# Patient Record
Sex: MALE | Race: BLACK OR AFRICAN AMERICAN | NOT HISPANIC OR LATINO | Employment: OTHER | URBAN - METROPOLITAN AREA
[De-identification: names, ages, dates, MRNs, and addresses within clinical notes are randomized per-mention and may not be internally consistent; named-entity substitution may affect disease eponyms.]

---

## 2017-12-12 ENCOUNTER — HOSPITAL ENCOUNTER (EMERGENCY)
Facility: CLINIC | Age: 67
Discharge: HOME | End: 2017-12-12
Attending: EMERGENCY MEDICINE | Admitting: EMERGENCY MEDICINE
Payer: OTHER MISCELLANEOUS

## 2017-12-12 ENCOUNTER — APPOINTMENT (OUTPATIENT)
Dept: GENERAL RADIOLOGY | Facility: CLINIC | Age: 67
End: 2017-12-12
Attending: EMERGENCY MEDICINE
Payer: OTHER MISCELLANEOUS

## 2017-12-12 VITALS
DIASTOLIC BLOOD PRESSURE: 100 MMHG | SYSTOLIC BLOOD PRESSURE: 141 MMHG | BODY MASS INDEX: 29.4 KG/M2 | TEMPERATURE: 98 F | RESPIRATION RATE: 16 BRPM | WEIGHT: 210 LBS | OXYGEN SATURATION: 98 % | HEIGHT: 71 IN

## 2017-12-12 DIAGNOSIS — T14.8XXA HEMATOMA: ICD-10-CM

## 2017-12-12 DIAGNOSIS — R07.89 CHEST WALL PAIN: ICD-10-CM

## 2017-12-12 PROCEDURE — 25000128 H RX IP 250 OP 636: Performed by: EMERGENCY MEDICINE

## 2017-12-12 PROCEDURE — 90715 TDAP VACCINE 7 YRS/> IM: CPT | Performed by: EMERGENCY MEDICINE

## 2017-12-12 PROCEDURE — 71101 X-RAY EXAM UNILAT RIBS/CHEST: CPT | Mod: RT

## 2017-12-12 PROCEDURE — 25000132 ZZH RX MED GY IP 250 OP 250 PS 637: Performed by: EMERGENCY MEDICINE

## 2017-12-12 PROCEDURE — 99283 EMERGENCY DEPT VISIT LOW MDM: CPT

## 2017-12-12 PROCEDURE — 90471 IMMUNIZATION ADMIN: CPT

## 2017-12-12 RX ORDER — ACETAMINOPHEN 325 MG/1
650 TABLET ORAL ONCE
Status: COMPLETED | OUTPATIENT
Start: 2017-12-12 | End: 2017-12-12

## 2017-12-12 RX ADMIN — CLOSTRIDIUM TETANI TOXOID ANTIGEN (FORMALDEHYDE INACTIVATED), CORYNEBACTERIUM DIPHTHERIAE TOXOID ANTIGEN (FORMALDEHYDE INACTIVATED), BORDETELLA PERTUSSIS TOXOID ANTIGEN (GLUTARALDEHYDE INACTIVATED), BORDETELLA PERTUSSIS FILAMENTOUS HEMAGGLUTININ ANTIGEN (FORMALDEHYDE INACTIVATED), BORDETELLA PERTUSSIS PERTACTIN ANTIGEN, AND BORDETELLA PERTUSSIS FIMBRIAE 2/3 ANTIGEN 0.5 ML: 5; 2; 2.5; 5; 3; 5 INJECTION, SUSPENSION INTRAMUSCULAR at 03:17

## 2017-12-12 RX ADMIN — ACETAMINOPHEN 650 MG: 325 TABLET, FILM COATED ORAL at 02:59

## 2017-12-12 ASSESSMENT — ENCOUNTER SYMPTOMS
SHORTNESS OF BREATH: 0
MYALGIAS: 1

## 2017-12-12 NOTE — ED NOTES
Bed: ED25  Expected date:   Expected time:   Means of arrival:   Comments:  531- 67 male chest pain

## 2017-12-12 NOTE — ED AVS SNAPSHOT
Emergency Department    [address] [place] [address]    Phone:  [phone]    Fax:  [phone]                                       Raj Warren   MRN: [id number]    Department:   Emergency Department   Date of Visit:  12/12/2017           After Visit Summary Signature Page     I have received my discharge instructions, and my questions have been answered. I have discussed any challenges I see with this plan with the nurse or doctor.    ..........................................................................................................................................  Patient/Patient Representative Signature      ..........................................................................................................................................  Patient Representative Print Name and Relationship to Patient    ..................................................               ................................................  Date                                            Time    ..........................................................................................................................................  Reviewed by Signature/Title    ...................................................              ..............................................  Date                                                            Time
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
## 2017-12-12 NOTE — LETTER
December 12, 2017      To Whom It May Concern:      Raj Warren was seen in our Emergency Department today, 12/12/17.  I expect his condition to improve over the next 2 days.  He may return to work/school when improved.    Sincerely,        Serg Miller, DO

## 2017-12-12 NOTE — ED NOTES
Received report from Samira TURPIN RN.   Assumed care at this time.  Belkis Andres RN,.......................................... 12/12/2017   3:31 AM

## 2018-02-19 ENCOUNTER — OFFICE VISIT (OUTPATIENT)
Dept: URGENT CARE | Facility: URGENT CARE | Age: 68
End: 2018-02-19
Payer: COMMERCIAL

## 2018-02-19 VITALS
SYSTOLIC BLOOD PRESSURE: 120 MMHG | HEART RATE: 106 BPM | RESPIRATION RATE: 22 BRPM | BODY MASS INDEX: 30.13 KG/M2 | TEMPERATURE: 98.6 F | WEIGHT: 216 LBS | DIASTOLIC BLOOD PRESSURE: 80 MMHG | OXYGEN SATURATION: 98 %

## 2018-02-19 DIAGNOSIS — R05.8 PRODUCTIVE COUGH: ICD-10-CM

## 2018-02-19 DIAGNOSIS — R50.9 FEVER IN ADULT: Primary | ICD-10-CM

## 2018-02-19 DIAGNOSIS — R09.89 CHEST CONGESTION: ICD-10-CM

## 2018-02-19 DIAGNOSIS — J10.1 INFLUENZA A: ICD-10-CM

## 2018-02-19 DIAGNOSIS — J98.01 ACUTE BRONCHOSPASM: ICD-10-CM

## 2018-02-19 LAB
FLUAV+FLUBV AG SPEC QL: NEGATIVE
FLUAV+FLUBV AG SPEC QL: POSITIVE
SPECIMEN SOURCE: ABNORMAL

## 2018-02-19 PROCEDURE — 87804 INFLUENZA ASSAY W/OPTIC: CPT | Mod: 59 | Performed by: FAMILY MEDICINE

## 2018-02-19 PROCEDURE — 99214 OFFICE O/P EST MOD 30 MIN: CPT | Performed by: PHYSICIAN ASSISTANT

## 2018-02-19 RX ORDER — ALBUTEROL SULFATE 90 UG/1
2 AEROSOL, METERED RESPIRATORY (INHALATION) EVERY 6 HOURS
Qty: 1 INHALER | Refills: 0 | Status: SHIPPED | OUTPATIENT
Start: 2018-02-19 | End: 2020-04-02

## 2018-02-19 RX ORDER — AZITHROMYCIN 250 MG/1
TABLET, FILM COATED ORAL
Qty: 6 TABLET | Refills: 0 | Status: SHIPPED | OUTPATIENT
Start: 2018-02-19 | End: 2020-04-02

## 2018-02-19 RX ORDER — CODEINE PHOSPHATE AND GUAIFENESIN 10; 100 MG/5ML; MG/5ML
5-10 SOLUTION ORAL
Qty: 120 ML | Refills: 0 | Status: SHIPPED | OUTPATIENT
Start: 2018-02-19 | End: 2020-04-02

## 2018-02-19 NOTE — LETTER
[facility]  [address] [address]  [phone]      February 19, 2018    RE:  Raj Warren                                                                                                                                                       [address]  [facility] [address]            To whom it may concern:    Raj Warren was seen in the [facility] today for a medical issue. Please excuse patient from work on 2/19/18 and 2/20/18. Pt may return to work on 2/21/18.        Sincerely,        [place], KOKO/ Tristin Browne, PAC    [place] [facility]

## 2018-02-19 NOTE — MR AVS SNAPSHOT
"              After Visit Summary   2018    Raj Warren    MRN: [id number]           Patient Information     Date Of Birth          1950        Visit Information        Provider Department      2018 2:25 PM Tristin Browne PA-C [facility]        Today's Diagnoses     Fever in adult    -  1    Influenza A        Productive cough        Chest congestion        Acute bronchospasm           Follow-ups after your visit        Who to contact     If you have questions or need follow up information about today's clinic visit or your schedule please contact [facility] directly at [phone].  Normal or non-critical lab and imaging results will be communicated to you by [employer], letter or phone within 4 business days after the clinic has received the results. If you do not hear from us within 7 days, please contact the clinic through [employer] or phone. If you have a critical or abnormal lab result, we will notify you by phone as soon as possible.  Submit refill requests through [employer] or call your pharmacy and they will forward the refill request to us. Please allow 3 business days for your refill to be completed.          Additional Information About Your Visit        MyChart Information     [employer] lets you send messages to your doctor, view your test results, renew your prescriptions, schedule appointments and more. To sign up, go to [email or web address] . Click on \"Log in\" on the left side of the screen, which will take you to the Welcome page. Then click on \"Sign up Now\" on the right side of the page.     You will be asked to enter the access code listed below, as well as some personal information. Please follow the directions to create your username and password.     Your access code is: [email or web address]  Expires: 3/12/2018  4:13 AM     Your access code will  in 90 days. If you need help or a new code, please call your [place] clinic or " Pt has rested quietly during the 12-7a shift. oob to br with assistance. Dawson drains x2 draining brownish fluid without diff. Pt remains alert and oriented x4. Denies pain this shift. ivf infusing via iv site without diff. Iv site benign. Edema noted in bilateral hands. sr up x3, bed lowered and locked, and call light within reach. [phone].        Care EveryWhere ID     This is your Care EveryWhere ID. This could be used by other organizations to access your [facility] medical records  [id number]        Your Vitals Were     Pulse Temperature Respirations Pulse Oximetry BMI (Body Mass Index)       106 98.6  F (37  C) 22 98% 30.13 kg/m2        Blood Pressure from Last 3 Encounters:   02/19/18 120/80   12/12/17 (!) 141/100   01/31/12 129/103    Weight from Last 3 Encounters:   02/19/18 216 lb (98 kg)   12/12/17 210 lb (95.3 kg)   01/30/12 205 lb (93 kg)              We Performed the Following     Influenza A/B antigen          Today's Medication Changes          These changes are accurate as of 2/19/18  4:37 PM.  If you have any questions, ask your nurse or doctor.               Start taking these medicines.        Dose/Directions    albuterol 108 (90 BASE) MCG/ACT Inhaler   Commonly known as:  PROVENTIL HFA   Used for:  Acute bronchospasm   Started by:  Tristin Browne PA-C        Dose:  2 puff   Inhale 2 puffs into the lungs every 6 hours   Quantity:  1 Inhaler   Refills:  0       azithromycin 250 MG tablet   Commonly known as:  ZITHROMAX   Used for:  Productive cough   Started by:  Tristin Browne PA-C        2 tabs po qd day 1, then 1 tab po qd days 2-5   Quantity:  6 tablet   Refills:  0       guaiFENesin-codeine 100-10 MG/5ML Soln solution   Commonly known as:  ROBITUSSIN AC   Used for:  Chest congestion   Started by:  Tristin Browne PA-C        Dose:  5-10 mL   Take 5-10 mLs by mouth nightly as needed for cough   Quantity:  120 mL   Refills:  0            Where to get your medicines      These medications were sent to [facility] [address] [address]     Phone:  [phone]     albuterol 108 (90 BASE) MCG/ACT Inhaler    azithromycin 250 MG tablet         Some of these will need a paper prescription and others can be bought over the counter.  Ask your nurse if  you have questions.     Bring a paper prescription for each of these medications     guaiFENesin-codeine 100-10 MG/5ML Soln solution                Primary Care Provider Fax #    Physician No Ref-Primary [phone]       No address on file        Equal Access to Services     [facility] : Gricelda mckeon ku haley Soestebanali, waaxda luqadaha, qaybta kaalmada adefredyyacharlotte, amy mccormickstarr amaro. So [facility] [phone].    ATENCIÓN: Si habla español, tiene a cheema disposición servicios gratuitos de asistencia lingüística. Llame al [phone].    We comply with applicable federal civil rights laws and [place] laws. We do not discriminate on the basis of race, color, national origin, age, disability, sex, sexual orientation, or gender identity.            Thank you!     Thank you for choosing [facility]  for your care. Our goal is always to provide you with excellent care. Hearing back from our patients is one way we can continue to improve our services. Please take a few minutes to complete the written survey that you may receive in the mail after your visit with us. Thank you!             Your Updated Medication List - Protect others around you: Learn how to safely use, store and throw away your medicines at www.disposemymeds.org.          This list is accurate as of 2/19/18  4:37 PM.  Always use your most recent med list.                   Brand Name Dispense Instructions for use Diagnosis    albuterol 108 (90 BASE) MCG/ACT Inhaler    PROVENTIL HFA    1 Inhaler    Inhale 2 puffs into the lungs every 6 hours    Acute bronchospasm       azithromycin 250 MG tablet    ZITHROMAX    6 tablet    2 tabs po qd day 1, then 1 tab po qd days 2-5    Productive cough       CLARITIN PO      Take  by mouth.        FLONASE 50 MCG/ACT spray   Generic drug:  fluticasone      INHALE 2 SPRAYS IN EACH NOSTRIL ONCE DAILY at bedtime        guaiFENesin-codeine 100-10 MG/5ML Soln solution    ROBITUSSIN  AC    120 mL    Take 5-10 mLs by mouth nightly as needed for cough    Chest congestion       meclizine 25 MG tablet    ANTIVERT    30 Tab    ONE TABLET FOUR TIMES DAILY AS NEEDED    Labyrinthitis       TYLENOL COLD MAX 10-5-325 MG Tabs   Generic drug:  DM-Phenylephrine-Acetaminophen      Take by mouth as needed        ZYRTEC-D 5-120 MG per 12 hr tablet   Generic drug:  cetirizine-psuedoePHEDrine      1 TABLET TWICE DAILY AS NEEDED

## 2018-02-19 NOTE — PROGRESS NOTES
SUBJECTIVE:   Raj Warren is a 67 year old male presenting with a chief complaint of chest congestion, productive cough and body aches with fver.  Onset of symptoms was 5- days  ago.  Course of illness is worsening.    Severity moderate  Current and Associated symptoms: chest congestion, productive cough, bronchospasms  Treatment measures tried include OTC medications.  Predisposing factors include influenza A.    Past Medical History:   Diagnosis Date     Allergic state         Allergies   Allergen Reactions     Seasonal Allergies          Social History   Substance Use Topics     Smoking status: Former Smoker     Types: Cigarettes     Quit date: 1/1/1989     Smokeless tobacco: Never Used      Comment: quit in 1989  had smoked about 1/2 pack a day then cut back     Alcohol use No       ROS:  CONSTITUTIONAL:POSITIVE  for fever and chills  INTEGUMENTARY/SKIN: NEGATIVE for worrisome rashes, moles or lesions  ENT/MOUTH: positive for runny nose, throat pain  RESP:POSITIVE for coughing and chest congestion  CV: NEGATIVE for chest pain, palpitations or peripheral edema  GI: NEGATIVE for nausea, abdominal pain, heartburn, or change in bowel habits  MUSCULOSKELETAL: positive for body aches  NEURO: NEGATIVE for weakness, dizziness or paresthesias    OBJECTIVE  :/80  Pulse 106  Temp 98.6  F (37  C)  Resp 22  Wt 216 lb (98 kg)  SpO2 98%  BMI 30.13 kg/m2  GENERAL APPEARANCE: healthy, alert and no distress  EYES: EOMI,  PERRL, conjunctiva clear  HENT: ear canals and TM's normal.  Nose and mouth without ulcers, erythema or lesions  NECK: supple, nontender, no lymphadenopathy  RESP: Positive for wheezing and bronchospasms  CV: regular rates and rhythm, normal S1 S2, no murmur noted  NEURO: Normal strength and tone, sensory exam grossly normal,  normal speech and mentation  SKIN: no suspicious lesions or rashes    Results for orders placed or performed in visit on 02/19/18   Influenza A/B antigen   Result Value Ref  Range    Influenza A/B Agn Specimen Nasopharyngeal     Influenza A Positive (A) NEG^Negative    Influenza B Negative NEG^Negative       ASSESSMENT/PLAN:    ICD-10-CM    1. Fever in adult R50.9 Influenza A/B antigen   2. Influenza A J10.1    3. Productive cough R05 azithromycin (ZITHROMAX) 250 MG tablet   4. Chest congestion R09.89 guaiFENesin-codeine (ROBITUSSIN AC) 100-10 MG/5ML SOLN solution   5. Acute bronchospasm J98.01 albuterol (PROVENTIL HFA) 108 (90 BASE) MCG/ACT Inhaler     Patient given information about influenza.  Patient understands they are contagious until their fever has resolved without the use of motrin or tylenol.  At that time they can return to school/work.  Patient is to monitor for any worsening symptoms and return to the clinic if this occurs.  The most common complication of influenza is Pneumonia or other respiratory problems especially in those with underlying lung problems including asthma and COPD.  Patient will follow up if this occurs.    Patient given inhaler and zpak for bronchospasms and infection  Follow up with PCP as needed  See orders in Epic

## 2019-08-07 ENCOUNTER — HOSPITAL PATHOLOGY (OUTPATIENT)
Dept: OTHER | Facility: CLINIC | Age: 69
End: 2019-08-07

## 2019-08-09 LAB — COPATH REPORT: NORMAL

## 2020-04-02 ENCOUNTER — OFFICE VISIT (OUTPATIENT)
Dept: URGENT CARE | Facility: URGENT CARE | Age: 70
End: 2020-04-02
Payer: COMMERCIAL

## 2020-04-02 VITALS — HEART RATE: 83 BPM | SYSTOLIC BLOOD PRESSURE: 157 MMHG | DIASTOLIC BLOOD PRESSURE: 88 MMHG | TEMPERATURE: 98 F

## 2020-04-02 DIAGNOSIS — L29.9 ITCHING: ICD-10-CM

## 2020-04-02 DIAGNOSIS — J30.89 SEASONAL ALLERGIC RHINITIS DUE TO OTHER ALLERGIC TRIGGER: ICD-10-CM

## 2020-04-02 DIAGNOSIS — L50.9 HIVES: Primary | ICD-10-CM

## 2020-04-02 PROCEDURE — 99214 OFFICE O/P EST MOD 30 MIN: CPT | Performed by: PHYSICIAN ASSISTANT

## 2020-04-02 RX ORDER — METHYLPREDNISOLONE 4 MG
TABLET, DOSE PACK ORAL
Qty: 21 TABLET | Refills: 0 | Status: ON HOLD | OUTPATIENT
Start: 2020-04-02 | End: 2021-04-12

## 2020-04-02 RX ORDER — CETIRIZINE HYDROCHLORIDE 10 MG/1
10 TABLET ORAL DAILY
Qty: 30 TABLET | Refills: 0 | Status: ON HOLD | OUTPATIENT
Start: 2020-04-02 | End: 2021-04-12

## 2020-04-02 NOTE — PROGRESS NOTES
SUBJECTIVE:   Raj Warren is a 69 year old male presenting with a chief complaint of itching and hives.  Onset of symptoms was 4 day(s) ago.  Course of illness is worsening.    Severity moderate  Current and Associated symptoms: runny nose, stuffy nose and itching  Treatment measures tried include none.  Predisposing factors include allergies.    Past Medical History:   Diagnosis Date     Allergic state         Allergies   Allergen Reactions     Pollen Extract      Seasonal Allergies      Family History   Problem Relation Age of Onset     Circulatory Mother         blood clots     Alcohol/Drug Father         alcohol drank heavily     Alcohol/Drug Brother         alcohol         Social History     Tobacco Use     Smoking status: Former Smoker     Types: Cigarettes     Last attempt to quit: 1989     Years since quittin.2     Smokeless tobacco: Never Used     Tobacco comment: quit in   had smoked about 1/2 pack a day then cut back   Substance Use Topics     Alcohol use: No       ROS:  CONSTITUTIONAL:NEGATIVE for fever, chills, change in weight  INTEGUMENTARY/SKIN: POSITIVE for itching and rash  EYES: NEGATIVE for vision changes or irritation  ENT/MOUTH: POSITIVE for runny nose  RESP:NEGATIVE for significant cough or SOB  CV: NEGATIVE for chest pain, palpitations or peripheral edema  GI: NEGATIVE for nausea, abdominal pain, heartburn, or change in bowel habits  : negative for dysuria, hematuria, decreased urinary stream, erectile dysfunction  MUSCULOSKELETAL: NEGATIVE for significant arthralgias or myalgia  NEURO: NEGATIVE for weakness, dizziness or paresthesias    OBJECTIVE  :BP (!) 157/88   Pulse 83   Temp 98  F (36.7  C) (Oral)   GENERAL APPEARANCE: healthy, alert and no distress  EYES: EOMI,  PERRL, conjunctiva clear  HENT: TM's normal bilaterally and rhinorrhea clear  NECK: supple, nontender, no lymphadenopathy  RESP: lungs clear to auscultation - no rales, rhonchi or wheezes  CV: regular rates  and rhythm, normal S1 S2, no murmur noted  ABDOMEN:  soft, nontender, no HSM or masses and bowel sounds normal  NEURO: Normal strength and tone, sensory exam grossly normal,  normal speech and mentation  SKIN: hives and itching    ASSESSMENT/PLAN      ICD-10-CM    1. Hives  L50.9 cetirizine (ZYRTEC) 10 MG tablet     methylPREDNISolone (MEDROL DOSEPAK) 4 MG tablet therapy pack   2. Itching  L29.9 cetirizine (ZYRTEC) 10 MG tablet     methylPREDNISolone (MEDROL DOSEPAK) 4 MG tablet therapy pack   3. Seasonal allergic rhinitis due to other allergic trigger  J30.89        Orders Placed This Encounter     cetirizine (ZYRTEC) 10 MG tablet     methylPREDNISolone (MEDROL DOSEPAK) 4 MG tablet therapy pack       Use zyrtec for rash and itching, allergies  Medrol for rash  Follow up with PCP as needed

## 2021-04-05 ENCOUNTER — APPOINTMENT (OUTPATIENT)
Dept: GENERAL RADIOLOGY | Facility: CLINIC | Age: 71
End: 2021-04-05
Attending: EMERGENCY MEDICINE
Payer: COMMERCIAL

## 2021-04-05 ENCOUNTER — HOSPITAL ENCOUNTER (EMERGENCY)
Facility: CLINIC | Age: 71
Discharge: HOME | End: 2021-04-05
Attending: EMERGENCY MEDICINE | Admitting: EMERGENCY MEDICINE
Payer: COMMERCIAL

## 2021-04-05 VITALS
HEART RATE: 102 BPM | DIASTOLIC BLOOD PRESSURE: 108 MMHG | SYSTOLIC BLOOD PRESSURE: 161 MMHG | OXYGEN SATURATION: 97 % | RESPIRATION RATE: 18 BRPM

## 2021-04-05 DIAGNOSIS — R06.1 STRIDOR: ICD-10-CM

## 2021-04-05 LAB
ANION GAP SERPL CALCULATED.3IONS-SCNC: 4 MMOL/L (ref 3–14)
BASOPHILS # BLD AUTO: 0.1 10E9/L (ref 0–0.2)
BASOPHILS NFR BLD AUTO: 0.6 %
BUN SERPL-MCNC: 14 MG/DL (ref 7–30)
CALCIUM SERPL-MCNC: 8.6 MG/DL (ref 8.5–10.1)
CHLORIDE SERPL-SCNC: 109 MMOL/L (ref 94–109)
CO2 SERPL-SCNC: 27 MMOL/L (ref 20–32)
CREAT SERPL-MCNC: 0.97 MG/DL (ref 0.66–1.25)
DIFFERENTIAL METHOD BLD: NORMAL
EOSINOPHIL # BLD AUTO: 0.4 10E9/L (ref 0–0.7)
EOSINOPHIL NFR BLD AUTO: 4.4 %
ERYTHROCYTE [DISTWIDTH] IN BLOOD BY AUTOMATED COUNT: 12.9 % (ref 10–15)
GFR SERPL CREATININE-BSD FRML MDRD: 79 ML/MIN/{1.73_M2}
GLUCOSE SERPL-MCNC: 114 MG/DL (ref 70–99)
HCT VFR BLD AUTO: 45.7 % (ref 40–53)
HGB BLD-MCNC: 15.9 G/DL (ref 13.3–17.7)
IMM GRANULOCYTES # BLD: 0 10E9/L (ref 0–0.4)
IMM GRANULOCYTES NFR BLD: 0.3 %
INTERPRETATION ECG - MUSE: NORMAL
LYMPHOCYTES # BLD AUTO: 3.7 10E9/L (ref 0.8–5.3)
LYMPHOCYTES NFR BLD AUTO: 46.9 %
MCH RBC QN AUTO: 30.1 PG (ref 26.5–33)
MCHC RBC AUTO-ENTMCNC: 34.8 G/DL (ref 31.5–36.5)
MCV RBC AUTO: 86 FL (ref 78–100)
MONOCYTES # BLD AUTO: 0.8 10E9/L (ref 0–1.3)
MONOCYTES NFR BLD AUTO: 9.9 %
NEUTROPHILS # BLD AUTO: 3 10E9/L (ref 1.6–8.3)
NEUTROPHILS NFR BLD AUTO: 37.9 %
NRBC # BLD AUTO: 0 10*3/UL
NRBC BLD AUTO-RTO: 0 /100
PLATELET # BLD AUTO: 285 10E9/L (ref 150–450)
POTASSIUM SERPL-SCNC: 4.1 MMOL/L (ref 3.4–5.3)
RBC # BLD AUTO: 5.29 10E12/L (ref 4.4–5.9)
SODIUM SERPL-SCNC: 140 MMOL/L (ref 133–144)
WBC # BLD AUTO: 7.9 10E9/L (ref 4–11)

## 2021-04-05 PROCEDURE — 96374 THER/PROPH/DIAG INJ IV PUSH: CPT

## 2021-04-05 PROCEDURE — 250N000013 HC RX MED GY IP 250 OP 250 PS 637: Performed by: EMERGENCY MEDICINE

## 2021-04-05 PROCEDURE — 93005 ELECTROCARDIOGRAM TRACING: CPT

## 2021-04-05 PROCEDURE — 70360 X-RAY EXAM OF NECK: CPT

## 2021-04-05 PROCEDURE — 250N000009 HC RX 250

## 2021-04-05 PROCEDURE — 250N000011 HC RX IP 250 OP 636: Performed by: EMERGENCY MEDICINE

## 2021-04-05 PROCEDURE — 94640 AIRWAY INHALATION TREATMENT: CPT

## 2021-04-05 PROCEDURE — 99285 EMERGENCY DEPT VISIT HI MDM: CPT | Mod: 25

## 2021-04-05 PROCEDURE — 71045 X-RAY EXAM CHEST 1 VIEW: CPT

## 2021-04-05 PROCEDURE — 85025 COMPLETE CBC W/AUTO DIFF WBC: CPT | Performed by: EMERGENCY MEDICINE

## 2021-04-05 PROCEDURE — C9803 HOPD COVID-19 SPEC COLLECT: HCPCS

## 2021-04-05 PROCEDURE — 250N000009 HC RX 250: Performed by: EMERGENCY MEDICINE

## 2021-04-05 PROCEDURE — 80048 BASIC METABOLIC PNL TOTAL CA: CPT | Performed by: EMERGENCY MEDICINE

## 2021-04-05 RX ORDER — PREDNISONE 20 MG/1
40 TABLET ORAL DAILY
Qty: 10 TABLET | Refills: 0 | Status: SHIPPED | OUTPATIENT
Start: 2021-04-05 | End: 2021-04-10

## 2021-04-05 RX ORDER — IPRATROPIUM BROMIDE AND ALBUTEROL SULFATE 2.5; .5 MG/3ML; MG/3ML
3 SOLUTION RESPIRATORY (INHALATION) ONCE
Status: COMPLETED | OUTPATIENT
Start: 2021-04-05 | End: 2021-04-05

## 2021-04-05 RX ORDER — IPRATROPIUM BROMIDE AND ALBUTEROL SULFATE 2.5; .5 MG/3ML; MG/3ML
3 SOLUTION RESPIRATORY (INHALATION) ONCE
Status: DISCONTINUED | OUTPATIENT
Start: 2021-04-05 | End: 2021-04-05 | Stop reason: HOSPADM

## 2021-04-05 RX ORDER — METHYLPREDNISOLONE SODIUM SUCCINATE 125 MG/2ML
125 INJECTION, POWDER, LYOPHILIZED, FOR SOLUTION INTRAMUSCULAR; INTRAVENOUS ONCE
Status: COMPLETED | OUTPATIENT
Start: 2021-04-05 | End: 2021-04-05

## 2021-04-05 RX ORDER — IPRATROPIUM BROMIDE AND ALBUTEROL SULFATE 2.5; .5 MG/3ML; MG/3ML
SOLUTION RESPIRATORY (INHALATION)
Status: COMPLETED
Start: 2021-04-05 | End: 2021-04-05

## 2021-04-05 RX ADMIN — IPRATROPIUM BROMIDE AND ALBUTEROL SULFATE 3 ML: 2.5; .5 SOLUTION RESPIRATORY (INHALATION) at 06:35

## 2021-04-05 RX ADMIN — IPRATROPIUM BROMIDE AND ALBUTEROL SULFATE 3 ML: .5; 3 SOLUTION RESPIRATORY (INHALATION) at 06:35

## 2021-04-05 RX ADMIN — RACEPINEPHRINE HYDROCHLORIDE 0.5 ML: 11.25 SOLUTION RESPIRATORY (INHALATION) at 08:44

## 2021-04-05 RX ADMIN — IPRATROPIUM BROMIDE AND ALBUTEROL SULFATE 3 ML: .5; 3 SOLUTION RESPIRATORY (INHALATION) at 07:02

## 2021-04-05 RX ADMIN — METHYLPREDNISOLONE SODIUM SUCCINATE 125 MG: 125 INJECTION, POWDER, FOR SOLUTION INTRAMUSCULAR; INTRAVENOUS at 06:54

## 2021-04-05 ASSESSMENT — ENCOUNTER SYMPTOMS
NAUSEA: 0
SHORTNESS OF BREATH: 1
WHEEZING: 1
COUGH: 0
DIAPHORESIS: 0
CHEST TIGHTNESS: 1
FEVER: 0
CHILLS: 0

## 2021-04-05 NOTE — ED PROVIDER NOTES
History   Chief Complaint:  Shortness of Breath    HPI   Raj Warren is a 70 year old male, who presents to the ED for evaluation of shortness of breath. The patient reports he has been dealing with some shortness of breath for the past week. He says he lives on the third floor of his building and when he walks up the three flights of stairs he feels as if he has extreme chest tightness and that he is having a heart attack. He says he is wheezing, has a sore throat, bilateral ear soreness and that he has to turn the air conditioner on and sit for about 7-8 minutes before his symptoms subside. He cannot tell if he feels that his sore throat is swelling or closing, rather it just hurts. He has not had any fever or nausea. The patient denies any chest pain rather it just feels tight. He denies any diaphoresis, chills, cough, or any other acute symptoms. He had 2 negative covid tests recently, last one 3/25/21.     Review of Systems   Constitutional: Negative for chills, diaphoresis and fever.   Respiratory: Positive for chest tightness, shortness of breath and wheezing. Negative for cough.    Cardiovascular: Negative for chest pain.   Gastrointestinal: Negative for nausea.   All other systems reviewed and are negative.    Allergies:  No known drug allergies    Medications:    The patient is not currently taking any prescribed medications.    Past Medical History:    Aphthous ulcer    Past Surgical History:    The patient denies past surgical history.     Family History:    Circulatory abnormalities  Alcohol abuse    Social History:  Presents to the ED alone  Lives independently    Physical Exam     Patient Vitals for the past 24 hrs:   BP Pulse Resp SpO2   04/05/21 0628 (!) 161/108 102 18 97 %       Physical Exam  General/Appearance: appears stated age, well-groomed, appears comfortable, stridulous, hoarse voice which pt states is his baseline  Eyes: EOMI, no scleral injection, no icterus  ENT: MMM  Neck: supple,  nl ROM, no stiffness  Cardiovascular: tachy but regular, nl S1S2, no m/r/g, 2+ pulses in all 4 extremities, cap refill <2sec  Respiratory: stridor vs wheezing, mild increased WOB but able to communicate in full sentences  GI: abd soft, non-distended, nttp,  no HSM, no rebound, no guarding, nl BS  MSK: SIMPSON, good tone, no bony abnormality  Skin: warm and well-perfused, no rash, no edema, no ecchymosis, nl turgor  Neuro: GCS 15, alert and oriented, no gross focal neuro deficits  Psych: interacts appropriately  Heme: no petechia, no purpura, no active bleeding        Emergency Department Course   ECG (06:49:53):  Rate 100 bpm. AR interval 154. QRS duration 68. QT/QTc 346/446. P-R-T axes 77 42 76. Normal sinu rhythm. Normal ECG. Interpreted at 0652 by Samantha Soliman MD.    Imaging:    XR Chest, portable, 1 view:  Unremarkable single view of the chest.     Neck soft tissue XR:  The radiographic appearance of the base of tongue and   epiglottis is within normal limits. The prevertebral/retropharyngeal   soft tissues appear within normal limits. Multiple dental restorations   are partially seen. No definite radiopaque foreign body identified.   Multilevel degenerative disc disease is noted in the cervical spine.   If there is ongoing clinical concern for significant upper airway   stenosis or soft tissue abnormality, consider direct inspection or CT   of the neck, as clinically warranted.     Imaging independently reviewed and agree with radiologist interpretation.      Laboratory:  CBC: WBC 7.9, HGB 15.9,     BMP:  (H), o/w WNL (Creatinine 0.97)    Emergency Department Course:    Reviewed:  I reviewed the patient's nursing notes, vitals, past available medical records.     Assessments:  0630: I obtained history and examined the patient as noted above.     0711: I rechecked the patient. The patient states has had great improvement in his symptoms since he arrived.     0824: I rechecked the  "patient. Explained findings to patient. The patient is feeling much improved.    0905: I rechecked the patient after receiving additional medication and he stated \" my breathing is as good as it is going to get\". He does feel improved and ready for discharge.     Interventions:  0635: Duoneb 3 mL nebulization   0654: Solu-Medrol 125 mg IV  0702: Duoneb 3 mL nebulization   0844: Racepinephrine 0.5 mL nebulization     Disposition:  The patient was discharged to home.    Impression & Plan    Medical Decision Making:  This patient is a very pleasant 70-year-old male who presents with shortness of breath for several days.  On initial presentation it was noted that he had a fairly hoarse voice but he states this is his baseline.  It was difficult to tell if it was wheezing versus stridor.  We attempted to DuoNeb without any change in the audible noises or his symptoms.  At that point we tried a racemic epi which markedly proved how he felt and resolved the audible breathing noises.  This is consistent with stridor.  He is already gotten Solu-Medrol and I think would benefit from a steroid burst.  He clinically was able to complete full sentences, ambulate through the hallway, maintain normal oxygen saturations.  We did get a neck x-ray which did not show any evidence of enlarged epiglottis.  Because of this I suspect this is some parainfluenza or such virus and that this can be managed symptomatically at home.  He wishes to be discharged.  I said this is fine and we can remove the IV and not watch him for a couple hours after the racemic epi dose as long as, if he notices any worsening or recurrence of the audible stridor, he needs to return to the ER immediately.  He says he is willing to do this and thus will be discharged.    Covid-19  Raj Warren was evaluated during a global COVID-19 pandemic, which necessitated consideration that the patient might be at risk for infection with the SARS-CoV-2 virus that causes " COVID-19.   Applicable protocols for evaluation were followed during the patient's care.   COVID-19 was considered as part of the patient's evaluation.    Diagnosis:    ICD-10-CM    1. Stridor  R06.1        Discharge Medications:  New Prescriptions    PREDNISONE (DELTASONE) 20 MG TABLET    Take 2 tablets (40 mg) by mouth daily for 5 days     Scribe Disclosure:  Damien LOCKETT, am serving as a scribe at 6:30 AM on 4/5/2021 to document services personally performed by Samantha Soliman MD based on my observations and the provider's statements to me.      Samantha Soliman MD  04/05/21 4300

## 2021-04-12 ENCOUNTER — APPOINTMENT (OUTPATIENT)
Dept: CT IMAGING | Facility: CLINIC | Age: 71
DRG: 013 | End: 2021-04-12
Attending: EMERGENCY MEDICINE
Payer: COMMERCIAL

## 2021-04-12 ENCOUNTER — APPOINTMENT (OUTPATIENT)
Dept: GENERAL RADIOLOGY | Facility: CLINIC | Age: 71
DRG: 013 | End: 2021-04-12
Attending: EMERGENCY MEDICINE
Payer: COMMERCIAL

## 2021-04-12 ENCOUNTER — ANESTHESIA (OUTPATIENT)
Dept: SURGERY | Facility: CLINIC | Age: 71
DRG: 013 | End: 2021-04-12
Payer: COMMERCIAL

## 2021-04-12 ENCOUNTER — ANESTHESIA EVENT (OUTPATIENT)
Dept: SURGERY | Facility: CLINIC | Age: 71
DRG: 013 | End: 2021-04-12
Payer: COMMERCIAL

## 2021-04-12 ENCOUNTER — HOSPITAL ENCOUNTER (INPATIENT)
Facility: CLINIC | Age: 71
LOS: 5 days | Discharge: HOME | DRG: 013 | End: 2021-04-17
Attending: EMERGENCY MEDICINE | Admitting: INTERNAL MEDICINE
Payer: COMMERCIAL

## 2021-04-12 DIAGNOSIS — R52 PAIN: ICD-10-CM

## 2021-04-12 DIAGNOSIS — R06.1 STRIDOR: Primary | ICD-10-CM

## 2021-04-12 DIAGNOSIS — J38.3 VOCAL CORD MASS: ICD-10-CM

## 2021-04-12 LAB
ANION GAP SERPL CALCULATED.3IONS-SCNC: 5 MMOL/L (ref 3–14)
BASE DEFICIT BLDV-SCNC: 0.5 MMOL/L
BASOPHILS # BLD AUTO: 0.4 10E9/L (ref 0–0.2)
BASOPHILS NFR BLD AUTO: 2 %
BUN SERPL-MCNC: 23 MG/DL (ref 7–30)
CALCIUM SERPL-MCNC: 8.7 MG/DL (ref 8.5–10.1)
CHLORIDE SERPL-SCNC: 106 MMOL/L (ref 94–109)
CO2 SERPL-SCNC: 26 MMOL/L (ref 20–32)
CREAT SERPL-MCNC: 1.02 MG/DL (ref 0.66–1.25)
DACRYOCYTES BLD QL SMEAR: SLIGHT
DIFFERENTIAL METHOD BLD: ABNORMAL
EOSINOPHIL # BLD AUTO: 0.2 10E9/L (ref 0–0.7)
EOSINOPHIL NFR BLD AUTO: 1 %
ERYTHROCYTE [DISTWIDTH] IN BLOOD BY AUTOMATED COUNT: 13.2 % (ref 10–15)
FLUAV RNA RESP QL NAA+PROBE: NEGATIVE
FLUBV RNA RESP QL NAA+PROBE: NEGATIVE
GFR SERPL CREATININE-BSD FRML MDRD: 75 ML/MIN/{1.73_M2}
GLUCOSE BLDC GLUCOMTR-MCNC: 144 MG/DL (ref 70–99)
GLUCOSE BLDC GLUCOMTR-MCNC: 163 MG/DL (ref 70–99)
GLUCOSE BLDC GLUCOMTR-MCNC: 164 MG/DL (ref 70–99)
GLUCOSE BLDC GLUCOMTR-MCNC: 167 MG/DL (ref 70–99)
GLUCOSE BLDC GLUCOMTR-MCNC: 186 MG/DL (ref 70–99)
GLUCOSE BLDC GLUCOMTR-MCNC: 207 MG/DL (ref 70–99)
GLUCOSE SERPL-MCNC: 119 MG/DL (ref 70–99)
HBA1C MFR BLD: 5.6 % (ref 0–5.6)
HCO3 BLDV-SCNC: 27 MMOL/L (ref 21–28)
HCT VFR BLD AUTO: 48.3 % (ref 40–53)
HGB BLD-MCNC: 16.9 G/DL (ref 13.3–17.7)
INTERPRETATION ECG - MUSE: NORMAL
LABORATORY COMMENT REPORT: NORMAL
LYMPHOCYTES # BLD AUTO: 10.6 10E9/L (ref 0.8–5.3)
LYMPHOCYTES NFR BLD AUTO: 54 %
MAGNESIUM SERPL-MCNC: 2.4 MG/DL (ref 1.6–2.3)
MCH RBC QN AUTO: 30.1 PG (ref 26.5–33)
MCHC RBC AUTO-ENTMCNC: 35 G/DL (ref 31.5–36.5)
MCV RBC AUTO: 86 FL (ref 78–100)
MONOCYTES # BLD AUTO: 1.4 10E9/L (ref 0–1.3)
MONOCYTES NFR BLD AUTO: 7 %
NEUTROPHILS # BLD AUTO: 7.1 10E9/L (ref 1.6–8.3)
NEUTROPHILS NFR BLD AUTO: 36 %
OXYHGB MFR BLDV: 86 %
PCO2 BLDV: 51 MM HG (ref 40–50)
PH BLDV: 7.33 PH (ref 7.32–7.43)
PLATELET # BLD AUTO: 369 10E9/L (ref 150–450)
PLATELET # BLD EST: ABNORMAL 10*3/UL
PO2 BLDV: 54 MM HG (ref 25–47)
POTASSIUM SERPL-SCNC: 4.1 MMOL/L (ref 3.4–5.3)
RBC # BLD AUTO: 5.61 10E12/L (ref 4.4–5.9)
RBC MORPH BLD: ABNORMAL
RSV RNA SPEC QL NAA+PROBE: NORMAL
SARS-COV-2 RNA RESP QL NAA+PROBE: NEGATIVE
SODIUM SERPL-SCNC: 137 MMOL/L (ref 133–144)
SPECIMEN SOURCE: NORMAL
T4 FREE SERPL-MCNC: 1.17 NG/DL (ref 0.76–1.46)
TSH SERPL DL<=0.005 MIU/L-ACNC: 4.8 MU/L (ref 0.4–4)
VARIANT LYMPHS BLD QL SMEAR: PRESENT
WBC # BLD AUTO: 19.6 10E9/L (ref 4–11)

## 2021-04-12 PROCEDURE — 250N000013 HC RX MED GY IP 250 OP 250 PS 637

## 2021-04-12 PROCEDURE — 71275 CT ANGIOGRAPHY CHEST: CPT

## 2021-04-12 PROCEDURE — 99223 1ST HOSP IP/OBS HIGH 75: CPT | Mod: AI | Performed by: INTERNAL MEDICINE

## 2021-04-12 PROCEDURE — 70491 CT SOFT TISSUE NECK W/DYE: CPT

## 2021-04-12 PROCEDURE — 88305 TISSUE EXAM BY PATHOLOGIST: CPT | Mod: 26 | Performed by: PATHOLOGY

## 2021-04-12 PROCEDURE — 250N000011 HC RX IP 250 OP 636: Performed by: EMERGENCY MEDICINE

## 2021-04-12 PROCEDURE — 250N000025 HC SEVOFLURANE, PER MIN: Performed by: OTOLARYNGOLOGY

## 2021-04-12 PROCEDURE — 258N000003 HC RX IP 258 OP 636: Performed by: NURSE ANESTHETIST, CERTIFIED REGISTERED

## 2021-04-12 PROCEDURE — 93005 ELECTROCARDIOGRAM TRACING: CPT

## 2021-04-12 PROCEDURE — 272N000001 HC OR GENERAL SUPPLY STERILE: Performed by: OTOLARYNGOLOGY

## 2021-04-12 PROCEDURE — 999N000141 HC STATISTIC PRE-PROCEDURE NURSING ASSESSMENT: Performed by: OTOLARYNGOLOGY

## 2021-04-12 PROCEDURE — 999N000157 HC STATISTIC RCP TIME EA 10 MIN

## 2021-04-12 PROCEDURE — 258N000003 HC RX IP 258 OP 636: Performed by: INTERNAL MEDICINE

## 2021-04-12 PROCEDURE — 250N000013 HC RX MED GY IP 250 OP 250 PS 637: Performed by: EMERGENCY MEDICINE

## 2021-04-12 PROCEDURE — 85025 COMPLETE CBC W/AUTO DIFF WBC: CPT | Performed by: EMERGENCY MEDICINE

## 2021-04-12 PROCEDURE — 250N000011 HC RX IP 250 OP 636: Performed by: ANESTHESIOLOGY

## 2021-04-12 PROCEDURE — 0CBS8ZX EXCISION OF LARYNX, VIA NATURAL OR ARTIFICIAL OPENING ENDOSCOPIC, DIAGNOSTIC: ICD-10-PCS | Performed by: OTOLARYNGOLOGY

## 2021-04-12 PROCEDURE — 96374 THER/PROPH/DIAG INJ IV PUSH: CPT | Mod: 59

## 2021-04-12 PROCEDURE — 99292 CRITICAL CARE ADDL 30 MIN: CPT

## 2021-04-12 PROCEDURE — 200N000001 HC R&B ICU

## 2021-04-12 PROCEDURE — 82805 BLOOD GASES W/O2 SATURATION: CPT | Performed by: EMERGENCY MEDICINE

## 2021-04-12 PROCEDURE — 0B110F4 BYPASS TRACHEA TO CUTANEOUS WITH TRACHEOSTOMY DEVICE, OPEN APPROACH: ICD-10-PCS | Performed by: OTOLARYNGOLOGY

## 2021-04-12 PROCEDURE — 99291 CRITICAL CARE FIRST HOUR: CPT | Mod: 25

## 2021-04-12 PROCEDURE — 71045 X-RAY EXAM CHEST 1 VIEW: CPT

## 2021-04-12 PROCEDURE — 83735 ASSAY OF MAGNESIUM: CPT | Performed by: EMERGENCY MEDICINE

## 2021-04-12 PROCEDURE — 250N000011 HC RX IP 250 OP 636: Performed by: OTOLARYNGOLOGY

## 2021-04-12 PROCEDURE — C9803 HOPD COVID-19 SPEC COLLECT: HCPCS

## 2021-04-12 PROCEDURE — 250N000011 HC RX IP 250 OP 636: Performed by: HOSPITALIST

## 2021-04-12 PROCEDURE — 80048 BASIC METABOLIC PNL TOTAL CA: CPT | Performed by: EMERGENCY MEDICINE

## 2021-04-12 PROCEDURE — 84443 ASSAY THYROID STIM HORMONE: CPT | Performed by: EMERGENCY MEDICINE

## 2021-04-12 PROCEDURE — 999N001017 HC STATISTIC GLUCOSE BY METER IP

## 2021-04-12 PROCEDURE — 360N000076 HC SURGERY LEVEL 3, PER MIN: Performed by: OTOLARYNGOLOGY

## 2021-04-12 PROCEDURE — 250N000009 HC RX 250: Performed by: EMERGENCY MEDICINE

## 2021-04-12 PROCEDURE — 250N000009 HC RX 250: Performed by: NURSE ANESTHETIST, CERTIFIED REGISTERED

## 2021-04-12 PROCEDURE — 258N000003 HC RX IP 258 OP 636: Performed by: ANESTHESIOLOGY

## 2021-04-12 PROCEDURE — 250N000011 HC RX IP 250 OP 636: Performed by: INTERNAL MEDICINE

## 2021-04-12 PROCEDURE — 88305 TISSUE EXAM BY PATHOLOGIST: CPT | Mod: TC | Performed by: OTOLARYNGOLOGY

## 2021-04-12 PROCEDURE — 83036 HEMOGLOBIN GLYCOSYLATED A1C: CPT | Performed by: EMERGENCY MEDICINE

## 2021-04-12 PROCEDURE — 87636 SARSCOV2 & INF A&B AMP PRB: CPT | Performed by: EMERGENCY MEDICINE

## 2021-04-12 PROCEDURE — 84439 ASSAY OF FREE THYROXINE: CPT | Performed by: EMERGENCY MEDICINE

## 2021-04-12 PROCEDURE — 370N000017 HC ANESTHESIA TECHNICAL FEE, PER MIN: Performed by: OTOLARYNGOLOGY

## 2021-04-12 PROCEDURE — 710N000010 HC RECOVERY PHASE 1, LEVEL 2, PER MIN: Performed by: OTOLARYNGOLOGY

## 2021-04-12 PROCEDURE — 250N000011 HC RX IP 250 OP 636: Performed by: NURSE ANESTHETIST, CERTIFIED REGISTERED

## 2021-04-12 PROCEDURE — 250N000012 HC RX MED GY IP 250 OP 636 PS 637: Performed by: INTERNAL MEDICINE

## 2021-04-12 PROCEDURE — 99232 SBSQ HOSP IP/OBS MODERATE 35: CPT | Performed by: ANESTHESIOLOGY

## 2021-04-12 PROCEDURE — 250N000009 HC RX 250: Performed by: OTOLARYNGOLOGY

## 2021-04-12 RX ORDER — IPRATROPIUM BROMIDE AND ALBUTEROL SULFATE 2.5; .5 MG/3ML; MG/3ML
3 SOLUTION RESPIRATORY (INHALATION) ONCE
Status: COMPLETED | OUTPATIENT
Start: 2021-04-12 | End: 2021-04-12

## 2021-04-12 RX ORDER — ONDANSETRON 4 MG/1
4 TABLET, ORALLY DISINTEGRATING ORAL EVERY 6 HOURS PRN
Status: DISCONTINUED | OUTPATIENT
Start: 2021-04-12 | End: 2021-04-17 | Stop reason: HOSPADM

## 2021-04-12 RX ORDER — NALOXONE HYDROCHLORIDE 0.4 MG/ML
0.2 INJECTION, SOLUTION INTRAMUSCULAR; INTRAVENOUS; SUBCUTANEOUS
Status: DISCONTINUED | OUTPATIENT
Start: 2021-04-12 | End: 2021-04-17 | Stop reason: HOSPADM

## 2021-04-12 RX ORDER — ACETAMINOPHEN 325 MG/1
650 TABLET ORAL EVERY 4 HOURS PRN
Status: DISCONTINUED | OUTPATIENT
Start: 2021-04-12 | End: 2021-04-17 | Stop reason: HOSPADM

## 2021-04-12 RX ORDER — NALOXONE HYDROCHLORIDE 0.4 MG/ML
0.4 INJECTION, SOLUTION INTRAMUSCULAR; INTRAVENOUS; SUBCUTANEOUS
Status: DISCONTINUED | OUTPATIENT
Start: 2021-04-12 | End: 2021-04-17 | Stop reason: HOSPADM

## 2021-04-12 RX ORDER — DEXTROSE MONOHYDRATE 25 G/50ML
25-50 INJECTION, SOLUTION INTRAVENOUS
Status: DISCONTINUED | OUTPATIENT
Start: 2021-04-12 | End: 2021-04-17 | Stop reason: HOSPADM

## 2021-04-12 RX ORDER — HYDRALAZINE HYDROCHLORIDE 20 MG/ML
10 INJECTION INTRAMUSCULAR; INTRAVENOUS EVERY 4 HOURS PRN
Status: DISCONTINUED | OUTPATIENT
Start: 2021-04-12 | End: 2021-04-17 | Stop reason: HOSPADM

## 2021-04-12 RX ORDER — AMOXICILLIN 250 MG
1 CAPSULE ORAL 2 TIMES DAILY PRN
Status: DISCONTINUED | OUTPATIENT
Start: 2021-04-12 | End: 2021-04-17 | Stop reason: HOSPADM

## 2021-04-12 RX ORDER — ONDANSETRON 2 MG/ML
4 INJECTION INTRAMUSCULAR; INTRAVENOUS EVERY 30 MIN PRN
Status: DISCONTINUED | OUTPATIENT
Start: 2021-04-12 | End: 2021-04-12 | Stop reason: HOSPADM

## 2021-04-12 RX ORDER — SODIUM CHLORIDE, SODIUM LACTATE, POTASSIUM CHLORIDE, CALCIUM CHLORIDE 600; 310; 30; 20 MG/100ML; MG/100ML; MG/100ML; MG/100ML
INJECTION, SOLUTION INTRAVENOUS CONTINUOUS
Status: DISCONTINUED | OUTPATIENT
Start: 2021-04-12 | End: 2021-04-15 | Stop reason: ALTCHOICE

## 2021-04-12 RX ORDER — DEXTROSE MONOHYDRATE 25 G/50ML
25-50 INJECTION, SOLUTION INTRAVENOUS
Status: DISCONTINUED | OUTPATIENT
Start: 2021-04-12 | End: 2021-04-12

## 2021-04-12 RX ORDER — PROCHLORPERAZINE 25 MG
12.5 SUPPOSITORY, RECTAL RECTAL EVERY 12 HOURS PRN
Status: DISCONTINUED | OUTPATIENT
Start: 2021-04-12 | End: 2021-04-17 | Stop reason: HOSPADM

## 2021-04-12 RX ORDER — SODIUM CHLORIDE, SODIUM LACTATE, POTASSIUM CHLORIDE, CALCIUM CHLORIDE 600; 310; 30; 20 MG/100ML; MG/100ML; MG/100ML; MG/100ML
INJECTION, SOLUTION INTRAVENOUS CONTINUOUS
Status: DISCONTINUED | OUTPATIENT
Start: 2021-04-12 | End: 2021-04-12 | Stop reason: HOSPADM

## 2021-04-12 RX ORDER — ONDANSETRON 2 MG/ML
INJECTION INTRAMUSCULAR; INTRAVENOUS PRN
Status: DISCONTINUED | OUTPATIENT
Start: 2021-04-12 | End: 2021-04-12

## 2021-04-12 RX ORDER — IOPAMIDOL 755 MG/ML
115 INJECTION, SOLUTION INTRAVASCULAR ONCE
Status: COMPLETED | OUTPATIENT
Start: 2021-04-12 | End: 2021-04-12

## 2021-04-12 RX ORDER — HYDROMORPHONE HYDROCHLORIDE 1 MG/ML
.3-.5 INJECTION, SOLUTION INTRAMUSCULAR; INTRAVENOUS; SUBCUTANEOUS EVERY 5 MIN PRN
Status: DISCONTINUED | OUTPATIENT
Start: 2021-04-12 | End: 2021-04-12 | Stop reason: HOSPADM

## 2021-04-12 RX ORDER — PROPOFOL 10 MG/ML
INJECTION, EMULSION INTRAVENOUS PRN
Status: DISCONTINUED | OUTPATIENT
Start: 2021-04-12 | End: 2021-04-12

## 2021-04-12 RX ORDER — LABETALOL HYDROCHLORIDE 5 MG/ML
10 INJECTION, SOLUTION INTRAVENOUS
Status: DISCONTINUED | OUTPATIENT
Start: 2021-04-12 | End: 2021-04-17 | Stop reason: HOSPADM

## 2021-04-12 RX ORDER — ACETAMINOPHEN 650 MG/1
650 SUPPOSITORY RECTAL EVERY 4 HOURS PRN
Status: DISCONTINUED | OUTPATIENT
Start: 2021-04-12 | End: 2021-04-17 | Stop reason: HOSPADM

## 2021-04-12 RX ORDER — BISACODYL 10 MG
10 SUPPOSITORY, RECTAL RECTAL DAILY PRN
Status: DISCONTINUED | OUTPATIENT
Start: 2021-04-12 | End: 2021-04-17 | Stop reason: HOSPADM

## 2021-04-12 RX ORDER — EPINEPHRINE 1 MG/ML
0.5 INJECTION, SOLUTION, CONCENTRATE INTRAVENOUS ONCE
Status: DISCONTINUED | OUTPATIENT
Start: 2021-04-12 | End: 2021-04-12

## 2021-04-12 RX ORDER — DEXAMETHASONE SODIUM PHOSPHATE 4 MG/ML
INJECTION, SOLUTION INTRA-ARTICULAR; INTRALESIONAL; INTRAMUSCULAR; INTRAVENOUS; SOFT TISSUE PRN
Status: DISCONTINUED | OUTPATIENT
Start: 2021-04-12 | End: 2021-04-12

## 2021-04-12 RX ORDER — LIDOCAINE HYDROCHLORIDE 40 MG/ML
4 INJECTION, SOLUTION RETROBULBAR ONCE
Status: COMPLETED | OUTPATIENT
Start: 2021-04-12 | End: 2021-04-12

## 2021-04-12 RX ORDER — NICOTINE POLACRILEX 4 MG
15-30 LOZENGE BUCCAL
Status: DISCONTINUED | OUTPATIENT
Start: 2021-04-12 | End: 2021-04-17 | Stop reason: HOSPADM

## 2021-04-12 RX ORDER — ONDANSETRON 2 MG/ML
4 INJECTION INTRAMUSCULAR; INTRAVENOUS EVERY 6 HOURS PRN
Status: DISCONTINUED | OUTPATIENT
Start: 2021-04-12 | End: 2021-04-17 | Stop reason: HOSPADM

## 2021-04-12 RX ORDER — PROCHLORPERAZINE MALEATE 5 MG
5 TABLET ORAL EVERY 6 HOURS PRN
Status: DISCONTINUED | OUTPATIENT
Start: 2021-04-12 | End: 2021-04-17 | Stop reason: HOSPADM

## 2021-04-12 RX ORDER — NEOSTIGMINE METHYLSULFATE 1 MG/ML
VIAL (ML) INJECTION PRN
Status: DISCONTINUED | OUTPATIENT
Start: 2021-04-12 | End: 2021-04-12

## 2021-04-12 RX ORDER — EPINEPHRINE 1 MG/ML
INJECTION, SOLUTION INTRAMUSCULAR; SUBCUTANEOUS PRN
Status: DISCONTINUED | OUTPATIENT
Start: 2021-04-12 | End: 2021-04-12 | Stop reason: HOSPADM

## 2021-04-12 RX ORDER — POLYETHYLENE GLYCOL 3350 17 G/17G
17 POWDER, FOR SOLUTION ORAL DAILY PRN
Status: DISCONTINUED | OUTPATIENT
Start: 2021-04-12 | End: 2021-04-17 | Stop reason: HOSPADM

## 2021-04-12 RX ORDER — FENTANYL CITRATE 50 UG/ML
INJECTION, SOLUTION INTRAMUSCULAR; INTRAVENOUS PRN
Status: DISCONTINUED | OUTPATIENT
Start: 2021-04-12 | End: 2021-04-12

## 2021-04-12 RX ORDER — FENTANYL CITRATE 50 UG/ML
25-100 INJECTION, SOLUTION INTRAMUSCULAR; INTRAVENOUS
Status: DISCONTINUED | OUTPATIENT
Start: 2021-04-12 | End: 2021-04-12 | Stop reason: HOSPADM

## 2021-04-12 RX ORDER — NICOTINE POLACRILEX 4 MG
15-30 LOZENGE BUCCAL
Status: DISCONTINUED | OUTPATIENT
Start: 2021-04-12 | End: 2021-04-12

## 2021-04-12 RX ORDER — AMOXICILLIN 250 MG
2 CAPSULE ORAL 2 TIMES DAILY PRN
Status: DISCONTINUED | OUTPATIENT
Start: 2021-04-12 | End: 2021-04-17 | Stop reason: HOSPADM

## 2021-04-12 RX ORDER — LIDOCAINE HYDROCHLORIDE 40 MG/ML
SOLUTION TOPICAL PRN
Status: DISCONTINUED | OUTPATIENT
Start: 2021-04-12 | End: 2021-04-12 | Stop reason: HOSPADM

## 2021-04-12 RX ORDER — LABETALOL HYDROCHLORIDE 5 MG/ML
10 INJECTION, SOLUTION INTRAVENOUS
Status: DISCONTINUED | OUTPATIENT
Start: 2021-04-12 | End: 2021-04-12 | Stop reason: HOSPADM

## 2021-04-12 RX ORDER — HYDROMORPHONE HYDROCHLORIDE 1 MG/ML
.3-.5 INJECTION, SOLUTION INTRAMUSCULAR; INTRAVENOUS; SUBCUTANEOUS
Status: DISCONTINUED | OUTPATIENT
Start: 2021-04-12 | End: 2021-04-17 | Stop reason: HOSPADM

## 2021-04-12 RX ORDER — ONDANSETRON 4 MG/1
4 TABLET, ORALLY DISINTEGRATING ORAL EVERY 30 MIN PRN
Status: DISCONTINUED | OUTPATIENT
Start: 2021-04-12 | End: 2021-04-12 | Stop reason: HOSPADM

## 2021-04-12 RX ORDER — METHYLPREDNISOLONE SODIUM SUCCINATE 125 MG/2ML
125 INJECTION, POWDER, LYOPHILIZED, FOR SOLUTION INTRAMUSCULAR; INTRAVENOUS ONCE
Status: COMPLETED | OUTPATIENT
Start: 2021-04-12 | End: 2021-04-12

## 2021-04-12 RX ORDER — MEPERIDINE HYDROCHLORIDE 25 MG/ML
12.5 INJECTION INTRAMUSCULAR; INTRAVENOUS; SUBCUTANEOUS EVERY 5 MIN PRN
Status: DISCONTINUED | OUTPATIENT
Start: 2021-04-12 | End: 2021-04-12 | Stop reason: HOSPADM

## 2021-04-12 RX ORDER — DEXAMETHASONE SODIUM PHOSPHATE 4 MG/ML
4 INJECTION, SOLUTION INTRA-ARTICULAR; INTRALESIONAL; INTRAMUSCULAR; INTRAVENOUS; SOFT TISSUE EVERY 6 HOURS
Status: DISCONTINUED | OUTPATIENT
Start: 2021-04-12 | End: 2021-04-13 | Stop reason: ALTCHOICE

## 2021-04-12 RX ORDER — GLYCOPYRROLATE 0.2 MG/ML
INJECTION, SOLUTION INTRAMUSCULAR; INTRAVENOUS PRN
Status: DISCONTINUED | OUTPATIENT
Start: 2021-04-12 | End: 2021-04-12

## 2021-04-12 RX ORDER — KETAMINE HYDROCHLORIDE 10 MG/ML
INJECTION, SOLUTION INTRAMUSCULAR; INTRAVENOUS
Status: DISCONTINUED
Start: 2021-04-12 | End: 2021-04-12 | Stop reason: WASHOUT

## 2021-04-12 RX ORDER — HYDRALAZINE HYDROCHLORIDE 20 MG/ML
2.5-5 INJECTION INTRAMUSCULAR; INTRAVENOUS EVERY 10 MIN PRN
Status: DISCONTINUED | OUTPATIENT
Start: 2021-04-12 | End: 2021-04-12 | Stop reason: HOSPADM

## 2021-04-12 RX ORDER — FENTANYL CITRATE 50 UG/ML
25-50 INJECTION, SOLUTION INTRAMUSCULAR; INTRAVENOUS
Status: DISCONTINUED | OUTPATIENT
Start: 2021-04-12 | End: 2021-04-12 | Stop reason: HOSPADM

## 2021-04-12 RX ADMIN — DEXMEDETOMIDINE HYDROCHLORIDE 8 MCG: 100 INJECTION, SOLUTION INTRAVENOUS at 13:56

## 2021-04-12 RX ADMIN — HYDROMORPHONE HYDROCHLORIDE 0.3 MG: 1 INJECTION, SOLUTION INTRAMUSCULAR; INTRAVENOUS; SUBCUTANEOUS at 16:54

## 2021-04-12 RX ADMIN — PROPOFOL 50 MG: 10 INJECTION, EMULSION INTRAVENOUS at 13:35

## 2021-04-12 RX ADMIN — SODIUM CHLORIDE, POTASSIUM CHLORIDE, SODIUM LACTATE AND CALCIUM CHLORIDE: 600; 310; 30; 20 INJECTION, SOLUTION INTRAVENOUS at 12:35

## 2021-04-12 RX ADMIN — HYDRALAZINE HYDROCHLORIDE 10 MG: 20 INJECTION INTRAMUSCULAR; INTRAVENOUS at 17:54

## 2021-04-12 RX ADMIN — ONDANSETRON 4 MG: 2 INJECTION INTRAMUSCULAR; INTRAVENOUS at 13:55

## 2021-04-12 RX ADMIN — DEXMEDETOMIDINE HYDROCHLORIDE 8 MCG: 100 INJECTION, SOLUTION INTRAVENOUS at 13:12

## 2021-04-12 RX ADMIN — NEOSTIGMINE METHYLSULFATE 5 MG: 1 INJECTION, SOLUTION INTRAVENOUS at 14:03

## 2021-04-12 RX ADMIN — LABETALOL HYDROCHLORIDE 10 MG: 5 INJECTION, SOLUTION INTRAVENOUS at 18:39

## 2021-04-12 RX ADMIN — SODIUM CHLORIDE 98 ML: 9 INJECTION, SOLUTION INTRAVENOUS at 03:09

## 2021-04-12 RX ADMIN — LIDOCAINE HYDROCHLORIDE 4 ML: 40 INJECTION, SOLUTION RETROBULBAR; TOPICAL at 04:20

## 2021-04-12 RX ADMIN — ROCURONIUM BROMIDE 25 MG: 10 INJECTION INTRAVENOUS at 13:24

## 2021-04-12 RX ADMIN — IPRATROPIUM BROMIDE AND ALBUTEROL SULFATE 3 ML: .5; 3 SOLUTION RESPIRATORY (INHALATION) at 02:35

## 2021-04-12 RX ADMIN — DEXAMETHASONE SODIUM PHOSPHATE 4 MG: 4 INJECTION, SOLUTION INTRAMUSCULAR; INTRAVENOUS at 15:47

## 2021-04-12 RX ADMIN — INSULIN ASPART 1 UNITS: 100 INJECTION, SOLUTION INTRAVENOUS; SUBCUTANEOUS at 15:45

## 2021-04-12 RX ADMIN — MIDAZOLAM 1 MG: 1 INJECTION INTRAMUSCULAR; INTRAVENOUS at 13:24

## 2021-04-12 RX ADMIN — INSULIN ASPART 1 UNITS: 100 INJECTION, SOLUTION INTRAVENOUS; SUBCUTANEOUS at 08:16

## 2021-04-12 RX ADMIN — HYDROMORPHONE HYDROCHLORIDE 0.5 MG: 1 INJECTION, SOLUTION INTRAMUSCULAR; INTRAVENOUS; SUBCUTANEOUS at 13:51

## 2021-04-12 RX ADMIN — DEXMEDETOMIDINE HYDROCHLORIDE 8 MCG: 100 INJECTION, SOLUTION INTRAVENOUS at 12:55

## 2021-04-12 RX ADMIN — FENTANYL CITRATE 50 MCG: 50 INJECTION, SOLUTION INTRAMUSCULAR; INTRAVENOUS at 13:35

## 2021-04-12 RX ADMIN — RACEPINEPHRINE HYDROCHLORIDE 0.5 ML: 11.25 SOLUTION RESPIRATORY (INHALATION) at 03:51

## 2021-04-12 RX ADMIN — PROPOFOL 200 MG: 10 INJECTION, EMULSION INTRAVENOUS at 13:24

## 2021-04-12 RX ADMIN — PROPOFOL 50 MG: 10 INJECTION, EMULSION INTRAVENOUS at 13:45

## 2021-04-12 RX ADMIN — SODIUM CHLORIDE, POTASSIUM CHLORIDE, SODIUM LACTATE AND CALCIUM CHLORIDE: 600; 310; 30; 20 INJECTION, SOLUTION INTRAVENOUS at 07:56

## 2021-04-12 RX ADMIN — INSULIN ASPART 1 UNITS: 100 INJECTION, SOLUTION INTRAVENOUS; SUBCUTANEOUS at 20:25

## 2021-04-12 RX ADMIN — RACEPINEPHRINE HYDROCHLORIDE 0.5 ML: 11.25 SOLUTION RESPIRATORY (INHALATION) at 02:32

## 2021-04-12 RX ADMIN — ROCURONIUM BROMIDE 20 MG: 10 INJECTION INTRAVENOUS at 13:35

## 2021-04-12 RX ADMIN — HYDROMORPHONE HYDROCHLORIDE 0.5 MG: 1 INJECTION, SOLUTION INTRAMUSCULAR; INTRAVENOUS; SUBCUTANEOUS at 19:21

## 2021-04-12 RX ADMIN — DEXAMETHASONE SODIUM PHOSPHATE 4 MG: 4 INJECTION, SOLUTION INTRA-ARTICULAR; INTRALESIONAL; INTRAMUSCULAR; INTRAVENOUS; SOFT TISSUE at 13:35

## 2021-04-12 RX ADMIN — IOPAMIDOL 115 ML: 755 INJECTION, SOLUTION INTRAVENOUS at 03:09

## 2021-04-12 RX ADMIN — DEXAMETHASONE SODIUM PHOSPHATE 4 MG: 4 INJECTION, SOLUTION INTRAMUSCULAR; INTRAVENOUS at 09:36

## 2021-04-12 RX ADMIN — GLYCOPYRROLATE 0.2 MG: 0.2 INJECTION, SOLUTION INTRAMUSCULAR; INTRAVENOUS at 13:35

## 2021-04-12 RX ADMIN — GLYCOPYRROLATE 0.8 MG: 0.2 INJECTION, SOLUTION INTRAMUSCULAR; INTRAVENOUS at 14:03

## 2021-04-12 RX ADMIN — DEXAMETHASONE SODIUM PHOSPHATE 4 MG: 4 INJECTION, SOLUTION INTRAMUSCULAR; INTRAVENOUS at 22:01

## 2021-04-12 RX ADMIN — METHYLPREDNISOLONE SODIUM SUCCINATE 125 MG: 125 INJECTION, POWDER, FOR SOLUTION INTRAMUSCULAR; INTRAVENOUS at 02:32

## 2021-04-12 RX ADMIN — SODIUM CHLORIDE, POTASSIUM CHLORIDE, SODIUM LACTATE AND CALCIUM CHLORIDE: 600; 310; 30; 20 INJECTION, SOLUTION INTRAVENOUS at 11:27

## 2021-04-12 RX ADMIN — SODIUM CHLORIDE, POTASSIUM CHLORIDE, SODIUM LACTATE AND CALCIUM CHLORIDE: 600; 310; 30; 20 INJECTION, SOLUTION INTRAVENOUS at 13:41

## 2021-04-12 RX ADMIN — DEXMEDETOMIDINE HYDROCHLORIDE 12 MCG: 100 INJECTION, SOLUTION INTRAVENOUS at 12:39

## 2021-04-12 RX ADMIN — FENTANYL CITRATE 50 MCG: 0.05 INJECTION, SOLUTION INTRAMUSCULAR; INTRAVENOUS at 14:53

## 2021-04-12 RX ADMIN — DEXMEDETOMIDINE HYDROCHLORIDE 8 MCG: 100 INJECTION, SOLUTION INTRAVENOUS at 12:45

## 2021-04-12 RX ADMIN — RACEPINEPHRINE HYDROCHLORIDE 0.5 ML: 11.25 SOLUTION RESPIRATORY (INHALATION) at 04:04

## 2021-04-12 RX ADMIN — MIDAZOLAM 1 MG: 1 INJECTION INTRAMUSCULAR; INTRAVENOUS at 12:46

## 2021-04-12 RX ADMIN — FENTANYL CITRATE 50 MCG: 50 INJECTION, SOLUTION INTRAMUSCULAR; INTRAVENOUS at 13:24

## 2021-04-12 ASSESSMENT — ACTIVITIES OF DAILY LIVING (ADL)
ADLS_ACUITY_SCORE: 16
ADLS_ACUITY_SCORE: 17
ADLS_ACUITY_SCORE: 14

## 2021-04-12 ASSESSMENT — ENCOUNTER SYMPTOMS
STRIDOR: 1
SORE THROAT: 1
NAUSEA: 0
VOMITING: 0
SHORTNESS OF BREATH: 1
COUGH: 1
ABDOMINAL PAIN: 0
FEVER: 0

## 2021-04-12 NOTE — H&P
Admitted:     04/12/2021      CHIEF COMPLAINT:  Stridor and respiratory distress.      HISTORY OF PRESENT ILLNESS:  The patient is a 70-year-old male seen in the ER on 4-5-21 for stridor and was prescribed prednisone.  He developed increased shortness of breath and a cough while going to bed, so he went to the emergency room.  He was noted to have stridor.  He subsequently had a CT scan which showed an enhancing soft tissue mass of the right larynx 3.9 x 1.6 x 3.9 cm.  He has improved somewhat with the use of racemic epinephrine and Solu-Medrol in the emergency room.  He still has some stridor, but is not feeling significantly short of breath.  He is not reporting pain or discomfort with swallowing.  He has a hoarse voice and does have some mild stridor at rest, his O2 saturations have been 100% on 2 liters nasal cannula.  The significant past medical history is that of a biopsy-proven laryngeal carcinoma in 2019.  It is unclear what further therapy  he had done.      The remainder of the past medical history, social history, family history and review of systems is unremarkable.  He has a past history of smoking and quit in 2005.  He does continue to use alcohol.      PHYSICAL EXAMINATION:  On examination today revealed the ears are clear.  The nose was clear anteriorly.  The oral cavity was unremarkable.      We discussed the potential for flexible fiberoptic examination and he consented.  The left nostril was anesthetized with topical lidocaine and then the flexible scope was used to visualize the hypopharynx and larynx.  There was a large mass involving the right supraglottic structures.  There appears to be right vocal cord paralysis.  A left vocal cord moves some but there may be some mild compromise.  There is relatively small airway because of the mass, which extends to near the midline.  The CT scan was reviewed, which revealed a large enhancing mass consistent with laryngeal cancer.      IMPRESSION:   Laryngeal cancer with airway compromise.      PLAN:  At this point, he is stable with his airway.  This has been noted for the past several weeks.  He has improved while in the emergency room with the steroids and racemic epinephrine.  He will likely need to have a tracheotomy and direct laryngoscopy for complete diagnosis.  We will make these arrangements for him later today.  In the meantime, he will be in the ICU for close airway observation.  He does have airway compromise, but this has been a slow growing process so with the stabilization that has been obtained in the emergency room should be able to be accomplished later today in a controlled fashion in the operating room.         JIM KUMAR MD             D: 2021   T: 2021   MT: ABBIE      Name:     LUPE HALL   MRN:      [id number]        Account:      [id number]   :      1950        Admitted:     2021                   Document: [id number]

## 2021-04-12 NOTE — OR NURSING
Quite stridorous but able to maintain SaO2 at 100% and answer questions in a hoarse whisper. Monitoring closely.

## 2021-04-12 NOTE — PROGRESS NOTES
SHIFT SUMMARY   NEURO: AOx4. Writes.   RESP: new trach #8 shiley today. On trach dome, 30%, 40L. Minimal bloody secretions. Trach cares per ENT.  CARDIAC: Sinus tach. BP elevated, PRN hydralazine & labetalol given   GI/: NPO. SLP ordered. No BM. Uses urinal. UOP adequate.   PLAN: Pt educated on new trach. Can write questions, answered by RN. Anxious about not being able to eat/drink/talk. Oral cares provided frequently. & reassurance provided.   1800 Bloody drainage noted from trach. ENT aware. Per Dr. Johnson leave dressing in place. And reinforce over dressing as needed.       Tabitha Kraft RN

## 2021-04-12 NOTE — PROGRESS NOTES
Admitted from ED approx 0640. UA to arun lying flat per ED RN. Able to scoot across from cart to bed with staff assist but did note increase in stridor with movement. Sats remained 100% on 2L NC. -154's, RR20's-30's 's/100's.  Dr. Sinclair at bedside discussing possible plan for hospital stay. Pt A&O, noted sl increase in stridor with talking but sats remained 100%. , will get order for sliding scale insulin. Pt resting at this time and report given to oncoming RN.

## 2021-04-12 NOTE — ANESTHESIA POSTPROCEDURE EVALUATION
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    Anesthesia Type:  General    Note:  Disposition: Inpatient   Postop Pain Control: Uneventful            Sign Out: Well controlled pain   PONV: No   Neuro/Psych: Uneventful            Sign Out: Acceptable/Baseline neuro status   Airway/Respiratory: Uneventful            Sign Out: AIRWAY IN SITU/Resp. Support   CV/Hemodynamics: Uneventful            Sign Out: Acceptable CV status   Other NRE: NONE   DID A NON-ROUTINE EVENT OCCUR? No         Last vitals:  Vitals:    04/12/21 1420 04/12/21 1430 04/12/21 1440   BP: (!) 158/87 (!) 153/90    Pulse:  102 100   Resp:  12 12   Temp: 36.5  C (97.7  F)     SpO2: 100% 100% 100%       Last vitals prior to Anesthesia Care Transfer:  CRNA VITALS  4/12/2021 1349 - 4/12/2021 1443      4/12/2021             EKG:  Sinus rhythm          Electronically Signed By: Carlos De León MD  April 12, 2021  2:43 PM

## 2021-04-12 NOTE — ED PROVIDER NOTES
History   Chief Complaint:  Shortness of Breath     HPI   Raj Warren is a 70 year old male who was seen at the ER on 4/05 for stridor and prescribed prednisone who presents with shortness of breath. Per the patient, today he developed shortness of breath, cough, and sore throat while getting ready for bed. He drove himself her but his symptoms worsened as he walked into the ER. He states he finished his prescribed prednisone yesterday. He denies fever and a personal history of asthma or COPD.    4/05/21 XR Chest, portable, 1 view:  Unremarkable single view of the chest.      4/05/21 Neck soft tissue XR:  The radiographic appearance of the base of tongue and   epiglottis is within normal limits. The prevertebral/retropharyngeal   soft tissues appear within normal limits. Multiple dental restorations   are partially seen. No definite radiopaque foreign body identified.   Multilevel degenerative disc disease is noted in the cervical spine.   If there is ongoing clinical concern for significant upper airway   stenosis or soft tissue abnormality, consider direct inspection or CT   of the neck, as clinically warranted.     Review of Systems   Constitutional: Negative for fever.   HENT: Positive for sore throat.    Respiratory: Positive for cough and shortness of breath.    Cardiovascular: Negative for chest pain.   Gastrointestinal: Negative for abdominal pain, nausea and vomiting.   All other systems reviewed and are negative.        Allergies:  No known drug allergies     Medications:    The patient is not currently taking any prescribed medications.     Past Medical History:    Aphthous ulcer     Past Surgical History:    The patient denies past surgical history.      Family History:    Circulatory abnormalities  Alcohol abuse     Social History:  Presents to the ED alone  Lives independently    Physical Exam     Patient Vitals for the past 24 hrs:   BP Temp Temp src Pulse Resp SpO2 Weight   04/12/21 0450 (!)  177/95 -- -- 131 21 98 % --   04/12/21 0440 (!) 145/92 -- -- 128 22 100 % --   04/12/21 0430 (!) 136/109 -- -- 126 8 100 % --   04/12/21 0425 (!) 170/97 -- -- 130 18 100 % --   04/12/21 0420 (!) 162/101 -- -- 131 27 100 % --   04/12/21 0415 -- -- -- 130 17 100 % --   04/12/21 0410 (!) 174/109 -- -- 120 12 100 % --   04/12/21 0405 (!) 151/99 -- -- 122 18 100 % --   04/12/21 0400 (!) 172/101 -- -- 125 19 100 % --   04/12/21 0355 -- -- -- -- -- 100 % --   04/12/21 0351 -- -- -- 112 18 100 % --   04/12/21 0350 (!) 195/113 -- -- 112 (!) 42 100 % --   04/12/21 0347 -- -- -- 138 (!) 44 99 % --   04/12/21 0345 (!) 195/113 -- -- 146 (!) 31 -- --   04/12/21 0340 -- -- -- 104 15 100 % --   04/12/21 0335 -- -- -- 105 14 100 % --   04/12/21 0330 (!) 152/99 -- -- 105 14 100 % --   04/12/21 0325 -- -- -- 104 15 100 % --   04/12/21 0323 -- -- -- 105 17 99 % --   04/12/21 0322 (!) 151/97 -- -- 108 26 -- --   04/12/21 0320 -- -- -- 107 -- -- --   04/12/21 0315 -- -- -- 105 19 100 % --   04/12/21 0310 (!) 158/97 -- -- 109 15 100 % --   04/12/21 0305 (!) 150/97 -- -- 117 -- 99 % --   04/12/21 0252 (!) 161/97 -- -- 118 24 96 % --   04/12/21 0245 -- -- -- 118 15 100 % --   04/12/21 0240 -- -- -- 118 17 100 % --   04/12/21 0236 -- 96.7  F (35.9  C) Temporal -- -- -- --   04/12/21 0235 (!) 179/126 -- -- 115 18 100 % --   04/12/21 0230 (!) 150/132 -- -- 116 (!) 38 94 % 95.3 kg (210 lb)       Physical Exam  General: Appears anxious  Head: No signs of trauma.   Mouth/Throat: Oropharynx is clear and moist.   Eyes: Conjunctivae are normal.   Neck: Normal range of motion. No nuchal rigidity. No cervical adenopathy  CV: Tachycardic and regular rhythm.    Resp: Significant stridor and respiratory distress.  Hoarse voice  GI: Soft. There is no tenderness.  No rebound or guarding.  Normal bowel sounds.    MSK: Normal range of motion. no edema. No Calf tenderness.  Neuro: The patient is alert and oriented.  PERRLA, EOMI, strength in upper/lower  extremities normal and symmetrical. Sensation normal.   Skin: Skin is warm and dry. No rash noted.       Emergency Department Course   ECG  ECG taken at 0228, ECG read at 0230  Sinus tachycardia with premature atrial complexes with aberrant conduction  Nonspecific ST and T wave abnormality  Abnormal ECG   Rate 123 bpm. DE interval 144 ms. QRS duration 64 ms. QT/QTc 306/438 ms. P-R-T axes 68 -2 78.     Imaging:    Soft tissue neck CT w contrast  1.  Enhancing soft tissue mass centered within the right larynx measuring 3.9 x 1.6 x 3.9 cm (AP x TV x CC), centered within the right vocal apparatus with involvement of the right aryepiglottic fold, right false vocal fold, right infraglottic trachea,   posterior hypopharynx and left aryepiglottic fold. There is effacement of the laryngeal airway. ENT consultation and correlation for airway compromise is recommended.  2.  No definite suspicious lymphadenopathy.  Reading per radiology.    CT Chest Pulmonary Embolism w Contrast  1.  There appears to be narrowing of the airway at the level of the vocal folds with possible thickening of the right vocal fold not fully imaged on this study of the chest. Reference to the CT of the neck study also performed today is recommended for   additional information.  2.  No pulmonary embolus.  3.  Cholelithiasis.  Reading per radiology.    XR Chest Port 1 View  Negative chest.  Reading per radiology.    The above imaging workup was performed.     Laboratory:    CBC: WBC 19.6(H), HGB 16.9,   BMP: Glucose 119(H) o/w WNL (Creatinine 1.02)  Blood gas venous and oxyhgb: pH 7.33 PCO2 51(H) PO2 54(H) Bicarbonate 27 Oxyhemoglobin 86 Base Deficit 0.5    Symptomatic Influenza A/B & SARS-CoV2 (COVID19) Virus PCR Multiplex: Negative     Emergency Department Course:    Reviewed:  I reviewed nursing notes, vitals and past medical history    Assessments:  1424 The patient arrived at the ER from triage. I obtained history and performed a physical  exam as documented above.  0403 I rechecked the patient and explained findings.   0618 I rechecked the patient and explained findings.    Consults:   0325 I spoke with Dr. Giordano of the anesthesiologist service from [facility] regarding patient's presentation, findings, and plan of care.  0342 I spoke with Dr. Porras of the radiology service from [facility] regarding patient's CT scan.  0405 I spoke with Dr. Antonio Willoughby of the ENT service from [facility] regarding patient's presentation, findings, and plan of care. He states he will be in the ER in 25-30 minutes.  0420 I spoke with Dr. Rincon of the ICU service from [facility] regarding patient's presentation, findings, and plan of care.  0428 I spoke with Dr. Antonio Willoughby of the ENT service from [facility] regarding patient's presentation, findings, and plan of care.  0511 ENT arrived. The patient will now be admitted here at [facility].  0521 I spoke with Dr. Sinclair of the hospitalist service from [facility] regarding patient's presentation, findings, and plan of care.    Interventions:  0232: Racepinephrine neb solution, 0.5 mL, Nebulization  0232: Solu-Medrol, 125 mg, IV  0235: Duoneb, 3 mL, Nebulization  0404: Racepinephrine neb solution, 0.5 mL, Nebulization  0420: Lidocaine 4% injection, 4 mL, Nebulization    Disposition:  The patient was admitted to the hospital under the care of Dr. Sinclair      Impression & Plan     Medical Decision Making:  Raj Warren is a 70-year-old gentleman who presents due to difficulty breathing.  He presented with significant stridor and was taken immediately to the stabilization bay.  He had a similar presentation on Monday and apparently responded to steroids and racemic epinephrine.  He was given these once again and did have improvement in his breathing.  We were able to get a CT scan of the neck and chest.  The CT scan showed a large vocal cord mass causing significant airway constriction.  Shortly  after obtaining the CT the patient had worsening stridor again.  He was given additional racemic epi nebulizers and once again had improvement.  Given the mass and the return of stridor I did ask anesthesia and ENT to see the patient emergently.  Dr. Willoughby with ENT was able to scope the patient and we could visualize the vocal cords.  The mass was present and the air space was quite small.  We were all in agreement that attrmpting to intubate the patient from above the vocal cords would be very dangerous.  Dr. Willoughby felt that the patient was currently stable and we were able to obtain an ICU bed here with the plan that the patient will go to the operating room for a controlled trach later today.  Pt remained stable with appropriate breathing throughout the remainder of his time in the ER.    Critical Care Time for this patient, exclusive of procedures, is 120 minutes.     Covid-19  Raj Warren was evaluated during a global COVID-19 pandemic, which necessitated consideration that the patient might be at risk for infection with the SARS-CoV-2 virus that causes COVID-19.   Applicable protocols for evaluation were followed during the patient's care.   COVID-19 was considered as part of the patient's evaluation. The plan for testing is:  a test was obtained during this visit.    Diagnosis:    ICD-10-CM    1. 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 Stridor  R06.1      Scribe Disclosure:  Julio LOCKETT, am serving as a scribe at 2:27 AM on 4/12/2021 to document services personally performed by Jarrod Perdomo MD based on my observations and the provider's statements to me.          Jarrod Perdomo MD  04/12/21 0617

## 2021-04-12 NOTE — ED TRIAGE NOTES
Patient walked, stumbled into triage.  Severe labored breathing, audible stridor.  Respiratory distress.  Wheelchair to patient & patient partly collapsed into wheelchair.  RN rushed patient back to Stabe 1 & EDT notified Charge RN of patient arrival.  Stabe team awaiting patient in Stabe room 1.

## 2021-04-12 NOTE — UTILIZATION REVIEW
Admission Status; Secondary Review Determination    Under the authority of the Utilization Management Committee, the utilization review process indicated a secondary review on the above patient. The review outcome is based on review of the medical records, discussions with staff, and applying clinical experience noted on the date of the review.    (x) Inpatient Status Appropriate - This patient's medical care is consistent with medical management for inpatient care and reasonable inpatient medical practice.    RATIONALE FOR DETERMINATION:70-year-old male first presented to the emergency room on April 5 for stridor and was prescribed empiric prednisone.  He represents 12 April with worsening shortness of breath and stridor and found to have a 3.9 x 1.6 x 3.9 cm laryngeal mass suspicious for laryngeal cancer causing significant respiratory distress and airway compromise.  Patient requiring intensive care unit management with urgent surgery for direct laryngoscopy and probable tracheotomy appropriate for inpatient management.    At the time of admission with the information available to the attending physician more than 2 nights Hospital complex care was anticipated, based on patient risk of adverse outcome if treated as outpatient and complex care required. Inpatient admission is appropriate based on the Medicare guidelines.    This document was produced using voice recognition software    The information on this document is developed by the utilization review team in order for the business office to ensure compliance. This only denotes the appropriateness of proper admission status and does not reflect the quality of care rendered.    The definitions of Inpatient Status and Observation Status used in making the determination above are those provided in the CMS Coverage Manual, Chapter 1 and Chapter 6, section 70.4.    Sincerely,    Jay Jay Carmen MD  Utilization Review  Physician Advisor  [facility].

## 2021-04-12 NOTE — PROGRESS NOTES
70-year-old male admitted to the ICU for neck mass with airway compromise.  Patient has a history of laryngeal mass and presented to the emergency room with shortness of breath.  Patient was admitted to the ICU on the hospitalist service with stable vital signs.  I examined the patient this morning and found him stable on 2 L nasal cannula with no use of accessory muscles and no shortness of breath.  Patient did report his breathing was better than when he was admitted to the hospital.  On my exam patient had audible stridor and breath sounds were coarse bilaterally abdomen was soft.  Review of systems was unremarkable social history the patient denied EtOH and tobacco use.  The patient go for tracheostomy this a.m. for airway obstruction.  The patient is primarily in the hospital service I will follow the patient in the periphery and be available if any critical care needs arise.    Roger Muñoz MD  Critical Care Medicine

## 2021-04-12 NOTE — H&P
[facility]    History and Physical - Hospitalist Service       Date of Admission:  4/12/2021    Assessment & Plan   Raj Warren is a 70 year old male admitted on 4/12/2021. He presents to the emergency department for evaluation of worsening shortness of breath and stridor.  Recently seen and evaluated and thought to have a COPD exacerbation.  Some improvement with steroids at that time, now on representation found to have laryngeal mass on CT chest imaging.    Biopsy-proven laryngeal carcinoma with airway compromise: First diagnosed through outpatient ENT clinic in 2019; patient tells me that he had a laryngeal polyp at that time.  Patient reports that he was told there was a question that this could represent cancer, though did not follow-up further  -ENT consulted, aware of patient from the emergency department and was evaluated in the ER with plan for tracheostomy later today  -Admit to ICU for monitoring of airway  -As needed racemic epi nebulizer treatments are available.  Patient did have improvement with these, though required redosing at approximately 1-1/2 hours in the emergency department; has received 3 total.  Please page provider to update on status if worsening stridor requires use of racemic epi neb.  -N.p.o.  -Decadron 4 mg IV every 6 hours; received Solu-Medrol 125 in the emergency department    Hyperglycemia: Blood glucose of 207 following steroid administration in the ER  -Medium dose sliding scale insulin available every 4 hours as needed while n.p.o.  -Hemoglobin A1c pending       Diet:  N.p.o. per anesthesia guidelines  DVT Prophylaxis: Pneumatic compression devices  Mera Catheter: not present  Code Status:  Full code.  Confirmed with patient on admission         Disposition Plan   Expected discharge: Suspect 4/14/2021, recommended to prior living arrangement once Airway stabilized with post tracheostomy teaching.  Possible more long-term hospitalization pending  surgical intervention required via ENT or subsequent interventions for laryngeal cancer..  Entered: Tristin Sinclair MD 04/12/2021, 6:39 AM     The patient's care was discussed with the Patient and Dr. Perdomo in the emergency department, Dr. Elliott in ICU.  Discussed also with bedside nurse in ICU    Tristin Sinclair MD  [facility]  Contact information available via [facility] Paging/Directory      ______________________________________________________________________    Chief Complaint   Shortness of breath    History is obtained from patient, chart review, discussion with ER provider    History of Present Illness   Raj Warren is a 70 year old male who presents to the emergency department for evaluation of worsening shortness of breath.  Patient with a distant history of alcohol and tobacco use disorder, quit greater than 30 years ago.    In 2019, patient was evaluated by ENT service for what he believed was a vocal fold polyp.  He had a clinic biopsy which apparently demonstrated laryngeal cancer.  He tells me that there was a concern for cancer at that time and he was requested for follow-up.  He is not exactly clear as to what was recommended at that time, though describes that his ENT provider wanted to do something with x-rays (PET scan?).  He tells me that his biopsy demonstrated that his laryngeal mass could possibly be cancer.  ENT in the emergency department notes that patient had biopsy proven laryngeal carcinoma suggesting that this was not the case.    Patient apparently did well, though over the past several weeks, he has had increasing shortness of breath.  Was actually evaluated in the emergency department 4/5/2021 with some wheezing, sore throat.  Reported improvement with sitting in front of his air conditioner, also worsening with hot shower.  He was actually concerned that he had COVID-19, though had 2 - tests for this given his persistent symptoms prior to ER  presentation on the fifth.  Received nebulizer treatments and steroids in the emergency department given suspicion for COPD, discharged with a course of prednisone.    Patient reports that he did have improvement initially, finished his course of prednisone on Saturday.  Since then, he has again been worsening with hoarse voice and wheezing, shortness of breath with exertion as well as at rest.  Represented to the emergency department and a CT scan of the chest was performed.  This demonstrated significant airway obstruction with laryngeal mass.  A dedicated CT scan of the neck further identified notable laryngeal mass.  Patient received racemic epinephrine nebulizer for his stridor, and had improvement.  He had recurrence of his stridor approximately 45 minutes later, again improved with racemic epinephrine.  On initial arrival to the emergency department he also received IV Solu-Medrol 125 mg.    With findings of significant airway compromise, ENT was contacted in the emergency department.  Plan is for tracheostomy, though following racemic epinephrine, patient stridor has much improved.  He is not hypoxic.  Briefly observed patient approximately 45 minutes after racemic epinephrine and he is not stridulous.  On arrival to the floor in the ICU, more significant stridor.  Speaking in 3-4 word sentences.  Describes feeling as though he was having a heart attack as he walked from his car to the emergency department secondary to shortness of breath.    Discussed at length current findings including concern for laryngeal carcinoma, need for tracheostomy via ENT for further evaluation and treatment of his airway compromise.  Discussed that I anticipate his tracheostomy will be in place at least temporarily for the next days, possibly more prolonged given risk of airway edema associated with procedural intervention.  Patient made aware that if he has a tracheostomy postoperatively, he will initially be unable to speak  until tracheostomy training takes place.    Patient tells me that his only other surgical intervention was his laryngeal polyp biopsy in 2019.  He reports no complications with anesthesia.    Review of Systems    The 10 point Review of Systems is negative other than noted in the HPI or here.  No fevers or chills  Some phlegm production over the past days  Chest tightness associated with exertion and shortness of breath.    Past Medical History    I have reviewed this patient's medical history and updated it with pertinent information if needed.   Past Medical History:   Diagnosis Date     Allergic state/allergic rhinitis        Past Surgical History   I have reviewed this patient's surgical history and updated it with pertinent information if needed.  Vocal fold mass biopsy 2019 under general anesthesia    Social History   I have reviewed this patient's social history and updated it with pertinent information if needed.  Social History     Tobacco Use     Smoking status: Former Smoker     Types: Cigarettes     Quit date: 1989     Years since quittin.2     Smokeless tobacco: Never Used     Tobacco comment: quit in   had smoked about 1/2 pack a day then cut back   Substance Use Topics     Alcohol use: No     Drug use: No       Family History   I have reviewed this patient's family history and updated it with pertinent information if needed.  Family History   Problem Relation Age of Onset     Circulatory Mother         blood clots     Alcohol/Drug Father         alcohol drank heavily     Alcohol/Drug Brother         alcohol       Prior to Admission Medications   Prior to Admission Medications   Prescriptions Last Dose Informant Patient Reported? Taking?   cetirizine (ZYRTEC) 10 MG tablet   No No   Sig: Take 1 tablet (10 mg) by mouth daily   methylPREDNISolone (MEDROL DOSEPAK) 4 MG tablet therapy pack   No No   Sig: Follow package instructions      Facility-Administered Medications: None     Allergies    Allergies   Allergen Reactions     Pollen Extract      Seasonal Allergies        Physical Exam   Vital Signs: Temp: 96.7  F (35.9  C) Temp src: Temporal BP: (!) 148/103 Pulse: 123   Resp: 21 SpO2: 96 % O2 Device: Nasal cannula Oxygen Delivery: 2 LPM  Weight: 210 lbs 0 oz    General Appearance: Robust appearing 70-year-old -American male sitting comfortably on hospital bed  Eyes: No scleral icterus or injection  HEENT: Normocephalic, atraumatic.  Overt stridor present with quite raspy voice.  Respiratory: Transmitted stridor throughout air fields, good air movement throughout lung fields.  Tachypnea to the 20s range.  Speaking only in 3-4 word phrases  Cardiovascular: Tachycardia to the 130s.  No appreciable murmur  GI: Abdomen protuberant, soft, nontender to palpation.  No palpable mass.  Lymph/Hematologic: No lower extremity edema  Genitourinary: Not examined  Skin: No rashes, no jaundice  Musculoskeletal: Muscular tone intact in all extremities without subcutaneous fat wasting  Neurologic: Alert, conversant, appropriate in conversation.  Mental status grossly intact.  Psychiatric: Very pleasant, normal affect    Data   Data reviewed today: I reviewed all medications, new labs and imaging results over the last 24 hours. I personally reviewed the Neck CT image(s) showing Significantly compromised laryngeal airway at level of vocal folds.    Recent Labs   Lab 04/12/21  0227 04/05/21  0653   WBC 19.6* 7.9   HGB 16.9 15.9   MCV 86 86    285    140   POTASSIUM 4.1 4.1   CHLORIDE 106 109   CO2 26 27   BUN 23 14   CR 1.02 0.97   ANIONGAP 5 4   TELMA 8.7 8.6   * 114*

## 2021-04-12 NOTE — PROGRESS NOTES
Pt came up from the PACU on a 10L trach dome. It was noted that the trach is secured with twill. Bloody drainage on dressing around trach site. Pt was placed on high flow trach dome 30 LPM FiO2 40% with SpO2 in the upper 90's.   Will cont to monitor.  4/12/2021  Elke Galloway, RT

## 2021-04-12 NOTE — PROGRESS NOTES
ent consult dictated. Pt has a large right sided large mass which extends to the midline with apparent right vocal cord paralysis. Some mild stridor noted when speaking, none at rest, maintaining O2 sats well with 2 liters by nasal cannula. Will need trach and DL. Will try to arrange today if possible

## 2021-04-12 NOTE — ED NOTES
Pt reports he is beginning to feel a little bit better. Pt appears less anxious and work of breathing has improved, however, pt still having stridor. Pt to be transported to CT with RN and EDT on monitor

## 2021-04-12 NOTE — PROCEDURES
OpNote    PreOp Dx:  Laryngeal cancer  Respiratory insufficiency    PostOp Dx:  same    Surgical Procedure:  Tracheotomy, awake  DL with bx    Surgeon:  MOISE Sauceda    Anesthesia:  Local with mac, then Gen ET.     Findings:  Normal trach anatomy  Endolaryngeal mass based on the R, difficult to distinguish landmarks      EBL:  10 ml    No complications.    R laryngeal tissue sent to pathology

## 2021-04-12 NOTE — PROGRESS NOTES
[facility] Intensive Care Unit  History and Physical  April 12, 2021    Raj Warren   MRN# [id number]   Age: 70 year old YOB: 1950     Date of Admission: 4/12/2021    Primary care provider: No Ref-Primary, Physician          Assessment and plan :   Raj Warren is a 70 year old male admitted on 4/12/2021 from the ED for a neck mass with airway compromise. He was seen in the ED 4/5/2021 for stridor and shortness of breath and prescribed steroids with initial improvement which worsened after course was completed.    I have personally reviewed the daily labs, imaging studies, cultures and discussed the case with referring physician and consulting physicians.     My assessment and plan by system for this patient is as follows:    Neurology/Psychiatry:   1. Pain  2. Analgesia  3. Anxiety  Plan  - PRN APAP, Dilaudid     Cardiovascular:   1. Inpatient hypertension- systolic in the 170's-180's, likely related to racemic epinephrine treatment  2. Tachycardia- ranging near 130's  Plan  - Hydralazine and labetalol PRN's for SBP>180 or DBP>110    Pulmonary/Ventilator Management:   1. Biopsy-confirmed laryngeal cancer with airway compromise- Imaging demonstrates severe obstruction due to neoplasm although patient has been satting appropriately.  Plan  - Dexamethasone 4mg Q6H.  - Plan for procedure this afternoon.    GI/Nutrition :   1. Need for procedure this afternoon  Plan  - NPO  - Bowel regimen ppx available  - PRN Compazine, Zofran    Renal/Fluids/Electrolytes:   1. Electrolyte monitoring- BMP normal on admission  Plan  - Monitor and replace per ICU protocol.  - Maintenance fluids LR 100ml/hr    Infectious Disease:   1. Patient afebrile without signs of infection- isolated leukocytosis most likely due to malignant state  Plan  - No changes for now.     Endocrine:   1. Stress and steroid-induced hyperglycemia  Plan  - ICU insulin protocol, goal sugar <180  - Check A1c  - Checking  TSH    Hematology/Oncology:   1. Leukocytosis- consistent with malignant state.  Plan  - Will continue to monitor, no changes for now.    MSK/Rheum:  1. No concerns.    IV/Access:   1. Venous access - PIV x3    ICU Prophylaxis:   1. DVT: mechanical ppx  2. Bowel regimen ordered  3. Disposition - OR for trach today    Medications     lactated ringers 100 mL/hr at 04/12/21 0756       dexamethasone  4 mg Intravenous Q6H     insulin aspart  1-6 Units Subcutaneous Q4H       Data        Goals for the next 24 hours :    1. Airway monitoring  2.   OR for trach  3.   Reassess care needs following procedure.               Murphy Mclaughlin, MS4    I reviewed this note with medical student. Please see mu separate progress note for my separate visit      History of Present Illness          Chief Complaint/ Reason for ICU Admission and HPI     Admitted for : Neck mass with airway compromise  History obtained from Patient and chart.  History of Present Illness: Patient has had some wheezing and trouble breathing for the past two weeks. He initially thought it was COVID-19 but tested negative for this. He first went to the emergency department on 4/5/2021, after which he was prescribed a course of steroids. He said this initially helped, however once he completed the medications he started having trouble breathing again. He came back to the emergency department on 4/12/2021.              Physical Exam:   Vitals were reviewed  Physical Exam   Temp: 96.7  F (35.9  C) Temp src: Temporal Temp  Min: 96.7  F (35.9  C)  Max: 96.7  F (35.9  C) BP: (!) 148/103 Pulse: 129   Resp: 18 SpO2: 100 % O2 Device: Nasal cannula Oxygen Delivery: 2 LPM    Vitals:    04/12/21 0230   Weight: 95.3 kg (210 lb)       Intake/Output Summary (Last 24 hours) at 4/12/2021 0814  Last data filed at 4/12/2021 0700  Gross per 24 hour   Intake --   Output 400 ml   Net -400 ml       GEN: no acute distress.   HEENT: head atraumatic, sclera anicteric.  PULM: prominent  inspiratory and expiratory stridor, patient able to speak, hoarse voice, no respiratory distress.  CV/COR: RRR, normal S1 and S2, no additional heart sounds appreciated.  ABD: soft, non-tender, non-distended, audible bowel sounds.  MSK/EXT:  No LE edema. Extremities warm and well perfused.  NEURO: CN II-XII grossly intact.  SKIN: no obvious rash, skin warm and dry.          Ancillary Data:   All laboratory and imaging data reviewed.   ABG/vent data:   Resp: 18    No lab results found in last 7 days.     ROUTINE IP LABS  Recent Labs   Lab 04/12/21  0227   WBC 19.6*   HGB 16.9   MCV 86         POTASSIUM 4.1   CHLORIDE 106   CO2 26   BUN 23   CR 1.02   ANIONGAP 5   TELMA 8.7   *     Recent Results (from the past 24 hour(s))   XR Chest Port 1 View    Narrative    EXAM: XR CHEST PORT 1 VW  LOCATION: [facility]  DATE/TIME: 4/12/2021 2:30 AM    INDICATION: Shortness of breath.  COMPARISON: 4/5/2021.      Impression    IMPRESSION: Negative chest.   CT Chest Pulmonary Embolism w Contrast    Narrative    EXAM: CT CHEST PULMONARY EMBOLISM W CONTRAST  LOCATION: [facility]  DATE/TIME: 4/12/2021 2:53 AM    INDICATION: Difficulty breathing. Stridor.  COMPARISON: Chest radiograph 4/5/2021 and 4/12/2021.  TECHNIQUE: CT chest pulmonary angiogram during arterial phase injection of IV contrast. Multiplanar reformats and MIP reconstructions were performed. Dose reduction techniques were used.   CONTRAST: 115mL Isovue 370    FINDINGS:  ANGIOGRAM CHEST: No aortic dissection or aneurysm. No pulmonary embolus detected.    LUNGS AND PLEURA: Mild dependent and basilar atelectasis bilaterally. No pleural fluid or pneumothorax.    MEDIASTINUM/AXILLAE: There appears to be airway narrowing at the level of the vocal folds with possible thickening of the right vocal fold not fully imaged on this study of the chest (images 1-8 of axial series 4). Trachea and visualized central bronchi   appear patent. No  lymphadenopathy.    CORONARY ARTERY CALCIFICATION: Mild.    UPPER ABDOMEN: Cholelithiasis.    MUSCULOSKELETAL: Normal.      Impression    IMPRESSION:  1.  There appears to be narrowing of the airway at the level of the vocal folds with possible thickening of the right vocal fold not fully imaged on this study of the chest. Reference to the CT of the neck study also performed today is recommended for   additional information.  2.  No pulmonary embolus.  3.  Cholelithiasis.   Soft tissue neck CT w contrast    Narrative    EXAM: CT SOFT TISSUE NECK W CONTRAST  LOCATION: [facility]  DATE/TIME: 4/12/2021 2:54 AM    INDICATION: Dyspnea. Stridor.  COMPARISON: None.  CONTRAST: 115 mL Isovue 370.  TECHNIQUE: Routine CT soft tissue neck with IV contrast. Multiplanar reformats. Dose reduction techniques were used.    FINDINGS:     MUCOSAL SPACES/SOFT TISSUES: The adenoid soft tissues as well as oropharyngeal soft tissues demonstrate normal enhancement without mass lesion. There is an enlarged and enhancing mass centered within the right true vocal fold with involvement of the   right aryepiglottic fold, right false vocal folds, posterior hypopharynx (asymmetric to the right), right proximal wall of the infraglottic trachea. This mass measures approximately 3.9 x 1.6 cm in greatest axial dimensions and approximately 3.9 cm in   cranial caudal dimensions. This mass appears to involve the right thyroid cartilage. Additionally, there is abnormal thickening and enhancement involving the left aryepiglottic fold. The right prelaryngeal fat planes have been infiltrated by enhancing   soft tissue. The mass effaces the laryngeal airway. Normal parapharyngeal space and subcutaneous soft tissues. Normal oral cavity,  spaces, and floor of mouth structures.    LYMPH NODES: No pathologic lymph nodes by size or morphology criteria.     SALIVARY GLANDS: Normal parotid and submandibular glands.    THYROID: Normal.      VESSELS: Vascular structures of the neck are patent.    VISUALIZED INTRACRANIAL/ORBITS/SINUSES: No abnormality of the visualized intracranial compartment or orbits. Minimal paranasal sinus mucosal thickening. The mastoid air cells are clear.    OTHER: Multilevel degenerative changes of cervical spine. The included lung apices are clear.      Impression    IMPRESSION:   1.  Enhancing soft tissue mass involving the right larynx measuring 3.9 x 1.6 x 3.9 cm (AP x TV x CC), centered within the right true vocal fold with involvement of the right aryepiglottic fold, right false vocal fold, right infraglottic trachea,   posterior hypopharynx and left aryepiglottic fold. There is effacement of the laryngeal airway. ENT consultation and correlation for airway compromise is recommended.  2.  No pathologic-appearing lymphadenopathy.      Dr. Roger Porras discussed results with Dr. Perdomo on 4/12/2021 3:42 AM.           I have reviewed this note and agree.

## 2021-04-12 NOTE — ED TRIAGE NOTES
Pt presents through triage with complaints of SOB. Pt has audible stridor, tachypnic and in obvious distress. Initial room air saturation 94%. Pt was seen in ED on Monday for similar symptoms and reports the stridor worsens when he exerts himself. Pt states this has been ongoing since Monday but did worsen when he arrived to the ED.

## 2021-04-12 NOTE — ANESTHESIA CARE TRANSFER NOTE
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    Anesthesia Type:   General     Note:    Oropharynx: trach in place.  Level of Consciousness: awake  Oxygen Supplementation: face mask (over trach.)  Level of Supplemental Oxygen (L/min / FiO2): 6  Airway patency satisfactory and stable: trach.  Dentition: dentition unchanged  Vital Signs Stable: post-procedure vital signs reviewed and stable  Report to RN Given: handoff report given  Patient transferred to: PACU    Handoff Report: Identifed the Patient, Identified the Reponsible Provider, Reviewed the pertinent medical history, Discussed the surgical course, Reviewed Intra-OP anesthesia mangement and issues during anesthesia, Set expectations for post-procedure period and Allowed opportunity for questions and acknowledgement of understanding      Vitals: (Last set prior to Anesthesia Care Transfer)  CRNA VITALS  4/12/2021 1349 - 4/12/2021 1428      4/12/2021             EKG:  Sinus rhythm        Electronically Signed By: TARUN Kearney CRNA  April 12, 2021  2:28 PM

## 2021-04-12 NOTE — PHARMACY-ADMISSION MEDICATION HISTORY
Pharmacy Medication History  Admission medication history interview status for the 4/12/2021  admission is complete. See EPIC admission navigator for prior to admission medications     Medication history sources: Patient, Surescripts and Care Everywhere    Significant changes made to the medication list:  none    In the past week, patient estimated taking medication this percent of the time: N/A    Additional medication history information:   none    Medication reconciliation completed by provider prior to medication history? No    Time spent in this activity: 20    Prior to Admission medications    None       The information provided in this note is only as accurate as the sources available at the time of update(s)

## 2021-04-12 NOTE — PLAN OF CARE
Acknowledged new SLP order to evaluate s/p trach.  Patient awaiting trach placement due to airway obstruction from laryngeal CA.  Will follow 24-48 hours s/p tracheostomy per ENT clearance for any PO trials.  Patient unlikely to be appropriate for speaking valve use due to tumor, but will follow closely for swallowing plan.

## 2021-04-13 LAB
ANION GAP SERPL CALCULATED.3IONS-SCNC: 4 MMOL/L (ref 3–14)
BUN SERPL-MCNC: 17 MG/DL (ref 7–30)
CALCIUM SERPL-MCNC: 9 MG/DL (ref 8.5–10.1)
CHLORIDE SERPL-SCNC: 104 MMOL/L (ref 94–109)
CO2 SERPL-SCNC: 29 MMOL/L (ref 20–32)
COPATH REPORT: NORMAL
CREAT SERPL-MCNC: 0.7 MG/DL (ref 0.66–1.25)
ERYTHROCYTE [DISTWIDTH] IN BLOOD BY AUTOMATED COUNT: 13.5 % (ref 10–15)
GFR SERPL CREATININE-BSD FRML MDRD: >90 ML/MIN/{1.73_M2}
GLUCOSE BLDC GLUCOMTR-MCNC: 117 MG/DL (ref 70–99)
GLUCOSE BLDC GLUCOMTR-MCNC: 117 MG/DL (ref 70–99)
GLUCOSE BLDC GLUCOMTR-MCNC: 137 MG/DL (ref 70–99)
GLUCOSE BLDC GLUCOMTR-MCNC: 139 MG/DL (ref 70–99)
GLUCOSE BLDC GLUCOMTR-MCNC: 140 MG/DL (ref 70–99)
GLUCOSE BLDC GLUCOMTR-MCNC: 150 MG/DL (ref 70–99)
GLUCOSE SERPL-MCNC: 125 MG/DL (ref 70–99)
HCT VFR BLD AUTO: 46.2 % (ref 40–53)
HGB BLD-MCNC: 15.6 G/DL (ref 13.3–17.7)
MCH RBC QN AUTO: 29.5 PG (ref 26.5–33)
MCHC RBC AUTO-ENTMCNC: 33.8 G/DL (ref 31.5–36.5)
MCV RBC AUTO: 87 FL (ref 78–100)
PLATELET # BLD AUTO: 348 10E9/L (ref 150–450)
POTASSIUM SERPL-SCNC: 4.2 MMOL/L (ref 3.4–5.3)
RBC # BLD AUTO: 5.29 10E12/L (ref 4.4–5.9)
SODIUM SERPL-SCNC: 137 MMOL/L (ref 133–144)
WBC # BLD AUTO: 18.2 10E9/L (ref 4–11)

## 2021-04-13 PROCEDURE — 120N000001 HC R&B MED SURG/OB

## 2021-04-13 PROCEDURE — 99231 SBSQ HOSP IP/OBS SF/LOW 25: CPT | Performed by: ANESTHESIOLOGY

## 2021-04-13 PROCEDURE — 99222 1ST HOSP IP/OBS MODERATE 55: CPT | Performed by: INTERNAL MEDICINE

## 2021-04-13 PROCEDURE — 99233 SBSQ HOSP IP/OBS HIGH 50: CPT | Performed by: HOSPITALIST

## 2021-04-13 PROCEDURE — 250N000011 HC RX IP 250 OP 636: Performed by: INTERNAL MEDICINE

## 2021-04-13 PROCEDURE — 85027 COMPLETE CBC AUTOMATED: CPT | Performed by: HOSPITALIST

## 2021-04-13 PROCEDURE — 999N001017 HC STATISTIC GLUCOSE BY METER IP

## 2021-04-13 PROCEDURE — 250N000011 HC RX IP 250 OP 636: Performed by: HOSPITALIST

## 2021-04-13 PROCEDURE — 36415 COLL VENOUS BLD VENIPUNCTURE: CPT | Performed by: HOSPITALIST

## 2021-04-13 PROCEDURE — 250N000009 HC RX 250: Performed by: HOSPITALIST

## 2021-04-13 PROCEDURE — 80048 BASIC METABOLIC PNL TOTAL CA: CPT | Performed by: HOSPITALIST

## 2021-04-13 PROCEDURE — 258N000003 HC RX IP 258 OP 636: Performed by: INTERNAL MEDICINE

## 2021-04-13 RX ORDER — ACETAMINOPHEN 160 MG
TABLET,DISINTEGRATING ORAL EVERY 8 HOURS PRN
Status: DISCONTINUED | OUTPATIENT
Start: 2021-04-13 | End: 2021-04-17 | Stop reason: HOSPADM

## 2021-04-13 RX ADMIN — SODIUM CHLORIDE, POTASSIUM CHLORIDE, SODIUM LACTATE AND CALCIUM CHLORIDE: 600; 310; 30; 20 INJECTION, SOLUTION INTRAVENOUS at 13:41

## 2021-04-13 RX ADMIN — HYDROMORPHONE HYDROCHLORIDE 0.5 MG: 1 INJECTION, SOLUTION INTRAMUSCULAR; INTRAVENOUS; SUBCUTANEOUS at 20:08

## 2021-04-13 RX ADMIN — INSULIN ASPART 1 UNITS: 100 INJECTION, SOLUTION INTRAVENOUS; SUBCUTANEOUS at 04:14

## 2021-04-13 RX ADMIN — HYDROGEN PEROXIDE: 30 LIQUID TOPICAL at 16:15

## 2021-04-13 RX ADMIN — DEXAMETHASONE SODIUM PHOSPHATE 4 MG: 4 INJECTION, SOLUTION INTRAMUSCULAR; INTRAVENOUS at 09:24

## 2021-04-13 RX ADMIN — SODIUM CHLORIDE, POTASSIUM CHLORIDE, SODIUM LACTATE AND CALCIUM CHLORIDE: 600; 310; 30; 20 INJECTION, SOLUTION INTRAVENOUS at 03:06

## 2021-04-13 RX ADMIN — DEXAMETHASONE SODIUM PHOSPHATE 4 MG: 4 INJECTION, SOLUTION INTRAMUSCULAR; INTRAVENOUS at 04:15

## 2021-04-13 ASSESSMENT — ACTIVITIES OF DAILY LIVING (ADL)
ADLS_ACUITY_SCORE: 15

## 2021-04-13 NOTE — PROGRESS NOTES
[facility]    Otolaryngology Progress Note     Assessment & Plan   Raj Warren is a 70 year old male who was admitted on 4/12/2021. He is POD#1 s/p awake tracheotomy and DL/BX.  Biopsy results are pending.  He is off the ventillator and on trach dome for humidification.  No bleeding.  Will keep the trach cuff up for 24 hours.  Dressing changed.        Roger Tavarez MD    Interval History   Per above    Physical Exam   Temp: 98.7  F (37.1  C) Temp src: Axillary BP: (!) 149/87 Pulse: 92   Resp: 14 SpO2: 98 % O2 Device: Trach dome Oxygen Delivery: 8 LPM  Vitals:    04/12/21 0230 04/13/21 0600   Weight: 95.3 kg (210 lb) 98.1 kg (216 lb 4.3 oz)     Vital Signs with Ranges  Temp:  [97.4  F (36.3  C)-98.7  F (37.1  C)] 98.7  F (37.1  C)  Pulse:  [] 92  Resp:  [6-30] 14  BP: (127-199)/() 149/87  FiO2 (%):  [28 %-100 %] 28 %  SpO2:  [98 %-100 %] 98 %  I/O last 3 completed shifts:  In: 3190 [I.V.:3190]  Out: 3080 [Urine:3075; Blood:5]     Trach site stable without bleeding, dressing in place.  No abnormal discharge or extraneous sounds from the trach.      Medications     lactated ringers 100 mL/hr at 04/13/21 0830       dexamethasone  4 mg Intravenous Q6H     insulin aspart  1-6 Units Subcutaneous Q4H       Data   Results for orders placed or performed during the hospital encounter of 04/12/21 (from the past 24 hour(s))   Glucose by meter   Result Value Ref Range    Glucose 167 (H) 70 - 99 mg/dL   Glucose by meter   Result Value Ref Range    Glucose 144 (H) 70 - 99 mg/dL   Glucose by meter   Result Value Ref Range    Glucose 163 (H) 70 - 99 mg/dL   Glucose by meter   Result Value Ref Range    Glucose 139 (H) 70 - 99 mg/dL   Glucose by meter   Result Value Ref Range    Glucose 150 (H) 70 - 99 mg/dL   Glucose by meter   Result Value Ref Range    Glucose 140 (H) 70 - 99 mg/dL

## 2021-04-13 NOTE — PROVIDER NOTIFICATION
MD NOTIFICATION    Person Notified: MD    Notified Person's Name:    Notification Date/Time: 4/13/2021 0049    Notification Interaction: Paged physician     Purpose of Notification: Did dressing change on trach and now oozing blood. 1/3 of 1 4x4 gauze soaked.    Orders Received: No new orders. Ok to transfer to [facility], [place].

## 2021-04-13 NOTE — PLAN OF CARE
SLP - Epic notes were reviewed, and RT/RN consulted.  Plan to schedule SLP swallow evaluation for 4/14 as trach placement has not been greater than 24 hours for cuff deflation during swallow eval.  Recommend continued NPO with frequent oral cares.

## 2021-04-13 NOTE — PROGRESS NOTES
Pt is on HFTD 40% and 30LPM. The SPO2 is in the upper 90's.We will continue to monitor.  Georgiana Santizo, RT

## 2021-04-13 NOTE — PLAN OF CARE
Pt A+Ox4. New trach placed today. Writes to communicate when needed. BP elevated, controlled using PRN medication. Tele Sinus tach. O2 stable on trach dome. Bloody secretions present. Per MD continue to reinforce dressing. Using urinal, adequate output. Will continue to monitor.

## 2021-04-13 NOTE — CONSULTS
Consult Date:  04/13/2021      This consult has been requested by Dr. Alexander for laryngeal cancer.      Mr. Warren is a 70-year-old gentleman who has been having shortness of breath.  He was initially evaluated in the emergency room on 04/05/2021 for shortness of breath.  He was treated with steroid.  He again presented to the emergency room on 04/12/2021 because of shortness of breath.  He was found to have stridor.  Multiple investigations were done.   -CBC reveals leukocytosis.  Normal hemoglobin and platelets.   -Normal electrolytes.   -Mildly elevated TSH, but normal free T4.   -COVID-19 negative.   -Influenza negative.   -CT chest angiogram did not reveal any pulmonary embolism.   -CT neck revealed an enhancing soft tissue mass involving the right larynx centered within right true vocal cord with involvement of right aryepiglottic fold, right false vocal cord, right infraglottic trachea, posterior hypopharynx and left aryepiglottic fold.      The patient was seen by ENT.  Direct laryngoscopy on 04/12/2021 revealed an endolaryngeal mass based on the right.  Because of stridor, patient had a tracheostomy done on 04/12/2021.      I met with the patient.  Pathology is still pending.  The patient denies any history of cancer.      Before this shortness of breath, he had been in good health.  No chest pain.  No upper respiratory infection.  No weight loss.  He has not felt any lump.  All other review of systems negative.      ALLERGIES:  REVIEWED.      MEDICATIONS:  Reviewed.      PAST MEDICAL HISTORY:     1.  Allergic rhinitis.     2.  ENT evaluation in 2019 for hoarseness of voice. Details not available.     SOCIAL HISTORY:    -He smoked half a pack per day for almost 30 years. Quit in 1989.    -Occasional alcohol use.      PHYSICAL EXAMINATION:   GENERAL:  The patient is alert and oriented.  Not in acute distress.  He has tracheostomy.    The rest of the systems not examined.      ASSESSMENT:  A 70-year-old  gentleman with right-sided laryngeal mass, status post biopsy and tracheostomy.      RECOMMENDATIONS:   1.  The patient has a right-sided laryngeal mass causing stridor.  He had biopsy and tracheostomy done.  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   2.  Discussed with him regarding further management.  We will need to get a PET scan as an outpatient.  On the CT scan, there is no involvement of lymph node.   3.  I explained to the patient that main treatment will be concurrent chemoradiation.  We will get a Radiation Oncology consult. Definitive treatment plan will be made after PET scan and Radiation Oncology consult.      Thanks for the consult.      TOTAL TIME SPENT:  45 minutes.         SANTIAGO BERRY MD             D: 2021   T: 2021   MT: CELESTINO      Name:     LUPE HALL   MRN:      [id number]        Account:       [id number]   :      1950           Consult Date:  2021      Document: [id number]

## 2021-04-13 NOTE — PROGRESS NOTES
[facility]    Hospitalist Progress Note      Assessment & Plan   Raj Warren is a 70 year old male was admitted on 4/12/2021 presents with progressive dyspnea and stridor.    CT with laryngeal mass with airway compromise presented with dyspnea and stridor  S/p trach per ENT 4/12/2021  Hx laryngeal mass with airway compromise 2019: First diagnosed through outpatient ENT clinic in 2019; on admission he reported he had a laryngeal polyp at that time.  Patient reports dyspnea for the last few weeks, presented to ED 4/5 with stridor and prescribed prednisone.  Since then progressive dyspnea.  In ED CT with soft tissue mass in right [place] 3.9 x 1.6 x 3.9 cm within the right vocal apparatus, narrowing of airway.  Given racepinephrine per nebulizer x 2, Solu-Medrol 1.25 mg, DuoNeb.  -Admited to ICU for monitoring of airway started on dexamethasone every 6 hours.  -ENT consult, performed tracheotomy 4/12/2021 with humidified trach placement to keep inflated 24 hours.  -4/13/2021 status post day 1 trach placement, on exam stridor resolved, will DC dexamethasone, discussed with Dr. Johnson, ENT who describes complicated biopsy and states if this returns negative for CA he would be doubtful and would repeat biopsy.  -Continue orders for racemic epi nebulizer PRN  -RT to continue to follow, instruct patient on self trach care.  -ST to evaluate after 24 hours with potential trach deflation to resume p.o.'s.  -Although biopsy results are pending we will have Oncology consult with patient as in the past he was lost to follow-up.  Oncology can discuss with patient expectation regarding plans pending biopsy.    Hyperglycemia: Blood glucose of 207 following steroid administration in the ER  -Medium dose sliding scale insulin available every 4 hours as needed while n.p.o.  -Hemoglobin A1c normal     Suboptimal blood pressure control  Patient denies history of hypertension.  Since admission -160 range  diastolic .  Antihypertensives were limited as n.p.o. now with trach, inflated for the next 24 hours.  Has as needed IV labetalol and hydralazine.  After can resume p.o. start antihypertensives if needed.    Covid 19 NEG    DVT Prophylaxis: Pneumatic Compression Devices. Early ambulation  Code Status: Full Code  Expected discharge:1-2 days pending clinical improvement, ENT clearance and  plan established.  Although biopsy is pending we will have Oncology see patient as in the past he was lost to follow-up.  Naga Alexander MD  Text Page (7am - 6pm, M-F)    Interval History   Reports breathing improved, no further wheeze or stridor.  Nursing reports no complication, trach remains inflated, plan for at least 24 hours.  Glucoses WNL.      -Data reviewed today: I reviewed all new labs and imaging results over the last 24 hours.    Physical Exam   Temp: 98.5  F (36.9  C) Temp src: Axillary BP: (!) 159/98 Pulse: 90   Resp: 11 SpO2: 99 % O2 Device: Trach dome Oxygen Delivery: 8 LPM  Vitals:    04/12/21 0230 04/13/21 0600   Weight: 95.3 kg (210 lb) 98.1 kg (216 lb 4.3 oz)     Vital Signs with Ranges  Temp:  [97.4  F (36.3  C)-98.7  F (37.1  C)] 98.5  F (36.9  C)  Pulse:  [] 90  Resp:  [6-30] 11  BP: (131-199)/() 159/98  FiO2 (%):  [28 %-40 %] 28 %  SpO2:  [98 %-100 %] 99 %  I/O last 3 completed shifts:  In: 2428.83 [I.V.:2428.83]  Out: 3275 [Urine:3275]    General/Constitutional:   NAD, alert, calm, cooperative    HEENT/Head Exam:  Atraumatic; s/p tracheotomy with trach dome.  Eyes:  PERRL, no conjunctivits  Mouth/Oral Pharynx:  Buccal mucosa WNL  Chest/Respiratory: Trach dome.  Air exchange bilateral lung fields; no rales or wheeze. Respiration nonlabored.  Cardiovascular: No murmur appreciated.  LE edema none  Gastrointestinal/Abdomen:  soft, nontender, no rebound, guarding or other peritoneal signs.  Musculoskeletal:  extremities warm, dry, noncyanotic; no acitve synovitis.  Neurologic:  gross CN  and motor testing  nonfocal.  Sensation grossly tested intact.  Psychiatric:  Mental status:  Alert, oriented, affect calm  Skin:  No rash noted on gross exam    Medications     lactated ringers 100 mL/hr at 04/13/21 1341       insulin aspart  1-6 Units Subcutaneous Q4H       Data   Recent Labs   Lab 04/13/21  1421 04/12/21  0227   WBC 18.2* 19.6*   HGB 15.6 16.9   MCV 87 86    369    137   POTASSIUM 4.2 4.1   CHLORIDE 104 106   CO2 29 26   BUN 17 23   CR 0.70 1.02   ANIONGAP 4 5   TELMA 9.0 8.7   * 119*

## 2021-04-13 NOTE — PROGRESS NOTES
Patient is currently on 8L 28% trach dome. MD wants his cuff up for 24 hours. Breath sounds are course and crackles. SpO2 is in the upper 90s. We will continue to monitor and wean O2 as needed.   Morena Dahl, RT

## 2021-04-13 NOTE — OP NOTE
Procedure Date: 04/12/2021      PREOPERATIVE DIAGNOSES:  Laryngeal mass with respiratory insufficiency.      POSTOPERATIVE DIAGNOSES:  Laryngeal mass with respiratory insufficiency.      SURGICAL PROCEDURES:   1.  Awake tracheostomy.   2.  Direct laryngoscopy with biopsies.      SURGEONS:  Choco Johnson MD; Tam Sauceda MD      ANESTHESIA:  Local with monitored anesthesia care, converted to general anesthesia with tracheostomy tube in place.      FINDINGS:   1.  Normal neck and tracheal anatomy.   2.  Endolaryngeal mass obscuring laryngeal landmarks.  Mass appeared to be based on the right endolarynx.  Biopsy sent.      INDICATIONS:  Mr. Warren is a 70-year-old male with a history of a right-sided vocal cord carcinoma, who did not complete treatment for this.  He has had progressive dyspnea in the past several weeks and came to the emergency room in stridor and mild respiratory distress.  CT imaging identified a right-sided laryngeal mass and in order to secure an airway, the above procedure was recommended.  The risks, alternatives, and benefits were reviewed with the patient directly, he had his questions answered, and he wished to proceed.  The risks discussed included the risks of infection, anesthesia, hemorrhage, loss of airway, death.  We discussed the possibility this may be a permanent trach or may be temporary.  We also briefly discussed potential treatment options for his laryngeal cancer, including laryngectomy and/or chemo/radiation.      DESCRIPTION OF PROCEDURE:  After meeting with the patient in the preoperative area and answering all his questions, he was taken to the operating room and placed on the operating table in supine position.  He was placed in a slightly recumbent position with his neck extended.  The universal timeout protocol was then observed and all were in agreement.  The neck was cleansed and 1% lidocaine with epinephrine 1:100,000 was carefully injected into the planned  incision line.  The neck was then prepped and draped in the usual sterile fashion.  A horizontal incision was made approximately 2 fingerbreadths above the sternal notch with a #15 blade.  This was carried through the skin, through the subcutaneous tissue, down to the level of the strap muscles.  These were bluntly  in the midline and retracted laterally, exposing the thyroid isthmus.  The thyroid gland isthmus was carefully elevated off of the trachea and was divided with electrocautery.  The trach was then identified and soft tissue removed.  At the level of the second tracheal ring, an anterior window was made with removal of cartilage.  A #8 cuffed Shiley tube was then inserted and a good airway was noted.  The Anesthesia team then proceeded to place him under full general anesthesia.  The trach was secured with four 2-0 nylon sutures and cotton twill tape.  The table was then turned 90 degrees for the direct laryngoscopy.      The shoulder roll was removed and the head placed in more of a sniffing position.  An upper dental guard was placed and a Rafat laryngoscope was selected.  The upper aerodigestive tract was examined; however, endolaryngeal landmarks were difficult to identify due to the bulbous soft tissue.  Eventually, I was able to use a Dedo laryngoscope to identify the endolarynx and biopsies were taken of the right-sided endolaryngeal mass.  I could not identify vocal cords, anterior commissure, but I was able to identify the arytenoids.  I did not appreciate a tumor extending into the piriform sinuses.  The laryngoscope was then removed and the dental guard was removed, and he was allowed to recover from anesthesia.      He tolerated the procedure well without any complications and no significant blood loss.         LEVI SEXTON MD             D: 2021   T: 2021   MT: SARAY      Name:     LUPE HALL   MRN:      [id number]        Account:        [id number]   :       1950           Procedure Date: 04/12/2021      Document: [id number]

## 2021-04-13 NOTE — PLAN OF CARE
Pt to floor from ICU at 1715. Pt. Alert and oriented x4, communicates well with mouthing and written communication. Vital signs stable on 8L humidified trach dome. Up SBA. Pt NPO. Lung sounds clear. Bowel sounds active, + flatus. BM 4/10, adequate urine output. Trach site with minimal sanguinous drainage. Denies pain. Denies nausea.

## 2021-04-13 NOTE — PLAN OF CARE
Shift Summary    Neuro: A&O. ALOK   Cardiac: BP elevated, under parameters. Tachy at times. Tele SR/ST  Pulmonary: Trach dome 40% 30L. Frequent cough.   GI/: No bm/Flatus. adaquate UO per urinal  Skin: Trach with dried drainage. No drainage on reinforced gauze.

## 2021-04-13 NOTE — PROGRESS NOTES
Patient is postop day 1 from tracheostomy from potential obstruction secondary to laryngeal mass.  He is awake alert doing well on high-humidity trach collar when I examined him this morning.  He offers no complaints and reports his breathing is better.  Temp: 98.7  F (37.1  C) Temp src: Axillary BP: (!) 160/99 Pulse: 92   Resp: 17 SpO2: 100 % O2 Device: Trach dome Oxygen Delivery: 8 LPM   Awake alert sitting in chair  Trach in position  Follows commands  CV regular rate and rhythm  Chest coarse anteriorly  Assessment and plan postop day 1 from tracheostomy from laryngeal mass.  He is stable from the ICU perspective and can be transferred out of the ICU.  He can remain on the hospitalist service.  I am available if any critical care needs arise and be more than happy to reconsult.    Roger Muñoz MD  Critical Care Medicine

## 2021-04-13 NOTE — PLAN OF CARE
Shift: [id number]    Neuro: PERRL. Intact, SIMPSON. Up to chair- SBA. Able to make needs known.   Cardiac: SR/ST. BP hypertensive but wnl. Pulses palpable.   Resp: Weaned down to trach dome 8L 28% for humidification. Changed trach dressing- some oozing- see provider notification note. Lungs clear. Coarse at times. Minimal secretions from trach- bloody.   GI: NPO. Plan for SLP to see in AM. No BM. BG wnl.   : Using urinal at bedside.   Skin: Intact.   Family: Talked with sisterYeni. Updated and questions answered.     Transferred to [facility] . Plan to discharge within the next couple days.

## 2021-04-14 LAB
ERYTHROCYTE [DISTWIDTH] IN BLOOD BY AUTOMATED COUNT: 13.4 % (ref 10–15)
GLUCOSE BLDC GLUCOMTR-MCNC: 100 MG/DL (ref 70–99)
GLUCOSE BLDC GLUCOMTR-MCNC: 104 MG/DL (ref 70–99)
GLUCOSE BLDC GLUCOMTR-MCNC: 119 MG/DL (ref 70–99)
GLUCOSE BLDC GLUCOMTR-MCNC: 127 MG/DL (ref 70–99)
GLUCOSE BLDC GLUCOMTR-MCNC: 87 MG/DL (ref 70–99)
GLUCOSE BLDC GLUCOMTR-MCNC: 96 MG/DL (ref 70–99)
HCT VFR BLD AUTO: 45.1 % (ref 40–53)
HGB BLD-MCNC: 15.3 G/DL (ref 13.3–17.7)
MCH RBC QN AUTO: 29.7 PG (ref 26.5–33)
MCHC RBC AUTO-ENTMCNC: 33.9 G/DL (ref 31.5–36.5)
MCV RBC AUTO: 87 FL (ref 78–100)
PLATELET # BLD AUTO: 295 10E9/L (ref 150–450)
RBC # BLD AUTO: 5.16 10E12/L (ref 4.4–5.9)
WBC # BLD AUTO: 17.7 10E9/L (ref 4–11)

## 2021-04-14 PROCEDURE — 36415 COLL VENOUS BLD VENIPUNCTURE: CPT | Performed by: HOSPITALIST

## 2021-04-14 PROCEDURE — 999N001017 HC STATISTIC GLUCOSE BY METER IP

## 2021-04-14 PROCEDURE — 85027 COMPLETE CBC AUTOMATED: CPT | Performed by: HOSPITALIST

## 2021-04-14 PROCEDURE — 99231 SBSQ HOSP IP/OBS SF/LOW 25: CPT | Performed by: INTERNAL MEDICINE

## 2021-04-14 PROCEDURE — 99233 SBSQ HOSP IP/OBS HIGH 50: CPT | Performed by: HOSPITALIST

## 2021-04-14 PROCEDURE — 120N000001 HC R&B MED SURG/OB

## 2021-04-14 PROCEDURE — 258N000003 HC RX IP 258 OP 636: Performed by: HOSPITALIST

## 2021-04-14 RX ADMIN — SODIUM CHLORIDE, POTASSIUM CHLORIDE, SODIUM LACTATE AND CALCIUM CHLORIDE: 600; 310; 30; 20 INJECTION, SOLUTION INTRAVENOUS at 00:29

## 2021-04-14 RX ADMIN — SODIUM CHLORIDE, POTASSIUM CHLORIDE, SODIUM LACTATE AND CALCIUM CHLORIDE: 600; 310; 30; 20 INJECTION, SOLUTION INTRAVENOUS at 09:32

## 2021-04-14 RX ADMIN — SODIUM CHLORIDE, POTASSIUM CHLORIDE, SODIUM LACTATE AND CALCIUM CHLORIDE: 600; 310; 30; 20 INJECTION, SOLUTION INTRAVENOUS at 20:18

## 2021-04-14 ASSESSMENT — ACTIVITIES OF DAILY LIVING (ADL)
ADLS_ACUITY_SCORE: 15

## 2021-04-14 NOTE — PLAN OF CARE
VSS except slightly hypertensive at times, not meeting critieria for PRN meds.  A/Ox 4. Tracheostomy site cleansed and dressing changed. Has some minor discomfort but denies need for intervention. Up independently. NPO d/t new trach, per ENT will deflate tomorrow and trial liquids. IVF @ 100. Blood sugars monitored. Voiding well. PRN suctioning at request of the patient, trach dome on 5L but 98% room air. Plan for radiation marking prior to discharge. Care management consult placed for possible discharge home tomorrow.

## 2021-04-14 NOTE — PLAN OF CARE
The patient is a very pleasant man who communicates mainly through gestures or a writing pad. Patient is very independent with cares, specifically trach cares and voiding. Inner cannula was found to be full of clots about 0130 and Respiratory Therapy replaced it after deep suctioning. Patient experienced significant relief, though had been saturating high 90s to 100%. Patient continued to require intermittent deep suctioning, performed by this writer, productive of pink-tinged sputum.    VS WNL other than slight hypertension. A/Ox4. Dressing continues unchanged with very small amount of dried blood. Dressing changed once during shift. Pain controlled with one dose of Dilaudid. Up independently. Diet NPO. BS normoactive. Positive flatus but no bowel movement since 4/10/21. Voiding very well with bedside urinal. LS CTAB.

## 2021-04-14 NOTE — PROGRESS NOTES
[facility]    Hospitalist Progress Note      Assessment & Plan   Raj Warren is a 70 year old male was admitted on 4/12/2021 presents with progressive dyspnea and stridor.    CT with laryngeal mass with airway compromise presented with dyspnea and stridor  S/p trach per ENT 4/12/2021  Hx laryngeal mass with airway compromise 2019: First diagnosed through outpatient ENT clinic in 2019; on admission he reported he had a laryngeal polyp at that time.  Patient reports dyspnea for the last few weeks, presented to ED 4/5 with stridor and prescribed prednisone.  Since then progressive dyspnea.  In ED CT with soft tissue mass in right Larynex 3.9 x 1.6 x 3.9 cm within the right vocal apparatus, narrowing of airway.  Given racepinephrine per nebulizer x 2, Solu-Medrol 1.25 mg, DuoNeb.  -Admited to ICU for monitoring of airway started on dexamethasone every 6 hours.  -ENT consult, performed tracheotomy 4/12/2021 with humidified trach placement to keep inflated 24 hours.  -4/13/2021 status post day 1 trach placement, on exam stridor resolved, will DC dexamethasone, discussed with Dr. Johnson, ENT who describes complicated biopsy and states if this returns negative for CA he would be doubtful and would repeat biopsy.  -Continue orders for racemic epi nebulizer PRN  -RT to continue to follow, instruct patient on self trach care.  -ST to evaluate after 24 hours with potential trach deflation to resume p.o.'s.  4/14/2021 due to oozing around trach ENT is postponing deflation today, reassess tomorrow including ST evaluation for possible start on p.o.'s.  -Oncology, Dr. Bean met with patient yesterday and reviewed plans pending biopsy for presumptive right-sided laryngeal cancer including need for PET scan as outpatient with main treatment being concurrent chemo radiation.  Ration oncology consult placed.  -Patient's airway remains patent, occasional increased secretion requiring suctioning, patient will  be instructed on self trach care.      Hyperglycemia: Blood glucose of 207 following steroid administration in the ER  -Medium dose sliding scale insulin available every 4 hours while on dexamethasone.  Since DC dexamethasone glucoses WNL.-Hemoglobin A1c normal     Leukocytosis; WBC 19.6 on admission,> 18.2> 17.7  Likely related to high-dose dexamethasone prior to trach placement for stridor and airway obstruction.  No S/S active infection.  Afebrile.  Recheck WBC in the a.m., persistent elevation check procalcitonin.    Suboptimal blood pressure control  Patient denies history of hypertension.  Since admission -160 range diastolic .  Antihypertensives were limited as n.p.o. now with trach, inflated.  Has as needed IV labetalol and hydralazine.  After can resume p.o. start antihypertensives if needed.    Covid 19 NEG    DVT Prophylaxis: Pneumatic Compression Devices. Early ambulation  Code Status: Full Code  Expected discharge:1-2 days pending clinical improvement, ENT clearance and  plan established, including trach cuff deflation, ST clearance regarding p.o.'s, instructions on self trach care.  Naga Alexander MD  Text Page (7am - 6pm, M-F)      I spent >35 minutes on the unit/floor in management of care today reviewing labs, medications, interdisciplinary notes; and completing documentation of encounter and orders with over 50% of my time was spent Counseling the Patient regarding trach care, delay on start of p.o.'s, hypertension, oncology plans pending biopsy results and Coordinating Care and plan with Nursing and Specialists, ENT regarding trach management and surveillance.     Interval History   Breathing improving, no wheeze or stridor.  Occasional increased secretion requiring suctioning.  Patient has not yet been instructed on trach self-care.  Reports oozing around trach site therefore ENT has postponed trach deflation for at least the next 24 hours.  Hemoglobin stable.  Reassess at that  time.    -Data reviewed today: I reviewed all new labs and imaging results over the last 24 hours.    Physical Exam   Temp: 97.9  F (36.6  C) Temp src: Oral BP: (!) 155/92 Pulse: 96   Resp: 18 SpO2: 100 % O2 Device: None (Room air) Oxygen Delivery: 8 LPM  Vitals:    04/12/21 0230 04/13/21 0600   Weight: 95.3 kg (210 lb) 98.1 kg (216 lb 4.3 oz)     Vital Signs with Ranges  Temp:  [97.5  F (36.4  C)-98.1  F (36.7  C)] 97.9  F (36.6  C)  Pulse:  [79-97] 96  Resp:  [15-20] 18  BP: (136-165)/() 155/92  FiO2 (%):  [28 %] 28 %  SpO2:  [98 %-100 %] 100 %  I/O last 3 completed shifts:  In: 1386.67 [I.V.:1386.67]  Out: 2510 [Urine:2510]    General/Constitutional:    NAD, alert, calm, cooperative    HEENT/Head Exam:  Atraumatic; s/p tracheotomy with trach dome.  Eyes:  PERRL, no conjunctivits  Mouth/Oral Pharynx:  Buccal mucosa WNL  Chest/Respiratory: Trach dome.  No surrounding swelling, erythema, active bleeding.  Minimal secretions trach, clears with suctioning.  Air exchange bilateral lung fields; no rales or wheeze. Respiration nonlabored.  Cardiovascular: No murmur appreciated.  LE edema none  Gastrointestinal/Abdomen:  soft, nontender, no rebound, guarding or other peritoneal signs.  Musculoskeletal:  extremities warm, dry, noncyanotic; no acitve synovitis.  Neurologic:  gross CN and motor testing  nonfocal.  Sensation grossly tested intact.  Psychiatric:  Mental status:  Alert, oriented, affect calm.  Skin:  No rash noted on gross exam    Medications     lactated ringers 100 mL/hr at 04/14/21 0932       insulin aspart  1-6 Units Subcutaneous Q4H       Data   Recent Labs   Lab 04/14/21  0845 04/13/21  1421 04/12/21  0227   WBC 17.7* 18.2* 19.6*   HGB 15.3 15.6 16.9   MCV 87 87 86    348 369   NA  --  137 137   POTASSIUM  --  4.2 4.1   CHLORIDE  --  104 106   CO2  --  29 26   BUN  --  17 23   CR  --  0.70 1.02   ANIONGAP  --  4 5   TELMA  --  9.0 8.7   GLC  --  125* 119*

## 2021-04-14 NOTE — PLAN OF CARE
SLP - RN and ENT consulted this am.  ENT reports pt with continued oozing blood and plan for no cuff deflation today.  Plan to return 4/15 for SLP swallow evaluation if pt able to tolerate cuff deflation per ENT.

## 2021-04-14 NOTE — PROGRESS NOTES
ENT Progress Note:    POD #2 tracheotomy, DL with  Bx.  Pt doing well, indicates minimal pain, able to sleep. Anxious to eat and go home.     Exam:  BP (!) 150/100 (BP Location: Right arm)   Pulse 96   Temp 97.5  F (36.4  C) (Oral)   Resp 20   Wt 98.1 kg (216 lb 4.3 oz)   SpO2 98%   BMI 30.16 kg/m       Trach secure, minor blood staining on dressing, inner cannula removed and had some moist blood.    Path: moderately differentiated SCCA    A/P  --Advanced stage laryngeal squamous cell cancer, will likely need concurrent Chemo and XRT with laryngectomy for salvage. This was briefly discussed with the pt. Further w/u with PET scan as an outpt.   --Because there was still some minor oozing I left the trach cuff up. If there is no oozing tomorrow will deflate cuff and start  liquid diet.   --Nursing staff to teach pt inner cannula care.  --When pt can demonstrate inner cannula care and is able to drink liquids would be OK to discharge to home. ENT f/u next week for trach change. Will switch to a cuffless trach tube which will allow some phonation and should allow him to advance his diet.   --please get #8 trach tube that is cuffless (NO cuff) at bedside and send home with pt. He will need this for trach change in clinic. If he stays in the hospital over the weekend will change trach as inpt.     Choco Johnson MD  ENT [facility]  [phone]

## 2021-04-14 NOTE — CONSULTS
69 y/o male with Dx. 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  CT neck imaging demonstrates laryngeal tumor with cartilage erosion - likely stage T3-T4 with no evidence of H&N lymphadenopathy.  Based on obstructing tumor consideration of XRT treatment & chemorx. was discussed with pt. Pt being considered for PET/CT study for staging once discharged from hospital.  I discussed with Dr. MOISE Johnson ENT & Dr. Bean Oncology.  I recommend RT treatment planning CT simulation while pt inpt to prepare for initiating XRT rx. once outpt staging accomplished. I met with pt & discussed & reviewed potential XRT rx. For likely 7 week course of combined treatment.  Also discussed potential side effects of XRT rx.  Pt is agreeable to carrying out simulation tomorrow.  Will tentatively schedule tomorrow.  MAXIMILIANO Mclaughlin MD.

## 2021-04-14 NOTE — PROGRESS NOTES
Service Date: 2021      SUBJECTIVE:  Mr. Warren is a 70-year-old gentleman with newly diagnosed laryngeal squamous cell carcinoma.  The patient admitted with stridor.  Direct laryngoscopy revealed laryngeal mass.  CT scan revealed soft tissue mass involving the right larynx.  There is no neck lymphadenopathy.  Biopsy has come back positive for invasive keratinizing moderately differentiated squamous cell carcinoma.      I met with the patient.  He has a trach.  Overall condition is stable.  He is not in pain.  Stridor has improved.  No chest pain.  No nausea or vomiting.  No fever.      PHYSICAL EXAMINATION:   GENERAL:  He is alert, oriented x 3.  Not in any acute distress.   VITAL SIGNS:  Reviewed.      The rest of the systems not examined.      ASSESSMENT:  A 70-year-old gentleman with newly diagnosed laryngeal squamous cell carcinoma.      PLAN:   1.  Biopsy report was reviewed with the patient.  I explained to him that he has laryngeal squamous cell carcinoma.   2.  Based on the CT neck and chest, there is no evidence of lymphadenopathy or lung metastasis.  He needs a PET scan, which will be arranged after discharge.   3.  My plan will be to treat him with concurrent chemoradiation.  Hopefully, it goes into remission.  If not, he will need salvage laryngectomy after that.  Radiation Oncology consult awaited.  After discharge, we will also schedule him to go to [facility].  The patient is agreeable for it.   4.  Oncology will continue to follow.         SANTIAGO BERRY MD             D: 2021   T: 2021   MT: CELESTINO      Name:     LUPE WARREN   MRN:      [id number]        Account:      [id number]   :      1950           Service Date: 2021      Document: [id number]

## 2021-04-15 ENCOUNTER — APPOINTMENT (OUTPATIENT)
Dept: SPEECH THERAPY | Facility: CLINIC | Age: 71
DRG: 013 | End: 2021-04-15
Attending: HOSPITALIST
Payer: COMMERCIAL

## 2021-04-15 DIAGNOSIS — C14.0 PRIMARY SQUAMOUS CELL CARCINOMA OF THROAT (H): ICD-10-CM

## 2021-04-15 DIAGNOSIS — J38.3 VOCAL CORD MASS: Primary | ICD-10-CM

## 2021-04-15 DIAGNOSIS — C32.8 MALIGNANT NEOPLASM OF OVERLAPPING SITES OF LARYNX (H): ICD-10-CM

## 2021-04-15 DIAGNOSIS — C32.9 LARYNGEAL SQUAMOUS CELL CARCINOMA (H): ICD-10-CM

## 2021-04-15 LAB
ERYTHROCYTE [DISTWIDTH] IN BLOOD BY AUTOMATED COUNT: 13.2 % (ref 10–15)
GLUCOSE BLDC GLUCOMTR-MCNC: 100 MG/DL (ref 70–99)
GLUCOSE BLDC GLUCOMTR-MCNC: 104 MG/DL (ref 70–99)
GLUCOSE BLDC GLUCOMTR-MCNC: 96 MG/DL (ref 70–99)
HCT VFR BLD AUTO: 47.5 % (ref 40–53)
HGB BLD-MCNC: 16 G/DL (ref 13.3–17.7)
MCH RBC QN AUTO: 29.3 PG (ref 26.5–33)
MCHC RBC AUTO-ENTMCNC: 33.7 G/DL (ref 31.5–36.5)
MCV RBC AUTO: 87 FL (ref 78–100)
PLATELET # BLD AUTO: 231 10E9/L (ref 150–450)
PROCALCITONIN SERPL-MCNC: <0.05 NG/ML
RBC # BLD AUTO: 5.47 10E12/L (ref 4.4–5.9)
WBC # BLD AUTO: 11.7 10E9/L (ref 4–11)

## 2021-04-15 PROCEDURE — 84145 PROCALCITONIN (PCT): CPT | Performed by: HOSPITALIST

## 2021-04-15 PROCEDURE — 999N001017 HC STATISTIC GLUCOSE BY METER IP

## 2021-04-15 PROCEDURE — 92610 EVALUATE SWALLOWING FUNCTION: CPT | Mod: GN

## 2021-04-15 PROCEDURE — 36415 COLL VENOUS BLD VENIPUNCTURE: CPT | Performed by: HOSPITALIST

## 2021-04-15 PROCEDURE — 258N000003 HC RX IP 258 OP 636: Performed by: HOSPITALIST

## 2021-04-15 PROCEDURE — 85027 COMPLETE CBC AUTOMATED: CPT | Performed by: HOSPITALIST

## 2021-04-15 PROCEDURE — 120N000001 HC R&B MED SURG/OB

## 2021-04-15 PROCEDURE — 99231 SBSQ HOSP IP/OBS SF/LOW 25: CPT | Performed by: INTERNAL MEDICINE

## 2021-04-15 PROCEDURE — 99233 SBSQ HOSP IP/OBS HIGH 50: CPT | Performed by: HOSPITALIST

## 2021-04-15 PROCEDURE — 999N000157 HC STATISTIC RCP TIME EA 10 MIN

## 2021-04-15 RX ORDER — SODIUM CHLORIDE 9 MG/ML
INJECTION, SOLUTION INTRAVENOUS CONTINUOUS
Status: DISCONTINUED | OUTPATIENT
Start: 2021-04-15 | End: 2021-04-17 | Stop reason: ALTCHOICE

## 2021-04-15 RX ADMIN — DEXTROSE AND SODIUM CHLORIDE: 5; 900 INJECTION, SOLUTION INTRAVENOUS at 12:08

## 2021-04-15 RX ADMIN — SODIUM CHLORIDE: 9 INJECTION, SOLUTION INTRAVENOUS at 15:04

## 2021-04-15 RX ADMIN — SODIUM CHLORIDE, POTASSIUM CHLORIDE, SODIUM LACTATE AND CALCIUM CHLORIDE: 600; 310; 30; 20 INJECTION, SOLUTION INTRAVENOUS at 06:32

## 2021-04-15 ASSESSMENT — ACTIVITIES OF DAILY LIVING (ADL)
ADLS_ACUITY_SCORE: 15
ADLS_ACUITY_SCORE: 16
ADLS_ACUITY_SCORE: 15
ADLS_ACUITY_SCORE: 15
ADLS_ACUITY_SCORE: 17
ADLS_ACUITY_SCORE: 16

## 2021-04-15 NOTE — CONSULTS
Consult Date:  04/15/2021      RADIATION THERAPY CONSULTATION      INPATIENT LOCATION:  Room 332, Bed 2.      REQUESTING PHYSICIAN:  Dr. Rosa Bean; Dr. Choco Johnson, ENT and Dr. Alexander, Hospitalist.      HISTORY OF PRESENT ILLNESS:  Mr. Raj Warren is a 70-year-old gentleman admitted to [facility] with increased shortness of breath and respiratory stridor on 04/12/2021.  Subsequent evaluation included a CT scan of the neck, identifying soft tissue mass causing airway obstruction of the right hemilarynx with right vocal cord tumor mass, extending into the posterior hypopharynx and crossing midline with evidence of cartilage erosion, consistent with stage T3 to T4 laryngeal carcinoma tumor.  Subsequent direct laryngoscopy and biopsy procedure on 04/12/2021 was carried out by Dr. Johnson in ENT.  A tracheostomy was placed at that time.  Pathology report from this procedure identifies invasive keratinizing moderately differentiated squamous cell carcinoma tumor involvement.  The patient has stabilized after having tracheostomy placement.  He initially was managed with dexamethasone medication.  This has subsequently been tapered.  CT scan of the chest on 04/12/2021 identified narrowing of the airway with no evidence of pulmonary metastatic or mediastinal metastatic disease involvement.      The patient is seen for discussion and consideration of the role of external beam radiation treatment for his identified squamous cell carcinoma of the larynx.      PAST MEDICAL HISTORY:     1.  Prior ENT evaluation in 2019 for voice hoarseness with biopsy of right vocal cord mass on 08/07/2019, positive for invasive squamous cell carcinoma, moderately differentiated.  The patient apparently did not follow-up for treatment.   2.  History of allergic rhinitis.      HABITS:  Tobacco, half-pack per day, discontinued in 1989.  Alcohol -- occasional.      MEDICATIONS:  The patient has been tapered off the  dexamethasone.  He has been on insulin as needed during hospitalization and Dilaudid pain medication.      ALLERGIES:  SEASONAL AND NO KNOWN DRUG ALLERGIES.      PHYSICAL EXAMINATION:   GENERAL:  Reveals alert male in no acute distress.  Tracheostomy in place with serosanguineous crusting.   VITAL SIGNS:  Blood pressure 155/92, pulse 96 and regular, temperature 97.9 degrees Fahrenheit.  Weight 216 pounds.   HEENT:  EOMs are intact.  Oral cavity exam reveals no mucosal lesions or ulcerations.  Moderate dentition with missing tooth in one area.  He has not had dental care in the last several years.   NECK:  No cervical or supraclavicular lymphadenopathy.  Tracheostomy in place with serosanguineous crusting.   LUNGS:  On auscultation reveal no significant rales or rhonchi.   CARDIAC:  Regular rhythm without significant murmur.   ABDOMEN:  Soft without organomegaly.   EXTREMITIES:  Show no significant edema.  No calf tenderness.      IMPRESSION:  Mr. Raj Warren is a 70-year-old gentleman with a diagnosis of moderately differentiated squamous cell carcinoma of the larynx with evidence of cartilage erosion with likely stage T3 T4a disease involvement, stage III versus stage DALY.      Based on the extent of the patient's disease involvement, he has been considered for treatment for his locally advanced squamous cell carcinoma tumor with external beam radiation to be combined with potential chemotherapy treatment.  Based on this, I have recommended simulation and treatment planning for the region.  He is additionally being scheduled for outpatient staging evaluation including PET CT study.  I have explained to the patient the potential side effects, complications and goals of external beam radiation directed to the larynx neck region.  I have outlined consideration of treatment to a cumulative dose of 7000 cGy in 35 treatment fractions to the reduced larynx treatment region.  I have explained the risks for related  mucositis, xerostomia and decreased taste sensation.  I have recommended dental prophylaxis prior to initiating radiation treatment; however, this will be dependent on the patient's discharge and evaluation by his dentist.      I have recommended that we initiate radiation treatment promptly once the final staging is accomplished.  Consideration of concomitant systemic chemotherapy as per Dr. Bean in Oncology is also being considered.      We will be carrying out simulation and CT planning today.  We will plan to consider initiating treatment after final treatment recommendations are finalized and final staging as noted above is accomplished.      Thank you for asking me to evaluate Mr. Raj Hall.         JONATHAN GALVAN MD             D: 04/15/2021   T: 04/15/2021   MT:       Name:     RAJ HALL   MRN:      [id number]        Account:       [id number]   :      1950           Consult Date:  04/15/2021      Document: [id number]       cc: Rosa Johnson MD

## 2021-04-15 NOTE — PROGRESS NOTES
Visit Date:   04/15/2021      SUBJECTIVE:  Mr. Hall is a 70-year-old gentleman with newly diagnosed laryngeal squamous cell carcinoma.  This is T3 versus T4 lesion, N0.  This is at least stage III disease.        For his laryngeal squamous cell carcinoma, plan is to get an outpatient PET scan and after that start him on concurrent chemoradiation.  He has been evaluated by Dr. Mclaughlin from Radiation Oncology.      The patient's overall condition is stable.  His shortness of breath/stridor is better.  He has a trach.  ENT is following.      The patient is currently on clear liquid.  Speech therapists are seeing.  He will be getting a video swallow.  Diet will be increased as needed.      The patient is not in pain.  No chest pain.  No shortness of breath.  No fever.  No chills.      All other review of systems negative.      PHYSICAL EXAMINATION:   GENERAL:  The patient is alert, oriented x 3.  Not in any distress.   VITAL SIGNS:  Reviewed.      The rest of the systems not examined.      LABORATORY DATA:  Reviewed.      ASSESSMENT:  A 70-year-old gentleman with newly diagnosed laryngeal squamous cell carcinoma.      PLAN:   1.  Treatment was again discussed with the patient.  I told him that we will plan on starting him on concurrent chemoradiation in the next 1-2 weeks.   2.  We will schedule an outpatient PET scan.  PET scan not available inpatient.   3.  The patient will also see an ENT at [facility].  The patient may need a salvage laryngectomy.   4.  The patient will be getting radiation.  He will develop radiation esophagitis.  In the future, we may have to consider PEG tube placement.  If his video swallow study reveals any aspiration, will get the PEG tube sooner.   5.  I will see him in clinic next week for followup.  Case discussed with Dr. Alexander.         SANTIAGO BERRY MD             D: 04/15/2021   T: 04/15/2021   MT: CELESTINO      Name:     LUPE HALL   MRN:      [id number]        Account:       [id number]   :      1950           Visit Date:   04/15/2021      Document: [id number]

## 2021-04-15 NOTE — PLAN OF CARE
Alert and oriented x4. Vital signs stable on room air except tachycardic.Trach in place, cuff deflated by RT per MD order, trach sutured in place. Inner cannula changed by RT. Tolerating suctioning for thick serosanguinous secretions. Stand by assist. Patient tolerating clear liquid diet. Lung sounds clear, equal bilaterally, non-labored respiratory pattern. Bowel sounds active, passing flatus, no bowel movement during shift, adequate urine output. Denies pain. Denies nausea.

## 2021-04-15 NOTE — PROGRESS NOTES
Pt seen and examined.  Trach in place with crusting.  Breathing stable.  Outlined proposed XRT treatment to pt & potential side effects & complications & goals of XRT treatment.  Anticipate approximate 35 XRT rx fractions to dose of 7000 cGy.  Pt consents. Have carried out CT simulation for XRT treatment planning.    Will await final staging W/U as outpt. before initiating XRT & possible concomitant chemorx.  Pt likely to have PET/CT for staging as outpt.  I discussed with Dr. Bean of oncology.  Will follow pt & schedule return appt in radiation oncology as outpt. Consult dictated.  MAXIMILIANO Mclaughlin MD.

## 2021-04-15 NOTE — PLAN OF CARE
Alert and oriented x4. Vital signs stable on 5L trach dome with humidification, tachycardic. Assist of SBA. Tolerating NPO diet. Lung sounds diminished. Passing flatus, no BM this shift. Adequate urine output. Trach intact, scant serosanguineous drainage, suctioned x1. Denies pain and nausea.

## 2021-04-15 NOTE — PROGRESS NOTES
ENT Dr  Came in removed sutures. Per MD cuff deflated.     Trach cares done. Inner cannula changed. Inner cannula was full of bloody clots. There was still bleeding and oozing secretions around trach site.   RT will continue to follow and monitor.    Yoselin Lanier, RT

## 2021-04-15 NOTE — PLAN OF CARE
A&OX4, VSS, except tachycardia.  Denies pain.  Trach requiring suctioning of thick bloody/secretions at 1800.  Tolerated well.  Tolerating clear liquid diet.  Voiding well.

## 2021-04-15 NOTE — PROGRESS NOTES
[facility]    Hospitalist Progress Note      Assessment & Plan   Raj Warren is a 70 year old male was admitted on 4/12/2021 presents with progressive dyspnea and stridor.    CT with laryngeal mass with airway compromise presented with dyspnea and stridor  S/p trach per ENT 4/12/2021  Hx laryngeal mass with airway compromise 2019: First diagnosed through outpatient ENT clinic in 2019; on admission he reported he had a laryngeal polyp at that time.  Patient reports dyspnea for the last few weeks, presented to ED 4/5 with stridor and prescribed prednisone.  Since then progressive dyspnea.  In ED CT with soft tissue mass in right Larynex 3.9 x 1.6 x 3.9 cm within the right vocal apparatus, narrowing of airway.  Given racepinephrine per nebulizer x 2, Solu-Medrol 1.25 mg, DuoNeb.  -Admited to ICU for monitoring of airway started on dexamethasone every 6 hours.  -ENT consult, performed tracheotomy 4/12/2021 with humidified trach placement to keep inflated 24 hours.  -4/13/2021 status post day 1 trach placement, on exam stridor resolved, will DC dexamethasone, discussed with Dr. Johnson, ENT who describes complicated biopsy and states if this returns negative for CA he would be doubtful and would repeat biopsy.  -Continue orders for racemic epi nebulizer PRN  -RT to continue to follow, instruct patient on self trach care.  -ST to evaluate after 24 hours with potential trach deflation to resume p.o.'s.  4/14/2021 due to oozing around trach ENT is postponing deflation, remained NPO  -Oncology, Dr. Bean met with patien ;  bx:  SCCA. PET scan as outpatient with main treatment being concurrent chemo radiation. Dr Mclaughlin Radiation Oncology saw patient and had simulation and CT planning today.  Dr. Bean indicates treatment through [facility].   -per RT ENT deflated cuff today, notes continued oozing.  ST evaluated patient and felt patient was appropriate for regular diet however last ENT  note yesterday indicates clear liquids which will initiate.  Continue IVF at decreased rate 50 cc NS/hour.  Plans for video swallow study tomorrow, further recommendations regarding diet at that time with ENT input.  -Per ENT note yesterday plans for replacement of trach with cuffless trach it appears early next week.  -Patient needs education on self trach care prior to discharge.    Hyperglycemia: Initial hypoglycemia while on dexamethasone now normoglycemic.  Started on p.o.'s today.    Leukocytosis; WBC 19.6 on admission,> 18.2> 17.7>11.7  Likely related to high-dose dexamethasone prior to trach placement for stridor and airway obstruction.  No S/S active infection.  Afebrile.  Recheck WBC in the a.m., if persistent elevation check procalcitonin.    Suboptimal blood pressure control  Patient denies history of hypertension.  Since admission -160 range diastolic .  Antihypertensives were limited as n.p.o. now with trach, inflated.  Has as needed IV labetalol and hydralazine.  After can resume p.o. start antihypertensives if needed.  -Serial declining BP today 140s/80s, monitor.  Initial hypertension may have been related to steroid effects.      Covid 19 NEG    DVT Prophylaxis: Pneumatic Compression Devices. Early ambulation  Code Status: Full Code  Expected discharge:1-2 days pending clinical improvement including advancement of diet without IV support, ENT clearance and  plan established including education on self trach care. Naga Alexander MD  Text Page (7am - 6pm, M-F)    I spent >35 minutes on the unit/floor in management of care today reviewing labs, medications, interdisciplinary notes; and completing documentation of encounter and orders with over 50% of my time was spent Counseling the Patient regarding trach status, pending ST evaluation for p.o.'s, plans for cancer treatment with XRT, PET scanning and chemotherapy and Coordinating Care and plan with Nursing regarding education on self  trach care and Specialists, ST regarding p.o. intake and plans for video swallow study tomorrow      Interval History   Reports breathing improved, has a lot of questions.  Decreased secretions.  Had trach cuff deflated this morning, awaiting ST evaluation for p.o.'s.  Plan XRT simulation today.    -Data reviewed today: I reviewed all new labs and imaging results over the last 24 hours.    Physical Exam   Temp: 98.9  F (37.2  C) Temp src: Oral BP: (!) 147/87 Pulse: 121   Resp: 18 SpO2: 95 % O2 Device: None (Room air)(trach HME device) Oxygen Delivery: 5 LPM  Vitals:    04/12/21 0230 04/13/21 0600 04/15/21 0545   Weight: 95.3 kg (210 lb) 98.1 kg (216 lb 4.3 oz) 95.3 kg (210 lb 1.6 oz)     Vital Signs with Ranges  Temp:  [97.9  F (36.6  C)-98.9  F (37.2  C)] 98.9  F (37.2  C)  Pulse:  [107-121] 121  Resp:  [18] 18  BP: (141-161)/(86-98) 147/87  FiO2 (%):  [28 %] 28 %  SpO2:  [95 %-98 %] 95 %  I/O last 3 completed shifts:  In: -   Out: 1575 [Urine:1575]    General/Constitutional:    NAD, alert, calm, cooperative  HEENT/Head Exam:  Atraumatic; s/p tracheotomy with trach dome. Eyes:  PERRL, no conjunctivits  Mouth/Oral Pharynx:  Buccal mucosa WNL  Chest/Respiratory: Trach dome.  No surrounding swelling, erythema.  Minimal scabbing around trach site.  Minimal trach secretions. clears with suctioning.  Air exchange bilateral lung fields; no rales or wheeze. Respiration nonlabored.  Cardiovascular: No murmur appreciated.  LE edema none  Gastrointestinal/Abdomen:  soft, nontender, no rebound, guarding or other peritoneal signs.  Musculoskeletal:  extremities warm, dry, noncyanotic; no acitve synovitis.  Neurologic:  gross CN and motor testing  nonfocal.  Sensation grossly tested intact.  Psychiatric:  Mental status:  Alert, oriented, affect calm.  Skin:  No rash noted on gross exam    Medications     sodium chloride 50 mL/hr at 04/15/21 1504         Data   Recent Labs   Lab 04/15/21  1205 04/14/21  0845 04/13/21  1426  04/12/21  0227   WBC 11.7* 17.7* 18.2* 19.6*   HGB 16.0 15.3 15.6 16.9   MCV 87 87 87 86    295 348 369   NA  --   --  137 137   POTASSIUM  --   --  4.2 4.1   CHLORIDE  --   --  104 106   CO2  --   --  29 26   BUN  --   --  17 23   CR  --   --  0.70 1.02   ANIONGAP  --   --  4 5   TELMA  --   --  9.0 8.7   GLC  --   --  125* 119*

## 2021-04-15 NOTE — CONSULTS
Care Management Initial Consult    General Information  Assessment completed with: Patient, VM-chart review,    Type of CM/SW Visit: Initial Assessment    Primary Care Provider verified and updated as needed: Yes(needs one at the [facility] location)   Readmission within the last 30 days:           Advance Care Planning:            Communication Assessment  Patient's communication style: spoken language (English or Bilingual)    Hearing Difficulty or Deaf: no   Wear Glasses or Blind: no    Cognitive  Cognitive/Neuro/Behavioral: .WDL except  Level of Consciousness: alert  Arousal Level: opens eyes spontaneously  Orientation: oriented x 4  Mood/Behavior: calm, cooperative  Best Language: 3 - Mute  Speech: trached    Living Environment:   People in home: alone     Current living Arrangements: apartment(2 flights of stairrs to navigate)      Able to return to prior arrangements: yes       Family/Social Support:  Care provided by: self  Provides care for: no one     Children, Sibling(s)          Description of Support System:           Current Resources:   Patient receiving home care services: No     Community Resources:    Equipment currently used at home:    Supplies currently used at home:      Employment/Financial:  Employment Status:          Financial Concerns:             Lifestyle & Psychosocial Needs:        Socioeconomic History     Marital status: Single     Spouse name: Not on file     Number of children: Not on file     Years of education: Not on file     Highest education level: Not on file     Tobacco Use     Smoking status: Former Smoker     Types: Cigarettes     Quit date: 1989     Years since quittin.3     Smokeless tobacco: Never Used     Tobacco comment: quit in   had smoked about 1/2 pack a day then cut back   Substance and Sexual Activity     Alcohol use: No     Drug use: No     Sexual activity: Yes     Partners: Female       Functional Status:  Prior to admission patient needed  assistance:              Mental Health Status:          Chemical Dependency Status:                Values/Beliefs:  Spiritual, Cultural Beliefs, Congregational Practices, Values that affect care:                 Additional Information:  Awaiting evaluation by . Will follow to finalize plans and discuss with MD. Felipa Hartman RN   [facility]   Phone [phone]

## 2021-04-15 NOTE — PROGRESS NOTES
04/15/21 1456   General Information   Onset of Illness/Injury or Date of Surgery 04/12/21   Referring Physician Naga Alexander MD   Patient/Family Therapy Goal Statement (SLP) Wants to eat/drink and talk    Pertinent History of Current Problem 69 y/o male with Dx. 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    General Observations Pt is alert and agreeable. He is eager for swallowing evaluation. ENT note not in yet today therefore somewhat limited info. Did discuss pt with hospitalist. No order for PMSV/communication evaluation and suspect this may not be desired given pt's tumor and unable to get in touch with ENT for clarification.    Past History of Dysphagia None reported by pt or in chart review    Type of Evaluation   Type of Evaluation Swallow Evaluation   Oral Motor   Oral Musculature generally intact   Structural Abnormalities none present   Mucosal Quality adequate   Dentition (Oral Motor)   Dentition (Oral Motor) natural dentition   Facial Symmetry (Oral Motor)   Facial Symmetry (Oral Motor) WNL   Lip Function (Oral Motor)   Lip Range of Motion (Oral Motor) WNL   Tongue Function (Oral Motor)   Tongue ROM (Oral Motor) WNL   Cough/Swallow/Gag Reflex (Oral Motor)   Soft Palate/Velum (Oral Motor) WNL   Vocal Quality/Secretion Management (Oral Motor)   Vocal Quality (Oral Motor) aphonia  (d/t tracheostomy)   Secretion Management (Oral Motor) WNL  (pt reports some tracheal suctioning required t/o the day )   General Swallowing Observations   Current Diet/Method of Nutritional Intake (General Swallowing Observations, NIS) NPO   Respiratory Support (General Swallowing Observations) none  (room air with humidifier in place )   Swallowing Evaluation Clinical swallow evaluation   Clinical Swallow Evaluation   Feeding Assistance no assistance needed   Clinical Swallow Evaluation Textures Trialed Thin Liquids;Puree Textures;Solid Foods   Clinical Swallow  Eval: Thin Liquid Texture Trial   Mode of Presentation, Thin Liquids cup;straw;self-fed   Volume of Liquid or Food Presented 6 oz    Oral Phase of Swallow WFL   Pharyngeal Phase of Swallow intact   Diagnostic Statement No overt s/sx of aspiration, no changes in breath sounds or vocal quality. Slight increase in congested sounds but no clear evidence of blue from trach    Clinical Swallow Evaluation: Puree Solid Texture Trial   Mode of Presentation, Puree spoon;self-fed   Volume of Puree Presented 4 oz    Oral Phase, Puree WFL   Pharyngeal Phase, Puree intact   Diagnostic Statement Adequate oral manipulation and clearance, no overt s/sx of aspiration    Clinical Swallow Evaluation: Solid Food Texture Trial   Mode of Presentation, Solid self-fed   Volume of Solid Food Presented 2 crackers    Oral Phase, Solid WFL   Pharyngeal Phase, Solid intact   Diagnostic Statement Adequate oral manipulation and clearance, no overt s/sx of aspiration, pt denies any difficulty    Esophageal Phase of Swallow   Patient reports or presents with symptoms of esophageal dysphagia No   Swallowing Recommendations   Diet Consistency Recommendations regular diet;thin liquids  (MD would like to start with clear liquids today )   Supervision Level for Intake patient independent   Mode of Delivery Recommendations bolus size, small;slow rate of intake   Swallowing Maneuver Recommendations alternate food and liquid intake;extra swallow   Monitoring/Assistance Required (Eating/Swallowing) stop eating activities when fatigue is present;monitor for cough or change in vocal quality with intake   Recommended Feeding/Eating Techniques (Swallow Eval) maintain upright sitting position for eating;maintain upright posture during/after eating for 30 minutes;minimize distractions during oral intake   Medication Administration Recommendations, Swallowing (SLP) As per pt preference    Instrumental Assessment Recommendations VFSS (videofluroscopic swallowing  study)   General Therapy Interventions   Planned Therapy Interventions Dysphagia Treatment   Dysphagia treatment Instruction of safe swallow strategies   SLP Therapy Assessment/Plan   Criteria for Skilled Therapeutic Interventions Met (SLP Eval) yes;treatment indicated   SLP Diagnosis Minimal oropharyngeal dysphagia    Rehab Potential (SLP Eval) good, to achieve stated therapy goals   Therapy Frequency (SLP Eval) 5 times/wk   Predicted Duration of Therapy Intervention (SLP Eval) 1 week    Comment, Therapy Assessment/Plan (SLP) Pt seen for clinical swallow evaluation per providers order. Pt is alert and eager to eat/drink. He has many appropriate questions re: trach tube, trach cares, communication, swallow, etc. Pt was assessed with various textures/consistencies using blue dye to assess for any material coming from trach tube as an indication of aspiration. Pt consumed thin liquids by cup sip and straw sip without overt s/sx of aspiration, no changes in breath sounds or vocal quality. Slightly congested sounded breathing, however pt has also been needing some tracheal suctioning t/o the day prior to Po intake. Pt also consumed 4 oz of puree and 2 crackers with adequate oral manipulation and clearance and without overt s/sx of aspiration.  Pt denied any difficulty. Given new trach and likely inability to wear PMSV will complete a video swallow to further assess for aspiration. Pt was discussed with hospitalist and it was recommended by her to initiate a clear liquid diet with further recommendations to follow after video swallow. Pt also very eager to try to speak, therefore, finger occlusion was taught to the pt. He was able to implement this very quickly with improved success in communication and timing to coordinate breathing and speech.  Initiate SLP services for communciation, education, and swallow.     Recommend clear liquid diet and video swallow tomorrow 4/16.    Therapy Plan Review/Discharge Plan (SLP)    Therapy Plan Review (SLP) evaluation/treatment results reviewed;care plan/treatment goals reviewed;risks/benefits reviewed;participants included;participants voiced agreement with care plan;patient   SLP Discharge Planning    SLP Discharge Recommendation (DC Rec) home;home with outpatient speech therapy   SLP Rationale for DC Rec Pt is below baseline in regards to swallow function and communication    SLP Brief overview of current status  Initiate SLP services for communciation, education, and swallow. Recommend clear liquid diet and video swallow tomorrow 4/16.     Total Evaluation Time   Total Evaluation Time (Minutes) 20

## 2021-04-15 NOTE — PROGRESS NOTES
Patient came to radiation therapy for a CT planning scan of his larynx.  Patient will be contacted at a later date to set up treatment appointments.   DANYA WAN

## 2021-04-15 NOTE — PROGRESS NOTES
Patient was switched from cool aerosol trach dome to trach HME device with no oxygen bled in. SpO2 in the mid to upper 90's. Pt was suctioned down trach for a small amount of red streaked secretions. Inner cannula was changed. Size 8 Shiley cuffless fenestrated trach placed at bedside per MD request.  Will cont to monitor.  4/15/2021  Elke Galloway, RT

## 2021-04-16 ENCOUNTER — APPOINTMENT (OUTPATIENT)
Dept: SPEECH THERAPY | Facility: CLINIC | Age: 71
DRG: 013 | End: 2021-04-16
Payer: COMMERCIAL

## 2021-04-16 ENCOUNTER — APPOINTMENT (OUTPATIENT)
Dept: GENERAL RADIOLOGY | Facility: CLINIC | Age: 71
DRG: 013 | End: 2021-04-16
Attending: HOSPITALIST
Payer: COMMERCIAL

## 2021-04-16 ENCOUNTER — TELEPHONE (OUTPATIENT)
Dept: OTOLARYNGOLOGY | Facility: CLINIC | Age: 71
End: 2021-04-16
Payer: COMMERCIAL

## 2021-04-16 LAB
ANION GAP SERPL CALCULATED.3IONS-SCNC: 6 MMOL/L (ref 3–14)
BUN SERPL-MCNC: 16 MG/DL (ref 7–30)
CALCIUM SERPL-MCNC: 8.1 MG/DL (ref 8.5–10.1)
CHLORIDE SERPL-SCNC: 107 MMOL/L (ref 94–109)
CO2 SERPL-SCNC: 24 MMOL/L (ref 20–32)
CREAT SERPL-MCNC: 0.97 MG/DL (ref 0.66–1.25)
ERYTHROCYTE [DISTWIDTH] IN BLOOD BY AUTOMATED COUNT: 13.1 % (ref 10–15)
GFR SERPL CREATININE-BSD FRML MDRD: 80 ML/MIN/{1.73_M2}
GLUCOSE SERPL-MCNC: 115 MG/DL (ref 70–99)
HCT VFR BLD AUTO: 43.2 % (ref 40–53)
HGB BLD-MCNC: 14.7 G/DL (ref 13.3–17.7)
MCH RBC QN AUTO: 29.7 PG (ref 26.5–33)
MCHC RBC AUTO-ENTMCNC: 34 G/DL (ref 31.5–36.5)
MCV RBC AUTO: 87 FL (ref 78–100)
PLATELET # BLD AUTO: 194 10E9/L (ref 150–450)
POTASSIUM SERPL-SCNC: 3.8 MMOL/L (ref 3.4–5.3)
RBC # BLD AUTO: 4.95 10E12/L (ref 4.4–5.9)
SODIUM SERPL-SCNC: 137 MMOL/L (ref 133–144)
WBC # BLD AUTO: 11.7 10E9/L (ref 4–11)

## 2021-04-16 PROCEDURE — 258N000003 HC RX IP 258 OP 636: Performed by: HOSPITALIST

## 2021-04-16 PROCEDURE — 99233 SBSQ HOSP IP/OBS HIGH 50: CPT | Performed by: HOSPITALIST

## 2021-04-16 PROCEDURE — 92611 MOTION FLUOROSCOPY/SWALLOW: CPT | Mod: GN | Performed by: SPEECH-LANGUAGE PATHOLOGIST

## 2021-04-16 PROCEDURE — 36415 COLL VENOUS BLD VENIPUNCTURE: CPT | Performed by: HOSPITALIST

## 2021-04-16 PROCEDURE — 74230 X-RAY XM SWLNG FUNCJ C+: CPT

## 2021-04-16 PROCEDURE — 85027 COMPLETE CBC AUTOMATED: CPT | Performed by: HOSPITALIST

## 2021-04-16 PROCEDURE — 120N000001 HC R&B MED SURG/OB

## 2021-04-16 PROCEDURE — 80048 BASIC METABOLIC PNL TOTAL CA: CPT | Performed by: HOSPITALIST

## 2021-04-16 PROCEDURE — 92526 ORAL FUNCTION THERAPY: CPT | Mod: GN | Performed by: SPEECH-LANGUAGE PATHOLOGIST

## 2021-04-16 RX ADMIN — SODIUM CHLORIDE: 9 INJECTION, SOLUTION INTRAVENOUS at 10:12

## 2021-04-16 ASSESSMENT — ACTIVITIES OF DAILY LIVING (ADL)
ADLS_ACUITY_SCORE: 17

## 2021-04-16 NOTE — PROGRESS NOTES
[facility]    Hospitalist Progress Note      Assessment & Plan   Raj Warren is a 70 year old male was admitted on 4/12/2021 presents with progressive dyspnea and stridor.    CT with laryngeal mass with airway compromise presented with dyspnea and stridor  S/p trach per ENT 4/12/2021  Hx laryngeal mass with airway compromise 2019: First diagnosed through outpatient ENT clinic in 2019; on admission he reported he had a laryngeal polyp at that time.  Patient reports dyspnea for the last few weeks, presented to ED 4/5 with stridor and prescribed prednisone.  Since then progressive dyspnea.  In ED CT with soft tissue mass in right Larynex 3.9 x 1.6 x 3.9 cm within the right vocal apparatus, narrowing of airway.  Given racepinephrine per nebulizer x 2, Solu-Medrol 1.25 mg, DuoNeb.  -Admited to ICU for monitoring of airway started on dexamethasone every 6 hours.  -ENT consult, performed tracheotomy 4/12/2021 with humidified trach placement to keep inflated 24 hours.  -4/13/2021 status post day 1 trach placement, on exam stridor resolved, will DC dexamethasone, discussed with Dr. Johnson, ENT who describes complicated biopsy and states if this returns negative for CA he would be doubtful and would repeat biopsy.  -Continue orders for racemic epi nebulizer PRN  -Oncology, Dr. Bean met with patient ;bx: SCCA. PET scan as outpatient;  chemo radiation. Dr Mclaughlin Radiation Oncology saw patient and had simulation and CT planning  Dr. Bean to manage Oncology course and chemotherapy.  He has arranged ENT consultation of [facility] given potential need for salvage laryngectomy   ENT deflated cuff yesterday, with plans for change to cuffless #8 trach tomorrow, and ENT follow-up next week  -Video swallow study today without aspiration/dysphagia, start regular diet.  ST recommends ST evaluation every other week after start of XRT    -Patient needs self trach care education per Nursing or RT prior to  discharge  -Patient taking adequate p.o.'s, DC IV..    Hyperglycemia: Initial hypoglycemia while on dexamethasone now normoglycemic.  Started on p.o.'s today.    Leukocytosis; WBC 19.6 on admission,> 18.2> 17.7>11.7  Likely related to high-dose dexamethasone prior to trach placement for stridor and airway obstruction.  No S/S active infection.  Afebrile.  Recheck WBC in the a.m., if persistent elevation check procalcitonin.    Suboptimal blood pressure control  Patient denies history of hypertension.  Since admission -160 range diastolic .  Antihypertensives were limited as n.p.o. now with trach, inflated.  Has as needed IV labetalol and hydralazine.  After can resume p.o. start antihypertensives if needed.  -Now normotensive without antihypertensives, initial hypertension may have been related to steroid effects.      Covid 19 NEG    DVT Prophylaxis: Pneumatic Compression Devices. Early ambulation  Code Status: Full Code  Expected discharge:1-days pending clinical improvement, tolerating regular diet, ENT plans trach placement tomorrow cuffless, needs self trach care education per Nursing or RT prior to discharge.    Naga Alexander MD  Text Page (7am - 6pm, M-F)    I spent >35 minutes on the unit/floor in management of care today reviewing labs, medications, interdisciplinary notes; and completing documentation of encounter and orders with over 50% of my time was spent Counseling the Patient regarding discharge planning reviewing oncology, XRT, ENT and ST follow-up and Coordinating Care and plan with  regarding need for self trach care education prior to discharge    Interval History   No issues with breathing, manual occlusion of trach in attempts to vocalization.  No excess secretions.  Tolerated softer food choices on regular diet for breakfast..    -Data reviewed today: I reviewed all new labs and imaging results over the last 24 hours.    Physical Exam   Temp: 98.4  F (36.9  C)  Temp src: Axillary BP: 135/71 Pulse: 114   Resp: 18 SpO2: 98 % O2 Device: None (Room air)    Vitals:    04/12/21 0230 04/13/21 0600 04/15/21 0545   Weight: 95.3 kg (210 lb) 98.1 kg (216 lb 4.3 oz) 95.3 kg (210 lb 1.6 oz)     Vital Signs with Ranges  Temp:  [97.3  F (36.3  C)-99.2  F (37.3  C)] 98.4  F (36.9  C)  Pulse:  [111-118] 114  Resp:  [18] 18  BP: (121-135)/(71-91) 135/71  SpO2:  [93 %-98 %] 98 %  I/O last 3 completed shifts:  In: 40 [P.O.:40]  Out: 2025 [Urine:2025]    General/Constitutional:    NAD, alert, calm, cooperative   HEENT/Head Exam:  Atraumatic; s/p tracheotomy   Eyes:  PERRL, no conjunctivits  Mouth/Oral Pharynx:  Buccal mucosa WNL  Chest/Respiratory: Trach.  No surrounding swelling, erythema.  Minimal scabbing around trach site.  Minimal trach secretions.   Air exchange bilateral lung fields; no rales or wheeze. Respiration nonlabored.  Cardiovascular: No murmur appreciated.  LE edema none  Gastrointestinal/Abdomen:  soft, nontender, no rebound, guarding or other peritoneal signs.  Musculoskeletal:  extremities warm, dry, noncyanotic; no acitve synovitis.  Neurologic:  gross CN and motor testing  nonfocal.  Sensation grossly tested intact.  Psychiatric:  Mental status:  Alert, oriented, affect calm .  Skin:  No rash noted on gross exam    Medications     sodium chloride 50 mL/hr at 04/16/21 1012         Data   Recent Labs   Lab 04/16/21  0712 04/15/21  1205 04/14/21  0845 04/13/21  1421 04/12/21  0227   WBC 11.7* 11.7* 17.7* 18.2* 19.6*   HGB 14.7 16.0 15.3 15.6 16.9   MCV 87 87 87 87 86    231 295 348 369     --   --  137 137   POTASSIUM 3.8  --   --  4.2 4.1   CHLORIDE 107  --   --  104 106   CO2 24  --   --  29 26   BUN 16  --   --  17 23   CR 0.97  --   --  0.70 1.02   ANIONGAP 6  --   --  4 5   TELMA 8.1*  --   --  9.0 8.7   *  --   --  125* 119*

## 2021-04-16 NOTE — PROGRESS NOTES
ENT Progress note    Hx:  Pt denies neck pain, dyspnea, and cough. He is anxious to eat, is tolerating liquids.     Exam:  /82 (BP Location: Right arm)   Pulse 114   Temp 98.8  F (37.1  C) (Axillary)   Resp 18   Wt 95.3 kg (210 lb 1.6 oz)   SpO2 93%   BMI 29.30 kg/m      Sitting up in chair, breathing easily  Trach secure, stoma healthy, no active bleeding, no blood in inner cannula.    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 May use a PMR with a cuffless tube to improve voice.  He is scheduled for VSS today, but with his trach tube tethering his larynx he may due poorly. If he is able would advance to soft, then regular diet.     Choco Johnson MD  [facility]  [phone]

## 2021-04-16 NOTE — PROGRESS NOTES
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  Trach is stable. He continues to have some thick, blood-tinged secretions.  Tolerated having the cuff down.  SLP consult today to advance diet. Discussed need for Cuffless Shiley #8 to bedside prior to discharge. Oncology and Radiation oncology consults today with planned PET imaging after discharge.   Please call with questions or concerns.        Roger Tavarez MD    Interval History   Stable with limited ongoing secretions.  Learning trach care.  Home care/supplies/education underway.     Physical Exam   Temp: 98.4  F (36.9  C) Temp src: Oral BP: (!) 145/88 Pulse: 121   Resp: 18 SpO2: 97 % O2 Device: None (Room air) Oxygen Delivery: 5 LPM  Vitals:    04/12/21 0230 04/13/21 0600 04/15/21 0545   Weight: 95.3 kg (210 lb) 98.1 kg (216 lb 4.3 oz) 95.3 kg (210 lb 1.6 oz)     Vital Signs with Ranges  Temp:  [98  F (36.7  C)-98.9  F (37.2  C)] 98.4  F (36.9  C)  Pulse:  [107-121] 121  Resp:  [18] 18  BP: (141-154)/(86-93) 145/88  FiO2 (%):  [28 %] 28 %  SpO2:  [95 %-98 %] 97 %  I/O last 3 completed shifts:  In: -   Out: 1575 [Urine:1575]    Constitutional: awake, alert, cooperative, no apparent distress, and appears stated age  Tracheotomy site c/d/i without erythema - dressing changed    Medications     sodium chloride 50 mL/hr at 04/15/21 1504         Data   Results for orders placed or performed during the hospital encounter of 04/12/21 (from the past 24 hour(s))   Glucose by meter   Result Value Ref Range    Glucose 96 70 - 99 mg/dL   Glucose by meter   Result Value Ref Range    Glucose 104 (H) 70 - 99 mg/dL   Glucose by meter   Result Value Ref Range    Glucose 100 (H) 70 - 99 mg/dL   CBC with platelets   Result Value Ref Range    WBC 11.7 (H) 4.0 - 11.0 10e9/L    RBC Count 5.47 4.4 - 5.9 10e12/L    Hemoglobin 16.0 13.3  - 17.7 g/dL    Hematocrit 47.5 40.0 - 53.0 %    MCV 87 78 - 100 fl    MCH 29.3 26.5 - 33.0 pg    MCHC 33.7 31.5 - 36.5 g/dL    RDW 13.2 10.0 - 15.0 %    Platelet Count 231 150 - 450 10e9/L   Procalcitonin   Result Value Ref Range    Procalcitonin <0.05 ng/ml

## 2021-04-16 NOTE — PLAN OF CARE
A&Ox4, VSS on RA. SBA. Tolerating regular diet with thin liquids. LS clear. No BM for shift, voiding adequately. Did not need suctioning for trach except at shift start. Video swallow done, diet advanced to regular with thin liquids, tolerating. Denies pain/nausea. Has some numbness and tingling to L calf. +pulses BLE. Care coordinator working on discharge planning. Will continue to monitor.

## 2021-04-16 NOTE — PROGRESS NOTES
Care Management Follow Up    Length of Stay (days): 4    Expected Discharge Date: 04/18/21     Concerns to be Addressed: discharge planning     Patient plan of care discussed at interdisciplinary rounds: Yes    Anticipated Discharge Disposition: Home, Outpatient Rehab (PT, OT, SLP, Cardiac or Pulmonary)  Disposition Comments: Introduced role of case management. Patient confirms he needs a PCP and would like [facility] [place], male provider. Updated hospitalist that outpatient ST recommended. Anticipated Discharge Services:  Outpatient ST  Anticipated Discharge DME: Other (see comment)(tracheostomy supplies)  -  Call out to [employer] to help with #8 shiley cuffless inner cannulas. It could take a week for shipment so hospital would need to supply enough til then. Split dressing, q tips should be able to be obtained over the counter per [employer].     Patient/family educated on Medicare website which has current facility and service quality ratings: no  Education Provided on the Discharge Plan:    Patient/Family in Agreement with the Plan: other (see comments)(pending determination about HHC and trach cares)    Referrals Placed by CM/SW:    Private pay costs discussed: Not applicable    Additional Information:  Discussed discharge plan with MD, ST, RT and nursing. Will have RT and nursing staff provide education for trach cares at home. I discussed trach supplies with Jeffrey at [facility] they have 20 on hand pending orders and documentation. Upon discharge fax documents to [phone] attention Jeffrey. Patient should receive shipment next week, so hospital needs to send some supplies with him until shipment comes.     Per MD not anticipated to have HHC or need for suction at this time.     Felipa Hartman, RN   [facility]   Phone [phone]

## 2021-04-16 NOTE — PLAN OF CARE
Alert and oriented x4. Vital signs stable on RA. SBA in room. Tolerating clear liquid diet. Lung sounds clear. No BM this shift. Adequate urine output. Scant drainage to trach, suctioned x2, tolerated well. Denies pain and nausea. Reported numbness and tingling to L calf, +2 pulses BLE, no change to sensation to BLE.

## 2021-04-16 NOTE — TELEPHONE ENCOUNTER
[facility] Call Center    Phone Message    May a detailed message be left on voicemail: no     Reason for Call: Appointment Intake    Referring Provider Name: Rosa Bean MD [facility]  Diagnosis and/or Symptoms: Vocal cord mass    Action Taken: Message routed to:  [facility] [[id number]]    Travel Screening: Not Applicable

## 2021-04-16 NOTE — PROGRESS NOTES
VIDEO SWALLOW STUDY       04/16/21 0952   General Information   Onset of Illness/Injury or Date of Surgery 04/12/21   Referring Physician Naga Alexander MD   Patient/Family Therapy Goal Statement (SLP) To return to regular PO intake.   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  He will likely undergo chemoXRT followed by prospective salvage laryngectomy, pending outcome of chemoXRT course.    Past History of Dysphagia None reported or noted in EMR prior to this admission.    Type of Evaluation   Type of Evaluation Swallow Evaluation   Oral Motor   Oral Musculature generally intact   Structural Abnormalities none present   Mucosal Quality adequate   Dentition (Oral Motor)   Dentition (Oral Motor) natural dentition   Facial Symmetry (Oral Motor)   Facial Symmetry (Oral Motor) WNL   Lip Function (Oral Motor)   Lip Range of Motion (Oral Motor) WNL   Tongue Function (Oral Motor)   Tongue ROM (Oral Motor) WNL   Cough/Swallow/Gag Reflex (Oral Motor)   Soft Palate/Velum (Oral Motor) WNL   Vocal Quality/Secretion Management (Oral Motor)   Vocal Quality (Oral Motor) aphonia  (d/t tracheostomy)   Secretion Management (Oral Motor) WNL  (pt reports some tracheal suctioning required t/o the day )   Comment, Vocal Quality/Secretion Management (Oral Motor) Dysphonia with finger occlusion   General Swallowing Observations   Current Diet/Method of Nutritional Intake (General Swallowing Observations, NIS) clear liquid diet   Respiratory Support (General Swallowing Observations) none  (HME piece)   Swallowing Evaluation Videofluoroscopic swallow study (VFSS)   Clinical Swallow Evaluation   Feeding Assistance no assistance needed   VFSS Evaluation   Radiologist Dr. Huffman   Views Taken left lateral   VFSS Textures Trialed Thin Liquids;Purees;Solid   VFSS Eval: Thin Liquid Texture Trial   Mode of Presentation,  Thin Liquid cup;self-fed   Preparatory Phase WFL   Oral Phase, Thin Liquid WFL   Pharyngeal Phase, Thin Liquid WFL   Rosenbek's Penetration Aspiration Scale: Thin Liquid Trial Results 1 - no aspiration, contrast does not enter airway   Diagnostic Statement No penetration or aspiration with single sips or consecutive swallowing   VFSS Evaluation: Puree Solid Texture Trial   Mode of Presentation, Puree spoon;self-fed   Preparatory Phase WFL   Pharyngeal Phase, Puree WFL   Rosenbek's Penetration Aspiration Scale: Puree Food Trial Results 1 - no aspiration, contrast does not enter airway   Diagnostic Statement WFL   VFSS Evaluation: Solid Food Texture Trial   Mode of Presentation, Solid spoon;self-fed   Preparatory Phase WFL   Oral Phase, Solid WFL   Pharyngeal Phase, Solid WFL   Rosenbek's Penetration Aspiration Scale: Solid Food Trial Results 1 - no aspiration, contrast does not enter airway   Diagnostic Statement WFL   Esophageal Phase of Swallow   Patient reports or presents with symptoms of esophageal dysphagia No   Esophageal sweep performed during today s vidofluoroscopic exam  No   Esophageal comments Cervical esophagus viewed, no restriction   Swallowing Recommendations   Diet Consistency Recommendations regular diet;thin liquids  (MD would like to start with clear liquids today )   Supervision Level for Intake patient independent   Mode of Delivery Recommendations bolus size, small;slow rate of intake   Swallowing Maneuver Recommendations extra swallow;effortful (hard) swallow   Monitoring/Assistance Required (Eating/Swallowing) stop eating activities when fatigue is present;monitor for cough or change in vocal quality with intake   Recommended Feeding/Eating Techniques (Swallow Eval) maintain upright sitting position for eating;maintain upright posture during/after eating for 30 minutes   General Therapy Interventions   Planned Therapy Interventions Dysphagia Treatment   Dysphagia treatment Instruction of  safe swallow strategies   SLP Therapy Assessment/Plan   Criteria for Skilled Therapeutic Interventions Met (SLP Eval) yes;treatment indicated   Rehab Potential (SLP Eval) fair, will monitor progress closely   Therapy Frequency (SLP Eval) 5 times/wk   Predicted Duration of Therapy Intervention (SLP Eval) 1 week   Comment, Therapy Assessment/Plan (SLP) Will likely require ongoing SLP services during chemoXRT for prevention of further dysphagia and communication rehabilitation.   Therapy Plan Review/Discharge Plan (SLP)   Therapy Plan Review (SLP) evaluation/treatment results reviewed;care plan/treatment goals reviewed;risks/benefits reviewed;participants included;participants voiced agreement with care plan;patient   SLP Discharge Planning    SLP Discharge Recommendation (DC Rec) home with outpatient speech therapy   SLP Rationale for DC Rec Recommend SLP services as outpatient every other week for swallowing and communication during chemoXRT.   SLP Brief overview of current status  video swallow completed - recommend regular diet with thin liquids when upright, alert.  Chew well and attend to eating.  Recommend regular interval OP SLP follow up to prevent further dysphagia and rehab communication function during the course of the patient's chemoXRT.    Total Evaluation Time   Total Evaluation Time (Minutes) 45

## 2021-04-17 ENCOUNTER — DOCUMENTATION ONLY (OUTPATIENT)
Dept: RESPIRATORY THERAPY | Facility: CLINIC | Age: 71
End: 2021-04-17

## 2021-04-17 ENCOUNTER — APPOINTMENT (OUTPATIENT)
Dept: SPEECH THERAPY | Facility: CLINIC | Age: 71
DRG: 013 | End: 2021-04-17
Payer: COMMERCIAL

## 2021-04-17 VITALS
HEART RATE: 110 BPM | OXYGEN SATURATION: 97 % | RESPIRATION RATE: 18 BRPM | WEIGHT: 215.39 LBS | SYSTOLIC BLOOD PRESSURE: 126 MMHG | TEMPERATURE: 98 F | DIASTOLIC BLOOD PRESSURE: 78 MMHG | BODY MASS INDEX: 30.04 KG/M2

## 2021-04-17 PROCEDURE — 99239 HOSP IP/OBS DSCHRG MGMT >30: CPT | Performed by: HOSPITALIST

## 2021-04-17 PROCEDURE — 258N000003 HC RX IP 258 OP 636: Performed by: HOSPITALIST

## 2021-04-17 PROCEDURE — 92526 ORAL FUNCTION THERAPY: CPT | Mod: GN | Performed by: SPEECH-LANGUAGE PATHOLOGIST

## 2021-04-17 RX ORDER — ACETAMINOPHEN 325 MG/1
650 TABLET ORAL 3 TIMES DAILY PRN
Status: ON HOLD | COMMUNITY
Start: 2021-04-17 | End: 2021-05-06

## 2021-04-17 RX ADMIN — SODIUM CHLORIDE: 9 INJECTION, SOLUTION INTRAVENOUS at 05:56

## 2021-04-17 ASSESSMENT — ACTIVITIES OF DAILY LIVING (ADL)
ADLS_ACUITY_SCORE: 17

## 2021-04-17 NOTE — CONSULTS
Care Management Discharge Note    Discharge Date: 04/17/21       Discharge Disposition: Home.    Discharge Services:  Outpatient Speech Therapy Referral    Discharge DME:  Tracheostomy Supplies ordered through [facility] (to be delivered next week), Suction to be ordered on Monday 4/19/2021 Through [facility].  (Ins Benefits will be checked on Monday 4/19 and possible need for Ins Authorization). Suction will hopefully be delivered to patient's home Monday or early next week.)  Discharge Transportation:  family    Private pay costs discussed: Not applicable    PAS Confirmation Code:  N/A  Patient/family educated on Medicare website which has current facility and service quality ratings: no    Education Provided on the Discharge Plan:  yes  Persons Notified of Discharge Plans: yes  Patient/Family in Agreement with the Plan:  yes    Handoff Referral Completed: Yes, Care Coordination Outpatient Referral    Additional Information:  TELEPHONE ORDER:  Spoke with CASH Clark   Patient can discharge home today without suction in anticipation the suction will be delivered to his home next week,   preferably on Monday 4/19/2021. Due to the weekend, [facility] unable to check patient's Insurance.   Insurance can not be checked until Monday 4/19 for Insurance Authorization.          Care Coordinator on Monday 4/19 will contact [facility] to order Home Suction for patient. Spoke with Leigh at [facility] who informed writer that suction order could not be placed until Monday when office open to check patient's Insurance coverage and possible need for authorization. See Telephone Order above from CASH Clark.     Spoke with patient regarding the plan for Suction.      Yeni Waldron RN  Care Coordination

## 2021-04-17 NOTE — PLAN OF CARE
A&Ox4, VSS on RA. SBA. Tolerating regular diet with thin liquids. LS clear. No BM for shift, voiding adequately. Did not need suctioning today, pt able to cough up secretions well. Denies pain/nausea. Trach care teaching done, pt able to demonstrate changing dressing, cuff and inner canula. Pt instructed on follow up appointments. Notified pt that per care coordinator, 1) pt should call dentist for retainer prior to radiation treatments, 1) keep PCP appoint, ok to come late after covid shot, they will not turn away pt. Pt verbalized understanding. Pt provided supplies for home care until more supplies are mailed in. Pt discharged home with son who picked up pt.

## 2021-04-17 NOTE — PROGRESS NOTES
Spoke with Mary this morning regarding a suction device and supplies for this pt. Went over what docs we need to qualify, and that we would have to follow up on Monday because of the pt's insurance (we may need a PA). She said her co-worker spoke with Jeffrey recently about trach supplies, but the dr didn't think he would need suction. Today, the ENT dr would feel more comfortable with the pt having suction. I told Mary I would get things started on my end, and Monday we would contact insurance and get  Back to her.

## 2021-04-17 NOTE — DISCHARGE SUMMARY
[facility]    Discharge Summary  Hospitalist    Date of Admission:  4/12/2021  Date of Discharge:  4/17/2021  Discharging Provider: Naga Alexander MD  Date of Service (when I saw the patient): 04/17/21      History of Present Illness   Raj Warren is a 70 year old male was admitted on 4/12/2021 presents with progressive dyspnea and stridor.    Hospital Course   Raj Warren was admitted on 4/12/2021.  The following problems were addressed during his hospitalization:    CT with laryngeal mass with airway compromise: Bx:SCCA of Larynx   S/p trach per ENT 4/12/2021  Hx laryngeal mass with airway compromise 2019: First diagnosed through outpatient ENT clinic in 2019; on admission he reported he had a laryngeal polyp at that time.  Patient reports dyspnea for the last few weeks, presented to ED 4/5/21 with stridor and prescribed prednisone.  Since then progressive dyspnea.  In ED CT with soft tissue mass in right Larynx 3.9 x 1.6 x 3.9 cm within the right vocal apparatus, narrowing of airway.  Given racepinephrine per nebulizer x 2, Solu-Medrol 1.25 mg, DuoNeb.  -Started on dexamethasone every 6 hours.  -ENT consult, performed tracheotomy 4/12/2021   -Oncology, Dr. Bean. Bx: SCCA. PET scan as outpatient;  chemo radiation. Dr Mclaughlin Radiation Oncology with simulation and CT planning.  Dr. Bean to manage Oncology course and chemotherapy; to arrange ENT consultation of [facility] given potential need for salvage laryngectomy   ENT deflated cuff;Video swallow study today without aspiration/dysphagia, start regular diet 4/16/2021.   -  ST follow-up/evaluation every other week after start of XRT  -4/17/2021 ENT, Dr. Johnson changed back to cuffless, stoma without issue, ENT follow-up at 1 week.  -Patient taking in p.o.'s without complication, DC IV.  -Discharge today with Oncology and XRT appointments as above, ENT, Dr. Johnson at 1 week, see below.  ST appointments once XRT started, see below,  patient established with PCP at [facility], follow-up next week, see below    -Zohaib with Patient and Nursing discharge today after education on self trach care.     Hyperglycemia: Resolved.   Initial hypoglycemia while on dexamethasone now normoglycemic.         Leukocytosis; WBC 19.6 on admission,> 18.2> 17.7>11.7  Likely related to high-dose dexamethasone prior to trach placement for stridor and airway obstruction.  No S/S active infection.  Afebrile.   -PCP follow-up next week    Suboptimal blood pressure control, resolved  Patient denies history of hypertension.  Since admission -160 range diastolic .  -Now normotensive without antihypertensives, initial hypertension may have been related to steroid effects.      Covid 19 NEG      Code Status   Full Code       Primary Care Physician   Physician No Ref-Primary    Physical Exam   Temp: 98  F (36.7  C) Temp src: Oral BP: 126/78 Pulse: 110   Resp: 18 SpO2: 97 % O2 Device: None (Room air)    Vitals:    04/13/21 0600 04/15/21 0545 04/17/21 0656   Weight: 98.1 kg (216 lb 4.3 oz) 95.3 kg (210 lb 1.6 oz) 97.7 kg (215 lb 6.2 oz)     Vital Signs with Ranges  Temp:  [97.8  F (36.6  C)-99  F (37.2  C)] 98  F (36.7  C)  Pulse:  [] 110  Resp:  [18] 18  BP: (126-138)/(45-88) 126/78  SpO2:  [96 %-100 %] 97 %  I/O last 3 completed shifts:  In: 50 [P.O.:50]  Out: 925 [Urine:925]        Discharge Disposition   Discharged to home  Condition at discharge: Stable    Consultations This Hospital Stay   ENT IP CONSULT  SPEECH LANGUAGE PATH ADULT IP CONSULT  HEMATOLOGY & ONCOLOGY IP CONSULT  RADIATION ONCOLOGY IP CONSULT  CARE MANAGEMENT / SOCIAL WORK IP CONSULT    Time Spent on this Encounter   INaga MD, personally saw the patient today and spent greater than 30 minutes discharging this patient discussing discharge planning with Patient and Nursing, including coordination of extensive follow-up appointments, see below, and discussion  with Nursing that patient needs self trach care education prior to discharge..    Discharge Orders      SPEECH THERAPY REFERRAL      Speech Therapy Referral      Reason for your hospital stay    Presented with shortness of breath and airway obstruction.  Diagnosed with Larynx Cancer     Follow-up and recommended labs and tests     Follow up with primary care provider, Nuvia Brown MD, Thursday Apr 22, 2021 1:00 PM for hospital follow- up.   [facility]  [address] [facility] [id number] [phone]      Apr19 PE NPET ONCOLOGY (EYES TO THIGHS)  Monday Apr 19, 2021 9:30 AM  [facility]  [address] [facility] [id number] [phone]    Apr21 Return Visit with Rosa Bean MD  Wednesday Apr 21, 2021 3:20 PM   [facility]  [address]   [facility]      ENT, Dr Johnson for follow-up appointment within 1 wk as arranged  Choco Johnson MD  ENT [facility] [place]  [phone]     Appointment at [facility] ENT to be arranged by Dr Bean's Office, please confirm on follow-up appointment with Dr Herrera     Speech Therapy will call to schedule appointment every other week once Radiation Therapy begins.   [facility] [id number] [phone]     Activity    Your activity upon discharge: activity as tolerated     Full Code     Diet    Follow this diet upon discharge: Orders Placed This Encounter      Combination Diet Regular Diet Adult; Thin Liquids (water, ice chips, juice, milk, gelatin, ice cream, etc)     Discharge Medications   Current Discharge Medication List      START taking these medications    Details   acetaminophen (TYLENOL) 325 MG tablet Take 2 tablets (650 mg) by mouth 3 times daily as needed for mild pain    Associated Diagnoses: Pain           Allergies   Allergies   Allergen Reactions     Pollen Extract      Seasonal Allergies      Data   Most Recent 3 CBC's:  Recent Labs    Lab Test 04/16/21  0712 04/15/21  1205 04/14/21  0845   WBC 11.7* 11.7* 17.7*   HGB 14.7 16.0 15.3   MCV 87 87 87    231 295      Most Recent 3 BMP's:  Recent Labs   Lab Test 04/16/21  0712 04/13/21  1421 04/12/21 0227    137 137   POTASSIUM 3.8 4.2 4.1   CHLORIDE 107 104 106   CO2 24 29 26   BUN 16 17 23   CR 0.97 0.70 1.02   ANIONGAP 6 4 5   TELMA 8.1* 9.0 8.7   * 125* 119*       Recent Labs   Lab Test 04/12/21 0227   TSH 4.80*   T4 1.17   A1C 5.6

## 2021-04-17 NOTE — PLAN OF CARE
A&Ox4, unable to talk - but able to make needs known by pointing and mouthing words. VSS on RA. Denies pain. Trach patent, dried drainage to trach. Up SBA. Tolerating Regular diet. Possible discharge home today after ENT changes to cuffless trach.

## 2021-04-17 NOTE — PLAN OF CARE
Speech Language Therapy Discharge Summary    Reason for therapy discharge:    All goals and outcomes met, no further needs identified.    Progress towards therapy goal(s). See goals on Care Plan in [facility] electronic health record for goal details.  Goals met    Therapy recommendation(s):    Continued therapy is recommended.  Rationale/Recommendations:  Reviewed Video Swallow Study, current swallowing function, close monitoring of swallowing function and s/sx of aspiration, frequent use of exercises and staying hydrated/keeping mouth moist. Reviewed previously provided handouts and pt expressed understanding. Continue OP SLP check in once every 2 weeks. Notified Care Coordinator of pt concerns for dental referral - encouraged close follow up with Care Coordinator at Radiation Oncology. .

## 2021-04-17 NOTE — PROGRESS NOTES
ENT Progress note    Hx:  POD #5 trach, DL  No complaints  He passed swallow study and is tolerating a regular diet.     Exam:  /88 (BP Location: Right arm)   Pulse 95   Temp 99  F (37.2  C) (Oral)   Resp 18   Wt 97.7 kg (215 lb 6.2 oz)   SpO2 100%   BMI 30.04 kg/m      Trach changed: #8 cuffless, fenestrated easily placed. Stoma without active bleeding or infection.       WBC   Date Value Ref Range Status   04/16/2021 11.7 (H) 4.0 - 11.0 10e9/L Final       Assessment/Plan:  His trach was successfully changed and his is tolerating a regular diet. OK to discharge to home with ENT f/u in 1 week. Further outpt w/u and treatment planning as per Heme/onc and Radiation oncology.

## 2021-04-19 ENCOUNTER — PATIENT OUTREACH (OUTPATIENT)
Dept: CARE COORDINATION | Facility: CLINIC | Age: 71
End: 2021-04-19

## 2021-04-19 ENCOUNTER — TELEPHONE (OUTPATIENT)
Dept: ONCOLOGY | Facility: CLINIC | Age: 71
End: 2021-04-19

## 2021-04-19 NOTE — PROGRESS NOTES
Please Note: Patient has already been discharged to home on Saturday 4/17/21.     1. Orders were faxed as requested to [facility] this morning for Tracheostomy Supplies. Faxed Attention Jeffrey at [phone]. According to Epic note from Felipa, Care Coordinator on 4/16/21, patient should get a shipment of the Tracheostomy Supplies delivered to his home this week. (Tracheostomy Supplies had already been discussed between Felipa and Jeffrey last week).    2. Spoke with Customer Service at [facility]: Still waitng for Insurance Authorization for the Tracheostomy Suction.

## 2021-04-19 NOTE — TELEPHONE ENCOUNTER
Raj called to inquire about his schedule.    He was scheduled with ENT Dr. Sethi on 4/21 and he is wondering if this is necessary.  He states he already has an appointment with ENT MD Dr. Johnson next week, whom he saw while inpatient.    He missed his PET scan this morning.  He states he was unaware of the appointment.  Message sent to scheduling to reschedule.    Reviewed upcoming appointment with Dr. Bean on 4/21.    Routing to RNCC Kayy to call patient back about ENT appointment and also to follow-up on PET scan scheduling.    Cali New, ARLETN, RN, OCN  Oncology Care Coordinator  [facility]

## 2021-04-19 NOTE — TELEPHONE ENCOUNTER
FUTURE VISIT INFORMATION      FUTURE VISIT INFORMATION:    Date: 4/21/2021    Time: 12PM    Location: [facility]  REFERRAL INFORMATION:    Referring provider:      Referring providers clinic:      Reason for visit/diagnosis  vocal cord mass // ref by nikos Christian by Sheree     RECORDS REQUESTED FROM:       Clinic name Comments Records Status Imaging Status   [facility]  4/12/2021 - 4/17/2021 Hospital Encounter   4/12/2021 LARYNGOSCOPY; CREATION, TRACHEOSTOMY   EPIC    Imaging 4/16/2021 XR Video Swallow  4/12/2012 CT Neck and CT chest Epic PACS   Pathology  4/12/2021 right laryns (Specimen #: [id number] )  8/7/2019 Vocal cord mass biopsy, right (Specimen #: [id number] ) Epic

## 2021-04-19 NOTE — PROGRESS NOTES
Clinic Care Coordination Contact  [facility]/Voicemail       Clinical Data: Care Coordinator Outreach  Outreach attempted x 1.  Left message on patient's voicemail with call back information and requested return call.  Plan: Care Coordinator will try to reach patient again in 1-2 business days.

## 2021-04-19 NOTE — TELEPHONE ENCOUNTER
Writer noted patient has been R/S with Dr. Bean for next week 4/26 and 4/29.    Writer called and and spoke with patient he states that he would like to see Dr. Johnson as scheduled.Writer asked when he was following up and was informed he would call me back with that information.    Serena Castillo RN

## 2021-04-20 ENCOUNTER — TELEPHONE (OUTPATIENT)
Dept: OTOLARYNGOLOGY | Facility: CLINIC | Age: 71
End: 2021-04-20

## 2021-04-20 ENCOUNTER — DOCUMENTATION ONLY (OUTPATIENT)
Dept: HOME HEALTH SERVICES | Facility: CLINIC | Age: 71
End: 2021-04-20

## 2021-04-20 PROBLEM — C32.9 MALIGNANT NEOPLASM OF LARYNX (H): Status: ACTIVE | Noted: 2021-04-20

## 2021-04-20 NOTE — PROGRESS NOTES
Clinic Care Coordination Contact  Community Health Worker Initial Outreach            Patient accepts CC: No, patient did not need anything at this time but may need CCC in the furture . Patient will be sent Care Coordination introduction letter for future reference.

## 2021-04-21 ENCOUNTER — PRE VISIT (OUTPATIENT)
Dept: OTOLARYNGOLOGY | Facility: CLINIC | Age: 71
End: 2021-04-21

## 2021-04-22 ENCOUNTER — OFFICE VISIT (OUTPATIENT)
Dept: FAMILY MEDICINE | Facility: CLINIC | Age: 71
End: 2021-04-22
Payer: COMMERCIAL

## 2021-04-22 VITALS
HEART RATE: 120 BPM | WEIGHT: 207 LBS | OXYGEN SATURATION: 94 % | SYSTOLIC BLOOD PRESSURE: 136 MMHG | TEMPERATURE: 97.7 F | BODY MASS INDEX: 28.98 KG/M2 | DIASTOLIC BLOOD PRESSURE: 86 MMHG | HEIGHT: 71 IN

## 2021-04-22 DIAGNOSIS — H10.45 CHRONIC ALLERGIC CONJUNCTIVITIS: ICD-10-CM

## 2021-04-22 DIAGNOSIS — Z93.0 TRACHEOSTOMY IN PLACE (H): ICD-10-CM

## 2021-04-22 DIAGNOSIS — C32.9 MALIGNANT NEOPLASM OF LARYNX (H): Primary | ICD-10-CM

## 2021-04-22 DIAGNOSIS — R06.1 STRIDOR: ICD-10-CM

## 2021-04-22 DIAGNOSIS — Z48.89 ENCOUNTER FOR POST SURGICAL WOUND CHECK: ICD-10-CM

## 2021-04-22 DIAGNOSIS — R06.09 OTHER FORM OF DYSPNEA: ICD-10-CM

## 2021-04-22 PROCEDURE — 99204 OFFICE O/P NEW MOD 45 MIN: CPT | Performed by: INTERNAL MEDICINE

## 2021-04-22 ASSESSMENT — MIFFLIN-ST. JEOR: SCORE: 1731.08

## 2021-04-22 NOTE — LETTER
April 22, 2021      Raj Warren  [address]  [facility] [facility] [address]        To Whom It May Concern:    Raj Warren was seen in our clinic. Please excuse patient's son Raj Hahn from work for at least 1 week to be able to travel to [place] to help take care of his father Mr. Warren during his cancer treatment for Laryngeal cancer.         Sincerely,              Nuvia Brown MD

## 2021-04-22 NOTE — PROGRESS NOTES
Subjective   Raj is a 70 year old who presents for the following health issues     HPI       Hospital Follow-up Visit:    Hospital/Nursing Home/IP Rehab Facility: [facility]  Date of Admission: 04/12/2021  Date of Discharge: 04/17/2021  Reason(s) for Admission:       CT with laryngeal mass with airway compromise: Bx:SCCA of Larynx   S/p trach per ENT 4/12/2021  Hx laryngeal mass with airway compromise 2019        Was your hospitalization related to COVID-19? No   Problems taking medications regularly:  None  Medication changes since discharge: None  Problems adhering to non-medication therapy:  None    Summary of hospitalization:  [facility] discharge summary reviewed  Diagnostic Tests/Treatments reviewed.  Follow up needed: ENT   Other Healthcare Providers Involved in Patient s Care:         Specialist appointment - oncology  Update since discharge: stable.    Post Discharge Medication Reconciliation: discharge medications reconciled, continue medications without change.  Plan of care communicated with patient          Chief Complaint:     Post hospital discharge follow up of recent hospitalization for stridor due to malignant neoplasm of the larynx s/p tracheostomy    HPI:   Patient Raj Warren is a very pleasant 70 year old male with history of stridor due to larynx mass who presents to Internal Medicine clinic today for post hospital discharge follow up of recent hospitalization for stridor due to malignant neoplasm of the larynx s/p tracheostomy. No further dyspnea symptoms at this time. Patient is scheduled for both oncology, ENT clinic follow up appointments going forward. No signs of acute tracheostomy obstruction or surgical wound site infection or bleeding at this time. Pain is well controlled on tylenol medication. No chest pain, headaches, fever or chills at this time.    Current Medications:     Current Outpatient Medications   Medication Sig Dispense Refill      acetaminophen (TYLENOL) 325 MG tablet Take 2 tablets (650 mg) by mouth 3 times daily as needed for mild pain           Allergies:      Allergies   Allergen Reactions     Pollen Extract      Seasonal Allergies             Past Medical History:     Past Medical History:   Diagnosis Date     Allergic state          Past Surgical History:     Past Surgical History:   Procedure Laterality Date     LARYNGOSCOPY N/A 2021    Procedure: LARYNGOSCOPY;  Surgeon: Choco Johnson MD;  Location:  OR     TRACHEOSTOMY  2021    Procedure: CREATION, TRACHEOSTOMY;  Surgeon: Choco Johnson MD;  Location:  OR         Family Medical History:     Family History   Problem Relation Age of Onset     Circulatory Mother         blood clots     Alcohol/Drug Father         alcohol drank heavily     Alcohol/Drug Brother         alcohol         Social History:     Social History     Socioeconomic History     Marital status: Single     Spouse name: Not on file     Number of children: Not on file     Years of education: Not on file     Highest education level: Not on file   Occupational History     Not on file   Social Needs     Financial resource strain: Not on file     Food insecurity     Worry: Not on file     Inability: Not on file     Transportation needs     Medical: Not on file     Non-medical: Not on file   Tobacco Use     Smoking status: Former Smoker     Types: Cigarettes     Quit date: 1989     Years since quittin.3     Smokeless tobacco: Never Used     Tobacco comment: quit in   had smoked about 1/2 pack a day then cut back   Substance and Sexual Activity     Alcohol use: No     Drug use: No     Sexual activity: Yes     Partners: Female   Lifestyle     Physical activity     Days per week: Not on file     Minutes per session: Not on file     Stress: Not on file   Relationships     Social connections     Talks on phone: Not on file     Gets together: Not on file     Attends Yarsani service: Not on file  "    Active member of club or organization: Not on file     Attends meetings of clubs or organizations: Not on file     Relationship status: Not on file     Intimate partner violence     Fear of current or ex partner: Not on file     Emotionally abused: Not on file     Physically abused: Not on file     Forced sexual activity: Not on file   Other Topics Concern     Parent/sibling w/ CABG, MI or angioplasty before 65F 55M? Not Asked   Social History Narrative     Not on file           Review of System:     Constitutional: Negative for fever or chills  Skin: Negative for rashes  Eyes: positive for allergic conjunctivitis  Ears/Nose/Throat: positive for recent diagnosis of larynx mass with stridor  Respiratory: positive for dyspnea  Cardiovascular: Negative for chest pain  Gastrointestinal: Negative for nausea, vomiting  Genitourinary: Negative for dysuria, hematuria  Musculoskeletal: Negative for myalgias  Neurologic: Negative for headaches  Psychiatric: Negative for depression, anxiety  Hematologic/Lymphatic/Immunologic: Negative  Endocrine: Negative  Behavioral: Negative for tobacco use       Physical Exam:   /86 (BP Location: Right arm, Patient Position: Sitting, Cuff Size: Adult Regular)   Pulse 120   Temp 97.7  F (36.5  C) (Temporal)   Ht 1.803 m (5' 11\")   Wt 93.9 kg (207 lb)   SpO2 94%   BMI 28.87 kg/m      GENERAL: alert and no distress  EYES: eyes grossly normal to inspection, and conjunctivae and sclerae normal  HENT: Normocephalic atraumatic. Tracheostomy present without signs of acute tracheostomy obstruction or surgical wound site infection or bleeding at this time.  NECK: supple  RESP: lungs clear to auscultation   CV: regular rate and rhythm, normal S1 S2  LYMPH: no peripheral edema   ABDOMEN: nondistended  MS: no gross musculoskeletal defects noted  SKIN: no suspicious lesions or rashes  NEURO: Alert & Oriented x 3.   PSYCH: mentation appears normal, affect normal        Diagnostic Test " Results:     Diagnostic Test Results:  Labs reviewed in [facility]    EXAM: CT SOFT TISSUE NECK W CONTRAST  LOCATION: [facility]  DATE/TIME: 4/12/2021 2:54 AM     INDICATION: Dyspnea. Stridor.  COMPARISON: None.  CONTRAST: 115 mL Isovue 370.  TECHNIQUE: Routine CT soft tissue neck with IV contrast. Multiplanar reformats. Dose reduction techniques were used.     FINDINGS:      MUCOSAL SPACES/SOFT TISSUES: The adenoid soft tissues as well as oropharyngeal soft tissues demonstrate normal enhancement without mass lesion. There is an enlarged and enhancing mass centered within the right true vocal fold with involvement of the   right aryepiglottic fold, right false vocal folds, posterior hypopharynx (asymmetric to the right), right proximal wall of the infraglottic trachea. This mass measures approximately 3.9 x 1.6 cm in greatest axial dimensions and approximately 3.9 cm in   cranial caudal dimensions. This mass appears to involve the right thyroid cartilage. Additionally, there is abnormal thickening and enhancement involving the left aryepiglottic fold. The right prelaryngeal fat planes have been infiltrated by enhancing   soft tissue. The mass effaces the laryngeal airway. Normal parapharyngeal space and subcutaneous soft tissues. Normal oral cavity,  spaces, and floor of mouth structures.     LYMPH NODES: No pathologic lymph nodes by size or morphology criteria.      SALIVARY GLANDS: Normal parotid and submandibular glands.     THYROID: Normal.      VESSELS: Vascular structures of the neck are patent.     VISUALIZED INTRACRANIAL/ORBITS/SINUSES: No abnormality of the visualized intracranial compartment or orbits. Minimal paranasal sinus mucosal thickening. The mastoid air cells are clear.     OTHER: Multilevel degenerative changes of cervical spine. The included lung apices are clear.                                                                      IMPRESSION:   1.  Enhancing soft tissue mass  involving the right larynx measuring 3.9 x 1.6 x 3.9 cm (AP x TV x CC), centered within the right true vocal fold with involvement of the right aryepiglottic fold, right false vocal fold, right infraglottic trachea,   posterior hypopharynx and left aryepiglottic fold. There is effacement of the laryngeal airway. ENT consultation and correlation for airway compromise is recommended.  2.  No pathologic-appearing lymphadenopathy.        Dr. Roger Porras discussed results with Dr. Perdomo on 4/12/2021 3:42 AM.    ASSESSMENT/PLAN:     (Z48.89) Encounter for post surgical wound check  (R06.09) Other form of dyspnea  (Z93.0) Tracheostomy in place (H)  (R06.1) Stridor  (C32.9) Malignant neoplasm of larynx (H)  (primary encounter diagnosis)  Comment: post hospital discharge follow up of recent hospitalization for stridor due to malignant neoplasm of the larynx s/p tracheostomy. No further dyspnea symptoms at this time. Patient is scheduled for both oncology, ENT clinic follow up appointments going forward. No signs of acute tracheostomy obstruction or surgical wound site infection or bleeding at this time.  Plan: continue oncology, ENT clinics follow up going forward. Continue tylenol pain medicaiton for pain relief going forward.       (H10.45) Chronic allergic conjunctivitis  Comment: stable  Plan: continue current therapy        Follow Up Plan:     Patient is instructed to return to Internal Medicine clinic for follow-up visit in 1 month.        Nuvia Brown MD  Internal Medicine  [facility]

## 2021-04-26 ENCOUNTER — HOSPITAL ENCOUNTER (OUTPATIENT)
Dept: PET IMAGING | Facility: CLINIC | Age: 71
Discharge: HOME | DRG: 163 | End: 2021-04-26
Attending: INTERNAL MEDICINE | Admitting: INTERNAL MEDICINE
Payer: COMMERCIAL

## 2021-04-26 DIAGNOSIS — C32.8 MALIGNANT NEOPLASM OF OVERLAPPING SITES OF LARYNX (H): ICD-10-CM

## 2021-04-26 PROBLEM — Z93.0 TRACHEOSTOMY IN PLACE (H): Status: ACTIVE | Noted: 2021-04-26

## 2021-04-26 PROCEDURE — 78815 PET IMAGE W/CT SKULL-THIGH: CPT | Mod: 26 | Performed by: RADIOLOGY

## 2021-04-26 PROCEDURE — A9552 F18 FDG: HCPCS | Performed by: INTERNAL MEDICINE

## 2021-04-26 PROCEDURE — 78815 PET IMAGE W/CT SKULL-THIGH: CPT

## 2021-04-26 PROCEDURE — 343N000001 HC RX 343: Performed by: INTERNAL MEDICINE

## 2021-04-26 RX ADMIN — FLUDEOXYGLUCOSE F-18 11.83 MCI.: 500 INJECTION, SOLUTION INTRAVENOUS at 15:12

## 2021-04-27 ENCOUNTER — HOSPITAL ENCOUNTER (EMERGENCY)
Facility: CLINIC | Age: 71
Discharge: ANOTHER HOSPITAL | End: 2021-04-27
Attending: EMERGENCY MEDICINE | Admitting: EMERGENCY MEDICINE
Payer: COMMERCIAL

## 2021-04-27 ENCOUNTER — APPOINTMENT (OUTPATIENT)
Dept: CARDIOLOGY | Facility: CLINIC | Age: 71
End: 2021-04-27
Attending: EMERGENCY MEDICINE
Payer: COMMERCIAL

## 2021-04-27 ENCOUNTER — APPOINTMENT (OUTPATIENT)
Dept: ULTRASOUND IMAGING | Facility: CLINIC | Age: 71
DRG: 163 | End: 2021-04-27
Attending: HOSPITALIST
Payer: COMMERCIAL

## 2021-04-27 ENCOUNTER — APPOINTMENT (OUTPATIENT)
Dept: CT IMAGING | Facility: CLINIC | Age: 71
End: 2021-04-27
Attending: EMERGENCY MEDICINE
Payer: COMMERCIAL

## 2021-04-27 ENCOUNTER — HOSPITAL ENCOUNTER (INPATIENT)
Facility: CLINIC | Age: 71
LOS: 11 days | Discharge: NURSING, REHAB OR LONG-TERM CARE | DRG: 163 | End: 2021-05-08
Attending: HOSPITALIST | Admitting: HOSPITALIST
Payer: COMMERCIAL

## 2021-04-27 ENCOUNTER — APPOINTMENT (OUTPATIENT)
Dept: GENERAL RADIOLOGY | Facility: CLINIC | Age: 71
End: 2021-04-27
Attending: EMERGENCY MEDICINE
Payer: COMMERCIAL

## 2021-04-27 VITALS
SYSTOLIC BLOOD PRESSURE: 122 MMHG | OXYGEN SATURATION: 96 % | DIASTOLIC BLOOD PRESSURE: 82 MMHG | TEMPERATURE: 97.5 F | HEART RATE: 124 BPM | RESPIRATION RATE: 28 BRPM

## 2021-04-27 DIAGNOSIS — I26.09 ACUTE PULMONARY EMBOLISM WITH ACUTE COR PULMONALE, UNSPECIFIED PULMONARY EMBOLISM TYPE (H): ICD-10-CM

## 2021-04-27 DIAGNOSIS — I26.99 BILATERAL PULMONARY EMBOLISM (H): Primary | ICD-10-CM

## 2021-04-27 LAB
ANION GAP SERPL CALCULATED.3IONS-SCNC: 7 MMOL/L (ref 3–14)
BASE DEFICIT BLDV-SCNC: 0.2 MMOL/L
BASOPHILS # BLD AUTO: 0 10E9/L (ref 0–0.2)
BASOPHILS NFR BLD AUTO: 0.3 %
BUN SERPL-MCNC: 12 MG/DL (ref 7–30)
CALCIUM SERPL-MCNC: 9.6 MG/DL (ref 8.5–10.1)
CHLORIDE SERPL-SCNC: 107 MMOL/L (ref 94–109)
CO2 SERPL-SCNC: 25 MMOL/L (ref 20–32)
CREAT SERPL-MCNC: 1.06 MG/DL (ref 0.66–1.25)
D DIMER PPP FEU-MCNC: >20 UG/ML FEU (ref 0–0.5)
DIFFERENTIAL METHOD BLD: NORMAL
EOSINOPHIL # BLD AUTO: 0 10E9/L (ref 0–0.7)
EOSINOPHIL NFR BLD AUTO: 0.3 %
ERYTHROCYTE [DISTWIDTH] IN BLOOD BY AUTOMATED COUNT: 13.2 % (ref 10–15)
ERYTHROCYTE [DISTWIDTH] IN BLOOD BY AUTOMATED COUNT: 13.4 % (ref 10–15)
FLUAV RNA RESP QL NAA+PROBE: NEGATIVE
FLUBV RNA RESP QL NAA+PROBE: NEGATIVE
GFR SERPL CREATININE-BSD FRML MDRD: 71 ML/MIN/{1.73_M2}
GLUCOSE BLDC GLUCOMTR-MCNC: 107 MG/DL (ref 70–99)
GLUCOSE BLDC GLUCOMTR-MCNC: 122 MG/DL (ref 70–99)
GLUCOSE SERPL-MCNC: 137 MG/DL (ref 70–99)
HCO3 BLDV-SCNC: 25 MMOL/L (ref 21–28)
HCT VFR BLD AUTO: 42.8 % (ref 40–53)
HCT VFR BLD AUTO: 48.6 % (ref 40–53)
HGB BLD-MCNC: 14.7 G/DL (ref 13.3–17.7)
HGB BLD-MCNC: 16.2 G/DL (ref 13.3–17.7)
IMM GRANULOCYTES # BLD: 0 10E9/L (ref 0–0.4)
IMM GRANULOCYTES NFR BLD: 0.5 %
LABORATORY COMMENT REPORT: NORMAL
LACTATE BLD-SCNC: 2.1 MMOL/L (ref 0.7–2)
LACTATE BLD-SCNC: 2.3 MMOL/L (ref 0.7–2)
LYMPHOCYTES # BLD AUTO: 1.6 10E9/L (ref 0.8–5.3)
LYMPHOCYTES NFR BLD AUTO: 18.8 %
MCH RBC QN AUTO: 29.2 PG (ref 26.5–33)
MCH RBC QN AUTO: 29.7 PG (ref 26.5–33)
MCHC RBC AUTO-ENTMCNC: 33.3 G/DL (ref 31.5–36.5)
MCHC RBC AUTO-ENTMCNC: 34.3 G/DL (ref 31.5–36.5)
MCV RBC AUTO: 85 FL (ref 78–100)
MCV RBC AUTO: 89 FL (ref 78–100)
MONOCYTES # BLD AUTO: 0.8 10E9/L (ref 0–1.3)
MONOCYTES NFR BLD AUTO: 8.8 %
NEUTROPHILS # BLD AUTO: 6.2 10E9/L (ref 1.6–8.3)
NEUTROPHILS NFR BLD AUTO: 71.3 %
NRBC # BLD AUTO: 0 10*3/UL
NRBC BLD AUTO-RTO: 0 /100
NT-PROBNP SERPL-MCNC: 5441 PG/ML (ref 0–900)
O2/TOTAL GAS SETTING VFR VENT: ABNORMAL %
OXYHGB MFR BLDV: 19 %
PCO2 BLDV: 40 MM HG (ref 40–50)
PH BLDV: 7.4 PH (ref 7.32–7.43)
PLATELET # BLD AUTO: 281 10E9/L (ref 150–450)
PLATELET # BLD AUTO: 308 10E9/L (ref 150–450)
PO2 BLDV: 15 MM HG (ref 25–47)
POTASSIUM SERPL-SCNC: 3.8 MMOL/L (ref 3.4–5.3)
RADIOLOGIST FLAGS: ABNORMAL
RBC # BLD AUTO: 5.03 10E12/L (ref 4.4–5.9)
RBC # BLD AUTO: 5.46 10E12/L (ref 4.4–5.9)
RSV RNA SPEC QL NAA+PROBE: NORMAL
SARS-COV-2 RNA RESP QL NAA+PROBE: NEGATIVE
SODIUM SERPL-SCNC: 139 MMOL/L (ref 133–144)
SPECIMEN SOURCE: NORMAL
TROPONIN I SERPL-MCNC: 0.08 UG/L (ref 0–0.04)
UFH PPP CHRO-ACNC: >1.1 IU/ML
WBC # BLD AUTO: 8.7 10E9/L (ref 4–11)
WBC # BLD AUTO: 9 10E9/L (ref 4–11)

## 2021-04-27 PROCEDURE — 96365 THER/PROPH/DIAG IV INF INIT: CPT | Mod: 59

## 2021-04-27 PROCEDURE — 250N000011 HC RX IP 250 OP 636: Performed by: EMERGENCY MEDICINE

## 2021-04-27 PROCEDURE — 96366 THER/PROPH/DIAG IV INF ADDON: CPT

## 2021-04-27 PROCEDURE — 99291 CRITICAL CARE FIRST HOUR: CPT | Mod: 25

## 2021-04-27 PROCEDURE — 200N000001 HC R&B ICU

## 2021-04-27 PROCEDURE — 250N000009 HC RX 250: Performed by: EMERGENCY MEDICINE

## 2021-04-27 PROCEDURE — 71045 X-RAY EXAM CHEST 1 VIEW: CPT

## 2021-04-27 PROCEDURE — 93005 ELECTROCARDIOGRAM TRACING: CPT

## 2021-04-27 PROCEDURE — 82805 BLOOD GASES W/O2 SATURATION: CPT | Performed by: EMERGENCY MEDICINE

## 2021-04-27 PROCEDURE — 258N000003 HC RX IP 258 OP 636: Performed by: EMERGENCY MEDICINE

## 2021-04-27 PROCEDURE — 96361 HYDRATE IV INFUSION ADD-ON: CPT

## 2021-04-27 PROCEDURE — 96376 TX/PRO/DX INJ SAME DRUG ADON: CPT

## 2021-04-27 PROCEDURE — 999N001017 HC STATISTIC GLUCOSE BY METER IP

## 2021-04-27 PROCEDURE — 85027 COMPLETE CBC AUTOMATED: CPT | Performed by: HOSPITALIST

## 2021-04-27 PROCEDURE — 85025 COMPLETE CBC W/AUTO DIFF WBC: CPT | Performed by: EMERGENCY MEDICINE

## 2021-04-27 PROCEDURE — 96367 TX/PROPH/DG ADDL SEQ IV INF: CPT

## 2021-04-27 PROCEDURE — 80048 BASIC METABOLIC PNL TOTAL CA: CPT | Performed by: EMERGENCY MEDICINE

## 2021-04-27 PROCEDURE — 999N000208 ECHOCARDIOGRAM COMPLETE

## 2021-04-27 PROCEDURE — 85379 FIBRIN DEGRADATION QUANT: CPT | Performed by: EMERGENCY MEDICINE

## 2021-04-27 PROCEDURE — 83880 ASSAY OF NATRIURETIC PEPTIDE: CPT | Performed by: EMERGENCY MEDICINE

## 2021-04-27 PROCEDURE — 93306 TTE W/DOPPLER COMPLETE: CPT | Mod: 26 | Performed by: INTERNAL MEDICINE

## 2021-04-27 PROCEDURE — 87636 SARSCOV2 & INF A&B AMP PRB: CPT | Performed by: EMERGENCY MEDICINE

## 2021-04-27 PROCEDURE — 93970 EXTREMITY STUDY: CPT

## 2021-04-27 PROCEDURE — 36415 COLL VENOUS BLD VENIPUNCTURE: CPT | Performed by: HOSPITALIST

## 2021-04-27 PROCEDURE — C9803 HOPD COVID-19 SPEC COLLECT: HCPCS

## 2021-04-27 PROCEDURE — 87040 BLOOD CULTURE FOR BACTERIA: CPT | Mod: 59 | Performed by: EMERGENCY MEDICINE

## 2021-04-27 PROCEDURE — 99223 1ST HOSP IP/OBS HIGH 75: CPT | Mod: AI | Performed by: HOSPITALIST

## 2021-04-27 PROCEDURE — 71275 CT ANGIOGRAPHY CHEST: CPT

## 2021-04-27 PROCEDURE — 84484 ASSAY OF TROPONIN QUANT: CPT | Performed by: EMERGENCY MEDICINE

## 2021-04-27 PROCEDURE — 84145 PROCALCITONIN (PCT): CPT | Performed by: EMERGENCY MEDICINE

## 2021-04-27 PROCEDURE — 83605 ASSAY OF LACTIC ACID: CPT | Mod: 91 | Performed by: EMERGENCY MEDICINE

## 2021-04-27 PROCEDURE — 255N000002 HC RX 255 OP 636: Performed by: EMERGENCY MEDICINE

## 2021-04-27 PROCEDURE — 85520 HEPARIN ASSAY: CPT | Performed by: HOSPITALIST

## 2021-04-27 RX ORDER — POLYETHYLENE GLYCOL 3350 17 G/17G
17 POWDER, FOR SOLUTION ORAL DAILY PRN
Status: DISCONTINUED | OUTPATIENT
Start: 2021-04-27 | End: 2021-05-02

## 2021-04-27 RX ORDER — AMOXICILLIN 250 MG
1 CAPSULE ORAL 2 TIMES DAILY PRN
Status: DISCONTINUED | OUTPATIENT
Start: 2021-04-27 | End: 2021-05-02

## 2021-04-27 RX ORDER — PROCHLORPERAZINE 25 MG
12.5 SUPPOSITORY, RECTAL RECTAL EVERY 12 HOURS PRN
Status: DISCONTINUED | OUTPATIENT
Start: 2021-04-27 | End: 2021-05-01

## 2021-04-27 RX ORDER — PROCHLORPERAZINE MALEATE 5 MG
5 TABLET ORAL EVERY 6 HOURS PRN
Status: DISCONTINUED | OUTPATIENT
Start: 2021-04-27 | End: 2021-05-01

## 2021-04-27 RX ORDER — ONDANSETRON 4 MG/1
4 TABLET, ORALLY DISINTEGRATING ORAL EVERY 6 HOURS PRN
Status: DISCONTINUED | OUTPATIENT
Start: 2021-04-27 | End: 2021-04-28

## 2021-04-27 RX ORDER — ONDANSETRON 2 MG/ML
4 INJECTION INTRAMUSCULAR; INTRAVENOUS EVERY 6 HOURS PRN
Status: DISCONTINUED | OUTPATIENT
Start: 2021-04-27 | End: 2021-04-28

## 2021-04-27 RX ORDER — DEXTROSE MONOHYDRATE 25 G/50ML
25-50 INJECTION, SOLUTION INTRAVENOUS
Status: DISCONTINUED | OUTPATIENT
Start: 2021-04-27 | End: 2021-04-28

## 2021-04-27 RX ORDER — NICOTINE POLACRILEX 4 MG
15-30 LOZENGE BUCCAL
Status: DISCONTINUED | OUTPATIENT
Start: 2021-04-27 | End: 2021-04-28

## 2021-04-27 RX ORDER — SODIUM CHLORIDE 9 MG/ML
INJECTION, SOLUTION INTRAVENOUS CONTINUOUS
Status: DISCONTINUED | OUTPATIENT
Start: 2021-04-27 | End: 2021-04-27

## 2021-04-27 RX ORDER — IOPAMIDOL 755 MG/ML
500 INJECTION, SOLUTION INTRAVASCULAR ONCE
Status: COMPLETED | OUTPATIENT
Start: 2021-04-27 | End: 2021-04-27

## 2021-04-27 RX ORDER — ACETAMINOPHEN 325 MG/1
650 TABLET ORAL EVERY 4 HOURS PRN
Status: DISCONTINUED | OUTPATIENT
Start: 2021-04-27 | End: 2021-04-28

## 2021-04-27 RX ORDER — HEPARIN SODIUM 10000 [USP'U]/100ML
0-5000 INJECTION, SOLUTION INTRAVENOUS CONTINUOUS
Status: DISCONTINUED | OUTPATIENT
Start: 2021-04-27 | End: 2021-04-27 | Stop reason: HOSPADM

## 2021-04-27 RX ORDER — SODIUM CHLORIDE 9 MG/ML
INJECTION, SOLUTION INTRAVENOUS CONTINUOUS
Status: DISCONTINUED | OUTPATIENT
Start: 2021-04-27 | End: 2021-04-27 | Stop reason: HOSPADM

## 2021-04-27 RX ORDER — AMOXICILLIN 250 MG
2 CAPSULE ORAL 2 TIMES DAILY PRN
Status: DISCONTINUED | OUTPATIENT
Start: 2021-04-27 | End: 2021-05-02

## 2021-04-27 RX ORDER — HEPARIN SODIUM 10000 [USP'U]/100ML
0-5000 INJECTION, SOLUTION INTRAVENOUS CONTINUOUS
Status: DISCONTINUED | OUTPATIENT
Start: 2021-04-27 | End: 2021-04-28

## 2021-04-27 RX ADMIN — IOPAMIDOL 69 ML: 755 INJECTION, SOLUTION INTRAVENOUS at 14:15

## 2021-04-27 RX ADMIN — TAZOBACTAM SODIUM AND PIPERACILLIN SODIUM 4.5 G: 500; 4 INJECTION, SOLUTION INTRAVENOUS at 14:01

## 2021-04-27 RX ADMIN — HUMAN ALBUMIN MICROSPHERES AND PERFLUTREN 3 ML: 10; .22 INJECTION, SOLUTION INTRAVENOUS at 16:15

## 2021-04-27 RX ADMIN — SODIUM CHLORIDE 87 ML: 9 INJECTION, SOLUTION INTRAVENOUS at 14:16

## 2021-04-27 RX ADMIN — HEPARIN SODIUM 1700 UNITS/HR: 10000 INJECTION, SOLUTION INTRAVENOUS at 14:58

## 2021-04-27 RX ADMIN — SODIUM CHLORIDE 1000 ML: 9 INJECTION, SOLUTION INTRAVENOUS at 13:18

## 2021-04-27 ASSESSMENT — ENCOUNTER SYMPTOMS
SHORTNESS OF BREATH: 1
FEVER: 0
DIARRHEA: 0
VOMITING: 0
COUGH: 1

## 2021-04-27 ASSESSMENT — ACTIVITIES OF DAILY LIVING (ADL)
WEAR_GLASSES_OR_BLIND: NO
CONCENTRATING,_REMEMBERING_OR_MAKING_DECISIONS_DIFFICULTY: NO
TOILETING_ISSUES: NO
DIFFICULTY_EATING/SWALLOWING: NO
ADLS_ACUITY_SCORE: 16
HEARING_DIFFICULTY_OR_DEAF: NO
PATIENT_/_FAMILY_COMMUNICATION_STYLE: SPOKEN LANGUAGE (ENGLISH OR BILINGUAL)
WHICH_OF_THE_ABOVE_FUNCTIONAL_RISKS_HAD_A_RECENT_ONSET_OR_CHANGE?: TRANSFERRING;AMBULATION
DOING_ERRANDS_INDEPENDENTLY_DIFFICULTY: YES
DIFFICULTY_COMMUNICATING: NO
WALKING_OR_CLIMBING_STAIRS: STAIR CLIMBING DIFFICULTY, REQUIRES EQUIPMENT;TRANSFERRING DIFFICULTY, REQUIRES EQUIPMENT
FALL_HISTORY_WITHIN_LAST_SIX_MONTHS: NO
DRESSING/BATHING_DIFFICULTY: NO
WALKING_OR_CLIMBING_STAIRS_DIFFICULTY: YES

## 2021-04-27 ASSESSMENT — MIFFLIN-ST. JEOR: SCORE: 1700.13

## 2021-04-27 NOTE — TELEPHONE ENCOUNTER
Writer did not receive a call back and attempted to call patient and LVM requesting a return call regarding F/U care.     Does patient have follow up with Dr. Johnson?    Currently is scheduled with Dr. Bean on 4/29 at 9:40 in person.    Serena Castillo RN

## 2021-04-27 NOTE — ED NOTES
DATE:  4/27/2021   TIME OF RECEIPT FROM LAB:  1406  LAB TEST:  DDimer   LAB VALUE:  >20   RESULTS GIVEN WITH READ-BACK TO (PROVIDER): Lindenbaum   TIME LAB VALUE REPORTED TO PROVIDER:   1402

## 2021-04-27 NOTE — H&P
[facility]    History and Physical  Hospitalist       Date of Admission:  4/27/2021    Assessment & Plan   Raj Warren is a 70 year old male who presents with shortness of breath, found to have large bilateral pulmonary emboli with RV thrombus and right heart strain in setting of laryngeal cancer    Large bilateral pulmonary emboli with RV Strain  RV thrombus-likely clot in transit  SOB over the weekend and chest pain with coughing. D-dimer >20. BNP 5441. Trop 0.083 secondary to demand with PE.  Initially given zosyn at ED prior to findings of PE. No plans for further infectious treatment.   CT Chest PE: large bilateral PE, central thrombus burden leads to the bilateral main pulmonary arteries. Right heart strain  With bowing of septum. RV thrombus. Small patchy ground glass nodular opacities in ABNER. Soft tissue thickening of vocal folds.   Echocardiogram 4/27: EF 60-65%, RV severely dilated, moderately decreased RVSF. Echogenic mobile mass in RV 3.5x1cm, close to basal RV free wall --likely clot in transit. RVSP 48mmhg, pulmonary hypertension  - admit icu  - discussed with IR--no plans for thrombectomy unless decompensation  - CV surgery consult for RV thrombus  - cardiology consult  - IV tPA if decompensation and then repeat CTA to monitor RV thrombus  - NPO except meds for now  - high intensity heparin.  - lower extremity US bilateral lower extremities    CT with laryngeal mass with airway compromise: Bx:SCCA of Larynx   S/p trach per ENT 4/12/2021  Hx laryngeal mass with airway compromise 2019  Oncology, Dr. Bean. PET 4/26 with right laryngeal mass, level 4 LN, new nodule in right middle lobe  - oncology consulted  - trach cares per nursing    COVID-19 screening-negative    DVT Prophylaxis: heparin gtt  Code Status: Full Code  Expected discharge: >7 days, pending course, recovery from blood clot    Pam Bansal DO    Primary Care Physician   Nuvia Brown    Chief Complaint    Shortness of breath    History is obtained from the patient, prior record review    History of Present Illness   Raj Warren is a 70 year old male who presents with shortness of breath present over the past few days. Noted to have coughing as well with some chest discomfort. When his breathing started to get worse and he wasn't able to walk very far without feeling very short of breath. Came to ED and found to have clots.  No chest pain at rest. No fevers, chills, nausea, vomiting, abdominal pain, headache, numbness tingling. Has intermittent cough while talking.    Past Medical History    I have reviewed this patient's medical history and updated it with pertinent information if needed.   Past Medical History:   Diagnosis Date     Allergic state    laryngeal cancer  tracheostomy    Past Surgical History   I have reviewed this patient's surgical history and updated it with pertinent information if needed.  Past Surgical History:   Procedure Laterality Date     LARYNGOSCOPY N/A 4/12/2021    Procedure: LARYNGOSCOPY;  Surgeon: Choco Johnson MD;  Location:  OR     TRACHEOSTOMY  4/12/2021    Procedure: CREATION, TRACHEOSTOMY;  Surgeon: Choco Johnson MD;  Location:  OR       Prior to Admission Medications   Prior to Admission Medications   Prescriptions Last Dose Informant Patient Reported? Taking?   acetaminophen (TYLENOL) 325 MG tablet   No Yes   Sig: Take 2 tablets (650 mg) by mouth 3 times daily as needed for mild pain      Facility-Administered Medications: None     Allergies   Allergies   Allergen Reactions     Pollen Extract        Social History   I have reviewed this patient's social history and updated it with pertinent information if needed. Raj Warren  reports that he quit smoking about 32 years ago. His smoking use included cigarettes. He has never used smokeless tobacco. He reports that he does not drink alcohol or use drugs.    Family History   I have reviewed this patient's family  history and updated it with pertinent information if needed.   Family History   Problem Relation Age of Onset     Circulatory Mother         blood clots     Alcohol/Drug Father         alcohol drank heavily     Alcohol/Drug Brother         alcohol       Review of Systems   The 10 point Review of Systems is negative other than noted in the HPI    Physical Exam   Temp: 99.8  F (37.7  C) Temp src: Oral BP: (!) 121/93 Pulse: 118   Resp: 26 SpO2: 96 % O2 Device: None (Room air)    Vital Signs with Ranges  Temp:  [97.5  F (36.4  C)-99.8  F (37.7  C)] 99.8  F (37.7  C)  Pulse:  [117-129] 118  Resp:  [19-36] 26  BP: (113-157)/() 121/93  SpO2:  [94 %-99 %] 96 %  200 lbs 2.84 oz    Constitutional: Awake, alert, cooperative, no apparent distress.  Eyes: Conjunctiva and pupils examined and normal.  HEENT: Moist mucous membranes, normal dentition.  Respiratory: rhonchi bilaterally due to some congested transmitted sounds via trach. Respiratory rate in low 20s. Coughing with frequent talking. Tracheostomy present  Cardiovascular: tachycardic, regular rhythm normal S1 and S2, and no murmur noted.  GI: Soft, non-distended, non-tender, normal bowel sounds.  Lymph/Hematologic: No anterior cervical or supraclavicular adenopathy.  Skin: No rashes, no cyanosis, no edema.  Musculoskeletal: No joint swelling, erythema or tenderness.  Neurologic: Cranial nerves 2-12 intact, normal strength and sensation.  Psychiatric: Alert, oriented to person, place and time, no obvious anxiety or depression.    Data   Data reviewed today:  I personally reviewed CXR: tracheostomy present, no infiltrate. CT Chest PE: large bilateral PE, central thrombus burden leads to the bilateral main pulmonary arteries. Right heart strain  With bowing of septum. RV thrombus. Small patchy ground glass nodular opacities in ABNER. Soft tissue thickening of vocal folds.   Recent Labs   Lab 04/27/21  1906 04/27/21  1254   WBC 9.0 8.7   HGB 14.7 16.2   MCV 85 89   PLT  281 308   NA  --  139   POTASSIUM  --  3.8   CHLORIDE  --  107   CO2  --  25   BUN  --  12   CR  --  1.06   ANIONGAP  --  7   TELMA  --  9.6   GLC  --  137*   TROPI  --  0.083*       Recent Results (from the past 24 hour(s))   XR Chest Port 1 View    Narrative    CHEST PORTABLE ONE VIEW   4/27/2021 1:13 PM     HISTORY: Short of breath.    COMPARISON: 4/12/2021.      Impression    IMPRESSION: Tracheostomy appears appropriately positioned. Normal  cardiac silhouette. No acute airspace disease.    JESSICA REESE MD   CT Chest Pulmonary Embolism w Contrast   Result Value    Radiologist flags Large bilateral acute pulmonary embolism with (AA)    Narrative    CT CHEST PULMONARY EMBOLISM WITH CONTRAST 4/27/2021 2:31 PM    CLINICAL HISTORY: PE suspected, high probability. Shortness of breath.  Cough. Status post tracheostomy for laryngeal cancer, elevated  troponin/BNP.    TECHNIQUE: CT angiogram chest during arterial phase injection IV  contrast. 2D and 3D MIP reconstructions were performed by the CT  technologist. Dose reduction techniques were used.     CONTRAST: 69mL Isovue-370    COMPARISON: CT chest 4/12/2021.    FINDINGS:  ANGIOGRAM CHEST: Large bilateral pulmonary embolism newly identified  leading to all lobes of both lungs. Central embolism burden can be  seen extending to the midportion of the right main pulmonary artery  series 5 image 72, and distal aspect of the left main pulmonary artery  image 68. There is associated dilatation of the right cardiac chambers  and leftward bulging of the intraventricular septum. There are also  curvilinear low density filling defects within the right ventricular  lumen series 5 image 114 and on adjacent images. Thoracic aorta is  negative for dissection.    LUNGS AND PLEURA: No effusions. Motion artifact limits detailed  assessment of the lung parenchyma. Very mild patchy ground-glass  opacities at the left upper lobe, for instance, a nodular region  measuring 0.7 cm series 7 image  102. No dense lobar consolidation  identified.    MEDIASTINUM/AXILLAE: No enlarged lymph nodes are seen by size  criteria. There is a tracheostomy in place. Some small mucus  aspiration posterior to the tracheostomy. Soft tissue fullness at the  vocal folds again noted, right greater than left.    UPPER ABDOMEN: Cholelithiasis. No convincing acute abnormality, but  assessment is limited by motion.    MUSCULOSKELETAL: No acute abnormality.      Impression    IMPRESSION:  1.  Large bilateral pulmonary embolism leading to all lobes of both  lungs. Central thrombus burden leads to the bilateral main pulmonary  arteries. There is evidence of right cardiac strain. The chambers of  the right heart are dilated and there is leftward bowing of the  intraventricular septum. Further, there is curvilinear apparent  filling defect within the right ventricular lumen suggesting thrombus  within the right ventricle.  2.  Small patchy ground-glass nodular opacities at the left upper  lobe. Recommend follow-up CT chest in three months for surveillance.  3.  Soft tissue thickening at the vocal folds again noted.  4.  Appropriate positioning of a newly identified tracheostomy.    [Critical Result: Large bilateral acute pulmonary embolism with  evidence of right cardiac strain. There is also potential thrombus  burden within the lumen of the right ventricle.]    Finding was identified on 2021 2:33 PM.     Dr. Pritchard was contacted by me on 2021 2:43 PM and verbalized  understanding of the critical result.     JESSICA REESE MD   Echocardiogram Complete    Narrative    [id number]  [id number]  [id number]  [id number]^FIDEL^[facility]  Echocardiography Laboratory  [address] [place] [address]     Name: LUPE HALL  MRN: [id number]  : 1950  Study Date: 2021 03:38 PM  Age: 70 yrs  Gender: Male  Patient Location: [facility]  Reason For Study: Pulmonary Emboli  Ordering Physician:  JUANITA PRITCHARD  Performed By: May Billy     BSA: 2.1 m2  Height: 71 in  Weight: 207 lb  HR: 120  BP: 122/76 mmHg  ______________________________________________________________________________  Procedure  Complete Portable Echo Adult. Optison (NDC #[id number]) given intravenously.  ______________________________________________________________________________  Interpretation Summary     Left ventricular systolic function is normal.The visual ejection fraction is  estimated at 60-65%.Flattened septum is consistent with RV pressure/volume  overload.  The right ventricle is severely dilated.Moderately decreased right ventricular  systolic function  The right atrium is severely dilated.  Echogenic mobile mass in right ventricle- measuring 3.5 cm x 1 cm, it is seen  close to the basal RV free wall- differential includes clot in transit given  the clinical scenario of large bilateral acute pulmonary embolism,  differential also includes a mass.  Pulmonary hypertension- RVSP 48 mm hg +RA.     No prior study for comparison.  Findings discussed with Dr Pritchard.  ______________________________________________________________________________  Left Ventricle  The left ventricle is normal in size. There is mild concentric left  ventricular hypertrophy. Diastolic Doppler findings (E/E' ratio and/or other  parameters) suggest left ventricular filling pressures are indeterminate. Left  ventricular systolic function is normal. The visual ejection fraction is  estimated at 60-65%. Flattened septum is consistent with RV pressure/volume  overload.     Right Ventricle  The right ventricle is severely dilated. echogenic mobile mass in right  ventricle- measuring 3.5 cm x 1 cm, it is seen close to the basal RV free  wall- differential includes clot in transit given the clinical scenario of  large bilateral acute pulmonary embolism. Moderately decreased right  ventricular systolic function.     Atria  Normal left atrial size. The  right atrium is severely dilated.     Mitral Valve  There is mild (1+) mitral regurgitation.     Tricuspid Valve  There is mild (1+) tricuspid regurgitation. The right ventricular systolic  pressure is approximated at 47.6 mmHg plus the right atrial pressure.  Pulmonary hypertension.     Aortic Valve  The aortic valve is trileaflet. mild aortic valve sclerosis. No aortic  regurgitation is present. No aortic stenosis is present.     Pulmonic Valve  The pulmonic valve is not well visualized. There is no pulmonic valvular  stenosis.     Vessels  The aortic root is normal size. Normal size ascending aorta. borderline  dilated IVC.     Pericardium  There is no pericardial effusion.     ______________________________________________________________________________  MMode/2D Measurements & Calculations  IVSd: 1.5 cm  LVIDd: 2.3 cm  LVIDs: 1.5 cm  LVPWd: 1.4 cm  FS: 36.2 %     LV mass(C)d: 103.9 grams  LV mass(C)dI: 48.5 grams/m2  Ao root diam: 3.1 cm  LA dimension: 2.8 cm  asc Aorta Diam: 3.0 cm  LA/Ao: 0.90  LVOT diam: 2.0 cm  LVOT area: 3.1 cm2  LA Volume (BP): 16.4 ml  LA Volume Index (BP): 7.7 ml/m2  RWT: 1.2     Doppler Measurements & Calculations  MV E max sheila: 66.0 cm/sec     MV dec time: 0.05 sec  LV V1 max P.0 mmHg  LV V1 max: 70.5 cm/sec  LV V1 VTI: 9.9 cm  SV(LVOT): 31.2 ml  SI(LVOT): 14.6 ml/m2  PA acc time: 0.12 sec  TR max sheila: 345.0 cm/sec  TR max P.6 mmHg  E/E' av.0  Lateral E/e': 7.2  Medial E/e': 8.8     ______________________________________________________________________________  Report approved by: Mini Brooks 2021 04:44 PM

## 2021-04-27 NOTE — PHARMACY-ADMISSION MEDICATION HISTORY
Admission medication history interview status for this patient is complete. See [facility] admission navigator for allergy information, prior to admission medications and immunization status.     Medication history interview done, indicate source(s): Patient  Medication history resources (including written lists, pill bottles, clinic record):None  Pharmacy: -    Changes made to PTA medication list:  Added: -  Deleted: -  Changed: -    Actions taken by pharmacist (provider contacted, etc):None     Additional medication history information:None    Medication reconciliation/reorder completed by provider prior to medication history?  no   (Y/N)     For patients on insulin therapy:   Do you use sliding scale insulin based on blood sugars?   What is your pre-meal insulin coverage?    Do you typically eat three meals a day?   How many times do you check your blood glucose per day?   How many episodes of hypoglycemia do you typically have per month?   Do you have a Continuous Glucose Monitor (CGM)?      Prior to Admission medications    Medication Sig Last Dose Taking? Auth Provider   acetaminophen (TYLENOL) 325 MG tablet Take 2 tablets (650 mg) by mouth 3 times daily as needed for mild pain  Yes Naga Alexander MD

## 2021-04-27 NOTE — ED PROVIDER NOTES
History     Chief Complaint:  Shortness of Breath    HPI   Raj Warren is a 70 year old male with a history of larynx cancer and tracheostomy placement who presents with shortness of breath. The patient states that he has had some shortness of breath and cough over the last 3-4 days. He reports chest pain when coughing. The patient denies any fever, vomiting or diarrhea. He denies any leg swelling. He denies a history of blood clots or CHF. He received his second Covid-19 vaccine on 4/22.     PET Scan 4/26/2021:  1. 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 Mass involves the right aryepiglottic fold and suspect  involves the right-side of the epiglottis, as well as extends  inferiorly to just above the level of the tracheostomy.     2. Hypermetabolic left level 4 lymph node likely representing  metastasis.     3. New hypermetabolic nodule in the right middle lobe, new since  4/12/2021. This is unlikely to be metastasis given short time  interval, favor infection or inflammation, possibly from aspiration.     4. Borderline avid small subcarinal lymph node favor physiologic or  reactive to the lung process.     5. Cholelithiasis without evidence of acute cholecystitis.    Review of Systems   Constitutional: Negative for fever.   Respiratory: Positive for cough and shortness of breath.    Cardiovascular: Positive for chest pain. Negative for leg swelling.   Gastrointestinal: Negative for diarrhea and vomiting.   All other systems reviewed and are negative.    Allergies:  The patient has no known allergies.    Medications:    The patient is not currently taking any prescribed medications.    Past Medical History:    Tracheostomy   Larynx cancer   Aphthous ulcer     Past Surgical History:    Skin graft   Laryngoscopy   Tracheotomy      Family History:    Blood clots- mother   alcohol abuse- father, brother     Social History:  Lives alone   Presents with son     Physical  Exam     Patient Vitals for the past 24 hrs:   BP Temp Temp src Pulse Resp SpO2   04/27/21 1445 -- -- -- 117 29 99 %   04/27/21 1440 -- -- -- 120 24 97 %   04/27/21 1430 122/76 -- -- -- -- --   04/27/21 1410 116/87 -- -- 123 25 --   04/27/21 1400 -- -- -- 122 24 95 %   04/27/21 1350 -- -- -- 121 19 96 %   04/27/21 1345 -- -- -- 122 30 98 %   04/27/21 1330 -- -- -- 121 -- 97 %   04/27/21 1315 -- -- -- 126 27 97 %   04/27/21 1300 113/82 -- -- 123 28 97 %   04/27/21 1245 -- -- -- 124 24 98 %   04/27/21 1230 -- 97.5  F (36.4  C) Oral -- (!) 36 --   04/27/21 1227 (!) 146/96 -- Oral 126 -- 96 %       Physical Exam  VS: Reviewed per above  HENT: Mucous membranes moist  EYES: sclera anicteric  CV: Rate as noted, regular rhythm.   RESP: Tracheostomy present, mild tachypnea, intermittent coughing. Breath sounds are normal bilaterally.  GI: no tenderness/rebound/guarding, not distended.  NEURO: Alert, moving all extremities  MSK: No deformity of the extremities  SKIN: Warm and dry    Emergency Department Course   ECG:  ECG taken at 1234, ECG read at 1240  Sinus tachycardia    No change as compared to prior, dated 4/12/2021.  Rate 127 bpm. TX interval 130 ms. QRS duration 68 ms. QT/QTc 316/459 ms. P-R-T axes 78 16 6.     Imaging:    CT Chest pulmonary embolism with contrast: (AA)  1.  Large bilateral pulmonary embolism leading to all lobes of both   lungs. Central thrombus burden leads to the bilateral main pulmonary   arteries. There is evidence of right cardiac strain. The chambers of   the right heart are dilated and there is leftward bowing of the   intraventricular septum. Further, there is curvilinear apparent   filling defect within the right ventricular lumen suggesting thrombus   within the right ventricle.   2.  Small patchy ground-glass nodular opacities at the left upper   lobe. Recommend follow-up CT chest in three months for surveillance.   3.  Soft tissue thickening at the vocal folds again noted.   4.   Appropriate positioning of a newly identified tracheostomy.   Reading per radiology    Chest XR:  Tracheostomy appears appropriately positioned. Normal   cardiac silhouette. No acute airspace disease.  Reading per radiology    Laboratory:    CBC: WBC 8.7, HGB 16.2,      BMP: glucose 137 (H) o/w WNL (Creatinine 1.06)     Troponin (Collected 1254): 0.083    BNP: 5,441 (H)     D dimer: >20.0 (HH)     Procalcitonin: pending       Symptomatic Influenza A/B & SARS-CoV2 (COVID19) Virus PCR Multiplex: pending       Lactic acid (result time 1307) 2.3 (H)    Lactic acid (result time 1501) 2.1 (H)     Blood culture: pending X2     blood gas venous 1254: pH: 7.40, PCO2: 40, PO2: 15(L), Bicarbonate: 25, Oxyhgb: 19, FIO2 Room Air, base excess 0.2      Procedures:    Emergency Department Course:    Reviewed:  I reviewed nursing notes, vitals, past history and care everywhere    Assessments:  1247 I obtained history and examined the patient as noted above.     1443 I rechecked the patient and explained findings.     Consults:   1433 I spoke with Dr. Segovia of Radiology regarding patient's presentation, findings, and plan of care.     1532 I spoke with Dr. Bansal of the Hospitalist service from [facility] regarding patient's presentation, findings, and plan of care.     I spoke with Dr. Martinez of IR regarding patient's presentation, findings, and plan of care. She was in touch with [facility] IR provider with recommendation for stat echo and further IR evaluation at [facility].    Interventions:  1318 NS, 1 L, IV    1401 Zoysn 4.5 g IV     1458 Heparin loading dose 7,500 units IV     1458 Heparin 1,700 units/hr IV      Disposition:  The patient was transferred to [facility] via EMS. Dr. Bansal accepted the patient for transfer.    Impression & Plan      Medical Decision Making:  Patient presents to the ER for evaluation of a few days of worsening shortness of breath and cough.  On arrival he is  tachycardic.  He has intermittent coughing and is status post tracheostomy for laryngeal cancer.  EKG is sinus rhythm without ischemic change.  A single troponin is slightly elevated as well as BNP elevation.  Chest x-ray is clear.  D-dimer was over 20.  CT PE study shows large bilateral pulmonary emboli as well as evidence of RV thrombus and right heart strain.  Patient was given heparin.  Initially he was given IV antibiotics with cough and lactic acidosis, although in retrospect there is no evidence of pneumonia, and I suspect that this lactic acidosis is due to critical illness/PE.  He was given IV fluids.  On recheck, lactic acid is 2.1 from 2.3.  I spoke with interventional radiology here at [facility].  They were in touch with interventional radiologist at [facility].  Recommendation was for stat echocardiogram and transfer to [facility] for consideration of further IR intervention.  Signout was given to admitting hospitalist at [facility].  At signout to my colleague, patient remained stable while awaiting transfer.    Critical Care time:  35 minutes    Covid-Cristhian Warren was evaluated during a global COVID-19 pandemic, which necessitated consideration that the patient might be at risk for infection with the SARS-CoV-2 virus that causes COVID-19.   Applicable protocols for evaluation were followed during the patient's care.   COVID-19 was considered as part of the patient's evaluation. The plan for testing is:  a test was obtained during this visit.    Diagnosis:    ICD-10-CM    1. Acute pulmonary embolism with acute cor pulmonale, unspecified pulmonary embolism type (H)  I26.09 Blood culture     Blood culture     Lactic acid whole blood     Scribe Disclosure:  I, Kanwal Munoz, am serving as a scribe at 12:37 PM on 4/27/2021 to document services personally performed by Axel Pritchard MD based on my observations and the provider's statements to me.      Axel Pritchard,  MD  04/27/21 [id number]

## 2021-04-27 NOTE — PLAN OF CARE
Neuros intact. ST with bp in goal. PP+ skin warm dry intact. Shiley 8.0 with room air sat fine. Dry non productive cough. BS active NPO last BM 2 days ago. All care explained throughout shift period as well as plan of care. Close monitoring and consults tomorrow. Heparin gtt continued.

## 2021-04-27 NOTE — PROGRESS NOTES
Called to see trach patient. Patient was resting comfortable on room air sats=95%. Suctioned trach for scant/none secretions. cleaned and changed inner cannula. Trach site looks good. Patient may benefit with home humidity for trach.

## 2021-04-28 ENCOUNTER — ANESTHESIA (OUTPATIENT)
Dept: SURGERY | Facility: CLINIC | Age: 71
DRG: 163 | End: 2021-04-28
Payer: COMMERCIAL

## 2021-04-28 ENCOUNTER — APPOINTMENT (OUTPATIENT)
Dept: GENERAL RADIOLOGY | Facility: CLINIC | Age: 71
DRG: 163 | End: 2021-04-28
Attending: SURGERY
Payer: COMMERCIAL

## 2021-04-28 ENCOUNTER — APPOINTMENT (OUTPATIENT)
Dept: GENERAL RADIOLOGY | Facility: CLINIC | Age: 71
DRG: 163 | End: 2021-04-28
Attending: HOSPITALIST
Payer: COMMERCIAL

## 2021-04-28 ENCOUNTER — ANESTHESIA EVENT (OUTPATIENT)
Dept: SURGERY | Facility: CLINIC | Age: 71
DRG: 163 | End: 2021-04-28
Payer: COMMERCIAL

## 2021-04-28 PROBLEM — I26.99 PULMONARY EMBOLI (H): Status: ACTIVE | Noted: 2021-04-28

## 2021-04-28 LAB
ABO + RH BLD: NORMAL
ABO + RH BLD: NORMAL
ALBUMIN SERPL-MCNC: 2.6 G/DL (ref 3.4–5)
ALBUMIN SERPL-MCNC: 2.9 G/DL (ref 3.4–5)
ALBUMIN SERPL-MCNC: 2.9 G/DL (ref 3.4–5)
ALBUMIN SERPL-MCNC: 3.5 G/DL (ref 3.4–5)
ALP SERPL-CCNC: 38 U/L (ref 40–150)
ALP SERPL-CCNC: 41 U/L (ref 40–150)
ALP SERPL-CCNC: 44 U/L (ref 40–150)
ALP SERPL-CCNC: 62 U/L (ref 40–150)
ALT SERPL W P-5'-P-CCNC: 27 U/L (ref 0–70)
ALT SERPL W P-5'-P-CCNC: 28 U/L (ref 0–70)
ALT SERPL W P-5'-P-CCNC: 29 U/L (ref 0–70)
ANION GAP SERPL CALCULATED.3IONS-SCNC: 11 MMOL/L (ref 3–14)
ANION GAP SERPL CALCULATED.3IONS-SCNC: 22 MMOL/L (ref 3–14)
ANION GAP SERPL CALCULATED.3IONS-SCNC: 7 MMOL/L (ref 3–14)
ANION GAP SERPL CALCULATED.3IONS-SCNC: 8 MMOL/L (ref 3–14)
APTT PPP: 46 SEC (ref 22–37)
APTT PPP: 51 SEC (ref 22–37)
AST SERPL W P-5'-P-CCNC: 23 U/L (ref 0–45)
AST SERPL W P-5'-P-CCNC: 28 U/L (ref 0–45)
AST SERPL W P-5'-P-CCNC: 30 U/L (ref 0–45)
AST SERPL W P-5'-P-CCNC: 31 U/L (ref 0–45)
BASE DEFICIT BLDA-SCNC: 11.1 MMOL/L
BASE DEFICIT BLDA-SCNC: 14.5 MMOL/L
BASE DEFICIT BLDA-SCNC: 8.6 MMOL/L
BASE DEFICIT BLDV-SCNC: 14.1 MMOL/L
BASOPHILS # BLD AUTO: 0 10E9/L (ref 0–0.2)
BASOPHILS NFR BLD AUTO: 0.5 %
BILIRUB DIRECT SERPL-MCNC: 0.2 MG/DL (ref 0–0.2)
BILIRUB SERPL-MCNC: 0.4 MG/DL (ref 0.2–1.3)
BILIRUB SERPL-MCNC: 0.6 MG/DL (ref 0.2–1.3)
BILIRUB SERPL-MCNC: 0.9 MG/DL (ref 0.2–1.3)
BILIRUB SERPL-MCNC: 1.4 MG/DL (ref 0.2–1.3)
BLD GP AB SCN SERPL QL: NORMAL
BLD PROD TYP BPU: NORMAL
BLD UNIT ID BPU: 0
BLOOD BANK CMNT PATIENT-IMP: NORMAL
BLOOD PRODUCT CODE: NORMAL
BPU ID: NORMAL
BUN SERPL-MCNC: 11 MG/DL (ref 7–30)
BUN SERPL-MCNC: 11 MG/DL (ref 7–30)
BUN SERPL-MCNC: 12 MG/DL (ref 7–30)
BUN SERPL-MCNC: 14 MG/DL (ref 7–30)
CA-I BLD-MCNC: 4.6 MG/DL (ref 4.4–5.2)
CALCIUM SERPL-MCNC: 7.7 MG/DL (ref 8.5–10.1)
CALCIUM SERPL-MCNC: 8 MG/DL (ref 8.5–10.1)
CALCIUM SERPL-MCNC: 8.2 MG/DL (ref 8.5–10.1)
CALCIUM SERPL-MCNC: 9.3 MG/DL (ref 8.5–10.1)
CHLORIDE SERPL-SCNC: 110 MMOL/L (ref 94–109)
CHLORIDE SERPL-SCNC: 114 MMOL/L (ref 94–109)
CHLORIDE SERPL-SCNC: 115 MMOL/L (ref 94–109)
CHLORIDE SERPL-SCNC: 116 MMOL/L (ref 94–109)
CO2 SERPL-SCNC: 13 MMOL/L (ref 20–32)
CO2 SERPL-SCNC: 20 MMOL/L (ref 20–32)
CO2 SERPL-SCNC: 22 MMOL/L (ref 20–32)
CO2 SERPL-SCNC: 25 MMOL/L (ref 20–32)
CREAT SERPL-MCNC: 0.92 MG/DL (ref 0.66–1.25)
CREAT SERPL-MCNC: 0.95 MG/DL (ref 0.66–1.25)
CREAT SERPL-MCNC: 0.96 MG/DL (ref 0.66–1.25)
CREAT SERPL-MCNC: 1.23 MG/DL (ref 0.66–1.25)
DIFFERENTIAL METHOD BLD: NORMAL
EOSINOPHIL # BLD AUTO: 0.1 10E9/L (ref 0–0.7)
EOSINOPHIL NFR BLD AUTO: 0.9 %
ERYTHROCYTE [DISTWIDTH] IN BLOOD BY AUTOMATED COUNT: 13.6 % (ref 10–15)
ERYTHROCYTE [DISTWIDTH] IN BLOOD BY AUTOMATED COUNT: 13.8 % (ref 10–15)
ERYTHROCYTE [DISTWIDTH] IN BLOOD BY AUTOMATED COUNT: 14 % (ref 10–15)
ERYTHROCYTE [DISTWIDTH] IN BLOOD BY AUTOMATED COUNT: 14.2 % (ref 10–15)
FIBRINOGEN PPP-MCNC: 319 MG/DL (ref 200–420)
FIBRINOGEN PPP-MCNC: 484 MG/DL (ref 200–420)
GFR SERPL CREATININE-BSD FRML MDRD: 60 ML/MIN/{1.73_M2}
GFR SERPL CREATININE-BSD FRML MDRD: 80 ML/MIN/{1.73_M2}
GFR SERPL CREATININE-BSD FRML MDRD: 82 ML/MIN/{1.73_M2}
GFR SERPL CREATININE-BSD FRML MDRD: 84 ML/MIN/{1.73_M2}
GLUCOSE BLDC GLUCOMTR-MCNC: 103 MG/DL (ref 70–99)
GLUCOSE BLDC GLUCOMTR-MCNC: 106 MG/DL (ref 70–99)
GLUCOSE BLDC GLUCOMTR-MCNC: 126 MG/DL (ref 70–99)
GLUCOSE BLDC GLUCOMTR-MCNC: 218 MG/DL (ref 70–99)
GLUCOSE BLDC GLUCOMTR-MCNC: 246 MG/DL (ref 70–99)
GLUCOSE BLDC GLUCOMTR-MCNC: 295 MG/DL (ref 70–99)
GLUCOSE SERPL-MCNC: 116 MG/DL (ref 70–99)
GLUCOSE SERPL-MCNC: 194 MG/DL (ref 70–99)
GLUCOSE SERPL-MCNC: 208 MG/DL (ref 70–99)
GLUCOSE SERPL-MCNC: 291 MG/DL (ref 70–99)
HCO3 BLD-SCNC: 11 MMOL/L (ref 21–28)
HCO3 BLD-SCNC: 14 MMOL/L (ref 21–28)
HCO3 BLD-SCNC: 18 MMOL/L (ref 21–28)
HCO3 BLDV-SCNC: 13 MMOL/L (ref 21–28)
HCT VFR BLD AUTO: 25.8 % (ref 40–53)
HCT VFR BLD AUTO: 26.2 % (ref 40–53)
HCT VFR BLD AUTO: 28.7 % (ref 40–53)
HCT VFR BLD AUTO: 40.9 % (ref 40–53)
HGB BLD-MCNC: 14 G/DL (ref 13.3–17.7)
HGB BLD-MCNC: 8.5 G/DL (ref 13.3–17.7)
HGB BLD-MCNC: 8.9 G/DL (ref 13.3–17.7)
HGB BLD-MCNC: 9.5 G/DL (ref 13.3–17.7)
IMM GRANULOCYTES # BLD: 0 10E9/L (ref 0–0.4)
IMM GRANULOCYTES NFR BLD: 0.5 %
INR PPP: 1.26 (ref 0.86–1.14)
INR PPP: 1.5 (ref 0.86–1.14)
INR PPP: 1.82 (ref 0.86–1.14)
INTERPRETATION ECG - MUSE: NORMAL
LACTATE BLD-SCNC: 11.8 MMOL/L (ref 0.7–2)
LACTATE BLD-SCNC: 4 MMOL/L (ref 0.7–2)
LDH SERPL L TO P-CCNC: 376 U/L (ref 85–227)
LYMPHOCYTES # BLD AUTO: 2.2 10E9/L (ref 0.8–5.3)
LYMPHOCYTES NFR BLD AUTO: 25.2 %
MAGNESIUM SERPL-MCNC: 2.1 MG/DL (ref 1.6–2.3)
MCH RBC QN AUTO: 29.5 PG (ref 26.5–33)
MCH RBC QN AUTO: 29.7 PG (ref 26.5–33)
MCH RBC QN AUTO: 29.9 PG (ref 26.5–33)
MCH RBC QN AUTO: 30.6 PG (ref 26.5–33)
MCHC RBC AUTO-ENTMCNC: 32.9 G/DL (ref 31.5–36.5)
MCHC RBC AUTO-ENTMCNC: 33.1 G/DL (ref 31.5–36.5)
MCHC RBC AUTO-ENTMCNC: 34 G/DL (ref 31.5–36.5)
MCHC RBC AUTO-ENTMCNC: 34.2 G/DL (ref 31.5–36.5)
MCV RBC AUTO: 86 FL (ref 78–100)
MCV RBC AUTO: 90 FL (ref 78–100)
MCV RBC AUTO: 90 FL (ref 78–100)
MCV RBC AUTO: 91 FL (ref 78–100)
MONOCYTES # BLD AUTO: 0.9 10E9/L (ref 0–1.3)
MONOCYTES NFR BLD AUTO: 10.2 %
NEUTROPHILS # BLD AUTO: 5.4 10E9/L (ref 1.6–8.3)
NEUTROPHILS NFR BLD AUTO: 62.7 %
NRBC # BLD AUTO: 0 10*3/UL
NRBC BLD AUTO-RTO: 0 /100
NUM BPU REQUESTED: 2
NUM BPU REQUESTED: 2
NUM BPU REQUESTED: 3
NUM BPU REQUESTED: 6
O2/TOTAL GAS SETTING VFR VENT: ABNORMAL %
OXYHGB MFR BLD: 90 % (ref 92–100)
OXYHGB MFR BLD: 98 % (ref 92–100)
OXYHGB MFR BLD: 99 % (ref 92–100)
OXYHGB MFR BLDV: 62 %
PCO2 BLD: 23 MM HG (ref 35–45)
PCO2 BLD: 26 MM HG (ref 35–45)
PCO2 BLD: 43 MM HG (ref 35–45)
PCO2 BLDV: 33 MM HG (ref 40–50)
PH BLD: 7.24 PH (ref 7.35–7.45)
PH BLD: 7.27 PH (ref 7.35–7.45)
PH BLD: 7.33 PH (ref 7.35–7.45)
PH BLDV: 7.2 PH (ref 7.32–7.43)
PHOSPHATE SERPL-MCNC: 3.9 MG/DL (ref 2.5–4.5)
PHOSPHATE SERPL-MCNC: 4.9 MG/DL (ref 2.5–4.5)
PLATELET # BLD AUTO: 208 10E9/L (ref 150–450)
PLATELET # BLD AUTO: 278 10E9/L (ref 150–450)
PLATELET # BLD AUTO: 283 10E9/L (ref 150–450)
PLATELET # BLD AUTO: 292 10E9/L (ref 150–450)
PO2 BLD: 106 MM HG (ref 80–105)
PO2 BLD: 163 MM HG (ref 80–105)
PO2 BLD: 76 MM HG (ref 80–105)
PO2 BLDV: 38 MM HG (ref 25–47)
POTASSIUM SERPL-SCNC: 3.7 MMOL/L (ref 3.4–5.3)
POTASSIUM SERPL-SCNC: 3.8 MMOL/L (ref 3.4–5.3)
POTASSIUM SERPL-SCNC: 3.8 MMOL/L (ref 3.4–5.3)
POTASSIUM SERPL-SCNC: 4.1 MMOL/L (ref 3.4–5.3)
PROCALCITONIN SERPL-MCNC: <0.05 NG/ML
PROT SERPL-MCNC: 4.9 G/DL (ref 6.8–8.8)
PROT SERPL-MCNC: 5.3 G/DL (ref 6.8–8.8)
PROT SERPL-MCNC: 5.6 G/DL (ref 6.8–8.8)
PROT SERPL-MCNC: 6.5 G/DL (ref 6.8–8.8)
RBC # BLD AUTO: 2.84 10E12/L (ref 4.4–5.9)
RBC # BLD AUTO: 2.91 10E12/L (ref 4.4–5.9)
RBC # BLD AUTO: 3.2 10E12/L (ref 4.4–5.9)
RBC # BLD AUTO: 4.75 10E12/L (ref 4.4–5.9)
SODIUM SERPL-SCNC: 143 MMOL/L (ref 133–144)
SODIUM SERPL-SCNC: 145 MMOL/L (ref 133–144)
SODIUM SERPL-SCNC: 146 MMOL/L (ref 133–144)
SODIUM SERPL-SCNC: 149 MMOL/L (ref 133–144)
SPECIMEN EXP DATE BLD: NORMAL
TRANSFUSION STATUS PATIENT QL: NORMAL
UFH PPP CHRO-ACNC: 0.63 IU/ML
UFH PPP CHRO-ACNC: 0.89 IU/ML
WBC # BLD AUTO: 13.3 10E9/L (ref 4–11)
WBC # BLD AUTO: 14 10E9/L (ref 4–11)
WBC # BLD AUTO: 14.4 10E9/L (ref 4–11)
WBC # BLD AUTO: 8.5 10E9/L (ref 4–11)

## 2021-04-28 PROCEDURE — 03HY32Z INSERTION OF MONITORING DEVICE INTO UPPER ARTERY, PERCUTANEOUS APPROACH: ICD-10-PCS | Performed by: INTERNAL MEDICINE

## 2021-04-28 PROCEDURE — 250N000013 HC RX MED GY IP 250 OP 250 PS 637: Performed by: SURGERY

## 2021-04-28 PROCEDURE — 85730 THROMBOPLASTIN TIME PARTIAL: CPT | Performed by: SURGERY

## 2021-04-28 PROCEDURE — 82803 BLOOD GASES ANY COMBINATION: CPT | Performed by: SURGERY

## 2021-04-28 PROCEDURE — 82947 ASSAY GLUCOSE BLOOD QUANT: CPT | Performed by: SURGERY

## 2021-04-28 PROCEDURE — 999N000063 XR CHEST PORT 1 VIEW

## 2021-04-28 PROCEDURE — 86850 RBC ANTIBODY SCREEN: CPT | Performed by: SURGERY

## 2021-04-28 PROCEDURE — 85027 COMPLETE CBC AUTOMATED: CPT | Performed by: HOSPITALIST

## 2021-04-28 PROCEDURE — 36415 COLL VENOUS BLD VENIPUNCTURE: CPT | Performed by: INTERNAL MEDICINE

## 2021-04-28 PROCEDURE — 36620 INSERTION CATHETER ARTERY: CPT | Performed by: INTERNAL MEDICINE

## 2021-04-28 PROCEDURE — 370N000017 HC ANESTHESIA TECHNICAL FEE, PER MIN: Performed by: SURGERY

## 2021-04-28 PROCEDURE — P9041 ALBUMIN (HUMAN),5%, 50ML: HCPCS | Performed by: NURSE ANESTHETIST, CERTIFIED REGISTERED

## 2021-04-28 PROCEDURE — 5A1221Z PERFORMANCE OF CARDIAC OUTPUT, CONTINUOUS: ICD-10-PCS | Performed by: SURGERY

## 2021-04-28 PROCEDURE — 88305 TISSUE EXAM BY PATHOLOGIST: CPT | Mod: 26 | Performed by: PATHOLOGY

## 2021-04-28 PROCEDURE — 82248 BILIRUBIN DIRECT: CPT | Performed by: INTERNAL MEDICINE

## 2021-04-28 PROCEDURE — 250N000009 HC RX 250: Performed by: NURSE ANESTHETIST, CERTIFIED REGISTERED

## 2021-04-28 PROCEDURE — P9041 ALBUMIN (HUMAN),5%, 50ML: HCPCS | Performed by: SURGERY

## 2021-04-28 PROCEDURE — 82805 BLOOD GASES W/O2 SATURATION: CPT | Performed by: SURGERY

## 2021-04-28 PROCEDURE — 83605 ASSAY OF LACTIC ACID: CPT | Performed by: SURGERY

## 2021-04-28 PROCEDURE — 85384 FIBRINOGEN ACTIVITY: CPT | Performed by: HOSPITALIST

## 2021-04-28 PROCEDURE — 82247 BILIRUBIN TOTAL: CPT | Performed by: INTERNAL MEDICINE

## 2021-04-28 PROCEDURE — 250N000009 HC RX 250: Performed by: ANESTHESIOLOGY

## 2021-04-28 PROCEDURE — 999N000157 HC STATISTIC RCP TIME EA 10 MIN

## 2021-04-28 PROCEDURE — 80053 COMPREHEN METABOLIC PANEL: CPT | Performed by: SURGERY

## 2021-04-28 PROCEDURE — 83615 LACTATE (LD) (LDH) ENZYME: CPT | Performed by: INTERNAL MEDICINE

## 2021-04-28 PROCEDURE — 250N000009 HC RX 250: Performed by: SURGERY

## 2021-04-28 PROCEDURE — 80053 COMPREHEN METABOLIC PANEL: CPT | Performed by: HOSPITALIST

## 2021-04-28 PROCEDURE — 250N000011 HC RX IP 250 OP 636: Performed by: NURSE ANESTHETIST, CERTIFIED REGISTERED

## 2021-04-28 PROCEDURE — 02CQ0ZZ EXTIRPATION OF MATTER FROM RIGHT PULMONARY ARTERY, OPEN APPROACH: ICD-10-PCS | Performed by: SURGERY

## 2021-04-28 PROCEDURE — 83735 ASSAY OF MAGNESIUM: CPT | Performed by: SURGERY

## 2021-04-28 PROCEDURE — 999N001063 HC STATISTIC THAWING COMPONENT: Performed by: HOSPITALIST

## 2021-04-28 PROCEDURE — 82330 ASSAY OF CALCIUM: CPT | Performed by: SURGERY

## 2021-04-28 PROCEDURE — P9041 ALBUMIN (HUMAN),5%, 50ML: HCPCS

## 2021-04-28 PROCEDURE — 200N000001 HC R&B ICU

## 2021-04-28 PROCEDURE — 4A133R1 MONITORING OF ARTERIAL SATURATION, PERIPHERAL, PERCUTANEOUS APPROACH: ICD-10-PCS | Performed by: INTERNAL MEDICINE

## 2021-04-28 PROCEDURE — 360N000079 HC SURGERY LEVEL 6, PER MIN: Performed by: SURGERY

## 2021-04-28 PROCEDURE — 258N000003 HC RX IP 258 OP 636: Performed by: INTERNAL MEDICINE

## 2021-04-28 PROCEDURE — 250N000011 HC RX IP 250 OP 636: Performed by: SURGERY

## 2021-04-28 PROCEDURE — 5A1955Z RESPIRATORY VENTILATION, GREATER THAN 96 CONSECUTIVE HOURS: ICD-10-PCS | Performed by: INTERNAL MEDICINE

## 2021-04-28 PROCEDURE — 84100 ASSAY OF PHOSPHORUS: CPT | Performed by: SURGERY

## 2021-04-28 PROCEDURE — 999N001017 HC STATISTIC GLUCOSE BY METER IP

## 2021-04-28 PROCEDURE — 250N000012 HC RX MED GY IP 250 OP 636 PS 637: Performed by: SURGERY

## 2021-04-28 PROCEDURE — 85520 HEPARIN ASSAY: CPT | Performed by: HOSPITALIST

## 2021-04-28 PROCEDURE — 94002 VENT MGMT INPAT INIT DAY: CPT

## 2021-04-28 PROCEDURE — 36415 COLL VENOUS BLD VENIPUNCTURE: CPT | Performed by: HOSPITALIST

## 2021-04-28 PROCEDURE — 250N000011 HC RX IP 250 OP 636: Performed by: HOSPITALIST

## 2021-04-28 PROCEDURE — 80069 RENAL FUNCTION PANEL: CPT | Performed by: INTERNAL MEDICINE

## 2021-04-28 PROCEDURE — 84450 TRANSFERASE (AST) (SGOT): CPT | Performed by: INTERNAL MEDICINE

## 2021-04-28 PROCEDURE — 250N000011 HC RX IP 250 OP 636: Performed by: INTERNAL MEDICINE

## 2021-04-28 PROCEDURE — 250N000009 HC RX 250

## 2021-04-28 PROCEDURE — 93005 ELECTROCARDIOGRAM TRACING: CPT

## 2021-04-28 PROCEDURE — 272N000001 HC OR GENERAL SUPPLY STERILE: Performed by: SURGERY

## 2021-04-28 PROCEDURE — 84155 ASSAY OF PROTEIN SERUM: CPT | Performed by: INTERNAL MEDICINE

## 2021-04-28 PROCEDURE — 258N000003 HC RX IP 258 OP 636: Performed by: SURGERY

## 2021-04-28 PROCEDURE — 85027 COMPLETE CBC AUTOMATED: CPT | Performed by: SURGERY

## 2021-04-28 PROCEDURE — 84075 ASSAY ALKALINE PHOSPHATASE: CPT | Performed by: INTERNAL MEDICINE

## 2021-04-28 PROCEDURE — 02CR0ZZ EXTIRPATION OF MATTER FROM LEFT PULMONARY ARTERY, OPEN APPROACH: ICD-10-PCS | Performed by: SURGERY

## 2021-04-28 PROCEDURE — 82805 BLOOD GASES W/O2 SATURATION: CPT | Performed by: INTERNAL MEDICINE

## 2021-04-28 PROCEDURE — P9037 PLATE PHERES LEUKOREDU IRRAD: HCPCS | Performed by: HOSPITALIST

## 2021-04-28 PROCEDURE — 85610 PROTHROMBIN TIME: CPT | Performed by: SURGERY

## 2021-04-28 PROCEDURE — 250N000025 HC SEVOFLURANE, PER MIN: Performed by: SURGERY

## 2021-04-28 PROCEDURE — 258N000003 HC RX IP 258 OP 636: Performed by: NURSE ANESTHETIST, CERTIFIED REGISTERED

## 2021-04-28 PROCEDURE — C1763 CONN TISS, NON-HUMAN: HCPCS | Performed by: SURGERY

## 2021-04-28 PROCEDURE — 250N000011 HC RX IP 250 OP 636

## 2021-04-28 PROCEDURE — 85610 PROTHROMBIN TIME: CPT | Performed by: HOSPITALIST

## 2021-04-28 PROCEDURE — 99291 CRITICAL CARE FIRST HOUR: CPT | Mod: 25 | Performed by: INTERNAL MEDICINE

## 2021-04-28 PROCEDURE — 250N000009 HC RX 250: Performed by: INTERNAL MEDICINE

## 2021-04-28 PROCEDURE — P9012 CRYOPRECIPITATE EACH UNIT: HCPCS | Performed by: HOSPITALIST

## 2021-04-28 PROCEDURE — 86901 BLOOD TYPING SEROLOGIC RH(D): CPT | Performed by: SURGERY

## 2021-04-28 PROCEDURE — 99222 1ST HOSP IP/OBS MODERATE 55: CPT | Performed by: NURSE PRACTITIONER

## 2021-04-28 PROCEDURE — 99291 CRITICAL CARE FIRST HOUR: CPT | Performed by: INTERNAL MEDICINE

## 2021-04-28 PROCEDURE — 85025 COMPLETE CBC W/AUTO DIFF WBC: CPT | Performed by: INTERNAL MEDICINE

## 2021-04-28 PROCEDURE — 85730 THROMBOPLASTIN TIME PARTIAL: CPT | Performed by: HOSPITALIST

## 2021-04-28 PROCEDURE — 84132 ASSAY OF SERUM POTASSIUM: CPT | Performed by: SURGERY

## 2021-04-28 PROCEDURE — 4A133B1 MONITORING OF ARTERIAL PRESSURE, PERIPHERAL, PERCUTANEOUS APPROACH: ICD-10-PCS | Performed by: INTERNAL MEDICINE

## 2021-04-28 PROCEDURE — 86923 COMPATIBILITY TEST ELECTRIC: CPT | Performed by: SURGERY

## 2021-04-28 PROCEDURE — P9016 RBC LEUKOCYTES REDUCED: HCPCS | Performed by: SURGERY

## 2021-04-28 PROCEDURE — 88305 TISSUE EXAM BY PATHOLOGIST: CPT | Mod: TC | Performed by: SURGERY

## 2021-04-28 PROCEDURE — 84295 ASSAY OF SERUM SODIUM: CPT | Performed by: SURGERY

## 2021-04-28 PROCEDURE — 250N000011 HC RX IP 250 OP 636: Performed by: ANESTHESIOLOGY

## 2021-04-28 PROCEDURE — 99222 1ST HOSP IP/OBS MODERATE 55: CPT | Performed by: INTERNAL MEDICINE

## 2021-04-28 PROCEDURE — P9059 PLASMA, FRZ BETWEEN 8-24HOUR: HCPCS | Performed by: HOSPITALIST

## 2021-04-28 PROCEDURE — 94640 AIRWAY INHALATION TREATMENT: CPT

## 2021-04-28 PROCEDURE — 86900 BLOOD TYPING SEROLOGIC ABO: CPT | Performed by: SURGERY

## 2021-04-28 DEVICE — GRAFT PERI-GUARD PERICARDIUM 6X8CM PC0608SNBIO: Type: IMPLANTABLE DEVICE | Site: HEART | Status: FUNCTIONAL

## 2021-04-28 RX ORDER — LIDOCAINE 40 MG/G
CREAM TOPICAL
Status: CANCELLED | OUTPATIENT
Start: 2021-04-28

## 2021-04-28 RX ORDER — DEXTROSE MONOHYDRATE 25 G/50ML
25-50 INJECTION, SOLUTION INTRAVENOUS
Status: DISCONTINUED | OUTPATIENT
Start: 2021-04-28 | End: 2021-05-08 | Stop reason: HOSPADM

## 2021-04-28 RX ORDER — MILRINONE LACTATE 0.2 MG/ML
INJECTION, SOLUTION INTRAVENOUS CONTINUOUS PRN
Status: DISCONTINUED | OUTPATIENT
Start: 2021-04-28 | End: 2021-04-28

## 2021-04-28 RX ORDER — EPINEPHRINE IN 0.9 % SOD CHLOR 5 MG/250ML
.01-.1 PLASTIC BAG, INJECTION (ML) INTRAVENOUS CONTINUOUS PRN
Status: DISCONTINUED | OUTPATIENT
Start: 2021-04-28 | End: 2021-04-28

## 2021-04-28 RX ORDER — PROPOFOL 10 MG/ML
5-75 INJECTION, EMULSION INTRAVENOUS CONTINUOUS
Status: DISCONTINUED | OUTPATIENT
Start: 2021-04-28 | End: 2021-05-01

## 2021-04-28 RX ORDER — CALCIUM CHLORIDE 100 MG/ML
INJECTION INTRAVENOUS; INTRAVENTRICULAR PRN
Status: DISCONTINUED | OUTPATIENT
Start: 2021-04-28 | End: 2021-04-28

## 2021-04-28 RX ORDER — SODIUM CHLORIDE 9 MG/ML
INJECTION, SOLUTION INTRAVENOUS CONTINUOUS
Status: DISCONTINUED | OUTPATIENT
Start: 2021-04-28 | End: 2021-04-28

## 2021-04-28 RX ORDER — SODIUM CHLORIDE, SODIUM LACTATE, POTASSIUM CHLORIDE, CALCIUM CHLORIDE 600; 310; 30; 20 MG/100ML; MG/100ML; MG/100ML; MG/100ML
INJECTION, SOLUTION INTRAVENOUS CONTINUOUS PRN
Status: DISCONTINUED | OUTPATIENT
Start: 2021-04-28 | End: 2021-04-28

## 2021-04-28 RX ORDER — FENTANYL CITRATE 50 UG/ML
INJECTION, SOLUTION INTRAMUSCULAR; INTRAVENOUS PRN
Status: DISCONTINUED | OUTPATIENT
Start: 2021-04-28 | End: 2021-04-28

## 2021-04-28 RX ORDER — ALBUMIN, HUMAN INJ 5% 5 %
SOLUTION INTRAVENOUS CONTINUOUS PRN
Status: DISCONTINUED | OUTPATIENT
Start: 2021-04-28 | End: 2021-04-28

## 2021-04-28 RX ORDER — HYDRALAZINE HYDROCHLORIDE 20 MG/ML
10 INJECTION INTRAMUSCULAR; INTRAVENOUS EVERY 30 MIN PRN
Status: DISCONTINUED | OUTPATIENT
Start: 2021-04-28 | End: 2021-05-08 | Stop reason: HOSPADM

## 2021-04-28 RX ORDER — OXYCODONE HYDROCHLORIDE 5 MG/1
5 TABLET ORAL EVERY 4 HOURS PRN
Status: DISCONTINUED | OUTPATIENT
Start: 2021-04-28 | End: 2021-05-02

## 2021-04-28 RX ORDER — PHENYLEPHRINE HCL IN 0.9% NACL 50MG/250ML
.1-4 PLASTIC BAG, INJECTION (ML) INTRAVENOUS CONTINUOUS PRN
Status: DISCONTINUED | OUTPATIENT
Start: 2021-04-28 | End: 2021-05-01

## 2021-04-28 RX ORDER — ALBUTEROL SULFATE 0.83 MG/ML
2.5 SOLUTION RESPIRATORY (INHALATION) EVERY 4 HOURS PRN
Status: DISCONTINUED | OUTPATIENT
Start: 2021-04-28 | End: 2021-05-08 | Stop reason: HOSPADM

## 2021-04-28 RX ORDER — ACETAMINOPHEN 325 MG/1
650 TABLET ORAL EVERY 4 HOURS PRN
Status: DISCONTINUED | OUTPATIENT
Start: 2021-05-01 | End: 2021-05-08 | Stop reason: HOSPADM

## 2021-04-28 RX ORDER — CHLORHEXIDINE GLUCONATE ORAL RINSE 1.2 MG/ML
10 SOLUTION DENTAL ONCE
Status: CANCELLED | OUTPATIENT
Start: 2021-04-28 | End: 2021-04-28

## 2021-04-28 RX ORDER — HYDROMORPHONE HYDROCHLORIDE 1 MG/ML
0.4 INJECTION, SOLUTION INTRAMUSCULAR; INTRAVENOUS; SUBCUTANEOUS
Status: DISCONTINUED | OUTPATIENT
Start: 2021-04-28 | End: 2021-05-08 | Stop reason: HOSPADM

## 2021-04-28 RX ORDER — PROTAMINE SULFATE 10 MG/ML
INJECTION, SOLUTION INTRAVENOUS PRN
Status: DISCONTINUED | OUTPATIENT
Start: 2021-04-28 | End: 2021-04-28

## 2021-04-28 RX ORDER — LIDOCAINE HYDROCHLORIDE 20 MG/ML
INJECTION, SOLUTION INFILTRATION; PERINEURAL PRN
Status: DISCONTINUED | OUTPATIENT
Start: 2021-04-28 | End: 2021-04-28

## 2021-04-28 RX ORDER — VECURONIUM BROMIDE 1 MG/ML
INJECTION, POWDER, LYOPHILIZED, FOR SOLUTION INTRAVENOUS PRN
Status: DISCONTINUED | OUTPATIENT
Start: 2021-04-28 | End: 2021-04-28

## 2021-04-28 RX ORDER — MILRINONE LACTATE 0.2 MG/ML
0.25-0.75 INJECTION, SOLUTION INTRAVENOUS CONTINUOUS
Status: DISCONTINUED | OUTPATIENT
Start: 2021-04-28 | End: 2021-05-01

## 2021-04-28 RX ORDER — ASPIRIN 81 MG/1
162 TABLET, CHEWABLE ORAL DAILY
Status: DISCONTINUED | OUTPATIENT
Start: 2021-04-29 | End: 2021-05-04

## 2021-04-28 RX ORDER — EPINEPHRINE IN 0.9 % SOD CHLOR 5 MG/250ML
.03-.3 PLASTIC BAG, INJECTION (ML) INTRAVENOUS CONTINUOUS
Status: DISCONTINUED | OUTPATIENT
Start: 2021-04-28 | End: 2021-05-01

## 2021-04-28 RX ORDER — MILRINONE LACTATE 0.2 MG/ML
.125-.5 INJECTION, SOLUTION INTRAVENOUS CONTINUOUS PRN
Status: DISCONTINUED | OUTPATIENT
Start: 2021-04-28 | End: 2021-04-28

## 2021-04-28 RX ORDER — ONDANSETRON 4 MG/1
4 TABLET, ORALLY DISINTEGRATING ORAL EVERY 6 HOURS PRN
Status: DISCONTINUED | OUTPATIENT
Start: 2021-04-28 | End: 2021-05-08 | Stop reason: HOSPADM

## 2021-04-28 RX ORDER — LIDOCAINE 40 MG/G
CREAM TOPICAL
Status: DISCONTINUED | OUTPATIENT
Start: 2021-04-28 | End: 2021-05-08 | Stop reason: HOSPADM

## 2021-04-28 RX ORDER — DEXTROSE MONOHYDRATE, SODIUM CHLORIDE, AND POTASSIUM CHLORIDE 50; 1.49; 4.5 G/1000ML; G/1000ML; G/1000ML
INJECTION, SOLUTION INTRAVENOUS CONTINUOUS
Status: DISCONTINUED | OUTPATIENT
Start: 2021-04-28 | End: 2021-05-07 | Stop reason: CLARIF

## 2021-04-28 RX ORDER — SODIUM CHLORIDE 9 MG/ML
INJECTION, SOLUTION INTRAVENOUS CONTINUOUS
Status: DISCONTINUED | OUTPATIENT
Start: 2021-04-27 | End: 2021-05-04

## 2021-04-28 RX ORDER — NALOXONE HYDROCHLORIDE 0.4 MG/ML
0.2 INJECTION, SOLUTION INTRAMUSCULAR; INTRAVENOUS; SUBCUTANEOUS
Status: DISCONTINUED | OUTPATIENT
Start: 2021-04-28 | End: 2021-05-08 | Stop reason: HOSPADM

## 2021-04-28 RX ORDER — CHLORHEXIDINE GLUCONATE ORAL RINSE 1.2 MG/ML
15 SOLUTION DENTAL EVERY 12 HOURS
Status: DISCONTINUED | OUTPATIENT
Start: 2021-04-28 | End: 2021-05-08

## 2021-04-28 RX ORDER — AMOXICILLIN 250 MG
1 CAPSULE ORAL 2 TIMES DAILY
Status: DISCONTINUED | OUTPATIENT
Start: 2021-04-28 | End: 2021-05-02

## 2021-04-28 RX ORDER — NICOTINE POLACRILEX 4 MG
15-30 LOZENGE BUCCAL
Status: DISCONTINUED | OUTPATIENT
Start: 2021-04-28 | End: 2021-05-08 | Stop reason: HOSPADM

## 2021-04-28 RX ORDER — CEFAZOLIN SODIUM 1 G/3ML
1 INJECTION, POWDER, FOR SOLUTION INTRAMUSCULAR; INTRAVENOUS SEE ADMIN INSTRUCTIONS
Status: DISCONTINUED | OUTPATIENT
Start: 2021-04-28 | End: 2021-04-28 | Stop reason: HOSPADM

## 2021-04-28 RX ORDER — HYDROMORPHONE HCL IN WATER/PF 6 MG/30 ML
0.2 PATIENT CONTROLLED ANALGESIA SYRINGE INTRAVENOUS
Status: DISCONTINUED | OUTPATIENT
Start: 2021-04-28 | End: 2021-05-08 | Stop reason: HOSPADM

## 2021-04-28 RX ORDER — OXYCODONE HYDROCHLORIDE 5 MG/1
10 TABLET ORAL EVERY 4 HOURS PRN
Status: DISCONTINUED | OUTPATIENT
Start: 2021-04-28 | End: 2021-05-02

## 2021-04-28 RX ORDER — NICOTINE POLACRILEX 4 MG
15-30 LOZENGE BUCCAL
Status: DISCONTINUED | OUTPATIENT
Start: 2021-04-28 | End: 2021-04-28

## 2021-04-28 RX ORDER — LIDOCAINE HYDROCHLORIDE 10 MG/ML
INJECTION, SOLUTION INFILTRATION; PERINEURAL
Status: DISCONTINUED | OUTPATIENT
Start: 2021-04-28 | End: 2021-04-28

## 2021-04-28 RX ORDER — CEFAZOLIN SODIUM 2 G/100ML
INJECTION, SOLUTION INTRAVENOUS PRN
Status: DISCONTINUED | OUTPATIENT
Start: 2021-04-28 | End: 2021-04-28

## 2021-04-28 RX ORDER — NOREPINEPHRINE BITARTRATE 0.06 MG/ML
.01-.4 INJECTION, SOLUTION INTRAVENOUS CONTINUOUS PRN
Status: DISCONTINUED | OUTPATIENT
Start: 2021-04-28 | End: 2021-05-01

## 2021-04-28 RX ORDER — SODIUM CHLORIDE 9 MG/ML
INJECTION, SOLUTION INTRAVENOUS CONTINUOUS PRN
Status: DISCONTINUED | OUTPATIENT
Start: 2021-04-28 | End: 2021-04-28

## 2021-04-28 RX ORDER — NALOXONE HYDROCHLORIDE 0.4 MG/ML
0.4 INJECTION, SOLUTION INTRAMUSCULAR; INTRAVENOUS; SUBCUTANEOUS
Status: DISCONTINUED | OUTPATIENT
Start: 2021-04-28 | End: 2021-05-08 | Stop reason: HOSPADM

## 2021-04-28 RX ORDER — CEFAZOLIN SODIUM 2 G/100ML
2 INJECTION, SOLUTION INTRAVENOUS EVERY 8 HOURS
Status: COMPLETED | OUTPATIENT
Start: 2021-04-29 | End: 2021-04-29

## 2021-04-28 RX ORDER — PANTOPRAZOLE SODIUM 40 MG/1
40 TABLET, DELAYED RELEASE ORAL DAILY
Status: DISCONTINUED | OUTPATIENT
Start: 2021-04-28 | End: 2021-04-30

## 2021-04-28 RX ORDER — MUPIROCIN 20 MG/G
0.5 OINTMENT TOPICAL 2 TIMES DAILY
Status: DISCONTINUED | OUTPATIENT
Start: 2021-04-28 | End: 2021-05-01 | Stop reason: CLARIF

## 2021-04-28 RX ORDER — FENTANYL CITRATE 50 UG/ML
25-50 INJECTION, SOLUTION INTRAMUSCULAR; INTRAVENOUS
Status: DISCONTINUED | OUTPATIENT
Start: 2021-04-28 | End: 2021-05-05

## 2021-04-28 RX ORDER — HYDROMORPHONE HYDROCHLORIDE 1 MG/ML
.3-.5 INJECTION, SOLUTION INTRAMUSCULAR; INTRAVENOUS; SUBCUTANEOUS EVERY 4 HOURS PRN
Status: DISCONTINUED | OUTPATIENT
Start: 2021-04-28 | End: 2021-04-28

## 2021-04-28 RX ORDER — PROPOFOL 10 MG/ML
10-20 INJECTION, EMULSION INTRAVENOUS EVERY 30 MIN PRN
Status: DISCONTINUED | OUTPATIENT
Start: 2021-04-28 | End: 2021-05-01

## 2021-04-28 RX ORDER — DEXMEDETOMIDINE HYDROCHLORIDE 4 UG/ML
.2-.7 INJECTION, SOLUTION INTRAVENOUS CONTINUOUS PRN
Status: DISCONTINUED | OUTPATIENT
Start: 2021-04-28 | End: 2021-05-01

## 2021-04-28 RX ORDER — GABAPENTIN 100 MG/1
100 CAPSULE ORAL AT BEDTIME
Status: DISCONTINUED | OUTPATIENT
Start: 2021-04-28 | End: 2021-05-02

## 2021-04-28 RX ORDER — ALBUMIN, HUMAN INJ 5% 5 %
500-1000 SOLUTION INTRAVENOUS
Status: COMPLETED | OUTPATIENT
Start: 2021-04-28 | End: 2021-04-28

## 2021-04-28 RX ORDER — METHOCARBAMOL 500 MG/1
500 TABLET, FILM COATED ORAL EVERY 6 HOURS PRN
Status: DISCONTINUED | OUTPATIENT
Start: 2021-04-28 | End: 2021-05-02

## 2021-04-28 RX ORDER — POTASSIUM CHLORIDE 29.8 MG/ML
20 INJECTION INTRAVENOUS ONCE
Status: COMPLETED | OUTPATIENT
Start: 2021-04-28 | End: 2021-04-28

## 2021-04-28 RX ORDER — DEXTROSE MONOHYDRATE 100 MG/ML
INJECTION, SOLUTION INTRAVENOUS CONTINUOUS PRN
Status: DISCONTINUED | OUTPATIENT
Start: 2021-04-28 | End: 2021-05-07 | Stop reason: CLARIF

## 2021-04-28 RX ORDER — POLYETHYLENE GLYCOL 3350 17 G/17G
17 POWDER, FOR SOLUTION ORAL DAILY
Status: DISCONTINUED | OUTPATIENT
Start: 2021-04-29 | End: 2021-05-01 | Stop reason: DRUGHIGH

## 2021-04-28 RX ORDER — FAMOTIDINE 20 MG/1
20 TABLET, FILM COATED ORAL
Status: CANCELLED | OUTPATIENT
Start: 2021-04-28

## 2021-04-28 RX ORDER — CEFAZOLIN SODIUM 2 G/100ML
2 INJECTION, SOLUTION INTRAVENOUS
Status: CANCELLED | OUTPATIENT
Start: 2021-04-28

## 2021-04-28 RX ORDER — LORAZEPAM 2 MG/ML
.5-1 INJECTION INTRAMUSCULAR EVERY 4 HOURS PRN
Status: DISCONTINUED | OUTPATIENT
Start: 2021-04-28 | End: 2021-05-08 | Stop reason: HOSPADM

## 2021-04-28 RX ORDER — HEPARIN SODIUM 1000 [USP'U]/ML
INJECTION, SOLUTION INTRAVENOUS; SUBCUTANEOUS PRN
Status: DISCONTINUED | OUTPATIENT
Start: 2021-04-28 | End: 2021-04-28

## 2021-04-28 RX ORDER — CEFAZOLIN SODIUM 1 G/3ML
INJECTION, POWDER, FOR SOLUTION INTRAMUSCULAR; INTRAVENOUS PRN
Status: DISCONTINUED | OUTPATIENT
Start: 2021-04-28 | End: 2021-04-28

## 2021-04-28 RX ORDER — ALBUMIN, HUMAN INJ 5% 5 %
SOLUTION INTRAVENOUS
Status: COMPLETED
Start: 2021-04-28 | End: 2021-04-28

## 2021-04-28 RX ORDER — DEXTROSE MONOHYDRATE 25 G/50ML
25-50 INJECTION, SOLUTION INTRAVENOUS
Status: DISCONTINUED | OUTPATIENT
Start: 2021-04-28 | End: 2021-04-28

## 2021-04-28 RX ORDER — ONDANSETRON 2 MG/ML
4 INJECTION INTRAMUSCULAR; INTRAVENOUS EVERY 6 HOURS PRN
Status: DISCONTINUED | OUTPATIENT
Start: 2021-04-28 | End: 2021-05-08 | Stop reason: HOSPADM

## 2021-04-28 RX ORDER — PROPOFOL 10 MG/ML
INJECTION, EMULSION INTRAVENOUS PRN
Status: DISCONTINUED | OUTPATIENT
Start: 2021-04-28 | End: 2021-04-28

## 2021-04-28 RX ORDER — ACETAMINOPHEN 325 MG/1
975 TABLET ORAL EVERY 8 HOURS
Status: DISPENSED | OUTPATIENT
Start: 2021-04-28 | End: 2021-05-01

## 2021-04-28 RX ORDER — PROPOFOL 10 MG/ML
INJECTION, EMULSION INTRAVENOUS CONTINUOUS PRN
Status: DISCONTINUED | OUTPATIENT
Start: 2021-04-28 | End: 2021-04-28

## 2021-04-28 RX ORDER — BISACODYL 10 MG
10 SUPPOSITORY, RECTAL RECTAL DAILY PRN
Status: DISCONTINUED | OUTPATIENT
Start: 2021-04-28 | End: 2021-05-08 | Stop reason: HOSPADM

## 2021-04-28 RX ORDER — DOCUSATE SODIUM 100 MG/1
100 CAPSULE, LIQUID FILLED ORAL 2 TIMES DAILY
Status: DISCONTINUED | OUTPATIENT
Start: 2021-04-28 | End: 2021-05-02

## 2021-04-28 RX ADMIN — ALBUTEROL SULFATE 2.5 MG: 2.5 SOLUTION RESPIRATORY (INHALATION) at 01:25

## 2021-04-28 RX ADMIN — ALBUMIN HUMAN 250 ML: 0.05 INJECTION, SOLUTION INTRAVENOUS at 19:47

## 2021-04-28 RX ADMIN — SODIUM CHLORIDE: 9 INJECTION, SOLUTION INTRAVENOUS at 08:59

## 2021-04-28 RX ADMIN — CALCIUM CHLORIDE 200 MG: 100 INJECTION, SOLUTION INTRAVENOUS at 17:12

## 2021-04-28 RX ADMIN — FENTANYL CITRATE 50 MCG: 50 INJECTION, SOLUTION INTRAMUSCULAR; INTRAVENOUS at 17:56

## 2021-04-28 RX ADMIN — HYDROMORPHONE HYDROCHLORIDE 0.5 MG: 1 INJECTION, SOLUTION INTRAMUSCULAR; INTRAVENOUS; SUBCUTANEOUS at 02:34

## 2021-04-28 RX ADMIN — MIDAZOLAM HYDROCHLORIDE 2 MG: 1 INJECTION, SOLUTION INTRAMUSCULAR; INTRAVENOUS at 16:24

## 2021-04-28 RX ADMIN — EPINEPHRINE 10 MCG: 1 INJECTION INTRAMUSCULAR; INTRAVENOUS; SUBCUTANEOUS at 14:49

## 2021-04-28 RX ADMIN — SODIUM CHLORIDE 2.5 UNITS/HR: 9 INJECTION, SOLUTION INTRAVENOUS at 20:42

## 2021-04-28 RX ADMIN — NOREPINEPHRINE BITARTRATE 32 MCG: 1 INJECTION, SOLUTION, CONCENTRATE INTRAVENOUS at 14:54

## 2021-04-28 RX ADMIN — EPINEPHRINE 20 MCG: 1 INJECTION INTRAMUSCULAR; INTRAVENOUS; SUBCUTANEOUS at 15:07

## 2021-04-28 RX ADMIN — EPINEPHRINE 10 MCG: 1 INJECTION INTRAMUSCULAR; INTRAVENOUS; SUBCUTANEOUS at 14:55

## 2021-04-28 RX ADMIN — FENTANYL CITRATE 100 MCG: 50 INJECTION, SOLUTION INTRAMUSCULAR; INTRAVENOUS at 18:07

## 2021-04-28 RX ADMIN — EPINEPHRINE 10 MCG: 1 INJECTION INTRAMUSCULAR; INTRAVENOUS; SUBCUTANEOUS at 15:03

## 2021-04-28 RX ADMIN — NOREPINEPHRINE BITARTRATE 32 MCG: 1 INJECTION, SOLUTION, CONCENTRATE INTRAVENOUS at 14:55

## 2021-04-28 RX ADMIN — SODIUM CHLORIDE: 9 INJECTION, SOLUTION INTRAVENOUS at 17:41

## 2021-04-28 RX ADMIN — PROPOFOL 40 MCG/KG/MIN: 10 INJECTION, EMULSION INTRAVENOUS at 20:30

## 2021-04-28 RX ADMIN — LIDOCAINE HYDROCHLORIDE 100 MG: 20 INJECTION, SOLUTION INFILTRATION; PERINEURAL at 14:43

## 2021-04-28 RX ADMIN — CALCIUM CHLORIDE 200 MG: 100 INJECTION, SOLUTION INTRAVENOUS at 17:01

## 2021-04-28 RX ADMIN — PROPOFOL 50 MG: 10 INJECTION, EMULSION INTRAVENOUS at 14:46

## 2021-04-28 RX ADMIN — EPOPROSTENOL 20 NG/KG/MIN: 1.5 INJECTION, POWDER, LYOPHILIZED, FOR SOLUTION INTRAVENOUS at 20:52

## 2021-04-28 RX ADMIN — FENTANYL CITRATE 100 MCG: 50 INJECTION, SOLUTION INTRAMUSCULAR; INTRAVENOUS at 18:20

## 2021-04-28 RX ADMIN — MIDAZOLAM HYDROCHLORIDE 2 MG: 1 INJECTION, SOLUTION INTRAMUSCULAR; INTRAVENOUS at 17:19

## 2021-04-28 RX ADMIN — NOREPINEPHRINE BITARTRATE 64 MCG: 1 INJECTION, SOLUTION, CONCENTRATE INTRAVENOUS at 14:57

## 2021-04-28 RX ADMIN — SODIUM CHLORIDE, SODIUM LACTATE, POTASSIUM CHLORIDE, CALCIUM CHLORIDE: 600; 310; 30; 20 INJECTION, SOLUTION INTRAVENOUS at 14:54

## 2021-04-28 RX ADMIN — EPINEPHRINE 10 MCG: 1 INJECTION INTRAMUSCULAR; INTRAVENOUS; SUBCUTANEOUS at 14:52

## 2021-04-28 RX ADMIN — EPINEPHRINE 10 MCG: 1 INJECTION INTRAMUSCULAR; INTRAVENOUS; SUBCUTANEOUS at 14:59

## 2021-04-28 RX ADMIN — NOREPINEPHRINE BITARTRATE 32 MCG: 1 INJECTION, SOLUTION, CONCENTRATE INTRAVENOUS at 15:07

## 2021-04-28 RX ADMIN — NOREPINEPHRINE BITARTRATE 64 MCG: 1 INJECTION, SOLUTION, CONCENTRATE INTRAVENOUS at 14:51

## 2021-04-28 RX ADMIN — VECURONIUM BROMIDE 10 MG: 1 INJECTION, POWDER, LYOPHILIZED, FOR SOLUTION INTRAVENOUS at 15:09

## 2021-04-28 RX ADMIN — DEXMEDETOMIDINE HYDROCHLORIDE 8 MCG: 100 INJECTION, SOLUTION INTRAVENOUS at 14:43

## 2021-04-28 RX ADMIN — MILRINONE LACTATE IN DEXTROSE 0.38 MCG/KG/MIN: 200 INJECTION, SOLUTION INTRAVENOUS at 16:21

## 2021-04-28 RX ADMIN — PROPOFOL 50 MCG/KG/MIN: 10 INJECTION, EMULSION INTRAVENOUS at 17:43

## 2021-04-28 RX ADMIN — HEPARIN SODIUM 1400 UNITS/HR: 10000 INJECTION, SOLUTION INTRAVENOUS at 02:37

## 2021-04-28 RX ADMIN — NOREPINEPHRINE BITARTRATE 32 MCG: 1 INJECTION, SOLUTION, CONCENTRATE INTRAVENOUS at 15:04

## 2021-04-28 RX ADMIN — EPINEPHRINE 10 MCG: 1 INJECTION INTRAMUSCULAR; INTRAVENOUS; SUBCUTANEOUS at 14:56

## 2021-04-28 RX ADMIN — EPINEPHRINE 0.03 MCG/KG/MIN: 1 INJECTION INTRAMUSCULAR; INTRAVENOUS; SUBCUTANEOUS at 15:11

## 2021-04-28 RX ADMIN — SODIUM CHLORIDE: 9 INJECTION, SOLUTION INTRAVENOUS at 14:23

## 2021-04-28 RX ADMIN — EPINEPHRINE 10 MCG: 1 INJECTION INTRAMUSCULAR; INTRAVENOUS; SUBCUTANEOUS at 15:05

## 2021-04-28 RX ADMIN — SODIUM CHLORIDE, POTASSIUM CHLORIDE, SODIUM LACTATE AND CALCIUM CHLORIDE: 600; 310; 30; 20 INJECTION, SOLUTION INTRAVENOUS at 15:00

## 2021-04-28 RX ADMIN — CEFAZOLIN SODIUM 2 G: 2 INJECTION, SOLUTION INTRAVENOUS at 15:13

## 2021-04-28 RX ADMIN — PROPOFOL 30 MG: 10 INJECTION, EMULSION INTRAVENOUS at 14:47

## 2021-04-28 RX ADMIN — NOREPINEPHRINE BITARTRATE 32 MCG: 1 INJECTION, SOLUTION, CONCENTRATE INTRAVENOUS at 15:05

## 2021-04-28 RX ADMIN — POTASSIUM CHLORIDE, DEXTROSE MONOHYDRATE AND SODIUM CHLORIDE: 150; 5; 450 INJECTION, SOLUTION INTRAVENOUS at 20:28

## 2021-04-28 RX ADMIN — PROTAMINE SULFATE 300 MG: 10 INJECTION, SOLUTION INTRAVENOUS at 16:37

## 2021-04-28 RX ADMIN — NOREPINEPHRINE BITARTRATE 64 MCG: 1 INJECTION, SOLUTION, CONCENTRATE INTRAVENOUS at 15:01

## 2021-04-28 RX ADMIN — MIDAZOLAM HYDROCHLORIDE 2 MG: 1 INJECTION, SOLUTION INTRAMUSCULAR; INTRAVENOUS at 14:43

## 2021-04-28 RX ADMIN — EPINEPHRINE 10 MCG: 1 INJECTION INTRAMUSCULAR; INTRAVENOUS; SUBCUTANEOUS at 15:08

## 2021-04-28 RX ADMIN — CEFAZOLIN 1 G: 1 INJECTION, POWDER, FOR SOLUTION INTRAMUSCULAR; INTRAVENOUS at 17:13

## 2021-04-28 RX ADMIN — DEXMEDETOMIDINE HYDROCHLORIDE 12 MCG: 100 INJECTION, SOLUTION INTRAVENOUS at 14:46

## 2021-04-28 RX ADMIN — NOREPINEPHRINE BITARTRATE 16 MCG: 1 INJECTION, SOLUTION, CONCENTRATE INTRAVENOUS at 16:54

## 2021-04-28 RX ADMIN — ALBUMIN HUMAN: 0.05 INJECTION, SOLUTION INTRAVENOUS at 15:03

## 2021-04-28 RX ADMIN — VASOPRESSIN 2 UNITS/HR: 20 INJECTION INTRAVENOUS at 16:36

## 2021-04-28 RX ADMIN — EPINEPHRINE 10 MCG: 1 INJECTION INTRAMUSCULAR; INTRAVENOUS; SUBCUTANEOUS at 15:06

## 2021-04-28 RX ADMIN — HYDROXOCOBALAMIN 5 G: 5 INJECTION, POWDER, LYOPHILIZED, FOR SOLUTION INTRAVENOUS at 23:00

## 2021-04-28 RX ADMIN — Medication 1 UNITS: at 16:33

## 2021-04-28 RX ADMIN — Medication 1 UNITS: at 16:51

## 2021-04-28 RX ADMIN — EPINEPHRINE 10 MCG: 1 INJECTION INTRAMUSCULAR; INTRAVENOUS; SUBCUTANEOUS at 15:02

## 2021-04-28 RX ADMIN — VECURONIUM BROMIDE 3 MG: 1 INJECTION, POWDER, LYOPHILIZED, FOR SOLUTION INTRAVENOUS at 17:35

## 2021-04-28 RX ADMIN — ALBUMIN HUMAN 250 ML: 0.05 INJECTION, SOLUTION INTRAVENOUS at 20:30

## 2021-04-28 RX ADMIN — HYDROMORPHONE HYDROCHLORIDE 0.4 MG: 1 INJECTION, SOLUTION INTRAMUSCULAR; INTRAVENOUS; SUBCUTANEOUS at 19:20

## 2021-04-28 RX ADMIN — FENTANYL CITRATE 50 MCG: 50 INJECTION, SOLUTION INTRAMUSCULAR; INTRAVENOUS at 14:47

## 2021-04-28 RX ADMIN — POTASSIUM CHLORIDE 20 MEQ: 29.8 INJECTION, SOLUTION INTRAVENOUS at 21:04

## 2021-04-28 RX ADMIN — NOREPINEPHRINE BITARTRATE 64 MCG: 1 INJECTION, SOLUTION, CONCENTRATE INTRAVENOUS at 15:20

## 2021-04-28 RX ADMIN — EPINEPHRINE 10 MCG: 1 INJECTION INTRAMUSCULAR; INTRAVENOUS; SUBCUTANEOUS at 14:50

## 2021-04-28 RX ADMIN — NOREPINEPHRINE BITARTRATE 16 MCG: 1 INJECTION, SOLUTION, CONCENTRATE INTRAVENOUS at 15:24

## 2021-04-28 RX ADMIN — LORAZEPAM 1 MG: 2 INJECTION INTRAMUSCULAR; INTRAVENOUS at 01:45

## 2021-04-28 RX ADMIN — VECURONIUM BROMIDE 3 MG: 1 INJECTION, POWDER, LYOPHILIZED, FOR SOLUTION INTRAVENOUS at 16:43

## 2021-04-28 RX ADMIN — PHENYLEPHRINE HYDROCHLORIDE 100 MCG: 10 INJECTION INTRAVENOUS at 14:44

## 2021-04-28 RX ADMIN — NOREPINEPHRINE BITARTRATE 0.03 MCG/KG/MIN: 1 INJECTION, SOLUTION, CONCENTRATE INTRAVENOUS at 15:06

## 2021-04-28 RX ADMIN — SODIUM BICARBONATE 50 MEQ: 84 INJECTION INTRAVENOUS at 19:54

## 2021-04-28 RX ADMIN — Medication 2 UNITS: at 16:29

## 2021-04-28 RX ADMIN — NOREPINEPHRINE BITARTRATE 64 MCG: 1 INJECTION, SOLUTION, CONCENTRATE INTRAVENOUS at 14:59

## 2021-04-28 RX ADMIN — CHLORHEXIDINE GLUCONATE 15 ML: 1.2 SOLUTION ORAL at 19:45

## 2021-04-28 RX ADMIN — EPINEPHRINE 10 MCG: 1 INJECTION INTRAMUSCULAR; INTRAVENOUS; SUBCUTANEOUS at 15:01

## 2021-04-28 RX ADMIN — LIDOCAINE HYDROCHLORIDE 1 ML: 10 INJECTION, SOLUTION INFILTRATION; PERINEURAL at 22:33

## 2021-04-28 RX ADMIN — CALCIUM CHLORIDE 200 MG: 100 INJECTION, SOLUTION INTRAVENOUS at 17:10

## 2021-04-28 RX ADMIN — EPINEPHRINE 10 MCG: 1 INJECTION INTRAMUSCULAR; INTRAVENOUS; SUBCUTANEOUS at 14:51

## 2021-04-28 RX ADMIN — HEPARIN SODIUM 36000 UNITS: 1000 INJECTION INTRAVENOUS; SUBCUTANEOUS at 15:19

## 2021-04-28 RX ADMIN — PHENYLEPHRINE HYDROCHLORIDE 200 MCG: 10 INJECTION INTRAVENOUS at 14:48

## 2021-04-28 RX ADMIN — EPOPROSTENOL 20 NG/KG/MIN: 1.5 INJECTION, POWDER, LYOPHILIZED, FOR SOLUTION INTRAVENOUS at 19:35

## 2021-04-28 RX ADMIN — Medication 2 UNITS: at 16:30

## 2021-04-28 RX ADMIN — FENTANYL CITRATE 50 MCG: 50 INJECTION, SOLUTION INTRAMUSCULAR; INTRAVENOUS at 21:51

## 2021-04-28 RX ADMIN — NOREPINEPHRINE BITARTRATE 32 MCG: 1 INJECTION, SOLUTION, CONCENTRATE INTRAVENOUS at 15:06

## 2021-04-28 RX ADMIN — Medication 100 MEQ: at 23:30

## 2021-04-28 RX ADMIN — ALBUMIN HUMAN 250 ML: 0.05 INJECTION, SOLUTION INTRAVENOUS at 22:11

## 2021-04-28 RX ADMIN — AMINOCAPROIC ACID 1 G/HR: 250 INJECTION, SOLUTION INTRAVENOUS at 16:21

## 2021-04-28 RX ADMIN — NOREPINEPHRINE BITARTRATE 32 MCG: 1 INJECTION, SOLUTION, CONCENTRATE INTRAVENOUS at 15:08

## 2021-04-28 RX ADMIN — HYDROMORPHONE HYDROCHLORIDE 0.4 MG: 1 INJECTION, SOLUTION INTRAMUSCULAR; INTRAVENOUS; SUBCUTANEOUS at 23:13

## 2021-04-28 RX ADMIN — FENTANYL CITRATE 100 MCG: 50 INJECTION, SOLUTION INTRAMUSCULAR; INTRAVENOUS at 14:52

## 2021-04-28 RX ADMIN — FENTANYL CITRATE 100 MCG: 50 INJECTION, SOLUTION INTRAMUSCULAR; INTRAVENOUS at 15:15

## 2021-04-28 RX ADMIN — EPOPROSTENOL 20 NG/KG/MIN: 1.5 INJECTION, POWDER, LYOPHILIZED, FOR SOLUTION INTRAVENOUS at 16:22

## 2021-04-28 RX ADMIN — AMINOCAPROIC ACID 5 G/HR: 250 INJECTION, SOLUTION INTRAVENOUS at 15:25

## 2021-04-28 RX ADMIN — PHENYLEPHRINE HYDROCHLORIDE 100 MCG: 10 INJECTION INTRAVENOUS at 16:53

## 2021-04-28 RX ADMIN — MUPIROCIN 0.5 G: 20 OINTMENT TOPICAL at 19:45

## 2021-04-28 ASSESSMENT — ACTIVITIES OF DAILY LIVING (ADL)
ADLS_ACUITY_SCORE: 16

## 2021-04-28 ASSESSMENT — ENCOUNTER SYMPTOMS: SEIZURES: 0

## 2021-04-28 NOTE — BRIEF OP NOTE
[facility]    Brief Operative Note    Pre-operative diagnosis: Bleeding [R58]  Post-operative diagnosis Massive bilateral pulmonary emboli    Procedure: Procedure(s):  PULMONARY THROMBECTOMY  Surgeon: Surgeon(s) and Role:     * Yumi Singh MD - Primary     * Bárbara Cali MD - Assisting  Anesthesia: General   Estimated blood loss: 750cc  Drains: Mediastinal x2  Specimens:   ID Type Source Tests Collected by Time Destination   A : PULMONARY EMBOLISM Tissue Heart SURGICAL PATHOLOGY EXAM Yumi Singh MD 4/28/2021  5:17 PM      Findings:   large bilateral PE.  Complications: None.  Implants:   Implant Name Type Inv. Item Serial No.  Lot No. LRB No. Used Action   GRAFT ALONZO-GUARD PERICARDIUM [id number] Bone/Tissue/Biologic GRAFT ALONZO-GUARD PERICARDIUM [id number] [id number] [employer] [id number] N/A 1 Implanted

## 2021-04-28 NOTE — PROGRESS NOTES
Consent signed. Report given to OR nurse/anesthesia. Pt on the monitor, went to OR.  supporting son.

## 2021-04-28 NOTE — PROGRESS NOTES
"Slept until now, feels \"a little better\" however pt. continues to become very  FLORES with spasmodic cough, secretions per trach thin pink tinged/white. Remains on O2 per trach dome, 35%.  "

## 2021-04-28 NOTE — CONSULTS
Consult Date: 04/28/2021    REASON FOR CONSULTATION:  Large bilateral pulmonary embolism and RV thrombus in the right ventricle in transit.    HISTORY OF PRESENT ILLNESS:  The patient is a very delightful 70-year-old gentleman who was unfortunately recently diagnosed with laryngeal cancer.  He is status post tracheostomy on 04/12/2021.  He is scheduled to undergo chemoradiation for his laryngeal cancer.    He presented with several-day history of progressive dyspnea on exertion along with a cough and some pleuritic chest discomfort.  Given the recently diagnosed cancer and sudden onset dyspnea CT of the chest was performed.  The CTA showed large bilateral pulmonary embolisms with significant clot burden.  Additionally, the CT showed a defect within the right ventricle, suggestive of thrombus.  He subsequently had transthoracic echocardiogram, which revealed preserved LV function, but severely dilated RV with moderately reduced RV systolic function.  There was also an echogenic mobile mass in the right ventricle measuring 3.5 cm close to the basal RV free wall consistent with clot in transit.  His RVSP is 48 mmHg.  His ECG showed sinus tachycardia, with ventricular rate of 127 beats per minute and RV strain pattern.    The patient was subsequently admitted to the ICU.  He is currently on intravenous heparin.  His blood pressure is very stable and mostly monitored in the 120s this morning.  He is still tachycardic with heart rate in the 110s to low 120s.  He is on 6 L of oxygen.  He otherwise does not endorse any major other symptoms.    PAST MEDICAL HISTORY:  Laryngeal cancer, status post tracheostomy.    SOCIAL HISTORY:  He is a former smoker, quit many years ago.  Does not endorse significant alcohol use.    FAMILY HISTORY:  Apparently, there is some history of clotting disorder in his mother's side.  Otherwise, no other major medical history in his family.    REVIEW OF SYSTEMS:  Comprehensive review of systems was  performed and essentially negative except what was mentioned in HPI.    MEDICATIONS:  Heparin infusion, Tylenol 650 mg every 4 hours as needed.    PHYSICAL EXAMINATION:    VITAL SIGNS:  Blood pressure is 119/90, pulse is 120.  GENERAL:  He is resting, appears to be in mild respiratory distress.    NECK:  Jugular venous pressure appears to be distended.  LUNGS:  Clear to auscultation bilaterally.  HEART:  Tachycardic.  I do not appreciate any murmurs.  ABDOMEN:  Soft, nontender.  EXTREMITIES:  Warm, trace edema bilaterally.  NEUROLOGIC:  No focal deficits.  Vessels 2+ radial, 2+ femoral.  HEENT:  He has a trach in place.  Oropharynx is clear.  Mucous membranes are dry.    IMPRESSION AND PLAN:    1.  Submassive pulmonary embolism.  2.  Large RV thrombus, likely clot in transit.  3.  Large laryngeal mass with airway compromise (consistent with squamous cell cancer of the larynx).  4.  Status post tracheostomy.     The patient was unfortunately recently diagnosed with laryngeal cancer.  He now presents with pulmonary embolism as well as right ventricle clot in transit.  He has significant clot burden, both in his right ventricle and his pulmonary arteries.  Despite this, he is stable at this point, although he is tachycardic.  He is requiring moderate oxygenation via his trach.    I reviewed diagnostic and therapeutic options moving forward. Many consider right-sided clot in transit in a patient with PE to be a medical emergency; however, best treatment for these types of patients is unclear. Treatment options include anticoagulation, which he is currently on, surgical thrombectomy/embolectomy or catheter-directed thrombolysis.  We also discussed systemic thrombolytic therapy, although intracardiac thrombus presence is a relative contraindication to thrombolytic therapy.  I also explained to the patient that aspiration thrombectomy carries a significant risk of breaking off the RV thrombus causing distal embolization,  which can be catastrophic.    After reviewing all the options with the patient, I believe the best option for him would be to consider open surgical thrombectomy and embolectomy by CV surgery.  CV surgery was consulted and has not seen the patient yet.  Ideally, the patient would like to avoid any invasive therapies, including open surgery.  He was wondering if we could give the anticoagulation some time.  I told him there is highly likely of him developing hemodynamic instability on anticoagulation alone.  Nonetheless, we will have Surgery evaluate the patient first to see if he is a reasonable surgical candidate.  For now, we will continue the current heparin infusion.    Of note, if the patient is deemed nonsurgical and we elect to proceed with aspiration thrombectomy given the high risk nature of this procedure, he will need anesthesia as well as perfusion/ECMO backup.    I appreciate the opportunity to participate in his care.    Erasmo Lopez MD        D: 2021   T: 2021   MT: [id number]    Name:     LUPE HALL  MRN:      [id number]        Account:      [id number]   :      1950           Consult Date: 2021     Document: [id number]

## 2021-04-28 NOTE — CONSULTS
Consult Date: 04/27/2021    REFERRING PHYSICIAN:  Dr. Lopez.    REASON FOR CONSULTATION:  Evaluation for RV thrombus in transit and submassive pulmonary emboli.    HISTORY OF PRESENT ILLNESS:  Mr. Warren is a very pleasant 70-year-old gentleman with a recent diagnosis of laryngeal cancer.  He underwent the tracheostomy on 04/12/2021.  He was scheduled to undergo chemoradiation for his laryngeal cancer.  He unfortunately presented with progressively worsening shortness of breath with cough along with some pleuritic chest pain.  CT scan demonstrated large bilateral PE with significant clot burden.  He also showed a large mass in the right ventricle suggestive of a thrombus.  His RV function was moderately diminished with significant RV dilatation with evidence of RV strain.  I was asked by Dr. Lopez for surgical evaluation for urgent RV and pulmonary thrombectomy.    PAST MEDICAL SURGICAL HISTORY:  Includes laryngeal cancer, status post tracheostomy on 04/11/2021.    ALLERGIES:  No known drug allergies.    CURRENT MEDICATIONS:  Reviewed in chart.  He is on a heparin drip.    FAMILY HISTORY:  Noncontributory.    SOCIAL HISTORY:  Former smoker, quit many years ago.  No significant alcohol use.    REVIEW OF SYSTEMS:  As per HPI.  All the 10-point review of systems are completed and were otherwise negative unless stated above.    PHYSICAL EXAMINATION:    VITAL SIGNS:  All are stable.  GENERAL:  Appears well, but in mild respiratory distress.  HEENT:  Within normal limits.  NECK:  Supple, no lymphadenopathy.  CARDIOVASCULAR:  Regular rate and rhythm with normal S1, S2.  No murmurs.  LUNGS:  Clear bilaterally.  ABDOMEN:  Soft, nontender.  EXTREMITIES:  Negative for edema, cyanosis or clubbing.  NEUROLOGIC:  A and O x3.  No focal deficits.    IMPRESSION AND PLAN:  Mr. Warern is a 70-year-old gentleman with a recent diagnosis of laryngeal cancer, status post tracheostomy on 04/11/2021, who now has a submassive bilateral PE with a  large thrombus in the right ventricle.  My recommendation is emergent RV thrombectomy and pulmonary thrombectomy.  A diagnosis was explained to the patient and the reason for recommending the proposed intervention.  We went over the risks and benefits of the operation and the potential complications associated with the procedure including RV failure requiring ECMO postop and open chest.  He agrees to proceed.  I quoted an operative risk of around 10%.    Yumi Singh MD        D: 2021   T: 2021   MT: GUSTAVOT    Name:     LUPE HALL  MRN:      [id number]        Account:      [id number]   :      1950           Consult Date: 2021     Document: [id number]    cc:  Erasmo Lopez MD

## 2021-04-28 NOTE — PROVIDER NOTIFICATION
"Pt. Had been asleep however now awake, loose cough suctioned for very scant thin, white secretions per trach, sounds stridorous with wheezes. Call out to Hospitalist ? Neb. Also pt. Is \"not sleeping very well\" anything for anxiety.  "

## 2021-04-28 NOTE — ANESTHESIA PROCEDURE NOTES
Airway      Staff -        Anesthesiologist:  Imtiaz Clark MD       Performed By: anesthesiologist  Consent for Airway        Urgency: emergent  Indications and Patient Condition       Indications for airway management: cheryl-procedural       Induction type:intravenous       Mask assessment prior to intubation: via existing tracheostomy.    Final Airway Details       Final airway type: endotracheal airway       Successful airway: Reinforced tube  Endotracheal Airway Details        ETT size (mm): 7.5       Cuffed: yes       Intubation method: inserted  into tracheostomy site.       Measured from: stoma       Secured at (cm): 15       Bite block used: None    Post intubation assessment        Placement verified by: capnometry, equal breath sounds and chest rise        Number of attempts at approach: 1       Secured with: sutures       Ease of procedure: easy       Dentition: Unchanged and Intact    Medication(s) Administered   Medication Administration Time: 4/28/2021 2:49 PM

## 2021-04-28 NOTE — PROGRESS NOTES
"2320: C/o feeling SOB, sats 91-92% RR 28-30. Breath sounds equal bilat, continues with coarse BS throughout. O2 @ 35% per trach dome applied with immediate increase of Sats to 98-99%. Pt. States \"it's better but I feel like I'm just watching the clock\" offerred essential oil & music to assist with relaxation.   "

## 2021-04-28 NOTE — ANESTHESIA CARE TRANSFER NOTE
Patient: Raj Warren    Procedure(s):  PULMONARY THROMBECTOMY    Diagnosis: Bleeding [R58]  Diagnosis Additional Information: No value filed.    Anesthesia Type:   No value filed.     Note:    Oropharynx: tracheostomy.  Level of Consciousness: iatrogenic sedation  Patient oxygen source: via tracheostomy.  Level of Supplemental Oxygen (L/min / FiO2): 15  Independent Airway: airway patency not satisfactory and stable  Dentition: dentition unchanged  Vital Signs Stable: post-procedure vital signs reviewed and stable  Report to RN Given: handoff report given  Patient transferred to: ICU (room 351)  Comments: Patient connected to SpO2, EKG, and arterial blood pressure transport monitors and accompanied by anesthesiologist, circulating RN, surgeon (fellow) to ICU room. Patient ventilated by Respiratory Therapy with portable ventilator via tracheostomy with O2 at 10 liters per minute during transport.     On arrival to ICU, tracheostomy in place, equal, bilateral breath sounds auscultated in ICU room, patient placed on ICU ventilator by respiratory therapist, teeth and oral mucosa intact and unchanged at handoff of care. At anesthesia handoff of care, clinical monitors and alarms on and functioning, report on patient's clinical status given to ICU RN, report on patient's clinical status given to intensivist by anesthesiolgist, ICU staff questions answered.  ICU Handoff: Call for PAUSE to initiate/utilize ICU HANDOFF, Identified Patient, Identified Responsible Provider, Reviewed the Pertinent Medical History, Discussed Surgical Course, Reviewed Intra-OP Anesthesia Management and Issues during Anesthesia, Set Expectations for Post Procedure Period and Allowed Opportunity for Questions and Acknowledgement of Understanding      Vitals: (Last set prior to Anesthesia Care Transfer)  CRNA VITALS  4/28/2021 1805 - 4/28/2021 1835      4/28/2021             Resp Rate (set):  10        Electronically Signed By: Norris Ba  APRN CRNA  April 28, 2021  6:35 PM

## 2021-04-28 NOTE — PROGRESS NOTES
SPIRITUAL HEALTH SERVICES Progress Note  [facility] ICU    Referral Source: Request for Advanced Care Directive    Advised son that it would not be possible to complete ACD prior to surgery as PT was being prepped and taken to OR. Advised family that ACD can be completed at a later date.    Provided emotional and spiritual support for son of PT prior to surgical procedure. Son indicates that his father is not Jainism and declined offer of a presurgical prayer. Advised son that SH services are available by request.    Follow-up with PT and family after post-surgical transfer back to unit.    Catrachito Barreralain Resident

## 2021-04-28 NOTE — PROGRESS NOTES
[facility]    Hospitalist Progress Note    Brief Summary:  Raj Warren is a 70 year old male with history of recently diagnosed laryngeal cancer s/p tracheostomy, suppose to start treatment of his cancer on 4/28,  who presented on 4/27 with worsening shortness of breath, cough and pleuritic chest pain, found to have large bilateral pulmonary embolism with RV thrombus and right heart strain in setting of laryngeal cancer.      Assessment & Plan     Submassive Pulmonary Embolism  RV Thrombus, likely clot in transit   Mildly elevated troponin secondary to PE     SOB over the weekend and chest pain with coughing. D-dimer >20. BNP 5441. Trop 0.083 secondary to demand with PE.  CT Chest PE shows large bilateral PE, central thrombus burden leads to the bilateral main pulmonary arteries and leading to all lobes of both lung. Right heart strain      Echocardiogram 4/27: EF 60-65%, RV severely dilated, moderately decreased RVSF. Echogenic mobile mass in RV 3.5x1cm, close to basal RV free wall --likely clot in transit. RVSP 48mmhg, pulmonary hypertension.  Patient admitted in ICU, started on High intensity IV heparin, IR, Cardiology and Cardiovascular surgery consulted.   US of the bilateral LE negative for DVT.    Cardiology has evaluated the patient and recommending open surgical thrombectomy and embolectomy by CV surgery.   CV Surgery evaluation is pending at this time. Patient is overall hemodynamically stable but continue to have tachycardia and on 6 liters of supplemental oxygen at this time.   Await CV surgery evaluation, if not a candidate for open surgery then IR for aspiration thrombectomy.       Laryngeal Cancer S/P Tracheostomy by ENT on 4/12/2021.     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   Followed by Dr Bean of [facility] oncology, plan was to start him on chemoradiation,  But need to hold on that for now.   RT for Trach care.    PET 4/26 with right laryngeal mass,  level 4 LN, new nodule in right middle lobe  - oncology consulted  - trach cares per nursing and RT.      COVID-19 screening-negative    Complicated situation, submassive PE with large clot burden and RV clot.   Await CV surgery evaluation today, appreciate input from cardiology.     Discussed with patient, patient son, IR and the nursing staff taking care of the patient today.     Total time spend today 30 min of critical care time which include chart review, history taking, counseling and coordination of care.        DVT Prophylaxis: Heparin IV  Code Status: Full Code    Disposition: Expected discharge >2 days once more stable.     Aurelio Mcintyre MD  Text Page  (7am - 6pm)    Interval History   Patient feeling some what better, chest pain is better, SOB improve but not doing much. Denies any fever, chills, nausea, vomiting, headache or dizziness.     No other significant event overnight.     -Data reviewed today: I reviewed all new labs and imaging results over the last 24 hours. I personally reviewed no images or EKG's today.    Physical Exam   Temp: 98.1  F (36.7  C) Temp src: Oral BP: 115/87 Pulse: 120   Resp: 23 SpO2: 100 % O2 Device: Trach dome Oxygen Delivery: 8 LPM  Vitals:    04/27/21 1805   Weight: 90.8 kg (200 lb 2.8 oz)     Vital Signs with Ranges  Temp:  [97.5  F (36.4  C)-99.8  F (37.7  C)] 98.1  F (36.7  C)  Pulse:  [111-131] 120  Resp:  [18-36] 23  BP: ()/() 115/87  FiO2 (%):  [35 %] 35 %  SpO2:  [91 %-100 %] 100 %  I/O last 3 completed shifts:  In: 244.36 [I.V.:244.36]  Out: 450 [Urine:450]    Constitutional: awake, alert, cooperative, no apparent distress, and appears stated age  Eyes: Lids and lashes normal, pupils equal, round and reactive to light, extra ocular muscles intact, sclera clear, conjunctiva normal  Respiratory: Tracheostomy in place,   No increased work of breathing, good air exchange, clear to auscultation bilaterally, no crackles or wheezing  Cardiovascular:  Tachycardic, regular rhythm, normal S1 and S2, no S3 or S4, and no murmur noted  GI: No scars, normal bowel sounds, soft, non-distended, non-tender, no masses palpated, no hepatosplenomegally  Skin: no bruising or bleeding  Musculoskeletal: no lower extremity pitting edema present  Neurologic: no focal deficit.     Medications     heparin 1,300 Units/hr (04/28/21 0700)     - MEDICATION INSTRUCTIONS -       sodium chloride 10 mL/hr at 04/27/21 1900     sodium chloride 100 mL/hr at 04/28/21 0859         Data   Recent Labs   Lab 04/28/21  0840 04/27/21  1906 04/27/21  1254   WBC 8.5 9.0 8.7   HGB 14.0 14.7 16.2   MCV 86 85 89    281 308     --  139   POTASSIUM 4.1  --  3.8   CHLORIDE 114*  --  107   CO2 22  --  25   BUN 11  --  12   CR 0.92  --  1.06   ANIONGAP 7  --  7   TELMA 8.2*  --  9.6   *  --  137*   ALBUMIN 2.9*  --   --    TROPI  --   --  0.083*       Recent Results (from the past 24 hour(s))   XR Chest Port 1 View    Narrative    CHEST PORTABLE ONE VIEW   4/27/2021 1:13 PM     HISTORY: Short of breath.    COMPARISON: 4/12/2021.      Impression    IMPRESSION: Tracheostomy appears appropriately positioned. Normal  cardiac silhouette. No acute airspace disease.    JESSICA REESE MD   CT Chest Pulmonary Embolism w Contrast   Result Value    Radiologist flags Large bilateral acute pulmonary embolism with (AA)    Narrative    CT CHEST PULMONARY EMBOLISM WITH CONTRAST 4/27/2021 2:31 PM    CLINICAL HISTORY: PE suspected, high probability. Shortness of breath.  Cough. Status post tracheostomy for laryngeal cancer, elevated  troponin/BNP.    TECHNIQUE: CT angiogram chest during arterial phase injection IV  contrast. 2D and 3D MIP reconstructions were performed by the CT  technologist. Dose reduction techniques were used.     CONTRAST: 69mL Isovue-370    COMPARISON: CT chest 4/12/2021.    FINDINGS:  ANGIOGRAM CHEST: Large bilateral pulmonary embolism newly identified  leading to all lobes of both lungs.  Central embolism burden can be  seen extending to the midportion of the right main pulmonary artery  series 5 image 72, and distal aspect of the left main pulmonary artery  image 68. There is associated dilatation of the right cardiac chambers  and leftward bulging of the intraventricular septum. There are also  curvilinear low density filling defects within the right ventricular  lumen series 5 image 114 and on adjacent images. Thoracic aorta is  negative for dissection.    LUNGS AND PLEURA: No effusions. Motion artifact limits detailed  assessment of the lung parenchyma. Very mild patchy ground-glass  opacities at the left upper lobe, for instance, a nodular region  measuring 0.7 cm series 7 image 102. No dense lobar consolidation  identified.    MEDIASTINUM/AXILLAE: No enlarged lymph nodes are seen by size  criteria. There is a tracheostomy in place. Some small mucus  aspiration posterior to the tracheostomy. Soft tissue fullness at the  vocal folds again noted, right greater than left.    UPPER ABDOMEN: Cholelithiasis. No convincing acute abnormality, but  assessment is limited by motion.    MUSCULOSKELETAL: No acute abnormality.      Impression    IMPRESSION:  1.  Large bilateral pulmonary embolism leading to all lobes of both  lungs. Central thrombus burden leads to the bilateral main pulmonary  arteries. There is evidence of right cardiac strain. The chambers of  the right heart are dilated and there is leftward bowing of the  intraventricular septum. Further, there is curvilinear apparent  filling defect within the right ventricular lumen suggesting thrombus  within the right ventricle.  2.  Small patchy ground-glass nodular opacities at the left upper  lobe. Recommend follow-up CT chest in three months for surveillance.  3.  Soft tissue thickening at the vocal folds again noted.  4.  Appropriate positioning of a newly identified tracheostomy.    [Critical Result: Large bilateral acute pulmonary embolism  with  evidence of right cardiac strain. There is also potential thrombus  burden within the lumen of the right ventricle.]    Finding was identified on 2021 2:33 PM.     Dr. Pritchard was contacted by me on 2021 2:43 PM and verbalized  understanding of the critical result.     JESSICA REESE MD   Echocardiogram Complete    Narrative    [id number]  [id number]  [id number]  [id number]^FIDEL^JUANITA     [facility]  Echocardiography Laboratory  [address] [place] [address]     Name: LUPE HALL  MRN: [id number]  : 1950  Study Date: 2021 03:38 PM  Age: 70 yrs  Gender: Male  Patient Location: [facility] [place]  Reason For Study: Pulmonary Emboli  Ordering Physician: JUANITA PRITCHARD  Performed By: May Billy     BSA: 2.1 m2  Height: 71 in  Weight: 207 lb  HR: 120  BP: 122/76 mmHg  ______________________________________________________________________________  Procedure  Complete Portable Echo Adult. Optison (NDC #2613-7953) given intravenously.  ______________________________________________________________________________  Interpretation Summary     Left ventricular systolic function is normal.The visual ejection fraction is  estimated at 60-65%.Flattened septum is consistent with RV pressure/volume  overload.  The right ventricle is severely dilated.Moderately decreased right ventricular  systolic function  The right atrium is severely dilated.  Echogenic mobile mass in right ventricle- measuring 3.5 cm x 1 cm, it is seen  close to the basal RV free wall- differential includes clot in transit given  the clinical scenario of large bilateral acute pulmonary embolism,  differential also includes a mass.  Pulmonary hypertension- RVSP 48 mm hg +RA.     No prior study for comparison.  Findings discussed with Dr Pritchard.  ______________________________________________________________________________  Left Ventricle  The left ventricle is normal in size. There is mild concentric  left  ventricular hypertrophy. Diastolic Doppler findings (E/E' ratio and/or other  parameters) suggest left ventricular filling pressures are indeterminate. Left  ventricular systolic function is normal. The visual ejection fraction is  estimated at 60-65%. Flattened septum is consistent with RV pressure/volume  overload.     Right Ventricle  The right ventricle is severely dilated. echogenic mobile mass in right  ventricle- measuring 3.5 cm x 1 cm, it is seen close to the basal RV free  wall- differential includes clot in transit given the clinical scenario of  large bilateral acute pulmonary embolism. Moderately decreased right  ventricular systolic function.     Atria  Normal left atrial size. The right atrium is severely dilated.     Mitral Valve  There is mild (1+) mitral regurgitation.     Tricuspid Valve  There is mild (1+) tricuspid regurgitation. The right ventricular systolic  pressure is approximated at 47.6 mmHg plus the right atrial pressure.  Pulmonary hypertension.     Aortic Valve  The aortic valve is trileaflet. mild aortic valve sclerosis. No aortic  regurgitation is present. No aortic stenosis is present.     Pulmonic Valve  The pulmonic valve is not well visualized. There is no pulmonic valvular  stenosis.     Vessels  The aortic root is normal size. Normal size ascending aorta. borderline  dilated IVC.     Pericardium  There is no pericardial effusion.     ______________________________________________________________________________  MMode/2D Measurements & Calculations  IVSd: 1.5 cm  LVIDd: 2.3 cm  LVIDs: 1.5 cm  LVPWd: 1.4 cm  FS: 36.2 %     LV mass(C)d: 103.9 grams  LV mass(C)dI: 48.5 grams/m2  Ao root diam: 3.1 cm  LA dimension: 2.8 cm  asc Aorta Diam: 3.0 cm  LA/Ao: 0.90  LVOT diam: 2.0 cm  LVOT area: 3.1 cm2  LA Volume (BP): 16.4 ml  LA Volume Index (BP): 7.7 ml/m2  RWT: 1.2     Doppler Measurements & Calculations  MV E max sheila: 66.0 cm/sec     MV dec time: 0.05 sec  LV V1 max P.0  mmHg  LV V1 max: 70.5 cm/sec  LV V1 VTI: 9.9 cm  SV(LVOT): 31.2 ml  SI(LVOT): 14.6 ml/m2  PA acc time: 0.12 sec  TR max sheila: 345.0 cm/sec  TR max P.6 mmHg  E/E' av.0  Lateral E/e': 7.2  Medial E/e': 8.8     ______________________________________________________________________________  Report approved by: Mini Brooks 2021 04:44 PM         US Lower Extremity Venous Duplex Bilateral    Narrative    EXAM: US LOWER EXTREMITY VENOUS DUPLEX BILATERAL  LOCATION: [facility]  DATE/TIME: 2021 10:21 PM    INDICATION: Pulmonary emboli. Evaluate for DVT burden.  COMPARISON: None.  TECHNIQUE: Venous Duplex ultrasound of bilateral lower extremities with and without compression, augmentation and duplex. Color flow and spectral Doppler with waveform analysis performed.    FINDINGS: Exam includes the common femoral, femoral, popliteal veins as well as segmentally visualized deep calf veins and greater saphenous vein.     RIGHT: No deep vein thrombosis. No superficial thrombophlebitis. No popliteal cyst.    LEFT: No deep vein thrombosis. No superficial thrombophlebitis. No popliteal cyst.      Impression    IMPRESSION:  1.  No deep venous thrombosis in the bilateral lower extremities.

## 2021-04-28 NOTE — PROGRESS NOTES
Pt. Is sleeping soundly. O2 sats 99% RR=18-20, HR slower. Will allow to sleep, continue to monitor HR,RR

## 2021-04-28 NOTE — PROVIDER NOTIFICATION
Pt. has received neb, Ativan, changed inner cannula small amt. Thin yellow, pink tinged secretions suctioned out. Secretions are also coming around trach as pt. Has a very forceful incessant cough after suctioning.  RR 28-30, -231. Sats remain mid 90's with O2 @ 35% PER TRACH DOME.Pt. continues anxious, only slight c/o sore throat. Call out to Ankita Ferraro. Called back orders noted will given Dilaudid. Pt. Is very aware, all questions answered reassurance given.

## 2021-04-28 NOTE — CONSULTS
Hematology-Oncology Follow-up Note  [facility]     Today's Date: 04/28/21  Date of Admission:  4/27/2021  Reason for Consult: Laryngeal cancer status post tracheostomy  Patient of Dr. Bean who was supposed to start concurrent chemoradiation        04/27-presented to ER with shortness of breath,  Multiple investigations done in the ER including CT scan which revealed bilateral large acute pulmonary embolism  Echocardiogram revealed Echogenic mobile mass in right ventricle- measuring 3.5 cm x 1 cm, it is seen  close to the basal RV free wall-clot in transit      Oncology HPI  Patient is seen by ENT  04/12/2021-laryngoscopy revealed right endolaryngeal mass  Biopsy revealed laryngeal squamous cell carcinoma  04/26-PET scan reveals Hypermetabolic mass involving the supraglottic, glottic and subglottic larynx Hypermetabolic left level 4 lymph node likely representing  Metastasis. New hypermetabolic nodule in the right middle lobe, new since4/12/2021. Given short time interval, this is less likely to bemetastasis, infection or inflammation is favored, possibly from  aspiration. Borderline avid small subcarinal lymph node favor physiologic or reactive to the lung process.    Per Dr. Bean recommendation patient was supposed to start concurrent chemoradiation          ASSESSMENT/ PLAN :  Raj Warren is a 70 year old male      laryngeal squamous cell carcinoma  Patient of Dr. Bean's   No plans to start chemoradiation until after discharge  schedule with Dr. Bean after discharge  Continue to follow-up with ENT after discharge      Large bilateral pulmonary embolism and RV thrombus in the right ventricle  Ultrasound of bilateral lower extremity negative for DVT  Patient is currently on heparin drip which we will continue  Cardiology following-recommendation is for CV surgery consult for thrombectomy /embolectomy  Continue per cardiology/hospitalist /CV surgery team recommendations      INTERIM  "HISTORY:  Patient reports his chest pain is better this morning he continues to have shortness of breath.     MEDICATIONS:  Reviewed         PHYSICAL EXAM:  Vital signs:  Temp: 98.3  F (36.8  C) Temp src: Oral BP: (!) 118/92 Pulse: 120   Resp: 26 SpO2: 100 % O2 Device: Trach dome Oxygen Delivery: 3 LPM Height: 180.3 cm (5' 11\") Weight: 90.8 kg (200 lb 2.8 oz)  Estimated body mass index is 27.92 kg/m  as calculated from the following:    Height as of this encounter: 1.803 m (5' 11\").    Weight as of this encounter: 90.8 kg (200 lb 2.8 oz).      GENERAL/CONSTITUTIONAL: No acute distress.  RESPIRATORY: Clear to auscultation bilaterally. No crackles or wheezing.   CARDIOVASCULAR: Regular rate and rhythm without murmurs, gallops, or rubs.  GASTROINTESTINAL: No hepatosplenomegaly, masses, or tenderness. The patient has normal bowel sounds. No guarding.  No distention.  MUSCULOSKELETAL: Warm and well-perfused, no cyanosis, clubbing, or edema.  LYMPH: No anterior cervical, posterior cervical, supraclavicular, axillary or inguinal adenopathy.       LABS:  Recent Labs   Lab Test 04/28/21  0840      POTASSIUM 4.1   CHLORIDE 114*   CO2 22   ANIONGAP 7   BUN 11   CR 0.92   *   TELMA 8.2*     Recent Labs   Lab Test 04/28/21  0840 04/27/21  1906 04/27/21  1254   WBC 8.5 9.0 8.7   HGB 14.0 14.7 16.2    281 308   MCV 86 85 89   NEUTROPHIL 62.7  --  71.3     Recent Labs   Lab Test 04/28/21  0840   BILITOTAL 0.4   ALKPHOS 62   AST 23   ALBUMIN 2.9*   *             Kyree Jurado, Nurse Practitioner   [facility]     Chart documentation with Dragon Voice recognition Software. Although reviewed after completion, some words and grammatical errors may remain.       "

## 2021-04-29 ENCOUNTER — APPOINTMENT (OUTPATIENT)
Dept: GENERAL RADIOLOGY | Facility: CLINIC | Age: 71
DRG: 163 | End: 2021-04-29
Attending: SURGERY
Payer: COMMERCIAL

## 2021-04-29 ENCOUNTER — APPOINTMENT (OUTPATIENT)
Dept: GENERAL RADIOLOGY | Facility: CLINIC | Age: 71
DRG: 163 | End: 2021-04-29
Attending: INTERNAL MEDICINE
Payer: COMMERCIAL

## 2021-04-29 DIAGNOSIS — I26.99 BILATERAL PULMONARY EMBOLISM (H): Primary | ICD-10-CM

## 2021-04-29 LAB
ALBUMIN SERPL-MCNC: 3.2 G/DL (ref 3.4–5)
ALBUMIN SERPL-MCNC: 3.3 G/DL (ref 3.4–5)
ALBUMIN UR-MCNC: 20 MG/DL
ALP SERPL-CCNC: 33 U/L (ref 40–150)
ALP SERPL-CCNC: 38 U/L (ref 40–150)
ALT SERPL W P-5'-P-CCNC: 38 U/L (ref 0–70)
ALT SERPL W P-5'-P-CCNC: 47 U/L (ref 0–70)
ANION GAP SERPL CALCULATED.3IONS-SCNC: 10 MMOL/L (ref 3–14)
ANION GAP SERPL CALCULATED.3IONS-SCNC: 18 MMOL/L (ref 3–14)
ANION GAP SERPL CALCULATED.3IONS-SCNC: 3 MMOL/L (ref 3–14)
ANION GAP SERPL CALCULATED.3IONS-SCNC: 5 MMOL/L (ref 3–14)
APPEARANCE UR: CLEAR
AST SERPL W P-5'-P-CCNC: 39 U/L (ref 0–45)
AST SERPL W P-5'-P-CCNC: 50 U/L (ref 0–45)
BASE DEFICIT BLDA-SCNC: 0.1 MMOL/L
BASE DEFICIT BLDA-SCNC: 0.9 MMOL/L
BASE DEFICIT BLDA-SCNC: 10.4 MMOL/L
BASE DEFICIT BLDA-SCNC: 4.6 MMOL/L
BASE DEFICIT BLDA-SCNC: 5.4 MMOL/L
BASE DEFICIT BLDA-SCNC: 6.2 MMOL/L
BASE DEFICIT BLDA-SCNC: 6.9 MMOL/L
BASE DEFICIT BLDV-SCNC: 3 MMOL/L
BASE DEFICIT BLDV-SCNC: 5.5 MMOL/L
BASE EXCESS BLDA CALC-SCNC: 5.5 MMOL/L
BASE EXCESS BLDA CALC-SCNC: 5.6 MMOL/L
BASOPHILS # BLD AUTO: 0 10E9/L (ref 0–0.2)
BASOPHILS NFR BLD AUTO: 0.1 %
BILIRUB SERPL-MCNC: 1 MG/DL (ref 0.2–1.3)
BILIRUB SERPL-MCNC: 1.1 MG/DL (ref 0.2–1.3)
BILIRUB UR QL STRIP: NEGATIVE
BUN SERPL-MCNC: 11 MG/DL (ref 7–30)
BUN SERPL-MCNC: 14 MG/DL (ref 7–30)
BUN SERPL-MCNC: 14 MG/DL (ref 7–30)
BUN SERPL-MCNC: 9 MG/DL (ref 7–30)
CA-I BLD-MCNC: 3.9 MG/DL (ref 4.4–5.2)
CA-I BLD-MCNC: 3.9 MG/DL (ref 4.4–5.2)
CA-I BLD-MCNC: 4 MG/DL (ref 4.4–5.2)
CA-I BLD-MCNC: 4.2 MG/DL (ref 4.4–5.2)
CA-I BLD-MCNC: 4.3 MG/DL (ref 4.4–5.2)
CA-I BLD-MCNC: 4.3 MG/DL (ref 4.4–5.2)
CA-I BLD-MCNC: 4.5 MG/DL (ref 4.4–5.2)
CA-I BLD-MCNC: 4.5 MG/DL (ref 4.4–5.2)
CALCIUM SERPL-MCNC: 7.1 MG/DL (ref 8.5–10.1)
CALCIUM SERPL-MCNC: 7.2 MG/DL (ref 8.5–10.1)
CALCIUM SERPL-MCNC: 7.7 MG/DL (ref 8.5–10.1)
CALCIUM SERPL-MCNC: 7.9 MG/DL (ref 8.5–10.1)
CHLORIDE SERPL-SCNC: 112 MMOL/L (ref 94–109)
CHLORIDE SERPL-SCNC: 113 MMOL/L (ref 94–109)
CHLORIDE SERPL-SCNC: 117 MMOL/L (ref 94–109)
CHLORIDE SERPL-SCNC: 117 MMOL/L (ref 94–109)
CO2 SERPL-SCNC: 19 MMOL/L (ref 20–32)
CO2 SERPL-SCNC: 26 MMOL/L (ref 20–32)
CO2 SERPL-SCNC: 29 MMOL/L (ref 20–32)
CO2 SERPL-SCNC: 31 MMOL/L (ref 20–32)
COLOR UR AUTO: ABNORMAL
CREAT SERPL-MCNC: 0.99 MG/DL (ref 0.66–1.25)
CREAT SERPL-MCNC: 0.99 MG/DL (ref 0.66–1.25)
CREAT SERPL-MCNC: 1.2 MG/DL (ref 0.66–1.25)
CREAT SERPL-MCNC: 1.31 MG/DL (ref 0.66–1.25)
DIFFERENTIAL METHOD BLD: ABNORMAL
EOSINOPHIL # BLD AUTO: 0 10E9/L (ref 0–0.7)
EOSINOPHIL NFR BLD AUTO: 0 %
ERYTHROCYTE [DISTWIDTH] IN BLOOD BY AUTOMATED COUNT: 14.1 % (ref 10–15)
ERYTHROCYTE [DISTWIDTH] IN BLOOD BY AUTOMATED COUNT: 14.2 % (ref 10–15)
ERYTHROCYTE [DISTWIDTH] IN BLOOD BY AUTOMATED COUNT: 14.2 % (ref 10–15)
GFR SERPL CREATININE-BSD FRML MDRD: 55 ML/MIN/{1.73_M2}
GFR SERPL CREATININE-BSD FRML MDRD: 61 ML/MIN/{1.73_M2}
GFR SERPL CREATININE-BSD FRML MDRD: 77 ML/MIN/{1.73_M2}
GFR SERPL CREATININE-BSD FRML MDRD: 77 ML/MIN/{1.73_M2}
GLUCOSE BLD-MCNC: 143 MG/DL (ref 70–99)
GLUCOSE BLD-MCNC: 150 MG/DL (ref 70–99)
GLUCOSE BLD-MCNC: 161 MG/DL (ref 70–99)
GLUCOSE BLD-MCNC: 169 MG/DL (ref 70–99)
GLUCOSE BLD-MCNC: 192 MG/DL (ref 70–99)
GLUCOSE BLDC GLUCOMTR-MCNC: 108 MG/DL (ref 70–99)
GLUCOSE BLDC GLUCOMTR-MCNC: 115 MG/DL (ref 70–99)
GLUCOSE BLDC GLUCOMTR-MCNC: 117 MG/DL (ref 70–99)
GLUCOSE BLDC GLUCOMTR-MCNC: 120 MG/DL (ref 70–99)
GLUCOSE BLDC GLUCOMTR-MCNC: 124 MG/DL (ref 70–99)
GLUCOSE BLDC GLUCOMTR-MCNC: 128 MG/DL (ref 70–99)
GLUCOSE BLDC GLUCOMTR-MCNC: 128 MG/DL (ref 70–99)
GLUCOSE BLDC GLUCOMTR-MCNC: 129 MG/DL (ref 70–99)
GLUCOSE BLDC GLUCOMTR-MCNC: 133 MG/DL (ref 70–99)
GLUCOSE BLDC GLUCOMTR-MCNC: 160 MG/DL (ref 70–99)
GLUCOSE BLDC GLUCOMTR-MCNC: 165 MG/DL (ref 70–99)
GLUCOSE BLDC GLUCOMTR-MCNC: 166 MG/DL (ref 70–99)
GLUCOSE BLDC GLUCOMTR-MCNC: 175 MG/DL (ref 70–99)
GLUCOSE BLDC GLUCOMTR-MCNC: 191 MG/DL (ref 70–99)
GLUCOSE BLDC GLUCOMTR-MCNC: 214 MG/DL (ref 70–99)
GLUCOSE BLDC GLUCOMTR-MCNC: 216 MG/DL (ref 70–99)
GLUCOSE BLDC GLUCOMTR-MCNC: 225 MG/DL (ref 70–99)
GLUCOSE BLDC GLUCOMTR-MCNC: 245 MG/DL (ref 70–99)
GLUCOSE BLDC GLUCOMTR-MCNC: 88 MG/DL (ref 70–99)
GLUCOSE BLDC GLUCOMTR-MCNC: 94 MG/DL (ref 70–99)
GLUCOSE SERPL-MCNC: 103 MG/DL (ref 70–99)
GLUCOSE SERPL-MCNC: 121 MG/DL (ref 70–99)
GLUCOSE SERPL-MCNC: 198 MG/DL (ref 70–99)
GLUCOSE SERPL-MCNC: 250 MG/DL (ref 70–99)
GLUCOSE UR STRIP-MCNC: NEGATIVE MG/DL
HCO3 BLD-SCNC: 17 MMOL/L (ref 21–28)
HCO3 BLD-SCNC: 18 MMOL/L (ref 21–28)
HCO3 BLD-SCNC: 21 MMOL/L (ref 21–28)
HCO3 BLD-SCNC: 22 MMOL/L (ref 21–28)
HCO3 BLD-SCNC: 22 MMOL/L (ref 21–28)
HCO3 BLD-SCNC: 23 MMOL/L (ref 21–28)
HCO3 BLD-SCNC: 24 MMOL/L (ref 21–28)
HCO3 BLD-SCNC: 29 MMOL/L (ref 21–28)
HCO3 BLD-SCNC: 30 MMOL/L (ref 21–28)
HCO3 BLDV-SCNC: 19 MMOL/L (ref 21–28)
HCO3 BLDV-SCNC: 24 MMOL/L (ref 21–28)
HCT VFR BLD AUTO: 25.3 % (ref 40–53)
HCT VFR BLD AUTO: 25.5 % (ref 40–53)
HCT VFR BLD AUTO: 26.4 % (ref 40–53)
HGB BLD-MCNC: 10.5 G/DL (ref 13.3–17.7)
HGB BLD-MCNC: 10.8 G/DL (ref 13.3–17.7)
HGB BLD-MCNC: 11.3 G/DL (ref 13.3–17.7)
HGB BLD-MCNC: 13.4 G/DL (ref 13.3–17.7)
HGB BLD-MCNC: 8.7 G/DL (ref 13.3–17.7)
HGB BLD-MCNC: 8.8 G/DL (ref 13.3–17.7)
HGB BLD-MCNC: 9 G/DL (ref 13.3–17.7)
HGB BLD-MCNC: 9.4 G/DL (ref 13.3–17.7)
HGB UR QL STRIP: ABNORMAL
IMM GRANULOCYTES # BLD: 0.1 10E9/L (ref 0–0.4)
IMM GRANULOCYTES NFR BLD: 0.7 %
KETONES UR STRIP-MCNC: NEGATIVE MG/DL
LACTATE BLD-SCNC: 1.6 MMOL/L (ref 0.7–2)
LACTATE BLD-SCNC: 1.9 MMOL/L (ref 0.7–2)
LACTATE BLD-SCNC: 11.5 MMOL/L (ref 0.7–2)
LACTATE BLD-SCNC: 14.5 MMOL/L (ref 0.7–2)
LACTATE BLD-SCNC: 2 MMOL/L (ref 0.7–2)
LACTATE BLD-SCNC: 2.1 MMOL/L (ref 0.7–2)
LACTATE BLD-SCNC: 2.7 MMOL/L (ref 0.7–2)
LACTATE BLD-SCNC: 2.8 MMOL/L (ref 0.7–2)
LACTATE BLD-SCNC: 3.5 MMOL/L (ref 0.7–2)
LACTATE BLD-SCNC: 9.7 MMOL/L (ref 0.7–2)
LEUKOCYTE ESTERASE UR QL STRIP: NEGATIVE
LYMPHOCYTES # BLD AUTO: 0.7 10E9/L (ref 0.8–5.3)
LYMPHOCYTES NFR BLD AUTO: 6.4 %
MAGNESIUM SERPL-MCNC: 2 MG/DL (ref 1.6–2.3)
MCH RBC QN AUTO: 29.8 PG (ref 26.5–33)
MCH RBC QN AUTO: 30 PG (ref 26.5–33)
MCH RBC QN AUTO: 30.4 PG (ref 26.5–33)
MCHC RBC AUTO-ENTMCNC: 34.1 G/DL (ref 31.5–36.5)
MCHC RBC AUTO-ENTMCNC: 34.4 G/DL (ref 31.5–36.5)
MCHC RBC AUTO-ENTMCNC: 34.5 G/DL (ref 31.5–36.5)
MCV RBC AUTO: 87 FL (ref 78–100)
MCV RBC AUTO: 87 FL (ref 78–100)
MCV RBC AUTO: 88 FL (ref 78–100)
MONOCYTES # BLD AUTO: 1.1 10E9/L (ref 0–1.3)
MONOCYTES NFR BLD AUTO: 10.2 %
MUCOUS THREADS #/AREA URNS LPF: PRESENT /LPF
NEUTROPHILS # BLD AUTO: 8.8 10E9/L (ref 1.6–8.3)
NEUTROPHILS NFR BLD AUTO: 82.6 %
NITRATE UR QL: NEGATIVE
NRBC # BLD AUTO: 0 10*3/UL
NRBC BLD AUTO-RTO: 0 /100
O2/TOTAL GAS SETTING VFR VENT: 100 %
O2/TOTAL GAS SETTING VFR VENT: 100 %
O2/TOTAL GAS SETTING VFR VENT: 40 %
O2/TOTAL GAS SETTING VFR VENT: 80 %
O2/TOTAL GAS SETTING VFR VENT: ABNORMAL %
OXYHGB MFR BLD: 93 % (ref 92–100)
OXYHGB MFR BLD: 95 % (ref 92–100)
OXYHGB MFR BLD: 96 % (ref 92–100)
OXYHGB MFR BLD: 97 % (ref 92–100)
OXYHGB MFR BLD: 97 % (ref 92–100)
OXYHGB MFR BLDV: 62 %
PCO2 BLD: 29 MM HG (ref 35–45)
PCO2 BLD: 34 MM HG (ref 35–45)
PCO2 BLD: 37 MM HG (ref 35–45)
PCO2 BLD: 38 MM HG (ref 35–45)
PCO2 BLD: 40 MM HG (ref 35–45)
PCO2 BLD: 44 MM HG (ref 35–45)
PCO2 BLD: 44 MM HG (ref 35–45)
PCO2 BLD: 52 MM HG (ref 35–45)
PCO2 BLD: 55 MM HG (ref 35–45)
PCO2 BLDV: 34 MM HG (ref 40–50)
PCO2 BLDV: 50 MM HG (ref 40–50)
PH BLD: 7.2 PH (ref 7.35–7.45)
PH BLD: 7.22 PH (ref 7.35–7.45)
PH BLD: 7.22 PH (ref 7.35–7.45)
PH BLD: 7.3 PH (ref 7.35–7.45)
PH BLD: 7.39 PH (ref 7.35–7.45)
PH BLD: 7.42 PH (ref 7.35–7.45)
PH BLD: 7.44 PH (ref 7.35–7.45)
PH BLD: 7.48 PH (ref 7.35–7.45)
PH BLD: 7.49 PH (ref 7.35–7.45)
PH BLDV: 7.29 PH (ref 7.32–7.43)
PH BLDV: 7.37 PH (ref 7.32–7.43)
PH UR STRIP: 5.5 PH (ref 5–7)
PHOSPHATE SERPL-MCNC: 1.3 MG/DL (ref 2.5–4.5)
PHOSPHATE SERPL-MCNC: 4.1 MG/DL (ref 2.5–4.5)
PLATELET # BLD AUTO: 248 10E9/L (ref 150–450)
PLATELET # BLD AUTO: 283 10E9/L (ref 150–450)
PLATELET # BLD AUTO: 306 10E9/L (ref 150–450)
PO2 BLD: 109 MM HG (ref 80–105)
PO2 BLD: 113 MM HG (ref 80–105)
PO2 BLD: 140 MM HG (ref 80–105)
PO2 BLD: 146 MM HG (ref 80–105)
PO2 BLD: 372 MM HG (ref 80–105)
PO2 BLD: 385 MM HG (ref 80–105)
PO2 BLD: 415 MM HG (ref 80–105)
PO2 BLD: 535 MM HG (ref 80–105)
PO2 BLD: 94 MM HG (ref 80–105)
PO2 BLDV: 34 MM HG (ref 25–47)
PO2 BLDV: 49 MM HG (ref 25–47)
POTASSIUM BLD-SCNC: 3.6 MMOL/L (ref 3.4–5.3)
POTASSIUM BLD-SCNC: 3.6 MMOL/L (ref 3.4–5.3)
POTASSIUM BLD-SCNC: 3.7 MMOL/L (ref 3.4–5.3)
POTASSIUM BLD-SCNC: 3.8 MMOL/L (ref 3.4–5.3)
POTASSIUM BLD-SCNC: 4.4 MMOL/L (ref 3.4–5.3)
POTASSIUM SERPL-SCNC: 3.2 MMOL/L (ref 3.4–5.3)
POTASSIUM SERPL-SCNC: 3.3 MMOL/L (ref 3.4–5.3)
POTASSIUM SERPL-SCNC: 3.5 MMOL/L (ref 3.4–5.3)
POTASSIUM SERPL-SCNC: 3.9 MMOL/L (ref 3.4–5.3)
POTASSIUM SERPL-SCNC: 3.9 MMOL/L (ref 3.4–5.3)
PROCALCITONIN SERPL-MCNC: 0.79 NG/ML
PROT SERPL-MCNC: 5.2 G/DL (ref 6.8–8.8)
PROT SERPL-MCNC: 5.4 G/DL (ref 6.8–8.8)
RBC # BLD AUTO: 2.89 10E12/L (ref 4.4–5.9)
RBC # BLD AUTO: 2.9 10E12/L (ref 4.4–5.9)
RBC # BLD AUTO: 3.02 10E12/L (ref 4.4–5.9)
RBC #/AREA URNS AUTO: 112 /HPF (ref 0–2)
SODIUM BLD-SCNC: 147 MMOL/L (ref 133–144)
SODIUM BLD-SCNC: 147 MMOL/L (ref 133–144)
SODIUM BLD-SCNC: 148 MMOL/L (ref 133–144)
SODIUM BLD-SCNC: 148 MMOL/L (ref 133–144)
SODIUM BLD-SCNC: 149 MMOL/L (ref 133–144)
SODIUM SERPL-SCNC: 149 MMOL/L (ref 133–144)
SODIUM SERPL-SCNC: 149 MMOL/L (ref 133–144)
SODIUM SERPL-SCNC: 151 MMOL/L (ref 133–144)
SODIUM SERPL-SCNC: 151 MMOL/L (ref 133–144)
SOURCE: ABNORMAL
SP GR UR STRIP: 1.03 (ref 1–1.03)
SQUAMOUS #/AREA URNS AUTO: 0 /HPF (ref 0–1)
UFH PPP CHRO-ACNC: 0.25 IU/ML
UFH PPP CHRO-ACNC: 0.35 IU/ML
UROBILINOGEN UR STRIP-MCNC: 0 MG/DL (ref 0–2)
WBC # BLD AUTO: 10.4 10E9/L (ref 4–11)
WBC # BLD AUTO: 10.6 10E9/L (ref 4–11)
WBC # BLD AUTO: 11.8 10E9/L (ref 4–11)
WBC #/AREA URNS AUTO: 3 /HPF (ref 0–5)

## 2021-04-29 PROCEDURE — 200N000001 HC R&B ICU

## 2021-04-29 PROCEDURE — 85027 COMPLETE CBC AUTOMATED: CPT | Performed by: SURGERY

## 2021-04-29 PROCEDURE — 999N000065 XR CHEST PORT 1 VIEW

## 2021-04-29 PROCEDURE — 85520 HEPARIN ASSAY: CPT | Performed by: PHYSICIAN ASSISTANT

## 2021-04-29 PROCEDURE — 36415 COLL VENOUS BLD VENIPUNCTURE: CPT | Performed by: INTERNAL MEDICINE

## 2021-04-29 PROCEDURE — 87640 STAPH A DNA AMP PROBE: CPT | Performed by: INTERNAL MEDICINE

## 2021-04-29 PROCEDURE — 94003 VENT MGMT INPAT SUBQ DAY: CPT

## 2021-04-29 PROCEDURE — 87040 BLOOD CULTURE FOR BACTERIA: CPT | Performed by: INTERNAL MEDICINE

## 2021-04-29 PROCEDURE — 02HQ32Z INSERTION OF MONITORING DEVICE INTO RIGHT PULMONARY ARTERY, PERCUTANEOUS APPROACH: ICD-10-PCS | Performed by: SURGERY

## 2021-04-29 PROCEDURE — 85520 HEPARIN ASSAY: CPT | Performed by: SURGERY

## 2021-04-29 PROCEDURE — 258N000003 HC RX IP 258 OP 636: Performed by: INTERNAL MEDICINE

## 2021-04-29 PROCEDURE — 999N000157 HC STATISTIC RCP TIME EA 10 MIN

## 2021-04-29 PROCEDURE — 82805 BLOOD GASES W/O2 SATURATION: CPT | Performed by: SURGERY

## 2021-04-29 PROCEDURE — 99291 CRITICAL CARE FIRST HOUR: CPT | Mod: 24 | Performed by: INTERNAL MEDICINE

## 2021-04-29 PROCEDURE — 999N000009 HC STATISTIC AIRWAY CARE

## 2021-04-29 PROCEDURE — 250N000013 HC RX MED GY IP 250 OP 250 PS 637: Performed by: SURGERY

## 2021-04-29 PROCEDURE — 250N000012 HC RX MED GY IP 250 OP 636 PS 637: Performed by: SURGERY

## 2021-04-29 PROCEDURE — 250N000009 HC RX 250

## 2021-04-29 PROCEDURE — 258N000003 HC RX IP 258 OP 636: Performed by: HOSPITALIST

## 2021-04-29 PROCEDURE — 83605 ASSAY OF LACTIC ACID: CPT | Performed by: SURGERY

## 2021-04-29 PROCEDURE — 99232 SBSQ HOSP IP/OBS MODERATE 35: CPT | Performed by: INTERNAL MEDICINE

## 2021-04-29 PROCEDURE — 250N000011 HC RX IP 250 OP 636: Performed by: SURGERY

## 2021-04-29 PROCEDURE — 999N001017 HC STATISTIC GLUCOSE BY METER IP

## 2021-04-29 PROCEDURE — 85027 COMPLETE CBC AUTOMATED: CPT | Performed by: PHYSICIAN ASSISTANT

## 2021-04-29 PROCEDURE — 84100 ASSAY OF PHOSPHORUS: CPT | Performed by: SURGERY

## 2021-04-29 PROCEDURE — 93005 ELECTROCARDIOGRAM TRACING: CPT

## 2021-04-29 PROCEDURE — 83735 ASSAY OF MAGNESIUM: CPT | Performed by: SURGERY

## 2021-04-29 PROCEDURE — 81001 URINALYSIS AUTO W/SCOPE: CPT | Performed by: INTERNAL MEDICINE

## 2021-04-29 PROCEDURE — 250N000011 HC RX IP 250 OP 636: Performed by: INTERNAL MEDICINE

## 2021-04-29 PROCEDURE — 71045 X-RAY EXAM CHEST 1 VIEW: CPT

## 2021-04-29 PROCEDURE — 250N000009 HC RX 250: Performed by: SURGERY

## 2021-04-29 PROCEDURE — 80048 BASIC METABOLIC PNL TOTAL CA: CPT | Performed by: INTERNAL MEDICINE

## 2021-04-29 PROCEDURE — 87641 MR-STAPH DNA AMP PROBE: CPT | Performed by: INTERNAL MEDICINE

## 2021-04-29 PROCEDURE — 80048 BASIC METABOLIC PNL TOTAL CA: CPT | Performed by: SURGERY

## 2021-04-29 PROCEDURE — 82805 BLOOD GASES W/O2 SATURATION: CPT | Performed by: INTERNAL MEDICINE

## 2021-04-29 PROCEDURE — 84145 PROCALCITONIN (PCT): CPT | Performed by: INTERNAL MEDICINE

## 2021-04-29 PROCEDURE — 82330 ASSAY OF CALCIUM: CPT | Performed by: SURGERY

## 2021-04-29 PROCEDURE — 85025 COMPLETE CBC W/AUTO DIFF WBC: CPT | Performed by: SURGERY

## 2021-04-29 PROCEDURE — 80053 COMPREHEN METABOLIC PANEL: CPT | Performed by: SURGERY

## 2021-04-29 PROCEDURE — 258N000003 HC RX IP 258 OP 636: Performed by: SURGERY

## 2021-04-29 PROCEDURE — 84132 ASSAY OF SERUM POTASSIUM: CPT | Performed by: INTERNAL MEDICINE

## 2021-04-29 PROCEDURE — 250N000013 HC RX MED GY IP 250 OP 250 PS 637: Performed by: INTERNAL MEDICINE

## 2021-04-29 PROCEDURE — 250N000011 HC RX IP 250 OP 636: Performed by: PHYSICIAN ASSISTANT

## 2021-04-29 RX ORDER — CALCIUM GLUCONATE 20 MG/ML
1 INJECTION, SOLUTION INTRAVENOUS ONCE
Status: COMPLETED | OUTPATIENT
Start: 2021-04-29 | End: 2021-04-29

## 2021-04-29 RX ORDER — POTASSIUM CHLORIDE 29.8 MG/ML
20 INJECTION INTRAVENOUS
Status: COMPLETED | OUTPATIENT
Start: 2021-04-29 | End: 2021-04-29

## 2021-04-29 RX ORDER — PIPERACILLIN SODIUM, TAZOBACTAM SODIUM 3; .375 G/15ML; G/15ML
3.38 INJECTION, POWDER, LYOPHILIZED, FOR SOLUTION INTRAVENOUS EVERY 6 HOURS
Status: DISCONTINUED | OUTPATIENT
Start: 2021-04-29 | End: 2021-05-02

## 2021-04-29 RX ORDER — FUROSEMIDE 10 MG/ML
20 INJECTION INTRAMUSCULAR; INTRAVENOUS ONCE
Status: COMPLETED | OUTPATIENT
Start: 2021-04-29 | End: 2021-04-29

## 2021-04-29 RX ORDER — MAGNESIUM SULFATE HEPTAHYDRATE 40 MG/ML
2 INJECTION, SOLUTION INTRAVENOUS ONCE
Status: COMPLETED | OUTPATIENT
Start: 2021-04-29 | End: 2021-04-29

## 2021-04-29 RX ORDER — EPINEPHRINE HCL IN DEXTROSE 5% 2MG/250ML
.03-.3 PLASTIC BAG, INJECTION (ML) INTRAVENOUS CONTINUOUS
Status: DISCONTINUED | OUTPATIENT
Start: 2021-04-29 | End: 2021-04-29

## 2021-04-29 RX ORDER — HEPARIN SODIUM 10000 [USP'U]/100ML
0-5000 INJECTION, SOLUTION INTRAVENOUS CONTINUOUS
Status: DISCONTINUED | OUTPATIENT
Start: 2021-04-29 | End: 2021-05-01

## 2021-04-29 RX ORDER — EPINEPHRINE IN 0.9 % SOD CHLOR 5 MG/250ML
.03-.3 PLASTIC BAG, INJECTION (ML) INTRAVENOUS CONTINUOUS
Status: DISCONTINUED | OUTPATIENT
Start: 2021-04-29 | End: 2021-04-29

## 2021-04-29 RX ORDER — ACETAMINOPHEN 650 MG/1
650 SUPPOSITORY RECTAL EVERY 4 HOURS PRN
Status: DISCONTINUED | OUTPATIENT
Start: 2021-04-29 | End: 2021-05-08 | Stop reason: HOSPADM

## 2021-04-29 RX ORDER — POTASSIUM CHLORIDE 29.8 MG/ML
20 INJECTION INTRAVENOUS ONCE
Status: COMPLETED | OUTPATIENT
Start: 2021-04-29 | End: 2021-04-29

## 2021-04-29 RX ADMIN — EPOPROSTENOL 20 NG/KG/MIN: 1.5 INJECTION, POWDER, LYOPHILIZED, FOR SOLUTION INTRAVENOUS at 23:25

## 2021-04-29 RX ADMIN — PROPOFOL 40 MCG/KG/MIN: 10 INJECTION, EMULSION INTRAVENOUS at 09:26

## 2021-04-29 RX ADMIN — VANCOMYCIN HYDROCHLORIDE 2000 MG: 5 INJECTION, POWDER, LYOPHILIZED, FOR SOLUTION INTRAVENOUS at 23:07

## 2021-04-29 RX ADMIN — EPOPROSTENOL 20 NG/KG/MIN: 1.5 INJECTION, POWDER, LYOPHILIZED, FOR SOLUTION INTRAVENOUS at 02:40

## 2021-04-29 RX ADMIN — Medication 0.07 MCG/KG/MIN: at 12:31

## 2021-04-29 RX ADMIN — SODIUM CHLORIDE: 9 INJECTION, SOLUTION INTRAVENOUS at 22:27

## 2021-04-29 RX ADMIN — FENTANYL CITRATE 50 MCG: 50 INJECTION, SOLUTION INTRAMUSCULAR; INTRAVENOUS at 20:11

## 2021-04-29 RX ADMIN — POTASSIUM CHLORIDE 20 MEQ: 29.8 INJECTION, SOLUTION INTRAVENOUS at 11:21

## 2021-04-29 RX ADMIN — MUPIROCIN 0.5 G: 20 OINTMENT TOPICAL at 09:35

## 2021-04-29 RX ADMIN — MAGNESIUM SULFATE HEPTAHYDRATE 2 G: 40 INJECTION, SOLUTION INTRAVENOUS at 04:49

## 2021-04-29 RX ADMIN — EPOPROSTENOL 20 NG/KG/MIN: 1.5 INJECTION, POWDER, LYOPHILIZED, FOR SOLUTION INTRAVENOUS at 07:36

## 2021-04-29 RX ADMIN — FENTANYL CITRATE 50 MCG: 50 INJECTION, SOLUTION INTRAMUSCULAR; INTRAVENOUS at 08:38

## 2021-04-29 RX ADMIN — CEFAZOLIN SODIUM 2 G: 2 INJECTION, SOLUTION INTRAVENOUS at 09:27

## 2021-04-29 RX ADMIN — PROPOFOL 40 MCG/KG/MIN: 10 INJECTION, EMULSION INTRAVENOUS at 10:21

## 2021-04-29 RX ADMIN — POTASSIUM CHLORIDE 20 MEQ: 29.8 INJECTION, SOLUTION INTRAVENOUS at 05:47

## 2021-04-29 RX ADMIN — SODIUM PHOSPHATE, MONOBASIC, MONOHYDRATE 15 MMOL: 276; 142 INJECTION, SOLUTION INTRAVENOUS at 09:12

## 2021-04-29 RX ADMIN — SODIUM CHLORIDE 11 UNITS/HR: 9 INJECTION, SOLUTION INTRAVENOUS at 07:37

## 2021-04-29 RX ADMIN — SODIUM PHOSPHATE, MONOBASIC, MONOHYDRATE 15 MMOL: 276; 142 INJECTION, SOLUTION INTRAVENOUS at 05:04

## 2021-04-29 RX ADMIN — CHLORHEXIDINE GLUCONATE 15 ML: 1.2 SOLUTION ORAL at 09:21

## 2021-04-29 RX ADMIN — HEPARIN SODIUM 1200 UNITS/HR: 10000 INJECTION, SOLUTION INTRAVENOUS at 09:53

## 2021-04-29 RX ADMIN — FUROSEMIDE 20 MG: 10 INJECTION, SOLUTION INTRAVENOUS at 16:35

## 2021-04-29 RX ADMIN — CEFAZOLIN SODIUM 2 G: 2 INJECTION, SOLUTION INTRAVENOUS at 02:09

## 2021-04-29 RX ADMIN — Medication 0.08 MCG/KG/MIN: at 09:27

## 2021-04-29 RX ADMIN — CEFAZOLIN SODIUM 2 G: 2 INJECTION, SOLUTION INTRAVENOUS at 16:40

## 2021-04-29 RX ADMIN — HYDROMORPHONE HYDROCHLORIDE 0.4 MG: 1 INJECTION, SOLUTION INTRAMUSCULAR; INTRAVENOUS; SUBCUTANEOUS at 03:49

## 2021-04-29 RX ADMIN — FENTANYL CITRATE 50 MCG: 50 INJECTION, SOLUTION INTRAMUSCULAR; INTRAVENOUS at 14:53

## 2021-04-29 RX ADMIN — SODIUM CHLORIDE 9 UNITS/HR: 9 INJECTION, SOLUTION INTRAVENOUS at 04:24

## 2021-04-29 RX ADMIN — SODIUM BICARBONATE 50 MEQ: 84 INJECTION INTRAVENOUS at 02:26

## 2021-04-29 RX ADMIN — PROPOFOL 25 MCG/KG/MIN: 10 INJECTION, EMULSION INTRAVENOUS at 14:09

## 2021-04-29 RX ADMIN — PROPOFOL 40 MCG/KG/MIN: 10 INJECTION, EMULSION INTRAVENOUS at 05:05

## 2021-04-29 RX ADMIN — POTASSIUM CHLORIDE 20 MEQ: 29.8 INJECTION, SOLUTION INTRAVENOUS at 02:14

## 2021-04-29 RX ADMIN — POTASSIUM CHLORIDE 20 MEQ: 29.8 INJECTION, SOLUTION INTRAVENOUS at 03:10

## 2021-04-29 RX ADMIN — SODIUM CHLORIDE 10 UNITS/HR: 9 INJECTION, SOLUTION INTRAVENOUS at 01:20

## 2021-04-29 RX ADMIN — PIPERACILLIN SODIUM AND TAZOBACTAM SODIUM 3.38 G: 3; .375 INJECTION, POWDER, LYOPHILIZED, FOR SOLUTION INTRAVENOUS at 22:23

## 2021-04-29 RX ADMIN — Medication 0.1 MCG/KG/MIN: at 00:59

## 2021-04-29 RX ADMIN — CALCIUM GLUCONATE 1 G: 20 INJECTION, SOLUTION INTRAVENOUS at 15:57

## 2021-04-29 RX ADMIN — PROPOFOL 40 MCG/KG/MIN: 10 INJECTION, EMULSION INTRAVENOUS at 00:27

## 2021-04-29 RX ADMIN — HYDROMORPHONE HYDROCHLORIDE 0.4 MG: 1 INJECTION, SOLUTION INTRAMUSCULAR; INTRAVENOUS; SUBCUTANEOUS at 12:59

## 2021-04-29 RX ADMIN — EPOPROSTENOL 20 NG/KG/MIN: 1.5 INJECTION, POWDER, LYOPHILIZED, FOR SOLUTION INTRAVENOUS at 12:25

## 2021-04-29 RX ADMIN — MUPIROCIN 0.5 G: 20 OINTMENT TOPICAL at 21:00

## 2021-04-29 RX ADMIN — ACETAMINOPHEN 650 MG: 650 SUPPOSITORY RECTAL at 20:37

## 2021-04-29 RX ADMIN — SODIUM CHLORIDE, POTASSIUM CHLORIDE, SODIUM LACTATE AND CALCIUM CHLORIDE 500 ML: 600; 310; 30; 20 INJECTION, SOLUTION INTRAVENOUS at 01:50

## 2021-04-29 RX ADMIN — VASOPRESSIN 2.4 UNITS/HR: 20 INJECTION INTRAVENOUS at 04:06

## 2021-04-29 RX ADMIN — SODIUM CHLORIDE: 9 INJECTION, SOLUTION INTRAVENOUS at 02:39

## 2021-04-29 RX ADMIN — EPOPROSTENOL 20 NG/KG/MIN: 1.5 INJECTION, POWDER, LYOPHILIZED, FOR SOLUTION INTRAVENOUS at 17:41

## 2021-04-29 RX ADMIN — POTASSIUM CHLORIDE 20 MEQ: 29.8 INJECTION, SOLUTION INTRAVENOUS at 04:46

## 2021-04-29 RX ADMIN — CHLORHEXIDINE GLUCONATE 15 ML: 1.2 SOLUTION ORAL at 20:06

## 2021-04-29 RX ADMIN — Medication: at 00:30

## 2021-04-29 RX ADMIN — FENTANYL CITRATE 50 MCG: 50 INJECTION, SOLUTION INTRAMUSCULAR; INTRAVENOUS at 23:05

## 2021-04-29 RX ADMIN — PROPOFOL 25 MCG/KG/MIN: 10 INJECTION, EMULSION INTRAVENOUS at 22:20

## 2021-04-29 RX ADMIN — SODIUM CHLORIDE 3 UNITS/HR: 9 INJECTION, SOLUTION INTRAVENOUS at 15:59

## 2021-04-29 RX ADMIN — HYDROMORPHONE HYDROCHLORIDE 0.4 MG: 1 INJECTION, SOLUTION INTRAMUSCULAR; INTRAVENOUS; SUBCUTANEOUS at 05:36

## 2021-04-29 RX ADMIN — CALCIUM GLUCONATE 1 G: 20 INJECTION, SOLUTION INTRAVENOUS at 05:19

## 2021-04-29 ASSESSMENT — ACTIVITIES OF DAILY LIVING (ADL)
ADLS_ACUITY_SCORE: 16
ADLS_ACUITY_SCORE: 17
ADLS_ACUITY_SCORE: 16
ADLS_ACUITY_SCORE: 19
ADLS_ACUITY_SCORE: 19
ADLS_ACUITY_SCORE: 17

## 2021-04-29 ASSESSMENT — MIFFLIN-ST. JEOR: SCORE: 1784.13

## 2021-04-29 NOTE — PROCEDURES
[facility]    Central line    Date/Time: 4/29/2021 12:41 AM  Performed by: Bárbara Liu MD  Authorized by: Yumi Singh MD   Indications: vascular access    UNIVERSAL PROTOCOL   Site Marked: Yes  Prior Images Obtained and Reviewed:  Yes  Required items: Required blood products, implants, devices and special equipment available    Patient identity confirmed:  Hospital-assigned identification number  Patient was reevaluated immediately before administering moderate or deep sedation or anesthesia  Confirmation Checklist:  Patient's identity using two indicators, relevant allergies, procedure was appropriate and matched the consent or emergent situation and correct equipment/implants were available  Time out: Immediately prior to the procedure a time out was called    Universal Protocol: the Joint Commission Universal Protocol was followed    Preparation: Patient was prepped and draped in usual sterile fashion    ESBL (mL):  5        SEDATION    Patient Sedated: Yes    Sedation Type:  Deep  Sedation:  Fentanyl and propofol  Vital signs: Vital signs monitored during sedation      Preparation: skin prepped with ChloraPrep  Skin prep agent dried: skin prep agent completely dried prior to procedure  Sterile barriers: all five maximum sterile barriers used - cap, mask, sterile gown, sterile gloves, and large sterile sheet  Hand hygiene: hand hygiene performed prior to central venous catheter insertion  Site selection rationale: Right IJ in-use for PA catheter, critically ill  Patient position: flat  Catheter type: triple lumen  Catheter size: 7 Fr  Pre-procedure: landmarks identified  Ultrasound guidance: no  Number of attempts: 1  Successful placement: yes  Post-procedure: line sutured and dressing applied  Assessment: blood return through all ports and free fluid flow    PROCEDURE   Patient Tolerance:  Patient tolerated the procedure well with no immediate  complications  Describe Procedure: Patient prepped and draped in sterile fashion. Right femoral artery palpated and right femoral vein accessed using landmarks on first attempt with return of dark, nonpulsatile blood. Seldinger technique used to insert 7cm triple lumen central line without complication. Blood aspirated from all ports and flushed with sterile saline. Line sutured in place. Biopatch and secure dressing placed. Patient tolerated well.   Length of time physician/provider present for 1:1 monitoring during sedation: 10

## 2021-04-29 NOTE — PROVIDER NOTIFICATION
Dr. Liu notified of Lactic acid, SBP labile, low increasing pressors. Orders obtained & carried out. CT losses minimal. uop adeq.

## 2021-04-29 NOTE — PLAN OF CARE
Very unstable night with rising Lactic Acid, pressor requirements high. (see MAR & flowsheets for details) Uop adeq, CT loss minimal. New lines placed, received Cyanokit, urine now burgundy. Multiple calls to CV surgery, Intensivist. Now able to slowly wean pressors to keep MAP>65. Remains sedated on Propofol @ 40. SR isolated PVC. Labs obtained, replacing K+, Mg+ & Phos

## 2021-04-29 NOTE — ANESTHESIA PROCEDURE NOTES
Central Line/PA Catheter Placement  Pre-Procedure   Staff -        Anesthesiologist:  Choco Spencer MD       Performed By: Anesthesiologist       Location: OR       Pre-Anesthestic Checklist: patient identified, IV checked, site marked, risks and benefits discussed, informed consent, monitors and equipment checked and pre-op evaluation  Timeout:       Correct Patient: Yes        Correct Procedure: Yes        Correct Site: Yes        Correct Position: Yes        Correct Laterality: N/A     Procedure   Procedure: central line and new line       Laterality: right       Insertion Site: internal jugular.       Patient Position: supine  Sterile Prep        All elements of maximal sterile barrier technique followed       Patient Prep/Sterile Barriers: draped, hand hygiene, gloves , hat , mask , draped, gown, sterile gel and probe cover       Skin prep: Chloraprep  Insertion/Injection        Local skin infiltrated with 3 mL of 1% lidocaine.        Technique: Seldinger Technique and ultrasound guided        - Vein evaluated for patency/adequacy of catheter insertion is adequate, and using realtime U/S imaging the vein was punctured, and needle was observed entering vein on U/S       - Permanent Image entered into patient's record       - The visualized structures were anatomically normal.       - There were no apparent abnormal pathologic findings       Introducer Type: 9 Fr, 10 cm        Hands-off Catheter: Hands-off 3-lumen via introducer   Narrative         Secured by: suture       Biopatch and Tegaderm dressing used.       Complications: None apparent,        blood aspirated from all lumens,        All lumens flushed: Yes       Verification method: Placement to be verified post-op  Comments:  Ultrasound Interpretation, central venous catheter    1. Ultrasound guidance was used to evaluate potential access sites.  2. Ultrasound was also used to verify the patency of the vessel specified above.   3. Ultrasound was  used to visualize the needle entering the vessel.   4. The visualized structures were anatomically normal.  5. There were no apparent abnormal pathological findings.  6. A permanent ultrasound image was saved in the patient's record.    Line placed in operating room post induction.   Blood aspirated from all lumens.

## 2021-04-29 NOTE — PROVIDER NOTIFICATION
Notified the intensivist, Dr. Sanchez, regarding critical lactic acid value. Also notified the bedside RN.

## 2021-04-29 NOTE — PROGRESS NOTES
Patient is tentatively on IR schedule tomorrow Friday 4/30/21 for a temporary IVC filter placement in the afternoon.     -Labs WNL for procedure.    -Patient intubated and sedated.     Yusuf Hahn has been called a couple times today but has not answered and there is no VM set up.  Per ICU RN he was driving back to [place] today and was aware of the IVC filter recommendation. Will continue to attempt to call tomorrow also.     [place] the IR department at [phone] for procedure related questions.      Discussed with Susanna ODOM with CT surgery today.     Thanks [place] [facility] CNP ([phone]) (phone [phone])

## 2021-04-29 NOTE — CONSULTS
[facility] Intensive Care Unit  History and Physical  April 28, 2021      Raj Warren MRN# [id number]   Age: 70 year old YOB: 1950     Date of Admission: 4/27/2021    Primary care provider: Nuvia Brown            Assessment and Plan:     Raj Warren is a 70 year old male admitted on 4/27/2021 for No chief complaint on file.  . My assessment and plan for this patient is as follows:    SUMMARY: Raj Warren is a 70 year old gentleman with recent diagnosis of Laryngeal ca (S/p Trach), who is s/p surgical pulmonary thrombectomy on 4/28/2021.    Neurology and Psychiatry: Sedation and analgesia achieved by; Propofol  1. Sedation for Respiratory issues.    Plan:   - Goal RASS of 0 to -1    Pulmonary: Lung protective ventilation strategy with a tidal volume of 6-8mL/kg of ideal body weight, head of the bed elevated at 30 degrees, and oral care.  1. Hypoxic resp failure: Due to Arthur Pulmonary embolism. Lower extremity dopplers were neg on 4/27/2021. Etiology ?due to laryngeal cancer. Neg RT-PCR for COVID. This was associated with D-dimer >20, BNP 5441 and Trop of 0.083.     Plan:  - Repeat RT-PCR for COVID in 72hrs.  - Plan to do PST in AM.  - Will discuss Anticoag plan with CVTS.    Cardiovascular system:  1. Shock: Due to PE and cardiac surgery.  Echo (4/27): EF of 60 - 65%, RV severely dilated with moderate reduction in RVSF. Clot noted in RV. RVSP of 48mmHg.    Plan:  - Will plan to wean Levo/Vaso gtts first. Goal MAP Of 65.  - Then wean milrinone and Flolan by 50%.    Renal/Electrolytes:  1. Mild Lactic acidosis    Plan:    ID:  1. No acute issues.    Plan:    GI/  1. No acute issues.  Plan:    Musculoskeletal/Rheumatology:   1. No acute issues.    Plan:    Endocrine:  1. Insulin gtts as needed per ICU guidelines.    Plan:    Heme/Onc:  1. Laryngeal Cancer. Diagnosed recently, S/p Trach    Plan:   - Concurrent Chemo/XRT per Oncology.     ICU prophylaxis:      DVT; SCDs     VAP; As  above     Restrain needed: Yes     Stress ulcer: IV PPI     Central line: Needed today for IV access/Meds.    Code Status: Full    Prognosis:  Critical    Family update: Done at bedside by me.    Billing: total time spend providing critical care was 45min, excluding the procedures.    Panarmani Sanchez MD            Treatment goals for next 24 hours:   1.  2.  3.  4.  5.         History of Present Illness:     Raj Warren is a 70 year old gentleman with recent diagnosis of Laryngeal ca (s/p Trach for stridor), who presented with shortness of breath present over the past few days.     He developed SOB for the last few days and had some cough and chest discomfort. He presented on 4/27/2021 and was found to have clots. He was assessed IR and felt the clot burden was too high.   He was taken to surgery today for pulmonary thrombectomy.              Mechanical Ventilation:     Day # 1  FiO2 (%): 35 %  Resp: 25    Peak airway pressure:   Plateau airway pressure:   Auto-PEEP:            Lines:     PICC:    (placed on  )   Central venous line:  (placed on 4/28/2021)   Periph IV:  Vasc cath: (placed on 4/28/2021)  Mera cath: (placed on 4/28/2021)   ETT # 7.5  OGT: (placed on 4/28/2021)   Feeding tube/Dobhoff: (placed on )   PEG: (placed on )   Tracheostomy: (placed on 4/28/2021)   Chest tube: (placed on 4/28/2021)          Feeding:     Enteral Feeding:  None  Route;   Source;   Gastric residuals;     Parenteral feeding: None  Route;   Day #    Blood glucose/Insulin requirement last 24 hours:              Allergies:     Allergies   Allergen Reactions     Pollen Extract                 Past Medical History:     Past Medical History:   Diagnosis Date     Allergic state              Past Surgical History:     Past Surgical History:   Procedure Laterality Date     LARYNGOSCOPY N/A 4/12/2021    Procedure: LARYNGOSCOPY;  Surgeon: Choco Johnson MD;  Location: [facility] OR     TRACHEOSTOMY  4/12/2021    Procedure: CREATION,  TRACHEOSTOMY;  Surgeon: Choco Johnson MD;  Location:  OR             Social History:     Social History     Socioeconomic History     Marital status: Single     Spouse name: Not on file     Number of children: Not on file     Years of education: Not on file     Highest education level: Not on file   Occupational History     Not on file   Social Needs     Financial resource strain: Not on file     Food insecurity     Worry: Not on file     Inability: Not on file     Transportation needs     Medical: Not on file     Non-medical: Not on file   Tobacco Use     Smoking status: Former Smoker     Types: Cigarettes     Quit date: 1989     Years since quittin.3     Smokeless tobacco: Never Used     Tobacco comment: quit in   had smoked about 1/2 pack a day then cut back   Substance and Sexual Activity     Alcohol use: No     Drug use: No     Sexual activity: Yes     Partners: Female   Lifestyle     Physical activity     Days per week: Not on file     Minutes per session: Not on file     Stress: Not on file   Relationships     Social connections     Talks on phone: Not on file     Gets together: Not on file     Attends [employer] service: Not on file     Active member of club or organization: Not on file     Attends meetings of clubs or organizations: Not on file     Relationship status: Not on file     Intimate partner violence     Fear of current or ex partner: Not on file     Emotionally abused: Not on file     Physically abused: Not on file     Forced sexual activity: Not on file   Other Topics Concern     Parent/sibling w/ CABG, MI or angioplasty before 65F 55M? Not Asked   Social History Narrative     Not on file         Smoking:   Social History     Tobacco Use     Smoking status: Former Smoker     Types: Cigarettes     Quit date: 1989     Years since quittin.3     Smokeless tobacco: Never Used     Tobacco comment: quit in   had smoked about 1/2 pack a day then cut back   Substance Use  Topics     Alcohol use: No     Drug use: No              Family History:     Family History   Problem Relation Age of Onset     Circulatory Mother         blood clots     Alcohol/Drug Father         alcohol drank heavily     Alcohol/Drug Brother         alcohol                Medications:     Current Facility-Administered Medications   Medication     acetaminophen (TYLENOL) solution 650 mg     [START ON 5/1/2021] acetaminophen (TYLENOL) tablet 650 mg     acetaminophen (TYLENOL) tablet 975 mg     albumin human 5 % injection 500-1,000 mL     albumin human 5 % injection     albuterol (PROVENTIL) neb solution 2.5 mg     [START ON 4/29/2021] aspirin (ASA) chewable tablet 162 mg     benzocaine-menthol (CHLORASEPTIC) 6-10 MG lozenge 1 lozenge     bisacodyl (DULCOLAX) Suppository 10 mg     [START ON 4/29/2021] ceFAZolin (ANCEF) intermittent infusion 2 g in 100 mL dextrose PRE-MIX     chlorhexidine (PERIDEX) 0.12 % solution 15 mL     dexmedetomidine (PRECEDEX) 400 mcg in 0.9% sodium chloride 100 mL     dextrose 10% infusion     dextrose 5% and 0.45% NaCl + KCl 20 mEq/L infusion     glucose gel 15-30 g    Or     dextrose 50 % injection 25-50 mL    Or     glucagon injection 1 mg     docusate sodium (COLACE) capsule 100 mg     EPINEPHrine (ADRENALIN) 5 mg in  mL infusion     epoprostenol (VELETRI) 20 mcg/mL in sterile water inhalation solution     fentaNYL (PF) (SUBLIMAZE) injection 25-50 mcg     gabapentin (NEURONTIN) capsule 100 mg     hydrALAZINE (APRESOLINE) injection 10 mg     HYDROmorphone (DILAUDID) injection 0.2 mg    Or     HYDROmorphone (PF) (DILAUDID) injection 0.4 mg     insulin 1 unit/mL in saline (NovoLIN, HumuLIN Regular) drip - ADULT IV Infusion     lidocaine (LMX4) cream     lidocaine 1 % 0.1-1 mL     LORazepam (ATIVAN) injection 0.5-1 mg     magnesium hydroxide (MILK OF MAGNESIA) suspension 30 mL     methocarbamol (ROBAXIN) tablet 500 mg     milrinone (PRIMACOR) infusion 200 mcg/mL PREMIX     mupirocin  "(BACTROBAN) 2 % ointment 0.5 g     naloxone (NARCAN) injection 0.2 mg    Or     naloxone (NARCAN) injection 0.4 mg    Or     naloxone (NARCAN) injection 0.2 mg    Or     naloxone (NARCAN) injection 0.4 mg     norepinephrine (LEVOPHED) 16 mg in  mL infusion CENTRAL LINE     ondansetron (ZOFRAN-ODT) ODT tab 4 mg    Or     ondansetron (ZOFRAN) injection 4 mg     oxyCODONE (ROXICODONE) tablet 5 mg    Or     oxyCODONE (ROXICODONE) tablet 10 mg     pantoprazole (PROTONIX) 2 mg/mL suspension 40 mg    Or     pantoprazole (PROTONIX) EC tablet 40 mg     phenylephrine (LAWANDA-SYNEPHRINE) 50 mg in NaCl 0.9 % 250 mL infusion     [START ON 4/29/2021] polyethylene glycol (MIRALAX) Packet 17 g     polyethylene glycol (MIRALAX) Packet 17 g     prochlorperazine (COMPAZINE) injection 5 mg    Or     prochlorperazine (COMPAZINE) tablet 5 mg    Or     prochlorperazine (COMPAZINE) suppository 12.5 mg     propofol (DIPRIVAN) infusion    And     propofol (DIPRIVAN) infusion 10-20 mg     Reason beta blocker order not selected     senna-docusate (SENOKOT-S/PERICOLACE) 8.6-50 MG per tablet 1 tablet     senna-docusate (SENOKOT-S/PERICOLACE) 8.6-50 MG per tablet 1 tablet    Or     senna-docusate (SENOKOT-S/PERICOLACE) 8.6-50 MG per tablet 2 tablet     sodium chloride (PF) 0.9% PF flush 3 mL     sodium chloride (PF) 0.9% PF flush 3 mL     sodium chloride 0.9% infusion     sodium phosphate (FLEET ENEMA) 1 enema     vasopressin 40 units in NS 40 mL (PITRESSIN) infusion               Review of Systems:   Unable to perform since the patient sedated/mechanically ventilated.         Physical Examination:   General: Alert, oriented, not in distress  The patient is orally intubated w ETT # 7.5, well sedated  Vitals: BP (!) 119/91 (BP Location: Left arm)   Pulse 129   Temp 98.3  F (36.8  C) (Oral)   Resp 25   Ht 1.803 m (5' 11\")   Wt 90.8 kg (200 lb 2.8 oz)   SpO2 100%   BMI 27.92 kg/m     HEENT: neck supple, symmetrical, no lymph node " enlargement, thyromegaly, bruit, JVD, pupils are isocoric and equally responsive to the light.  Lungs: both hemithoraces are symmetrical, normal to palpation, no dullness to percussion, auscultation of lungs revealed decreased bronchovesicular sounds, expirium prolongation, wheezing, rhonci and crackles  CVS: Normal S1 and S2, no additional heart sounds, murmur, rub, normal peripheral pulses  Abdomen: Bowel sounds present, soft, non tender, non distended, no organomegaly, ascitis, mass  Extremities/musculoskeletal: no peripheral edema, deformity, cyanosis, clubbing  Neurology:Grossly normal  Skin: no rash  Psychiatry: Can not evaluate due to sedation      Exam of Line sites: No erythema or discharge.          Labs:     CBC RESULTS:     Recent Labs   Lab 04/28/21 1900 04/28/21 1703 04/28/21  0840 04/27/21 1906 04/27/21  1254   WBC 14.4* 14.0* 8.5 9.0 8.7   RBC 3.20* 2.91* 4.75 5.03 5.46   HGB 9.5* 8.9* 14.0 14.7 16.2   HCT 28.7* 26.2* 40.9 42.8 48.6    208 292 281 308       Basic Metabolic Panel:  Recent Labs   Lab 04/28/21 1703 04/28/21  0840 04/27/21  1254   * 143 139   POTASSIUM 3.8 4.1 3.8   CHLORIDE 116* 114* 107   CO2 25 22 25   BUN 12 11 12   CR 0.96 0.92 1.06   * 116* 137*   TELMA 9.3 8.2* 9.6       INR  Recent Labs   Lab 04/28/21 1900 04/28/21  1703 04/28/21  1054   INR 1.50* 1.82* 1.26*          Thank you for this consultation    Pan Sanchez MD

## 2021-04-29 NOTE — PROGRESS NOTES
Patient seen and discussed with Adama Cali and Francisco    [facility]  Cardiovascular and Thoracic Surgery Daily Note          Assessment and Plan:   POD#1 s/p Emergent pulmonary embolectomy by Dr. Yumi Singh and Dr. Bárbara Cali  -CVS: HR: 90-110s. SBP: 100s-140s. Pre op EF: 60-65%. Milrinone weaned off overnight. Remains on Epi, NE and Vaso this am-- weaning as able. Wean epi last to maintain CI above 2. Lactic acid trending down 11.8<9.7<3.5. Chest tubes to suction. Start on low intensity heparin gtt with no bolus-- will do low intensity d/t risk of bleeding after surgery, will transition to high intensity when appropriate. IR consulted for IVC filter. Wound vac in place.  -Resp: Intubated via trach d/t laryngeal mass with airway compromise.   -Neuro:  Sedated on propofol  -Renal: good UO, up about 9kg from preoperative weight. Will continue to monitor.   Creatinine   Date Value Ref Range Status   2021 1.20 0.66 - 1.25 mg/dL Final   ]  -GI:  Continue bowel regimen.   -:  Mera in place d/t limited mobility and need for accurate I&Os.   -Endo: pre op a1c: 5.6 Continue Insulin gtt 48hrs  -FEN: replete lytes as needed, Na: 149 K: 3.3  Orders Placed This Encounter      NPO for Medical/Clinical Reasons Except for: Meds      Advance Diet as Tolerated: Clear Liquid Diet      Advance Diet as Tolerated: Regular Diet Adult; Low Saturated Fat Na <2400mg Diet    -ID: Temp (24hrs), Av.8  F (37.1  C), Min:95.9  F (35.5  C), Max:100.8  F (38.2  C)   Completing perioperative abx.   WBC   Date Value Ref Range Status   2021 10.6 4.0 - 11.0 10e9/L Final   ]  -Heme: plt: 306. Acute blood loss anemia and thrombocytopenia related to surgery.   Hemoglobin   Date Value Ref Range Status   2021 9.0 (L) 13.3 - 17.7 g/dL Final   ],   -Proph: PCD, ASA, PPI, heparin gtt.   -Dispo: Continue ICU cares. Wean pressors as able.           Interval History:   Remains intubated and sedated. Weaning  "pressors.          Medications:       acetaminophen  975 mg Oral Q8H     aspirin  162 mg Oral or NG Tube Daily     ceFAZolin  2 g Intravenous Q8H     chlorhexidine  15 mL Mouth/Throat Q12H     docusate sodium  100 mg Oral BID     gabapentin  100 mg Oral At Bedtime     mupirocin  0.5 g Both Nostrils BID     pantoprazole  40 mg Oral or NG Tube Daily    Or     pantoprazole  40 mg Oral Daily     polyethylene glycol  17 g Oral Daily     senna-docusate  1 tablet Oral BID     sodium phosphate  15 mmol Intravenous Q2H     [DISCONTINUED] acetaminophen **OR** acetaminophen, [START ON 5/1/2021] acetaminophen, albuterol, sore throat lozenge, bisacodyl, dexmedetomidine, dextrose, glucose **OR** dextrose **OR** glucagon, fentaNYL, hydrALAZINE, HYDROmorphone **OR** HYDROmorphone, lidocaine 4%, lidocaine (buffered or not buffered), LORazepam, magnesium hydroxide, methocarbamol, naloxone **OR** naloxone **OR** naloxone **OR** naloxone, norepinephrine, ondansetron **OR** ondansetron, oxyCODONE **OR** oxyCODONE, phenylephrine, polyethylene glycol, prochlorperazine **OR** prochlorperazine **OR** prochlorperazine, propofol (DIPRIVAN) infusion **AND** propofol **AND** CK total **AND** Triglycerides, BETA BLOCKER NOT PRESCRIBED, senna-docusate **OR** senna-docusate, sodium chloride (PF), sodium chloride (PF), sodium phosphate, vasopressin          Physical Exam:   Vitals were reviewed  Blood pressure 125/59, pulse 96, temperature 99.1  F (37.3  C), resp. rate 18, height 1.803 m (5' 11\"), weight 99.2 kg (218 lb 11.1 oz), SpO2 100 %.  Rhythm: NSR    Lungs: coarse    Cardiovascular: RRR normal s1 and s2    Abdomen: soft NTND    Extremeties: minimal edema    Incision: wound vac in place    CT: to suction    Weight:   Vitals:    04/27/21 1805 04/29/21 0630   Weight: 90.8 kg (200 lb 2.8 oz) 99.2 kg (218 lb 11.1 oz)            Data:   Labs:   Lab Results   Component Value Date    WBC 10.6 04/29/2021     Lab Results   Component Value Date    RBC " 3.02 04/29/2021     Lab Results   Component Value Date    HGB 9.0 04/29/2021     Lab Results   Component Value Date    HCT 26.4 04/29/2021     No components found for: MCT  Lab Results   Component Value Date    MCV 87 04/29/2021     Lab Results   Component Value Date    MCH 29.8 04/29/2021     Lab Results   Component Value Date    MCHC 34.1 04/29/2021     Lab Results   Component Value Date    RDW 14.1 04/29/2021     Lab Results   Component Value Date     04/29/2021       Last Basic Metabolic Panel:  Lab Results   Component Value Date     04/29/2021      Lab Results   Component Value Date    POTASSIUM 3.3 04/29/2021     Lab Results   Component Value Date    CHLORIDE 113 04/29/2021     Lab Results   Component Value Date    TELMA 7.2 04/29/2021     Lab Results   Component Value Date    CO2 26 04/29/2021     Lab Results   Component Value Date    BUN 14 04/29/2021     Lab Results   Component Value Date    CR 1.20 04/29/2021     Lab Results   Component Value Date     04/29/2021       CXR: 4/29/21    IMPRESSION: Tracheostomy. Right IJ Hitchcock-Lonnie catheter tip at the main  pulmonary artery. Median sternotomy, mediastinal drains, left basilar  chest tubes, and mediastinal surgical clips. Pacer pad has been  removed. Stable mild right basilar patchy opacity, either atelectasis  or developing pneumonia. Stable mild bibasilar retrocardiac  atelectasis. No pleural effusion or pneumothorax.       Susanna Dc PA-C  [facility]  Pager #[phone]

## 2021-04-29 NOTE — PROGRESS NOTES
Critical Care  Note      04/29/2021    Name: Raj Warren MRN#: [id number]   Age: 70 year old YOB: 1950     Hsptl Day# 2  ICU DAY #    MV DAY #             Problem List:   Active Problems:    Bilateral pulmonary embolism (H)    Pulmonary emboli (H)           Summary/Hospital Course:     70 year old male admitted on 4/27 with recent dx of laryngeal cancer and is recently s/p tracheostomy for cancer.  Patient presented with dyspnea and was found to have massive b/l pulmonary emboli & a right ventricular thrombus.  Patient was subsequently taken for suction embolectomy with cardiothoracic surgery on 4/28.  Patient is admitted to the ICU post op care; ICU is consulted for assistance & vent management.      Assessment and plan :     Raj Warren IS a 70 year old male admitted on 4/27/2021 for Bilateral Pulmonary Emboli & Right Ventricular Thrombus.   I have personally reviewed the daily labs, imaging studies, cultures and discussed the case with referring physician and consulting physicians.     My assessment and plan by system for this patient is as follows:    Neurology/Psychiatry:   1. Pain/analgesia  2. Sedation - TRUDY goal 0 to -1  Plan  APAP 975 PO Q 8 hours - pain  Gabapentin 100 mg PO at bedtime  Propofol Gtt for sedation  Ativan 0.5 - 1 prn anxiety  Robaxin 500 mg PO Q 6 prn muscle spasm  Oxycodone 5 - 10 mg PO Q 4 hours prn pain    Cardiovascular:   1. S/P Pulmonary Thrombectomy.  2. Bilateral Pulmonary Emboli  3. Right Ventricular Thrombus  4. Ascending Aortic Aneurysm - s/p repair  Plan  Milrinone Gtt - Weaned off  Epinephrine Gtt @ 0.07   - Wean as tolerated  Levophed Gtt - currently weaned off  Vasopressin Gtt - currently off  Heparin Gtt - started today.   mg PO daily    Pulmonary/Ventilator Management:   1. Acute Hypoxic Respiratory Failure   - Current settings CMV, Vt: 500, f: 18, FiO2: wean as arun, Peep 10  Plan  - Continue current vent plans for today.  - PST/wean to trach dome  possibly tomorrow.    GI and Nutrition :   1. No Issues.  Plan  - NPO  - Protonix 40 IVP daily  - Bowel regimen    Renal/Fluids/Electrolytes:   1. Hypernatremia  2. Hyperchloremia  3. Hypocalcemia  4. Primary Respiratory Alkalosis, Chronic with appropriately Compensated  Metabolic Acidosis  5. + 6.8 L/24 hrs, + 6.6 L/admit, urine: 925 ml's/24 hrs (38/hr)  Plan  - D5 0.45 NS @ 10 - 30 ml's per hour  - Replace e's prn  - Strict I's & O's    Infectious Disease:   1. Afebrile  2. No leukocytosis  3. Perioperative Abx's  Plan  - Ancef 2 gm Q8 x 3 doses    Endocrine:   1. Stress induced hyperglycemia  2. Blood glucose well controlled  Plan  - Cotinue insulin Gtt with goal sugar <180    Hematology/Oncology:   1. Hb: 8.7   2. WBC 10.4  3. Bilateral PE's  4. Right Ventricular Thrombus  5. Laryngeal Cancer  Plan  - Monitor Hb.  - Continue Heparin gtt     IV/Access:   1. Venous access - Right internal jugular Cordis with Cade, Right Femoral CVC  2. Arterial access - Left Radial A line  3.  Plan  - central access required and necessary      ICU Prophylaxis:   1. DVT: Heparin gtt/mechanical  2. VAP: HOB 30 degrees, chlorhexidine rinse  3. Stress Ulcer: PPI  4. Restraints: Nonviolent soft two point restraints required and necessary for patient safety and continued cares and good effect as patient continues to pull at necessary lines, tubes despite education and distraction. Will readdress daily.   5. Wound care  - not indicated  6. Feeding -no  7. Family Update: Son at bedside  8. Disposition - ICU      Key goals for next 24 hours:   1. Wean pressors  2. Wean vent with SBT tomorrow             Medical History:     Past Medical History:   Diagnosis Date     Allergic state      Past Surgical History:   Procedure Laterality Date     LARYNGOSCOPY N/A 4/12/2021    Procedure: LARYNGOSCOPY;  Surgeon: Choco Johnson MD;  Location: [facility] OR     TRACHEOSTOMY  4/12/2021    Procedure: CREATION, TRACHEOSTOMY;  Surgeon: Choco Johnson MD;   Location:  OR     Social History     Socioeconomic History     Marital status: Single     Spouse name: Not on file     Number of children: Not on file     Years of education: Not on file     Highest education level: Not on file   Occupational History     Not on file   Social Needs     Financial resource strain: Not on file     Food insecurity     Worry: Not on file     Inability: Not on file     Transportation needs     Medical: Not on file     Non-medical: Not on file   Tobacco Use     Smoking status: Former Smoker     Types: Cigarettes     Quit date: 1989     Years since quittin.3     Smokeless tobacco: Never Used     Tobacco comment: quit in   had smoked about 1/2 pack a day then cut back   Substance and Sexual Activity     Alcohol use: No     Drug use: No     Sexual activity: Yes     Partners: Female   Lifestyle     Physical activity     Days per week: Not on file     Minutes per session: Not on file     Stress: Not on file   Relationships     Social connections     Talks on phone: Not on file     Gets together: Not on file     Attends Sabianism service: Not on file     Active member of club or organization: Not on file     Attends meetings of clubs or organizations: Not on file     Relationship status: Not on file     Intimate partner violence     Fear of current or ex partner: Not on file     Emotionally abused: Not on file     Physically abused: Not on file     Forced sexual activity: Not on file   Other Topics Concern     Parent/sibling w/ CABG, MI or angioplasty before 65F 55M? Not Asked   Social History Narrative     Not on file        Allergies   Allergen Reactions     Pollen Extract               Key Medications:       acetaminophen  975 mg Oral Q8H     aspirin  162 mg Oral or NG Tube Daily     ceFAZolin  2 g Intravenous Q8H     chlorhexidine  15 mL Mouth/Throat Q12H     docusate sodium  100 mg Oral BID     gabapentin  100 mg Oral At Bedtime     mupirocin  0.5 g Both Nostrils BID      pantoprazole  40 mg Oral or NG Tube Daily    Or     pantoprazole  40 mg Oral Daily     polyethylene glycol  17 g Oral Daily     senna-docusate  1 tablet Oral BID     sodium phosphate  15 mmol Intravenous Q2H       dexmedetomidine       dextrose       dextrose 5% and 0.45% NaCl + KCl 20 mEq/L 30 mL/hr at 04/29/21 0746     EPINEPHrine 0.08 mcg/kg/min (04/29/21 0823)     epoprostenol (VELETRI) 20 mcg/mL in sterile water inhalation solution 20 ng/kg/min (04/29/21 0742)     insulin (regular) 6 Units/hr (04/29/21 0800)     milrinone Stopped (04/29/21 0025)     norepinephrine 0.06 mcg/kg/min (04/29/21 0823)     phenylephrine       propofol (DIPRIVAN) infusion 40 mcg/kg/min (04/29/21 0744)     BETA BLOCKER NOT PRESCRIBED       sodium chloride 10 mL/hr at 04/29/21 0745     vasopressin 2.4 Units/hr (04/29/21 0746)        Home Meds  No current facility-administered medications on file prior to encounter.   acetaminophen (TYLENOL) 325 MG tablet, Take 2 tablets (650 mg) by mouth 3 times daily as needed for mild pain               Physical Examination:   Temp:  [95.9  F (35.5  C)-100.8  F (38.2  C)] 99.1  F (37.3  C)  Pulse:  [] 96  Resp:  [8-31] 18  BP: ()/() 125/59  MAP:  [46 mmHg-143 mmHg] 84 mmHg  Arterial Line BP: ()/() 117/66  FiO2 (%):  [40 %] 40 %  SpO2:  [91 %-100 %] 100 %    Intake/Output Summary (Last 24 hours) at 4/29/2021 0901  Last data filed at 4/29/2021 0800  Gross per 24 hour   Intake 8248.58 ml   Output 1170 ml   Net 7078.58 ml     Wt Readings from Last 4 Encounters:   04/29/21 99.2 kg (218 lb 11.1 oz)   04/22/21 93.9 kg (207 lb)   04/17/21 97.7 kg (215 lb 6.2 oz)   02/19/18 98 kg (216 lb)     Arterial Line BP: ()/() 117/66  MAP:  [46 mmHg-143 mmHg] 84 mmHg  BP - Mean:  [] 78  CVP:  [10 mmHg-23 mmHg] 13 mmHg  SVO2:  [58 %-66 %] 61 %  Ventilation Mode: CMV/AC  (Continuous Mandatory Ventilation/ Assist Control)  FiO2 (%): 40 %  Rate Set (breaths/minute): 18  breaths/min  Tidal Volume Set (mL): 500 mL  PEEP (cm H2O): 10 cmH2O  Oxygen Concentration (%): 40 %  Resp: 18    Recent Labs   Lab 04/29/21  0400 04/29/21  0300 04/29/21  0118 04/28/21  2348 04/28/21  2245   PH 7.42 7.44 7.39 7.33* 7.27*   PCO2 37 34* 29* 26* 23*   PO2 109* 140* 94 76* 106*   HCO3 24 23 18* 14* 11*   O2PER 40% 40  --  40% 40%  40%       GEN: no acute distress   HEENT: head ncat, sclera anicteric, OP patent, trachea midline   PULM: unlabored synchronous with vent, clear anteriorly    CV/COR: RRR S1S2 no gallop,  No rub, no murmur  ABD: soft nontender, hypoactive bowel sounds, no mass  EXT:  Edema   warm  NEURO: Sedated and on vent  SKIN: no obvious rash           Data:   All data and imaging reviewed     ROUTINE ICU LABS (Last four results)  CMP  Recent Labs   Lab 04/29/21  0400 04/29/21  0118 04/28/21  2245 04/28/21  1900 04/28/21  0840 04/28/21  0840   * 149* 145* 146*   < > 143   POTASSIUM 3.3* 3.2* 3.8 3.7   < > 4.1   CHLORIDE 113* 112* 110* 115*   < > 114*   CO2 26 19* 13* 20   < > 22   ANIONGAP 10 18* 22* 11   < > 7   * 250* 291* 208*   < > 116*   BUN 14 14 14 11   < > 11   CR 1.20 1.31* 1.23 0.95   < > 0.92   GFRESTIMATED 61 55* 60* 82   < > 84   GFRESTBLACK 71 64 69 >90   < > >90   TELMA 7.2* 7.1* 7.7* 8.0*   < > 8.2*   MAG 2.0  --   --  2.1  --   --    PHOS 1.3*  --   --  4.9*  --  3.9   PROTTOTAL 5.4* 5.2* 5.6* 5.3*   < > 6.5*   ALBUMIN 3.3* 3.2* 3.5 2.9*   < > 2.9*   BILITOTAL 1.0 1.1 1.4* 0.9   < > 0.4   ALKPHOS 38* 33* 38* 44   < > 62   AST 50* 39 30 31   < > 23   ALT 47 38 29 28   < >  --     < > = values in this interval not displayed.     CBC  Recent Labs   Lab 04/29/21  0400 04/29/21  0118 04/28/21  2245 04/28/21  1900   WBC 10.6 11.8* 13.3* 14.4*   RBC 3.02* 2.89* 2.84* 3.20*   HGB 9.0* 8.8* 8.5* 9.5*   HCT 26.4* 25.5* 25.8* 28.7*   MCV 87 88 91 90   MCH 29.8 30.4 29.9 29.7   MCHC 34.1 34.5 32.9 33.1   RDW 14.1 14.2 14.2 14.0    283 278 283     INR  Recent Labs    Lab 04/28/21  1900 04/28/21  1703 04/28/21  1054   INR 1.50* 1.82* 1.26*     Arterial Blood Gas  Recent Labs   Lab 04/29/21  0400 04/29/21  0300 04/29/21  0118 04/28/21  2348 04/28/21  2245   PH 7.42 7.44 7.39 7.33* 7.27*   PCO2 37 34* 29* 26* 23*   PO2 109* 140* 94 76* 106*   HCO3 24 23 18* 14* 11*   O2PER 40% 40  --  40% 40%  40%       All cultures:  Recent Labs   Lab 04/27/21  1254   CULT No growth after 2 days  No growth after 2 days     Recent Results (from the past 24 hour(s))   XR Chest Port 1 View    Narrative    XR CHEST PORT 1 VIEW  4/28/2021 5:35 PM       INDICATION: Missing Instrument, Call OR12 [phone]  COMPARISON: 4/27/2021       Impression    IMPRESSION: Endotracheal tube above the mikhail. Sternotomy.  Mediastinal and left chest tubes. Right IJ central venous catheter tip  in the SVC. Lung volumes are low. No pneumothorax or pleural effusion.  No retained foreign bodies. Negative for postoperative purposes.    LEIDY WALTER MD   XR Chest Port 1 View    Narrative    XR CHEST PORT 1 VIEW  4/28/2021 8:11 PM       INDICATION: s/p cabg  COMPARISON: 4/28/2001 at 1728 hours       Impression    IMPRESSION: Tracheostomy tube in good position above the mikhail. Right  IJ CVC in SVC. Mediastinal tubes. No pleural effusion or pneumothorax.  The lungs are clear.    LEIDY WALTER MD   XR Chest Port 1 View    Narrative    EXAM: XR CHEST PORT 1 VIEW  LOCATION: [facility]  DATE/TIME: 4/29/2021 12:45 AM    INDICATION: [place]-Lonnie catheter placement.  COMPARISON: CTA chest 04/27/2021. Chest radiographs today.      Impression    IMPRESSION: Termination of the right internal jugular [place]-Lonnie catheter is coiled in the right ventricle. Repositioning recommended. Poststernotomy. Mediastinal and left basilar pleural drains unchanged. Mild bibasilar atelectasis.   XR Chest Port 1 View    Narrative    EXAM: XR CHEST PORT 1 VIEW  LOCATION: [facility]  DATE/TIME: 4/29/2021 12:46  AM    INDICATION: [place]-Lonnie catheter placement.  COMPARISON: Chest radiograph yesterday. CTA chest 04/27/2021.      Impression    IMPRESSION: Termination of the right internal jugular [place]-Lonnie catheter is coiled in the right ventricle. Repositioning recommended. Poststernotomy. Mediastinal and left basilar pleural drains in position. Tracheostomy in satisfactory position. Mild   bibasilar atelectasis. No pneumothorax.   XR Chest Port 1 View    Narrative    EXAM: CHEST SINGLE VIEW PORTABLE  LOCATION: [facility]  DATE/TIME: 4/29/2021 12:58 AM    INDICATION: Verify pulmonary arterial catheter placement.    COMPARISON: 4/29/2021 at 0106 hours.      Impression    IMPRESSION:  1. Interval advancement of a right internal jugular pulmonary arterial catheter with distal catheter tip now projecting over the main right pulmonary artery, approximately 3.3 cm lateral to the lateral aspect of the spine.  2. No other significant interval change since the recent comparison study.  3. A tracheostomy tube, mediastinal drain, and an additional left mediastinal or pleural drain are again noted.  4. A few band-like opacities in the right lung base most likely represent atelectasis or scarring.   XR Chest Port 1 View    Narrative    EXAM: XR CHEST PORT 1 VIEW  LOCATION: [facility]  DATE/TIME: 4/29/2021 1:08 AM    INDICATION: [place]-Lonnie catheter placement.  COMPARISON: Earlier today.      Impression    IMPRESSION: Right internal jugular venous [place]-Lonnie catheter terminates in the right main pulmonary artery. Poststernotomy. Mediastinal and left basilar pleural drains unchanged. Tracheostomy in satisfactory position. Mild bibasilar atelectasis. No   pneumothorax.   XR Chest Port 1 View    Narrative    EXAM: XR CHEST PORT 1 VIEW  LOCATION: [facility]  DATE/TIME: 4/29/2021 1:05 AM    INDICATION: Follow-up central line placement.  COMPARISON: Most recent exam from 0102 hours today      Impression     IMPRESSION: Minimal change. [place]-Lonnie catheter is been pulled back slightly, tip within the proximal aspect of right main pulmonary artery. Tracheostomy tube as well as the midline mediastinal and left pleural drains remain in place. Lungs clear. No   pneumothorax. Sternal wires. Heart size normal.         Billing: This patient is critically ill: Yes. Total critical care time today 45 min.

## 2021-04-29 NOTE — PROVIDER NOTIFICATION
Pt febrile max 101.6 F .  Pt on ancef.  Notified Dr. Hanley.  Pt given cool water bath, linen change.  Repositioning & pulmonary toilet continue as per routine.

## 2021-04-29 NOTE — PROGRESS NOTES
[facility] ICU RESPIRATORY NOTE        Date of Admission: 4/27/2021    Date of Intubation (most recent): 4/28/21 trached .    Reason for Mechanical Ventilation: airway protection    Number of Days on Mechanical Ventilation:1    Met Criteria for Spontaneous Breathing Trial: No    Reason for No Spontaneous Breathing Trial: Per MD    Significant Events Today: None    ABG Results:   Recent Labs   Lab 04/28/21  1900 04/27/21  1254   PH 7.24*  --    PCO2 43  --    PO2 163*  --    HCO3 18*  --    O2PER 90% Room Air       Current Vent Settings: Ventilation Mode: CMV/AC  (Continuous Mandatory Ventilation/ Assist Control)  FiO2 (%): 35 %  Rate Set (breaths/minute): 18 breaths/min  Tidal Volume Set (mL): 500 mL  PEEP (cm H2O): 5 cmH2O  Oxygen Concentration (%): 100 %  Resp: 29      Plan: Keep the pt on full ventilatory support.    Georgiana Santizo, RT

## 2021-04-29 NOTE — PLAN OF CARE
OT/cardiac rehab: held per nursing as patient not yet appropriate and no plan for extubation today.

## 2021-04-29 NOTE — PROGRESS NOTES
Lisa waveform dampening, NIBP higher, Dr. Sanchez @ bedside, will go by NIBP. For now. Pt. Is cool, Lyndon Hugger on. CT lossees minimal, uop adeq. Titrating Gtts to keep MAP>65

## 2021-04-29 NOTE — PROVIDER NOTIFICATION
CO/CI wnl (see flowsheets) slowly weaning pressors. Dr. Liu notified of LA, orders noted. Dr. Liu updated on vitas, hemodynamics. Continue to wean pressors as tolerated.

## 2021-04-29 NOTE — CONSULTS
CARDIAC SURGERY NUTRITION CONSULT    Received standing order to assess and educate patient.  Also asked to see patient for Nursing Admission Screen -  Have you recently lost weight without trying? - Unsure   Have you eaten poorly because of a decreased appetite? - Yes       Patient remains intubated s/p surgery, pressor x 3, Propofol, Insulin drip.  He also has a Trach in place.    Will follow and complete assessment once patient is extubated and/or is transferred to medical unit.    Patient will receive nutrition education during the Outpatient Cardiac Rehab Program (nutrition classes/dietitian counseling).    No Clark RD, LD, [facility]   Clinical Dietitian - [facility]

## 2021-04-29 NOTE — PROCEDURES
[facility]    PA Catheter Insertion    Date/Time: 4/29/2021 12:45 AM  Performed by: Bárbara Liu MD  Authorized by: Yumi Singh MD   Indications: central pressure monitoring    UNIVERSAL PROTOCOL   Site Marked: NA  Prior Images Obtained and Reviewed:  Yes  Required items: Required blood products, implants, devices and special equipment available    Patient identity confirmed:  Hospital-assigned identification number  Patient was reevaluated immediately before administering moderate or deep sedation or anesthesia  Confirmation Checklist:  Patient's identity using two indicators, relevant allergies, procedure was appropriate and matched the consent or emergent situation and correct equipment/implants were available  Time out: Immediately prior to the procedure a time out was called    Universal Protocol: the Joint Commission Universal Protocol was followed    Preparation: Patient was prepped and draped in usual sterile fashion    ESBL (mL):  0        SEDATION    Patient Sedated: Yes    Sedation Type:  Deep  Sedation:  Fentanyl and propofol  Vital signs: Vital signs monitored during sedation      Preparation: skin prepped with ChloraPrep  Skin prep agent dried: skin prep agent completely dried prior to procedure  Sterile barriers: all five maximum sterile barriers used - cap, mask, sterile gown, sterile gloves, and large sterile sheet  Hand hygiene: hand hygiene performed prior to central venous catheter insertion  Site selection rationale: 9F Cordis Catheter already in place  Patient position: flat  Procedural supplies: PA Catheter.  Catheter size: 8 Fr  Pre-procedure: landmarks identified  Number of attempts: 1  Successful placement: yes  Post-procedure: Line secured in place.  Assessment: blood return through all ports and placement verified by x-ray    PROCEDURE   Patient Tolerance:  Patient tolerated the procedure well with no immediate complications    Length of  time physician/provider present for 1:1 monitoring during sedation: 15

## 2021-04-29 NOTE — OP NOTE
Procedure Date: 04/28/2021    REFERRING CARDIOLOGIST:   Dr. Erasmo Lopez    PREOPERATIVE DIAGNOSES:  Acute submassive pulmonary emboli and right ventricular thrombus.    POSTOPERATIVE DIAGNOSIS:  Acute submassive pulmonary emboli.    SURGEON:  Yumi Singh MD    ASSISTANTS:  Bárbara Liu MD and Karishma Cali MD    PROCEDURE PERFORMED:  Emergent pulmonary embolectomy.    ANESTHESIA:  General through a tracheostomy.    INDICATIONS FOR PROCEDURE:  Mr. Warren is a very pleasant 70-year-old gentleman who was recently diagnosed with laryngeal cancer.  He was admitted to the hospital with worsening shortness of breath for about a week or so.  He was found to have bilateral PE with a significant clot burden, mainly in the right main PA, but also a large clot in the right ventricle as well.  Given the large clot burden and a clot in transit in the right ventricle, we evaluated him for possible emergent surgical embolectomy.  He was taken to the operating room today for pulmonary embolectomy and RV thrombectomy.    OPERATIVE FINDINGS:  Given his recent laryngeal cancer, we elected not to do a PANDA intraoperatively.  The RV function was grossly/visibly severely diminished prior to going on cardiopulmonary bypass, but it improved significantly after we came off pump after the embolectomy.    DESCRIPTION OF PROCEDURE:  After informed consent was obtained, the patient was brought down to the operating room and was placed on the OR table in supine position.  Intravenous and intraarterial lines were begun.  The patient had already had a tracheostomy placed, but this was switched out to a cuffed trach by the anesthesia staff.  His entire chest, abdomen, both groins and legs were prepped down to the knees using multiple layers of DuraPrep.  He was draped in a sterile field.  During this process, the patient became very hemodynamically unstable and was hypotensive with a systolic blood pressure down to 50s intermittently, but he  slowly responded to boluses of epinephrine.  A median sternotomy was performed and the sternal edges were retracted laterally.  The pericardium was opened to suspended the heart in a pericardial cradle.  The patient was fully heparinized.  The ascending aorta and the SVC and IVC were cannulated.  After appropriate ACT level was achieved, cardiopulmonary bypass was established and the patient was kept normothermic during the entire operation.  Umbilical tapes were placed around the SVC and IVC cannula and were snared down.  The right atrium was opened and then we explored the right ventricle.  To our surprise, we did not find the thrombus in the right ventricle or in the right atrium.  The right atriotomy was closed in 2 layers of 4-0 Prolene.  The main pulmonary artery was then opened vertically, and we discovered a large clot sitting in the left main PA extending into the main pulmonary artery.  This was presumably the clot in the right ventricle that we saw in the preop echo that had embolized.  This was completely removed.  The left main PA was visualized, and we were able to clean out all the clots up to the segmental branches.  The main PA incision was closed in 2 layers with running 4-0 Prolene.  Next, the right main PA was exposed between the SVC and the aorta and was opened again vertically, and we immediately encountered a large clot as well.  This was removed and the right main PA was irrigated out and any clots were removed, all the way up to the right upper lobe segmental artery.  The right main PA incision was closed using a small bovine pericardial patch to avoid any stenosis.  This was done using a running 4-0 Prolene.  The patient was then weaned off cardiopulmonary bypass with epinephrine, milrinone, Levophed and vasopressin support.  RV function visually was significantly improved.  Once the patient remained stable off bypass, the venous cannulae removed and protamine was given.  The aortic cannula  was subsequently removed as well.  The patient was in normal sinus rhythm.  The patient was quite coagulopathic and therefore, we decided to transfuse him with 2 units of FFP, 2 packs of platelets, and 2 of cryoprecipitate.  Both pleural spaces are slightly opened.  The mediastinum was irrigated with antibiotic saline and hemostasis was eventually achieved.  The sternum was reapproximated using multiple interrupted single and double wires.  The incision was closed in layers of running Vicryl suture.  The skin was closed using 3-0 Vicryl and was sealed using Dermabond.    There were no intraoperative complications, and the patient tolerated the operation well.  The patient was brought to the ICU in hemodynamically stable condition.    Yumi Singh MD        D: 2021   T: 2021   MT: DEVANG    Name:     LUPE HALL  MRN:      [id number]        Account:        [id number]   :      1950           Procedure Date: 2021     Document: [id number]    cc:  Erasmo Lopez MD

## 2021-04-29 NOTE — PROGRESS NOTES
Received from OR at 1820, slightly hypotensive on arrival, Norepi titrated upwards, 5% Albumin initiated, Dr. Liu at bedside along with SUKH., no plans for vent wean tonight, sedated on Propofol, surgical wound vac to sternum, chest tubes patent with minimal output.  Monitor SR, -130 systolic now.  Will need EMERITA hugger for temp 35.6.  Patients son here, aware of plan of care.

## 2021-04-29 NOTE — PROGRESS NOTES
[facility] ICU RESPIRATORY NOTE    Date of Admission: 4/27/2021    Date of Intubation (most recent): 4/28/21 trached     Reason for Mechanical Ventilation: airway protection    Number of Days on Mechanical Ventilation: 2    Met Criteria for Pressure Support Trial: No    Reason for No Pressure Support Trial: Per MD    Significant Events Today: None    ABG Results: Results for LUPE HALL (MRN [id number]) as of 4/29/2021 05:22   Ref. Range 4/29/2021 04:00   pH Arterial Latest Ref Range: 7.35 - 7.45 pH 7.42   pCO2 Arterial Latest Ref Range: 35 - 45 mm Hg 37   PO2 Arterial Latest Ref Range: 80 - 105 mm Hg 109 (H)   Bicarbonate Arterial Latest Ref Range: 21 - 28 mmol/L 24   Base Deficit Art Latest Units: mmol/L 0.1   FIO2 Unknown 40%   Oxyhemoglobin Arterial Latest Ref Range: 92 - 100 % 95     Current Vent Settings: Ventilation Mode: CMV/AC  (Continuous Mandatory Ventilation/ Assist Control)  FiO2 (%): 35 %  Rate Set (breaths/minute): 18 breaths/min  Tidal Volume Set (mL): 500 mL  PEEP (cm H2O): 10 cmH2O  Oxygen Concentration (%): 40%      Plan:  Continue on full support   No

## 2021-04-29 NOTE — PROVIDER NOTIFICATION
& Dr. Singh @ bedside & aware of increasing pressor support, rising Lactic acid, & A-line not working. Intensivist to place another A-line.uop remains adeq. CT loss minimal. razia give HCO# as ordered, had given 5%Alb. As CVP 15-16

## 2021-04-29 NOTE — ANESTHESIA POSTPROCEDURE EVALUATION
Patient: Raj Warren    Procedure(s):  PULMONARY THROMBECTOMY    Diagnosis:Bleeding [R58]  Diagnosis Additional Information: No value filed.    Anesthesia Type:  General    Note:  Disposition: ICU            ICU Sign Out: Anesthesiologist/ICU physician sign out WAS performed   Postop Pain Control: Uneventful            Sign Out: Well controlled pain   PONV: No   Neuro/Psych: Uneventful            Sign Out: Acceptable/Baseline neuro status   Airway/Respiratory:             Sign Out: AIRWAY IN SITU/Resp. Support               Airway in situ/Resp. Support: Tracheostomy                 Reason: Planned Pre-op   CV/Hemodynamics: Uneventful            Sign Out: Acceptable CV status; No obvious hypovolemia; No obvious fluid overload   Other NRE: NONE   DID A NON-ROUTINE EVENT OCCUR? No    Event details/Postop Comments:  Patient transported to ICU with tracheostomy insitu on significant doses of multiple vasopressors and placed on mechanical ventilation.            Last vitals:  Vitals:    04/28/21 2200 04/28/21 2215 04/28/21 2230   BP: 91/45 (!) 87/44 (!) 89/46   Pulse: 106 105 108   Resp: 29  (!) 31   Temp: 36.5  C (97.7  F) 36.7  C (98.1  F) 36.9  C (98.4  F)   SpO2: 100% 100% 100%       Last vitals prior to Anesthesia Care Transfer:  CRNA VITALS  4/28/2021 1742 - 4/28/2021 1842      4/28/2021             Resp Rate (set):  10          Electronically Signed By: Choco Spencer MD  April 28, 2021  10:43 PM

## 2021-04-29 NOTE — ANESTHESIA PROCEDURE NOTES
Arterial Line Procedure Note  Pre-Procedure   Staff -        Anesthesiologist:  Choco Spencer MD       Performed By: Anesthesiologist       Location: OR       Pre-Anesthestic Checklist: patient identified, IV checked, site marked, risks and benefits discussed, informed consent, monitors and equipment checked and pre-op evaluation  Timeout:       Correct Patient: Yes        Correct Procedure: Yes        Correct Site: Yes        Correct Position: Yes   Procedure   Procedure: arterial line       Laterality: right       Insertion Site: radial (radial).  Sterile Prep        Skin prep: Chloraprep  Insertion/Injection        Technique: ultrasound guided        - Artery evaluated via U/S for patency/adequacy of catheter insertion is adequate, and using realtime U/S imaging the artery was punctured, and needle was observed entering artery on U/S       - Permanent Image entered into patient's record       - The visualized structures were anatomically normal.       - There were no apparent abnormal pathologic findings       Catheter Type/Size: 20 gauge, 1.75 in/4.5 cm quick cath (integral wire)  Narrative         Secured by: suture       Tegaderm dressing used.       Complications: None apparent,        Arterial waveform: Yes        IBP within 10% of NIBP: Yes  Comments:  Ultrasound Interpretation arterial catheter    1. Ultrasound guidance was used to evaluate potential access sites.  2. Ultrasound was also used to verify the patency of the vessel specified above.   3. Ultrasound was used to visualize the needle entering the vessel.   4. The visualized structures were anatomically normal.  5. There were no apparent abnormal pathological findings.  6. A permanent ultrasound image was saved in the patient's record.    Line placed in operating room prior to induction.    Patient tolerated procedure well   No complications.

## 2021-04-29 NOTE — PROGRESS NOTES
Dr. Sanchez placing new A-line, Dr. Liu @ bedside, razia be placing Perry Lonnie cath. MAP <65, titrating pressors, Dr. Sanchez @ bedside, increased PEEP-10. O2 sats remain %. Secretions sm. Amt, creamy per trach.

## 2021-04-29 NOTE — ANESTHESIA PREPROCEDURE EVALUATION
Anesthesia Pre-Procedure Evaluation    Patient: Raj Warren   MRN: [id number] : 1950        Preoperative Diagnosis: Bleeding [R58]   Procedure : Procedure(s):  PULMONARY THROMBECTOMY     Past Medical History:   Diagnosis Date     Allergic state       Past Surgical History:   Procedure Laterality Date     LARYNGOSCOPY N/A 2021    Procedure: LARYNGOSCOPY;  Surgeon: Choco Johnson MD;  Location:  OR     TRACHEOSTOMY  2021    Procedure: CREATION, TRACHEOSTOMY;  Surgeon: Choco Johnson MD;  Location:  OR      Allergies   Allergen Reactions     Pollen Extract       Social History     Tobacco Use     Smoking status: Former Smoker     Types: Cigarettes     Quit date: 1989     Years since quittin.3     Smokeless tobacco: Never Used     Tobacco comment: quit in   had smoked about 1/2 pack a day then cut back   Substance Use Topics     Alcohol use: No      Wt Readings from Last 1 Encounters:   21 90.8 kg (200 lb 2.8 oz)      Echo   Interpretation Summary     Left ventricular systolic function is normal.The visual ejection fraction is  estimated at 60-65%.Flattened septum is consistent with RV pressure/volume  overload.  The right ventricle is severely dilated.Moderately decreased right ventricular  systolic function  The right atrium is severely dilated.  Echogenic mobile mass in right ventricle- measuring 3.5 cm x 1 cm, it is seen  close to the basal RV free wall- differential includes clot in transit given  the clinical scenario of large bilateral acute pulmonary embolism,  differential also includes a mass.  Pulmonary hypertension- RVSP 48 mm hg +RA.     No prior study for comparison.  Findings discussed with Dr Pritchard.  ______________________________________________________________________________  Left Ventricle  The left ventricle is normal in size. There is mild concentric left  ventricular hypertrophy. Diastolic Doppler findings (E/E' ratio and/or  other  parameters) suggest left ventricular filling pressures are indeterminate. Left  ventricular systolic function is normal. The visual ejection fraction is  estimated at 60-65%. Flattened septum is consistent with RV pressure/volume  overload.     Right Ventricle  The right ventricle is severely dilated. echogenic mobile mass in right  ventricle- measuring 3.5 cm x 1 cm, it is seen close to the basal RV free  wall- differential includes clot in transit given the clinical scenario of  large bilateral acute pulmonary embolism. Moderately decreased right  ventricular systolic function.     Atria  Normal left atrial size. The right atrium is severely dilated.     Mitral Valve  There is mild (1+) mitral regurgitation.     Tricuspid Valve  There is mild (1+) tricuspid regurgitation. The right ventricular systolic  pressure is approximated at 47.6 mmHg plus the right atrial pressure.  Pulmonary hypertension.     Aortic Valve  The aortic valve is trileaflet. mild aortic valve sclerosis. No aortic  regurgitation is present. No aortic stenosis is present.     Pulmonic Valve  The pulmonic valve is not well visualized. There is no pulmonic valvular  stenosis.     Vessels  The aortic root is normal size. Normal size ascending aorta. borderline  dilated IVC.     Pericardium  There is no pericardial effusion    27-APR-2021 12:34:46 [facility]-ED ROUTINE RECORD  Sinus tachycardia  Otherwise normal ECG  When compared with ECG of 12-APR-2021 02:28,  Aberrant conduction is no longer Present  T wave inversion now evident in Inferior leads  Nonspecific T wave abnormality no longer evident in Lateral leads.    Chest CT  CT CHEST PULMONARY EMBOLISM WITH CONTRAST 4/27/2021 2:31 PM     CLINICAL HISTORY: PE suspected, high probability. Shortness of breath.  Cough. Status post tracheostomy for laryngeal cancer, elevated  troponin/BNP.     TECHNIQUE: CT angiogram chest during arterial phase injection IV  contrast. 2D and 3D MIP  reconstructions were performed by the CT  technologist. Dose reduction techniques were used.      CONTRAST: 69mL Isovue-370     COMPARISON: CT chest 4/12/2021.     FINDINGS:  ANGIOGRAM CHEST: Large bilateral pulmonary embolism newly identified  leading to all lobes of both lungs. Central embolism burden can be  seen extending to the midportion of the right main pulmonary artery  series 5 image 72, and distal aspect of the left main pulmonary artery  image 68. There is associated dilatation of the right cardiac chambers  and leftward bulging of the intraventricular septum. There are also  curvilinear low density filling defects within the right ventricular  lumen series 5 image 114 and on adjacent images. Thoracic aorta is  negative for dissection.     LUNGS AND PLEURA: No effusions. Motion artifact limits detailed  assessment of the lung parenchyma. Very mild patchy ground-glass  opacities at the left upper lobe, for instance, a nodular region  measuring 0.7 cm series 7 image 102. No dense lobar consolidation  identified.     MEDIASTINUM/AXILLAE: No enlarged lymph nodes are seen by size  criteria. There is a tracheostomy in place. Some small mucus  aspiration posterior to the tracheostomy. Soft tissue fullness at the  vocal folds again noted, right greater than left.     UPPER ABDOMEN: Cholelithiasis. No convincing acute abnormality, but  assessment is limited by motion.     MUSCULOSKELETAL: No acute abnormality.                                                                      IMPRESSION:  1.  Large bilateral pulmonary embolism leading to all lobes of both  lungs. Central thrombus burden leads to the bilateral main pulmonary  arteries. There is evidence of right cardiac strain. The chambers of  the right heart are dilated and there is leftward bowing of the  intraventricular septum. Further, there is curvilinear apparent  filling defect within the right ventricular lumen suggesting thrombus  within the right  ventricle.  2.  Small patchy ground-glass nodular opacities at the left upper  lobe. Recommend follow-up CT chest in three months for surveillance.  3.  Soft tissue thickening at the vocal folds again noted.  4.  Appropriate positioning of a newly identified tracheostomy.     [Critical Result: Large bilateral acute pulmonary embolism with  evidence of right cardiac strain. There is also potential thrombus  burden within the lumen of the right ventricle.]    Anesthesia Evaluation   Pt has had prior anesthetic.     No history of anesthetic complications       ROS/MED HX  ENT/Pulmonary: Comment: Recent awake trach for laryngeal cancer     Untreated laryngeal cancer, presented 4/27/2021 to ER with SOB --> Large bilateral PE's and mobile Mass in RV    (+) sleep apnea,  (-) recent URI   Neurologic:    (-) no seizures, no CVA and migraines   Cardiovascular: Comment: Troponins mildly elevated felt to be secondary to PE, elevated BNP, right heart strain.     tachycardic    (+) -----Taking blood thinners (heparin drip)     METS/Exercise Tolerance:     Hematologic:     (+) History of blood clots, pt is anticoagulated,     Musculoskeletal:  - neg musculoskeletal ROS     GI/Hepatic:    (-) GERD   Renal/Genitourinary:  - neg Renal ROS     Endo:    (-) Type II DM and thyroid disease   Psychiatric/Substance Use:  - neg psychiatric ROS     Infectious Disease:  - neg infectious disease ROS     Malignancy:       Other:            Physical Exam    Airway        Mallampati: II   TM distance: > 3 FB   Neck ROM: full   Mouth opening: > 3 cm    Respiratory Devices and Support    TRACH:      Dental  no notable dental history         Cardiovascular          Rhythm and rate: regular and tachycardia     Pulmonary   pulmonary exam normal        breath sounds clear to auscultation           OUTSIDE LABS:  CBC:   Lab Results   Component Value Date    WBC 14.4 (H) 04/28/2021    WBC 14.0 (H) 04/28/2021    HGB 9.5 (L) 04/28/2021    HGB 8.9 (L)  04/28/2021    HCT 28.7 (L) 04/28/2021    HCT 26.2 (L) 04/28/2021     04/28/2021     04/28/2021     BMP:   Lab Results   Component Value Date     (H) 04/28/2021     (H) 04/28/2021    POTASSIUM 3.7 04/28/2021    POTASSIUM 3.8 04/28/2021    CHLORIDE 115 (H) 04/28/2021    CHLORIDE 116 (H) 04/28/2021    CO2 20 04/28/2021    CO2 25 04/28/2021    BUN 11 04/28/2021    BUN 12 04/28/2021    CR 0.95 04/28/2021    CR 0.96 04/28/2021     (H) 04/28/2021     (H) 04/28/2021     COAGS:   Lab Results   Component Value Date    PTT 51 (H) 04/28/2021    INR 1.50 (H) 04/28/2021    FIBR 319 04/28/2021     POC:   Lab Results   Component Value Date     (H) 04/28/2021     HEPATIC:   Lab Results   Component Value Date    ALBUMIN 2.9 (L) 04/28/2021    PROTTOTAL 5.3 (L) 04/28/2021    ALT 28 04/28/2021    AST 31 04/28/2021    ALKPHOS 44 04/28/2021    BILITOTAL 0.9 04/28/2021     OTHER:   Lab Results   Component Value Date    PH 7.24 (L) 04/28/2021    LACT 4.0 (HH) 04/28/2021    A1C 5.6 04/12/2021    TELMA 8.0 (L) 04/28/2021    PHOS 4.9 (H) 04/28/2021    MAG 2.1 04/28/2021    TSH 4.80 (H) 04/12/2021    T4 1.17 04/12/2021       Anesthesia Plan    ASA Status:  4, emergent    NPO Status:  NPO Appropriate    Anesthesia Type: General.   Induction: Propofol.   Maintenance: Balanced.   Techniques and Equipment:     - Lines/Monitors: Arterial Line, Central Line     Consents    Anesthesia Plan(s) and associated risks, benefits, and realistic alternatives discussed. Questions answered and patient/representative(s) expressed understanding.     - Discussed with:  Patient      - Extended Intubation/Ventilatory Support Discussed: Yes.    Use of blood products discussed: Yes.     - Consented: consented to blood products            Reason for refusal: other.     Postoperative Care    Pain management: IV analgesics.        Comments:                Choco Spencer MD

## 2021-04-30 ENCOUNTER — APPOINTMENT (OUTPATIENT)
Dept: INTERVENTIONAL RADIOLOGY/VASCULAR | Facility: CLINIC | Age: 71
DRG: 163 | End: 2021-04-30
Attending: PHYSICIAN ASSISTANT
Payer: COMMERCIAL

## 2021-04-30 LAB
ALBUMIN SERPL-MCNC: 2.8 G/DL (ref 3.4–5)
ALP SERPL-CCNC: 41 U/L (ref 40–150)
ALT SERPL W P-5'-P-CCNC: 98 U/L (ref 0–70)
ANION GAP SERPL CALCULATED.3IONS-SCNC: 4 MMOL/L (ref 3–14)
AST SERPL W P-5'-P-CCNC: 92 U/L (ref 0–45)
BILIRUB SERPL-MCNC: 0.6 MG/DL (ref 0.2–1.3)
BUN SERPL-MCNC: 9 MG/DL (ref 7–30)
CALCIUM SERPL-MCNC: 7.6 MG/DL (ref 8.5–10.1)
CHLORIDE SERPL-SCNC: 113 MMOL/L (ref 94–109)
CO2 SERPL-SCNC: 30 MMOL/L (ref 20–32)
COPATH REPORT: NORMAL
CREAT SERPL-MCNC: 0.93 MG/DL (ref 0.66–1.25)
ERYTHROCYTE [DISTWIDTH] IN BLOOD BY AUTOMATED COUNT: 14.4 % (ref 10–15)
GFR SERPL CREATININE-BSD FRML MDRD: 84 ML/MIN/{1.73_M2}
GLUCOSE BLDC GLUCOMTR-MCNC: 112 MG/DL (ref 70–99)
GLUCOSE BLDC GLUCOMTR-MCNC: 131 MG/DL (ref 70–99)
GLUCOSE BLDC GLUCOMTR-MCNC: 132 MG/DL (ref 70–99)
GLUCOSE BLDC GLUCOMTR-MCNC: 138 MG/DL (ref 70–99)
GLUCOSE BLDC GLUCOMTR-MCNC: 139 MG/DL (ref 70–99)
GLUCOSE BLDC GLUCOMTR-MCNC: 140 MG/DL (ref 70–99)
GLUCOSE BLDC GLUCOMTR-MCNC: 140 MG/DL (ref 70–99)
GLUCOSE BLDC GLUCOMTR-MCNC: 150 MG/DL (ref 70–99)
GLUCOSE BLDC GLUCOMTR-MCNC: 172 MG/DL (ref 70–99)
GLUCOSE BLDC GLUCOMTR-MCNC: 210 MG/DL (ref 70–99)
GLUCOSE BLDC GLUCOMTR-MCNC: 79 MG/DL (ref 70–99)
GLUCOSE BLDC GLUCOMTR-MCNC: 81 MG/DL (ref 70–99)
GLUCOSE BLDC GLUCOMTR-MCNC: 81 MG/DL (ref 70–99)
GLUCOSE BLDC GLUCOMTR-MCNC: 89 MG/DL (ref 70–99)
GLUCOSE SERPL-MCNC: 146 MG/DL (ref 70–99)
GRAM STN SPEC: NORMAL
GRAM STN SPEC: NORMAL
HCT VFR BLD AUTO: 25.1 % (ref 40–53)
HGB BLD-MCNC: 8.5 G/DL (ref 13.3–17.7)
INTERPRETATION ECG - MUSE: NORMAL
Lab: NORMAL
MAGNESIUM SERPL-MCNC: 2.4 MG/DL (ref 1.6–2.3)
MCH RBC QN AUTO: 30 PG (ref 26.5–33)
MCHC RBC AUTO-ENTMCNC: 33.9 G/DL (ref 31.5–36.5)
MCV RBC AUTO: 89 FL (ref 78–100)
MRSA DNA SPEC QL NAA+PROBE: NEGATIVE
PHOSPHATE SERPL-MCNC: 2.5 MG/DL (ref 2.5–4.5)
PLATELET # BLD AUTO: 240 10E9/L (ref 150–450)
POTASSIUM SERPL-SCNC: 3.6 MMOL/L (ref 3.4–5.3)
PROT SERPL-MCNC: 5.1 G/DL (ref 6.8–8.8)
RBC # BLD AUTO: 2.83 10E12/L (ref 4.4–5.9)
SODIUM SERPL-SCNC: 147 MMOL/L (ref 133–144)
SPECIMEN SOURCE: NORMAL
SPECIMEN SOURCE: NORMAL
UFH PPP CHRO-ACNC: 0.34 IU/ML
WBC # BLD AUTO: 12.5 10E9/L (ref 4–11)

## 2021-04-30 PROCEDURE — 85520 HEPARIN ASSAY: CPT | Performed by: PHYSICIAN ASSISTANT

## 2021-04-30 PROCEDURE — 83735 ASSAY OF MAGNESIUM: CPT | Performed by: PHYSICIAN ASSISTANT

## 2021-04-30 PROCEDURE — C1880 VENA CAVA FILTER: HCPCS

## 2021-04-30 PROCEDURE — 258N000003 HC RX IP 258 OP 636: Performed by: INTERNAL MEDICINE

## 2021-04-30 PROCEDURE — 37191 INS ENDOVAS VENA CAVA FILTR: CPT

## 2021-04-30 PROCEDURE — 85027 COMPLETE CBC AUTOMATED: CPT | Performed by: PHYSICIAN ASSISTANT

## 2021-04-30 PROCEDURE — 99291 CRITICAL CARE FIRST HOUR: CPT | Mod: 24 | Performed by: INTERNAL MEDICINE

## 2021-04-30 PROCEDURE — 250N000011 HC RX IP 250 OP 636: Performed by: PHYSICIAN ASSISTANT

## 2021-04-30 PROCEDURE — 999N000157 HC STATISTIC RCP TIME EA 10 MIN

## 2021-04-30 PROCEDURE — C1769 GUIDE WIRE: HCPCS

## 2021-04-30 PROCEDURE — 258N000003 HC RX IP 258 OP 636: Performed by: SURGERY

## 2021-04-30 PROCEDURE — 250N000013 HC RX MED GY IP 250 OP 250 PS 637: Performed by: SURGERY

## 2021-04-30 PROCEDURE — 999N000185 HC STATISTIC TRANSPORT TIME EA 15 MIN

## 2021-04-30 PROCEDURE — 87205 SMEAR GRAM STAIN: CPT | Performed by: INTERNAL MEDICINE

## 2021-04-30 PROCEDURE — 250N000011 HC RX IP 250 OP 636: Performed by: INTERNAL MEDICINE

## 2021-04-30 PROCEDURE — 250N000009 HC RX 250: Performed by: SURGERY

## 2021-04-30 PROCEDURE — 999N001017 HC STATISTIC GLUCOSE BY METER IP

## 2021-04-30 PROCEDURE — 84100 ASSAY OF PHOSPHORUS: CPT | Performed by: PHYSICIAN ASSISTANT

## 2021-04-30 PROCEDURE — C9113 INJ PANTOPRAZOLE SODIUM, VIA: HCPCS | Performed by: INTERNAL MEDICINE

## 2021-04-30 PROCEDURE — 87070 CULTURE OTHR SPECIMN AEROBIC: CPT | Performed by: INTERNAL MEDICINE

## 2021-04-30 PROCEDURE — 250N000012 HC RX MED GY IP 250 OP 636 PS 637: Performed by: SURGERY

## 2021-04-30 PROCEDURE — 272N000196 HC ACCESSORY CR5

## 2021-04-30 PROCEDURE — 255N000002 HC RX 255 OP 636: Performed by: RADIOLOGY

## 2021-04-30 PROCEDURE — 94003 VENT MGMT INPAT SUBQ DAY: CPT

## 2021-04-30 PROCEDURE — 250N000011 HC RX IP 250 OP 636: Performed by: RADIOLOGY

## 2021-04-30 PROCEDURE — 200N000001 HC R&B ICU

## 2021-04-30 PROCEDURE — 250N000013 HC RX MED GY IP 250 OP 250 PS 637: Performed by: INTERNAL MEDICINE

## 2021-04-30 PROCEDURE — 250N000011 HC RX IP 250 OP 636: Performed by: SURGERY

## 2021-04-30 PROCEDURE — 06H03DZ INSERTION OF INTRALUMINAL DEVICE INTO INFERIOR VENA CAVA, PERCUTANEOUS APPROACH: ICD-10-PCS | Performed by: RADIOLOGY

## 2021-04-30 PROCEDURE — 80053 COMPREHEN METABOLIC PANEL: CPT | Performed by: PHYSICIAN ASSISTANT

## 2021-04-30 RX ORDER — POTASSIUM CHLORIDE 29.8 MG/ML
20 INJECTION INTRAVENOUS ONCE
Status: COMPLETED | OUTPATIENT
Start: 2021-04-30 | End: 2021-04-30

## 2021-04-30 RX ORDER — FUROSEMIDE 10 MG/ML
20 INJECTION INTRAMUSCULAR; INTRAVENOUS ONCE
Status: COMPLETED | OUTPATIENT
Start: 2021-04-30 | End: 2021-04-30

## 2021-04-30 RX ORDER — IOPAMIDOL 612 MG/ML
50 INJECTION, SOLUTION INTRAVASCULAR ONCE
Status: COMPLETED | OUTPATIENT
Start: 2021-04-30 | End: 2021-04-30

## 2021-04-30 RX ORDER — HEPARIN SODIUM 200 [USP'U]/100ML
1 INJECTION, SOLUTION INTRAVENOUS CONTINUOUS PRN
Status: DISCONTINUED | OUTPATIENT
Start: 2021-04-30 | End: 2021-05-02

## 2021-04-30 RX ORDER — POTASSIUM CHLORIDE 7.45 MG/ML
10 INJECTION INTRAVENOUS
Status: DISCONTINUED | OUTPATIENT
Start: 2021-04-30 | End: 2021-04-30

## 2021-04-30 RX ADMIN — PANTOPRAZOLE SODIUM 40 MG: 40 INJECTION, POWDER, FOR SOLUTION INTRAVENOUS at 11:42

## 2021-04-30 RX ADMIN — FENTANYL CITRATE 50 MCG: 50 INJECTION, SOLUTION INTRAMUSCULAR; INTRAVENOUS at 15:57

## 2021-04-30 RX ADMIN — EPOPROSTENOL 20 NG/KG/MIN: 1.5 INJECTION, POWDER, LYOPHILIZED, FOR SOLUTION INTRAVENOUS at 11:39

## 2021-04-30 RX ADMIN — EPOPROSTENOL 20 NG/KG/MIN: 1.5 INJECTION, POWDER, LYOPHILIZED, FOR SOLUTION INTRAVENOUS at 05:37

## 2021-04-30 RX ADMIN — HEPARIN SODIUM 1 BAG: 200 INJECTION, SOLUTION INTRAVENOUS at 14:01

## 2021-04-30 RX ADMIN — SODIUM CHLORIDE 0.5 UNITS/HR: 9 INJECTION, SOLUTION INTRAVENOUS at 15:31

## 2021-04-30 RX ADMIN — PIPERACILLIN SODIUM AND TAZOBACTAM SODIUM 3.38 G: 3; .375 INJECTION, POWDER, LYOPHILIZED, FOR SOLUTION INTRAVENOUS at 10:30

## 2021-04-30 RX ADMIN — PROPOFOL 25 MCG/KG/MIN: 10 INJECTION, EMULSION INTRAVENOUS at 12:23

## 2021-04-30 RX ADMIN — FUROSEMIDE 20 MG: 10 INJECTION, SOLUTION INTRAVENOUS at 10:30

## 2021-04-30 RX ADMIN — CHLORHEXIDINE GLUCONATE 15 ML: 1.2 SOLUTION ORAL at 08:29

## 2021-04-30 RX ADMIN — IOPAMIDOL 25 ML: 612 INJECTION, SOLUTION INTRAVENOUS at 14:53

## 2021-04-30 RX ADMIN — VANCOMYCIN HYDROCHLORIDE 2000 MG: 5 INJECTION, POWDER, LYOPHILIZED, FOR SOLUTION INTRAVENOUS at 23:12

## 2021-04-30 RX ADMIN — POTASSIUM CHLORIDE, DEXTROSE MONOHYDRATE AND SODIUM CHLORIDE: 150; 5; 450 INJECTION, SOLUTION INTRAVENOUS at 16:38

## 2021-04-30 RX ADMIN — PIPERACILLIN SODIUM AND TAZOBACTAM SODIUM 3.38 G: 3; .375 INJECTION, POWDER, LYOPHILIZED, FOR SOLUTION INTRAVENOUS at 03:55

## 2021-04-30 RX ADMIN — PIPERACILLIN SODIUM AND TAZOBACTAM SODIUM 3.38 G: 3; .375 INJECTION, POWDER, LYOPHILIZED, FOR SOLUTION INTRAVENOUS at 15:57

## 2021-04-30 RX ADMIN — Medication 0.05 MCG/KG/MIN: at 00:01

## 2021-04-30 RX ADMIN — FENTANYL CITRATE 50 MCG: 50 INJECTION, SOLUTION INTRAMUSCULAR; INTRAVENOUS at 18:52

## 2021-04-30 RX ADMIN — CHLORHEXIDINE GLUCONATE 15 ML: 1.2 SOLUTION ORAL at 20:24

## 2021-04-30 RX ADMIN — PROPOFOL 25 MCG/KG/MIN: 10 INJECTION, EMULSION INTRAVENOUS at 04:33

## 2021-04-30 RX ADMIN — PIPERACILLIN SODIUM AND TAZOBACTAM SODIUM 3.38 G: 3; .375 INJECTION, POWDER, LYOPHILIZED, FOR SOLUTION INTRAVENOUS at 21:19

## 2021-04-30 RX ADMIN — PROPOFOL 25 MCG/KG/MIN: 10 INJECTION, EMULSION INTRAVENOUS at 18:44

## 2021-04-30 RX ADMIN — LIDOCAINE HYDROCHLORIDE 8 ML: 10 INJECTION, SOLUTION INFILTRATION; PERINEURAL at 14:34

## 2021-04-30 RX ADMIN — FENTANYL CITRATE 50 MCG: 50 INJECTION, SOLUTION INTRAMUSCULAR; INTRAVENOUS at 12:33

## 2021-04-30 RX ADMIN — ACETAMINOPHEN 650 MG: 650 SUPPOSITORY RECTAL at 08:29

## 2021-04-30 RX ADMIN — HEPARIN SODIUM 1200 UNITS/HR: 10000 INJECTION, SOLUTION INTRAVENOUS at 05:50

## 2021-04-30 RX ADMIN — FENTANYL CITRATE 50 MCG: 50 INJECTION, SOLUTION INTRAMUSCULAR; INTRAVENOUS at 09:01

## 2021-04-30 RX ADMIN — POTASSIUM CHLORIDE 20 MEQ: 29.8 INJECTION, SOLUTION INTRAVENOUS at 05:31

## 2021-04-30 ASSESSMENT — ACTIVITIES OF DAILY LIVING (ADL)
ADLS_ACUITY_SCORE: 19

## 2021-04-30 ASSESSMENT — MIFFLIN-ST. JEOR: SCORE: 1791.13

## 2021-04-30 NOTE — PROGRESS NOTES
Cardiology Chart Check:  Patient remains critically ill.   Recommend DOAC (Eliquis 5 mg BID) prior to discharge.   Cardiology will sign off.   Please call with questions.     TARUN Mcclure, CNP

## 2021-04-30 NOTE — PROGRESS NOTES
Service Date: 04/29/2021    SUBJECTIVE:  Mr. Warren is a 70-year-old gentleman with newly diagnosed laryngeal squamous cell carcinoma.  He has at least stage III disease.  The patient had a PET scan done on 04/26/2021.  It reveals hypermetabolic mass involving the supraglottic, glottic and subglottic larynx.  There is hypermetabolic left level 4 lymph node, likely representing metastasis.  There is a new hypermetabolic nodule in right middle lobe.  This could be inflammatory.  For laryngeal squamous cell carcinoma, plan was for patient to meet with the ENT team at the [facility].  He missed that appointment.    The patient has now been admitted with a massive pulmonary embolism.  CT angiogram revealed large bilateral pulmonary embolism and also right ventricular thrombus.  Lower extremity ultrasound did not reveal any DVT.  Echocardiogram revealed clot in transit in right ventricle.    The patient had emergency pulmonary embolectomy done yesterday.  He has been started back on heparin drip today.    I saw the patient.  He is on ventilator support.  I spoke to the nurse.  Overall, condition is stable.  He is not bleeding from any site.    LABS:  Reviewed.    ASSESSMENT:    1.  A 70-year-old gentleman with a massive pulmonary embolism secondary to hypercoagulability of malignancy.  2.  Newly diagnosed laryngeal squamous cell carcinoma.    3.  Anemia secondary to operative blood loss.    PLAN:    1. The patient is on heparin drip for pulmonary embolism.  Once he is stable, he can be transitioned to Eliquis twice a day.  He will need lifelong anticoagulation.    2.  The patient has laryngeal squamous cell carcinoma.  He will need laryngectomy.  He may need postoperative radiation.  Because of his pulmonary embolism, treatment will all be delayed.   3.  Oncology will follow.  Case discussed with Dr. Erasmo Lopez.    TOTAL TIME SPENT:  25 minutes.    Rosa Bean MD        D: 04/29/2021   T: 04/30/2021   MT:  [facility]    Name:     LUPE HALL  MRN:      [id number]        Account:      [id number]   :      1950           Service Date: 2021       Document: [id number]

## 2021-04-30 NOTE — PROGRESS NOTES
[facility] hospital ICU RESPIRATORY NOTE        Date of Admission: 4/27/2021    Date of Intubation (most recent):Chronic trach    Reason for Mechanical Ventilation:Airway protection    Number of Days on Mechanical Ventilation:3    Met Criteria for Spontaneous Breathing Trial:No    Reason for No Spontaneous Breathing Trial:Pt is on Veletri.    Significant Events Today:Veletri weaned down to half strength. PEEP weaned down from 10 to 5. Pt was transported to IR on the transport vent. Pt tolerated the transport well.    ABG Results:   Recent Labs   Lab 04/29/21  1446 04/29/21  0900 04/29/21  0400 04/29/21  0300   PH 7.48* 7.49* 7.42 7.44   PCO2 40 38 37 34*   PO2 113* 146* 109* 140*   HCO3 30* 29* 24 23   O2PER 40 40 40% 40       Current Vent Settings: Ventilation Mode: CMV/AC  (Continuous Mandatory Ventilation/ Assist Control)  FiO2 (%): 40 %  Rate Set (breaths/minute): 18 breaths/min  Tidal Volume Set (mL): 500 mL  PEEP (cm H2O): (S) 5 cmH2O  Oxygen Concentration (%): 40 %  Resp: 23      Skin Assessment: Trach site intact.    Plan:Will cont full vent support for now and will assess for weaning readiness daily.    Elke Galloway RT

## 2021-04-30 NOTE — PHARMACY-VANCOMYCIN DOSING SERVICE
Pharmacy Vancomycin Initial Note  Date of Service 2021  Patient's  1950  70 year old, male    Indication: [facility]ated Pneumonia    Current estimated CrCl = Estimated Creatinine Clearance: 85.8 mL/min (based on SCr of 0.99 mg/dL).    Creatinine for last 3 days  2021: 12:54 PM Creatinine 1.06 mg/dL  2021:  8:40 AM Creatinine 0.92 mg/dL;  5:03 PM Creatinine 0.96 mg/dL;  7:00 PM Creatinine 0.95 mg/dL; 10:45 PM Creatinine 1.23 mg/dL  2021:  1:18 AM Creatinine 1.31 mg/dL;  4:00 AM Creatinine 1.20 mg/dL;  2:46 PM Creatinine 0.99 mg/dL    Recent Vancomycin Level(s) for last 3 days  No results found for requested labs within last 72 hours.      Vancomycin IV Administrations (past 72 hours)      No vancomycin orders with administrations in past 72 hours.                Nephrotoxins and other renal medications (From now, onward)    Start     Dose/Rate Route Frequency Ordered Stop    21  vancomycin (VANCOCIN) 2,000 mg in D5W 500 mL intermittent infusion      2,000 mg  over 120 Minutes Intravenous EVERY 24 HOURS 21 220  piperacillin-tazobactam (ZOSYN) 3.375 g vial to attach to  mL bag      3.375 g  over 30 Minutes Intravenous EVERY 6 HOURS 21  vasopressin 40 units in NS 40 mL (PITRESSIN) infusion      0.5-4 Units/hr  0.5-4 mL/hr  Intravenous CONTINUOUS PRN 21 185  norepinephrine (LEVOPHED) 16 mg in  mL infusion CENTRAL LINE      0.01-0.4 mcg/kg/min × 90.8 kg  0.9-34.1 mL/hr  Intravenous CONTINUOUS PRN 21 185            Contrast Orders - past 72 hours (72h ago, onward)    None                Plan:  1. Start vancomycin  2000 mg IV q24h.   2. Vancomycin monitoring method: Trough (Method 2 = manual dose calculation)  3. Vancomycin therapeutic monitoring goal: 15-20 mg/L  4. Pharmacy will check vancomycin levels as appropriate in 1-3 Days.    5. Serum creatinine levels will be  ordered daily for the first week of therapy and at least twice weekly for subsequent weeks.      Roseanna Hudson, [facility]

## 2021-04-30 NOTE — IR NOTE
Interventional Radiology Intra-procedural Nursing Note    Patient Name: Raj Warren  Medical Record Number: [id number]  Today's Date: April 30, 2021    Start Time: 1430  End of procedure time: 1443  Procedure: IVC Filter Placement  Report given to: GENNARO Andrade RN  Time pt departs:  1453  : no    Other Notes: Pt came to IR suite 1 on a cart.  Pt was moved over to the table and was placed in supine position.  Pt was draped and prepped with chlorhexidine soap.  Pt is propofol and fentanyl for comfort from the floor.          Bhargav Zayas RN

## 2021-04-30 NOTE — PROGRESS NOTES
[facility] ICU RESPIRATORY NOTE    Date of Admission: 4/27/2021    Date of Intubation (most recent): 4/28/21 trached     Reason for Mechanical Ventilation: airway protection       Number of Days on Mechanical Ventilation: 3    Met Criteria for Pressure Support Trial: No    Reason for No Pressure Support Trial: Per MD    Significant Events Today: None    ABG Results: Results for LUPE HALL (MRN [id number]) as of 4/30/2021 04:45   Ref. Range 4/29/2021 14:46   pH Arterial Latest Ref Range: 7.35 - 7.45 pH 7.48 (H)   pCO2 Arterial Latest Ref Range: 35 - 45 mm Hg 40   PO2 Arterial Latest Ref Range: 80 - 105 mm Hg 113 (H)   Bicarbonate Arterial Latest Ref Range: 21 - 28 mmol/L 30 (H)   Base Excess Art Latest Units: mmol/L 5.6   FIO2 Unknown 40   Oxyhemoglobin Arterial Latest Ref Range: 92 - 100 % 97                                                                                                                                   Current Vent Settings: Ventilation Mode: CMV/AC  (Continuous Mandatory Ventilation/ Assist Control)  FiO2 (%): 40 %  Rate Set (breaths/minute): 18 breaths/min  Tidal Volume Set (mL): 500 mL  PEEP (cm H2O): 10 cmH2O  Oxygen Concentration (%): 40%                                                                                                                                                                                                                                                                                                                                                                                                                                                                                Plan:  Sputum sample sent. Pt to go to IR for IVC filter placement this afternoon. Remain on full settings and full strength veletri.

## 2021-04-30 NOTE — PLAN OF CARE
AVSS. Epi gtt turned off; Veletri infusion turned off; tolerating well.Trach WDL on Mech vent. Sternal incision to negative pressure therapy. CT WDL to suction with moderate Serosanguinous output. Pain controlled with PRN fentanyl pushes; sedation maintained with Propofol gtt; RASS maintained at -1.  LS coarse throughout. Diet NPO d/t mech vent. Up with assist of mech lift and 2. BS active. Mera with adequate output.

## 2021-04-30 NOTE — PLAN OF CARE
Patient remains in ICU overnight. Vent settings unchanged, full strength veletri continues. Minimal secretions via trach overnight, strong cough (will occasionally have vagal response). Will awake to voice and follows commands. Fentanyl given x2 for nonverbal pain score. Insulin, propofol, and heparin drips continue. Epi drip titrated down to 0.035 mcg/kg/min overnight. Continuous cardiac monitoring in place. HR SR. Minimal output via chest tubes. Scant/borderline UOP - MD aware. Pan cultured and antibiotics started overnight due to temps. No contact with family overnight. Plan for IVC filter placement in IR today.

## 2021-04-30 NOTE — PROGRESS NOTES
New fevers - infection vs. Postop.   - Will pan culture. CXR from AM fairly clear.   - Procal.   - Start Zosyn/Vanc.    Na was 151 in AM, will repeat BMP now.    Pan Sanchez MD.  Pulmonary and Critical Care.  04/29/2021  9:34 PM

## 2021-04-30 NOTE — PROCEDURES
[facility]    Procedure: IVC Filter placement    Date/Time: 4/30/2021 2:47 PM  Performed by: Hamzah Flores MD  Authorized by: Hamzah Flores MD     UNIVERSAL PROTOCOL   Site Marked: Yes  Prior Images Obtained and Reviewed:  Yes  Required items: Required blood products, implants, devices and special equipment available    Patient identity confirmed:  Verbally with patient  Patient was reevaluated immediately before administering moderate or deep sedation or anesthesia  Confirmation Checklist:  Patient's identity using two indicators, relevant allergies, procedure was appropriate and matched the consent or emergent situation and correct equipment/implants were available  Time out: Immediately prior to the procedure a time out was called    Universal Protocol: the Joint Commission Universal Protocol was followed    Preparation: Patient was prepped and draped in usual sterile fashion           ANESTHESIA    Anesthesia: Local infiltration  Local Anesthetic:  Lidocaine 1% without epinephrine  Anesthetic Total (mL):  9      SEDATION    Patient Sedated: No    See dictated procedure note for full details.  PROCEDURE   Patient Tolerance:  Patient tolerated the procedure well with no immediate complications  Describe Procedure: Successful infrarenal IVC filter placement.  Length of time physician/provider present for 1:1 monitoring during sedation: 0

## 2021-04-30 NOTE — PROGRESS NOTES
Chart check    I saw patient he is intubated  Massive pulmonary embolism and right ventricle thrombus  Status post embolectomy  He is on heparin drip which we will continue  Patient will need lifelong anticoagulation.  Once stable he may be switched to Eliquis    For newly diagnosed laryngeal squamous cell carcinoma-he will need laryngectomy and postoperative radiation  Treatment is  delayed  Patient to see Dr. Bean      Oncology will continue to follow      TARUN Sandy CNP  [facility]  [facility] [place]

## 2021-04-30 NOTE — PROGRESS NOTES
Critical Care  Note      04/30/2021    Name: Raj Warren MRN#: [id number]   Age: 70 year old YOB: 1950     Hsptl Day# 3  ICU DAY #    MV DAY #             Problem List:   Active Problems:    Bilateral pulmonary embolism (H)    Pulmonary emboli (H)    Overnight Events:   No major events  Overnight.  Tmax 101.8 over last 24 hrs         Summary/Hospital Course:     70 year old male admitted on 4/27 with recent dx of laryngeal cancer and is recently s/p tracheostomy for cancer.  Patient presented with dyspnea and was found to have massive b/l pulmonary emboli & a right ventricular thrombus.  Patient was subsequently taken for suction embolectomy with cardiothoracic surgery on 4/28.  Patient is admitted to the ICU post op care; ICU is consulted for assistance & vent management.      Assessment and plan :     Raj Warren IS a 70 year old male admitted on 4/27/2021 for Bilateral Pulmonary Emboli & Right Ventricular Thrombus.   I have personally reviewed the daily labs, imaging studies, cultures and discussed the case with referring physician and consulting physicians.     My assessment and plan by system for this patient is as follows:    Neurology/Psychiatry:   1. Pain/analgesia  2. Sedation - TRUDY goal 0 to -1  Plan  APAP 975 PO Q 8 hours - pain  Gabapentin 100 mg PO at bedtime  Propofol Gtt for sedation  Ativan 0.5 - 1 prn anxiety  Robaxin 500 mg PO Q 6 prn muscle spasm  Oxycodone 5 - 10 mg PO Q 4 hours prn pain    Cardiovascular:   1. S/P Pulmonary Thrombectomy.  2. Bilateral Pulmonary Emboli  3. Right Ventricular Thrombus  4. Ascending Aortic Aneurysm - s/p repair  Plan  Epinephrine    - Wean as tolerated  Heparin Gtt    mg PO daily    Pulmonary/Ventilator Management:   1. Acute Hypoxic Respiratory Failure   - Current settings CMV, Vt: 500, f: 18, FiO2: wean as arun, Peep 8  Plan  - Patient currently on Veletri   - CT surgery planning to wean today.  - Continue current vent plans for  today.  - PST/wean to trach dome after veletri is discontinued.    GI and Nutrition :   1. No Issues.  Plan  - NPO  - Protonix 40 IVP daily  - Bowel regimen    Renal/Fluids/Electrolytes:   1. Hypernatremia  2. Hyperchloremia  3. Primary Respiratory Alkalosis, Chronic with appropriately Compensated  Metabolic Acidosis  4. + 1.2 L/24 hrs, + 7.9 L/admit, urine: 1.5 L/24 hrs (65/hr)  Plan  - D5 0.45 NS @ 10 - 30 ml's per hour  - Replace e's prn  - Strict I's & O's    Infectious Disease:   1. Pyrexia 101.8 (Tmax)  2. Leukocytosis (12.5)  3. Perioperative Abx's  Plan  - F/u Blood Cx's  - Currently on Vanc/Zosyn    Endocrine:   1. Stress induced hyperglycemia  2. Blood glucose well controlled  Plan  - Cotinue insulin Gtt with goal sugar <180    Hematology/Oncology:   1. Hb: 8.5   2. WBC 12.5  3. Bilateral PE's  4. Right Ventricular Thrombus  5. Laryngeal Cancer  Plan  - Monitor Hb.  - Continue Heparin gtt  - Patient to go for IVC filter      IV/Access:   1. Venous access - Right internal jugular Cordis with [place], Right Femoral CVC  2. Arterial access - Left Radial A line    Plan  - central access required and necessary      ICU Prophylaxis:   1. DVT: Heparin gtt/mechanical  2. VAP: HOB 30 degrees, chlorhexidine rinse  3. Stress Ulcer: PPI  4. Restraints: Nonviolent soft two point restraints required and necessary for patient safety and continued cares and good effect as patient continues to pull at necessary lines, tubes despite education and distraction. Will readdress daily.   5. Wound care  - not indicated  6. Feeding -no  7. Family Update: Son at bedside  8. Disposition - ICU      Key goals for next 24 hours:   1. Wean Veletri  2. SBT with possible extubation after veletri is weaned.  3. Wean epinepherine.  4. IVC filter to be placed by IR.         Medical History:     Past Medical History:   Diagnosis Date     Allergic state      Past Surgical History:   Procedure Laterality Date     LARYNGOSCOPY N/A 4/12/2021     Procedure: LARYNGOSCOPY;  Surgeon: Choco Johnson MD;  Location:  OR     REPAIR ANEURYSM ASCENDING AORTA N/A 2021    Procedure: PULMONARY THROMBECTOMY;  Surgeon: Yumi Singh MD;  Location:  OR     TRACHEOSTOMY  2021    Procedure: CREATION, TRACHEOSTOMY;  Surgeon: Choco Johnson MD;  Location:  OR     Social History     Socioeconomic History     Marital status: Single     Spouse name: Not on file     Number of children: Not on file     Years of education: Not on file     Highest education level: Not on file   Occupational History     Not on file   Social Needs     Financial resource strain: Not on file     Food insecurity     Worry: Not on file     Inability: Not on file     Transportation needs     Medical: Not on file     Non-medical: Not on file   Tobacco Use     Smoking status: Former Smoker     Types: Cigarettes     Quit date: 1989     Years since quittin.3     Smokeless tobacco: Never Used     Tobacco comment: quit in   had smoked about 1/2 pack a day then cut back   Substance and Sexual Activity     Alcohol use: No     Drug use: No     Sexual activity: Yes     Partners: Female   Lifestyle     Physical activity     Days per week: Not on file     Minutes per session: Not on file     Stress: Not on file   Relationships     Social connections     Talks on phone: Not on file     Gets together: Not on file     Attends Hoahaoism service: Not on file     Active member of club or organization: Not on file     Attends meetings of clubs or organizations: Not on file     Relationship status: Not on file     Intimate partner violence     Fear of current or ex partner: Not on file     Emotionally abused: Not on file     Physically abused: Not on file     Forced sexual activity: Not on file   Other Topics Concern     Parent/sibling w/ CABG, MI or angioplasty before 65F 55M? Not Asked   Social History Narrative     Not on file        Allergies   Allergen Reactions     Pollen  Extract               Key Medications:       acetaminophen  975 mg Oral Q8H     aspirin  162 mg Oral or NG Tube Daily     chlorhexidine  15 mL Mouth/Throat Q12H     docusate sodium  100 mg Oral BID     gabapentin  100 mg Oral At Bedtime     mupirocin  0.5 g Both Nostrils BID     pantoprazole (PROTONIX) IV  40 mg Intravenous Daily with breakfast     piperacillin-tazobactam  3.375 g Intravenous Q6H     polyethylene glycol  17 g Oral Daily     senna-docusate  1 tablet Oral BID     vancomycin (VANCOCIN) IV  2,000 mg Intravenous Q24H       dexmedetomidine       dextrose       dextrose 5% and 0.45% NaCl + KCl 20 mEq/L 20 mL/hr at 04/30/21 0201     EPINEPHrine 0.025 mcg/kg/min (04/30/21 1433)     epoprostenol (VELETRI) 20 mcg/mL in sterile water inhalation solution 10 ng/kg/min (04/30/21 1223)     heparin       heparin 1,200 Units/hr (04/30/21 0700)     insulin (regular) 1 Units/hr (04/30/21 1308)     milrinone Stopped (04/29/21 0025)     norepinephrine Stopped (04/29/21 1040)     phenylephrine       propofol (DIPRIVAN) infusion 25 mcg/kg/min (04/30/21 1432)     BETA BLOCKER NOT PRESCRIBED       sodium chloride 30 mL/hr at 04/30/21 0531     vasopressin Stopped (04/29/21 1139)        Home Meds  No current facility-administered medications on file prior to encounter.   acetaminophen (TYLENOL) 325 MG tablet, Take 2 tablets (650 mg) by mouth 3 times daily as needed for mild pain               Physical Examination:   Temp:  [99.1  F (37.3  C)-101.8  F (38.8  C)] 99.1  F (37.3  C)  Pulse:  [] 93  Resp:  [10-23] 11  BP: ()/(59-88) 140/84  MAP:  [59 mmHg-98 mmHg] 87 mmHg  Arterial Line BP: ()/(45-75) 131/67  FiO2 (%):  [40 %] 40 %  SpO2:  [90 %-100 %] 99 %    Intake/Output Summary (Last 24 hours) at 4/29/2021 0901  Last data filed at 4/29/2021 0800  Gross per 24 hour   Intake 8248.58 ml   Output 1170 ml   Net 7078.58 ml     Wt Readings from Last 4 Encounters:   04/30/21 99.9 kg (220 lb 3.8 oz)   04/22/21 93.9 kg  (207 lb)   04/17/21 97.7 kg (215 lb 6.2 oz)   02/19/18 98 kg (216 lb)     Arterial Line BP: ()/(45-75) 131/67  MAP:  [59 mmHg-98 mmHg] 87 mmHg  BP - Mean:  [] 96  CVP:  [8 mmHg-26 mmHg] 9 mmHg  SVO2:  [55 %-65 %] 62 %  Ventilation Mode: CMV/AC  (Continuous Mandatory Ventilation/ Assist Control)  FiO2 (%): 40 %  Rate Set (breaths/minute): 18 breaths/min  Tidal Volume Set (mL): 500 mL  PEEP (cm H2O): 8 cmH2O (found on PEEP 8)  Oxygen Concentration (%): 40 %  Resp: 11    Recent Labs   Lab 04/29/21  1446 04/29/21  0900 04/29/21  0400 04/29/21  0300   PH 7.48* 7.49* 7.42 7.44   PCO2 40 38 37 34*   PO2 113* 146* 109* 140*   HCO3 30* 29* 24 23   O2PER 40 40 40% 40       GEN: no acute distress   HEENT: head ncat, sclera anicteric, OP patent, trachea midline   PULM: unlabored synchronous with vent, clear anteriorly    CV/COR: RRR S1S2 no gallop,  No rub, no murmur  ABD: soft nontender, hypoactive bowel sounds, no mass  EXT:  Edema   warm  NEURO: Sedated and on vent  SKIN: no obvious rash           Data:   All data and imaging reviewed     ROUTINE ICU LABS (Last four results)  CMP  Recent Labs   Lab 04/30/21  0405 04/29/21  2210 04/29/21  1446 04/29/21  0900 04/29/21  0400 04/29/21  0118 04/28/21  2245 04/28/21  1900   * 151* 151*  --  149* 149* 145* 146*   POTASSIUM 3.6 3.9 3.9 3.5 3.3* 3.2* 3.8 3.7   CHLORIDE 113* 117* 117*  --  113* 112* 110* 115*   CO2 30 31 29  --  26 19* 13* 20   ANIONGAP 4 3 5  --  10 18* 22* 11   * 103* 121*  --  198* 250* 291* 208*   BUN 9 9 11  --  14 14 14 11   CR 0.93 0.99 0.99  --  1.20 1.31* 1.23 0.95   GFRESTIMATED 84 77 77  --  61 55* 60* 82   GFRESTBLACK >90 90 90  --  71 64 69 >90   TELMA 7.6* 7.9* 7.7*  --  7.2* 7.1* 7.7* 8.0*   MAG 2.4*  --   --   --  2.0  --   --  2.1   PHOS 2.5  --  4.1  --  1.3*  --   --  4.9*   PROTTOTAL 5.1*  --   --   --  5.4* 5.2* 5.6* 5.3*   ALBUMIN 2.8*  --   --   --  3.3* 3.2* 3.5 2.9*   BILITOTAL 0.6  --   --   --  1.0 1.1 1.4* 0.9    ALKPHOS 41  --   --   --  38* 33* 38* 44   AST 92*  --   --   --  50* 39 30 31   ALT 98*  --   --   --  47 38 29 28     CBC  Recent Labs   Lab 04/30/21  0405 04/29/21  1051 04/29/21  0400 04/29/21  0118   WBC 12.5* 10.4 10.6 11.8*   RBC 2.83* 2.90* 3.02* 2.89*   HGB 8.5* 8.7* 9.0* 8.8*   HCT 25.1* 25.3* 26.4* 25.5*   MCV 89 87 87 88   MCH 30.0 30.0 29.8 30.4   MCHC 33.9 34.4 34.1 34.5   RDW 14.4 14.2 14.1 14.2    248 306 283     INR  Recent Labs   Lab 04/28/21  1900 04/28/21  1703 04/28/21  1054   INR 1.50* 1.82* 1.26*     Arterial Blood Gas  Recent Labs   Lab 04/29/21  1446 04/29/21  0900 04/29/21  0400 04/29/21  0300   PH 7.48* 7.49* 7.42 7.44   PCO2 40 38 37 34*   PO2 113* 146* 109* 140*   HCO3 30* 29* 24 23   O2PER 40 40 40% 40       All cultures:  Recent Labs   Lab 04/30/21  0218 04/29/21  2252 04/29/21  2210 04/27/21  1254   CULT Culture negative < 24 hours, reincubate No growth after 14 hours No growth after 14 hours No growth after 3 days  No growth after 3 days     Recent Results (from the past 24 hour(s))   XR Chest Port 1 View    Narrative    XR CHEST PORT 1 VIEW  4/28/2021 5:35 PM       INDICATION: Missing Instrument, Call OR12 [phone]  COMPARISON: 4/27/2021       Impression    IMPRESSION: Endotracheal tube above the mikhail. Sternotomy.  Mediastinal and left chest tubes. Right IJ central venous catheter tip  in the SVC. Lung volumes are low. No pneumothorax or pleural effusion.  No retained foreign bodies. Negative for postoperative purposes.    LEIDY WALTER MD   XR Chest Port 1 View    Narrative    XR CHEST PORT 1 VIEW  4/28/2021 8:11 PM       INDICATION: s/p cabg  COMPARISON: 4/28/2001 at 1728 hours       Impression    IMPRESSION: Tracheostomy tube in good position above the mikhail. Right  IJ CVC in SVC. Mediastinal tubes. No pleural effusion or pneumothorax.  The lungs are clear.    LEIDY WALTER MD   XR Chest Port 1 View    Narrative    EXAM: XR CHEST PORT 1 VIEW  LOCATION:  [facility]  DATE/TIME: 4/29/2021 12:45 AM    INDICATION: [place]-L[place]nie catheter placement.  COMPARISON: CTA chest 04/27/2021. Chest radiographs today.      Impression    IMPRESSION: Termination of the right internal jugular [place]-Lonnie catheter is coiled in the right ventricle. Repositioning recommended. Poststernotomy. Mediastinal and left basilar pleural drains unchanged. Mild bibasilar atelectasis.   XR Chest Port 1 View    Narrative    EXAM: XR CHEST PORT 1 VIEW  LOCATION: [facility]  DATE/TIME: 4/29/2021 12:46 AM    INDICATION: [place]-L[place]nie catheter placement.  COMPARISON: Chest radiograph yesterday. CTA chest 04/27/2021.      Impression    IMPRESSION: Termination of the right internal jugular [place]-Lonnie catheter is coiled in the right ventricle. Repositioning recommended. Poststernotomy. Mediastinal and left basilar pleural drains in position. Tracheostomy in satisfactory position. Mild   bibasilar atelectasis. No pneumothorax.   XR Chest Port 1 View    Narrative    EXAM: CHEST SINGLE VIEW PORTABLE  LOCATION: [facility]  DATE/TIME: 4/29/2021 12:58 AM    INDICATION: Verify pulmonary arterial catheter placement.    COMPARISON: 4/29/2021 at 0106 hours.      Impression    IMPRESSION:  1. Interval advancement of a right internal jugular pulmonary arterial catheter with distal catheter tip now projecting over the main right pulmonary artery, approximately 3.3 cm lateral to the lateral aspect of the spine.  2. No other significant interval change since the recent comparison study.  3. A tracheostomy tube, mediastinal drain, and an additional left mediastinal or pleural drain are again noted.  4. A few band-like opacities in the right lung base most likely represent atelectasis or scarring.   XR Chest Port 1 View    Narrative    EXAM: XR CHEST PORT 1 VIEW  LOCATION: [facility]  DATE/TIME: 4/29/2021 1:08 AM    INDICATION: [place]-L[place]nie catheter placement.  COMPARISON: Earlier  today.      Impression    IMPRESSION: Right internal jugular venous [place]-Lonnie catheter terminates in the right main pulmonary artery. Poststernotomy. Mediastinal and left basilar pleural drains unchanged. Tracheostomy in satisfactory position. Mild bibasilar atelectasis. No   pneumothorax.   XR Chest Port 1 View    Narrative    EXAM: XR CHEST PORT 1 VIEW  LOCATION: [facility]  DATE/TIME: 4/29/2021 1:05 AM    INDICATION: Follow-up central line placement.  COMPARISON: Most recent exam from 0102 hours today      Impression    IMPRESSION: Minimal change. [place]-Lonnie catheter is been pulled back slightly, tip within the proximal aspect of right main pulmonary artery. Tracheostomy tube as well as the midline mediastinal and left pleural drains remain in place. Lungs clear. No   pneumothorax. Sternal wires. Heart size normal.         Billing: This patient is critically ill: Yes. Total critical care time today 45 min.

## 2021-04-30 NOTE — PROGRESS NOTES
ART Line Dampening    ART line waveform dampening. Line redressed, flushed, hand repositioned - without success. With good waveform, ART line correlated well with cuff. Orders from Dr. Sanchez to go off of cuff pressures for pressor management and maintain ART line for lab draws.

## 2021-04-30 NOTE — PLAN OF CARE
Pt remains intubated, sedated on propofol gt.  Pt meets RASS goal of -1 to 0.  Pt wakes and follows, SIMPSON.  Pt nods yes or no to having pain especially w/coughing.  Dilaudid, fentanyl given for comfort, pt appears to rest between cares.    VSS,  MAP >65 w/use of epi gtt titration, meets goal of SBP <140, CI >2.0.  Levophed and vasopressin off.  Febrile to 101.6F, Intensivist aware.    Urine re-pink following cyanokit infusion 4/29.  UO improved following lasix.      Yusuf Anthony Jr present at bedside this morning and updated to plan of care & daily goals.  Son is returning to [place] today with plan to be available by phone.

## 2021-04-30 NOTE — PROGRESS NOTES
Phone consent was obtained from  after explaining the procedure, risks and benefits and consent is in IR.     Procedure will most likely be done in the afternoon    Thanks [facility] Interventional Radiology CNP ([phone]) (phone [phone])

## 2021-04-30 NOTE — PROGRESS NOTES
Patient seen and discussed with Dr. Singh and Dr. Cali    [facility]  Cardiovascular and Thoracic Surgery Daily Note          Assessment and Plan:   POD#2 s/p Emergent pulmonary embolectomy by Dr. Yumi Singh and Dr. Bárbara Cali    -CVS: HR: 90s-100s. SBP: 90s-120s. Pre op EF: 60-65%. Remains on low dose epi-- weaning as able. ASA. On low intensity heparin d/t PE, will eventually transition to Eliquis. IR consulted for IVC filter-- tentatively planned for this afternoon. Chest tubes with too much output to remove- to suction. Wound vac in place.   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 Will work on weaning veletri today.   -Neuro:  Sedated on propofol. Awake, opens eyes to voice. Follows commands.   -Renal: good UOP. Up about 9kg from preoperative weight. Will give additional lasix 20mg IV today. Will continue to monitor.   -GI:  Continue bowel regimen  -: Mera in place d/t limited mobility and need for accurate I&Os.   -Endo: pre op a1c: 5.6. Continue insulin gtt  -FEN: replace electrolytes as needed. Na: 147. K: 3.6  Orders Placed This Encounter      NPO for Medical/Clinical Reasons Except for: Meds      Advance Diet as Tolerated: Clear Liquid Diet      Advance Diet as Tolerated: Regular Diet Adult; Low Saturated Fat Na <2400mg Diet    -ID: Temp (24hrs), Av.7  F (38.2  C), Min:99.3  F (37.4  C), Max:101.8  F (38.8  C)  WBC: 12.5. Completed perioperative abx. Marte cultured and started on broad spectrum abx per intensivist for fevers overnight.   -Heme: hgb: 8.5. plt: 240. Acute blood loss anemia and thrombocytopenia related to surgery  -Proph: PCD, ASA, PPI, heparin gtt  -Dispo: Continue ICU cares. Initiate therapies. Wean epi and veletri as able today. Plan for IVC filter today          Interval History:   No acute events overnight. Weaning pressors. Opens eyes to voice. Follows commands.          Medications:       acetaminophen   "975 mg Oral Q8H     aspirin  162 mg Oral or NG Tube Daily     chlorhexidine  15 mL Mouth/Throat Q12H     docusate sodium  100 mg Oral BID     gabapentin  100 mg Oral At Bedtime     mupirocin  0.5 g Both Nostrils BID     pantoprazole  40 mg Oral or NG Tube Daily    Or     pantoprazole  40 mg Oral Daily     piperacillin-tazobactam  3.375 g Intravenous Q6H     polyethylene glycol  17 g Oral Daily     senna-docusate  1 tablet Oral BID     vancomycin (VANCOCIN) IV  2,000 mg Intravenous Q24H     [DISCONTINUED] acetaminophen **OR** acetaminophen, acetaminophen, [START ON 5/1/2021] acetaminophen, albuterol, sore throat lozenge, bisacodyl, dexmedetomidine, dextrose, glucose **OR** dextrose **OR** glucagon, fentaNYL, hydrALAZINE, HYDROmorphone **OR** HYDROmorphone, lidocaine 4%, lidocaine (buffered or not buffered), LORazepam, magnesium hydroxide, methocarbamol, naloxone **OR** naloxone **OR** naloxone **OR** naloxone, norepinephrine, ondansetron **OR** ondansetron, oxyCODONE **OR** oxyCODONE, phenylephrine, polyethylene glycol, prochlorperazine **OR** prochlorperazine **OR** prochlorperazine, propofol (DIPRIVAN) infusion **AND** propofol **AND** CK total **AND** Triglycerides, BETA BLOCKER NOT PRESCRIBED, senna-docusate **OR** senna-docusate, sodium chloride (PF), sodium chloride (PF), sodium phosphate, vasopressin          Physical Exam:   Vitals were reviewed  Blood pressure 122/76, pulse 97, temperature 100  F (37.8  C), resp. rate 18, height 1.803 m (5' 11\"), weight 99.9 kg (220 lb 3.8 oz), SpO2 100 %.  Rhythm: NSR    Lungs: coarse    Cardiovascular: RRR normal s1 and s2    Abdomen: soft NTND    Extremeties: minimal edema    Incision: wound vac in place    CT: to suction    Weight:   Vitals:    04/27/21 1805 04/29/21 0630 04/30/21 0400   Weight: 90.8 kg (200 lb 2.8 oz) 99.2 kg (218 lb 11.1 oz) 99.9 kg (220 lb 3.8 oz)            Data:   Labs:   Lab Results   Component Value Date    WBC 12.5 04/30/2021     Lab Results "   Component Value Date    RBC 2.83 04/30/2021     Lab Results   Component Value Date    HGB 8.5 04/30/2021     Lab Results   Component Value Date    HCT 25.1 04/30/2021     No components found for: MCT  Lab Results   Component Value Date    MCV 89 04/30/2021     Lab Results   Component Value Date    MCH 30.0 04/30/2021     Lab Results   Component Value Date    MCHC 33.9 04/30/2021     Lab Results   Component Value Date    RDW 14.4 04/30/2021     Lab Results   Component Value Date     04/30/2021       Last Basic Metabolic Panel:  Lab Results   Component Value Date     04/30/2021      Lab Results   Component Value Date    POTASSIUM 3.6 04/30/2021     Lab Results   Component Value Date    CHLORIDE 113 04/30/2021     Lab Results   Component Value Date    TELMA 7.6 04/30/2021     Lab Results   Component Value Date    CO2 30 04/30/2021     Lab Results   Component Value Date    BUN 9 04/30/2021     Lab Results   Component Value Date    CR 0.93 04/30/2021     Lab Results   Component Value Date     04/30/2021       Susanna Dc PA-C  CV Surgery  Pager # [phone]

## 2021-04-30 NOTE — PROCEDURES
Procedure: Arterial line placement    Consent: obtained, signed after risk benefit explained and all questions answered to the best of my knowledged and on file in the chart.    Location: Arterial line placement into the Left radial artery.    Universal Protocol: Patient Identification was verified, time out was performed, site marking N/A, Imaging data N/A. Full Barrier precaution done: Hands washed, mask, gloves, gown, cap and eye protection all used.    Indication: Monitoring of BP and frequent  For ABG blood draw in an hemodynamically unstable patient.    Narrative: Area prepped with chlorhexedine and draped in sterile fashion. 1% lidocaine injected subcutaneously for local anesthesia. Arterial catheter inserted using seldinger technique and sutured to the skin. Distal blood flow (Skin color and temperature) preserved and good arterial wave form documented.  Ultrasound was used for line placement.     Complications: No apparent complication    Procedure performed by  on April 28, 2021

## 2021-05-01 ENCOUNTER — APPOINTMENT (OUTPATIENT)
Dept: GENERAL RADIOLOGY | Facility: CLINIC | Age: 71
DRG: 163 | End: 2021-05-01
Attending: SURGERY
Payer: COMMERCIAL

## 2021-05-01 LAB
ALBUMIN SERPL-MCNC: 2.6 G/DL (ref 3.4–5)
ALP SERPL-CCNC: 49 U/L (ref 40–150)
ALT SERPL W P-5'-P-CCNC: 85 U/L (ref 0–70)
ANION GAP SERPL CALCULATED.3IONS-SCNC: 5 MMOL/L (ref 3–14)
AST SERPL W P-5'-P-CCNC: 51 U/L (ref 0–45)
BILIRUB SERPL-MCNC: 0.7 MG/DL (ref 0.2–1.3)
BUN SERPL-MCNC: 10 MG/DL (ref 7–30)
CALCIUM SERPL-MCNC: 7.6 MG/DL (ref 8.5–10.1)
CHLORIDE SERPL-SCNC: 113 MMOL/L (ref 94–109)
CO2 SERPL-SCNC: 26 MMOL/L (ref 20–32)
CREAT SERPL-MCNC: 0.86 MG/DL (ref 0.66–1.25)
ERYTHROCYTE [DISTWIDTH] IN BLOOD BY AUTOMATED COUNT: 14.6 % (ref 10–15)
ERYTHROCYTE [DISTWIDTH] IN BLOOD BY AUTOMATED COUNT: 14.6 % (ref 10–15)
GFR SERPL CREATININE-BSD FRML MDRD: 88 ML/MIN/{1.73_M2}
GLUCOSE BLDC GLUCOMTR-MCNC: 102 MG/DL (ref 70–99)
GLUCOSE BLDC GLUCOMTR-MCNC: 109 MG/DL (ref 70–99)
GLUCOSE BLDC GLUCOMTR-MCNC: 109 MG/DL (ref 70–99)
GLUCOSE BLDC GLUCOMTR-MCNC: 117 MG/DL (ref 70–99)
GLUCOSE BLDC GLUCOMTR-MCNC: 117 MG/DL (ref 70–99)
GLUCOSE BLDC GLUCOMTR-MCNC: 124 MG/DL (ref 70–99)
GLUCOSE BLDC GLUCOMTR-MCNC: 127 MG/DL (ref 70–99)
GLUCOSE BLDC GLUCOMTR-MCNC: 134 MG/DL (ref 70–99)
GLUCOSE BLDC GLUCOMTR-MCNC: 141 MG/DL (ref 70–99)
GLUCOSE BLDC GLUCOMTR-MCNC: 80 MG/DL (ref 70–99)
GLUCOSE BLDC GLUCOMTR-MCNC: 91 MG/DL (ref 70–99)
GLUCOSE BLDC GLUCOMTR-MCNC: 96 MG/DL (ref 70–99)
GLUCOSE SERPL-MCNC: 105 MG/DL (ref 70–99)
HCT VFR BLD AUTO: 27.3 % (ref 40–53)
HCT VFR BLD AUTO: 29.8 % (ref 40–53)
HGB BLD-MCNC: 9 G/DL (ref 13.3–17.7)
HGB BLD-MCNC: 9.8 G/DL (ref 13.3–17.7)
INTERPRETATION ECG - MUSE: NORMAL
MAGNESIUM SERPL-MCNC: 2.3 MG/DL (ref 1.6–2.3)
MCH RBC QN AUTO: 29.6 PG (ref 26.5–33)
MCH RBC QN AUTO: 30 PG (ref 26.5–33)
MCHC RBC AUTO-ENTMCNC: 32.9 G/DL (ref 31.5–36.5)
MCHC RBC AUTO-ENTMCNC: 33 G/DL (ref 31.5–36.5)
MCV RBC AUTO: 90 FL (ref 78–100)
MCV RBC AUTO: 91 FL (ref 78–100)
PHOSPHATE SERPL-MCNC: 1.9 MG/DL (ref 2.5–4.5)
PHOSPHATE SERPL-MCNC: 2.6 MG/DL (ref 2.5–4.5)
PLATELET # BLD AUTO: 240 10E9/L (ref 150–450)
PLATELET # BLD AUTO: 244 10E9/L (ref 150–450)
POTASSIUM SERPL-SCNC: 3.6 MMOL/L (ref 3.4–5.3)
PROCALCITONIN SERPL-MCNC: 0.52 NG/ML
PROT SERPL-MCNC: 5.2 G/DL (ref 6.8–8.8)
RBC # BLD AUTO: 3.04 10E12/L (ref 4.4–5.9)
RBC # BLD AUTO: 3.27 10E12/L (ref 4.4–5.9)
SODIUM SERPL-SCNC: 144 MMOL/L (ref 133–144)
UFH PPP CHRO-ACNC: 0.19 IU/ML
UFH PPP CHRO-ACNC: 0.6 IU/ML
WBC # BLD AUTO: 9.4 10E9/L (ref 4–11)
WBC # BLD AUTO: 9.5 10E9/L (ref 4–11)

## 2021-05-01 PROCEDURE — 200N000001 HC R&B ICU

## 2021-05-01 PROCEDURE — 85520 HEPARIN ASSAY: CPT | Performed by: SURGERY

## 2021-05-01 PROCEDURE — 250N000011 HC RX IP 250 OP 636: Performed by: INTERNAL MEDICINE

## 2021-05-01 PROCEDURE — 99233 SBSQ HOSP IP/OBS HIGH 50: CPT | Performed by: INTERNAL MEDICINE

## 2021-05-01 PROCEDURE — 94003 VENT MGMT INPAT SUBQ DAY: CPT

## 2021-05-01 PROCEDURE — 250N000011 HC RX IP 250 OP 636

## 2021-05-01 PROCEDURE — 258N000003 HC RX IP 258 OP 636: Performed by: INTERNAL MEDICINE

## 2021-05-01 PROCEDURE — 250N000011 HC RX IP 250 OP 636: Performed by: PHYSICIAN ASSISTANT

## 2021-05-01 PROCEDURE — 999N000157 HC STATISTIC RCP TIME EA 10 MIN

## 2021-05-01 PROCEDURE — 85520 HEPARIN ASSAY: CPT | Performed by: INTERNAL MEDICINE

## 2021-05-01 PROCEDURE — 80053 COMPREHEN METABOLIC PANEL: CPT | Performed by: PHYSICIAN ASSISTANT

## 2021-05-01 PROCEDURE — 250N000009 HC RX 250: Performed by: SURGERY

## 2021-05-01 PROCEDURE — 999N000065 XR ABDOMEN PORT 1 VIEWS

## 2021-05-01 PROCEDURE — 99291 CRITICAL CARE FIRST HOUR: CPT | Mod: 24 | Performed by: INTERNAL MEDICINE

## 2021-05-01 PROCEDURE — 84100 ASSAY OF PHOSPHORUS: CPT | Performed by: PHYSICIAN ASSISTANT

## 2021-05-01 PROCEDURE — 250N000011 HC RX IP 250 OP 636: Performed by: SURGERY

## 2021-05-01 PROCEDURE — 250N000009 HC RX 250

## 2021-05-01 PROCEDURE — 250N000013 HC RX MED GY IP 250 OP 250 PS 637: Performed by: INTERNAL MEDICINE

## 2021-05-01 PROCEDURE — 258N000003 HC RX IP 258 OP 636

## 2021-05-01 PROCEDURE — 84100 ASSAY OF PHOSPHORUS: CPT | Performed by: SURGERY

## 2021-05-01 PROCEDURE — 85520 HEPARIN ASSAY: CPT | Performed by: PHYSICIAN ASSISTANT

## 2021-05-01 PROCEDURE — 85027 COMPLETE CBC AUTOMATED: CPT | Performed by: SURGERY

## 2021-05-01 PROCEDURE — 83735 ASSAY OF MAGNESIUM: CPT | Performed by: PHYSICIAN ASSISTANT

## 2021-05-01 PROCEDURE — 84145 PROCALCITONIN (PCT): CPT | Performed by: SURGERY

## 2021-05-01 PROCEDURE — 71045 X-RAY EXAM CHEST 1 VIEW: CPT

## 2021-05-01 PROCEDURE — 999N001017 HC STATISTIC GLUCOSE BY METER IP

## 2021-05-01 PROCEDURE — 272N000452 HC KIT SHRLOCK 5FR POWER PICC TRIPLE LUMEN

## 2021-05-01 PROCEDURE — C9113 INJ PANTOPRAZOLE SODIUM, VIA: HCPCS | Performed by: INTERNAL MEDICINE

## 2021-05-01 PROCEDURE — 999N000009 HC STATISTIC AIRWAY CARE

## 2021-05-01 PROCEDURE — 36569 INSJ PICC 5 YR+ W/O IMAGING: CPT

## 2021-05-01 PROCEDURE — 250N000013 HC RX MED GY IP 250 OP 250 PS 637: Performed by: SURGERY

## 2021-05-01 RX ORDER — FUROSEMIDE 10 MG/ML
20 INJECTION INTRAMUSCULAR; INTRAVENOUS EVERY 12 HOURS
Status: DISCONTINUED | OUTPATIENT
Start: 2021-05-01 | End: 2021-05-04

## 2021-05-01 RX ORDER — HEPARIN SODIUM 10000 [USP'U]/100ML
0-5000 INJECTION, SOLUTION INTRAVENOUS CONTINUOUS
Status: DISCONTINUED | OUTPATIENT
Start: 2021-05-01 | End: 2021-05-04

## 2021-05-01 RX ORDER — POTASSIUM CHLORIDE 29.8 MG/ML
20 INJECTION INTRAVENOUS ONCE
Status: COMPLETED | OUTPATIENT
Start: 2021-05-01 | End: 2021-05-01

## 2021-05-01 RX ORDER — PIPERACILLIN SODIUM, TAZOBACTAM SODIUM 3; .375 G/15ML; G/15ML
3.38 INJECTION, POWDER, LYOPHILIZED, FOR SOLUTION INTRAVENOUS EVERY 6 HOURS
Status: DISCONTINUED | OUTPATIENT
Start: 2021-05-01 | End: 2021-05-01

## 2021-05-01 RX ORDER — POLYETHYLENE GLYCOL 3350 17 G/17G
34 POWDER, FOR SOLUTION ORAL 2 TIMES DAILY
Status: DISCONTINUED | OUTPATIENT
Start: 2021-05-01 | End: 2021-05-02

## 2021-05-01 RX ADMIN — SENNOSIDES AND DOCUSATE SODIUM 1 TABLET: 8.6; 5 TABLET ORAL at 20:03

## 2021-05-01 RX ADMIN — HYDRALAZINE HYDROCHLORIDE 10 MG: 20 INJECTION INTRAMUSCULAR; INTRAVENOUS at 16:21

## 2021-05-01 RX ADMIN — ACETAMINOPHEN 650 MG: 325 TABLET, FILM COATED ORAL at 18:25

## 2021-05-01 RX ADMIN — FENTANYL CITRATE 50 MCG: 50 INJECTION, SOLUTION INTRAMUSCULAR; INTRAVENOUS at 08:58

## 2021-05-01 RX ADMIN — PROPOFOL 15 MCG/KG/MIN: 10 INJECTION, EMULSION INTRAVENOUS at 00:29

## 2021-05-01 RX ADMIN — SODIUM PHOSPHATE, MONOBASIC, MONOHYDRATE 15 MMOL: 276; 142 INJECTION, SOLUTION INTRAVENOUS at 06:11

## 2021-05-01 RX ADMIN — HEPARIN SODIUM 1200 UNITS/HR: 10000 INJECTION, SOLUTION INTRAVENOUS at 05:22

## 2021-05-01 RX ADMIN — FENTANYL CITRATE 25 MCG: 50 INJECTION, SOLUTION INTRAMUSCULAR; INTRAVENOUS at 15:28

## 2021-05-01 RX ADMIN — FUROSEMIDE 20 MG: 10 INJECTION, SOLUTION INTRAVENOUS at 13:06

## 2021-05-01 RX ADMIN — CHLORHEXIDINE GLUCONATE 15 ML: 1.2 SOLUTION ORAL at 09:10

## 2021-05-01 RX ADMIN — POTASSIUM CHLORIDE 20 MEQ: 29.8 INJECTION, SOLUTION INTRAVENOUS at 06:06

## 2021-05-01 RX ADMIN — SODIUM CHLORIDE: 9 INJECTION, SOLUTION INTRAVENOUS at 21:14

## 2021-05-01 RX ADMIN — PIPERACILLIN SODIUM AND TAZOBACTAM SODIUM 3.38 G: 3; .375 INJECTION, POWDER, LYOPHILIZED, FOR SOLUTION INTRAVENOUS at 03:50

## 2021-05-01 RX ADMIN — PANTOPRAZOLE SODIUM 40 MG: 40 INJECTION, POWDER, FOR SOLUTION INTRAVENOUS at 09:10

## 2021-05-01 RX ADMIN — METHOCARBAMOL 500 MG: 500 TABLET ORAL at 18:34

## 2021-05-01 RX ADMIN — ACETAMINOPHEN 650 MG: 325 TABLET, FILM COATED ORAL at 18:34

## 2021-05-01 RX ADMIN — HYDRALAZINE HYDROCHLORIDE 10 MG: 20 INJECTION INTRAMUSCULAR; INTRAVENOUS at 18:25

## 2021-05-01 RX ADMIN — GABAPENTIN 100 MG: 100 CAPSULE ORAL at 21:13

## 2021-05-01 RX ADMIN — HEPARIN SODIUM 1800 UNITS/HR: 10000 INJECTION, SOLUTION INTRAVENOUS at 21:54

## 2021-05-01 RX ADMIN — FENTANYL CITRATE 25 MCG: 50 INJECTION, SOLUTION INTRAMUSCULAR; INTRAVENOUS at 19:50

## 2021-05-01 RX ADMIN — FENTANYL CITRATE 50 MCG: 50 INJECTION, SOLUTION INTRAMUSCULAR; INTRAVENOUS at 02:10

## 2021-05-01 RX ADMIN — POLYETHYLENE GLYCOL 3350 34 G: 17 POWDER, FOR SOLUTION ORAL at 18:34

## 2021-05-01 RX ADMIN — VANCOMYCIN HYDROCHLORIDE 2000 MG: 5 INJECTION, POWDER, LYOPHILIZED, FOR SOLUTION INTRAVENOUS at 21:55

## 2021-05-01 RX ADMIN — SODIUM PHOSPHATE, MONOBASIC, MONOHYDRATE 15 MMOL: 276; 142 INJECTION, SOLUTION INTRAVENOUS at 09:10

## 2021-05-01 RX ADMIN — PIPERACILLIN SODIUM AND TAZOBACTAM SODIUM 3.38 G: 3; .375 INJECTION, POWDER, LYOPHILIZED, FOR SOLUTION INTRAVENOUS at 21:13

## 2021-05-01 RX ADMIN — FENTANYL CITRATE 25 MCG: 50 INJECTION, SOLUTION INTRAMUSCULAR; INTRAVENOUS at 21:13

## 2021-05-01 RX ADMIN — CHLORHEXIDINE GLUCONATE 15 ML: 1.2 SOLUTION ORAL at 20:03

## 2021-05-01 RX ADMIN — LIDOCAINE HYDROCHLORIDE 2 ML: 10 INJECTION, SOLUTION EPIDURAL; INFILTRATION; INTRACAUDAL; PERINEURAL at 11:45

## 2021-05-01 RX ADMIN — POTASSIUM & SODIUM PHOSPHATES POWDER PACK 280-160-250 MG 1 PACKET: 280-160-250 PACK at 18:36

## 2021-05-01 RX ADMIN — DOCUSATE SODIUM 100 MG: 100 CAPSULE, LIQUID FILLED ORAL at 20:03

## 2021-05-01 RX ADMIN — PIPERACILLIN SODIUM AND TAZOBACTAM SODIUM 3.38 G: 3; .375 INJECTION, POWDER, LYOPHILIZED, FOR SOLUTION INTRAVENOUS at 16:21

## 2021-05-01 RX ADMIN — PIPERACILLIN SODIUM AND TAZOBACTAM SODIUM 3.38 G: 3; .375 INJECTION, POWDER, LYOPHILIZED, FOR SOLUTION INTRAVENOUS at 09:10

## 2021-05-01 ASSESSMENT — ACTIVITIES OF DAILY LIVING (ADL)
ADLS_ACUITY_SCORE: 20
ADLS_ACUITY_SCORE: 20
ADLS_ACUITY_SCORE: 21
ADLS_ACUITY_SCORE: 21
ADLS_ACUITY_SCORE: 20
ADLS_ACUITY_SCORE: 21

## 2021-05-01 ASSESSMENT — MIFFLIN-ST. JEOR: SCORE: 1805.13

## 2021-05-01 NOTE — PROGRESS NOTES
[facility]  Hematology-Oncology Follow-up Note      Today's Date: 05/01/21  Date of Admission:  4/27/2021  Reason for Consult: Laryngeal cancer      ASSESSMENT:  Raj Warren is a 70 year old male    Acute respiratory failure secondary to bilateral pulmonary emboli  Newly diagnosed laryngeal cancer - locally advanced diease   Post tracheostomy for airway compromise from cancer   Intubated and supported on ventilator  New FDG avid pulmonary nodule - likely infectious/inflammatory/ aspiration    Patient was awake in the room with 2 nursing staff around him. I did explain him that he would need a laryngectomy and likely chemoradiation after that. He is currently acutely ill with his bilateral pulmonary emboli and respiratory distress. He first has to recover from the acute illness and we would get him started on therapy. He did write his questions for me. He did express that he is looking forward to having therapy for his cancer.     RECOMMENDATIONS:  - Patient acutely ill with respiratory distress. He is recovering nicely  - Plan would be for a total laryngectomy followed by concurrent chemoradiation.   - We will follow intermittently.   - Please do call us with questions.     Over 35 min spent on day of visit including review of tests, obtaining/reviewing separately obtained history/physical exam, counseling patient, ordering medications/tests/procedures, communicating with PCP/consultants, and documenting in electronic medical record.    Abdias Ovalle  Hematologist and Medical Oncologist  [facility]     INTERIM HISTORY:  Patient has tracheostomy and on vent support. Stable and awake. Understands and able to communicate by writing.      MEDICATIONS:  Current Facility-Administered Medications   Medication     acetaminophen (TYLENOL) solution 650 mg     acetaminophen (TYLENOL) Suppository 650 mg     acetaminophen (TYLENOL) tablet 650 mg     acetaminophen (TYLENOL) tablet 975 mg      albuterol (PROVENTIL) neb solution 2.5 mg     aspirin (ASA) chewable tablet 162 mg     benzocaine-menthol (CHLORASEPTIC) 6-10 MG lozenge 1 lozenge     bisacodyl (DULCOLAX) Suppository 10 mg     chlorhexidine (PERIDEX) 0.12 % solution 15 mL     dextrose 10% infusion     dextrose 5% and 0.45% NaCl + KCl 20 mEq/L infusion     glucose gel 15-30 g    Or     dextrose 50 % injection 25-50 mL    Or     glucagon injection 1 mg     docusate sodium (COLACE) capsule 100 mg     fentaNYL (PF) (SUBLIMAZE) injection 25-50 mcg     furosemide (LASIX) injection 20 mg     gabapentin (NEURONTIN) capsule 100 mg     heparin (PRESSURE BAG) 2 Units/mL 0.9% NaCl (1000 mL)     heparin 25,000 units in 0.45% NaCl 250 mL ANTICOAGULANT infusion     hydrALAZINE (APRESOLINE) injection 10 mg     HYDROmorphone (DILAUDID) injection 0.2 mg    Or     HYDROmorphone (PF) (DILAUDID) injection 0.4 mg     insulin aspart (NovoLOG) injection (RAPID ACTING)     lidocaine (LMX4) cream     lidocaine 1 % 0.1-1 mL     LORazepam (ATIVAN) injection 0.5-1 mg     magnesium hydroxide (MILK OF MAGNESIA) suspension 30 mL     Med Instruction - Transition from IV Insulin Infusion to Sub-Q Insulin     methocarbamol (ROBAXIN) tablet 500 mg     naloxone (NARCAN) injection 0.2 mg    Or     naloxone (NARCAN) injection 0.4 mg    Or     naloxone (NARCAN) injection 0.2 mg    Or     naloxone (NARCAN) injection 0.4 mg     ondansetron (ZOFRAN-ODT) ODT tab 4 mg    Or     ondansetron (ZOFRAN) injection 4 mg     oxyCODONE (ROXICODONE) tablet 5 mg    Or     oxyCODONE (ROXICODONE) tablet 10 mg     pantoprazole (PROTONIX) IV push injection 40 mg     piperacillin-tazobactam (ZOSYN) 3.375 g vial to attach to  mL bag     polyethylene glycol (MIRALAX) Packet 17 g     polyethylene glycol (MIRALAX) Packet 17 g     senna-docusate (SENOKOT-S/PERICOLACE) 8.6-50 MG per tablet 1 tablet     senna-docusate (SENOKOT-S/PERICOLACE) 8.6-50 MG per tablet 1 tablet    Or     senna-docusate  "(SENOKOT-S/PERICOLACE) 8.6-50 MG per tablet 2 tablet     sodium chloride (PF) 0.9% PF flush 3 mL     sodium chloride (PF) 0.9% PF flush 3 mL     sodium chloride 0.9% infusion     sodium phosphate (NaPHOS) 15 mMol in 250 mL D5W PREMADE infusion     vancomycin (VANCOCIN) 2,000 mg in D5W 500 mL intermittent infusion     vasopressin 40 units in NS 40 mL (PITRESSIN) infusion           ALLERGIES:  Allergies   Allergen Reactions     Pollen Extract          PHYSICAL EXAM:  Vital signs:  Temp: 100  F (37.8  C) Temp src: Bladder BP: 115/76 Pulse: 82   Resp: 20 SpO2: 98 % O2 Device: Mechanical Ventilator Oxygen Delivery: 3 LPM Height: 180.3 cm (5' 11\") Weight: 101.3 kg (223 lb 5.2 oz)  Estimated body mass index is 31.15 kg/m  as calculated from the following:    Height as of this encounter: 1.803 m (5' 11\").    Weight as of this encounter: 101.3 kg (223 lb 5.2 oz).      LABS:  Recent Labs   Lab Test 05/01/21 0417 04/30/21 0405 04/29/21  2210 04/29/21  1446 04/29/21  0900 04/29/21  0400    147* 151* 151*  --  149*   POTASSIUM 3.6 3.6 3.9 3.9 3.5 3.3*   CHLORIDE 113* 113* 117* 117*  --  113*   CO2 26 30 31 29  --  26   ANIONGAP 5 4 3 5  --  10   BUN 10 9 9 11  --  14   CR 0.86 0.93 0.99 0.99  --  1.20   * 146* 103* 121*  --  198*   TELMA 7.6* 7.6* 7.9* 7.7*  --  7.2*     Recent Labs   Lab Test 05/01/21 0417 04/30/21  0405 04/29/21  1446 04/29/21  0400 04/28/21  1900 04/12/21  0227 04/12/21  0227   MAG 2.3 2.4*  --  2.0 2.1  --  2.4*   PHOS 1.9* 2.5 4.1 1.3* 4.9*   < >  --     < > = values in this interval not displayed.     Recent Labs   Lab Test 05/01/21  0625 04/30/21  0405 04/29/21  1051 04/29/21  0400 04/29/21  0118 04/28/21  0840 04/28/21  0840 04/27/21  1254 04/27/21  1254 04/12/21  0227 04/12/21  0227 04/05/21  0653   WBC 9.5 12.5* 10.4 10.6 11.8*   < > 8.5   < > 8.7   < > 19.6* 7.9   HGB 9.0* 8.5* 8.7* 9.0* 8.8*   < > 14.0   < > 16.2   < > 16.9 15.9    240 248 306 283   < > 292   < > 308   < > 369 " "285   MCV 90 89 87 87 88   < > 86   < > 89   < > 86 86   NEUTROPHIL  --   --   --  82.6  --   --  62.7  --  71.3  --  36.0 37.9    < > = values in this interval not displayed.     Recent Labs   Lab Test 05/01/21  0417 04/30/21  0405 04/29/21  0400 04/28/21  0840 04/28/21  0840   BILITOTAL 0.7 0.6 1.0   < > 0.4   ALKPHOS 49 41 38*   < > 62   ALT 85* 98* 47   < >  --    AST 51* 92* 50*   < > 23   ALBUMIN 2.6* 2.8* 3.3*   < > 2.9*   LDH  --   --   --   --  376*    < > = values in this interval not displayed.     TSH   Date Value Ref Range Status   04/12/2021 4.80 (H) 0.40 - 4.00 mU/L Final   08/20/2010 2.54 0.4 - 5.0 mU/L Final       PATHOLOGY:  Lab Results   Component Value Date    PATH  04/28/2021     Patient Name: LUPE HALL  MR#: [id number]  Specimen #: [id number]  Collected: 4/28/2021  Received: 4/29/2021  Reported: 4/30/2021 08:08  Ordering Phy(s): KATHERINE KAMARA    For improved result formatting, select 'View Enhanced Report Format' under   Linked Documents section.    SPECIMEN(S):  Pulmonary embolism    FINAL DIAGNOSIS:  Pulmonary embolism:  - Consistent with organized embolus.    Electronically signed out by:    Kenny Anaya M.D., PhD    CLINICAL HISTORY:  70 year old male.    GROSS:  The specimen is received in formalin, labeled with the patient's name and   date of birth, and designated  \"pulmonary embolism\". It consists of a 6 x 6 x 2 cm aggregate of red-brown   clotted blood. No discrete lesions  or masses are noted grossly. Representative sections are submitted in one   cassette. (Dictated by: LEI Braun([facility]) 4/29/2021 09:01 AM)    MICROSCOPIC:  Microscopic examination is performed.    The technical component of this testing was completed at the [facility], with the professional component performed   at the [facility], [address], [place]  [address] ([phone])    CPT Codes:  A: " 46278-BN2    COLLECTION SITE:  Client: [facility]  Location: [facility] (S)         IMAGING:  Results for orders placed or performed during the hospital encounter of 04/27/21   US Lower Extremity Venous Duplex Bilateral    Narrative    EXAM: US LOWER EXTREMITY VENOUS DUPLEX BILATERAL  LOCATION: [facility]  DATE/TIME: 4/27/2021 10:21 PM    INDICATION: Pulmonary emboli. Evaluate for DVT burden.  COMPARISON: None.  TECHNIQUE: Venous Duplex ultrasound of bilateral lower extremities with and without compression, augmentation and duplex. Color flow and spectral Doppler with waveform analysis performed.    FINDINGS: Exam includes the common femoral, femoral, popliteal veins as well as segmentally visualized deep calf veins and greater saphenous vein.     RIGHT: No deep vein thrombosis. No superficial thrombophlebitis. No popliteal cyst.    LEFT: No deep vein thrombosis. No superficial thrombophlebitis. No popliteal cyst.      Impression    IMPRESSION:  1.  No deep venous thrombosis in the bilateral lower extremities.   XR Chest Port 1 View    Narrative    XR CHEST PORT 1 VIEW  4/28/2021 5:35 PM       INDICATION: Missing Instrument, Call OR12 [phone]  COMPARISON: 4/27/2021       Impression    IMPRESSION: Endotracheal tube above the mikhail. Sternotomy.  Mediastinal and left chest tubes. Right IJ central venous catheter tip  in the SVC. Lung volumes are low. No pneumothorax or pleural effusion.  No retained foreign bodies. Negative for postoperative purposes.    LEIDY WALTER MD   XR Chest Port 1 View    Narrative    XR CHEST PORT 1 VIEW  4/28/2021 8:11 PM       INDICATION: s/p cabg  COMPARISON: 4/28/2001 at 1728 hours       Impression    IMPRESSION: Tracheostomy tube in good position above the mikhail. Right  IJ CVC in SVC. Mediastinal tubes. No pleural effusion or pneumothorax.  The lungs are clear.    LEIDY WALTER MD   XR Chest Port 1 View    Narrative    XR CHEST PORT 1 VIEW 4/29/2021  11:18 AM    HISTORY: sp cabg    COMPARISON: Earlier today at 1:05 AM      Impression    IMPRESSION: Tracheostomy. Right IJ [place]onnie catheter tip at the main  pulmonary artery. Median sternotomy, mediastinal drains, left basilar  chest tubes, and mediastinal surgical clips. Pacer pad has been  removed. Stable mild right basilar patchy opacity, either atelectasis  or developing pneumonia. Stable mild bibasilar retrocardiac  atelectasis. No pleural effusion or pneumothorax.    JF LUX MD   XR Chest Port 1 View    Narrative    EXAM: XR CHEST PORT 1 VIEW  LOCATION: [facility]  DATE/TIME: 4/29/2021 12:45 AM    INDICATION: [place]Lonnie catheter placement.  COMPARISON: CTA chest 04/27/2021. Chest radiographs today.      Impression    IMPRESSION: Termination of the right internal jugular [place]onnie catheter is coiled in the right ventricle. Repositioning recommended. Poststernotomy. Mediastinal and left basilar pleural drains unchanged. Mild bibasilar atelectasis.   XR Chest Port 1 View    Narrative    EXAM: XR CHEST PORT 1 VIEW  LOCATION: [facility]  DATE/TIME: 4/29/2021 12:46 AM    INDICATION: [place]nie catheter placement.  COMPARISON: Chest radiograph yesterday. CTA chest 04/27/2021.      Impression    IMPRESSION: Termination of the right internal jugular [place] catheter is coiled in the right ventricle. Repositioning recommended. Poststernotomy. Mediastinal and left basilar pleural drains in position. Tracheostomy in satisfactory position. Mild   bibasilar atelectasis. No pneumothorax.   XR Chest Port 1 View    Narrative    EXAM: XR CHEST PORT 1 VIEW  LOCATION: [facility]  DATE/TIME: 4/29/2021 1:08 AM    INDICATION: [place] catheter placement.  COMPARISON: Earlier today.      Impression    IMPRESSION: Right internal jugular venous [place] catheter terminates in the right main pulmonary artery. Poststernotomy. Mediastinal and left basilar pleural drains unchanged.  Tracheostomy in satisfactory position. Mild bibasilar atelectasis. No   pneumothorax.   XR Chest Port 1 View    Narrative    EXAM: CHEST SINGLE VIEW PORTABLE  LOCATION: [facility]  DATE/TIME: 4/29/2021 12:58 AM    INDICATION: Verify pulmonary arterial catheter placement.    COMPARISON: 4/29/2021 at 0106 hours.      Impression    IMPRESSION:  1. Interval advancement of a right internal jugular pulmonary arterial catheter with distal catheter tip now projecting over the main right pulmonary artery, approximately 3.3 cm lateral to the lateral aspect of the spine.  2. No other significant interval change since the recent comparison study.  3. A tracheostomy tube, mediastinal drain, and an additional left mediastinal or pleural drain are again noted.  4. A few band-like opacities in the right lung base most likely represent atelectasis or scarring.   XR Chest Port 1 View    Narrative    EXAM: XR CHEST PORT 1 VIEW  LOCATION: [facility]  DATE/TIME: 4/29/2021 1:05 AM    INDICATION: Follow-up central line placement.  COMPARISON: Most recent exam from 0102 hours today      Impression    IMPRESSION: Minimal change. [place] catheter is been pulled back slightly, tip within the proximal aspect of right main pulmonary artery. Tracheostomy tube as well as the midline mediastinal and left pleural drains remain in place. Lungs clear. No   pneumothorax. Sternal wires. Heart size normal.   IR IVC Filter Placement    Narrative    DATE: 4/30/2021    PROCEDURE: INFERIOR VENA CAVOGRAM AND INFERIOR VENA CAVA FILTER  PLACEMENT  1.  Ultrasound-guided access of the left common femoral vein. A  permanent image was stored.  2.  Digital subtraction inferior cavography.  3.  Inferior vena cava filter placement.    INTERVENTIONAL RADIOLOGIST: Hamzah Flores MD    INDICATION: Acute massive pulmonary embolism. Contraindication to  anticoagulation.    CONSENT: The risks, benefits and alternatives of procedure  were  discussed with the patient  in detail. All questions were answered.  Informed consent was given to proceed with the procedure.    CONTRAST: 25 mL Isovue 300  ANTIBIOTICS: None.  ADDITIONAL MEDICATIONS: None.    FLUOROSCOPIC TIME: 0.9 minutes.  RADIATION DOSE: Air Kerma: 61 mGy.    COMPLICATIONS: No immediate complications.    STERILE BARRIER TECHNIQUE: Maximum sterile barrier technique was used.  Cutaneous antisepsis was performed at the operative site with  application of 2% chlorhexidine and large sterile drape. Prior to the  procedure, the  and assistant performed hand hygiene and wore  hat, mask, sterile gown, and sterile gloves during the entire  procedure.    PROCEDURE:    The left common femoral vein was punctured under real-time ultrasound  guidance utilizing a 21-gauge micropuncture needle. A permanent  ultrasound image was saved. Through this needle, a 0.018 wire was  placed into the SVC. The needle was exchanged for a 3/4 [place] coaxial  dilator. The wire and inner dilator were removed. A 0.035 inch Bentson  wire was placed through the dilator and positioned into the left  common iliac vein. The dilator was exchanged for a 5 [place] pigtail  catheter was positioned in the left common iliac vein. A diagnostic  inferior vena cavagram was obtained to evaluate for caval thrombus,  renal vein anatomy/anomalies and location, caval anatomy/anomalies and  diameter. Following the diagnostic study, the Bentson wire was placed  down the pigtail catheter and this catheter was exchanged for the  introducer sheath of the IVC Filter which was positioned in the  infrarenal IVC. The wire was removed. A  retrievable Bard Trisha   inferior vena caval filter was deployed through the sheath below the  lowest renal vein. Post placement fluoroscopy demonstrated appropriate  position. The sheath was then removed and compression applied to the  puncture site until hemostasis was achieved.    FINDINGS:  Ultrasound  shows an anechoic and compressible common femoral vein. The  diagnostic inferior vena cavagram shows a normal-caliber vena cava  without anatomic anomaly or thrombus.      Impression    IMPRESSION:    1. Successful placement of an inferior vena cava filter, as discussed  above.    PLAN:  A retrievable filter was placed. Please contact the department of  interventional radiology once the filter is no longer medically  necessary to have the filter removed.    LOUISE GONSALEZ MD   XR Chest Port 1 View    Narrative    CHEST ONE VIEW  5/1/2021 9:48 AM     HISTORY: Pleural effusion.    COMPARISON: April 29, 2021      Impression    IMPRESSION: Chest tubes in place. No gross pneumothorax evident in  supine position. No gross effusion. Support lines stable.

## 2021-05-01 NOTE — PLAN OF CARE
Problem: Pain Acute (Oncology Care)  Goal: Optimal Pain Control  Outcome: Improving  Intervention: Develop Pain Management Plan  Recent Flowsheet Documentation  Taken 5/1/2021 0000 by Pauline Mir RN  Pain Management Interventions:   care clustered   distraction   emotional support   repositioned   rest   therapeutic presence   therapeutic touch  Taken 4/30/2021 2000 by Pauline Mir RN  Pain Management Interventions:   back rub   care clustered   emotional support   repositioned   rest   therapeutic presence   therapeutic touch  Intervention: Prevent or Manage Pain  Recent Flowsheet Documentation  Taken 5/1/2021 0400 by Pauline Mir RN  Medication Review/Management:   medications reviewed   high-risk medications identified  Taken 5/1/2021 0000 by Pauline Mir RN  Medication Review/Management:   medications reviewed   high-risk medications identified  Taken 4/30/2021 2000 by Pauline Mir RN  Medication Review/Management:   medications reviewed   high-risk medications identified     Problem: Ongoing Anesthesia Effects (Cardiovascular Surgery)  Goal: Anesthesia/Sedation Recovery  Outcome: Improving  Intervention: Optimize Anesthesia Recovery  Recent Flowsheet Documentation  Taken 5/1/2021 0400 by Pauline Mir RN  Safety Promotion/Fall Prevention:   bed alarm on   fall prevention program maintained   lighting adjusted   room door open   safety round/check completed  Taken 5/1/2021 0000 by Pauline Mir RN  Safety Promotion/Fall Prevention:   bed alarm on   fall prevention program maintained   lighting adjusted   room door open   safety round/check completed  Taken 4/30/2021 2000 by Pauline Mir RN  Safety Promotion/Fall Prevention:   bed alarm on   fall prevention program maintained   lighting adjusted   room door open   safety round/check completed   Patient received PRN pain medication once this shift and was found sleeping upon reassessment. Propofol was decreased this shift d/t  slight oversedation. Insulin infusion has been paused since the 2000 BG check last evening and BG readings have ranged from . Per electrolyte protocol patient is receiving both potassium and phosphate supplementation. UOP remains adequate but declining. Arterial line waveform becoming intermittently dampened starting around 0615 this shift. Will continue to monitor.

## 2021-05-01 NOTE — PROGRESS NOTES
[facility] ICU RESPIRATORY NOTE        Date of Admission: 4/27/2021    Date of Intubation (most recent):  4/28/21    Reason for Mechanical Ventilation:  AW protection    Number of Days on Mechanical Ventilation:  4    Met Criteria for Spontaneous Breathing Trial: Yes 2 hrs on 5/5 and then placecd on TD 30L 30% and stilll on    Significant Events Today:  None    ABG Results:   Recent Labs   Lab 04/29/21  1446 04/29/21  0900 04/29/21  0400 04/29/21  0300   PH 7.48* 7.49* 7.42 7.44   PCO2 40 38 37 34*   PO2 113* 146* 109* 140*   HCO3 30* 29* 24 23   O2PER 40 40 40% 40       Current Vent Settings: Ventilation Mode: Trach collar  FiO2 (%): 30 %  Rate Set (breaths/minute): 18 breaths/min  Tidal Volume Set (mL): 500 mL  PEEP (cm H2O): 5 cmH2O  Pressure Support (cm H2O): 5 cmH2O  Oxygen Concentration (%): 30 %  Resp: 20      Plan:  Pt to remain on full vent support overnight    Chaitanya Jones, RT

## 2021-05-01 NOTE — PROGRESS NOTES
Patient seen and discussed with Dr. Singh and Dr. Cali    [facility]  Cardiovascular and Thoracic Surgery Daily Note          Assessment and Plan:   POD#3 s/p Emergent pulmonary embolectomy by Dr. Yumi Singh and Dr. Bárbara Cali    -CVS: HR: 90s-100s. SBP: 90s-120s. Pre op EF: 60-65%. Off pressors. ASA. On low intensity heparin d/t PE, will transition to high intensity today. He will be started on Eliquis prior to discharge. IVC filter placed . Cont chest tubes one more day. Wound vac in place.   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 Flolan weaned off and tolerating PS trials. Trach dome today  -Neuro: Awake, opens eyes to voice. Follows commands.   -Renal: good UOP. Up 10kg from preoperative weight. Start on scheduled 20mg BID IV lasix today.  -GI:  Continue bowel regimen  -: Mera in place d/t limited mobility and need for accurate I&Os.   -Endo: pre op a1c: 5.6. Transition to SSI  -FEN: replace electrolytes as needed. Na: 144. K: 3.6  Orders Placed This Encounter      NPO for Medical/Clinical Reasons Except for: Meds      Advance Diet as Tolerated: Clear Liquid Diet      Advance Diet as Tolerated: Regular Diet Adult; Low Saturated Fat Na <2400mg Diet    -ID: Temp (24hrs), Av.8  F (37.7  C), Min:99.1  F (37.3  C), Max:100.8  F (38.2  C)    WBC: 9 from 12. Completed perioperative abx. Marte cultured and started on broad spectrum abx per intensivist for fevers overnight. Will follow up on cultures and d/c abx after 48h if no growth  -Heme: hgb: 9. plt: 244. Acute blood loss anemia and thrombocytopenia related to surgery  -Proph: PCD, ASA, PPI, heparin gtt  -Dispo: Continue ICU cares. Start PT/OT.           Interval History:   No acute events overnight. Off pressors. Opens eyes to voice. Follows commands.          Medications:       acetaminophen  975 mg Oral Q8H     aspirin  162 mg Oral or NG Tube Daily     chlorhexidine  15 mL  "Mouth/Throat Q12H     docusate sodium  100 mg Oral BID     furosemide  20 mg Intravenous Q12H     gabapentin  100 mg Oral At Bedtime     insulin aspart  1-4 Units Subcutaneous Q4H     pantoprazole (PROTONIX) IV  40 mg Intravenous Daily with breakfast     piperacillin-tazobactam  3.375 g Intravenous Q6H     polyethylene glycol  17 g Oral Daily     senna-docusate  1 tablet Oral BID     sodium chloride (PF)  10 mL Intracatheter Q8H     vancomycin (VANCOCIN) IV  2,000 mg Intravenous Q24H     [DISCONTINUED] acetaminophen **OR** acetaminophen, acetaminophen, acetaminophen, albuterol, sore throat lozenge, bisacodyl, dextrose, glucose **OR** dextrose **OR** glucagon, fentaNYL, heparin, hydrALAZINE, HYDROmorphone **OR** HYDROmorphone, lidocaine 4%, lidocaine (buffered or not buffered), lidocaine (buffered or not buffered), LORazepam, magnesium hydroxide, - MEDICATION INSTRUCTIONS -, methocarbamol, naloxone **OR** naloxone **OR** naloxone **OR** naloxone, ondansetron **OR** ondansetron, oxyCODONE **OR** oxyCODONE, polyethylene glycol, senna-docusate **OR** senna-docusate, sodium chloride (PF), sodium chloride (PF), sodium chloride (PF), sodium chloride (PF), vasopressin          Physical Exam:   Vitals were reviewed  Blood pressure 115/76, pulse 82, temperature 100  F (37.8  C), resp. rate 20, height 1.803 m (5' 11\"), weight 101.3 kg (223 lb 5.2 oz), SpO2 98 %.  Rhythm: NSR    Lungs: coarse    Cardiovascular: RRR normal s1 and s2    Abdomen: soft NTND    Extremeties: minimal edema    Incision: wound vac in place    CT: to suction    Weight:   Vitals:    04/27/21 1805 04/29/21 0630 04/30/21 0400 05/01/21 0400   Weight: 90.8 kg (200 lb 2.8 oz) 99.2 kg (218 lb 11.1 oz) 99.9 kg (220 lb 3.8 oz) 101.3 kg (223 lb 5.2 oz)            Data:   Labs:   Lab Results   Component Value Date    WBC 9.5 05/01/2021     Lab Results   Component Value Date    RBC 3.04 05/01/2021     Lab Results   Component Value Date    HGB 9.0 05/01/2021     Lab " Results   Component Value Date    HCT 27.3 05/01/2021     No components found for: MCT  Lab Results   Component Value Date    MCV 90 05/01/2021     Lab Results   Component Value Date    MCH 29.6 05/01/2021     Lab Results   Component Value Date    MCHC 33.0 05/01/2021     Lab Results   Component Value Date    RDW 14.6 05/01/2021     Lab Results   Component Value Date     05/01/2021         Last Basic Metabolic Panel:  Last Comprehensive Metabolic Panel:  Sodium   Date Value Ref Range Status   05/01/2021 144 133 - 144 mmol/L Final     Potassium   Date Value Ref Range Status   05/01/2021 3.6 3.4 - 5.3 mmol/L Final     Chloride   Date Value Ref Range Status   05/01/2021 113 (H) 94 - 109 mmol/L Final     Carbon Dioxide   Date Value Ref Range Status   05/01/2021 26 20 - 32 mmol/L Final     Anion Gap   Date Value Ref Range Status   05/01/2021 5 3 - 14 mmol/L Final     Glucose   Date Value Ref Range Status   05/01/2021 105 (H) 70 - 99 mg/dL Final     Urea Nitrogen   Date Value Ref Range Status   05/01/2021 10 7 - 30 mg/dL Final     Creatinine   Date Value Ref Range Status   05/01/2021 0.86 0.66 - 1.25 mg/dL Final     GFR Estimate   Date Value Ref Range Status   05/01/2021 88 >60 mL/min/[1.73_m2] Final     Comment:     Non  GFR Calc  Starting 12/18/2018, serum creatinine based estimated GFR (eGFR) will be   calculated using the Chronic Kidney Disease Epidemiology Collaboration   (CKD-EPI) equation.       Calcium   Date Value Ref Range Status   05/01/2021 7.6 (L) 8.5 - 10.1 mg/dL Final       Bárbara Liu MD  Cardiothoracic Fellow  Pgr [phone]

## 2021-05-01 NOTE — PROGRESS NOTES
Critical Care  Note      05/01/2021    Name: Raj Warren MRN#: [id number]   Age: 70 year old YOB: 1950     Hsptl Day# 4  ICU DAY #    MV DAY #             Problem List:   Active Problems:    Bilateral pulmonary embolism (H)    Pulmonary emboli (H)    Overnight Events:   More awake. On CPAP trial. Will switch to trach collar today.          Summary/Hospital Course:     70 year old male admitted on 4/27 with recent dx of laryngeal cancer and is recently s/p tracheostomy for cancer.  Patient presented with dyspnea and was found to have massive b/l pulmonary emboli & a right ventricular thrombus.  Patient was subsequently taken for suction embolectomy with cardiothoracic surgery on 4/28.  Patient is admitted to the ICU post op care; ICU is consulted for assistance & vent management.      Assessment and plan :     Raj Warren IS a 70 year old male admitted on 4/27/2021 for Bilateral Pulmonary Emboli & Right Ventricular Thrombus.   I have personally reviewed the daily labs, imaging studies, cultures and discussed the case with referring physician and consulting physicians.     My assessment and plan by system for this patient is as follows:    Neurology/Psychiatry:   1. Pain/analgesia  2. Sedation - TRUDY goal 0 to -1  Plan  APAP 975 PO Q 8 hours - pain  Gabapentin 100 mg PO at bedtime  Propofol Gtt for sedation  Ativan 0.5 - 1 prn anxiety  Robaxin 500 mg PO Q 6 prn muscle spasm  Oxycodone 5 - 10 mg PO Q 4 hours prn pain    Cardiovascular:   1. S/P Pulmonary Thrombectomy.  2. Bilateral Pulmonary Emboli  3. Right Ventricular Thrombus  4. Ascending Aortic Aneurysm - s/p repair  Plan  Epinephrine    - Wean as tolerated  Heparin Gtt    mg PO daily    Pulmonary/Ventilator Management:   1. Acute Hypoxic Respiratory Failure   - Current settings CMV, Vt: 500, f: 18, FiO2: wean as arun, Peep 8  Plan  - Patient currently on Veletri   - CT surgery planning to wean today.  - Continue current vent plans for  today.  - PST/wean to trach dome after veletri is discontinued.    GI and Nutrition :   1. No Issues.  Plan  - NPO  - Protonix 40 IVP daily  - Bowel regimen    Renal/Fluids/Electrolytes:   1. Hypernatremia  2. Hyperchloremia  3. Primary Respiratory Alkalosis, Chronic with appropriately Compensated  Metabolic Acidosis  4. + 1.2 L/24 hrs, + 7.9 L/admit, urine: 1.5 L/24 hrs (65/hr)  Plan  - D5 0.45 NS @ 10 - 30 ml's per hour  - Replace e's prn  - Strict I's & O's    Infectious Disease:   1. Pyrexia  improving  2. Leukocytosis  imporoving   3. Perioperative Abx's  Plan  - F/u Blood Cx's  - Currently on Vanc/Zosyn  -Ordered sputum culture.      Endocrine:   1. Stress induced hyperglycemia  2. Blood glucose well controlled  Plan  - Switched to ISS. And glargine.     Hematology/Oncology:   1. Hb: 8.5   2. WBC 12.5  3. Bilateral PE's  4. Right Ventricular Thrombus  5. Laryngeal Cancer  Plan  - Monitor Hb.  - Continue Heparin gtt  - Patientreceived IVC filter.      IV/Access:   1. Venous access - Right internal jugular Cordis with Arkoma, Right Femoral CVC  2. Arterial access - Left Radial A line    Plan  - central access required and necessary      ICU Prophylaxis:   1. DVT: Heparin gtt/mechanical  2. VAP: HOB 30 degrees, chlorhexidine rinse  3. Stress Ulcer: PPI  4. Restraints: Nonviolent soft two point restraints required and necessary for patient safety and continued cares and good effect as patient continues to pull at necessary lines, tubes despite education and distraction. Will readdress daily.   5. Wound care  - not indicated  6. Feeding -no  7. Family Update: Son at bedside  8. Disposition - ICU      Key goals for next 24 hours:   1. Tolerating CPAP trial   2. Will change to trach collar today. And cpap at night. Hope fully trach collar all day tomorrow.          Medical History:     Past Medical History:   Diagnosis Date     Allergic state      Past Surgical History:   Procedure Laterality Date     IR IVC FILTER  PLACEMENT  2021     LARYNGOSCOPY N/A 2021    Procedure: LARYNGOSCOPY;  Surgeon: Choco Johnson MD;  Location:  OR     REPAIR ANEURYSM ASCENDING AORTA N/A 2021    Procedure: PULMONARY THROMBECTOMY;  Surgeon: Yumi Singh MD;  Location:  OR     TRACHEOSTOMY  2021    Procedure: CREATION, TRACHEOSTOMY;  Surgeon: Choco Johnson MD;  Location:  OR     Social History     Socioeconomic History     Marital status: Single     Spouse name: Not on file     Number of children: Not on file     Years of education: Not on file     Highest education level: Not on file   Occupational History     Not on file   Social Needs     Financial resource strain: Not on file     Food insecurity     Worry: Not on file     Inability: Not on file     Transportation needs     Medical: Not on file     Non-medical: Not on file   Tobacco Use     Smoking status: Former Smoker     Types: Cigarettes     Quit date: 1989     Years since quittin.3     Smokeless tobacco: Never Used     Tobacco comment: quit in   had smoked about 1/2 pack a day then cut back   Substance and Sexual Activity     Alcohol use: No     Drug use: No     Sexual activity: Yes     Partners: Female   Lifestyle     Physical activity     Days per week: Not on file     Minutes per session: Not on file     Stress: Not on file   Relationships     Social connections     Talks on phone: Not on file     Gets together: Not on file     Attends [employer] service: Not on file     Active member of club or organization: Not on file     Attends meetings of clubs or organizations: Not on file     Relationship status: Not on file     Intimate partner violence     Fear of current or ex partner: Not on file     Emotionally abused: Not on file     Physically abused: Not on file     Forced sexual activity: Not on file   Other Topics Concern     Parent/sibling w/ CABG, MI or angioplasty before 65F 55M? Not Asked   Social History Narrative     Not on  file        Allergies   Allergen Reactions     Pollen Extract               Key Medications:       acetaminophen  975 mg Oral Q8H     aspirin  162 mg Oral or NG Tube Daily     chlorhexidine  15 mL Mouth/Throat Q12H     docusate sodium  100 mg Oral BID     furosemide  20 mg Intravenous Q12H     gabapentin  100 mg Oral At Bedtime     insulin aspart  1-4 Units Subcutaneous Q4H     pantoprazole (PROTONIX) IV  40 mg Intravenous Daily with breakfast     piperacillin-tazobactam  3.375 g Intravenous Q6H     polyethylene glycol  17 g Oral Daily     senna-docusate  1 tablet Oral BID     sodium chloride (PF)  10 mL Intracatheter Q8H     vancomycin (VANCOCIN) IV  2,000 mg Intravenous Q24H       dextrose       dextrose 5% and 0.45% NaCl + KCl 20 mEq/L 20 mL/hr (04/30/21 1923)     heparin       heparin       - MEDICATION INSTRUCTIONS -       sodium chloride 30 mL/hr at 04/30/21 1927     vasopressin Stopped (04/29/21 1139)        Home Meds  No current facility-administered medications on file prior to encounter.   acetaminophen (TYLENOL) 325 MG tablet, Take 2 tablets (650 mg) by mouth 3 times daily as needed for mild pain               Physical Examination:   Temp:  [99.1  F (37.3  C)-100.8  F (38.2  C)] 100  F (37.8  C)  Pulse:  [80-94] 82  Resp:  [10-23] 20  BP: ()/(56-88) 115/76  MAP:  [62 mmHg-112 mmHg] 112 mmHg  Arterial Line BP: ()/() 128/111  FiO2 (%):  [30 %-40 %] 30 %  SpO2:  [94 %-100 %] 98 %    Intake/Output Summary (Last 24 hours) at 4/29/2021 0901  Last data filed at 4/29/2021 0800  Gross per 24 hour   Intake 8248.58 ml   Output 1170 ml   Net 7078.58 ml     Wt Readings from Last 4 Encounters:   05/01/21 101.3 kg (223 lb 5.2 oz)   04/22/21 93.9 kg (207 lb)   04/17/21 97.7 kg (215 lb 6.2 oz)   02/19/18 98 kg (216 lb)     Arterial Line BP: ()/() 128/111  MAP:  [62 mmHg-112 mmHg] 112 mmHg  BP - Mean:  [] 92  CVP:  [8 mmHg-279 mmHg] 14 mmHg  SVO2:  [50 %-60 %] 50 %  Ventilation Mode:  Trach collar  FiO2 (%): 30 %  Rate Set (breaths/minute): 18 breaths/min  Tidal Volume Set (mL): 500 mL  PEEP (cm H2O): 5 cmH2O  Pressure Support (cm H2O): 5 cmH2O  Oxygen Concentration (%): 30 %  Resp: 20    Recent Labs   Lab 04/29/21  1446 04/29/21  0900 04/29/21  0400 04/29/21  0300   PH 7.48* 7.49* 7.42 7.44   PCO2 40 38 37 34*   PO2 113* 146* 109* 140*   HCO3 30* 29* 24 23   O2PER 40 40 40% 40       GEN: no acute distress   HEENT: head ncat, sclera anicteric, OP patent, trachea midline   PULM: unlabored synchronous with vent, clear anteriorly    CV/COR: RRR S1S2 no gallop,  No rub, no murmur  ABD: soft nontender, hypoactive bowel sounds, no mass  EXT:  Edema   warm  NEURO: Sedated and on vent  SKIN: no obvious rash           Data:   All data and imaging reviewed     ROUTINE ICU LABS (Last four results)  CMP  Recent Labs   Lab 05/01/21  0417 04/30/21  0405 04/29/21  2210 04/29/21  1446 04/29/21  0400 04/29/21  0400 04/29/21  0118 04/28/21  1900 04/28/21  1900    147* 151* 151*  --  149* 149*   < > 146*   POTASSIUM 3.6 3.6 3.9 3.9   < > 3.3* 3.2*   < > 3.7   CHLORIDE 113* 113* 117* 117*  --  113* 112*   < > 115*   CO2 26 30 31 29  --  26 19*   < > 20   ANIONGAP 5 4 3 5  --  10 18*   < > 11   * 146* 103* 121*  --  198* 250*   < > 208*   BUN 10 9 9 11  --  14 14   < > 11   CR 0.86 0.93 0.99 0.99  --  1.20 1.31*   < > 0.95   GFRESTIMATED 88 84 77 77  --  61 55*   < > 82   GFRESTBLACK >90 >90 90 90  --  71 64   < > >90   TELMA 7.6* 7.6* 7.9* 7.7*  --  7.2* 7.1*   < > 8.0*   MAG 2.3 2.4*  --   --   --  2.0  --   --  2.1   PHOS 1.9* 2.5  --  4.1  --  1.3*  --   --  4.9*   PROTTOTAL 5.2* 5.1*  --   --   --  5.4* 5.2*   < > 5.3*   ALBUMIN 2.6* 2.8*  --   --   --  3.3* 3.2*   < > 2.9*   BILITOTAL 0.7 0.6  --   --   --  1.0 1.1   < > 0.9   ALKPHOS 49 41  --   --   --  38* 33*   < > 44   AST 51* 92*  --   --   --  50* 39   < > 31   ALT 85* 98*  --   --   --  47 38   < > 28    < > = values in this interval not  displayed.     CBC  Recent Labs   Lab 05/01/21  0625 04/30/21  0405 04/29/21  1051 04/29/21  0400   WBC 9.5 12.5* 10.4 10.6   RBC 3.04* 2.83* 2.90* 3.02*   HGB 9.0* 8.5* 8.7* 9.0*   HCT 27.3* 25.1* 25.3* 26.4*   MCV 90 89 87 87   MCH 29.6 30.0 30.0 29.8   MCHC 33.0 33.9 34.4 34.1   RDW 14.6 14.4 14.2 14.1    240 248 306     INR  Recent Labs   Lab 04/28/21  1900 04/28/21  1703 04/28/21  1054   INR 1.50* 1.82* 1.26*     Arterial Blood Gas  Recent Labs   Lab 04/29/21  1446 04/29/21  0900 04/29/21  0400 04/29/21  0300   PH 7.48* 7.49* 7.42 7.44   PCO2 40 38 37 34*   PO2 113* 146* 109* 140*   HCO3 30* 29* 24 23   O2PER 40 40 40% 40       All cultures:  Recent Labs   Lab 04/30/21  0218 04/29/21  2252 04/29/21  2210 04/27/21  1254   CULT Culture negative < 24 hours, reincubate No growth after 1 day No growth after 1 day No growth after 4 days  No growth after 4 days     Recent Results (from the past 24 hour(s))   XR Chest Port 1 View    Narrative    XR CHEST PORT 1 VIEW  4/28/2021 5:35 PM       INDICATION: Missing Instrument, Call OR12 [phone]  COMPARISON: 4/27/2021       Impression    IMPRESSION: Endotracheal tube above the mikhail. Sternotomy.  Mediastinal and left chest tubes. Right IJ central venous catheter tip  in the SVC. Lung volumes are low. No pneumothorax or pleural effusion.  No retained foreign bodies. Negative for postoperative purposes.    LEIDY WALTER MD   XR Chest Port 1 View    Narrative    XR CHEST PORT 1 VIEW  4/28/2021 8:11 PM       INDICATION: s/p cabg  COMPARISON: 4/28/2001 at 1728 hours       Impression    IMPRESSION: Tracheostomy tube in good position above the mikhail. Right  IJ CVC in SVC. Mediastinal tubes. No pleural effusion or pneumothorax.  The lungs are clear.    LEIDY WALTER MD   XR Chest Port 1 View    Narrative    EXAM: XR CHEST PORT 1 VIEW  LOCATION: [facility]  DATE/TIME: 4/29/2021 12:45 AM    INDICATION: Archbold-Lonnie catheter placement.  COMPARISON: CTA  chest 04/27/2021. Chest radiographs today.      Impression    IMPRESSION: Termination of the right internal jugular Liberty-Lonnie catheter is coiled in the right ventricle. Repositioning recommended. Poststernotomy. Mediastinal and left basilar pleural drains unchanged. Mild bibasilar atelectasis.   XR Chest Port 1 View    Narrative    EXAM: XR CHEST PORT 1 VIEW  LOCATION: [facility]  DATE/TIME: 4/29/2021 12:46 AM    INDICATION: Liberty-Lonnie catheter placement.  COMPARISON: Chest radiograph yesterday. CTA chest 04/27/2021.      Impression    IMPRESSION: Termination of the right internal jugular Liberty-Lonnie catheter is coiled in the right ventricle. Repositioning recommended. Poststernotomy. Mediastinal and left basilar pleural drains in position. Tracheostomy in satisfactory position. Mild   bibasilar atelectasis. No pneumothorax.   XR Chest Port 1 View    Narrative    EXAM: CHEST SINGLE VIEW PORTABLE  LOCATION: [facility]  DATE/TIME: 4/29/2021 12:58 AM    INDICATION: Verify pulmonary arterial catheter placement.    COMPARISON: 4/29/2021 at 0106 hours.      Impression    IMPRESSION:  1. Interval advancement of a right internal jugular pulmonary arterial catheter with distal catheter tip now projecting over the main right pulmonary artery, approximately 3.3 cm lateral to the lateral aspect of the spine.  2. No other significant interval change since the recent comparison study.  3. A tracheostomy tube, mediastinal drain, and an additional left mediastinal or pleural drain are again noted.  4. A few band-like opacities in the right lung base most likely represent atelectasis or scarring.   XR Chest Port 1 View    Narrative    EXAM: XR CHEST PORT 1 VIEW  LOCATION: [facility]  DATE/TIME: 4/29/2021 1:08 AM    INDICATION: Liberty-Lonnie catheter placement.  COMPARISON: Earlier today.      Impression    IMPRESSION: Right internal jugular venous Liberty-Lonnie catheter terminates in the right main  pulmonary artery. Poststernotomy. Mediastinal and left basilar pleural drains unchanged. Tracheostomy in satisfactory position. Mild bibasilar atelectasis. No   pneumothorax.   XR Chest Port 1 View    Narrative    EXAM: XR CHEST PORT 1 VIEW  LOCATION: [facility]  DATE/TIME: 4/29/2021 1:05 AM    INDICATION: Follow-up central line placement.  COMPARISON: Most recent exam from 0102 hours today      Impression    IMPRESSION: Minimal change. Garner-Lonnie catheter is been pulled back slightly, tip within the proximal aspect of right main pulmonary artery. Tracheostomy tube as well as the midline mediastinal and left pleural drains remain in place. Lungs clear. No   pneumothorax. Sternal wires. Heart size normal.         Billing: This patient is critically ill: Yes. Total critical care time today 38 min.

## 2021-05-01 NOTE — PROVIDER NOTIFICATION
RN spoke with Susanna from CV surgery; PAPs 40's/20's, CVP 15-19, remains off veletri. Intermittent PAC's noted on monitor otherwise BP and HR stable.  No new orders at this time, continue to monitor.

## 2021-05-01 NOTE — PROGRESS NOTES
[facility] ICU RESPIRATORY NOTE           Date of Admission: 4/27/2021     Date of Intubation (most recent):Chronic trach     Reason for Mechanical Ventilation:Airway protection     Number of Days on Mechanical Ventilation:3     Met Criteria for Spontaneous Breathing Trial:No     Reason for No Spontaneous Breathing Trial:Pt is off Veletri.     Significant Events Today:Veletri off now. PEEP weaned down from 10 to 5.    Recent Labs   Lab 04/29/21  1446 04/29/21  0900 04/29/21  0400 04/29/21  0300   PH 7.48* 7.49* 7.42 7.44   PCO2 40 38 37 34*   PO2 113* 146* 109* 140*   HCO3 30* 29* 24 23   O2PER 40 40 40% 40     Ventilation Mode: CMV/AC  (Continuous Mandatory Ventilation/ Assist Control)  FiO2 (%): 40 %  Rate Set (breaths/minute): 18 breaths/min  Tidal Volume Set (mL): 500 mL  PEEP (cm H2O): 5 cmH2O  Oxygen Concentration (%): 30 %  Resp: 18    MELISSA MARSHALL, RT

## 2021-05-01 NOTE — PLAN OF CARE
Alert and oriented x4. VSS ex hypertensive; PRN hydralazine given. Tolerating 30L @30% on Trach dome; Plan to remain off Vent overnight. Pain controlled IV fentanyl. Trach WDL with fenestrated gauze; Sternal incision CDI with wound vac. CT to suction with moderate serosanguinous output. LS Clear dim throughout. Diet NPO; Feeding tube placed. Up with assist of 2 and mech lift. BS active; passing large amounts of gas.

## 2021-05-01 NOTE — PROCEDURES
[facility]    Triple Lumen PICC Placement    Date/Time: 5/1/2021 12:04 PM  Performed by: Sarah Snow RN  Authorized by: Bárbara Liu MD   Indications: vascular access    UNIVERSAL PROTOCOL   Site Marked: Yes  Prior Images Obtained and Reviewed:  Yes  Required items: Required blood products, implants, devices and special equipment available    Patient identity confirmed:  Verbally with patient, arm band, provided demographic data and hospital-assigned identification number  NA - No sedation, light sedation, or local anesthesia  Confirmation Checklist:  Patient's identity using two indicators, relevant allergies, procedure was appropriate and matched the consent or emergent situation and correct equipment/implants were available  Time out: Immediately prior to the procedure a time out was called    Universal Protocol: the Joint Commission Universal Protocol was followed    Preparation: Patient was prepped and draped in usual sterile fashion    ESBL (mL):  1         ANESTHESIA    Local Anesthetic: Lidocaine 1% without epinephrine  Anesthetic Total (mL):  2      SEDATION    Patient Sedated: No        Preparation: skin prepped with ChloraPrep  Skin prep agent: skin prep agent completely dried prior to procedure  Sterile barriers: maximum sterile barriers were used: cap, mask, sterile gown, sterile gloves, and large sterile sheet  Hand hygiene: hand hygiene performed prior to central venous catheter insertion  Type of line used: PICC  Catheter type: triple lumen  Catheter size: 5 Fr  Brand: Bard  Lot number: [id number]  Placement method: ultrasound  Number of attempts: 1  Successful placement: yes  Orientation: right  Location: basilic vein  Arm circumference: adults 10 cm  Extremity circumference: 35  Visible catheter length: 8  Total catheter length: 45  Dressing and securement: chlorhexidine patch applied, dressing applied, occlusive dressing applied and securement  device  Post procedure assessment: blood return through all ports  PROCEDURE   Patient Tolerance:  Patient tolerated the procedure well with no immediate complications     picc placed without difficulty/picc ok to use

## 2021-05-02 ENCOUNTER — APPOINTMENT (OUTPATIENT)
Dept: OCCUPATIONAL THERAPY | Facility: CLINIC | Age: 71
DRG: 163 | End: 2021-05-02
Attending: SURGERY
Payer: COMMERCIAL

## 2021-05-02 ENCOUNTER — APPOINTMENT (OUTPATIENT)
Dept: GENERAL RADIOLOGY | Facility: CLINIC | Age: 71
DRG: 163 | End: 2021-05-02
Attending: INTERNAL MEDICINE
Payer: COMMERCIAL

## 2021-05-02 LAB
ALBUMIN SERPL-MCNC: 2.8 G/DL (ref 3.4–5)
ALP SERPL-CCNC: 65 U/L (ref 40–150)
ALT SERPL W P-5'-P-CCNC: 83 U/L (ref 0–70)
ANION GAP SERPL CALCULATED.3IONS-SCNC: 2 MMOL/L (ref 3–14)
AST SERPL W P-5'-P-CCNC: 38 U/L (ref 0–45)
BACTERIA SPEC CULT: NO GROWTH
BILIRUB SERPL-MCNC: 1 MG/DL (ref 0.2–1.3)
BLD PROD TYP BPU: NORMAL
BLD PROD TYP BPU: NORMAL
BLD UNIT ID BPU: 0
BLD UNIT ID BPU: 0
BLOOD PRODUCT CODE: NORMAL
BLOOD PRODUCT CODE: NORMAL
BPU ID: NORMAL
BPU ID: NORMAL
BUN SERPL-MCNC: 8 MG/DL (ref 7–30)
CALCIUM SERPL-MCNC: 8 MG/DL (ref 8.5–10.1)
CHLORIDE SERPL-SCNC: 112 MMOL/L (ref 94–109)
CO2 SERPL-SCNC: 27 MMOL/L (ref 20–32)
CREAT SERPL-MCNC: 0.83 MG/DL (ref 0.66–1.25)
ERYTHROCYTE [DISTWIDTH] IN BLOOD BY AUTOMATED COUNT: 14.4 % (ref 10–15)
GFR SERPL CREATININE-BSD FRML MDRD: 90 ML/MIN/{1.73_M2}
GLUCOSE BLDC GLUCOMTR-MCNC: 102 MG/DL (ref 70–99)
GLUCOSE BLDC GLUCOMTR-MCNC: 81 MG/DL (ref 70–99)
GLUCOSE BLDC GLUCOMTR-MCNC: 87 MG/DL (ref 70–99)
GLUCOSE BLDC GLUCOMTR-MCNC: 89 MG/DL (ref 70–99)
GLUCOSE SERPL-MCNC: 106 MG/DL (ref 70–99)
GRAM STN SPEC: NORMAL
GRAM STN SPEC: NORMAL
HCT VFR BLD AUTO: 30.1 % (ref 40–53)
HGB BLD-MCNC: 10 G/DL (ref 13.3–17.7)
Lab: NORMAL
Lab: NORMAL
MAGNESIUM SERPL-MCNC: 2.3 MG/DL (ref 1.6–2.3)
MCH RBC QN AUTO: 29.5 PG (ref 26.5–33)
MCHC RBC AUTO-ENTMCNC: 33.2 G/DL (ref 31.5–36.5)
MCV RBC AUTO: 89 FL (ref 78–100)
PHOSPHATE SERPL-MCNC: 2.7 MG/DL (ref 2.5–4.5)
PLATELET # BLD AUTO: 314 10E9/L (ref 150–450)
POTASSIUM SERPL-SCNC: 3.4 MMOL/L (ref 3.4–5.3)
PROT SERPL-MCNC: 6.2 G/DL (ref 6.8–8.8)
RBC # BLD AUTO: 3.39 10E12/L (ref 4.4–5.9)
SODIUM SERPL-SCNC: 141 MMOL/L (ref 133–144)
SPECIMEN SOURCE: NORMAL
SPECIMEN SOURCE: NORMAL
TRANSFUSION STATUS PATIENT QL: NORMAL
UFH PPP CHRO-ACNC: 0.54 IU/ML
UFH PPP CHRO-ACNC: 0.7 IU/ML
WBC # BLD AUTO: 9.4 10E9/L (ref 4–11)

## 2021-05-02 PROCEDURE — 250N000013 HC RX MED GY IP 250 OP 250 PS 637: Performed by: INTERNAL MEDICINE

## 2021-05-02 PROCEDURE — 83735 ASSAY OF MAGNESIUM: CPT | Performed by: SURGERY

## 2021-05-02 PROCEDURE — 71045 X-RAY EXAM CHEST 1 VIEW: CPT

## 2021-05-02 PROCEDURE — 85027 COMPLETE CBC AUTOMATED: CPT | Performed by: SURGERY

## 2021-05-02 PROCEDURE — 84100 ASSAY OF PHOSPHORUS: CPT | Performed by: SURGERY

## 2021-05-02 PROCEDURE — 250N000011 HC RX IP 250 OP 636: Performed by: SURGERY

## 2021-05-02 PROCEDURE — 80053 COMPREHEN METABOLIC PANEL: CPT | Performed by: SURGERY

## 2021-05-02 PROCEDURE — 999N000190 HC STATISTIC VAT ROUNDS

## 2021-05-02 PROCEDURE — 87205 SMEAR GRAM STAIN: CPT | Performed by: INTERNAL MEDICINE

## 2021-05-02 PROCEDURE — 250N000011 HC RX IP 250 OP 636: Performed by: INTERNAL MEDICINE

## 2021-05-02 PROCEDURE — 999N000157 HC STATISTIC RCP TIME EA 10 MIN

## 2021-05-02 PROCEDURE — C9113 INJ PANTOPRAZOLE SODIUM, VIA: HCPCS | Performed by: INTERNAL MEDICINE

## 2021-05-02 PROCEDURE — 97530 THERAPEUTIC ACTIVITIES: CPT | Mod: GO | Performed by: OCCUPATIONAL THERAPIST

## 2021-05-02 PROCEDURE — 999N001017 HC STATISTIC GLUCOSE BY METER IP

## 2021-05-02 PROCEDURE — 250N000013 HC RX MED GY IP 250 OP 250 PS 637: Performed by: SURGERY

## 2021-05-02 PROCEDURE — 94003 VENT MGMT INPAT SUBQ DAY: CPT

## 2021-05-02 PROCEDURE — 200N000001 HC R&B ICU

## 2021-05-02 PROCEDURE — 85520 HEPARIN ASSAY: CPT | Performed by: SURGERY

## 2021-05-02 PROCEDURE — 97535 SELF CARE MNGMENT TRAINING: CPT | Mod: GO | Performed by: OCCUPATIONAL THERAPIST

## 2021-05-02 PROCEDURE — 97166 OT EVAL MOD COMPLEX 45 MIN: CPT | Mod: GO | Performed by: OCCUPATIONAL THERAPIST

## 2021-05-02 PROCEDURE — 258N000003 HC RX IP 258 OP 636: Performed by: SURGERY

## 2021-05-02 PROCEDURE — 87070 CULTURE OTHR SPECIMN AEROBIC: CPT | Performed by: INTERNAL MEDICINE

## 2021-05-02 RX ORDER — METHOCARBAMOL 500 MG/1
500 TABLET, FILM COATED ORAL EVERY 6 HOURS PRN
Status: DISCONTINUED | OUTPATIENT
Start: 2021-05-02 | End: 2021-05-08 | Stop reason: HOSPADM

## 2021-05-02 RX ORDER — GABAPENTIN 250 MG/5ML
100 SOLUTION ORAL AT BEDTIME
Status: DISCONTINUED | OUTPATIENT
Start: 2021-05-02 | End: 2021-05-05

## 2021-05-02 RX ORDER — OXYCODONE HYDROCHLORIDE 5 MG/1
5 TABLET ORAL EVERY 4 HOURS PRN
Status: DISCONTINUED | OUTPATIENT
Start: 2021-05-02 | End: 2021-05-08 | Stop reason: HOSPADM

## 2021-05-02 RX ORDER — POLYETHYLENE GLYCOL 3350 17 G/17G
17 POWDER, FOR SOLUTION ORAL DAILY PRN
Status: DISCONTINUED | OUTPATIENT
Start: 2021-05-02 | End: 2021-05-08 | Stop reason: HOSPADM

## 2021-05-02 RX ORDER — CARBOXYMETHYLCELLULOSE SODIUM 5 MG/ML
1 SOLUTION/ DROPS OPHTHALMIC
Status: DISCONTINUED | OUTPATIENT
Start: 2021-05-02 | End: 2021-05-08 | Stop reason: HOSPADM

## 2021-05-02 RX ORDER — AMOXICILLIN 250 MG
1 CAPSULE ORAL 2 TIMES DAILY
Status: DISCONTINUED | OUTPATIENT
Start: 2021-05-02 | End: 2021-05-08 | Stop reason: HOSPADM

## 2021-05-02 RX ORDER — POLYETHYLENE GLYCOL 3350 17 G/17G
34 POWDER, FOR SOLUTION ORAL 2 TIMES DAILY
Status: DISCONTINUED | OUTPATIENT
Start: 2021-05-02 | End: 2021-05-08 | Stop reason: HOSPADM

## 2021-05-02 RX ORDER — AMOXICILLIN 250 MG
2 CAPSULE ORAL 2 TIMES DAILY PRN
Status: DISCONTINUED | OUTPATIENT
Start: 2021-05-02 | End: 2021-05-08 | Stop reason: HOSPADM

## 2021-05-02 RX ORDER — AMOXICILLIN 250 MG
1 CAPSULE ORAL 2 TIMES DAILY PRN
Status: DISCONTINUED | OUTPATIENT
Start: 2021-05-02 | End: 2021-05-08 | Stop reason: HOSPADM

## 2021-05-02 RX ORDER — POTASSIUM CHLORIDE 1.5 G/1.58G
40 POWDER, FOR SOLUTION ORAL ONCE
Status: COMPLETED | OUTPATIENT
Start: 2021-05-02 | End: 2021-05-02

## 2021-05-02 RX ORDER — OXYCODONE HYDROCHLORIDE 5 MG/1
10 TABLET ORAL EVERY 4 HOURS PRN
Status: DISCONTINUED | OUTPATIENT
Start: 2021-05-02 | End: 2021-05-08 | Stop reason: HOSPADM

## 2021-05-02 RX ORDER — DEXTROSE MONOHYDRATE 100 MG/ML
INJECTION, SOLUTION INTRAVENOUS CONTINUOUS PRN
Status: DISCONTINUED | OUTPATIENT
Start: 2021-05-02 | End: 2021-05-07 | Stop reason: CLARIF

## 2021-05-02 RX ADMIN — GABAPENTIN 100 MG: 250 SOLUTION ORAL at 22:02

## 2021-05-02 RX ADMIN — POTASSIUM & SODIUM PHOSPHATES POWDER PACK 280-160-250 MG 1 PACKET: 280-160-250 PACK at 08:42

## 2021-05-02 RX ADMIN — POTASSIUM CHLORIDE 40 MEQ: 1.5 POWDER, FOR SOLUTION ORAL at 05:30

## 2021-05-02 RX ADMIN — CHLORHEXIDINE GLUCONATE 15 ML: 1.2 SOLUTION ORAL at 19:45

## 2021-05-02 RX ADMIN — FUROSEMIDE 20 MG: 10 INJECTION, SOLUTION INTRAVENOUS at 13:23

## 2021-05-02 RX ADMIN — POTASSIUM CHLORIDE, DEXTROSE MONOHYDRATE AND SODIUM CHLORIDE: 150; 5; 450 INJECTION, SOLUTION INTRAVENOUS at 18:43

## 2021-05-02 RX ADMIN — PIPERACILLIN SODIUM AND TAZOBACTAM SODIUM 3.38 G: 3; .375 INJECTION, POWDER, LYOPHILIZED, FOR SOLUTION INTRAVENOUS at 05:29

## 2021-05-02 RX ADMIN — FUROSEMIDE 20 MG: 10 INJECTION, SOLUTION INTRAVENOUS at 01:59

## 2021-05-02 RX ADMIN — DOCUSATE SODIUM 100 MG: 100 CAPSULE, LIQUID FILLED ORAL at 08:57

## 2021-05-02 RX ADMIN — ASPIRIN 81 MG CHEWABLE TABLET 162 MG: 81 TABLET CHEWABLE at 08:43

## 2021-05-02 RX ADMIN — ACETAMINOPHEN 650 MG: 325 TABLET, FILM COATED ORAL at 06:16

## 2021-05-02 RX ADMIN — HEPARIN SODIUM 1800 UNITS/HR: 10000 INJECTION, SOLUTION INTRAVENOUS at 18:43

## 2021-05-02 RX ADMIN — Medication 1 DROP: at 18:47

## 2021-05-02 RX ADMIN — SENNOSIDES AND DOCUSATE SODIUM 1 TABLET: 8.6; 5 TABLET ORAL at 08:43

## 2021-05-02 RX ADMIN — PANTOPRAZOLE SODIUM 40 MG: 40 INJECTION, POWDER, FOR SOLUTION INTRAVENOUS at 08:42

## 2021-05-02 RX ADMIN — CHLORHEXIDINE GLUCONATE 15 ML: 1.2 SOLUTION ORAL at 08:42

## 2021-05-02 RX ADMIN — POLYETHYLENE GLYCOL 3350 34 G: 17 POWDER, FOR SOLUTION ORAL at 08:43

## 2021-05-02 RX ADMIN — FENTANYL CITRATE 50 MCG: 50 INJECTION, SOLUTION INTRAMUSCULAR; INTRAVENOUS at 01:59

## 2021-05-02 ASSESSMENT — ACTIVITIES OF DAILY LIVING (ADL)
ADLS_ACUITY_SCORE: 18
PREVIOUS_RESPONSIBILITIES: DRIVING;FINANCES;MEDICATION MANAGEMENT;SHOPPING;LAUNDRY;HOUSEKEEPING;MEAL PREP
ADLS_ACUITY_SCORE: 18
ADLS_ACUITY_SCORE: 18

## 2021-05-02 ASSESSMENT — MIFFLIN-ST. JEOR: SCORE: 1737.13

## 2021-05-02 NOTE — PLAN OF CARE
Vital signs stable; Trach WDL with trach dome. Denies pain. Incisions CDI. LS dim coarse. Diet NPO; TF started; Increase overnight. Up with assist of 2 and gait belt. BS active and audible; 2x bowel movements. CT removed; Sternal incision CDI with negative pressure.

## 2021-05-02 NOTE — PROGRESS NOTES
Pt has remained on TD 30L 30% through the vent.  Cuff is deflated per MD order.  Will continue to follow  Chaitanya Jones, RT  5/2/2021

## 2021-05-02 NOTE — CONSULTS
CLINICAL NUTRITION SERVICES  -  ASSESSMENT NOTE      Recommendations Ordered by Registered Dietitian (RD):   Begin TF Jevity 1.5 at 10 mL/hr;  Increase by 20 mL every 12 hrs to goal 70 mL/hr = 2520 kcals (28 kcal/kg), 108 gm pro (1.2 gm/kg), 1277 mL H20, 35 gm fiber, 363 gm CHO  Free H20 60 mL every 4 hrs   Malnutrition:   % Weight Loss:  > 2% in 1 week (severe malnutrition) - PTA  % Intake:  </= 50% for >/= 5 days (severe malnutrition)  Subcutaneous Fat Loss:  Unable to determine  Muscle Loss:  Unable to determine  Fluid Retention:  Mild     Malnutrition Diagnosis: Severe malnutrition  In Context of:  Acute illness or injury        REASON FOR ASSESSMENT  Raj Warren is a 70 year old male seen by Registered Dietitian for Provider Order - Registered Dietitian to Assess and Order TF per Medical Nutrition Therapy Protocol and Nutrition Screen - unsure weight loss and poor appetite    DX:   Large bilateral pulmonary emboli with RV Strain   RV thrombus-likely clot in transit     PMH:   Newly diagnosed laryngeal cancer (has yet to start chemo/radiation)   Tracheostomy (4/12/21)     NUTRITION HISTORY  - Unable to obtain nutrition history as patient not available.  In rounds last week RT reported that SLP had stated patient had no swallowing issues and thus was on a Regular diet PTA.  No food allergies/intolerances noted.    CURRENT NUTRITION ORDERS  Diet Order:     NPO     Current Intake/Tolerance:  Patient has been NPO since admission x 6 days.  I/O:  1390/5880.    4/28:  Surgery = Emergent pulmonary embolectomy, Trached   4/30:  Remains on ventilator   4/30:  IR Procedure = IVC filter placement   5/1:  Change from vent reliance --> trach collar (dome)   5/1:  AXR = An enteric tube is in place, with tip in the proximal stomach (NG)   5/2:  Begin TF via NG       NUTRITION FOCUSED PHYSICAL ASSESSMENT FOR DIAGNOSING MALNUTRITION)  No:  Unable to see patient today              Observed:    N/A    Obtained from  "Chart/Interdisciplinary Team:  Edema - mild in extremities    ANTHROPOMETRICS  Height: 5' 11\"  Admit Wt:  90.8 kg  Current Wt:  94.5 kg  Body mass index is 27.9 kg/m .  Weight Status:  Overweight BMI 25-29.9  IBW:  78 kg  % IBW:  116%  Weight History:   - Pt had 6.9 kg (7%) weight loss over the past 1.5 weeks just PTA (since trach placed).    Wt Readings from Last 10 Encounters:   05/02/21 94.5 kg (208 lb 5.4 oz)   04/22/21 93.9 kg (207 lb)   04/17/21 97.7 kg (215 lb 6.2 oz)   02/19/18 98 kg (216 lb)   12/12/17 95.3 kg (210 lb)   01/30/12 93 kg (205 lb)   08/20/10 99.8 kg (220 lb 1.6 oz)   07/13/10 100.5 kg (221 lb 9.6 oz)   06/22/10 101.1 kg (222 lb 12.8 oz)     LABS  Labs reviewed    MEDICATIONS  Medications reviewed  Lasix - for diuresis  Miralax, Colace, Senokot - for bowel program  Pressor off 4/30  Propofol off 5/1  IVF D5 1/2 NaCl + 20 KCl at 20 mL/hr = 24 gm CHO, 82 kcals      ASSESSED NUTRITION NEEDS PER APPROVED PRACTICE GUIDELINES:    Dosing Weight:  90.8 kg (admit wt)  Estimated Energy Needs: 6455-5283 kcals (25-30 Kcal/Kg)  Justification: maintenance  Estimated Protein Needs:  108-136 grams protein (1.2-1.5 g pro/Kg)  Justification: post-op and hypercatabolism with critical illness  Estimated Fluid Needs:  9627-3256 mL (1 mL/Kcal)  Justification: maintenance    MALNUTRITION:  % Weight Loss:  > 2% in 1 week (severe malnutrition) - PTA  % Intake:  </= 50% for >/= 5 days (severe malnutrition)  Subcutaneous Fat Loss:  Unable to determine  Muscle Loss:  Unable to determine  Fluid Retention:  Mild     Malnutrition Diagnosis: Severe malnutrition  In Context of:  Acute illness or injury    NUTRITION DIAGNOSIS:  Inadequate protein-energy intake related to decreased appetite PTA and on vent --> trach dome since admission as evidenced by recent 7% weight loss 1.5 weeks PTA and now inadequate nutritional intake since admission x 6 days.    NUTRITION INTERVENTIONS  Recommendations / Nutrition Prescription  Begin TF " Jevity 1.5 at 10 mL/hr;  Increase by 20 mL every 12 hrs to goal 70 mL/hr = 2520 kcals (28 kcal/kg), 108 gm pro (1.2 gm/kg), 1277 mL H20, 35 gm fiber, 363 gm CHO  Free H20 60 mL every 4 hrs    Implementation  Nutrition education: Not appropriate at this time due to patient condition  EN Composition, EN Schedule and Feeding Tube Flush - TF orders entered in Epic as above    Nutrition Goals  TF goal Jevity 1.5 at 70 mL/hr will meet % estimated needs      MONITORING AND EVALUATION:  Progress towards goals will be monitored and evaluated per protocol and Practice Guidelines    Una Tang, RD, LD, CNSC

## 2021-05-02 NOTE — PROGRESS NOTES
Patient seen and discussed with Dr. Boyer    [facility]  Cardiovascular and Thoracic Surgery Daily Note          Assessment and Plan:   POD#4 s/p Emergent pulmonary embolectomy by Dr. Yumi Singh and Dr. Bárbara Cali    -CVS: HR: 80s-90s. SBP: 110s-130s. Pre op EF: 60-65%. Off pressors. ASA. On low intensity heparin d/t PE, will transition to high intensity today. He will be started on Eliquis prior to discharge. IVC filter placed . Will plan to remove chest tubes today. Wound vac in place for 7 days.   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 Continue trach dome today.   -Neuro: Awake, opens eyes to voice. Follows commands. Nods appropriately to questions  -Renal: good UOP. Up 4kg from preoperative weight. Cr: 0.83. Continue IV lasix 20mg BID, will monitor need tomorrow.   -GI:  Continue bowel regimen. Nutrition consult today to start tube feeds. Will plan consult to SLP tomorrow.   -: Mera in place d/t limited mobility and need for accurate I&Os.   -Endo: pre op a1c: 5.6. Continue sliding scale insulin   -FEN: replace electrolytes as needed. Na: 141 K: 3.4.   Orders Placed This Encounter      NPO for Medical/Clinical Reasons Except for: Meds      Advance Diet as Tolerated: Clear Liquid Diet      Advance Diet as Tolerated: Regular Diet Adult; Low Saturated Fat Na <2400mg Diet    -ID: Temp (24hrs), Av.8  F (37.7  C), Min:99.1  F (37.3  C), Max:100.8  F (38.2  C)    WBC: 9.4. Completed perioperative abx. Marte cultured and started on broad spectrum abx per intensivist for fevers overnight. Will discontinue Abx today  -Heme: hgb: 10.0. plt: 314. Acute blood loss anemia and thrombocytopenia related to surgery  -Proph: PCD, ASA, PPI, heparin gtt  -Dispo: Continue ICU cares today, consider transfer to [facility] tomorrow if able to stay off vent. Continue therapies. Plan to remove chest tubes today and repeat CXR tomorrow am.           Interval  "History:   No acute events overnight. Pain controlled. Tolerating trach dome. Tolerating up to chair.          Medications:       aspirin  162 mg Oral or NG Tube Daily     chlorhexidine  15 mL Mouth/Throat Q12H     docusate sodium  100 mg Oral BID     furosemide  20 mg Intravenous Q12H     gabapentin  100 mg Oral At Bedtime     insulin aspart  1-4 Units Subcutaneous Q4H     pantoprazole (PROTONIX) IV  40 mg Intravenous Daily with breakfast     polyethylene glycol  34 g Oral BID     senna-docusate  1 tablet Oral BID     sodium chloride (PF)  10 mL Intracatheter Q8H     [DISCONTINUED] acetaminophen **OR** acetaminophen, acetaminophen, acetaminophen, albuterol, sore throat lozenge, bisacodyl, dextrose, glucose **OR** dextrose **OR** glucagon, fentaNYL, hydrALAZINE, HYDROmorphone **OR** HYDROmorphone, lidocaine 4%, lidocaine (buffered or not buffered), lidocaine (buffered or not buffered), LORazepam, magnesium hydroxide, - MEDICATION INSTRUCTIONS -, methocarbamol, naloxone **OR** naloxone **OR** naloxone **OR** naloxone, ondansetron **OR** ondansetron, oxyCODONE **OR** oxyCODONE, polyethylene glycol, senna-docusate **OR** senna-docusate, sodium chloride (PF), sodium chloride (PF), sodium chloride (PF), sodium chloride (PF), vasopressin          Physical Exam:   Vitals were reviewed  Blood pressure 124/75, pulse 80, temperature 98.3  F (36.8  C), temperature source Oral, resp. rate 20, height 1.803 m (5' 11\"), weight 94.5 kg (208 lb 5.4 oz), SpO2 99 %.  Rhythm: NSR    Lungs: coarse    Cardiovascular: RRR normal s1 and s2    Abdomen: soft NTND    Extremeties: minimal edema    Incision: wound vac in place    CT: to suction    Weight:   Vitals:    04/27/21 1805 04/29/21 0630 04/30/21 0400 05/01/21 0400   Weight: 90.8 kg (200 lb 2.8 oz) 99.2 kg (218 lb 11.1 oz) 99.9 kg (220 lb 3.8 oz) 101.3 kg (223 lb 5.2 oz)    05/02/21 0400   Weight: 94.5 kg (208 lb 5.4 oz)            Data:   Labs:   Lab Results   Component Value Date    " WBC 9.5 05/01/2021     Lab Results   Component Value Date    RBC 3.04 05/01/2021     Lab Results   Component Value Date    HGB 9.0 05/01/2021     Lab Results   Component Value Date    HCT 27.3 05/01/2021     No components found for: MCT  Lab Results   Component Value Date    MCV 90 05/01/2021     Lab Results   Component Value Date    MCH 29.6 05/01/2021     Lab Results   Component Value Date    MCHC 33.0 05/01/2021     Lab Results   Component Value Date    RDW 14.6 05/01/2021     Lab Results   Component Value Date     05/01/2021         Last Basic Metabolic Panel:  Last Comprehensive Metabolic Panel:  Sodium   Date Value Ref Range Status   05/02/2021 141 133 - 144 mmol/L Final     Potassium   Date Value Ref Range Status   05/02/2021 3.4 3.4 - 5.3 mmol/L Final     Chloride   Date Value Ref Range Status   05/02/2021 112 (H) 94 - 109 mmol/L Final     Carbon Dioxide   Date Value Ref Range Status   05/02/2021 27 20 - 32 mmol/L Final     Anion Gap   Date Value Ref Range Status   05/02/2021 2 (L) 3 - 14 mmol/L Final     Glucose   Date Value Ref Range Status   05/02/2021 106 (H) 70 - 99 mg/dL Final     Urea Nitrogen   Date Value Ref Range Status   05/02/2021 8 7 - 30 mg/dL Final     Creatinine   Date Value Ref Range Status   05/02/2021 0.83 0.66 - 1.25 mg/dL Final     GFR Estimate   Date Value Ref Range Status   05/02/2021 90 >60 mL/min/[1.73_m2] Final     Comment:     Non  GFR Calc  Starting 12/18/2018, serum creatinine based estimated GFR (eGFR) will be   calculated using the Chronic Kidney Disease Epidemiology Collaboration   (CKD-EPI) equation.       Calcium   Date Value Ref Range Status   05/02/2021 8.0 (L) 8.5 - 10.1 mg/dL Final       Susanna Dc PA-C  CV surgery  Pager [phone]

## 2021-05-02 NOTE — PLAN OF CARE
Pt neurologically intact, A&O. VSS. Trach dome throughout the night. NG in use for meds. [place] UOP, no BM. K replaced this AM. Continue to monitor.

## 2021-05-02 NOTE — PROGRESS NOTES
05/02/21 1120   Quick Adds   Type of Visit Initial Occupational Therapy Evaluation   Living Environment   People in home alone   Current Living Arrangements apartment   Home Accessibility stairs to enter home   Number of Stairs, Main Entrance   (15)   Transportation Anticipated car, drives self;family or friend will provide   Self-Care   Regular Exercise No   Disability/Function   Hearing Difficulty or Deaf no   Wear Glasses or Blind no   Concentrating, Remembering or Making Decisions Difficulty no   General Information   Onset of Illness/Injury or Date of Surgery 04/27/21   Referring Physician Bárbara Liu MD   Patient/Family Therapy Goal Statement (OT) none stated   Additional Occupational Profile Info/Pertinent History of Current Problem Raj Warren is a 70 year old male who presents with shortness of breath, found to have large bilateral pulmonary emboli with RV thrombus and right heart strain in setting of laryngeal cancer. CT with laryngeal mass with airway compromise: Bx:SCCA of Larynx. S/p trach per ENT 4/12/2021, pt found to have Acute massive pulmonary emboli and right ventricular thrombus, requiring open heart surgery.  POD#4 s/p Emergent pulmonary embolectomy   Existing Precautions/Restrictions fall;sternal  (Trach)   Cognitive Status Examination   Orientation Status orientation to person, place and time   Affect/Mental Status (Cognitive) WFL   Follows Commands WFL   Cognitive Status Comments cont to monitor   Sensory   Sensory Comments Reports R hand numbness following surgery.    Pain Assessment   Patient Currently in Pain No  (sternal pain)   Range of Motion Comprehensive   Comment, General Range of Motion B UE AROM WFL, approx 90 degrees shoulder flexion at this time   Strength Comprehensive (MMT)   Comment, General Manual Muscle Testing (MMT) Assessment B UE MMT NT due sternal precautions.    Bed Mobility   Scooting/Bridging [place] (Bed Mobility) minimum assist (75% patient  effort)   Supine-Sit [place] (Bed Mobility) minimum assist (75% patient effort)   Sit-Supine [place] (Bed Mobility) moderate assist (50% patient effort);2 person assist   Transfer Skill: Bed to Chair/Chair to Bed   Bed-Chair [place] (Transfers) minimum assist (75% patient effort);2 person assist   Sit-Stand Transfer   Sit-Stand [place] (Transfers) minimum assist (75% patient effort)   Lower Body Dressing Assessment/Training   [place]oster Level (Lower Body Dressing) maximum assist (25% patient effort)   Instrumental Activities of Daily Living (IADL)   Previous Responsibilities driving;finances;medication management;shopping;laundry;housekeeping;meal prep  (Pt is retired)   Clinical Impression   Criteria for Skilled Therapeutic Interventions Met (OT) yes   OT Diagnosis impaired independence with ADl's and functional mobility   OT Problem List-Impairments impacting ADL problems related to;activity tolerance impaired;balance;strength;post-surgical precautions;pain   Assessment of Occupational Performance 3-5 Performance Deficits   Identified Performance Deficits impaired I with dressing, toilet and shower transfer, cleaning, driving, laundry,e tc and lives alone   Planned Therapy Interventions (OT) ADL retraining;transfer training;home program guidelines;progressive activity/exercise;risk factor education   Clinical Decision Making Complexity (OT) moderate complexity   Therapy Frequency (OT) 2x/day   Predicted Duration of Therapy 5 days   Risk & Benefits of therapy have been explained evaluation/treatment results reviewed;care plan/treatment goals reviewed;risks/benefits reviewed;current/potential barriers reviewed;participants voiced agreement with care plan;participants included;patient;spouse/significant other   OT Discharge Planning    OT Discharge Recommendation (DC Rec) [facility] patient will benefit from intensive, interdisciplinary therapy.  Anticipate will be able to  tolerate 3 hours of therapy per day   OT Rationale for DC Rec Pt currently limited due to weakness, balance, activity tolerance, pain, sternal precautions. At this time pt will require daily therapy to increase ADL and functional mobility to PLOF.    Total Evaluation Time (Minutes)   Total Evaluation Time (Minutes) 10

## 2021-05-03 ENCOUNTER — APPOINTMENT (OUTPATIENT)
Dept: GENERAL RADIOLOGY | Facility: CLINIC | Age: 71
DRG: 163 | End: 2021-05-03
Attending: PHYSICIAN ASSISTANT
Payer: COMMERCIAL

## 2021-05-03 ENCOUNTER — APPOINTMENT (OUTPATIENT)
Dept: OCCUPATIONAL THERAPY | Facility: CLINIC | Age: 71
DRG: 163 | End: 2021-05-03
Attending: HOSPITALIST
Payer: COMMERCIAL

## 2021-05-03 ENCOUNTER — APPOINTMENT (OUTPATIENT)
Dept: SPEECH THERAPY | Facility: CLINIC | Age: 71
DRG: 163 | End: 2021-05-03
Attending: PHYSICIAN ASSISTANT
Payer: COMMERCIAL

## 2021-05-03 LAB
ANION GAP SERPL CALCULATED.3IONS-SCNC: 3 MMOL/L (ref 3–14)
BACTERIA SPEC CULT: NO GROWTH
BACTERIA SPEC CULT: NO GROWTH
BUN SERPL-MCNC: 10 MG/DL (ref 7–30)
CALCIUM SERPL-MCNC: 8.1 MG/DL (ref 8.5–10.1)
CHLORIDE SERPL-SCNC: 112 MMOL/L (ref 94–109)
CO2 SERPL-SCNC: 26 MMOL/L (ref 20–32)
CREAT SERPL-MCNC: 0.83 MG/DL (ref 0.66–1.25)
ERYTHROCYTE [DISTWIDTH] IN BLOOD BY AUTOMATED COUNT: 14.6 % (ref 10–15)
GFR SERPL CREATININE-BSD FRML MDRD: 90 ML/MIN/{1.73_M2}
GLUCOSE BLDC GLUCOMTR-MCNC: 103 MG/DL (ref 70–99)
GLUCOSE BLDC GLUCOMTR-MCNC: 125 MG/DL (ref 70–99)
GLUCOSE BLDC GLUCOMTR-MCNC: 135 MG/DL (ref 70–99)
GLUCOSE BLDC GLUCOMTR-MCNC: 77 MG/DL (ref 70–99)
GLUCOSE BLDC GLUCOMTR-MCNC: 94 MG/DL (ref 70–99)
GLUCOSE SERPL-MCNC: 99 MG/DL (ref 70–99)
HCT VFR BLD AUTO: 30.7 % (ref 40–53)
HGB BLD-MCNC: 10 G/DL (ref 13.3–17.7)
MAGNESIUM SERPL-MCNC: 2.4 MG/DL (ref 1.6–2.3)
MCH RBC QN AUTO: 29.4 PG (ref 26.5–33)
MCHC RBC AUTO-ENTMCNC: 32.6 G/DL (ref 31.5–36.5)
MCV RBC AUTO: 90 FL (ref 78–100)
PHOSPHATE SERPL-MCNC: 3.2 MG/DL (ref 2.5–4.5)
PLATELET # BLD AUTO: 341 10E9/L (ref 150–450)
POTASSIUM SERPL-SCNC: 3.7 MMOL/L (ref 3.4–5.3)
RBC # BLD AUTO: 3.4 10E12/L (ref 4.4–5.9)
SODIUM SERPL-SCNC: 141 MMOL/L (ref 133–144)
SPECIMEN SOURCE: NORMAL
SPECIMEN SOURCE: NORMAL
UFH PPP CHRO-ACNC: 0.62 IU/ML
UFH PPP CHRO-ACNC: 0.79 IU/ML
WBC # BLD AUTO: 8.4 10E9/L (ref 4–11)

## 2021-05-03 PROCEDURE — 80048 BASIC METABOLIC PNL TOTAL CA: CPT | Performed by: INTERNAL MEDICINE

## 2021-05-03 PROCEDURE — 258N000003 HC RX IP 258 OP 636: Performed by: INTERNAL MEDICINE

## 2021-05-03 PROCEDURE — 97530 THERAPEUTIC ACTIVITIES: CPT | Mod: GO

## 2021-05-03 PROCEDURE — 250N000011 HC RX IP 250 OP 636: Performed by: INTERNAL MEDICINE

## 2021-05-03 PROCEDURE — C9113 INJ PANTOPRAZOLE SODIUM, VIA: HCPCS | Performed by: INTERNAL MEDICINE

## 2021-05-03 PROCEDURE — 85520 HEPARIN ASSAY: CPT | Performed by: SURGERY

## 2021-05-03 PROCEDURE — 85027 COMPLETE CBC AUTOMATED: CPT | Performed by: INTERNAL MEDICINE

## 2021-05-03 PROCEDURE — 999N000157 HC STATISTIC RCP TIME EA 10 MIN

## 2021-05-03 PROCEDURE — 250N000013 HC RX MED GY IP 250 OP 250 PS 637: Performed by: SURGERY

## 2021-05-03 PROCEDURE — 71046 X-RAY EXAM CHEST 2 VIEWS: CPT

## 2021-05-03 PROCEDURE — 92526 ORAL FUNCTION THERAPY: CPT | Mod: GN | Performed by: SPEECH-LANGUAGE PATHOLOGIST

## 2021-05-03 PROCEDURE — 84100 ASSAY OF PHOSPHORUS: CPT | Performed by: INTERNAL MEDICINE

## 2021-05-03 PROCEDURE — 999N001017 HC STATISTIC GLUCOSE BY METER IP

## 2021-05-03 PROCEDURE — 250N000011 HC RX IP 250 OP 636: Performed by: SURGERY

## 2021-05-03 PROCEDURE — 83735 ASSAY OF MAGNESIUM: CPT | Performed by: INTERNAL MEDICINE

## 2021-05-03 PROCEDURE — 97535 SELF CARE MNGMENT TRAINING: CPT | Mod: GO

## 2021-05-03 PROCEDURE — 999N000190 HC STATISTIC VAT ROUNDS

## 2021-05-03 PROCEDURE — 250N000013 HC RX MED GY IP 250 OP 250 PS 637: Performed by: PHYSICIAN ASSISTANT

## 2021-05-03 PROCEDURE — 85520 HEPARIN ASSAY: CPT | Performed by: INTERNAL MEDICINE

## 2021-05-03 PROCEDURE — 250N000013 HC RX MED GY IP 250 OP 250 PS 637: Performed by: INTERNAL MEDICINE

## 2021-05-03 PROCEDURE — 92610 EVALUATE SWALLOWING FUNCTION: CPT | Mod: GN | Performed by: SPEECH-LANGUAGE PATHOLOGIST

## 2021-05-03 PROCEDURE — 120N000001 HC R&B MED SURG/OB

## 2021-05-03 RX ORDER — POTASSIUM CHLORIDE 1.5 G/1.58G
20 POWDER, FOR SOLUTION ORAL ONCE
Status: COMPLETED | OUTPATIENT
Start: 2021-05-03 | End: 2021-05-03

## 2021-05-03 RX ADMIN — ACETAMINOPHEN 650 MG: 325 TABLET, FILM COATED ORAL at 00:28

## 2021-05-03 RX ADMIN — DOCUSATE SODIUM 100 MG: 50 LIQUID ORAL at 09:04

## 2021-05-03 RX ADMIN — SENNOSIDES AND DOCUSATE SODIUM 1 TABLET: 8.6; 5 TABLET ORAL at 09:04

## 2021-05-03 RX ADMIN — FUROSEMIDE 20 MG: 10 INJECTION, SOLUTION INTRAVENOUS at 12:21

## 2021-05-03 RX ADMIN — POTASSIUM CHLORIDE 20 MEQ: 1.5 POWDER, FOR SOLUTION ORAL at 06:39

## 2021-05-03 RX ADMIN — ACETAMINOPHEN 650 MG: 325 TABLET, FILM COATED ORAL at 09:04

## 2021-05-03 RX ADMIN — GABAPENTIN 100 MG: 250 SOLUTION ORAL at 21:54

## 2021-05-03 RX ADMIN — PANTOPRAZOLE SODIUM 40 MG: 40 INJECTION, POWDER, FOR SOLUTION INTRAVENOUS at 09:04

## 2021-05-03 RX ADMIN — HEPARIN SODIUM 1700 UNITS/HR: 10000 INJECTION, SOLUTION INTRAVENOUS at 09:37

## 2021-05-03 RX ADMIN — SODIUM CHLORIDE: 9 INJECTION, SOLUTION INTRAVENOUS at 05:58

## 2021-05-03 RX ADMIN — ASPIRIN 81 MG CHEWABLE TABLET 162 MG: 81 TABLET CHEWABLE at 09:04

## 2021-05-03 RX ADMIN — FUROSEMIDE 20 MG: 10 INJECTION, SOLUTION INTRAVENOUS at 00:20

## 2021-05-03 ASSESSMENT — ACTIVITIES OF DAILY LIVING (ADL)
ADLS_ACUITY_SCORE: 18
ADLS_ACUITY_SCORE: 17

## 2021-05-03 ASSESSMENT — MIFFLIN-ST. JEOR: SCORE: 1734.13

## 2021-05-03 NOTE — PLAN OF CARE
Pt neurologically intact, A&O. VSS. Trach dome overnight, no issues. SR. TF continued. [place] in place. K replaced this AM. Will continue to monitor.

## 2021-05-03 NOTE — PROGRESS NOTES
Swallow: Recommend initiate clear liquids, thin liquids.  -Only when alert and upright  -Single sips at a time  -Discontinue if exhibiting respiratory compromise    PATIENT NOT APPROPRIATE FOR PASSY-JESSICA SPEAKING VALVE DUE TO LARYNGEAL OBSTRUCTION.     Communication: Patient currently using written communication effectively, recommend transition to HME/T-piece instead of HFTD when appropriate from respiratory standpoint, as patient had been able to communicate effectively with trach-finger occlusion.    Recommend oncology and ENT follow up for medical care planning, as EMR indicates total laryngectomy has been recommended.  Will follow for pre-operative communication evaluation, if indicated.

## 2021-05-03 NOTE — PROGRESS NOTES
CLINICAL SWALLOW EVALUATION       05/03/21 1300   General Information   Onset of Illness/Injury or Date of Surgery 04/27/21   Referring Physician Susanna Dc PA-C   Patient/Family Therapy Goal Statement (SLP) Patient would like to eat.    Pertinent History of Current Problem Patient admitted 4/27 with shortness of breath, found to have large bilateral pulmonary emboli with RV thrombus and right heart strain in setting of laryngeal cancer.  He underwent emergent trach for obstructive laryngeal CA on 4/12/21.     General Observations Patient was a no show for ENT evaluation.  Per Oncology, recommending surgical intervention (total laryngectomy) prior to concurrent chemoXRT.    Past History of Dysphagia Video swallow during previous admission.   Type of Evaluation   Type of Evaluation Swallow Evaluation   Oral Motor   Oral Musculature generally intact   Structural Abnormalities present  (Trach, laryngeal CA)   Mucosal Quality good   Dentition (Oral Motor)   Dentition (Oral Motor) natural dentition   Facial Symmetry (Oral Motor)   Facial Symmetry (Oral Motor) WNL   Lip Function (Oral Motor)   Lip Range of Motion (Oral Motor) WNL   Tongue Function (Oral Motor)   Tongue ROM (Oral Motor) WNL   Vocal Quality/Secretion Management (Oral Motor)   Vocal Quality (Oral Motor) aphonia   Secretion Management (Oral Motor) WNL   Comment, Vocal Quality/Secretion Management (Oral Motor) Aphonia secondary to trach, inline HFTD    General Swallowing Observations   Current Diet/Method of Nutritional Intake (General Swallowing Observations, NIS) NPO   Respiratory Support (General Swallowing Observations) other (see comments)  (HFTD)   Swallowing Evaluation Clinical swallow evaluation   Clinical Swallow Evaluation   Clinical Swallow Evaluation Textures Trialed Thin Liquids   Clinical Swallow Eval: Thin Liquid Texture Trial   Mode of Presentation, Thin Liquids self-fed;spoon   Volume of Liquid or Food Presented 4 oz   Oral Phase of  Swallow WFL   Pharyngeal Phase of Swallow intact   Diagnostic Statement No overt Sx of aspiration, timely swallow response with no blue dye visible upon end of session suctioning.    Swallowing Recommendations   Diet Consistency Recommendations thin liquids;clear liquid diet   Supervision Level for Intake patient independent   Mode of Delivery Recommendations bolus size, small   Recommended Feeding/Eating Techniques (Swallow Eval) patient is independent, no specific recommendations   General Therapy Interventions   Planned Therapy Interventions Dysphagia Treatment   Dysphagia treatment Modified diet education;Compensatory strategies for swallowing;Instruction of safe swallow strategies   SLP Therapy Assessment/Plan   Criteria for Skilled Therapeutic Interventions Met (SLP Eval) yes;treatment indicated   SLP Diagnosis Mild oropharyngeal dysphagia   Rehab Potential (SLP Eval) fair, will monitor progress closely   Therapy Frequency (SLP Eval) daily   Predicted Duration of Therapy Intervention (SLP Eval) 1 week   Comment, Therapy Assessment/Plan (SLP) Patient presents with aspiration risk due to laryngeal mass.  Recent VFSS indicated no aspiration or laryngeal penetration, however, patient re-admitted with bilateral PEs, now s/p chest tube placement. His HFTD is restrictive to his finger occlusive communication method.  He passed blue dye screen today.    SLP Discharge Planning    SLP Discharge Recommendation (DC Rec) home with outpatient speech therapy   SLP Rationale for DC Rec Recommend SLP services as outpatient every other week for swallowing and communication during chemoXRT.   SLP Brief overview of current status  Clinical swallow evaluation completed with blue dye screen.  Initiated treatment, as well.  Recommend clear liquid diet, thin liquids when alert and upright.     Total Evaluation Time   Total Evaluation Time (Minutes) 30

## 2021-05-03 NOTE — PROGRESS NOTES
Patient seen and discussed with Dr. Singh    [facility]  Cardiovascular and Thoracic Surgery Daily Note          Assessment and Plan:   POD#5 s/p Emergent pulmonary embolectomy by Dr. Yumi Singh and Dr. Bárbara Cali  -CVS: HR: 80s, SBP: 100-130s. Pre op EF: 60-65%. ASA, high intensity heparin. Will plan for Eliquis prior to discharge. IVC filter placed on 430. Chest tubes removed yesterday without issue. CXR today stable. Wound vac in place for 7 days.   -Resp: Continue Trach dome. 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 Follows commands. Nods appropriately to questions.   -Renal: good UOP. Up about 4kg from preoperative weight. Cr: 0.83. Continue IV lasix 20mg BID. Will continue to monitor.  -GI:  Continue tube feeds. SLP consulted for speaking valve and to assist with advancing diet  -:  Ok to remove taylor   -Endo: pre op a1c: 5.6. Continue sliding scale insulin  -FEN: replace electrolytes as needed. Na: 141. K: 3.7  -ID: Temp (24hrs), Av.4  F (36.9  C), Min:97.8  F (36.6  C), Max:98.7  F (37.1  C)  WBC: 8.4. Completed perioperative abx. Marte cultured and started on broad spectrum abx per intensivist for fevers overnight on POD#1, discontinued abx yesterday.   -Heme: Hgb: 10.0. plt: 341. Acute blood loss anemia and thrombocytopenia related to surgery.   -Proph: PCD, ASA, PPI, heparin gtt  -Dispo: Transfer to [facility]. Continue therapies. SLP consulted. Continue heparin gtt with plan to transition to Eliquis when able.           Interval History:   No acute events overnight. Pain controlled. Tolerating trach dome. Tolerating tube feeds. +BM.          Medications:       aspirin  162 mg Oral or NG Tube Daily     chlorhexidine  15 mL Mouth/Throat Q12H     docusate  100 mg Oral or Feeding Tube BID     furosemide  20 mg Intravenous Q12H     gabapentin  100 mg Oral or Feeding Tube At Bedtime     insulin aspart  1-4  "Units Subcutaneous Q4H     pantoprazole (PROTONIX) IV  40 mg Intravenous Daily with breakfast     polyethylene glycol  34 g Oral or Feeding Tube BID     senna-docusate  1 tablet Oral or Feeding Tube BID     sodium chloride (PF)  10 mL Intracatheter Q8H     [DISCONTINUED] acetaminophen **OR** acetaminophen, acetaminophen, acetaminophen, albuterol, sore throat lozenge, bisacodyl, artificial tears ophthalmic solution, dextrose, dextrose, glucose **OR** dextrose **OR** glucagon, fentaNYL, hydrALAZINE, HYDROmorphone **OR** HYDROmorphone, lidocaine 4%, lidocaine (buffered or not buffered), lidocaine (buffered or not buffered), LORazepam, magnesium hydroxide, - MEDICATION INSTRUCTIONS -, methocarbamol, naloxone **OR** naloxone **OR** naloxone **OR** naloxone, ondansetron **OR** ondansetron, oxyCODONE **OR** oxyCODONE, polyethylene glycol, senna-docusate **OR** senna-docusate, sodium chloride (PF), sodium chloride (PF), sodium chloride (PF), sodium chloride (PF), vasopressin          Physical Exam:   Vitals were reviewed  Blood pressure 113/77, pulse 81, temperature 98.6  F (37  C), temperature source Oral, resp. rate 17, height 1.803 m (5' 11\"), weight 94.2 kg (207 lb 10.8 oz), SpO2 98 %.  Rhythm: NSR    Lungs: diminished bases    Cardiovascular: RRR normal s1 and s2    Abdomen: soft NTND    Extremeties: minimal edema    Incision: wound vac    CT: n/a    Weight:   Vitals:    04/29/21 0630 04/30/21 0400 05/01/21 0400 05/02/21 0400   Weight: 99.2 kg (218 lb 11.1 oz) 99.9 kg (220 lb 3.8 oz) 101.3 kg (223 lb 5.2 oz) 94.5 kg (208 lb 5.4 oz)    05/03/21 0400   Weight: 94.2 kg (207 lb 10.8 oz)            Data:   Labs:   Lab Results   Component Value Date    WBC 8.4 05/03/2021     Lab Results   Component Value Date    RBC 3.40 05/03/2021     Lab Results   Component Value Date    HGB 10.0 05/03/2021     Lab Results   Component Value Date    HCT 30.7 05/03/2021     No components found for: MCT  Lab Results   Component Value Date    " MCV 90 05/03/2021     Lab Results   Component Value Date    MCH 29.4 05/03/2021     Lab Results   Component Value Date    MCHC 32.6 05/03/2021     Lab Results   Component Value Date    RDW 14.6 05/03/2021     Lab Results   Component Value Date     05/03/2021       Last Basic Metabolic Panel:  Lab Results   Component Value Date     05/03/2021      Lab Results   Component Value Date    POTASSIUM 3.7 05/03/2021     Lab Results   Component Value Date    CHLORIDE 112 05/03/2021     Lab Results   Component Value Date    TELMA 8.1 05/03/2021     Lab Results   Component Value Date    CO2 26 05/03/2021     Lab Results   Component Value Date    BUN 10 05/03/2021     Lab Results   Component Value Date    CR 0.83 05/03/2021     Lab Results   Component Value Date    GLC 99 05/03/2021       CXR: 5/3/21    IMPRESSION: Previously seen chest tube is present. No pneumothorax. No  definite pneumomediastinum. PICC line and tracheostomy tube remain in  good position. Enteric tube in the stomach. Minimal atelectasis in the  lung bases. The lungs are otherwise clear.    Susanna Dc PA-C  [facility]  Pager # [phone]

## 2021-05-03 NOTE — PLAN OF CARE
Txf from ICU today, VSS. A/O. Non verbal due to trach. Chest wound vac intact. TF through NG, increased 50 ml @ 1700. Tolerating clears. Hep gtt cont, 10a recheck am. Mera removed @ 1530, voided once, cont pink tinge urine. Tele SR.

## 2021-05-03 NOTE — PROGRESS NOTES
Pt is on 30L 30% through vent. MD ordered deflated cuff. Will continue to follow.   5/3/2021  Manisha Graff

## 2021-05-03 NOTE — PLAN OF CARE
Alert and oriented. Calls appropriately. Tele NSR. BP unremarkable. Afebrile. Trach dome 30L/30% 02. Tolerating well. LS clear. Sputum via inline suction. Sternal precautions continued. WoundVac patent. CT removed yesterday. Mera patent. TF via NG, rate to be increased at 1600. SLP seeing Pt now. Heparin gtts, recheck 1330. Rt PICC. Transfer to [facility] with [facility] and NA. Nursing to follow closely

## 2021-05-04 ENCOUNTER — APPOINTMENT (OUTPATIENT)
Dept: OCCUPATIONAL THERAPY | Facility: CLINIC | Age: 71
DRG: 163 | End: 2021-05-04
Attending: HOSPITALIST
Payer: COMMERCIAL

## 2021-05-04 ENCOUNTER — APPOINTMENT (OUTPATIENT)
Dept: SPEECH THERAPY | Facility: CLINIC | Age: 71
DRG: 163 | End: 2021-05-04
Attending: PHYSICIAN ASSISTANT
Payer: COMMERCIAL

## 2021-05-04 LAB
ANION GAP SERPL CALCULATED.3IONS-SCNC: 6 MMOL/L (ref 3–14)
BACTERIA SPEC CULT: NO GROWTH
BUN SERPL-MCNC: 11 MG/DL (ref 7–30)
CALCIUM SERPL-MCNC: 8.1 MG/DL (ref 8.5–10.1)
CHLORIDE SERPL-SCNC: 111 MMOL/L (ref 94–109)
CO2 SERPL-SCNC: 24 MMOL/L (ref 20–32)
CREAT SERPL-MCNC: 0.84 MG/DL (ref 0.66–1.25)
ERYTHROCYTE [DISTWIDTH] IN BLOOD BY AUTOMATED COUNT: 14.8 % (ref 10–15)
GFR SERPL CREATININE-BSD FRML MDRD: 90 ML/MIN/{1.73_M2}
GLUCOSE BLDC GLUCOMTR-MCNC: 110 MG/DL (ref 70–99)
GLUCOSE BLDC GLUCOMTR-MCNC: 111 MG/DL (ref 70–99)
GLUCOSE BLDC GLUCOMTR-MCNC: 113 MG/DL (ref 70–99)
GLUCOSE BLDC GLUCOMTR-MCNC: 116 MG/DL (ref 70–99)
GLUCOSE BLDC GLUCOMTR-MCNC: 125 MG/DL (ref 70–99)
GLUCOSE SERPL-MCNC: 119 MG/DL (ref 70–99)
HCT VFR BLD AUTO: 32 % (ref 40–53)
HGB BLD-MCNC: 10.3 G/DL (ref 13.3–17.7)
Lab: NORMAL
MCH RBC QN AUTO: 29.2 PG (ref 26.5–33)
MCHC RBC AUTO-ENTMCNC: 32.2 G/DL (ref 31.5–36.5)
MCV RBC AUTO: 91 FL (ref 78–100)
PLATELET # BLD AUTO: 343 10E9/L (ref 150–450)
POTASSIUM SERPL-SCNC: 3.7 MMOL/L (ref 3.4–5.3)
RBC # BLD AUTO: 3.53 10E12/L (ref 4.4–5.9)
SODIUM SERPL-SCNC: 141 MMOL/L (ref 133–144)
SPECIMEN SOURCE: NORMAL
UFH PPP CHRO-ACNC: 0.71 IU/ML
UFH PPP CHRO-ACNC: 0.85 IU/ML
WBC # BLD AUTO: 10.4 10E9/L (ref 4–11)

## 2021-05-04 PROCEDURE — 120N000001 HC R&B MED SURG/OB

## 2021-05-04 PROCEDURE — 250N000013 HC RX MED GY IP 250 OP 250 PS 637: Performed by: SURGERY

## 2021-05-04 PROCEDURE — 97535 SELF CARE MNGMENT TRAINING: CPT | Mod: GO

## 2021-05-04 PROCEDURE — 85520 HEPARIN ASSAY: CPT | Performed by: SURGERY

## 2021-05-04 PROCEDURE — 999N001017 HC STATISTIC GLUCOSE BY METER IP

## 2021-05-04 PROCEDURE — 250N000013 HC RX MED GY IP 250 OP 250 PS 637: Performed by: PHYSICIAN ASSISTANT

## 2021-05-04 PROCEDURE — 92526 ORAL FUNCTION THERAPY: CPT | Mod: GN | Performed by: SPEECH-LANGUAGE PATHOLOGIST

## 2021-05-04 PROCEDURE — 85520 HEPARIN ASSAY: CPT | Performed by: PHYSICIAN ASSISTANT

## 2021-05-04 PROCEDURE — 250N000011 HC RX IP 250 OP 636: Performed by: SURGERY

## 2021-05-04 PROCEDURE — 97530 THERAPEUTIC ACTIVITIES: CPT | Mod: GO

## 2021-05-04 PROCEDURE — C9113 INJ PANTOPRAZOLE SODIUM, VIA: HCPCS | Performed by: PHYSICIAN ASSISTANT

## 2021-05-04 PROCEDURE — 80048 BASIC METABOLIC PNL TOTAL CA: CPT | Performed by: PHYSICIAN ASSISTANT

## 2021-05-04 PROCEDURE — 999N000190 HC STATISTIC VAT ROUNDS

## 2021-05-04 PROCEDURE — 85027 COMPLETE CBC AUTOMATED: CPT | Performed by: PHYSICIAN ASSISTANT

## 2021-05-04 PROCEDURE — 250N000011 HC RX IP 250 OP 636: Performed by: PHYSICIAN ASSISTANT

## 2021-05-04 RX ORDER — POTASSIUM CHLORIDE 1.5 G/1.58G
20 POWDER, FOR SOLUTION ORAL ONCE
Status: COMPLETED | OUTPATIENT
Start: 2021-05-04 | End: 2021-05-04

## 2021-05-04 RX ORDER — ASPIRIN 81 MG/1
81 TABLET, CHEWABLE ORAL DAILY
Status: DISCONTINUED | OUTPATIENT
Start: 2021-05-05 | End: 2021-05-08 | Stop reason: HOSPADM

## 2021-05-04 RX ORDER — FUROSEMIDE 40 MG
40 TABLET ORAL
Status: DISCONTINUED | OUTPATIENT
Start: 2021-05-04 | End: 2021-05-05

## 2021-05-04 RX ORDER — PANTOPRAZOLE SODIUM 40 MG/1
40 TABLET, DELAYED RELEASE ORAL
Status: DISCONTINUED | OUTPATIENT
Start: 2021-05-05 | End: 2021-05-08 | Stop reason: HOSPADM

## 2021-05-04 RX ORDER — HEPARIN SODIUM 10000 [USP'U]/100ML
0-5000 INJECTION, SOLUTION INTRAVENOUS CONTINUOUS
Status: DISPENSED | OUTPATIENT
Start: 2021-05-04 | End: 2021-05-04

## 2021-05-04 RX ADMIN — PANTOPRAZOLE SODIUM 40 MG: 40 INJECTION, POWDER, FOR SOLUTION INTRAVENOUS at 09:26

## 2021-05-04 RX ADMIN — GABAPENTIN 100 MG: 250 SOLUTION ORAL at 21:08

## 2021-05-04 RX ADMIN — ASPIRIN 81 MG CHEWABLE TABLET 162 MG: 81 TABLET CHEWABLE at 09:26

## 2021-05-04 RX ADMIN — HEPARIN SODIUM 1500 UNITS/HR: 10000 INJECTION, SOLUTION INTRAVENOUS at 19:23

## 2021-05-04 RX ADMIN — FUROSEMIDE 20 MG: 10 INJECTION, SOLUTION INTRAVENOUS at 12:15

## 2021-05-04 RX ADMIN — HEPARIN SODIUM 1700 UNITS/HR: 10000 INJECTION, SOLUTION INTRAVENOUS at 00:16

## 2021-05-04 RX ADMIN — APIXABAN 5 MG: 5 TABLET, FILM COATED ORAL at 21:08

## 2021-05-04 RX ADMIN — POTASSIUM CHLORIDE 20 MEQ: 1.5 POWDER, FOR SOLUTION ORAL at 09:26

## 2021-05-04 RX ADMIN — FUROSEMIDE 40 MG: 40 TABLET ORAL at 16:32

## 2021-05-04 RX ADMIN — FUROSEMIDE 20 MG: 10 INJECTION, SOLUTION INTRAVENOUS at 00:03

## 2021-05-04 RX ADMIN — HEPARIN SODIUM 1500 UNITS/HR: 10000 INJECTION, SOLUTION INTRAVENOUS at 16:32

## 2021-05-04 ASSESSMENT — ACTIVITIES OF DAILY LIVING (ADL)
ADLS_ACUITY_SCORE: 17

## 2021-05-04 ASSESSMENT — MIFFLIN-ST. JEOR: SCORE: 1743.13

## 2021-05-04 NOTE — PLAN OF CARE
OT/cardiac rehab:  to improve patient's general activity tolerance/endurance and balance please order Physical Therapy services. OT/cardiac rehab is following and will continue twice daily. Patient is motivated to participate and it is anticipated that he will now tolerate multiple therapies daily.

## 2021-05-04 NOTE — CONSULTS
Care Management Initial Consult    General Information  Assessment completed with: Patient, VM-chart review,    Type of CM/SW Visit: Initial Assessment    Primary Care Provider verified and updated as needed: Yes   Readmission within the last 30 days: current reason for admission unrelated to previous admission   Return Category: New Diagnosis  Reason for Consult: discharge planning  Advance Care Planning:            Communication Assessment  Patient's communication style: spoken language (English or Bilingual)    Hearing Difficulty or Deaf: no   Wear Glasses or Blind: no    Cognitive  Cognitive/Neuro/Behavioral: WDL  Level of Consciousness: alert  Arousal Level: opens eyes spontaneously  Orientation: oriented x 4  Mood/Behavior: calm, cooperative  Best Language: (REYNALDO)  Speech: trached    Living Environment:   People in home: alone     Current living Arrangements: apartment      Able to return to prior arrangements:  Needs therapy to stabilize first.        Family/Social Support:  Care provided by: self  Provides care for: no one  Marital Status: Single             Description of Support System:  Has son and sister who are supportive         Current Resources:   Patient receiving home care services:  Was receiving outpt services     Community Resources:    Equipment currently used at home: none  Supplies currently used at home:      Employment/Financial:  Employment Status: disabled        Financial Concerns:  Not discussed          Lifestyle & Psychosocial Needs:        Socioeconomic History     Marital status: Single     Spouse name: Not on file     Number of children: Not on file     Years of education: Not on file     Highest education level: Not on file     Tobacco Use     Smoking status: Former Smoker     Types: Cigarettes     Quit date: 1989     Years since quittin.3     Smokeless tobacco: Never Used     Tobacco comment: quit in   had smoked about 1/2 pack a day then cut back   Substance and  Sexual Activity     Alcohol use: No     Drug use: No     Sexual activity: Yes     Partners: Female       Functional Status:  Prior to admission patient needed assistance: Prior to April 2021 [facility] admission was independent, had outpatient services after April admission to [facility].         Mental Health Status:  Mental Health Status: No Current Concerns       Chemical Dependency Status:  Chemical Dependency Status: No Current Concerns             Values/Beliefs:  Spiritual, Cultural Beliefs, Roman [employer] Practices, Values that affect care:                 Additional Information:  Referral made to SADA Jackson

## 2021-05-04 NOTE — PROGRESS NOTES
Patient seen and discussed with Dr. Singh    [facility]  Cardiovascular and Thoracic Surgery Daily Note          Assessment and Plan:   POD#6 s/p Emergent pulmonary embolectomy by Dr. Yumi Singh and Dr. Bárbara Cali  -CVS: HR: 90s. SBP: 110s-120s. Pre op EF:60-65%. ASA, high intensity heparin. Will plan for eliquis per oncology prior to discharge. IVC filter placed on . Wound vac in place for at least 7 days-- surgeon would prefer it to remain on longer.  -Resp: Continue to wean oxygen via trach dome. 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 Will need to arrange follow up with ENT and oncology, oncology following peripherally in hospital  -Neuro:  Awake, open eyes to voice. Follows commands. Attempting to speak, able to read his lips and mimes his questions. Nods appropriately.   -Renal: good UOP. Up about 5kg from preoperative weight. Cr: 0.84. Continue IV lasix 20mg BID. Will continue to monitor.   -GI:  SLP consulted to assist with advancing diet, unable to do passy-roxana valve d/t laryngeal mass. Advancing to regular diet today, will consider removal of NG and discontinuing tube feeds today.   -:  Voiding well on own  -Endo: pre op A1c: 5.6. Continue sliding scale insulin.   -FEN: replace electrolytes as needed. Na: 141. K: 3.7  -ID: Temp (24hrs), Av.6  F (37  C), Min:98.1  F (36.7  C), Max:99  F (37.2  C)  WBC: 10.4. Completed perioperative abx. Fevers overnight on POD#1- Pan cultured and started on broad spectrum abx per intensivist-- abx discontinued on POD#4.   -Heme: Hgb: 10.3 plt: 343. Acute blood loss anemia and thrombocytopenia related to surgery.   -Proph: PCD, ASA, PPI, heparin   -Dispo: [facility]. Continue therapies. SLP following. Continue heparin gtt with plan to transition to Eliquis when able.           Interval History:   No acute events overnight. Pain controlled. Tolerating trach dome-- weaning oxygen as able. Tolerating tube  "feeds but diet advanced to regular diet today         Medications:       aspirin  162 mg Oral or NG Tube Daily     chlorhexidine  15 mL Mouth/Throat Q12H     docusate  100 mg Oral or Feeding Tube BID     furosemide  20 mg Intravenous Q12H     gabapentin  100 mg Oral or Feeding Tube At Bedtime     insulin aspart  1-4 Units Subcutaneous Q4H     pantoprazole (PROTONIX) IV  40 mg Intravenous Daily with breakfast     polyethylene glycol  34 g Oral or Feeding Tube BID     potassium chloride  20 mEq Oral or Feeding Tube Once     senna-docusate  1 tablet Oral or Feeding Tube BID     sodium chloride (PF)  10 mL Intracatheter Q8H     [DISCONTINUED] acetaminophen **OR** acetaminophen, acetaminophen, acetaminophen, albuterol, sore throat lozenge, bisacodyl, artificial tears ophthalmic solution, dextrose, dextrose, glucose **OR** dextrose **OR** glucagon, fentaNYL, hydrALAZINE, HYDROmorphone **OR** HYDROmorphone, lidocaine 4%, lidocaine (buffered or not buffered), lidocaine (buffered or not buffered), LORazepam, magnesium hydroxide, methocarbamol, naloxone **OR** naloxone **OR** naloxone **OR** naloxone, ondansetron **OR** ondansetron, oxyCODONE **OR** oxyCODONE, polyethylene glycol, senna-docusate **OR** senna-docusate, sodium chloride (PF), sodium chloride (PF), sodium chloride (PF), sodium chloride (PF)          Physical Exam:   Vitals were reviewed  Blood pressure 110/69, pulse 93, temperature 98.6  F (37  C), temperature source Oral, resp. rate 18, height 1.803 m (5' 11\"), weight 95.1 kg (209 lb 10.5 oz), SpO2 98 %.  Rhythm: NSR    Lungs: diminished bases    Cardiovascular: RRR normal s1 and s2    Abdomen: soft NTND    Extremeties: minimal edema    Incision: CDI    CT: n/a    Weight:   Vitals:    04/30/21 0400 05/01/21 0400 05/02/21 0400 05/03/21 0400   Weight: 99.9 kg (220 lb 3.8 oz) 101.3 kg (223 lb 5.2 oz) 94.5 kg (208 lb 5.4 oz) 94.2 kg (207 lb 10.8 oz)    05/04/21 0634   Weight: 95.1 kg (209 lb 10.5 oz)            " Data:   Labs:   Lab Results   Component Value Date    WBC 10.4 05/04/2021     Lab Results   Component Value Date    RBC 3.53 05/04/2021     Lab Results   Component Value Date    HGB 10.3 05/04/2021     Lab Results   Component Value Date    HCT 32.0 05/04/2021     No components found for: MCT  Lab Results   Component Value Date    MCV 91 05/04/2021     Lab Results   Component Value Date    MCH 29.2 05/04/2021     Lab Results   Component Value Date    MCHC 32.2 05/04/2021     Lab Results   Component Value Date    RDW 14.8 05/04/2021     Lab Results   Component Value Date     05/04/2021       Last Basic Metabolic Panel:  Lab Results   Component Value Date     05/04/2021      Lab Results   Component Value Date    POTASSIUM 3.7 05/04/2021     Lab Results   Component Value Date    CHLORIDE 111 05/04/2021     Lab Results   Component Value Date    TELMA 8.1 05/04/2021     Lab Results   Component Value Date    CO2 24 05/04/2021     Lab Results   Component Value Date    BUN 11 05/04/2021     Lab Results   Component Value Date    CR 0.84 05/04/2021     Lab Results   Component Value Date     05/04/2021       CXR: 5/3/21    IMPRESSION: Previously seen chest tube is present. No pneumothorax. No  definite pneumomediastinum. PICC line and tracheostomy tube remain in  good position. Enteric tube in the stomach. Minimal atelectasis in the  lung bases. The lungs are otherwise clear.    Susanna Dc PA-C  [facility]  Pager # [phone]

## 2021-05-04 NOTE — PLAN OF CARE
VSS. A/O. Up-1/walker. Chest wound vac. NG removed. Tolerating regular diet. Hep gtt 15 ml/hr. Voiding fine. Had a loose BM. Cont trach care. K+ replaced, recheck am. Tele SR.

## 2021-05-04 NOTE — PLAN OF CARE
A/O x4. VSS w/ trach dome at 30% on 20 LPM. Assist of 1x w/ frequent weight shifting encouraged. Tolerating clear thin liquids. Lung sounds clear, diminished. Bowel sounds active. Passing flatus. BM yesterday. Adequate urine output via bedside urinal, pink tinged. Wound vac -125 suction to sternal incision CDI. CT dressings CDI. TF at goal rate of 70 ml/hr w/ Q4 hours 60cc flushes infusing though NG, landmark of 60cm. Denies pain. Denies nausea. Tele: NSR. BGs: 125, 110, 125. Hep gtt infusing through PICC, paused at 0700 for 60 minutes and to be restarted at 1600 units/hr.

## 2021-05-05 ENCOUNTER — APPOINTMENT (OUTPATIENT)
Dept: PHYSICAL THERAPY | Facility: CLINIC | Age: 71
DRG: 163 | End: 2021-05-05
Attending: PHYSICIAN ASSISTANT
Payer: COMMERCIAL

## 2021-05-05 ENCOUNTER — APPOINTMENT (OUTPATIENT)
Dept: GENERAL RADIOLOGY | Facility: CLINIC | Age: 71
DRG: 163 | End: 2021-05-05
Attending: PHYSICIAN ASSISTANT
Payer: COMMERCIAL

## 2021-05-05 ENCOUNTER — APPOINTMENT (OUTPATIENT)
Dept: OCCUPATIONAL THERAPY | Facility: CLINIC | Age: 71
DRG: 163 | End: 2021-05-05
Attending: HOSPITALIST
Payer: COMMERCIAL

## 2021-05-05 ENCOUNTER — APPOINTMENT (OUTPATIENT)
Dept: SPEECH THERAPY | Facility: CLINIC | Age: 71
DRG: 163 | End: 2021-05-05
Attending: HOSPITALIST
Payer: COMMERCIAL

## 2021-05-05 LAB
ALBUMIN UR-MCNC: NEGATIVE MG/DL
ANION GAP SERPL CALCULATED.3IONS-SCNC: 5 MMOL/L (ref 3–14)
APPEARANCE UR: CLEAR
BILIRUB UR QL STRIP: NEGATIVE
BUN SERPL-MCNC: 13 MG/DL (ref 7–30)
CALCIUM SERPL-MCNC: 8.5 MG/DL (ref 8.5–10.1)
CHLORIDE SERPL-SCNC: 108 MMOL/L (ref 94–109)
CO2 SERPL-SCNC: 25 MMOL/L (ref 20–32)
COLOR UR AUTO: ABNORMAL
CREAT SERPL-MCNC: 0.95 MG/DL (ref 0.66–1.25)
ERYTHROCYTE [DISTWIDTH] IN BLOOD BY AUTOMATED COUNT: 15.3 % (ref 10–15)
GFR SERPL CREATININE-BSD FRML MDRD: 82 ML/MIN/{1.73_M2}
GLUCOSE BLDC GLUCOMTR-MCNC: 103 MG/DL (ref 70–99)
GLUCOSE BLDC GLUCOMTR-MCNC: 103 MG/DL (ref 70–99)
GLUCOSE BLDC GLUCOMTR-MCNC: 107 MG/DL (ref 70–99)
GLUCOSE BLDC GLUCOMTR-MCNC: 112 MG/DL (ref 70–99)
GLUCOSE BLDC GLUCOMTR-MCNC: 118 MG/DL (ref 70–99)
GLUCOSE BLDC GLUCOMTR-MCNC: 119 MG/DL (ref 70–99)
GLUCOSE BLDC GLUCOMTR-MCNC: 126 MG/DL (ref 70–99)
GLUCOSE SERPL-MCNC: 104 MG/DL (ref 70–99)
GLUCOSE UR STRIP-MCNC: NEGATIVE MG/DL
HCT VFR BLD AUTO: 33.9 % (ref 40–53)
HGB BLD-MCNC: 11.1 G/DL (ref 13.3–17.7)
HGB UR QL STRIP: NEGATIVE
HYALINE CASTS #/AREA URNS LPF: 4 /LPF (ref 0–2)
KETONES UR STRIP-MCNC: NEGATIVE MG/DL
LEUKOCYTE ESTERASE UR QL STRIP: NEGATIVE
MCH RBC QN AUTO: 29.8 PG (ref 26.5–33)
MCHC RBC AUTO-ENTMCNC: 32.7 G/DL (ref 31.5–36.5)
MCV RBC AUTO: 91 FL (ref 78–100)
MUCOUS THREADS #/AREA URNS LPF: PRESENT /LPF
NITRATE UR QL: NEGATIVE
PH UR STRIP: 6.5 PH (ref 5–7)
PLATELET # BLD AUTO: 376 10E9/L (ref 150–450)
POTASSIUM SERPL-SCNC: 3.8 MMOL/L (ref 3.4–5.3)
RBC # BLD AUTO: 3.73 10E12/L (ref 4.4–5.9)
RBC #/AREA URNS AUTO: 0 /HPF (ref 0–2)
SODIUM SERPL-SCNC: 138 MMOL/L (ref 133–144)
SOURCE: ABNORMAL
SP GR UR STRIP: 1.01 (ref 1–1.03)
SQUAMOUS #/AREA URNS AUTO: 0 /HPF (ref 0–1)
UROBILINOGEN UR STRIP-MCNC: 0 MG/DL (ref 0–2)
WBC # BLD AUTO: 11.3 10E9/L (ref 4–11)
WBC #/AREA URNS AUTO: 0 /HPF (ref 0–5)

## 2021-05-05 PROCEDURE — 80048 BASIC METABOLIC PNL TOTAL CA: CPT | Performed by: PHYSICIAN ASSISTANT

## 2021-05-05 PROCEDURE — 999N000157 HC STATISTIC RCP TIME EA 10 MIN

## 2021-05-05 PROCEDURE — 250N000013 HC RX MED GY IP 250 OP 250 PS 637: Performed by: INTERNAL MEDICINE

## 2021-05-05 PROCEDURE — 250N000013 HC RX MED GY IP 250 OP 250 PS 637: Performed by: SURGERY

## 2021-05-05 PROCEDURE — 999N001017 HC STATISTIC GLUCOSE BY METER IP

## 2021-05-05 PROCEDURE — 99232 SBSQ HOSP IP/OBS MODERATE 35: CPT | Performed by: INTERNAL MEDICINE

## 2021-05-05 PROCEDURE — 120N000001 HC R&B MED SURG/OB

## 2021-05-05 PROCEDURE — 71046 X-RAY EXAM CHEST 2 VIEWS: CPT

## 2021-05-05 PROCEDURE — 81001 URINALYSIS AUTO W/SCOPE: CPT | Performed by: PHYSICIAN ASSISTANT

## 2021-05-05 PROCEDURE — 999N000190 HC STATISTIC VAT ROUNDS

## 2021-05-05 PROCEDURE — 97110 THERAPEUTIC EXERCISES: CPT | Mod: GO | Performed by: OCCUPATIONAL THERAPIST

## 2021-05-05 PROCEDURE — 250N000013 HC RX MED GY IP 250 OP 250 PS 637: Performed by: PHYSICIAN ASSISTANT

## 2021-05-05 PROCEDURE — 85027 COMPLETE CBC AUTOMATED: CPT | Performed by: PHYSICIAN ASSISTANT

## 2021-05-05 PROCEDURE — 97530 THERAPEUTIC ACTIVITIES: CPT | Mod: GP | Performed by: PHYSICAL THERAPIST

## 2021-05-05 PROCEDURE — 97162 PT EVAL MOD COMPLEX 30 MIN: CPT | Mod: GP | Performed by: PHYSICAL THERAPIST

## 2021-05-05 PROCEDURE — 92526 ORAL FUNCTION THERAPY: CPT | Mod: GN | Performed by: SPEECH-LANGUAGE PATHOLOGIST

## 2021-05-05 PROCEDURE — 97530 THERAPEUTIC ACTIVITIES: CPT | Mod: GO | Performed by: OCCUPATIONAL THERAPIST

## 2021-05-05 PROCEDURE — 97116 GAIT TRAINING THERAPY: CPT | Mod: GP | Performed by: PHYSICAL THERAPIST

## 2021-05-05 PROCEDURE — 93005 ELECTROCARDIOGRAM TRACING: CPT

## 2021-05-05 RX ORDER — METOPROLOL TARTRATE 25 MG/1
25 TABLET, FILM COATED ORAL 2 TIMES DAILY
Status: DISCONTINUED | OUTPATIENT
Start: 2021-05-05 | End: 2021-05-08 | Stop reason: HOSPADM

## 2021-05-05 RX ORDER — GABAPENTIN 100 MG/1
100 CAPSULE ORAL
Status: DISCONTINUED | OUTPATIENT
Start: 2021-05-05 | End: 2021-05-08 | Stop reason: HOSPADM

## 2021-05-05 RX ORDER — MULTIPLE VITAMINS W/ MINERALS TAB 9MG-400MCG
1 TAB ORAL DAILY
Status: DISCONTINUED | OUTPATIENT
Start: 2021-05-05 | End: 2021-05-08 | Stop reason: HOSPADM

## 2021-05-05 RX ORDER — POTASSIUM CHLORIDE 1.5 G/1.58G
20 POWDER, FOR SOLUTION ORAL ONCE
Status: COMPLETED | OUTPATIENT
Start: 2021-05-05 | End: 2021-05-05

## 2021-05-05 RX ADMIN — APIXABAN 5 MG: 5 TABLET, FILM COATED ORAL at 21:23

## 2021-05-05 RX ADMIN — APIXABAN 5 MG: 5 TABLET, FILM COATED ORAL at 08:55

## 2021-05-05 RX ADMIN — ASPIRIN 81 MG CHEWABLE TABLET 81 MG: 81 TABLET CHEWABLE at 08:55

## 2021-05-05 RX ADMIN — MULTIPLE VITAMINS W/ MINERALS TAB 1 TABLET: TAB at 10:56

## 2021-05-05 RX ADMIN — FUROSEMIDE 40 MG: 40 TABLET ORAL at 08:55

## 2021-05-05 RX ADMIN — PANTOPRAZOLE SODIUM 40 MG: 40 TABLET, DELAYED RELEASE ORAL at 08:55

## 2021-05-05 RX ADMIN — METOPROLOL TARTRATE 25 MG: 25 TABLET, FILM COATED ORAL at 21:23

## 2021-05-05 RX ADMIN — POTASSIUM CHLORIDE 20 MEQ: 1.5 POWDER, FOR SOLUTION ORAL at 08:55

## 2021-05-05 RX ADMIN — METOPROLOL TARTRATE 25 MG: 25 TABLET, FILM COATED ORAL at 08:55

## 2021-05-05 ASSESSMENT — ACTIVITIES OF DAILY LIVING (ADL)
ADLS_ACUITY_SCORE: 16
ADLS_ACUITY_SCORE: 17
ADLS_ACUITY_SCORE: 16

## 2021-05-05 ASSESSMENT — MIFFLIN-ST. JEOR: SCORE: 1677.13

## 2021-05-05 NOTE — PROGRESS NOTES
05/05/21 0810   Quick Adds   Type of Visit Initial PT Evaluation   Living Environment   People in home alone   Current Living Arrangements apartment   Home Accessibility stairs to enter home   Number of Stairs, Main Entrance other (see comments)  (15)   Transportation Anticipated family or friend will provide   Self-Care   Current Activity Tolerance moderate   Equipment Currently Used at Home none   Activity/Exercise/Self-Care Comment reports no use of an assistive device for mobility prior to admit   Disability/Function   Fall history within last six months no   Change in Functional Status Since Onset of Current Illness/Injury yes   General Information   Onset of Illness/Injury or Date of Surgery 04/27/21   Referring Physician Yumi Singh MD   Patient/Family Therapy Goals Statement (PT) not stated   Pertinent History of Current Problem (include personal factors and/or comorbidities that impact the POC) per chart: Raj Warren is a 70 year old male who presents with shortness of breath, found to have large bilateral pulmonary emboli with RV thrombus and right heart strain in setting of laryngeal cancer. CT with laryngeal mass with airway compromise: Bx:SCCA of Larynx. S/p trach per ENT 4/12/2021, pt found to have Acute massive pulmonary emboli and right ventricular thrombus, requiring open heart surgery.  POD#4 s/p Emergent pulmonary embolectomy; see medical record for further information   Existing Precautions/Restrictions fall;sternal  (trach)   General Observations patient in bed; OK for eval per nurse and patient   Cognition   Orientation Status (Cognition) oriented x 3   Cognitive Status Comments appears intact during session   Pain Assessment   Patient Currently in Pain No   Integumentary/Edema   Integumentary/Edema Comments sternal incision and wound vac   Range of Motion (ROM)   ROM Comment UE's limited by sternal precautions; LE's appear WFL   Strength   Strength Comments functional  weakness/decreased activity tolerance noted with mobility   Bed Mobility   Comment (Bed Mobility) Min A for supine to sit with HOB elevated; assist to maintain sternal precautions   Transfers   Transfer Safety Comments Min A for sit>stand; use of walker   Gait/Stairs (Locomotion)   Comment (Gait/Stairs) Min/CGA for gait with use of walker   Balance   Balance Comments impaired; current need for walker and assist   Clinical Impression   Criteria for Skilled Therapeutic Intervention yes, treatment indicated   PT Diagnosis (PT) impaired gait/transfers   Influenced by the following impairments weakness; impaired balance; decreased activity tolerance; sternal precautions   Functional limitations due to impairments impaired independence with mobility secondary to above   Clinical Presentation Evolving/Changing   Clinical Presentation Rationale clinical judgement; level of assist   Clinical Decision Making (Complexity) moderate complexity   Therapy Frequency (PT) Daily   Predicted Duration of Therapy Intervention (days/wks) 5 days   Planned Therapy Interventions (PT) bed mobility training;groups;strengthening;balance training;transfer training;patient/family education   Anticipated Equipment Needs at Discharge (PT) walker, rolling   Risk & Benefits of therapy have been explained evaluation/treatment results reviewed;care plan/treatment goals reviewed;risks/benefits reviewed;current/potential barriers reviewed;participants voiced agreement with care plan;participants included;patient   PT Discharge Planning    PT Discharge Recommendation (DC Rec) [facility]-Motivated patient will benefit from intensive, interdisciplinary therapy.  Anticipate will be able to tolerate 3 hours of therapy per day   PT Rationale for DC Rec Pt currently limited due to weakness, balance, activity tolerance, pain, sternal precautions. At this time pt will require daily therapy to assist patient in return to PLOF; would benefit from  multidisciplinary treatment to assist patient in his return to PLOF; has complex medical needs also; previously lived independently   PT Brief overview of current status  A x 1 for mobility; wound vac   Total Evaluation Time   Total Evaluation Time (Minutes) 10

## 2021-05-05 NOTE — PROGRESS NOTES
"CLINICAL NUTRITION SERVICES - REASSESSMENT NOTE      Recommendations Ordered by Registered Dietitian (RD): Ensure Enlive BID, magic cup daily, Mvi with minerals   Malnutrition: 5/2  % Weight Loss:  > 2% in 1 week (severe malnutrition) - PTA  % Intake:  </= 50% for >/= 5 days (severe malnutrition)  Subcutaneous Fat Loss:  Unable to determine  Muscle Loss:  Unable to determine  Fluid Retention:  Mild      Malnutrition Diagnosis: Severe malnutrition  In Context of:  Acute illness or injury       EVALUATION OF PROGRESS TOWARD GOALS   Diet:  Regular with thin liquids, advanced from Clear liquids 5/4    Nutrition Support: FT removed 5/4    Intake/Tolerance:    Pt ate 25% of upgraded lunch yesterday and \"a little bit\" of supper per pt (intake not documented)  Fair fluid intake, 800 ml  Pt reports he is not very hungry      ASSESSED NUTRITION NEEDS:  Dosing Weight::  90.8 kg (admit wt)  Estimated Energy Needs: 7011-8530 kcals (25-30 Kcal/Kg)  Justification: maintenance  Estimated Protein Needs:  108-136 grams protein (1.2-1.5 g pro/Kg)  Justification: post-op and hypercatabolism with critical illness  Estimated Fluid Needs:  3752-8048 mL (1 mL/Kcal)  Justification: maintenance    Sternal incision with wound vac  Weight: 88.5 kg, 195 lb, down 14 lb from day prior? Pt is diuresing.   Weight fluctuating 207-209 lb x 3 days prior  Loose BM x 1 yesterday    Previous Goals:   TF goal Jevity 1.5 at 70 mL/hr will meet % estimated needs  Evalutation: Not applicable-FT removed    Previous Nutrition Diagnosis:   Inadequate protein-energy intake related to decreased appetite PTA and on vent --> trach dome since admission as evidenced by recent 7% weight loss 1.5 weeks PTA and now inadequate nutritional intake since admission x 6 days.  Evaluation: Not met      Malnutrition:   % Weight Loss:  > 2% in 1 week (severe malnutrition) - PTA  % Intake:  </= 50% for >/= 5 days (severe malnutrition)  Subcutaneous Fat Loss:  Unable to " determine  Muscle Loss:  Unable to determine  Fluid Retention:  Mild      Malnutrition Diagnosis: Severe malnutrition  In Context of:  Acute illness or injury    CURRENT NUTRITION DIAGNOSIS  Inadequate protein-energy intake related to decreased appetite PTA and on vent --> trach dome since admission as evidenced by recent 7% weight loss 1.5 weeks PTA and now inadequate nutritional intake with diet start/advance 5/4    Increased nutrition needs r/t wound healing evidenced by wound    INTERVENTIONS  Recommendations / Nutrition Prescription  Regular diet  Ensure Enlive BID and magic cup daily  Add mvi with minerals  Educated pt on increased nutrition needs for wound healing and recovery    Implementation  Medical Food Supplement and Multivitamin/Mineral    Goals  P.o intake > 75% of estimated nutrition needs  Wound healing      MONITORING AND EVALUATION:  Progress towards goals will be monitored and evaluated per protocol and Practice Guidelines

## 2021-05-05 NOTE — PLAN OF CARE
OT/CR: attempted OT/CR x2, pt getting meds and needing to use urinal. 2nd attempt: pt reports he needs to get acclimated to being up before a walk. Pt agreed to walking in room this pm.

## 2021-05-05 NOTE — PROGRESS NOTES
Care Management Follow Up    Length of Stay (days): 8  Expected Discharge Date: 05/06/21   Concerns to be Addressed:   Awaiting ARU receiving prior authorization  Patient plan of care discussed at interdisciplinary rounds: Yes  Anticipated Discharge Disposition:     Anticipated Discharge Services:    Anticipated Discharge DME:    Patient/family educated on Medicare website which has current facility and service quality ratings:    Education Provided on the Discharge Plan:    Patient/Family in Agreement with the Plan:    Referrals Placed by CM/SW:    Private pay costs discussed: Not applicable    Additional Information:  ERICA spoke to Tristin at  ARU.  He is working on getting prior auth and will be following up with ERICA.        Elsie Patten, ANALIASW

## 2021-05-05 NOTE — PROGRESS NOTES
Patient seen and discussed with Dr. Singh    [facility]  Cardiovascular and Thoracic Surgery Daily Note          Assessment and Plan:   POD#7 s/p Emergent pulmonary embolectomy by Dr. Yumi Singh and Dr. Bárbara Cali    POD#5 s/p IVC filter placement  -CVS: HR: 90s-100s. SBP: 100s-120s. Pre op EF: 60-65%. ASA, starting Metoprolol 25mg BID today d/t sinus tachycardia with occasional PACs. IVC filter placed on . Started on eliquis yesterday. Will consider removal of wound vac today.   -Resp: Weaned off trach dome yesterday. Saturating well on room air. 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 Will need eventual follow up with ENT and oncology as outpatient   -Neuro:  Grossly intact. Pain controlled.  -Renal: good UOP. Below preoperative weight. Cr: 0.95. received oral lasix this am and will discontinue further dosing. Will evaluate tomorrow if further diuretics are needed.   -GI: Tolerating regular diet. SLP following. Unable to do passy-roxana valve d/t laryngeal mass.   -:  Voiding well on own  -Endo: pre op A1c: 5.6. continue sliding scale insulin.   -FEN: replace electrolytes as needed. Na: 138. K: 3.8.  Orders Placed This Encounter      Combination Diet Regular Diet Adult; Thin Liquids (water, ice chips, juice, milk, gelatin, ice cream, etc)    -ID: Temp (24hrs), Av.7  F (37.1  C), Min:98.3  F (36.8  C), Max:99  F (37.2  C)  WBC: 11.1<10.4<8.4. Completed perioperative abx. Fevers overnight on POD#1-- pan cultured and started on broad spectrum abx per intensivist-- abx discontinued on POD#4. Slight increase in leukocytosis past two days-- will repeat CXR and UA today.   -Heme: Hgb: 11.3. plt: 376. Acute blood loss anemia and thrombocytopenia related to surgery  -Proph: PCD, ASA, BB, PPI, heparin  -Dispo: [facility]. Continue therapies. SLP following. Will obtain UA and CXR to investigate slight new leukocytosis. likely remove wound vac today. Plan  "for discharge to ARU in 1-2 days if medically appropriate.           Interval History:   No acute events overnight. Saturating well on room air. Pain controlled. Tolerating diet but has poor appetite. +BM         Medications:       apixaban ANTICOAGULANT  5 mg Oral BID     aspirin  81 mg Oral or NG Tube Daily     chlorhexidine  15 mL Mouth/Throat Q12H     insulin aspart  1-4 Units Subcutaneous Q4H     metoprolol tartrate  25 mg Oral BID     multivitamin w/minerals  1 tablet Oral Daily     pantoprazole  40 mg Oral QAM AC     polyethylene glycol  34 g Oral or Feeding Tube BID     senna-docusate  1 tablet Oral or Feeding Tube BID     sodium chloride (PF)  10 mL Intracatheter Q8H     [DISCONTINUED] acetaminophen **OR** acetaminophen, acetaminophen, acetaminophen, albuterol, sore throat lozenge, bisacodyl, artificial tears ophthalmic solution, dextrose, dextrose, glucose **OR** dextrose **OR** glucagon, gabapentin, hydrALAZINE, HYDROmorphone **OR** HYDROmorphone, lidocaine 4%, lidocaine (buffered or not buffered), LORazepam, magnesium hydroxide, methocarbamol, naloxone **OR** naloxone **OR** naloxone **OR** naloxone, ondansetron **OR** ondansetron, oxyCODONE **OR** oxyCODONE, polyethylene glycol, senna-docusate **OR** senna-docusate, sodium chloride (PF), sodium chloride (PF), sodium chloride (PF)          Physical Exam:   Vitals were reviewed  Blood pressure 109/71, pulse 101, temperature 98.3  F (36.8  C), temperature source Oral, resp. rate 16, height 1.803 m (5' 11\"), weight 88.5 kg (195 lb 1.7 oz), SpO2 97 %.  Rhythm: Sinus tachycardia with occasional PAC/PVCs    Lungs: diminished bases    Cardiovascular: tachycardia    Abdomen: soft NTND    Extremeties: minimal edema    Incision: wound vac in place    CT: n/a    Weight:   Vitals:    05/01/21 0400 05/02/21 0400 05/03/21 0400 05/04/21 0634   Weight: 101.3 kg (223 lb 5.2 oz) 94.5 kg (208 lb 5.4 oz) 94.2 kg (207 lb 10.8 oz) 95.1 kg (209 lb 10.5 oz)    05/05/21 0630 "   Weight: 88.5 kg (195 lb 1.7 oz)            Data:   Labs:   Lab Results   Component Value Date    WBC 11.3 05/05/2021     Lab Results   Component Value Date    RBC 3.73 05/05/2021     Lab Results   Component Value Date    HGB 11.1 05/05/2021     Lab Results   Component Value Date    HCT 33.9 05/05/2021     No components found for: MCT  Lab Results   Component Value Date    MCV 91 05/05/2021     Lab Results   Component Value Date    MCH 29.8 05/05/2021     Lab Results   Component Value Date    MCHC 32.7 05/05/2021     Lab Results   Component Value Date    RDW 15.3 05/05/2021     Lab Results   Component Value Date     05/05/2021       Last Basic Metabolic Panel:  Lab Results   Component Value Date     05/05/2021      Lab Results   Component Value Date    POTASSIUM 3.8 05/05/2021     Lab Results   Component Value Date    CHLORIDE 108 05/05/2021     Lab Results   Component Value Date    TELMA 8.5 05/05/2021     Lab Results   Component Value Date    CO2 25 05/05/2021     Lab Results   Component Value Date    BUN 13 05/05/2021     Lab Results   Component Value Date    CR 0.95 05/05/2021     Lab Results   Component Value Date     05/05/2021         Susanna Dc PA-C  CV Surgery  Pager # [phone]

## 2021-05-05 NOTE — PROGRESS NOTES
Pt trach site cleaned, dressings changed. Inner cannula changed.  Pt was switched ho Trach HME.   Spo2 98% on Room air.  Change to Trach Dome HFTD at night for humidity.    RT will continue to follow and monitor.    Yoselin Lanier, RT

## 2021-05-05 NOTE — PLAN OF CARE
Pt is alert. Heart rate has been irregular, tachy at times. 12 lead EKG shows ST with premature svc. Pt asymptomatic.   Suctioned prn. Voiding without difficulty. Wound vac in place to sternal incision.  Heparin stopped. Eliquis started

## 2021-05-05 NOTE — PLAN OF CARE
Speech Language Therapy Discharge Summary    Reason for therapy discharge:    All goals and outcomes met, no further needs identified.- for swallow Tx    Progress towards therapy goal(s). See goals on Care Plan in [facility] electronic health record for goal details.  Goals met for swallow Tx  Pt is tolerating a regular diet without difficulty.  Pt is independent with finger occlusion for voicing and donning/doffing HME piece.      Therapy recommendation(s):    See below  Per Oncology/previous notes, recommending surgical intervention (total laryngectomy) prior to concurrent chemoXRT.   Recommend short term OP SLP Tx for education/exercises if total laryngectomy scheduled.  SLP Tx may also be provided at ARU if pt discharges to ARU per OT/PT needs and surgery is scheduled

## 2021-05-05 NOTE — PLAN OF CARE
VSS. A/O. Up-1/belt/walker. Denies pain. Chest wound vac intact. Trach care. Tolerating diet. K+ replaced, recheck am. Tele SR. Voiding own.

## 2021-05-06 ENCOUNTER — APPOINTMENT (OUTPATIENT)
Dept: OCCUPATIONAL THERAPY | Facility: CLINIC | Age: 71
DRG: 163 | End: 2021-05-06
Attending: HOSPITALIST
Payer: COMMERCIAL

## 2021-05-06 ENCOUNTER — APPOINTMENT (OUTPATIENT)
Dept: PHYSICAL THERAPY | Facility: CLINIC | Age: 71
DRG: 163 | End: 2021-05-06
Attending: PHYSICIAN ASSISTANT
Payer: COMMERCIAL

## 2021-05-06 PROBLEM — Z98.890 HISTORY OF EMBOLECTOMY: Status: ACTIVE | Noted: 2021-05-06

## 2021-05-06 PROBLEM — Z79.01 ANTICOAGULATED: Status: ACTIVE | Noted: 2021-05-06

## 2021-05-06 LAB
ANION GAP SERPL CALCULATED.3IONS-SCNC: 6 MMOL/L (ref 3–14)
BACTERIA SPEC CULT: NO GROWTH
BACTERIA SPEC CULT: NO GROWTH
BUN SERPL-MCNC: 19 MG/DL (ref 7–30)
CALCIUM SERPL-MCNC: 8.8 MG/DL (ref 8.5–10.1)
CHLORIDE SERPL-SCNC: 107 MMOL/L (ref 94–109)
CO2 SERPL-SCNC: 24 MMOL/L (ref 20–32)
CREAT SERPL-MCNC: 0.97 MG/DL (ref 0.66–1.25)
ERYTHROCYTE [DISTWIDTH] IN BLOOD BY AUTOMATED COUNT: 15.2 % (ref 10–15)
GFR SERPL CREATININE-BSD FRML MDRD: 79 ML/MIN/{1.73_M2}
GLUCOSE BLDC GLUCOMTR-MCNC: 103 MG/DL (ref 70–99)
GLUCOSE BLDC GLUCOMTR-MCNC: 103 MG/DL (ref 70–99)
GLUCOSE BLDC GLUCOMTR-MCNC: 106 MG/DL (ref 70–99)
GLUCOSE BLDC GLUCOMTR-MCNC: 106 MG/DL (ref 70–99)
GLUCOSE BLDC GLUCOMTR-MCNC: 97 MG/DL (ref 70–99)
GLUCOSE SERPL-MCNC: 107 MG/DL (ref 70–99)
HCT VFR BLD AUTO: 33.8 % (ref 40–53)
HGB BLD-MCNC: 11 G/DL (ref 13.3–17.7)
LABORATORY COMMENT REPORT: NORMAL
MCH RBC QN AUTO: 29.7 PG (ref 26.5–33)
MCHC RBC AUTO-ENTMCNC: 32.5 G/DL (ref 31.5–36.5)
MCV RBC AUTO: 91 FL (ref 78–100)
PLATELET # BLD AUTO: 356 10E9/L (ref 150–450)
POTASSIUM SERPL-SCNC: 4 MMOL/L (ref 3.4–5.3)
PROCALCITONIN SERPL-MCNC: 0.23 NG/ML
RBC # BLD AUTO: 3.7 10E12/L (ref 4.4–5.9)
SARS-COV-2 RNA RESP QL NAA+PROBE: NEGATIVE
SODIUM SERPL-SCNC: 137 MMOL/L (ref 133–144)
SPECIMEN SOURCE: NORMAL
WBC # BLD AUTO: 9.1 10E9/L (ref 4–11)

## 2021-05-06 PROCEDURE — 85027 COMPLETE CBC AUTOMATED: CPT | Performed by: PHYSICIAN ASSISTANT

## 2021-05-06 PROCEDURE — 84145 PROCALCITONIN (PCT): CPT | Performed by: PHYSICIAN ASSISTANT

## 2021-05-06 PROCEDURE — 250N000013 HC RX MED GY IP 250 OP 250 PS 637: Performed by: SURGERY

## 2021-05-06 PROCEDURE — 250N000013 HC RX MED GY IP 250 OP 250 PS 637: Performed by: PHYSICIAN ASSISTANT

## 2021-05-06 PROCEDURE — 999N001017 HC STATISTIC GLUCOSE BY METER IP

## 2021-05-06 PROCEDURE — 97530 THERAPEUTIC ACTIVITIES: CPT | Mod: GP | Performed by: PHYSICAL THERAPIST

## 2021-05-06 PROCEDURE — 97535 SELF CARE MNGMENT TRAINING: CPT | Mod: GO

## 2021-05-06 PROCEDURE — 999N000190 HC STATISTIC VAT ROUNDS

## 2021-05-06 PROCEDURE — 97110 THERAPEUTIC EXERCISES: CPT | Mod: GO

## 2021-05-06 PROCEDURE — 97530 THERAPEUTIC ACTIVITIES: CPT | Mod: GO

## 2021-05-06 PROCEDURE — 80048 BASIC METABOLIC PNL TOTAL CA: CPT | Performed by: PHYSICIAN ASSISTANT

## 2021-05-06 PROCEDURE — 97116 GAIT TRAINING THERAPY: CPT | Mod: GP | Performed by: PHYSICAL THERAPIST

## 2021-05-06 PROCEDURE — 87635 SARS-COV-2 COVID-19 AMP PRB: CPT | Performed by: PHYSICIAN ASSISTANT

## 2021-05-06 PROCEDURE — 120N000001 HC R&B MED SURG/OB

## 2021-05-06 RX ORDER — MULTIPLE VITAMINS W/ MINERALS TAB 9MG-400MCG
1 TAB ORAL DAILY
DISCHARGE
Start: 2021-05-07 | End: 2021-06-09

## 2021-05-06 RX ORDER — POLYETHYLENE GLYCOL 3350 17 G/17G
17 POWDER, FOR SOLUTION ORAL DAILY
Qty: 510 G | Status: ON HOLD | DISCHARGE
Start: 2021-05-06 | End: 2021-05-14

## 2021-05-06 RX ORDER — ASPIRIN 81 MG/1
81 TABLET, CHEWABLE ORAL DAILY
Status: ON HOLD | DISCHARGE
Start: 2021-05-07 | End: 2021-05-14

## 2021-05-06 RX ORDER — ALBUTEROL SULFATE 0.83 MG/ML
2.5 SOLUTION RESPIRATORY (INHALATION) EVERY 4 HOURS PRN
Status: ON HOLD | DISCHARGE
Start: 2021-05-06 | End: 2021-05-14

## 2021-05-06 RX ORDER — GABAPENTIN 100 MG/1
100 CAPSULE ORAL
Status: ON HOLD | DISCHARGE
Start: 2021-05-06 | End: 2021-05-14

## 2021-05-06 RX ORDER — METOPROLOL TARTRATE 25 MG/1
25 TABLET, FILM COATED ORAL 2 TIMES DAILY
Status: ON HOLD | DISCHARGE
Start: 2021-05-06 | End: 2021-05-14

## 2021-05-06 RX ORDER — PANTOPRAZOLE SODIUM 40 MG/1
40 TABLET, DELAYED RELEASE ORAL
Status: ON HOLD | DISCHARGE
Start: 2021-05-07 | End: 2021-05-14

## 2021-05-06 RX ORDER — ACETAMINOPHEN 325 MG/1
650 TABLET ORAL EVERY 4 HOURS PRN
DISCHARGE
Start: 2021-05-06 | End: 2021-06-22

## 2021-05-06 RX ORDER — AMOXICILLIN 250 MG
2 CAPSULE ORAL 2 TIMES DAILY PRN
Status: ON HOLD | DISCHARGE
Start: 2021-05-06 | End: 2021-05-14

## 2021-05-06 RX ORDER — OXYCODONE HYDROCHLORIDE 5 MG/1
5 TABLET ORAL EVERY 4 HOURS PRN
Refills: 0 | Status: ON HOLD | DISCHARGE
Start: 2021-05-06 | End: 2021-05-14

## 2021-05-06 RX ORDER — CARBOXYMETHYLCELLULOSE SODIUM 5 MG/ML
1 SOLUTION/ DROPS OPHTHALMIC
Status: ON HOLD | DISCHARGE
Start: 2021-05-06 | End: 2021-05-14

## 2021-05-06 RX ORDER — METHOCARBAMOL 500 MG/1
500 TABLET, FILM COATED ORAL EVERY 6 HOURS PRN
Status: ON HOLD | DISCHARGE
Start: 2021-05-06 | End: 2021-05-14

## 2021-05-06 RX ADMIN — Medication 1 DROP: at 00:06

## 2021-05-06 RX ADMIN — APIXABAN 5 MG: 5 TABLET, FILM COATED ORAL at 21:22

## 2021-05-06 RX ADMIN — METOPROLOL TARTRATE 25 MG: 25 TABLET, FILM COATED ORAL at 21:23

## 2021-05-06 RX ADMIN — SENNOSIDES AND DOCUSATE SODIUM 2 TABLET: 8.6; 5 TABLET ORAL at 08:41

## 2021-05-06 RX ADMIN — MULTIPLE VITAMINS W/ MINERALS TAB 1 TABLET: TAB at 08:41

## 2021-05-06 RX ADMIN — METOPROLOL TARTRATE 25 MG: 25 TABLET, FILM COATED ORAL at 08:41

## 2021-05-06 RX ADMIN — ACETAMINOPHEN 650 MG: 325 TABLET, FILM COATED ORAL at 02:07

## 2021-05-06 RX ADMIN — APIXABAN 5 MG: 5 TABLET, FILM COATED ORAL at 08:41

## 2021-05-06 RX ADMIN — ASPIRIN 81 MG CHEWABLE TABLET 81 MG: 81 TABLET CHEWABLE at 08:41

## 2021-05-06 RX ADMIN — PANTOPRAZOLE SODIUM 40 MG: 40 TABLET, DELAYED RELEASE ORAL at 07:06

## 2021-05-06 RX ADMIN — METHOCARBAMOL 500 MG: 500 TABLET ORAL at 02:07

## 2021-05-06 ASSESSMENT — ACTIVITIES OF DAILY LIVING (ADL)
ADLS_ACUITY_SCORE: 17

## 2021-05-06 ASSESSMENT — MIFFLIN-ST. JEOR: SCORE: 1673.13

## 2021-05-06 NOTE — PROGRESS NOTES
Care Management Follow Up    Length of Stay (days): 9    Expected Discharge Date: 05/06/21     Concerns to be Addressed:       Patient plan of care discussed at interdisciplinary rounds: Yes    Anticipated Discharge Disposition:  [facility]     Anticipated Discharge Services:    Anticipated Discharge DME:      Patient/family educated on Medicare website which has current facility and service quality ratings:    Education Provided on the Discharge Plan:  yes  Patient/Family in Agreement with the Plan:  yes    Referrals Placed by CM/SW:    Private pay costs discussed: transportation costs    Additional Information:  Discussed case with admissions liaison at [facility]. Tentative bed today and still awaiting insurance authorization. No transport through [facility] available today until 4:30 which is too late to admit to ARU today. Wheelchair ride set for Friday at 1030 through [facility] transportation. Ride coordinated with Jyoti.     Updated patient and bedside RN.    [id number] called by admissions at ARU and they requested to move ride time tomorrow to 1330. I called [facility] Transport at [phone] and spoke to Fly. Wheelchair Ride is rescheduled from 1030 to 1330 for Friday, May 7th.    Felipa Hartman, RN   [facility]   Phone [phone]

## 2021-05-06 NOTE — PLAN OF CARE
A&Ox4. VSS on RA. Assist of 1. Tolerating regular diet. Lung sounds clear. Productive cough, PRN suctioning via trach. Bowel sounds active. Voiding. Sternal incision closed with liquid bandage. Pain controlled with Tylenol and Robaxin. R PICC SL.

## 2021-05-06 NOTE — PROGRESS NOTES
SUBJECTIVE:  Mr. Warren is a 70-year-old gentleman with locally advanced laryngeal squamous cell carcinoma.  PET scan on 04/26/2021 revealed a hypermetabolic mass involving the supraglottic, glottic and subglottic larynx.  There is also hypermetabolic left level 4 lymph node.  There is a hypermetabolic nodule in right middle lobe, likely inflammatory.  The patient was scheduled to meet with an ENT surgeon at [facility].    The patient admitted to the hospital with a massive pulmonary embolism.  CT chest angiogram on 04/27/2021 revealed large bilateral pulmonary emboli.  There was evidence of right cardiac strain.  Echocardiogram reviewed clot in transit in right ventricle.  The patient had emergency pulmonary embolectomy on 04/28/2021.  He is recovering from it.  He was on heparin.  Now is on Eliquis.    The patient is recovering from surgery.  He has some chest discomfort.  No shortness of breath.  He is not having any fever.  The patient will be going to ARU for rehabilitation.    The patient has a trach.      PHYSICAL EXAMINATION:  He is alert.  Not in any acute pain.  VITALS:  Reviewed.  Rest of systems not examined.    LABS:  Reviewed.    ASSESSMENT:     1.  A 70-year-old gentleman with laryngeal squamous cell carcinoma.  2.  Bilateral pulmonary embolism secondary to malignancy.    PLAN:    1.  For laryngeal squamous cell carcinoma, patient will need laryngectomy.  Discussed regarding laryngectomy.  The patient made it very clear that he does not want any surgery. I told the patient that the next option would be radiation and chemotherapy.  Further discussion after his discharge from ARU.  2.  The patient has massive pulmonary embolism.  He is on Eliquis.  I would recommend lifelong anticoagulation.  3.  He had few questions, which were all answered.  We will see him in the clinic after he is discharged from the ARU.    Total time spent 25 minutes.    Rosa Bean MD        D: 05/05/2021   T:  2021   MT: dottie    Name:     LUPE HALL  MRN:      [id number]        Account:      [id number]   :      1950       Document: [id number]

## 2021-05-06 NOTE — INTERIM SUMMARY
[facility] Pre-Admission Screen    Referral Source:  [facility] SURGICAL [facility]ITIES  [facility] SURG SPECIALTIES  Admit date to referring facility: 4/27/2021    Physical Medicine and Rehab Consult Completed: No    Rehab Diagnosis:    16 Debility (non-cardiac, non-pulmonary) due to B PEs, laryngeal mass    Justification for Acute Inpatient Rehabilitation  Raj Warren is a 70 year old male with past medical history including recently diagnosed laryngeal cancer and tracheostomy placement on 4/12/2021 by ENT who presents with shortness of breath. Noted to have coughing as well with some chest discomfort. When his breathing started to get worse and he wasn't able to walk very far without feeling very short of breath. Came to ED and found to have B PEs. He required Emergent pulmonary embolectomy on 4/28/2021. His course was complicated by need for feeding tube, non healing of incision requiring wound vac placement, IVC filter placement, and requires suction regularly 1-2 x per shift. 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 He will follow with [facility] and ENT after ARC.  The patient is medically ready for discharge to [facility] for rehabilitation. Patient requires an intensive inpatient rehab program to address the following acute impairments:impaired strength, impaired activity tolerance, impaired balance, impaired coordination and impaired weight shifting. These impairments are impacting his functional independence and safety w/ transfers, gait, stairs, ADLs, and IADLs.     Current Active Medical Management Needs/Risks for Clinical Complications  The patient requires the high level of rehabilitation physician supervision that accompanies the provision of intensive rehabilitation therapy.  The patient needs the services of the rehabilitation physician to assess the patient medically and functionally and to modify the course of treatment as  needed to maximize the patient's capacity to benefit from the rehabilitation process. The patient requires physician involvement at least 3 days per week to manage:   Cardiac: in setting of HTN, tachycardia with IVC filter placed 4/30-- currently on Eliquis, aspirin, metoprolol  Respiratory: in setting of B PEs, trach due to laryngeal carcinoma with airway compromise, weaned off O2-- manage trach cares, will need ENT and oncology follow up as OP, suction required  Integumentary: wound vac discontinued, manage wound for optimal healing  Pain: to ensure optimal participation in therapies-- currently on Neurontin, Tylenol, Robaxin  Renal: monitor Cr, was on PO lasix  Endocrine: in setting of A1C of 5.6-- currently sliding scale insulin ordered, but managing well with diet  Pt is at risk for falls with injury and DVT's    Past Medical/Surgical History   Surgery in the past 100 days: Yes   Additional relevant past medical history: ecently diagnosed laryngeal cancer and tracheostomy placement on 4/12/2021    Level of Functioning Prior to Admission:    LIVING ENVIRONMENT   People in home: alone   Current Living Arrangements: apartment   Home Accessibility:  15 stairs to enter home   Transportation Anticipated: family or friend will provide    SELF-CARE   Regular Exercise: No     Equipment Currently Used at Home: none   Activity/Exercise/Self-Care Comment: reports no use of an assistive device for mobility prior to admit    DISABILITY/FUNCTION   Concentrating, Remembering or Making Decisions Difficulty: no    Difficulty Communicating: no     Difficulty Eating/Swallowing: no     Walking or Climbing Stairs Difficulty: no   Dressing/Bathing Difficulty: no   Toileting issues: no     Doing Errands Independently Difficulty (such as shopping): yes     Fall history within last six months: no;     Change in Functional Status Since Onset of Current Illness/Injury: yes  Additional Comments: Pt has a son and sister who are supportive  but do not live in the area. Prior to April 2021 admission pt was IND with all mobility and I/ADL's. Pt was able to care for his trach.    Level of Function: GG Scale (Section GG Functional Ability and Goals; CMS's WAGNER Version 3.0 Manual effective 10.1.2019):  PT Current Function Goals for Rehab   Bed Rolling 4 Supervision or touching assitance 6 Independent   Supine to Sit 3 Partial/moderate assistance 6 Independent   Sit to Stand 4 Supervision or touching assitance 6 Independent   Transfer 4 Supervision or touching assitance 6 Independent   Ambulation 4 Supervision or touching assitance 6 Independent   Stairs Not completed 6 Independent     OT Current Function Goals for Rehab   Feeding 5 Setup or clean-up assistance 6 Independent   Grooming 4 Supervision or touching assitance 6 Independent   Bathing Not completed 6 Independent   Upper Body Dressing 3 Partial/moderate assistance 6 Independent   Lower Body Dressing 3 Partial/moderate assistance 6 Independent   Toileting Not completed 6 Independent   Toilet Transfer 3 Partial/moderate assistance 6 Independent   Tub/Shower Transfer Not completed 6 Independent   Cognition Not Impaired Independent     SLP Current Function Goals for Rehab   Swallow Not Impaired Not applicable   Communication Not Impaired Not applicable       Current Diet:  Regular diet and Thin liquids    Summary Statement:  Pt performes rolling to R side with SBA and requires cues and Min A for sidelying to sit. He completes sit to supine with SBA. Pt ambulates 50'x2 with FWW + CGA. Pt ambulates with decreased gait speed, downward gaze, step-through pattern, and stepping outside NELSON of FWW with turns. He requires some standing rest breaks. He is currently limited by weakness, balance, activity tolerance, pain and sternal precautions. Pt continues to report numbness in dominant right hand and shows incoordination/ataxia with hand to mouth.  Pt is communicating via finger occlusion independently and is  tolerating Regular with thin without s/s aspiration. Speech therapy recommends short term OP SLP Tx for education/exercises if total laryngectomy scheduled in the future. No acute rehab speech therapy needs identified.     Expected Therapies and Services Required During Inpatient Rehab Admission  Intensity of Therapy: Patient requires intensive therapies not available in a lesser level of care. Patient is motivated, making gains, and can tolerate 3 hours of therapy a day.  Physical Therapy: 90 minutes per day, 7 days a week for 7 days  Occupational Therapy: 90 minutes per day, 7 days a week for 7 days  Speech and Language Therapy: No current speech therapy needs  Rehabilitation Nursing Needs: Patient requires 24 hour Rehab Nursing to manage wound care, bowel program, vitals, medication education, carryover of new rehab techniques, care coordination, skin integrity, pain management and provide safe environment for patient at falls risk.    Precautions/restrictions/special needs:   Precautions: fall precautions and Sternal precautions   Special Needs: trach    Designated Visitor: Yusuf Hahn    Expected Level of Improvement: Mod I-IND for ambulation, transfers, stairs and ADL's. Assist with IADL's as needed.    Expected Length of time to achieve: 7 days    Anticipated Discharge Needs:  Anticipated Discharge Destination: Home  Anticipated Discharge Support: Family member  24/7 support available : Unknown  Identified caregiver(s):  Pt's yusuf Hahn will stay with him immediatly after acute rehab for a short time.   Anticipated Discharge Needs: Home with homecare and home tx vs. Outpatient therapy.     Identified challenges/barriers:  Lives alone    Admissions Liaison Signature/Date/Time:       Physician statement of review and agreement:  I have reviewed and am in agreement of the need for IRF stay to address above functional and medical needs. In addition to above statements address, Patient requires intensive active and  ongoing therapeutic intervention and multiple therapies; Patient requires medical supervision; Expected to actively participate in the intensive rehab program; Sufficiently stable to actively participate; Expectation for measurable improvement in functional capacity or adaption to impairments.      Physician Signature/Date/Time:

## 2021-05-06 NOTE — DISCHARGE SUMMARY
"Discharge Summary    Raj Warren MRN# [id number]   YOB: 1950 Age: 70 year old     Date of Admission:  4/27/2021  Date of Discharge:  5/8/2021  Admitting Physician:  Yumi Singh MD  Discharge Physician:  Yumi Singh,*  Discharging Service:  Cardiovascular and Thoracic Surgery     Home clinic: [place], [place]  Primary Provider: Nuvia Brown          Admission Diagnoses:   Bilateral pulmonary embolism (H) [I26.99]  Pulmonary emboli (H) [I26.99]          Discharge Diagnosis:   Patient Active Problem List   Diagnosis     Atopic rhinitis     Aphthous ulcer     Chronic allergic conjunctivitis     CARDIOVASCULAR SCREENING; LDL GOAL LESS THAN 160     Stridor     Vocal cord mass     Malignant neoplasm of larynx (H)     Tracheostomy in place (H)     Bilateral pulmonary embolism (H)     Pulmonary emboli (H)     History of embolectomy     Anticoagulated                Discharge Disposition:   Discharged to ARU           Condition on Discharge:   Discharge condition: Stable   Discharge vitals: Blood pressure 120/73, pulse 85, temperature 98.2  F (36.8  C), temperature source Axillary, resp. rate 17, height 1.803 m (5' 11\"), weight 88.1 kg (194 lb 3.6 oz), SpO2 98 %.     Code status on discharge: Full Code           Procedures:   On 4/28/21 Mr Warren successfully underwent Emergent pulmonary embolectomy by Dr. Yumi Singh          Medications Prior to Admission:     Medications Prior to Admission   Medication Sig Dispense Refill Last Dose     [DISCONTINUED] acetaminophen (TYLENOL) 325 MG tablet Take 2 tablets (650 mg) by mouth 3 times daily as needed for mild pain   Unknown at Unknown time             Discharge Medications:     Current Discharge Medication List      START taking these medications    Details   albuterol (PROVENTIL) (2.5 MG/3ML) 0.083% neb solution Take 1 vial (2.5 mg) by nebulization every 4 hours as needed for wheezing  Qty:      Associated Diagnoses: " Bilateral pulmonary embolism (H)      apixaban ANTICOAGULANT (ELIQUIS) 5 MG tablet Take 1 tablet (5 mg) by mouth 2 times daily  Qty:      Associated Diagnoses: Bilateral pulmonary embolism (H)      aspirin (ASA) 81 MG chewable tablet 1 tablet (81 mg) by Oral or NG Tube route daily  Qty:      Associated Diagnoses: Bilateral pulmonary embolism (H)      carboxymethylcellulose PF (REFRESH PLUS) 0.5 % ophthalmic solution Place 1 drop into both eyes every hour as needed for dry eyes  Qty:      Associated Diagnoses: Bilateral pulmonary embolism (H)      gabapentin (NEURONTIN) 100 MG capsule Take 1 capsule (100 mg) by mouth nightly as needed for other (pain)  Qty:      Associated Diagnoses: Bilateral pulmonary embolism (H)      methocarbamol (ROBAXIN) 500 MG tablet 1 tablet (500 mg) by Oral or Feeding Tube route every 6 hours as needed for muscle spasms  Qty:      Associated Diagnoses: Bilateral pulmonary embolism (H)      metoprolol tartrate (LOPRESSOR) 25 MG tablet Take 1 tablet (25 mg) by mouth 2 times daily  Qty:      Associated Diagnoses: Bilateral pulmonary embolism (H)      multivitamin w/minerals (THERA-VIT-M) tablet Take 1 tablet by mouth daily  Qty:      Associated Diagnoses: Bilateral pulmonary embolism (H)      oxyCODONE (ROXICODONE) 5 MG tablet 1 tablet (5 mg) by Oral or Feeding Tube route every 4 hours as needed  Qty:  , Refills: 0    Associated Diagnoses: Bilateral pulmonary embolism (H)      pantoprazole (PROTONIX) 40 MG EC tablet Take 1 tablet (40 mg) by mouth every morning (before breakfast) for 14 days    Associated Diagnoses: Bilateral pulmonary embolism (H)      polyethylene glycol (MIRALAX) 17 GM/Dose powder Take 17 g by mouth daily  Qty: 510 g    Associated Diagnoses: Bilateral pulmonary embolism (H)      senna-docusate (SENOKOT-S/PERICOLACE) 8.6-50 MG tablet 2 tablets by Oral or Feeding Tube route 2 times daily as needed for constipation  Qty:      Associated Diagnoses: Bilateral pulmonary embolism  (H)         CONTINUE these medications which have CHANGED    Details   acetaminophen (TYLENOL) 325 MG tablet Take 2 tablets (650 mg) by mouth every 4 hours as needed for other (For optimal non-opioid multimodal pain management to improve pain control.)  Qty:      Associated Diagnoses: Bilateral pulmonary embolism (H)                   Consultations:   Nutrition, Intensivist, , oncology, cardiology             Brief History of Illness:   Mr. Warren is a very pleasant 70-year-old gentleman who was recently diagnosed with laryngeal cancer with tracheostomy in place.  He was admitted to the hospital with worsening shortness of breath for about a week or so.  He was found to have bilateral PE with significant clot burden, mainly in the right main PA, but also a large clot in the right ventricle as well.  Given the large clot burden and a clot in transit in the right ventricle, we evaluated him for possible emergent surgical embolectomy.  He was taken to the operating room today for pulmonary embolectomy and RV thrombectomy.          Hospital Course:   Presented to the ED with SOB. Underwent CT chest with Large bilateral pulmonary embolism leading to all lobes of both lungs. Central thrombus burden leads to the bilateral main pulmonary  arteries. There is evidence of right cardiac strain. The chambers of the right heart are dilated and there is leftward bowing of the intraventricular septum. Further, there is curvilinear apparent filling defect within the right ventricular lumen suggesting thrombus within the right ventricle.    On 4/28/21 Mr Warren successfully underwent Emergent pulmonary embolectomy by Dr. Yumi Singh. He underwent an IVC filter placement on 4/30/21.  He was very unstable after surgery requiring multiple pressors, elevated lactic acid. New lines were placed by our fellow for closer hemodynamic monitoring.   Was extubated within 24 hours of surgery. His trach dome was capped. He was  cleared and went back to his PTA regular diet. Orders Placed This Encounter      Combination Diet Regular Diet Adult; Thin Liquids (water, ice chips, juice, milk, gelatin, ice cream, etc)      Advance Diet as Tolerated  Transferred out of ICU to surgical floor once stable.   Had some transient hyperglycemia treated with an insulin gtt, transitioned to sliding scale insulin and has no need at discharge.  Had a wound vac placed over his sternal incision to expedite healing and protect from trach secretions. It was removed on pod#8 and surgical glue applied to seal incision.   Had some fluid overload treated with diuretic medication. At discharge he is 2kg below his pre-op weight and is not sent with diuretics.   He was transitioned from heparin gtt to Eliquis BID that he will remain on. 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 He will follow with [facility] and ENT.   Heart rhythm remained stable. He progressed well with cardiac rehab. They are discharged ARU on pod# 7. He has follow up apts with CV Surgery, Oncology and Cardiology.              Significant Results:   Lab Results   Component Value Date    WBC 9.1 05/06/2021     Lab Results   Component Value Date    RBC 3.70 05/06/2021     Lab Results   Component Value Date    HGB 11.0 05/06/2021     Lab Results   Component Value Date    HCT 33.8 05/06/2021     No components found for: MCT  Lab Results   Component Value Date    MCV 91 05/06/2021     Lab Results   Component Value Date    MCH 29.7 05/06/2021     Lab Results   Component Value Date    MCHC 32.5 05/06/2021     Lab Results   Component Value Date    RDW 15.2 05/06/2021     Lab Results   Component Value Date     05/06/2021       Last Basic Metabolic Panel:  Lab Results   Component Value Date     05/06/2021      Lab Results   Component Value Date    POTASSIUM 4.0 05/06/2021     Lab Results   Component Value Date    CHLORIDE 107 05/06/2021     Lab Results    Component Value Date    TELMA 8.8 05/06/2021     Lab Results   Component Value Date    CO2 24 05/06/2021     Lab Results   Component Value Date    BUN 19 05/06/2021     Lab Results   Component Value Date    CR 0.97 05/06/2021     Lab Results   Component Value Date     05/06/2021                  Pending Results:   None           Discharge Instructions and Follow-Up:   Discharge diet: Regular   Discharge activity: Daily weights: Call if weight gain 2-3 lbs over 24 hours or if greater than 5 lbs in 1 week.  No lifting more than 10 lbs for 8-12 weeks.  No driving for 1 month.  Call for pain medication refill.  Call for temperature greater than 101.0  Daily shower with antibacterial soap.   Discharge follow-up: *Follow up primary care provider in 7-10 days after discharge in order to review your medication, vital signs, obtain any necessary lab work your doctor may want, and to update them on your hospitalization and medical issues.   *Follow up with Jeanine/Susanna/Quique Pantoja with Dr Singh, heart surgeon, at [facility] [address] on 5/19/21 at 2:30 PM. If any questions or concerns call [phone].  You will see us once at this visit and then if everything is going well you will not need to see us again.  You will follow long term with your cardiologist.   *Follow up with Dr. Jamin Bean, oncologist on 5/27/21 at 2:20 pm   *Follow up with Dr Lopez, cardiologist, on 7/8/21 at 12:45 PM. This is who you will follow with long term about your heart issues. [phone].   *Please schedule outpatient cardiac rehab within 5 days of discharge from TCU, call [phone].    Outpatient therapy: Cardiac rehab   Home Care agency: None    Supplies and equipment: None   Lines and drains: None    Wound care: Wash incision daily with antibacterial soap   Other instructions: None

## 2021-05-06 NOTE — PROGRESS NOTES
CV Surgery    S: Sitting up in bed, breathing fine, tolerating diet, stable, working with rehab, ready for ARU    O: B/P: 120/73, T: 98.2, P: 85, R: 17  General: NAD  Heart: s1/s2, no m/r/g  Lungs: course  Abd: s/nt/nd  Sternum: cdi  Extremities: no LE edema    Lab Results   Component Value Date    WBC 9.1 05/06/2021     Lab Results   Component Value Date    RBC 3.70 05/06/2021     Lab Results   Component Value Date    HGB 11.0 05/06/2021     Lab Results   Component Value Date    HCT 33.8 05/06/2021     No components found for: MCT  Lab Results   Component Value Date    MCV 91 05/06/2021     Lab Results   Component Value Date    MCH 29.7 05/06/2021     Lab Results   Component Value Date    MCHC 32.5 05/06/2021     Lab Results   Component Value Date    RDW 15.2 05/06/2021     Lab Results   Component Value Date     05/06/2021       Last Basic Metabolic Panel:  Lab Results   Component Value Date     05/06/2021      Lab Results   Component Value Date    POTASSIUM 4.0 05/06/2021     Lab Results   Component Value Date    CHLORIDE 107 05/06/2021     Lab Results   Component Value Date    TELMA 8.8 05/06/2021     Lab Results   Component Value Date    CO2 24 05/06/2021     Lab Results   Component Value Date    BUN 19 05/06/2021     Lab Results   Component Value Date    CR 0.97 05/06/2021     Lab Results   Component Value Date     05/06/2021       A/P: POD#8 s/p Emergent pulmonary embolectomy by Dr. Yumi Singh and Dr. Bárbara Cali     POD#6 s/p IVC filter placement    Seen and discussed with rounding surgeon  Jeanine Anderson PA-C  Pager [phone]

## 2021-05-07 ENCOUNTER — APPOINTMENT (OUTPATIENT)
Dept: OCCUPATIONAL THERAPY | Facility: CLINIC | Age: 71
DRG: 163 | End: 2021-05-07
Attending: HOSPITALIST
Payer: COMMERCIAL

## 2021-05-07 ENCOUNTER — APPOINTMENT (OUTPATIENT)
Dept: PHYSICAL THERAPY | Facility: CLINIC | Age: 71
DRG: 163 | End: 2021-05-07
Attending: HOSPITALIST
Payer: COMMERCIAL

## 2021-05-07 LAB
ANION GAP SERPL CALCULATED.3IONS-SCNC: 5 MMOL/L (ref 3–14)
BUN SERPL-MCNC: 19 MG/DL (ref 7–30)
CALCIUM SERPL-MCNC: 8.8 MG/DL (ref 8.5–10.1)
CHLORIDE SERPL-SCNC: 109 MMOL/L (ref 94–109)
CO2 SERPL-SCNC: 25 MMOL/L (ref 20–32)
CREAT SERPL-MCNC: 0.96 MG/DL (ref 0.66–1.25)
ERYTHROCYTE [DISTWIDTH] IN BLOOD BY AUTOMATED COUNT: 15.2 % (ref 10–15)
GFR SERPL CREATININE-BSD FRML MDRD: 80 ML/MIN/{1.73_M2}
GLUCOSE BLDC GLUCOMTR-MCNC: 101 MG/DL (ref 70–99)
GLUCOSE BLDC GLUCOMTR-MCNC: 103 MG/DL (ref 70–99)
GLUCOSE BLDC GLUCOMTR-MCNC: 110 MG/DL (ref 70–99)
GLUCOSE SERPL-MCNC: 97 MG/DL (ref 70–99)
HCT VFR BLD AUTO: 34.1 % (ref 40–53)
HGB BLD-MCNC: 11.1 G/DL (ref 13.3–17.7)
INTERPRETATION ECG - MUSE: NORMAL
INTERPRETATION ECG - MUSE: NORMAL
MCH RBC QN AUTO: 29.4 PG (ref 26.5–33)
MCHC RBC AUTO-ENTMCNC: 32.6 G/DL (ref 31.5–36.5)
MCV RBC AUTO: 91 FL (ref 78–100)
PLATELET # BLD AUTO: 383 10E9/L (ref 150–450)
POTASSIUM SERPL-SCNC: 4.1 MMOL/L (ref 3.4–5.3)
RBC # BLD AUTO: 3.77 10E12/L (ref 4.4–5.9)
SODIUM SERPL-SCNC: 139 MMOL/L (ref 133–144)
WBC # BLD AUTO: 8 10E9/L (ref 4–11)

## 2021-05-07 PROCEDURE — 80048 BASIC METABOLIC PNL TOTAL CA: CPT | Performed by: PHYSICIAN ASSISTANT

## 2021-05-07 PROCEDURE — 97110 THERAPEUTIC EXERCISES: CPT | Mod: GP | Performed by: PHYSICAL THERAPIST

## 2021-05-07 PROCEDURE — 999N000190 HC STATISTIC VAT ROUNDS

## 2021-05-07 PROCEDURE — 120N000001 HC R&B MED SURG/OB

## 2021-05-07 PROCEDURE — 97535 SELF CARE MNGMENT TRAINING: CPT | Mod: GO

## 2021-05-07 PROCEDURE — 250N000013 HC RX MED GY IP 250 OP 250 PS 637: Performed by: PHYSICIAN ASSISTANT

## 2021-05-07 PROCEDURE — 999N000157 HC STATISTIC RCP TIME EA 10 MIN

## 2021-05-07 PROCEDURE — 250N000013 HC RX MED GY IP 250 OP 250 PS 637: Performed by: SURGERY

## 2021-05-07 PROCEDURE — 999N001017 HC STATISTIC GLUCOSE BY METER IP

## 2021-05-07 PROCEDURE — 97110 THERAPEUTIC EXERCISES: CPT | Mod: GO

## 2021-05-07 PROCEDURE — 85027 COMPLETE CBC AUTOMATED: CPT | Performed by: PHYSICIAN ASSISTANT

## 2021-05-07 RX ADMIN — METOPROLOL TARTRATE 25 MG: 25 TABLET, FILM COATED ORAL at 20:36

## 2021-05-07 RX ADMIN — APIXABAN 5 MG: 5 TABLET, FILM COATED ORAL at 09:49

## 2021-05-07 RX ADMIN — ASPIRIN 81 MG CHEWABLE TABLET 81 MG: 81 TABLET CHEWABLE at 09:49

## 2021-05-07 RX ADMIN — APIXABAN 5 MG: 5 TABLET, FILM COATED ORAL at 20:37

## 2021-05-07 RX ADMIN — METOPROLOL TARTRATE 25 MG: 25 TABLET, FILM COATED ORAL at 09:49

## 2021-05-07 RX ADMIN — PANTOPRAZOLE SODIUM 40 MG: 40 TABLET, DELAYED RELEASE ORAL at 07:22

## 2021-05-07 RX ADMIN — MULTIPLE VITAMINS W/ MINERALS TAB 1 TABLET: TAB at 09:49

## 2021-05-07 ASSESSMENT — ACTIVITIES OF DAILY LIVING (ADL)
ADLS_ACUITY_SCORE: 17

## 2021-05-07 ASSESSMENT — MIFFLIN-ST. JEOR: SCORE: 1669.13

## 2021-05-07 NOTE — PROGRESS NOTES
Pt was placed on high flow trach dome for humidity for night. 45 minutes later requesting it off and HME replaced. Yoselin Washington, RT

## 2021-05-07 NOTE — PLAN OF CARE
A&O x4, VSS on RA trach. Incision CDI. Tolerated prn suction. Denies SOB or any pain. + flaus, denies N/V. Voids adequately. Pt plans for discharge today to ARU. Transportation arrangement made at 1330 per care coordinator.

## 2021-05-07 NOTE — PROGRESS NOTES
Care Management Follow Up    Length of Stay (days): 10    Expected Discharge Date: 05/08/21(leaving at 1315 pending insurance auth)     Concerns to be Addressed:       Patient plan of care discussed at interdisciplinary rounds: Yes    Anticipated Discharge Disposition:  [facility]     Anticipated Discharge Services:    Anticipated Discharge DME:      Patient/family educated on Medicare website which has current facility and service quality ratings:    Education Provided on the Discharge Plan:    Patient/Family in Agreement with the Plan:      Referrals Placed by CM/SW:    Private pay costs discussed: transportation costs    Additional Information:  Updated by ARU admissions that insurance authorization still not available and they are requesting ride be moved to tomorrow, Saturday, May 8th at 1 pm.   I called [facility] [employer] at [phone] and spoke to Marcy and confirmed a wheelchair ride for Saturday, May 8th to [facility] ARU at 1315. Patient, charge RN and bedside RN all updated.     Felipa Hartman, RN   [facility]   Phone [phone]

## 2021-05-07 NOTE — PROGRESS NOTES
Pt is on RA and  02 Sats 98%, pt is on HME throughout the day. Trach site clean and intact, inner cannula and dressing change. I offered pt to place mepilex gentle on trach site but pt refused and instead wanted regular 4X4 gauze. Will continue to follow.  5/7/2021  Luzma Snowden, RT

## 2021-05-07 NOTE — PLAN OF CARE
Pt denies any complaints, stable vitals, sating well on room air, trache suctioning done as needed. Pt has no pain, appetite is good, voiding adequately. Sternal incision healing. Discharge pending authorization.

## 2021-05-07 NOTE — PLAN OF CARE
OT: pt not seen in AM as he had just returned to bed after being up with nursing in bathroom, had completed standing shaving task at sink and wanted to rest.

## 2021-05-07 NOTE — PLAN OF CARE
A/Ox4. VSS on RA. Assist of 1x w/ GB/W. Tolerating regular diet. Lung sounds clear. Productive cough, PRN in-line suctioning via trach prn. Bowel sounds active. Voiding to urinal. BM yesterday. Sternal incision WDL w/ liquid bandage, BRYANT. Old CT site dressing WDL, CDI. Denies pain, prn Tylenol and Robaxin available. R PICC SL. BGs: 106, 103. Plan to discharge to ARU tomorrow pending insurance auth, ride scheduled for 1330.

## 2021-05-07 NOTE — PROGRESS NOTES
CV Surgery    S: Sitting up in bed, breathing stable, tolerating diet, pain controlled, ready for ARU    O: B/P: 108/64, T: 98.4, P: 96, R: 16  General: NAD  Heart: s1/s2, no m/r/g  Lungs: course  Abd: s/nt/nd  Sternum: cdi  Extremities: no LE edema    Lab Results   Component Value Date    WBC 8.0 05/07/2021     Lab Results   Component Value Date    RBC 3.77 05/07/2021     Lab Results   Component Value Date    HGB 11.1 05/07/2021     Lab Results   Component Value Date    HCT 34.1 05/07/2021     No components found for: MCT  Lab Results   Component Value Date    MCV 91 05/07/2021     Lab Results   Component Value Date    MCH 29.4 05/07/2021     Lab Results   Component Value Date    MCHC 32.6 05/07/2021     Lab Results   Component Value Date    RDW 15.2 05/07/2021     Lab Results   Component Value Date     05/07/2021       Last Basic Metabolic Panel:  Lab Results   Component Value Date     05/07/2021      Lab Results   Component Value Date    POTASSIUM 4.1 05/07/2021     Lab Results   Component Value Date    CHLORIDE 109 05/07/2021     Lab Results   Component Value Date    TELMA 8.8 05/07/2021     Lab Results   Component Value Date    CO2 25 05/07/2021     Lab Results   Component Value Date    BUN 19 05/07/2021     Lab Results   Component Value Date    CR 0.96 05/07/2021     Lab Results   Component Value Date    GLC 97 05/07/2021       A/P: POD#9 s/p Emergent pulmonary embolectomy by Dr. Yumi Singh and Dr. Bárbara Cali     POD#7 s/p IVC filter placement    Seen and discussed with attending surgeon  Jeanine Anderson PA-C  Pager [phone]

## 2021-05-08 ENCOUNTER — APPOINTMENT (OUTPATIENT)
Dept: OCCUPATIONAL THERAPY | Facility: CLINIC | Age: 71
DRG: 163 | End: 2021-05-08
Attending: HOSPITALIST
Payer: COMMERCIAL

## 2021-05-08 ENCOUNTER — HOSPITAL ENCOUNTER (INPATIENT)
Facility: CLINIC | Age: 71
LOS: 7 days | Discharge: HOME HEALTH | DRG: 949 | End: 2021-05-15
Attending: PHYSICAL MEDICINE & REHABILITATION | Admitting: PHYSICAL MEDICINE & REHABILITATION
Payer: COMMERCIAL

## 2021-05-08 VITALS
HEART RATE: 90 BPM | DIASTOLIC BLOOD PRESSURE: 64 MMHG | RESPIRATION RATE: 18 BRPM | OXYGEN SATURATION: 97 % | TEMPERATURE: 98.2 F | WEIGHT: 193.34 LBS | HEIGHT: 71 IN | SYSTOLIC BLOOD PRESSURE: 120 MMHG | BODY MASS INDEX: 27.07 KG/M2

## 2021-05-08 DIAGNOSIS — J38.3 VOCAL CORD MASS: Primary | ICD-10-CM

## 2021-05-08 DIAGNOSIS — I26.99 BILATERAL PULMONARY EMBOLISM (H): ICD-10-CM

## 2021-05-08 DIAGNOSIS — I10 BENIGN ESSENTIAL HYPERTENSION: ICD-10-CM

## 2021-05-08 DIAGNOSIS — K21.00 GASTROESOPHAGEAL REFLUX DISEASE WITH ESOPHAGITIS WITHOUT HEMORRHAGE: ICD-10-CM

## 2021-05-08 DIAGNOSIS — C32.9 MALIGNANT NEOPLASM OF LARYNX (H): ICD-10-CM

## 2021-05-08 LAB
ANION GAP SERPL CALCULATED.3IONS-SCNC: 7 MMOL/L (ref 3–14)
BUN SERPL-MCNC: 17 MG/DL (ref 7–30)
CALCIUM SERPL-MCNC: 8.7 MG/DL (ref 8.5–10.1)
CHLORIDE SERPL-SCNC: 108 MMOL/L (ref 94–109)
CO2 SERPL-SCNC: 23 MMOL/L (ref 20–32)
CREAT SERPL-MCNC: 0.9 MG/DL (ref 0.66–1.25)
ERYTHROCYTE [DISTWIDTH] IN BLOOD BY AUTOMATED COUNT: 15 % (ref 10–15)
GFR SERPL CREATININE-BSD FRML MDRD: 87 ML/MIN/{1.73_M2}
GLUCOSE SERPL-MCNC: 85 MG/DL (ref 70–99)
HCT VFR BLD AUTO: 33.9 % (ref 40–53)
HGB BLD-MCNC: 11.3 G/DL (ref 13.3–17.7)
MCH RBC QN AUTO: 30.4 PG (ref 26.5–33)
MCHC RBC AUTO-ENTMCNC: 33.3 G/DL (ref 31.5–36.5)
MCV RBC AUTO: 91 FL (ref 78–100)
PLATELET # BLD AUTO: 392 10E9/L (ref 150–450)
POTASSIUM SERPL-SCNC: 4 MMOL/L (ref 3.4–5.3)
RBC # BLD AUTO: 3.72 10E12/L (ref 4.4–5.9)
SODIUM SERPL-SCNC: 138 MMOL/L (ref 133–144)
WBC # BLD AUTO: 8.1 10E9/L (ref 4–11)

## 2021-05-08 PROCEDURE — 99223 1ST HOSP IP/OBS HIGH 75: CPT | Mod: 24 | Performed by: PHYSICAL MEDICINE & REHABILITATION

## 2021-05-08 PROCEDURE — 97110 THERAPEUTIC EXERCISES: CPT | Mod: GO | Performed by: OCCUPATIONAL THERAPIST

## 2021-05-08 PROCEDURE — 999N000157 HC STATISTIC RCP TIME EA 10 MIN

## 2021-05-08 PROCEDURE — 97535 SELF CARE MNGMENT TRAINING: CPT | Mod: GO | Performed by: OCCUPATIONAL THERAPIST

## 2021-05-08 PROCEDURE — 80048 BASIC METABOLIC PNL TOTAL CA: CPT | Performed by: PHYSICIAN ASSISTANT

## 2021-05-08 PROCEDURE — 250N000013 HC RX MED GY IP 250 OP 250 PS 637

## 2021-05-08 PROCEDURE — 85027 COMPLETE CBC AUTOMATED: CPT | Performed by: PHYSICIAN ASSISTANT

## 2021-05-08 PROCEDURE — 250N000013 HC RX MED GY IP 250 OP 250 PS 637: Performed by: PHYSICIAN ASSISTANT

## 2021-05-08 PROCEDURE — 128N000003 HC R&B REHAB

## 2021-05-08 PROCEDURE — 999N000190 HC STATISTIC VAT ROUNDS

## 2021-05-08 PROCEDURE — 250N000013 HC RX MED GY IP 250 OP 250 PS 637: Performed by: SURGERY

## 2021-05-08 PROCEDURE — 999N000040 HC STATISTIC CONSULT NO CHARGE VASC ACCESS

## 2021-05-08 RX ORDER — ACETAMINOPHEN 325 MG/1
650 TABLET ORAL EVERY 4 HOURS PRN
Status: DISCONTINUED | OUTPATIENT
Start: 2021-05-08 | End: 2021-05-15 | Stop reason: HOSPADM

## 2021-05-08 RX ORDER — ASPIRIN 81 MG/1
81 TABLET, CHEWABLE ORAL DAILY
Status: DISCONTINUED | OUTPATIENT
Start: 2021-05-09 | End: 2021-05-15 | Stop reason: HOSPADM

## 2021-05-08 RX ORDER — ALBUTEROL SULFATE 90 UG/1
2 AEROSOL, METERED RESPIRATORY (INHALATION) EVERY 4 HOURS PRN
Status: DISCONTINUED | OUTPATIENT
Start: 2021-05-08 | End: 2021-05-15 | Stop reason: HOSPADM

## 2021-05-08 RX ORDER — METOPROLOL TARTRATE 25 MG/1
25 TABLET, FILM COATED ORAL 2 TIMES DAILY
Status: DISCONTINUED | OUTPATIENT
Start: 2021-05-08 | End: 2021-05-15 | Stop reason: HOSPADM

## 2021-05-08 RX ORDER — AMOXICILLIN 250 MG
1-4 CAPSULE ORAL 2 TIMES DAILY PRN
Status: DISCONTINUED | OUTPATIENT
Start: 2021-05-08 | End: 2021-05-15 | Stop reason: HOSPADM

## 2021-05-08 RX ORDER — PANTOPRAZOLE SODIUM 40 MG/1
40 TABLET, DELAYED RELEASE ORAL
Status: DISCONTINUED | OUTPATIENT
Start: 2021-05-09 | End: 2021-05-15 | Stop reason: HOSPADM

## 2021-05-08 RX ORDER — MULTIVITAMIN,THERAPEUTIC
1 TABLET ORAL DAILY
Status: DISCONTINUED | OUTPATIENT
Start: 2021-05-09 | End: 2021-05-15 | Stop reason: HOSPADM

## 2021-05-08 RX ADMIN — ASPIRIN 81 MG CHEWABLE TABLET 81 MG: 81 TABLET CHEWABLE at 10:15

## 2021-05-08 RX ADMIN — APIXABAN 5 MG: 2.5 TABLET, FILM COATED ORAL at 20:19

## 2021-05-08 RX ADMIN — ACETAMINOPHEN 650 MG: 325 TABLET, FILM COATED ORAL at 01:10

## 2021-05-08 RX ADMIN — METOPROLOL TARTRATE 25 MG: 25 TABLET, FILM COATED ORAL at 10:15

## 2021-05-08 RX ADMIN — APIXABAN 5 MG: 5 TABLET, FILM COATED ORAL at 10:15

## 2021-05-08 RX ADMIN — SENNOSIDES AND DOCUSATE SODIUM 1 TABLET: 8.6; 5 TABLET ORAL at 10:15

## 2021-05-08 RX ADMIN — PANTOPRAZOLE SODIUM 40 MG: 40 TABLET, DELAYED RELEASE ORAL at 06:33

## 2021-05-08 RX ADMIN — METOPROLOL TARTRATE 25 MG: 25 TABLET, FILM COATED ORAL at 20:19

## 2021-05-08 RX ADMIN — MULTIPLE VITAMINS W/ MINERALS TAB 1 TABLET: TAB at 10:15

## 2021-05-08 ASSESSMENT — ACTIVITIES OF DAILY LIVING (ADL)
ADLS_ACUITY_SCORE: 16

## 2021-05-08 ASSESSMENT — MIFFLIN-ST. JEOR: SCORE: 1697.96

## 2021-05-08 NOTE — PROGRESS NOTES
CLINICAL NUTRITION SERVICES - ASSESSMENT NOTE     Nutrition Prescription    RECOMMENDATIONS FOR MDs/PROVIDERS TO ORDER:  None at this time    Malnutrition Status:    Unable to assess    Recommendations already ordered by Registered Dietitian (RD):  Resume Ensure Enlive bid with meals and Magic Cup daily with meal    Future/Additional Recommendations:  Monitor intake, weight, supplement acceptance      REASON FOR ASSESSMENT  Raj Warren is a/an 70 year old male assessed by the dietitian for Provider Order - Evaluate for malnutrition and supplementation requirements.  Pt admitted from [facility] for continued care r/t bilateral pulmonary embolism s/p embolectomy. Recent dx of  laryngeal cancer s/p tracheostomy.     NUTRITION HISTORY  - Information obtained from chart, pt having therapies  - Tube feeding history- enteral nutrition from 5/2-5/4 at which time FT was removed and regular diet with thin liquids was initiated. Pt on Jevity 1.5--does not appear to have reached goal rate of 70 due to improvement in condition and subsequent extubation and removal of FT.  - severe malnutrition in the setting of acute illness or injury identified on 5/2- >2% weight loss in 1 week and <50% of estimated needs for >5 days  - Supplements- Ensure Enlive bid, Magic cup daily, multivitamin with minerals  - NKFA    CURRENT NUTRITION ORDERS  Diet: Regular  Intake/Tolerance: None charted so far    LABS  Labs reviewed    Electrolytes  Sodium (mmol/L)   Date Value   05/08/2021 138   05/07/2021 139   05/06/2021 137     Potassium (mmol/L)   Date Value   05/08/2021 4.0   05/07/2021 4.1   05/06/2021 4.0     Magnesium (mg/dL)   Date Value   05/03/2021 2.4 (H)   05/02/2021 2.3   05/01/2021 2.3     Phosphorus (mg/dL)   Date Value   05/03/2021 3.2   05/02/2021 2.7   05/01/2021 2.6   05/01/2021 1.9 (L)   04/30/2021 2.5    Renal  Urea Nitrogen (mg/dL)   Date Value   05/08/2021 17   05/07/2021 19   05/06/2021 19     Creatinine (mg/dL)   Date  "Value   05/08/2021 0.90   05/07/2021 0.96   05/06/2021 0.97     Inflammatory Markers  WBC (10e9/L)   Date Value   05/08/2021 8.1   05/07/2021 8.0   05/06/2021 9.1     Albumin (g/dL)   Date Value   05/02/2021 2.8 (L)   05/01/2021 2.6 (L)   04/30/2021 2.8 (L)      Blood Glucose  Glucose (mg/dL)   Date Value   05/08/2021 85   05/07/2021 97   05/06/2021 107 (H)   05/05/2021 104 (H)   05/04/2021 119 (H)     Hemoglobin A1C (%)   Date Value   04/12/2021 5.6    Hepatic  ALT (U/L)   Date Value   05/02/2021 83 (H)     AST (U/L)   Date Value   05/02/2021 38     Alkaline Phosphatase (U/L)   Date Value   05/02/2021 65     Bilirubin Total (mg/dL)   Date Value   05/02/2021 1.0    Additional  Ketones Urine (mg/dL)   Date Value   05/05/2021 Negative            MEDICATIONS    apixaban ANTICOAGULANT  5 mg Oral BID     [START ON 5/9/2021] aspirin  81 mg Oral Daily     metoprolol tartrate  25 mg Oral BID     [START ON 5/9/2021] multivitamin, therapeutic  1 tablet Oral Daily     [START ON 5/9/2021] pantoprazole  40 mg Oral QAM AC        - MEDICATION INSTRUCTIONS -          ANTHROPOMETRICS  Height: 180.3 cm (5' 11\")  Most Recent Weight: 90.6 kg (199 lb 11.2 oz)    IBW: 78.2 kg  Body mass index is 27.85 kg/m .  BMI: Overweight BMI 25-29.9  Weight History:   Wt Readings from Last 10 Encounters:   05/08/21 90.6 kg (199 lb 11.2 oz)   05/07/21 87.7 kg (193 lb 5.5 oz)   04/29/21 90.8 kg- [facility] admission weight   04/22/21 93.9 kg (207 lb)   04/17/21 97.7 kg (215 lb 6.2 oz)   02/19/18 98 kg (216 lb)   12/12/17 95.3 kg (210 lb)   01/30/12 93 kg (205 lb)   08/20/10 99.8 kg (220 lb 1.6 oz)   07/13/10 100.5 kg (221 lb 9.6 oz)   06/22/10 101.1 kg (222 lb 12.8 oz)   - Pt had 6.9 kg (7%) weight loss over 1.5 weeks PTA to [place] (since trach placed).    Dosing Weight: 90.6 kg    ASSESSED NUTRITION NEEDS  Estimated Energy Needs: 4303-4934 kcals/day (25 - 30 kcals/kg)  Justification: Maintenance  Estimated Protein Needs: 109-136 grams protein/day (1.2 - " 1.5 grams of pro/kg)  Justification: Increased needs, Post-op and Repletion  Estimated Fluid Needs: 2961-6023 mL/day (1 mL/kcal)   Justification: Maintenance    PHYSICAL FINDINGS  Multiple surgical incisions noted, pt previous had vac to sternal wound, but has been removed    MALNUTRITION  % Intake: Unable to assess  % Weight Loss: > 2% in 1 week (severe)  Subcutaneous Fat Loss: Unable to assess  Muscle Loss: Unable to assess  Fluid Accumulation/Edema: None noted  Malnutrition Diagnosis: Unable to determine due to no NFPE    NUTRITION DIAGNOSIS  Unintended weight loss related to increased needs and likely decreased intake as evidenced by 7% weight loss PTA      INTERVENTIONS  Implementation  Nutrition Education: Unable to complete due to pt unavailable   Medical food supplement therapy as above    Goals  Patient to consume % of nutritionally adequate meal trays TID, or the equivalent with supplements/snacks.     Monitoring/Evaluation  Progress toward goals will be monitored and evaluated per protocol.  Lora De La Rosa RD, LD  Unit Pager: [phone]  Weekend Pager: [phone]

## 2021-05-08 NOTE — PROGRESS NOTES
Care Management Discharge Note    Discharge Date: 05/08/21(leaving at 1315 pending insurance auth)  Expected Time of Departure: [facility] wheelchair 1315    Discharge Disposition: Acute Rehab    Discharge Services:  NA    Discharge DME:  NA    Discharge Transportation: health plan transportation    Private pay costs discussed: transportation costs    PAS Confirmation Code:  NA  Patient/family educated on Medicare website which has current facility and service quality ratings: yes    Education Provided on the Discharge Plan:    Persons Notified of Discharge Plans: yes  Patient/Family in Agreement with the Plan: yes    Handoff Referral Completed: Yes     Additional Information:  Writer spoke to Emy at [facility] who reports that insurance auth is approved for patient to discharge to [facility] today. Ride scheduled for today at 1315. ARU requests Discharge Summary updated to today's date. Provider updated.         MATA Nino

## 2021-05-08 NOTE — PROGRESS NOTES
RT Note:    Brought trach dome and Aerobika to patient's bedside per order.     Recommended TD at night when not on HME.    Patient refused both therapies.     MD paged.     Zoe Maxwell, RRT-NPS

## 2021-05-08 NOTE — LETTER
[facility] [place] [id number] [address] [address]  Phone: [phone]    May 14, 2021        Raj Warren  [address]  [facility] [address]          To whom it may concern:    RE: Raj Warren    Due to the above patient's medical condition and current functional impairments, we have recommended that he pursue housing with greater accessibility to promote his ongoing safety, wellbeing, and independence.  In accordance with this recommendation, his family is assisting him with pursuing alternative housing options that would better suit his needs.  I would encourage these needs be considered and hope he can be provided the opportunity to leave his current lease in order to better accommodate this unexpected change in his medical/functional status.    Please contact me for questions or concerns.      Sincerely,    Michelle Butt PA-C  Physical Medicine & Rehabilitation

## 2021-05-08 NOTE — PLAN OF CARE
Physical Therapy Discharge Summary    Reason for therapy discharge:    Discharged to acute rehabilitation facility.    Progress towards therapy goal(s). See goals on Care Plan in [facility] electronic health record for goal details.  Goals not met.  Barriers to achieving goals:   discharge from facility.    Therapy recommendation(s):    Continued therapy is recommended.  Rationale/Recommendations:  cont PT at ARU to optimize functional recovery.

## 2021-05-08 NOTE — PROGRESS NOTES
Rehab Admissions:  I spoke with Mr. Warren' son Jovani over the phone today-he will be the designated visitor on Acute Rehab.  We discussed ELOS ~1wk, family support after discharge (Jovani live in [place], but will be coming up to Acute rehab Wednesday and will stay with his father after discharge from Acute rehab), baseline function, goals of acute rehab, what to bring, therapy needs after acute rehab and current visitor policy. I answered all of his questions. It was a pleasure speaking with Jovani today.     Emy Mena CM  Rehab Liaison/  [facility] [place] and Transitional Care Unit  5/8/2021    11:20 AM

## 2021-05-08 NOTE — PROGRESS NOTES
CV Surgery    S: Sitting up in chair, breathing fine, tolerating diet, moving with rehab, to ARU today    O: B/P: 120/64, T: 98.2, P: 90, R: 18  General: NAD  Heart: s1/s2, no m/r/g  Lungs: clear  Abd: s/nt/nd  Sternum: cdi  Extremities: no LE edema    Lab Results   Component Value Date    WBC 8.1 05/08/2021     Lab Results   Component Value Date    RBC 3.72 05/08/2021     Lab Results   Component Value Date    HGB 11.3 05/08/2021     Lab Results   Component Value Date    HCT 33.9 05/08/2021     No components found for: MCT  Lab Results   Component Value Date    MCV 91 05/08/2021     Lab Results   Component Value Date    MCH 30.4 05/08/2021     Lab Results   Component Value Date    MCHC 33.3 05/08/2021     Lab Results   Component Value Date    RDW 15.0 05/08/2021     Lab Results   Component Value Date     05/08/2021       Last Basic Metabolic Panel:  Lab Results   Component Value Date     05/08/2021      Lab Results   Component Value Date    POTASSIUM 4.0 05/08/2021     Lab Results   Component Value Date    CHLORIDE 108 05/08/2021     Lab Results   Component Value Date    TELMA 8.7 05/08/2021     Lab Results   Component Value Date    CO2 23 05/08/2021     Lab Results   Component Value Date    BUN 17 05/08/2021     Lab Results   Component Value Date    CR 0.90 05/08/2021     Lab Results   Component Value Date    GLC 85 05/08/2021       A/P: POD# 10 s/p Emergent pulmonary embolectomy by Dr. Yumi Singh and Dr. Bárbara Cali     POD#8 s/p IVC filter placement    Discussed with rounding surgeon  Jeanine Anderson PA-C  Pager [phone]

## 2021-05-08 NOTE — H&P
[facility]   Acute Rehabilitation Unit  Admission History and Physical    CHIEF COMPLAINT   Deconditioning in setting of pulmonary embolism    HISTORY OF PRESENT ILLNESS  Mr. 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  He was found to have bilateral pulmonary emboli with significant clot burden, mainly in the right main PA, but also a large clot in the right ventricle. On 4/28/21, he underwent emergent pulmonary embolectomy by Dr. Yumi Singh, which was successful. He also underwent an IVC filter placement on 4/30/21.     The patient was hemodynamically unstable following surgery with an elevated lactic acid requiring multiple pressors. He also had some transient hyperglycemia treated with insulin gtt, transitioned to sliding scale insulin. His course was also complicated by fluid overload treated with diuretics, need for a feeding tube, poor healing of sternal incision requiring wound vac placement until pod#8 and need for frequent suctioning. He was extubated within 24 hours of surgery, and he tolerated capping of his trach.      During his acute hospitalization, patient was seen and evaluated by PT, OT, SLP and PM&R consult service.  All specialties collectively recommended that patient would benefit from ongoing therapies in the acute inpatient rehabilitation setting.     Today, patient states he is feeling well other than just very weak from his hospitalization. He also becomes very winded with speaking for prolonged periods of time. He notes that he has numbness along the palmar aspect of his hand affecting digits 3-5 on the right side. This has been present since around the time he had his embolectomy. He has many questions about the surgery/cancer  treatment that will occur when he leaves inpatient rehab.      In review of the therapy notes:    Physical Therapy:  Strength   Strength Comments functional weakness/decreased activity tolerance noted with mobility   Bed Mobility   Comment (Bed Mobility) Min A for supine to sit with HOB elevated; assist to maintain sternal precautions   Transfers   Transfer Safety Comments Min A for sit>stand; use of walker   Gait/Stairs (Locomotion)   Comment (Gait/Stairs) Min/CGA for gait with use of walker   Balance   Balance Comments impaired; current need for walker and assist   Clinical Impression   Criteria for Skilled Therapeutic Intervention yes, treatment indicated     Occupational Therapy  Bed Mobility   Scooting/Bridging [place] (Bed Mobility) minimum assist (75% patient effort)   Supine-Sit [place] (Bed Mobility) minimum assist (75% patient effort)   Sit-Supine [place] (Bed Mobility) moderate assist (50% patient effort);2 person assist   Transfer Skill: Bed to Chair/Chair to Bed   Bed-Chair [place] (Transfers) minimum assist (75% patient effort);2 person assist   Sit-Stand Transfer   Sit-Stand [place] (Transfers) minimum assist (75% patient effort)   Lower Body Dressing Assessment/Training   [place] Level (Lower Body Dressing) maximum assist (25% patient effort)   Instrumental Activities of Daily Living (IADL)   Previous Responsibilities driving;finances;medication management;shopping;laundry;housekeeping;meal prep  (Pt is retired)   Clinical Impression   Criteria for Skilled Therapeutic Interventions Met (OT) yes   OT Diagnosis impaired independence with ADl's and functional mobility     Speech Language Pathology  Communication: Patient currently using written communication effectively, recommend transition to HME/T-piece instead of HFTD when appropriate from respiratory standpoint, as patient had been able to communicate effectively with trach-finger occlusion.    Currently, the patient  is medically appropriate and is assessed to have needs and will benefit from an inpatient acute rehabilitation comprehensive program working with PT, OT and SLP, and will also benefit from supervision and management of Rehab Nursing and Rehab MD.     PAST MEDICAL HISTORY  Past Medical History:   Diagnosis Date     Allergic state      SURGICAL HISTORY  Past Surgical History:   Procedure Laterality Date     IR IVC FILTER PLACEMENT  4/30/2021     LARYNGOSCOPY N/A 4/12/2021    Procedure: LARYNGOSCOPY;  Surgeon: Choco Johnson MD;  Location:  OR     REPAIR ANEURYSM ASCENDING AORTA N/A 4/28/2021    Procedure: PULMONARY THROMBECTOMY;  Surgeon: Yumi Singh MD;  Location:  OR     TRACHEOSTOMY  4/12/2021    Procedure: CREATION, TRACHEOSTOMY;  Surgeon: Choco Johnson MD;  Location:  OR     SOCIAL HISTORY  Marital Status: Single   Living situation: Alone, apartment, stairs to enter home (15). He was planning on moving into a place without stairs prior to hospitalization.  Family support: Has a son, Jovani, who lives in [place] but plans to come to ARU on 5/12 and will stay with him through the remainder of his rehab stay and following discharge for a short period of time.       FAMILY HISTORY  Family History   Problem Relation Age of Onset     Circulatory Mother         blood clots     Alcohol/Drug Father         alcohol drank heavily     Alcohol/Drug Brother         alcohol     PRIOR FUNCTIONAL HISTORY   Prior to admission patient was independent with all ADLs/IADLs, transfers, mobility and gait.      MEDICATIONS  Medications Prior to Admission   Medication Sig Dispense Refill Last Dose     acetaminophen (TYLENOL) 325 MG tablet Take 2 tablets (650 mg) by mouth every 4 hours as needed for other (For optimal non-opioid multimodal pain management to improve pain control.)   5/8/2021 at 01:10     apixaban ANTICOAGULANT (ELIQUIS) 5 MG tablet Take 1 tablet (5 mg) by mouth 2 times daily   5/8/2021 at 10:15      "aspirin (ASA) 81 MG chewable tablet 1 tablet (81 mg) by Oral or NG Tube route daily   5/8/2021 at 10:15     metoprolol tartrate (LOPRESSOR) 25 MG tablet Take 1 tablet (25 mg) by mouth 2 times daily   5/8/2021 at 10:15     multivitamin w/minerals (THERA-VIT-M) tablet Take 1 tablet by mouth daily   5/8/2021 at 10:15     pantoprazole (PROTONIX) 40 MG EC tablet Take 1 tablet (40 mg) by mouth every morning (before breakfast) for 14 days   5/8/2021 at 06:33     senna-docusate (SENOKOT-S/PERICOLACE) 8.6-50 MG tablet 2 tablets by Oral or Feeding Tube route 2 times daily as needed for constipation   5/8/2021 at 10:15     albuterol (PROVENTIL) (2.5 MG/3ML) 0.083% neb solution Take 1 vial (2.5 mg) by nebulization every 4 hours as needed for wheezing   4/28/2021 at 01:25     carboxymethylcellulose PF (REFRESH PLUS) 0.5 % ophthalmic solution Place 1 drop into both eyes every hour as needed for dry eyes   5/6/2021 at 00:06     gabapentin (NEURONTIN) 100 MG capsule Take 1 capsule (100 mg) by mouth nightly as needed for other (pain)   Unknown at Unknown time     methocarbamol (ROBAXIN) 500 MG tablet 1 tablet (500 mg) by Oral or Feeding Tube route every 6 hours as needed for muscle spasms   5/6/2021 at 02:07     oxyCODONE (ROXICODONE) 5 MG tablet 1 tablet (5 mg) by Oral or Feeding Tube route every 4 hours as needed  0 Unknown at Unknown time     polyethylene glycol (MIRALAX) 17 GM/Dose powder Take 17 g by mouth daily 510 g  Unknown at Unknown time     ALLERGIES     Allergies   Allergen Reactions     Pollen Extract      REVIEW OF SYSTEMS  A 10 point ROS was performed and negative unless otherwise noted in HPI.     PHYSICAL EXAM  VITAL SIGNS:  /74 (BP Location: Left arm)   Pulse 93   Temp 97.3  F (36.3  C) (Oral)   Resp 18   Ht 1.803 m (5' 11\")   Wt 90.6 kg (199 lb 11.2 oz)   SpO2 98%   BMI 27.85 kg/m    BMI:  Estimated body mass index is 27.85 kg/m  as calculated from the following:    Height as of this encounter: 1.803 " "m (5' 11\").    Weight as of this encounter: 90.6 kg (199 lb 11.2 oz).     General: NAD, pleasant and cooperative  HEENT: ATNC, oropharynx clear with MMM, trach  Pulmonary: clear breath sounds b/l, occasional cough with minimal sputum  Cardiovascular: RRR, no peripheral edema  Abdominal: soft, non-tender, non-distended  Extremities: warm, well perfused, no edema in bilateral lower extremities, no tenderness in calves  MSK/neuro:   Mental Status:  alert and oriented x3    Sensory: Normal to light touch in bilateral upper and lower extremities with the exception of numbness noted on the palmar aspect of right hand affecting digits 3-5 and palm.    Strength:    SF  EF  EE  WE  G  I  HF  KE  DF  EHL  PF   R  4/5 5/5 5/5 5/5 5/5 5/5 4/5 5/5 5/5 5/5 5/5   L  4/5 5/5 5/5 5/5 5/5 5/5 4/5 5/5 5/5 5/5 5/5    Abnormal movements: None   Speech: He is able to cover trach to speak, but becomes short of breath quickly with attempts to speak for longer periods of time.    Cognition: Intact  Skin: No lesions or abrasions on exposed skin      LABS  Pertinent labs    Lab Results   Component Value Date    WBC 8.1 05/08/2021    HGB 11.3 (L) 05/08/2021    HCT 33.9 (L) 05/08/2021    MCV 91 05/08/2021     05/08/2021     Lab Results   Component Value Date     05/08/2021    POTASSIUM 4.0 05/08/2021    CHLORIDE 108 05/08/2021    CO2 23 05/08/2021    GLC 85 05/08/2021     Lab Results   Component Value Date    GFRESTIMATED 87 05/08/2021    GFRESTBLACK >90 05/08/2021     Lab Results   Component Value Date    AST 38 05/02/2021    ALT 83 (H) 05/02/2021    ALKPHOS 65 05/02/2021    BILITOTAL 1.0 05/02/2021     Lab Results   Component Value Date    INR 1.50 (H) 04/28/2021     Lab Results   Component Value Date    BUN 17 05/08/2021    CR 0.90 05/08/2021         ASSESSMENT/PLAN:  Mr. Warren is a 70-year-old male with laryngeal cancer and tracheostomy placement on 4/12/21 who suffered recent bilateral pulmonary emboli s/p embolectomy and IVC " filter placement on 4/30/21 with prolonged hospitalization that included IVC filter placement on 4/30/21, elevated lactic acid and hemodynamic instability requiring multiple pressors, poor healing of incision requiring wound vac placement.  He was transferred to ARU on 05/08 for acute rehabilitation. His current deficits include decreased strength, mobility, and independence with ADLs.     Rehab Diagnosis:  16 Debility (non-cardiac, non-pulmonary) due to bilateral PEs, laryngeal mass    1. PT and OT 90 minutes of each on a daily basis, in addition to rehab nursing and close management of physiatrist.    2. Impairment of ADL's: OT for 90 minutes daily to work on ADL re-training such as grooming, self cares and bathing.    3. Impairment of mobility:  PT for 90 minutes daily to work on neuromuscular re-education focusing on strength, balance, coordination, and endurance.    4. Rehab RN for med administration, bowel regimen, glucose monitoring and wound care.  1. Adjustment to disability:  Clinical psychology to eval and treat as needed.   2. FEN: Regular diet thin liquids. Pantoprazole 40 mg qday.   3. Bowel: Continent. Miralax and Senna-docusate scheduled for now.  4. Bladder: Continent  5. DVT Prophylaxis: Anticoagulated with apixaban    5. Medical Conditions  # Bilateral pulmonary embolisms s/p embolectomy and IVC filter placed on 4/30.  - IVC filter in place  - Eliquis 5 mg bid  - ASA 81 mg daily    # Squamous cell carcinoma of larynx  - Tracheostomy placed on 04/12 d/t laryngeal squamous cell carcinoma with airway compromise.  - Wean oxygen via trach dome as tolerated  - Albuterol nebulizers q4h prn  - Follow up ENT and Oncology after discharge (see below) - will ultimately require laryngectomy    # DMII  - Pre op A1c: 5.6.   - Continue sliding scale insulin.   - Will check BG BID and based on these values, will determine whether he requires metformin or other DM medications.     # HTN:   - Metoprolol 25 mg  bid    # Pain:   - Acetaminophen 650 mg q4h prn for pain  - Gabapentin 100 mg at bedtime prn  - methocarbamol 500 mg q6h prn for muscle spasms  - oxycodone 5 mg q4h prn for severe pain    Code: Full code - patient states he would like to think more about this, but for now we decided to keep him full code.   Disposition: Pending further evaluation by therapies  ELOS:  2 weeks.  Rehab prognosis:  Good  Follow up Appointments on Discharge:    1. Primary care physician 7-10 days after discharge   2. Physical Medicine and Rehabilitation   3. ENT Surgery   4. Oncology - Follow up with Dr. Jamin Bean, oncologist on 5/27/21 at 2:20 PM.     5. Cardiology - Follow up with Dr Lopez, cardiologist, on 7/8/21 at 12:45 PM.   6. Jeanine/Susanna/Quique ODOM-Cs with Dr Singh, heart surgeon, at [facility] at [facility] [address] on 5/19/21 at 2:30 PM.      I spent a total of 70 minutes face-to-face and managing the care of Raj Warren. Over 50% of my time was spent counseling the patient and coordinating care. Please see note for details.

## 2021-05-08 NOTE — PLAN OF CARE
Tylenol given x1 for c/o posterior neck pain. Repositioned per request. Suctioned trach x2 of scant amount clear sputum. Voiding per urinal.Tele= NSR

## 2021-05-08 NOTE — PLAN OF CARE
Occupational Therapy Discharge Summary    Reason for therapy discharge:    Discharged to acute rehabilitation facility.    Progress towards therapy goal(s). See goals on Care Plan in [facility] electronic health record for goal details.  Goals partially met.  Barriers to achieving goals:   discharge from facility.    Therapy recommendation(s):    Continued therapy is recommended.  Rationale/Recommendations:  Recommend continued OT to increase endurance and independence in ADLs..

## 2021-05-09 ENCOUNTER — APPOINTMENT (OUTPATIENT)
Dept: OCCUPATIONAL THERAPY | Facility: CLINIC | Age: 71
End: 2021-05-09
Payer: COMMERCIAL

## 2021-05-09 ENCOUNTER — APPOINTMENT (OUTPATIENT)
Dept: PHYSICAL THERAPY | Facility: CLINIC | Age: 71
End: 2021-05-09
Payer: COMMERCIAL

## 2021-05-09 PROCEDURE — 97530 THERAPEUTIC ACTIVITIES: CPT | Mod: GP

## 2021-05-09 PROCEDURE — 97116 GAIT TRAINING THERAPY: CPT | Mod: GP

## 2021-05-09 PROCEDURE — 97165 OT EVAL LOW COMPLEX 30 MIN: CPT | Mod: GO

## 2021-05-09 PROCEDURE — 128N000003 HC R&B REHAB

## 2021-05-09 PROCEDURE — 97530 THERAPEUTIC ACTIVITIES: CPT | Mod: GO

## 2021-05-09 PROCEDURE — 97162 PT EVAL MOD COMPLEX 30 MIN: CPT | Mod: GP

## 2021-05-09 PROCEDURE — 97535 SELF CARE MNGMENT TRAINING: CPT | Mod: GO

## 2021-05-09 PROCEDURE — 250N000013 HC RX MED GY IP 250 OP 250 PS 637

## 2021-05-09 PROCEDURE — 97110 THERAPEUTIC EXERCISES: CPT | Mod: GP

## 2021-05-09 PROCEDURE — 250N000013 HC RX MED GY IP 250 OP 250 PS 637: Performed by: PHYSICAL MEDICINE & REHABILITATION

## 2021-05-09 PROCEDURE — 99231 SBSQ HOSP IP/OBS SF/LOW 25: CPT | Mod: 24 | Performed by: PHYSICAL MEDICINE & REHABILITATION

## 2021-05-09 RX ORDER — TETRAHYDROZOLINE HCL 0.05 %
1 DROPS OPHTHALMIC (EYE) 4 TIMES DAILY PRN
Status: DISCONTINUED | OUTPATIENT
Start: 2021-05-09 | End: 2021-05-15 | Stop reason: HOSPADM

## 2021-05-09 RX ADMIN — THERA TABS 1 TABLET: TAB at 09:22

## 2021-05-09 RX ADMIN — METOPROLOL TARTRATE 25 MG: 25 TABLET, FILM COATED ORAL at 20:02

## 2021-05-09 RX ADMIN — METOPROLOL TARTRATE 25 MG: 25 TABLET, FILM COATED ORAL at 09:23

## 2021-05-09 RX ADMIN — PANTOPRAZOLE SODIUM 40 MG: 40 TABLET, DELAYED RELEASE ORAL at 09:22

## 2021-05-09 RX ADMIN — APIXABAN 5 MG: 2.5 TABLET, FILM COATED ORAL at 20:01

## 2021-05-09 RX ADMIN — APIXABAN 5 MG: 2.5 TABLET, FILM COATED ORAL at 09:22

## 2021-05-09 RX ADMIN — ASPIRIN 81 MG CHEWABLE TABLET 81 MG: 81 TABLET CHEWABLE at 09:22

## 2021-05-09 NOTE — PHARMACY-MEDICATION REGIMEN REVIEW
Pharmacy Medication Regimen Review  Raj Warren is a 70 year old male who is currently in the Acute Rehab Unit.    Assessment: All medications have an appropriate indications, durations and no unnecessary use was found    Plan:   Continue medications as ordered.  Attending provider will be sent this note for review.  If there are any emergent issues noted above, pharmacist will contact provider directly by phone.      Pharmacy will periodically review the resident's medication regimen for any PRN medications not administered in > 72 hours and discontinue them. The pharmacist will discuss gradual dose reductions of psychopharmacologic medications with interdisciplinary team on a regular basis.    Please contact pharmacy if the above does not answer specific medication questions/concerns.    Background:  A pharmacist has reviewed all medications and pertinent medical history today.  Medications were reviewed for appropriate use and any irregularities found are listed with recommendations.      Current Facility-Administered Medications:      acetaminophen (TYLENOL) tablet 650 mg, 650 mg, Oral, Q4H PRN, Jessee Iraheta MD     albuterol (PROAIR HFA/PROVENTIL HFA/VENTOLIN HFA) 108 (90 Base) MCG/ACT inhaler 2 puff, 2 puff, Inhalation, Q4H PRN, Jessee Iraheta MD     apixaban ANTICOAGULANT (ELIQUIS) tablet 5 mg, 5 mg, Oral, BID, Jessee Iraheta MD, 5 mg at 05/08/21 2019     aspirin (ASA) chewable tablet 81 mg, 81 mg, Oral, Daily, Jessee Iraheta MD     metoprolol tartrate (LOPRESSOR) tablet 25 mg, 25 mg, Oral, BID, Jessee Iraheta MD, 25 mg at 05/08/21 2019     multivitamin, therapeutic (THERA-VIT) tablet 1 tablet, 1 tablet, Oral, Daily, Jessee Iraheta MD     pantoprazole (PROTONIX) EC tablet 40 mg, 40 mg, Oral, QAM AC, Jessee Iraheta MD     Patient is already receiving anticoagulation with heparin, enoxaparin (LOVENOX), warfarin (COUMADIN)  or other anticoagulant medication, , Does not  apply, Continuous PRN, Jessee Iraheta MD     pneumococcal vaccine (PNEUMOVAX 23-sami) injection 0.5 mL, 0.5 mL, Intramuscular, Prior to discharge, William Gill MD     senna-docusate (SENOKOT-S/PERICOLACE) 8.6-50 MG per tablet 1-4 tablet, 1-4 tablet, Oral, BID PRN, Jessee Iraheta MD  No current outpatient prescriptions on file.

## 2021-05-09 NOTE — PLAN OF CARE
FOCUS/GOAL  Pain management, Medical management, Mobility, Skin integrity, and Safety management    ASSESSMENT, INTERVENTIONS AND CONTINUING PLAN FOR GOAL:  Patient is alert and oriented, awake early during shift change noted coughing out secretion, sectioned x 1 with minimal amount of secretion. Denies pain, dizziness, chest pain or shortness of breathing. Trach [place] 8 cuffed in place. Trach site dressing clean, dry and intact. VSS O2 sats 98 % RA. Required suctioning again at 0520 x 1 for small amount of secretion. Patient tolerated well. Patient voided 300 ml @ 0500 PVR 15 ml. Currently sleeping. No concern at this time. Will continue with the POC.

## 2021-05-09 NOTE — PLAN OF CARE
Patient is A&Ox4, calm, and cooperative. Assist of 1 with walker and gait belt.  Admitted today from [facility]. Patient has trach, [place] 8 cuffed. He is on room air, O2 sats WDL, some SOB with exertion. Trach ties, inner cannula, and dressing changed during shift.  Patient denied trach dome overnight. Continent of bowel and bladder, last BM 05/06. Regular/thin diet, takes pills whole. Denied pain during shift. PICC line removed today, pressure dressing in place until 05/10. Has sternal incisions and groin incisions WDL. VS stable. Continue POC.

## 2021-05-09 NOTE — PLAN OF CARE
Discharge Planner OT   Discharge plan: return home alone  Precautions: trach, falls  Current status:   G/H: SBA with walker  UB dressing: SBA   LB dressing: min assist for socks and shoes  Bathing: SBA    Assessment: Pt presents deconditioned compared to baseline and requires use of walker. Pt new to trach cares. Pt would benefit from continued OT services for increasing endurance, safety with ADLs, and strengthening.     Other Barriers to discharge: 25+ steps, patient lining up intermittent assistance, equipment to be determined       Entered by: JOHNNY MC 05/09/2021 12:33 PM

## 2021-05-09 NOTE — PROGRESS NOTES
05/09/21 0815   Living Environment   People in home alone   Current Living Arrangements apartment   Home Accessibility stairs to enter home  (25 with railing)   Transportation Anticipated car, drives self   Self-Care   Usual Activity Tolerance good   Current Activity Tolerance moderate   Equipment Currently Used at Home none   Disability/Function   Hearing Difficulty or Deaf no   Wear Glasses or Blind yes  (for reading)   Concentrating, Remembering or Making Decisions Difficulty no   Difficulty Communicating yes   Communication difficulty speaking   Difficulty Eating/Swallowing no   Walking or Climbing Stairs Difficulty yes   Walking or Climbing Stairs ambulation difficulty, requires equipment   Dressing/Bathing Difficulty yes   Dressing/Bathing bathing difficulty, assistance 1 person   Toileting issues no   Doing Errands Independently Difficulty (such as shopping) no   Fall history within last six months no   Change in Functional Status Since Onset of Current Illness/Injury yes   General Information   Onset of Illness/Injury or Date of Surgery 04/12/21  (trach placed 4/12/21)   Referring Physician Jessee Iraheta MD   Patient/Family Therapy Goal Statement (OT) Return home independently   Additional Occupational Profile Info/Pertinent History of Current Problem 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   Cognitive Status Examination   Orientation Status orientation to person, place and time   Affect/Mental Status (Cognitive) WNL   Follows Commands WNL   Visual Perception   Visual Impairment/Limitations WNL   Pain Assessment   Patient Currently in Pain   (Patient describes tightness along chest incision)   Range of Motion Comprehensive   General Range of Motion no range of motion deficits identified   Muscle Tone Assessment   Muscle Tone Quick Adds No deficits were identified   Coordination   Fine Motor Coordination   (right hand fine motor deficits)   Clinical Impression   Criteria for Skilled  Therapeutic Interventions Met (OT) skilled treatment is necessary   OT Diagnosis   (laryngeal cancer and tracheostomy placement )   OT Problem List-Impairments impacting ADL problems related to;activity tolerance impaired;balance;coordination;strength   ADL comments/analysis Patient demonstrated decreased endurance and strength during ADls.    Assessment of Occupational Performance 1-3 Performance Deficits   Planned Therapy Interventions (OT) ADL retraining;IADL retraining;balance training;fine motor coordination training;motor coordination training;strengthening;transfer training   Clinical Decision Making Complexity (OT) low complexity   Therapy Frequency (OT) Daily   Predicted Duration of Therapy   (7-10 days)   Anticipated Equipment Needs Upon Discharge (OT) shower chair   Risk & Benefits of therapy have been explained evaluation/treatment results reviewed;care plan/treatment goals reviewed;risks/benefits reviewed;current/potential barriers reviewed;participants voiced agreement with care plan;participants included;patient   Comment-Clinical Impression patient appears deconditioned and slight R UE fine motor deficits. Pt would benefit from continue OT services for ADL training and stregthening.    Total Evaluation Time (Minutes)   Total Evaluation Time (Minutes) 15

## 2021-05-09 NOTE — PROGRESS NOTES
"[facility] [place] [place]   Physical Medicine and Rehabilitation Daily Note           Assessment and Plan of Care:   Raj Warren is a 70-year-old male with laryngeal cancer and tracheostomy placement on 4/12/21 who suffered recent bilateral pulmonary emboli s/p embolectomy and IVC filter placement on 4/30/21 with prolonged hospitalization.  He was transferred to ARU on 05/08 for acute rehabilitation.     --Vitals stable. No labs today.  --Continue ongoing medical management.  --Continue therapies and plan of care.             Interval history:   Patient seen and examined at bedside. He slept \"ok\" overnight. States he is not in any pain today. Continues to complain of numbness and tingling in his right hand, but it is non-painful. Breathing is stable. Denies fever, chills, CP, SOB, N/V, abdominal pain, new pain or weakness.             Physical Exam:   VS:   Vitals:    05/08/21 2015 05/08/21 2100 05/09/21 0203 05/09/21 0918   BP: 120/76 115/69 120/74 122/72   BP Location: Left arm Left arm Left arm Left arm   Pulse: 102 97 93 84   Resp: 12 16 18 20   Temp: 98.6  F (37  C) 96.5  F (35.8  C) 97.3  F (36.3  C) 97.8  F (36.6  C)   TempSrc: Oral Oral Oral Oral   SpO2: 98% 98% 98% 98%   Weight:       Height:         Gen: NAD, resting comfortably in bed  HEENT: Trach in place  Lungs: breathing unlabored on room air  Ext: no edema in BLE, no calf tenderness  MSK/neuro: Speech fluent, moves all four extremities volitionally.          Data:   Scheduled meds    apixaban ANTICOAGULANT  5 mg Oral BID     aspirin  81 mg Oral Daily     metoprolol tartrate  25 mg Oral BID     multivitamin, therapeutic  1 tablet Oral Daily     pantoprazole  40 mg Oral QAM AC     pneumococcal vaccine  0.5 mL Intramuscular Prior to discharge       PRN meds:  acetaminophen, albuterol, - MEDICATION INSTRUCTIONS -, senna-docusate      Elke Villagran MD  Physical Medicine and Rehabilitation     I spent a total of 15 minutes " face-to-face and managing the care of Raj Warren. Over 50% of my time on the unit was spent counseling the patient and coordinating care. Please see note for details.

## 2021-05-09 NOTE — PROGRESS NOTES
Discharge Planner Post-Acute Rehab PT:     Discharge Plan: pt lives alone in an apt with 25 stairs and B rails; previously very limiting pre hospital and pt is looking for a new apt due to this; TBA    Precautions: fall, trach (communication due to trach)    Current Status:  Bed Mobility: cga  Transfer: cga with FWW  Gait: Ambulates  feet with a FWW cga to sba no LOB  Stairs: pt performed 10 8-10 inch steps in the room with a FWW cga  Balance: no gross LOB, will assess higher level balance as appropriate    Assessment:  Pt is appropriate for skilled PT to help him return to his previous indep living status without a device    Other Barriers to Discharge (DME, Family Training, etc): functional tolerance, cirilo,     Rd Greco, PT  5/9/2021

## 2021-05-09 NOTE — CONSULTS
"Social Work: Initial Assessment with Discharge Plan    Patient Name: Raj Warren  : 1950  Age: 70 year old  MRN: [id number]  Completed assessment with: Pt and chart review  Admitted to ARU: 2021    Presenting Information   Date of SW assessment: May 9, 2021  Health Care Directive: Declined completing  Primary Health Care Agent: Pt  Secondary Health Care Agent: SonJovani, is ALBA  Living Situation: Lives alone in an apartment in [place], [place]. Lives on 3rd floor, [address]. SonJovani, lives in [place] and plans to come to ARU on 21 and stay w/ pt through the remainder of his ARU stay and following discharge for a short period of time. Pt wants to move into an apartment w/ less stairs/elevator access. SW encouraged pt to have his son assist him w/ this when he's here so he can go tour apartments, which pt agreed to do.   Previous Functional Status: IND w/ ADLs/IADLs, transfers, mobility and gait   DME available: None  Patient and family understanding of hospitalization: Appropriate  Cultural/Language/Spiritual Considerations: Black, English speaking male. Religion tenzin      Physical Health  Reason for admission: Pulmonary embolism    From H&P: \"Mr. Warren is a 70-year-old male with laryngeal cancer and tracheostomy placement on 21 who suffered recent bilateral pulmonary emboli s/p embolectomy and IVC filter placement on 21 with prolonged hospitalization that included IVC filter placement on 21, elevated lactic acid and hemodynamic instability requiring multiple pressors, poor healing of incision requiring wound vac placement.  He was transferred to ARU on  for acute rehabilitation. His current deficits include decreased strength, mobility, and independence with ADLs.\"    Provider Information   Primary Care Physician:Nuvia Brown at [facility]. Most recent visit was 3 weeks ago per pt  : None reported    Mental Health/Chemical Dependency: " "  Diagnosis: None reported  Alcohol/Tobacco/Narcotis: None reported  Support/Services in Place: NA  Services Needed/Recommended: Supportive services available by consult (Health Psychology and [place] services)   Sexuality/Intimacy: Not discussed     Support System  Marital Status: Single  Family support: SonJovani, lives in [place] and plans to come to ARU on 5/12/21 and stay w/ pt through the remainder of his ARU stay and following discharge for a short period of time.  Other support available: None reported. \"I'm working on it\" per pt    Community Resources  Current in home services: None  Previous services: None    Financial/Employment/Education  Employment Status: Retired  Income Source: shelter and social security  Education: 2 year college  Financial Concerns:  None reported  Insurance: BCBS Out of States Medicare Replacement      Discharge Plan   Patient and family discharge goal: Discharge home w/ son for a short period of time and recommended services  Provided Education on discharge plan: Yes  Patient agreeable to discharge plan:  Yes  Provided education and attained signature for Medicare IM and IRF Patient Rights and Privacy Information provided to patient : NA  Provided patient with [place] [facility] [place] Resources: NA  Barriers to discharge: Progress in therapies, stairs at apartment    Discharge Recommendations   Disposition: See above   Transportation Needs: Family assistance   Name of Transportation Company and Phone: N/A     Additional comments   ELOS 7-10 days. Discharge needs pending. 15/15 on BIMS.       Please invite to Care Conference:  SHAHANA IBARRA, VIANCA Cabral              "

## 2021-05-09 NOTE — PROGRESS NOTES
"   05/09/21 1115   Quick Adds   Type of Visit Initial PT Evaluation   Living Environment   People in home alone   Current Living Arrangements apartment   Home Accessibility stairs within home;other (see comments)   Number of Stairs, Main Entrance other (see comments)  (25 stairs, B rails; current apt, but wants to move)   Stair Railings, Main Entrance railings on both sides of stairs   Transportation Anticipated car, drives self;health plan transportation;other (see comments)  (pt normally drives, but wants to set up medical transport)   Living Environment Comments Pt recently signed a lease for his apt with 25 stairs, B rails, but is looking for another place due to this stairs barrier and current functional status;  referred pt to MD and social work to help him with this.    Self-Care   Usual Activity Tolerance good   Current Activity Tolerance moderate   Equipment Currently Used at Home none   Activity/Exercise/Self-Care Comment 45 min of walking in the park most days   Disability/Function   Hearing Difficulty or Deaf no   Wear Glasses or Blind yes   Concentrating, Remembering or Making Decisions Difficulty no   Difficulty Communicating yes   Communication difficulty speaking   Difficulty Eating/Swallowing no   Walking or Climbing Stairs Difficulty yes   Walking or Climbing Stairs other (see comments)  (stairs, SOB with medical changes)   Toileting issues no   Doing Errands Independently Difficulty (such as shopping) no   Fall history within last six months no   Change in Functional Status Since Onset of Current Illness/Injury yes   General Information   Referring Physician Dr. Villagran   Patient/Family Therapy Goals Statement (PT) Pt wants to discharge the hospital to an environment with less stairs as he currently has 25   Pertinent History of Current Problem (include personal factors and/or comorbidities that impact the POC) Per MD notes: \"Mr. Warren is a 70-year-old male with laryngeal cancer and tracheostomy " "placement on 4/12/21 who suffered recent bilateral pulmonary emboli s/p embolectomy and IVC filter placement on 4/30/21 with prolonged hospitalization that included IVC filter placement on 4/30/21, elevated lactic acid and hemodynamic instability requiring multiple pressors, poor healing of incision requiring wound vac placement.  He was transferred to ARU on 05/08 for acute rehabilitation. His current deficits include decreased strength, mobility, and independence with ADLs. \"     Existing Precautions/Restrictions fall;other (see comments)  (trach; alarms)   Weight-Bearing Status - LUE weight-bearing as tolerated   Weight-Bearing Status - RUE weight-bearing as tolerated   Weight-Bearing Status - LLE weight-bearing as tolerated   Weight-Bearing Status - RLE weight-bearing as tolerated   Heart Disease Risk Factors Gender;Age   General Observations PT: pt is comfortable and making basic needs known and answering basic hx through trach, although this is difficult   Cognition   Cognitive Status Comments A and O for PT purposes   Pain Assessment   Patient Currently in Pain No   Integumentary/Edema   Integumentary/Edema Comments denies, trach in place   Range of Motion (ROM)   ROM Comment WNL's for gait and transfers   Strength   Strength Comments Generalized weakness due to hospitalization;  grossly 4/5;  able to transfer sit to stand without UE assist; functional weakness noted on step/ups stairs (generalized with increased reps; able to perform 10 step ups with FWW).    ARC Assessment Only   Acute Rehab Functional Assessment See IP Rehab Daily Documentation Flowsheet for Functional Mobility/ADL Assessment   Balance   Balance Comments cga with a FWW no gross LOB but will continue to assess high level balance.    Sensory Examination   Sensory Perception Comments pt demonstrates impaired sensation in R UE digits 3,4,5;  some in the medial forearm as well   Coordination   Coordination Comments grossly intact   Muscle Tone "   Muscle Tone Comments unremarkable   Clinical Impression   Criteria for Skilled Therapeutic Intervention yes, treatment indicated   PT Diagnosis (PT) Pt demonstrates impaired functional tolerance, impaired cardiorespiratory function, weakness following medical issues as noted;  he is approprite for skilled PT to help him return to his prior level of function.    Influenced by the following impairments as in PT dx   Functional limitations due to impairments as in PT dx   Clinical Presentation Evolving/Changing   Clinical Presentation Rationale medical complexity; post surgical status   Clinical Decision Making (Complexity) moderate complexity   Therapy Frequency (PT) Daily   Predicted Duration of Therapy Intervention (days/wks) 7-14 days  (pending stairs needed at discharge location)   Planned Therapy Interventions (PT) balance training;bed mobility training;gait training;home exercise program;motor coordination training;neuromuscular re-education;patient/family education;postural re-education;ROM (range of motion);stair training;strengthening;stretching;transfer training;other (see comments)  (breathing education)   Anticipated Equipment Needs at Discharge (PT) walker, rolling   Risk & Benefits of therapy have been explained evaluation/treatment results reviewed;care plan/treatment goals reviewed;risks/benefits reviewed;current/potential barriers reviewed;participants voiced agreement with care plan;participants included;patient   Clinical Impression Comments pt has limited tolerance due to medical issues; respiratory status and deconditioning; pending stairs needed at DC location and progression toward these, anticipate between 1-2 weeks   Total Evaluation Time   Total Evaluation Time (Minutes) 45       Rd Greco, PT  5/9/2021

## 2021-05-10 ENCOUNTER — APPOINTMENT (OUTPATIENT)
Dept: OCCUPATIONAL THERAPY | Facility: CLINIC | Age: 71
End: 2021-05-10
Payer: COMMERCIAL

## 2021-05-10 ENCOUNTER — PATIENT OUTREACH (OUTPATIENT)
Dept: CARE COORDINATION | Facility: CLINIC | Age: 71
End: 2021-05-10

## 2021-05-10 ENCOUNTER — APPOINTMENT (OUTPATIENT)
Dept: PHYSICAL THERAPY | Facility: CLINIC | Age: 71
End: 2021-05-10
Payer: COMMERCIAL

## 2021-05-10 ENCOUNTER — APPOINTMENT (OUTPATIENT)
Dept: SPEECH THERAPY | Facility: CLINIC | Age: 71
End: 2021-05-10
Payer: COMMERCIAL

## 2021-05-10 PROCEDURE — 99233 SBSQ HOSP IP/OBS HIGH 50: CPT | Mod: 24 | Performed by: PHYSICAL MEDICINE & REHABILITATION

## 2021-05-10 PROCEDURE — 999N000157 HC STATISTIC RCP TIME EA 10 MIN

## 2021-05-10 PROCEDURE — 97535 SELF CARE MNGMENT TRAINING: CPT | Mod: GO

## 2021-05-10 PROCEDURE — 97530 THERAPEUTIC ACTIVITIES: CPT | Mod: GP

## 2021-05-10 PROCEDURE — 92523 SPEECH SOUND LANG COMPREHEN: CPT | Mod: GN | Performed by: SPEECH-LANGUAGE PATHOLOGIST

## 2021-05-10 PROCEDURE — 250N000013 HC RX MED GY IP 250 OP 250 PS 637

## 2021-05-10 PROCEDURE — 128N000003 HC R&B REHAB

## 2021-05-10 PROCEDURE — 97116 GAIT TRAINING THERAPY: CPT | Mod: GP

## 2021-05-10 PROCEDURE — 250N000013 HC RX MED GY IP 250 OP 250 PS 637: Performed by: PHYSICAL MEDICINE & REHABILITATION

## 2021-05-10 RX ADMIN — ACETAMINOPHEN 325 MG: 325 TABLET, FILM COATED ORAL at 18:40

## 2021-05-10 RX ADMIN — APIXABAN 5 MG: 2.5 TABLET, FILM COATED ORAL at 09:05

## 2021-05-10 RX ADMIN — ASPIRIN 81 MG CHEWABLE TABLET 81 MG: 81 TABLET CHEWABLE at 09:05

## 2021-05-10 RX ADMIN — TETRAHYDROZOLINE HCL 1 DROP: 0.05 LIQUID OPHTHALMIC at 16:05

## 2021-05-10 RX ADMIN — DOCUSATE SODIUM 50MG AND SENNOSIDES 8.6MG 2 TABLET: 8.6; 5 TABLET, FILM COATED ORAL at 21:24

## 2021-05-10 RX ADMIN — PANTOPRAZOLE SODIUM 40 MG: 40 TABLET, DELAYED RELEASE ORAL at 09:05

## 2021-05-10 RX ADMIN — METOPROLOL TARTRATE 25 MG: 25 TABLET, FILM COATED ORAL at 09:05

## 2021-05-10 RX ADMIN — APIXABAN 5 MG: 2.5 TABLET, FILM COATED ORAL at 21:00

## 2021-05-10 RX ADMIN — METOPROLOL TARTRATE 25 MG: 25 TABLET, FILM COATED ORAL at 21:22

## 2021-05-10 RX ADMIN — THERA TABS 1 TABLET: TAB at 09:05

## 2021-05-10 NOTE — PLAN OF CARE
"/70   Pulse 99   Temp 98.9  F (37.2  C) (Oral)   Resp 16   Ht 1.803 m (5' 11\")   Wt 90.6 kg (199 lb 11.2 oz)   SpO2 98%   BMI 27.85 kg/m    Body mass index is 27.85 kg/m .     FOCUS/GOAL  Mobility, Skin integrity and Safety management, Airway management    ASSESSMENT, INTERVENTIONS AND CONTINUING PLAN FOR GOAL:  Pt AOx4, FWW Sb ao1, makes needs know, calls appropriately, knows that he should call before getting OOB. R arm PICC dressing CDI- removal scheduled for 5/10. Sternal incisions approximated and CDI- BRYANT. Abdominal incison dressing in place CDI- UTV site. Trach suction completed at 2000, pt O2 stayed at 98%, tolerated well and coughed up clear secretions. Trach suction also completed at 0400, nothing came out, too try, trach dome offered- denied. Fluids provided    Bed alarm on, hourly checks completed, call light within reach at all times, extra trach equipment at bedside, Shiley 8 cuff.    Pt complains of chest/ incisional discomfort especially when coughing, denies any needs for medications or therapies.     Pt complained of eye dryness and irritation from \"allergies\". MD prescribed Visine 0.05% 4x daily PRN.    "

## 2021-05-10 NOTE — PROGRESS NOTES
"  [facility]   Acute Rehabilitation Unit  Daily progress note    INTERVAL HISTORY  Raj Warren was seen and examined at bedside this morning.  He states that he continues to have unchanged pain at sternal incision.  He expresses anxiety related to making decision about treatment for laryngeal cancer, and continues to wear surgery and chemotherapy.  He is concerned that if he does not do surgery soon, he may not be able to tolerate it.  He is trying to focus on his therapies and getting stronger while in rehab, but these concerns are weighing on him.  He notes intermittent shortness of breath, especially with effort or trying to speak longer.  He notes intermittent itchiness/irritation of bilateral eyes, has PRN eye drops available but has not tried.  He denies any nausea.  States he does not like the food here, but trying to drink.  Encouraged fluids and supplements.  AM labs reviewed and noted to have stable anemia.    Functionally, he is ambulating ' with FWW with contact guard to standby assist.  He performed 10 8-10' steps with FWW and contact guard assist.  He transfers with contact guard assist and FWW.  He needs standby assist for grooming/hygiene, upper body dressing, and min assist for socks and shoes.    MEDICATIONS    apixaban ANTICOAGULANT  5 mg Oral BID     aspirin  81 mg Oral Daily     metoprolol tartrate  25 mg Oral BID     multivitamin, therapeutic  1 tablet Oral Daily     pantoprazole  40 mg Oral QAM AC     pneumococcal vaccine  0.5 mL Intramuscular Prior to discharge        acetaminophen, albuterol, - MEDICATION INSTRUCTIONS -, senna-docusate, tetrahydrozoline     PHYSICAL EXAM  /74 (BP Location: Left arm)   Pulse 90   Temp 98.4  F (36.9  C) (Oral)   Resp 18   Ht 1.803 m (5' 11\")   Wt 90.6 kg (199 lb 11.2 oz)   SpO2 99%   BMI 27.85 kg/m    Gen: NAD, pleasant and cooperative  HEENT: NC/AT, MMM, trach   Cardio: RRR, no murmurs  Pulm: " non-labored on room air, lungs CTA bilaterally, occasional cough with minimal sputum, able to cover trach to speak, but only in short sentences before becoming short of breath  Abd: soft, non-tender, non-distended, bowel sounds present  Ext: warm, well-perfused, no edema or calf tenderness in bilateral lower extremities  Neuro/MSK: AAOx3, follows commands, responds appropriately  Skin: former PICC site in right upper arm with scant dried sanguinous drainage, CDI    LABS  CBC RESULTS:   Recent Labs   Lab Test 05/08/21  0630 05/07/21  0700 05/06/21  0540   WBC 8.1 8.0 9.1   RBC 3.72* 3.77* 3.70*   HGB 11.3* 11.1* 11.0*   HCT 33.9* 34.1* 33.8*   MCV 91 91 91   MCH 30.4 29.4 29.7   MCHC 33.3 32.6 32.5   RDW 15.0 15.2* 15.2*    383 356     Last Basic Metabolic Panel:  Recent Labs   Lab Test 05/08/21  0630 05/07/21  0700 05/06/21  0540    139 137   POTASSIUM 4.0 4.1 4.0   CHLORIDE 108 109 107   CO2 23 25 24   ANIONGAP 7 5 6   GLC 85 97 107*   BUN 17 19 19   CR 0.90 0.96 0.97   GFRESTIMATED 87 80 79   TELMA 8.7 8.8 8.8         Rehabilitation - continue comprehensive acute inpatient rehabilitation program with multidisciplinary approach including therapies, rehab nursing, and physiatry following. See interval history for updates.      ASSESSMENT AND PLAN  Raj Warren is a 70 year old male with laryngeal cancer and tracheostomy placement on 4/12/21 who suffered recent bilateral pulmonary emboli s/p embolectomy and IVC filter placement on 4/30/21 with prolonged hospitalization.  He was transferred to ARU on 05/08 for acute rehabilitation.     # Bilateral pulmonary embolisms s/p embolectomy and IVC filter placed on 4/30.  - IVC filter in place  - Eliquis 5 mg bid  - ASA 81 mg daily     # Squamous cell carcinoma of larynx  - Tracheostomy placed on 04/12 d/t laryngeal squamous cell carcinoma with airway compromise.  - Wean oxygen via trach dome as tolerated  - Albuterol nebulizers q4h prn  - Follow up ENT and  Oncology after discharge (see below) - will ultimately require laryngectomy     # DMII  - Pre op A1c: 5.6.   - Continue sliding scale insulin.   - Will check BG BID and based on these values, will determine whether he requires metformin or other DM medications.   - At this time blood glucose appears well-controlled, ranging from 80s-110s     # HTN:   - Metoprolol 25 mg bid  - Normotensive at this time     # Pain:   - Acetaminophen 650 mg q4h prn for pain  - Gabapentin 100 mg at bedtime prn  - methocarbamol 500 mg q6h prn for muscle spasms  - oxycodone 5 mg q4h prn for severe pain    #Anemia  Stable.  - Trend CBC  - Hgb stable at 11.3 on 5/10      1. Adjustment to disability:  Clinical psychology to eval and treat as needed.   2. FEN: Regular diet thin liquids.  3. Bowel: Continent. Miralax and Senna-docusate scheduled for now.  4. Bladder: Continent  5. DVT Prophylaxis: Anticoagulated with apixaban  6. GI Prophylaxis: Pantoprazole 40 mg qday.  7. Code: full - per discussion at admission, patient states he would like to think more about this, but for now decided to remain full code.   8. Disposition: goal for home  9. ELOS: 2 weeks  10. Follow up Appointments on Discharge: PCP, PM&R, ENT surgery, oncology (Dr. Jamin Bean) on 5/27/21 at 2:20pm, cardiology (Dr. Lopez) on 7/8/21 at 12:45pm, cardiothoracic surgery (Jeanine/Susanna/Quique Pantoja with Dr. Singh at [facility]) on 5/19/21 at 2:30pm      Patient discussed with Dr. William Gill, PM&R Staff Physician    Michelle Butt PA-C  Physical Medicine & Rehabilitation

## 2021-05-10 NOTE — PLAN OF CARE
"SLP: pt seen for communication, follow up.  Pt had been seen during hospitalization for swallowing by SLP. Pt discharged with notes stating tolerating regular diet, thin fluids, independent with finger occlusion for voicing and donning/doffing HME.  Pt is not appropriate, per notes, for PMSV 2/2 laryngeal obstruction/ca.  Pt was able to effectively communicate with finger occlusion of trach, mouthing words/gestures.  He is short of breath at times, generally only saying few words on a breath. O2 sats remained above 98%.  Pt states he is not wearing HME, as does not allow him to talk,\"creates more phlegm and can't breathe.\"  He did demonstrate donning/doffing. At this time pt is on room air/no trach dome/humidification.  OT/PT discussed need for plan to help contain pt's secretions when out of room (Covid guidelines) pt states he cannot tolerate paper mask - staff may trial pt wearing trach dome/mask , not connected to any oxygen/humidification source, when out of room in tx. Also briefly educated pt on laryngectomee definition/information- but further follow up/education to be covered by medical team PRN.  Treatment plan undetermined at this time,appears goals/needs met for SLP - but will await few days to see if any further management warranted.    "

## 2021-05-10 NOTE — PLAN OF CARE
Patient is alert and oriented x4, makes needs known. Denies pain. SBA with walker and gait belt. Voiding spontaneously using urinal. Tamiko 8 cuffed trach in place, trach cares done, suctioning done x1. Refusing trach dome or HME. No other concerns. Continue with POC.

## 2021-05-10 NOTE — PROGRESS NOTES
"   05/10/21 1700   General Information   Onset of Illness/Injury or Date of Surgery 04/12/21   Referring Physician Dr. Calderon   Pertinent History of Current Problem Mr. Warren is a very pleasant 70-year-old gentleman who was recently diagnosed with laryngeal squamous cell carcinoma with tracheostomy placement on 4/12/21 who presented to the [facility] ED on 4/27/21 with one week history of shortness of breath and chest pain.  He was found to have bilateral pulmonary emboli with significant clot burden, mainly in the right main PA, but also a large clot in the right ventricle. On 4/28/21, he underwent emergent pulmonary embolectomy by Dr. Yumi Singh, which was successful. He also underwent an IVC filter placement on 4/30/21.    General Observations SLP: pt seen during hospitalization for swallowing, goals met, further tx not recommended at pt independent with finger occlusion, HME placement and not appropriate for PMSV placement 2/2 laryngeal obstruction   Type of Evaluation   Type of Evaluation Artificial Airway (Speaking Valve)   Tracheostomy Assessment (Speaking Valve)   Date of Tracheostomy 04/12/21   Type, Tracheostomy Tube Shiley   Tube Size, Tracheostomy #8   Cuff, Tracheostomy Tube cuffed, deflated   Comment, Tracheostomy (Speaking Valve) SLP: speaking valve not appropriate 2/2 laryngeal ca/obstruction per notes. Pt independent with finger occlusion of trach, although often SOB, saying only few words per breah. O2 sats above 98% .  Pt states he does not like trach dome/humidification, has not been using, also does not like HME ie when out of room) stating it increases his phlegm, \"can't breathe\"    Respiratory Status (Speaking Valve)   Oxygen Supply other (see comments)  (room air. pt declines trach dome for humidification)   SLP Therapy Assessment/Plan   Criteria for Skilled Therapeutic Interventions Met (SLP Eval) other (see comments)  (treatment pending any concerns/education PRN)   SLP " "Diagnosis   (aphonia 2/2 trach,no appropriate for PMSV)   Comment, Therapy Assessment/Plan (SLP) SLP: pt seen for communication, follow up.  Pt had been seen during hospitalization for swallowing by SLP. Pt discharged with notes stating tolerating regular diet, thin fluids, independent with finger occlusion for voicing and donning/doffing HME.  Pt is not appropriate, per notes, for PMSV 2/2 laryngeal obstruction/ca.  Pt was able to effectively communicate with finger occlusion of trach, mouthing words/gestures.  He is short of breath at times, generally only saying few words on a breath. O2 sats remained above 98%.  Pt states he is not wearing HME, as does not allow him to talk,\"creates more phlegm and can't breathe.\"  At this time pt is on room air/no trach dome/humidification.  OT/PT discussed need for plan to help contain secretions when out of room (Covid guidelines) pt states he cannot tolerate paper mask - staff may trial pt wearing trach dome/mask , not connected to any oxygen/humidification source, when out of room in tx. Also briefly educated pt on laryngectomee definition - but further follow up/education to be covered by medical team PRN.  Treatment plan undetermined at this time,appears goals/needs met for SLP - but will await few days to see if any further management warranted.     Therapy Plan Review/Discharge Plan (SLP)   Therapy Plan Review (SLP) evaluation/treatment results reviewed;care plan/treatment goals reviewed;risks/benefits reviewed;current/potential barriers reviewed;participants voiced agreement with care plan;participants included;patient    Total Evaluation Time   Total Evaluation Time (Minutes) 25     "

## 2021-05-10 NOTE — PLAN OF CARE
Discharge Planner OT   Discharge plan: return home alone  Precautions: trach, falls  Current status:   G/H: SBA with walker  UB dressing: SBA   LB dressing: mod a assist for socks and shoes  Bathing: SBA     Assessment: pt min a with sponge bathing sitting and standing at sink sba dressing in pull over shirt fatigue and needed increase assist with l/b dressing this date   Other Barriers to discharge: 25+ steps, patient lining up intermittent assistance, equipment to be determined

## 2021-05-10 NOTE — PLAN OF CARE
Discharge Planner Post-Acute Rehab PT:      Discharge Plan: pt lives alone in an apt with 25 stairs and B rails; previously very limiting pre hospital and pt is looking for a new apt due to this; TBA     Precautions: fall, trach (communication due to trach), sternal      Current Status:  Bed Mobility: cga  Transfer: cga with FWW  Gait: Ambulates  feet with a FWW cga to sba no LOB  Stairs: pt performed 10 8-10 inch steps in the room with a FWW cga  Balance: no gross LOB, will assess higher level balance as appropriate     Assessment:  -25min in PM session 2/2 fatigue. Time spent problem solving trach covering for OOR mobility; good plan established to use trach dome in future sessions. Functional mobility limited today 2/2 pt reporting SOB and fatigue from many provider conversations/visits. Plan to set up set schedule to allow improved participation in future sessions.      Other Barriers to Discharge (DME, Family Training, etc): functional tolerance, stairs,

## 2021-05-10 NOTE — PROGRESS NOTES
Clinic Care Coordination Contact  Care Coordination Transition Communication    Referral Source: IP Handoff    Clinical Data: Patient was hospitalized at [facility] from 4/27/21 to 5/8/21 with diagnosis of bilateral pulmonary embolism, s/p emergent pulmonary embolectomy.     Transition to Facility:              Facility Name: [facility]              Contact name and phone number/fax: [phone]    Plan: Care Coordinator will review patient's chart in approximately 1 week to get updates on patient's status and assess for discharge planning.    Jeffrey Mabry RN  Clinic Care Coordinator  [facility]  [facility] [place], [place] [facility]  Ph: [phone]

## 2021-05-10 NOTE — PROVIDER NOTIFICATION
"\"Pt 503  requests eye drops for dry eyes and allergies. no PRNs available.     Thank You,  Niru OSMAN RN    [phone]\"  "

## 2021-05-11 ENCOUNTER — APPOINTMENT (OUTPATIENT)
Dept: PHYSICAL THERAPY | Facility: CLINIC | Age: 71
End: 2021-05-11
Payer: COMMERCIAL

## 2021-05-11 ENCOUNTER — APPOINTMENT (OUTPATIENT)
Dept: OCCUPATIONAL THERAPY | Facility: CLINIC | Age: 71
End: 2021-05-11
Payer: COMMERCIAL

## 2021-05-11 LAB
ALBUMIN SERPL-MCNC: 3 G/DL (ref 3.4–5)
ALP SERPL-CCNC: 100 U/L (ref 40–150)
ALT SERPL W P-5'-P-CCNC: 80 U/L (ref 0–70)
ANION GAP SERPL CALCULATED.3IONS-SCNC: 6 MMOL/L (ref 3–14)
ANION GAP SERPL CALCULATED.3IONS-SCNC: 8 MMOL/L (ref 3–14)
AST SERPL W P-5'-P-CCNC: ABNORMAL U/L (ref 0–45)
BASOPHILS # BLD AUTO: 0 10E9/L (ref 0–0.2)
BASOPHILS NFR BLD AUTO: 0.5 %
BILIRUB SERPL-MCNC: 0.6 MG/DL (ref 0.2–1.3)
BUN SERPL-MCNC: 13 MG/DL (ref 7–30)
BUN SERPL-MCNC: 14 MG/DL (ref 7–30)
CALCIUM SERPL-MCNC: 8.5 MG/DL (ref 8.5–10.1)
CALCIUM SERPL-MCNC: 9.1 MG/DL (ref 8.5–10.1)
CHLORIDE SERPL-SCNC: 103 MMOL/L (ref 94–109)
CHLORIDE SERPL-SCNC: 112 MMOL/L (ref 94–109)
CO2 SERPL-SCNC: 17 MMOL/L (ref 20–32)
CO2 SERPL-SCNC: 18 MMOL/L (ref 20–32)
CREAT SERPL-MCNC: 0.76 MG/DL (ref 0.66–1.25)
CREAT SERPL-MCNC: 0.84 MG/DL (ref 0.66–1.25)
DIFFERENTIAL METHOD BLD: ABNORMAL
EOSINOPHIL # BLD AUTO: 0.1 10E9/L (ref 0–0.7)
EOSINOPHIL NFR BLD AUTO: 1.2 %
ERYTHROCYTE [DISTWIDTH] IN BLOOD BY AUTOMATED COUNT: 15.2 % (ref 10–15)
GFR SERPL CREATININE-BSD FRML MDRD: 89 ML/MIN/{1.73_M2}
GFR SERPL CREATININE-BSD FRML MDRD: >90 ML/MIN/{1.73_M2}
GLUCOSE SERPL-MCNC: 129 MG/DL (ref 70–99)
GLUCOSE SERPL-MCNC: 93 MG/DL (ref 70–99)
HCT VFR BLD AUTO: 37.6 % (ref 40–53)
HGB BLD-MCNC: 12.1 G/DL (ref 13.3–17.7)
IMM GRANULOCYTES # BLD: 0.1 10E9/L (ref 0–0.4)
IMM GRANULOCYTES NFR BLD: 0.6 %
LACTATE BLD-SCNC: 1.3 MMOL/L (ref 0.7–2)
LACTATE BLD-SCNC: 2.4 MMOL/L (ref 0.7–2)
LYMPHOCYTES # BLD AUTO: 1.8 10E9/L (ref 0.8–5.3)
LYMPHOCYTES NFR BLD AUTO: 23.8 %
MCH RBC QN AUTO: 30 PG (ref 26.5–33)
MCHC RBC AUTO-ENTMCNC: 32.2 G/DL (ref 31.5–36.5)
MCV RBC AUTO: 93 FL (ref 78–100)
MONOCYTES # BLD AUTO: 0.7 10E9/L (ref 0–1.3)
MONOCYTES NFR BLD AUTO: 9 %
NEUTROPHILS # BLD AUTO: 5 10E9/L (ref 1.6–8.3)
NEUTROPHILS NFR BLD AUTO: 64.9 %
NRBC # BLD AUTO: 0 10*3/UL
NRBC BLD AUTO-RTO: 0 /100
PLATELET # BLD AUTO: 460 10E9/L (ref 150–450)
POTASSIUM SERPL-SCNC: 4.5 MMOL/L (ref 3.4–5.3)
POTASSIUM SERPL-SCNC: 4.6 MMOL/L (ref 3.4–5.3)
PROT SERPL-MCNC: 7 G/DL (ref 6.8–8.8)
RBC # BLD AUTO: 4.04 10E12/L (ref 4.4–5.9)
SODIUM SERPL-SCNC: 129 MMOL/L (ref 133–144)
SODIUM SERPL-SCNC: 135 MMOL/L (ref 133–144)
WBC # BLD AUTO: 7.7 10E9/L (ref 4–11)

## 2021-05-11 PROCEDURE — 99233 SBSQ HOSP IP/OBS HIGH 50: CPT | Mod: 24 | Performed by: PHYSICAL MEDICINE & REHABILITATION

## 2021-05-11 PROCEDURE — 97110 THERAPEUTIC EXERCISES: CPT | Mod: GO

## 2021-05-11 PROCEDURE — 97535 SELF CARE MNGMENT TRAINING: CPT | Mod: GO

## 2021-05-11 PROCEDURE — 85025 COMPLETE CBC W/AUTO DIFF WBC: CPT | Performed by: PHYSICAL MEDICINE & REHABILITATION

## 2021-05-11 PROCEDURE — 128N000003 HC R&B REHAB

## 2021-05-11 PROCEDURE — 80053 COMPREHEN METABOLIC PANEL: CPT | Performed by: PHYSICIAN ASSISTANT

## 2021-05-11 PROCEDURE — 83605 ASSAY OF LACTIC ACID: CPT | Performed by: PHYSICAL MEDICINE & REHABILITATION

## 2021-05-11 PROCEDURE — 97110 THERAPEUTIC EXERCISES: CPT | Mod: GP | Performed by: PHYSICAL THERAPIST

## 2021-05-11 PROCEDURE — 250N000013 HC RX MED GY IP 250 OP 250 PS 637: Performed by: PHYSICAL MEDICINE & REHABILITATION

## 2021-05-11 PROCEDURE — 999N000157 HC STATISTIC RCP TIME EA 10 MIN

## 2021-05-11 PROCEDURE — 36415 COLL VENOUS BLD VENIPUNCTURE: CPT | Performed by: PHYSICIAN ASSISTANT

## 2021-05-11 PROCEDURE — 258N000003 HC RX IP 258 OP 636: Performed by: PHYSICIAN ASSISTANT

## 2021-05-11 PROCEDURE — 250N000013 HC RX MED GY IP 250 OP 250 PS 637

## 2021-05-11 PROCEDURE — 36415 COLL VENOUS BLD VENIPUNCTURE: CPT | Performed by: PHYSICAL MEDICINE & REHABILITATION

## 2021-05-11 PROCEDURE — 83605 ASSAY OF LACTIC ACID: CPT | Performed by: PHYSICIAN ASSISTANT

## 2021-05-11 PROCEDURE — 80048 BASIC METABOLIC PNL TOTAL CA: CPT | Performed by: PHYSICIAN ASSISTANT

## 2021-05-11 PROCEDURE — 97530 THERAPEUTIC ACTIVITIES: CPT | Mod: GP | Performed by: PHYSICAL THERAPIST

## 2021-05-11 RX ORDER — LIDOCAINE 40 MG/G
CREAM TOPICAL
Status: DISCONTINUED | OUTPATIENT
Start: 2021-05-11 | End: 2021-05-15 | Stop reason: HOSPADM

## 2021-05-11 RX ADMIN — ASPIRIN 81 MG CHEWABLE TABLET 81 MG: 81 TABLET CHEWABLE at 10:06

## 2021-05-11 RX ADMIN — METOPROLOL TARTRATE 25 MG: 25 TABLET, FILM COATED ORAL at 20:27

## 2021-05-11 RX ADMIN — APIXABAN 5 MG: 2.5 TABLET, FILM COATED ORAL at 20:27

## 2021-05-11 RX ADMIN — PANTOPRAZOLE SODIUM 40 MG: 40 TABLET, DELAYED RELEASE ORAL at 10:06

## 2021-05-11 RX ADMIN — THERA TABS 1 TABLET: TAB at 10:06

## 2021-05-11 RX ADMIN — APIXABAN 5 MG: 2.5 TABLET, FILM COATED ORAL at 10:06

## 2021-05-11 RX ADMIN — SODIUM CHLORIDE 500 ML: 9 INJECTION, SOLUTION INTRAVENOUS at 12:11

## 2021-05-11 RX ADMIN — METOPROLOL TARTRATE 25 MG: 25 TABLET, FILM COATED ORAL at 10:05

## 2021-05-11 NOTE — PLAN OF CARE
FOCUS/GOAL  Bowel management, Bladder management, Nutrition/Feeding/Swallowing precautions, Pain management, Mobility, Cognition/Memory/Judgment/Problem solving and Safety management    ASSESSMENT, INTERVENTIONS AND CONTINUING PLAN FOR GOAL   With Tracheostomy Myles # 8 deflated   O2sats 99% R/A with scant amount of thick white secretions when he coughed and blood tinged  Scant amount of secretion one time , pt was refusing to be connected to Trach dome , explained to him that it could help with thinning the secretions and he agreed , RT was updated , therapist said he will come to evaluate him , trach care was done disposable inner canula was changed and trach site was cleaned no drainage noted , pt also has sternal incisions and old chest tube sites wound care done and left BRYANT , Tylenol was given at 1840 for sternal pain ( mid incision ) pt said it relieves the pain , continent of bladder using the urinal dark margaret urine encouraged to drink more water pt has been compliant with it  LBMwas on 5/7 Senna 2 tablets were given   Continue to [id number]  with trach in place and activity tolerance .  Pt does not like the Hospital food he said he will have his son bring him the food .

## 2021-05-11 NOTE — PLAN OF CARE
Discharge Planner OT   Discharge plan: return home alone  Precautions: trach, falls  Current status:   G/H: SBA with walker  UB dressing: SBA   LB dressing: mod a assist for socks and shoes  Bathing: SBA     Assessment: Pt limited by fatigue in AM, mildly impulsive with fatigue.  Pt adhering well to sternal precautions, needs 1 reminder with toileting.       Other Barriers to discharge: 25+ steps, patient lining up intermittent assistance, equipment to be determined

## 2021-05-11 NOTE — PLAN OF CARE
Patient is Alert and Oriented x4, makes needs known. Having some L. Neck stiffness, aching. PRN bengay ordered by PA. Tamiko 8 trach, inner canula changed and suctioned x1. Triggered sepsis protocol today, lactate elevated at 2.4. PA made aware. IV fluids initiated, and labs drawn. Follow up lactate is scheduled.

## 2021-05-11 NOTE — PROVIDER NOTIFICATION
Sepsis Evaluation Progress Note    I was called to see Raj Warren due to abnormal vital signs triggering the Sepsis SIRS screening alert. He is not known to have an infection.     Physical Exam   Vital Signs:  Temp: 98.3  F (36.8  C) Temp src: Oral BP: 114/65 Pulse: 100   Resp: 22 SpO2: 100 % O2 Device: None (Room air) Oxygen Delivery: 10 LPM     General: in no acute distress  Mental Status: AAOx4.     Remainder of physical exam is significant for trach present, occludes with finger for speech, only able to speak in short sentences without increased dyspnea.  Lungs CTA bilaterally.  RRR, no murmurs.  Abdomen soft, non-tender.  Midline sternal incision CDI, chest tube incisions with mild scab, no drainage or erythema.      Data   Lactic Acid   Date Value Ref Range Status   04/29/2021 1.6 0.7 - 2.0 mmol/L Final   04/29/2021 3.5 (H) 0.7 - 2.0 mmol/L Final     Lactate for Sepsis Protocol   Date Value Ref Range Status   05/11/2021 2.4 (H) 0.7 - 2.0 mmol/L Final       Assessment & Plan   NO EVIDENCE OF SEPSIS at this time.  Vital sign, physical exam, and lab findings are due to dehydration.  Patient reports he has had difficulty keeping up with oral fluid intake and noted dark urine, so tried to increase today.  Will check CBC, CMP and give 500 mL NS bolus and recheck lactic acid in 4 hours.     Disposition: The patient will remain on the current unit. We will continue to monitor this patient closely.  Michelle Butt PA-C    Sepsis Criteria   Sepsis: 2+ SIRS criteria due to infection  Severe Sepsis: Sepsis AND 1+ new sign of acute organ dysfunction (Note: lactate >2 or acute encephalopathy each qualify as organ dysfunction)  Septic Shock: Sepsis AND hypotension despite volume resuscitation with 30 ml/kg crystalloid or lactate >=4  Note: HYPOTENSION is defined as 2 BP readings measured 3 hrs apart that have a SBP <90, MAP <65, or decrease >40 mmHg, occurring 6 hrs before or after t-zero

## 2021-05-11 NOTE — PROGRESS NOTES
"  [facility]   Acute Rehabilitation Unit  Daily progress note    INTERVAL HISTORY  Raj Warren was seen and examined at bedside this morning.  No acute events reported overnight.  He reports that he has some new pain in left upper back/neck.  It is aggravated by activity.  He denies any radiation.  Hasn't tried heat/cold, as they \"affect his bones\".  He notes some mild ongoing sternal/incisional pain.  He denies any increased shortness of breath.  Secretions seem thinner/decreased today after agreeing to trach dome overnight.  However, he reports the trach dome disrupts his sleep.  Is agreeable to E instead for tonight.  He also notes ongoing numbness in right hand in ulnar distribution.  He denies any associated pain.  States this is constant and cannot identify alleviating or aggravating factors.    During my visit, he is noted to trigger sepsis protocol due to tachycardia and tachypnea.  Lactate returned mildly elevated at 2.4.  Will obtain CBC, CMP, give 500 mL NS, and recheck lactate in 4 hours.  See separate sepsis note for additional details.    Functionally, he is ambulating ' with FWW with contact guard to standby assist.  He transfers with contact guard assist with FWW.  He was able to perform 10 8-10\" steps in room with contact guard assist and walker.  He also needs standby assist for grooming/hygiene, standby assist for upper body dressing, mod assist for lower body dressing, standby assist for bathing.    MEDICATIONS    sodium chloride 0.9%  500 mL Intravenous Once     apixaban ANTICOAGULANT  5 mg Oral BID     aspirin  81 mg Oral Daily     metoprolol tartrate  25 mg Oral BID     multivitamin, therapeutic  1 tablet Oral Daily     pantoprazole  40 mg Oral QAM AC     pneumococcal vaccine  0.5 mL Intramuscular Prior to discharge     sodium chloride (PF)  3 mL Intracatheter Q8H     sterile water (bottle)            acetaminophen, albuterol, lidocaine 4%, " "lidocaine (buffered or not buffered), - MEDICATION INSTRUCTIONS -, senna-docusate, sodium chloride (PF), tetrahydrozoline     PHYSICAL EXAM  /65 (BP Location: Right arm)   Pulse 100   Temp 98.3  F (36.8  C) (Oral)   Resp 22   Ht 1.803 m (5' 11\")   Wt 90.6 kg (199 lb 11.2 oz)   SpO2 100%   BMI 27.85 kg/m    Gen: NAD, pleasant and cooperative  HEENT: NC/AT, MMM, trach   Cardio: RRR, no murmurs  Pulm: non-labored on room air, lungs CTA bilaterally, no cough/sputum noted today, able to cover trach to speak, but only in short sentences before becoming short of breath  Abd: soft, non-tender, non-distended, bowel sounds present  Ext: warm, well-perfused, no edema or calf tenderness in bilateral lower extremities  Neuro/MSK: AAOx3, follows commands, responds appropriately.  Tightness and tenderness to palpation in left trapezius and cervical paraspinal muscles  Skin: sternal incision CDI, former chest tube sites with scant scab, no active drainage or erythema    LABS  CBC RESULTS:   Recent Labs   Lab Test 05/08/21  0630 05/07/21  0700 05/06/21  0540   WBC 8.1 8.0 9.1   RBC 3.72* 3.77* 3.70*   HGB 11.3* 11.1* 11.0*   HCT 33.9* 34.1* 33.8*   MCV 91 91 91   MCH 30.4 29.4 29.7   MCHC 33.3 32.6 32.5   RDW 15.0 15.2* 15.2*    383 356     Last Basic Metabolic Panel:  Recent Labs   Lab Test 05/08/21  0630 05/07/21  0700 05/06/21  0540    139 137   POTASSIUM 4.0 4.1 4.0   CHLORIDE 108 109 107   CO2 23 25 24   ANIONGAP 7 5 6   GLC 85 97 107*   BUN 17 19 19   CR 0.90 0.96 0.97   GFRESTIMATED 87 80 79   TELMA 8.7 8.8 8.8         Rehabilitation - continue comprehensive acute inpatient rehabilitation program with multidisciplinary approach including therapies, rehab nursing, and physiatry following. See interval history for updates.      ASSESSMENT AND PLAN  Raj Warren is a 70 year old male with laryngeal cancer and tracheostomy placement on 4/12/21 who suffered recent bilateral pulmonary emboli " "s/p embolectomy and IVC filter placement on 4/30/21 with prolonged hospitalization.  He was transferred to ARU on 05/08 for acute rehabilitation.     # Bilateral pulmonary embolisms s/p embolectomy and IVC filter placed on 4/30.  - IVC filter in place  - Eliquis 5 mg bid  - ASA 81 mg daily     # Squamous cell carcinoma of larynx  - Tracheostomy placed on 04/12 d/t laryngeal squamous cell carcinoma with airway compromise.  - Wean oxygen via trach dome as tolerated  - Albuterol nebulizers q4h prn  - Follow up ENT and Oncology after discharge (see below) - will ultimately require laryngectomy     # DMII  - Pre op A1c: 5.6  - Continue sliding scale insulin.   - Will check BG BID and based on these values, will determine whether he requires metformin or other DM medications.   - At this time blood glucose appears well-controlled, ranging from 80s-110s     # HTN:   - Metoprolol 25 mg bid  - Normotensive at this time     # Pain:   - Acetaminophen 650 mg q4h prn for pain  - Gabapentin 100 mg at bedtime prn  - methocarbamol 500 mg q6h prn for muscle spasms  - oxycodone 5 mg q4h prn for severe pain    #Anemia  Stable.  - Trend CBC    #Elevated lactic acid  Triggered due to tachycardia and tachypnea.  Afebrile.  Denies urinary symptoms.  Surgical incisions without signs of infection.  No changes in respiratory status to indicate infection.  Patient suspects he has not been keeping up with oral hydration, but is trying to drink more today.  - Obtain CBC, CMP  -  mL IV fluid bolus  - Repeat lactate in 4 hours    #Left neck/upper back pain.  New onset 5/11.  No radiation, aggravated by activity.  Tightness and tenderness to palpation on exam in left trapezius and paraspinal muscles.  Patient declines to use heat or cold therapy, as he feels these \"affect his bones\".  Agreeable to trial of topical.  - Bengay PRN      1. Adjustment to disability:  Clinical psychology to eval and treat as needed.   2. FEN: Regular diet thin " liquids.  3. Bowel: Continent. Miralax and Senna-docusate scheduled for now.  4. Bladder: Continent  5. DVT Prophylaxis: Anticoagulated with apixaban  6. GI Prophylaxis: Pantoprazole 40 mg qday.  7. Code: full - per discussion at admission, patient states he would like to think more about this, but for now decided to remain full code.   8. Disposition: goal for home  9. ELOS: 2 weeks  10. Follow up Appointments on Discharge: PCP, PM&R, ENT surgery, oncology (Dr. Jamin Bean) on 5/27/21 at 2:20pm, cardiology (Dr. Lopez) on 7/8/21 at 12:45pm, cardiothoracic surgery (Jeanine/Susanna/Quique Pantoja with Dr. Singh at [facility]) on 5/19/21 at 2:30pm      Patient discussed with Dr. William Gill, PM&R Staff Physician    MELLISSA ScottC  Physical Medicine & Rehabilitation

## 2021-05-11 NOTE — PLAN OF CARE
Individualized Overall Plan Of Care (IOPOC)      Rehab diagnosis/Impairment Group Code: 16 debility (non-cardiac, non-pulmonary) due to b pes, laryngeal mass  Pulmonary embolism (h)       Expected functional outcome:Mod I-IND for ambulation, transfers, stairs and ADL's. Assist with IADL's as needed     Clinical Impression Comments: Raj Warren is a 70 year old male with past medical history including recently diagnosed laryngeal cancer and tracheostomy placement on 4/12/2021 by ENT who presents with shortness of breath. Noted to have coughing as well with some chest discomfort. When his breathing started to get worse and he wasn't able to walk very far without feeling very short of breath. Came to ED and found to have B PEs. He required Emergent pulmonary embolectomy on 4/28/2021. His course was complicated by need for feeding tube, non healing of incision requiring wound vac placement, IVC filter placement, and requires suction regularly 1-2 x per shift. 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 He will follow with [facility] and ENT after ARC.  The patient has impaired balance, impaired coordination and impaired weight shifting. These impairments are impacting his functional independence and safety w/ transfers, gait, stairs, ADLs, and IADLs.     Mobility: pt has limited tolerance due to medical issues; respiratory status and deconditioning; pending stairs needed at DC location and progression toward these, anticipate between 1-2 weeks    ADL: patient appears deconditioned and slight R UE fine motor deficits. Pt would benefit from continue OT services for ADL training and stregthening.     Communication/Cognition/Swallow: SLP: pt seen for communication, follow up.  Pt had been seen during hospitalization for swallowing by SLP. Pt discharged with notes stating tolerating regular diet, thin fluids, independent with finger occlusion for voicing and donning/doffing HME.  Pt is not  "appropriate, per notes, for PMSV 2/2 laryngeal obstruction/ca.  Pt was able to effectively communicate with finger occlusion of trach, mouthing words/gestures.  He is short of breath at times, generally only saying few words on a breath. O2 sats remained above 98%.  Pt states he is not wearing HME, as does not allow him to talk,\"creates more phlegm and can't breathe.\"  At this time pt is on room air/no trach dome/humidification.  OT/PT discussed need for plan to help contain secretions when out of room (Covid guidelines) pt states he cannot tolerate paper mask - staff may trial pt wearing trach dome/mask , not connected to any oxygen/humidification source, when out of room in tx. Also briefly educated pt on laryngectomee definition - but further follow up/education to be covered by medical team PRN.  Treatment plan undetermined at this time,appears goals/needs met for SLP - but will await few days to see if any further management warranted.       Intensity of therapy:   PT 90 minutes, Daily, for 7-14 days(pending stairs needed at discharge location)  OT 90 minutes, Daily, for (7-10 days)  SLP As above minutes,  , for      Orthotics None  Education wound care, bowel program, vitals, medication education, carryover of new rehab techniques, care coordination, skin integrity, pain management and provide safe environment for patient at falls risk.  Neuropsychology Testing: No  Other:  TBD      Medical Prognosis: Fair      Physician summary statement:In addition to above statements address, Patient requires intensive active and ongoing therapeutic intervention and multiple therapies; Patient requires medical supervision; Expected to actively participate in the intensive rehab program; Sufficiently stable to actively participate; Expectation for measurable improvement in functional capacity or adaption to impairments.    Discharge destination: prior home  Discharge rehabilitation needs: home care      Estimated length of " stay: 10-14 days      Rehabilitation Physician William Gill MD

## 2021-05-11 NOTE — PLAN OF CARE
"Discharge Planner Post-Acute Rehab PT:      Discharge Plan: pt lives alone in an apt with 25 stairs and B rails; previously very limiting pre hospital and pt is looking for a new apt due to this; TBA     Precautions: fall, trach (communication due to trach), sternal      Current Status:  Bed Mobility: ind  Transfer: sba  Gait: 400' x 2, 4ww, sba  Stairs: 6\" and 4\", reciprocal, rails, sba  Balance: uneven terrain w/ 4ww, no LOB     Assessment:  transitioned to 4ww in pm, he likes it better than the other one, walked safely outdoors w/ 4ww and supervision     Other Barriers to Discharge (DME, Family Training, etc): functional tolerance, stairs,         "

## 2021-05-11 NOTE — PLAN OF CARE
FOCUS/GOAL  Bowel management, Bladder management, Pain management and Cognition/Memory/Judgment/Problem solving    ASSESSMENT, INTERVENTIONS AND CONTINUING PLAN FOR GOAL:  Pt is alert and oriented x4, denies fever, chills, CP, SOB, N/V, abdominal pain, or new weakness/numbness/tingling, continent of bowel and bladder, using urinal, transferring with CGA and ww, sleeping on and off throughout the night, trach in place, back up and 1 size down taped to wall, vs stable, trach dome on, no further care concerns at this time continue with POC.

## 2021-05-11 NOTE — PLAN OF CARE
"RN [id number]    FOCUS/GOAL  Medical management, Skin integrity, Safety management, and Prevention of secondary complications    ASSESSMENT, INTERVENTIONS AND CONTINUING PLAN FOR GOAL:  A/Ox4. On RA. Denies pain, CP, SOB, N/V, dizziness. SBAx1 with walker/gb. Continent B&B. Voiding without difficulty in urinal/BR. BM today. Trach shiley size 8. Trach cares completed. Pt did not need suctioning this shift, has productive cough. No skin issues. Reg diet, good appetite, ate dinner. Sepsis protocol triggered this AM. Lactate recheck at 1700 was 1.3. Pt able to make needs known and call light within reach. Continue POC    /65 (BP Location: Right arm)   Pulse 94   Temp 98.1  F (36.7  C) (Axillary)   Resp 16   Ht 1.803 m (5' 11\")   Wt 90.6 kg (199 lb 11.2 oz)   SpO2 98%   BMI 27.85 kg/m       "

## 2021-05-12 ENCOUNTER — APPOINTMENT (OUTPATIENT)
Dept: OCCUPATIONAL THERAPY | Facility: CLINIC | Age: 71
End: 2021-05-12
Payer: COMMERCIAL

## 2021-05-12 ENCOUNTER — APPOINTMENT (OUTPATIENT)
Dept: PHYSICAL THERAPY | Facility: CLINIC | Age: 71
End: 2021-05-12
Payer: COMMERCIAL

## 2021-05-12 ENCOUNTER — DOCUMENTATION ONLY (OUTPATIENT)
Dept: OTHER | Facility: CLINIC | Age: 71
End: 2021-05-12

## 2021-05-12 ENCOUNTER — APPOINTMENT (OUTPATIENT)
Dept: SPEECH THERAPY | Facility: CLINIC | Age: 71
End: 2021-05-12
Payer: COMMERCIAL

## 2021-05-12 LAB
ANION GAP SERPL CALCULATED.3IONS-SCNC: 6 MMOL/L (ref 3–14)
BUN SERPL-MCNC: 13 MG/DL (ref 7–30)
CALCIUM SERPL-MCNC: 8.6 MG/DL (ref 8.5–10.1)
CHLORIDE SERPL-SCNC: 113 MMOL/L (ref 94–109)
CO2 SERPL-SCNC: 22 MMOL/L (ref 20–32)
CREAT SERPL-MCNC: 0.88 MG/DL (ref 0.66–1.25)
GFR SERPL CREATININE-BSD FRML MDRD: 87 ML/MIN/{1.73_M2}
GLUCOSE SERPL-MCNC: 117 MG/DL (ref 70–99)
POTASSIUM SERPL-SCNC: 4 MMOL/L (ref 3.4–5.3)
SODIUM SERPL-SCNC: 141 MMOL/L (ref 133–144)

## 2021-05-12 PROCEDURE — 97535 SELF CARE MNGMENT TRAINING: CPT | Mod: GO

## 2021-05-12 PROCEDURE — 999N000125 HC STATISTIC PATIENT MED CONFERENCE < 30 MIN

## 2021-05-12 PROCEDURE — 128N000003 HC R&B REHAB

## 2021-05-12 PROCEDURE — 250N000013 HC RX MED GY IP 250 OP 250 PS 637: Performed by: PHYSICAL MEDICINE & REHABILITATION

## 2021-05-12 PROCEDURE — 97530 THERAPEUTIC ACTIVITIES: CPT | Mod: GP | Performed by: STUDENT IN AN ORGANIZED HEALTH CARE EDUCATION/TRAINING PROGRAM

## 2021-05-12 PROCEDURE — 36415 COLL VENOUS BLD VENIPUNCTURE: CPT | Performed by: PHYSICIAN ASSISTANT

## 2021-05-12 PROCEDURE — 99233 SBSQ HOSP IP/OBS HIGH 50: CPT | Mod: 24 | Performed by: PHYSICAL MEDICINE & REHABILITATION

## 2021-05-12 PROCEDURE — 250N000013 HC RX MED GY IP 250 OP 250 PS 637

## 2021-05-12 PROCEDURE — 999N000150 HC STATISTIC PT MED CONFERENCE < 30 MIN: Performed by: PHYSICAL THERAPIST

## 2021-05-12 PROCEDURE — 97110 THERAPEUTIC EXERCISES: CPT | Mod: GP | Performed by: STUDENT IN AN ORGANIZED HEALTH CARE EDUCATION/TRAINING PROGRAM

## 2021-05-12 PROCEDURE — 80048 BASIC METABOLIC PNL TOTAL CA: CPT | Performed by: PHYSICIAN ASSISTANT

## 2021-05-12 RX ADMIN — METOPROLOL TARTRATE 25 MG: 25 TABLET, FILM COATED ORAL at 19:46

## 2021-05-12 RX ADMIN — APIXABAN 5 MG: 2.5 TABLET, FILM COATED ORAL at 08:59

## 2021-05-12 RX ADMIN — APIXABAN 5 MG: 2.5 TABLET, FILM COATED ORAL at 19:46

## 2021-05-12 RX ADMIN — ASPIRIN 81 MG CHEWABLE TABLET 81 MG: 81 TABLET CHEWABLE at 08:59

## 2021-05-12 RX ADMIN — THERA TABS 1 TABLET: TAB at 08:59

## 2021-05-12 RX ADMIN — PANTOPRAZOLE SODIUM 40 MG: 40 TABLET, DELAYED RELEASE ORAL at 08:59

## 2021-05-12 NOTE — PLAN OF CARE
Patient alert and oriented x4. . Continent of bowel and bladder. Last BM: 5/11. A1 with walker and gait belt, pivot to transfer. Pt reports numbness in R hand - baseline. PIV in L forearm, flushed and patent. Midline chest incision, approximated, open to air, pt c/o tenderness surrounding incision. Incisions from old peg tube sites on upper abdomen, covered with dressing. Rounds completed this morning on pt. Trach care orders put in appropriately Pt using cuffed shiley size 8 disposable inner cannula. Inner cannula changed and trach cares performed during shift. Pt refused suctioning. Site is clean, dry, and intact. Minimal secretions, pt tolerating well. Pt denies pain throughout shift. Vitals stable, /65 at beginning of shift, held 0800 metoprolol. Rechecked BP in afternoon, 97/66. Will continue with POC.

## 2021-05-12 NOTE — PROGRESS NOTES
"  [facility]   Acute Rehabilitation Unit  Daily progress note    INTERVAL HISTORY  Raj Warren was seen and examined at bedside this morning during team rounds.  No acute events reported overnight.  He remains understandably preoccupied with decisions around next steps for cancer treatment.  He denies any change in pain, shortness of breath, no dizziness, fever/chills, bowel or bladder concerns.  Lactate improved yesterday with 500cc NS bolus (2.4 ->1.3).  Hyponatremia also improved, further increased this morning.  RD did note patient ate at outside food truck for lunch yesterday.  Will encourage adequate hydration and nutrition.      Functionally, he is ambulating with 4WW.  Stairs remain greatest barrier, and will need to determine discharge location.  Patient would like to find new apartment with less stairs.  SLP will likely discharge.  Therapies approved for mod I from bed to chair during day.  Will continue alarms at night.  For full functional updates, see team rounds note from today.    MEDICATIONS    apixaban ANTICOAGULANT  5 mg Oral BID     aspirin  81 mg Oral Daily     metoprolol tartrate  25 mg Oral BID     multivitamin, therapeutic  1 tablet Oral Daily     pantoprazole  40 mg Oral QAM AC     pneumococcal vaccine  0.5 mL Intramuscular Prior to discharge     sodium chloride (PF)  3 mL Intracatheter Q8H        acetaminophen, albuterol, lidocaine 4%, lidocaine (buffered or not buffered), menthol (Topical Analgesic) 2.5%, - MEDICATION INSTRUCTIONS -, senna-docusate, sodium chloride (PF), tetrahydrozoline     PHYSICAL EXAM  /71 (BP Location: Left arm)   Pulse 99   Temp 97.5  F (36.4  C) (Oral)   Resp 12   Ht 1.803 m (5' 11\")   Wt 90.6 kg (199 lb 11.2 oz)   SpO2 97%   BMI 27.85 kg/m    Gen: NAD, pleasant and cooperative  HEENT: NC/AT, trach   Cardio: RRR  Pulm: non-labored on room air, able to cover trach to speak, but only in short sentences before " becoming short of breath  Abd: soft, non-tender, non-distended, bowel sounds present  Ext: warm, well-perfused, no edema or calf tenderness in bilateral lower extremities  Neuro/MSK: AAOx3, follows commands, responds appropriately.      LABS  CBC RESULTS:   Recent Labs   Lab Test 05/11/21  1023 05/08/21  0630 05/07/21  0700   WBC 7.7 8.1 8.0   RBC 4.04* 3.72* 3.77*   HGB 12.1* 11.3* 11.1*   HCT 37.6* 33.9* 34.1*   MCV 93 91 91   MCH 30.0 30.4 29.4   MCHC 32.2 33.3 32.6   RDW 15.2* 15.0 15.2*   * 392 383     Last Basic Metabolic Panel:  Recent Labs   Lab Test 05/12/21  0752 05/11/21  1720 05/11/21  1023    135 129*   POTASSIUM 4.0 4.5 4.6   CHLORIDE 113* 112* 103   CO2 22 17* 18*   ANIONGAP 6 6 8   * 93 129*   BUN 13 14 13   CR 0.88 0.84 0.76   GFRESTIMATED 87 89 >90   TELMA 8.6 8.5 9.1         Rehabilitation - continue comprehensive acute inpatient rehabilitation program with multidisciplinary approach including therapies, rehab nursing, and physiatry following. See interval history for updates.      ASSESSMENT AND PLAN  Raj Warren is a 70 year old male with laryngeal cancer and tracheostomy placement on 4/12/21 who suffered recent bilateral pulmonary emboli s/p embolectomy and IVC filter placement on 4/30/21 with prolonged hospitalization.  He was transferred to ARU on 05/08 for acute rehabilitation.     # Bilateral pulmonary embolisms s/p embolectomy and IVC filter placed on 4/30.  - IVC filter in place  - Eliquis 5 mg bid  - ASA 81 mg daily     # Squamous cell carcinoma of larynx  - Tracheostomy placed on 04/12 d/t laryngeal squamous cell carcinoma with airway compromise.  - Wean oxygen via trach dome as tolerated  - Continue trach cares. Though patient had trach prior to admission, may be helpful to review cares per nursing.  - Albuterol nebulizers q4h prn  - Follow up ENT and Oncology after discharge (see below) - will ultimately require laryngectomy     # DMII  - Pre op A1c: 5.6  -  "Continue sliding scale insulin.   - Will check BG BID and based on these values, will determine whether he requires metformin or other DM medications.   - At this time blood glucose appears well-controlled, ranging from 80s-110s     # HTN:   - Metoprolol 25 mg bid  - Normotensive at this time     # Pain:   - Acetaminophen 650 mg q4h prn for pain  - Gabapentin 100 mg at bedtime prn  - methocarbamol 500 mg q6h prn for muscle spasms  - oxycodone 5 mg q4h prn for severe pain    #Anemia  Stable.  - Trend CBC    #Elevated lactic acid, resolved  Triggered due to tachycardia and tachypnea.  Afebrile.  Denies urinary symptoms.  Surgical incisions without signs of infection.  No changes in respiratory status to indicate infection.  Improved with 500cc bolus IV NS to 1.3.    #Hyponatremia, resolved.  Noted at 129 on 5/11, improved to 135 after 500cc NS IV bolus.  Further increase to 141 on 5/12.  Patient noted to have eaten lunch at outside food truck.  - Trend BMP    #Left neck/upper back pain.  New onset 5/11.  No radiation, aggravated by activity.  Tightness and tenderness to palpation on exam in left trapezius and paraspinal muscles.  Patient declines to use heat or cold therapy, as he feels these \"affect his bones\".  Agreeable to trial of topical.  - Bengay PRN      1. Adjustment to disability:  Clinical psychology to eval and treat as needed.   2. FEN: Regular diet thin liquids.  3. Bowel: Continent. Miralax and Senna-docusate scheduled for now.  4. Bladder: Continent  5. DVT Prophylaxis: Anticoagulated with apixaban  6. GI Prophylaxis: Pantoprazole 40 mg qday.  7. Code: full - per discussion at admission, patient states he would like to think more about this, but for now decided to remain full code.   8. Disposition: goal for home  9. ELOS: 2 weeks  10. Follow up Appointments on Discharge: PCP, PM&R, ENT surgery, oncology (Dr. Jamin Bean) on 5/27/21 at 2:20pm, cardiology (Dr. Lopez) on 7/8/21 at 12:45pm, " cardiothoracic surgery (Jeanine/Susanna/Quique Pantoja with Dr. Singh at [facility]) on 5/19/21 at 2:30pm      Patient seen and discussed with Dr. William Gill, PM&R Staff Physician    Michelle Butt PA-C  Physical Medicine & Rehabilitation

## 2021-05-12 NOTE — PLAN OF CARE
"  VS: Blood pressure 118/71, pulse 96, temperature 98.1  F (36.7  C), temperature source Axillary, resp. rate 16, height 1.803 m (5' 11\"), weight 90.6 kg (199 lb 11.2 oz), SpO2 98 %.     O2: Room air, denies SOB. Lung sounds clear and equal bilat.   Output: Uses urinal independently.   Last BM: 5/11.   Activity: SBA w/ gait belt and walker for transfers, Assist x2 for repositioning.   Skin: Pt has chest wound/scar, g-tube site, and trach site.     No other skin concerns.   Pain: Pt denies pain, states he has tenderness on chest incision sit.   CMS: Pt states that he has continuing numbness on R hand fingers and half way up his forearm, otherwise no new N/T.   Dressing: Pt has dressing on abd covering g-tube insertion site, this is CDI. Chest wound/scar is open to air. Transparent dressing over PIV is CDI.   Diet: Regular diet, thin liquid.   LDA: L arm PIV SL, trach.    Equipment: Trach care supplies, suction, BVM.   Plan: Continue with plan of care.   Additional Info: Pt alert and oriented x4. Pt slept most of night.     Pt requested suctioning in the am. Produced small amount of thick secretions.       "

## 2021-05-12 NOTE — CARE CONFERENCE
Acute Rehab Care Conference/Team Rounds    Type: Team Rounds    Present:  Dr William Gill, Michelle Butt PA, Kalpana Rodriguez RN, Bhumika Jenkins PT, Sanjuana Gagnon OT, Tank Mae SLP, Dilma Gore LICSW, Sheree De Los Santos , Margo Durant Dietician, Chelsea Barker , Patient Raj Warren    Discharge Barriers/Treatment/Education    Rehab Diagnosis: 16 Debility (non-cardiac, non-pulmonary) due to B PEs, laryngeal mass    Active Medical Co-morbidities/Prognosis:   Patient Active Problem List   Diagnosis     Atopic rhinitis     Aphthous ulcer     Chronic allergic conjunctivitis     CARDIOVASCULAR SCREENING; LDL GOAL LESS THAN 160     Stridor     Vocal cord mass     Malignant neoplasm of larynx (H)     Tracheostomy in place (H)     Bilateral pulmonary embolism (H)     Pulmonary emboli (H)     History of embolectomy     Anticoagulated     Pulmonary embolism (H)        Safety: Pt calls appropriately.    Pain: Pt denies pain. Pt states that he only has some tenderness on chest scar/incision with touch.    Medications, Skin, Tubes/Lines: Pt has PIV in L arm SL. Pt has surgical incisions, dressing over past peg tube insertion site, and trach site, otherwise no skin concerns. Pt will be going home with trach. Pt and/or family member will need trach education prior to discharge.    Swallowing/Nutrition: On regular texture diet and thin liquids. No dysphagia related concerns.    Bowel/Bladder: Pt uses urinal. Pt is assist of 2 with gait belt and walker.    Psychosocial: Single, lives alone. Indep PTA. Son who lives out-of-town but coming to [place] at discharge. No financial concerns, MH or substance abuse concerns.     ADLs/IADLs:   G/H oral cares : SBA with walker standing at sink  UB dressing: SBA   LB dressing: sba  For shorts  feet socks sba with sock aide with therapist setting up aide for pt with sock  Bathing: SBA    Mobility: Up in room w/o device, prefers 4ww for community distances; stairs for home  access continue to be a barrier due to deconditioning.     Cognition/Language: Patient using finger occlusion to communicate. Not a candidate for PMSV. Reluctant at times to use HME due to increased constriction and the inability to use finger occlusion. Currently on hold for any SLP related interventions and will discharge if no further concerns.     Community Re-Entry: 4ww, ind    Transportation: car transfer not a barrier    Decision maker: self    Plan of Care and goals reviewed and updated.    Discharge Plan/Recommendations    Fall Precautions: continue    Patient/Family input to goals: Yes    Anticipated rehab needs following discharge: Home with home care    Anticipated care giver support after discharge: Family    Estimated length of stay: 5/19/21    Overall plan for the patient: Continue Ip Rehabilitation      Utilization Review and Continued Stay Justification    Medical Necessity Criteria:    For any criteria that is not met, please document reason and plan for discharge, transfer, or modification of plan of care to address.    Requires intensive rehabilitation program to treat functional deficits?: Yes    Requires 3x per week or greater involvement of rehabilitation physician to oversee rehabilitation program?: Yes    Requires rehabilitation nursing interventions?: Yes    Patient is making functional progress?: Yes    There is a potential for additional functional progress? Yes    Patient is participating in therapy 3 hours per day a minimum of 5 days per week or 15 hours per week in 7 day period?:Yes    Has discharge needs that require coordinated discharge planning approach?:Yes            Final Physician Sign off    Statement of Approval: I approve the plan of care    Patient Goals  Social Work Goals: Confirm discharge recommendations with therapy, coordinate safe discharge plan and remain available to support and assist as needed.     OT Frequency: daily,  minutes  OT goal: hygiene/grooming:  independent  OT goal: upper body dressing: Independent  OT goal: lower body dressing: Modified independent  OT goal: upper body bathing: Modified independent  OT goal: lower body bathing: Modified independent   OT Goal: transfer: Modified independent  OT goal: toilet transfer/toileting: Modified independent  OT goal 1: Pt will demonstrate safety with IADLs.      PT Frequency: (P) daily x 90 minutes  PT goal: bed mobility: (P) (met)  PT goal: transfers: (P) (met)  PT goal: gait: (P) (met)  PT goal: stairs: Modified independent, Greater than 10 stairs, Rail on both sides(25 stairs, B rails, in current apt)  PT goal: perform aerobic activity with stable cardiovascular response: 15 minutes   PT goal 1: patient to be mod I with a medically appropriate HEP focused on standing, balance, functional tolerance.  PT Goal 2: (P) (met)     No SLP goals at this time.     RN Goals:  1. Patient will be safe with Mod-I or Independent prior to discharge.  2. Patient will remain free from pressure injuries while on the ARU by demonstrating weight shifting and repositioning.  3. Patient will remain free from falls while on the ARU by waiting for staff for transfers and correct use of call light.

## 2021-05-12 NOTE — PLAN OF CARE
RN [id number]  VSS on RA. A&O x4. Denies SOB/chest pain/dizziness/headache.Trach shiley size 8, trach cares done this shift. Pt declined suctioning this shift, good cough. Using urinal or BR for voiding, LBM 5/11. SBA1 with walker/GB for transfers, A2 for repositioning/boosting in bed x2. Skin intact ex trach site, chest incision, abdominal incisions. Denies pain. Numbness present in RUE unchanged per pt report. Gauze on abdomen changed this shift. Regular diet, thin liquids, denies N/V. PIV L arm SL. Pt was calm and cooperative this shift. Talks in whisper voice, prefers to sit up during communication. Continue to monitor.

## 2021-05-12 NOTE — PLAN OF CARE
"Discharge Planner Post-Acute Rehab PT:     Discharge Plan: pt lives alone in an apt with 25 stairs and B rails; previously very limiting pre hospital and pt is looking for a new apt due to this; TBA     Precautions: fall, trach (communication due to trach), sternal      Current Status:  Bed Mobility: ind  Transfer: sba  Gait: up to 400' x 2, 4ww, sba  Stairs: 6\" and 4\", reciprocal, rails, sba  Balance: uneven terrain w/ 4ww, no LOB     Assessment:  pt with decreased activity tolerance this pm, though moving well functionally. Will plan to assess for mod I for accessing bathroom tomorrow. Need to continue working on stairs for safe home entry. Will need to order 4WW- determine if he wants to get through insurance or online (cheaper than rx).     Other Barriers to Discharge (DME, Family Training, etc): functional tolerance, stairs,   "

## 2021-05-12 NOTE — PROGRESS NOTES
Chart check at ARU. Will follow along. CV surgery follow up scheduled for 5/19 if discharged from ARU.

## 2021-05-12 NOTE — PLAN OF CARE
Post Rounds Family Phone Call with Sanjuana Gagnon OTRMICHELA Muse PT  Length of call: 3 mini  Family involved: son Jovani  Projected discharge date:5-18   Projected discharge location: TBD son looking into new living situation for pt to be D/Niels to  Equipment needs:   Other questions and misc: son coming from [place] and will be at the ARU to night and tomarrow

## 2021-05-13 ENCOUNTER — APPOINTMENT (OUTPATIENT)
Dept: OCCUPATIONAL THERAPY | Facility: CLINIC | Age: 71
End: 2021-05-13
Payer: COMMERCIAL

## 2021-05-13 ENCOUNTER — APPOINTMENT (OUTPATIENT)
Dept: PHYSICAL THERAPY | Facility: CLINIC | Age: 71
End: 2021-05-13
Payer: COMMERCIAL

## 2021-05-13 ENCOUNTER — PATIENT OUTREACH (OUTPATIENT)
Dept: CARE COORDINATION | Facility: CLINIC | Age: 71
End: 2021-05-13

## 2021-05-13 LAB
CREAT SERPL-MCNC: 0.92 MG/DL (ref 0.66–1.25)
GFR SERPL CREATININE-BSD FRML MDRD: 84 ML/MIN/{1.73_M2}

## 2021-05-13 PROCEDURE — 97535 SELF CARE MNGMENT TRAINING: CPT | Mod: GO

## 2021-05-13 PROCEDURE — 128N000003 HC R&B REHAB

## 2021-05-13 PROCEDURE — 250N000013 HC RX MED GY IP 250 OP 250 PS 637

## 2021-05-13 PROCEDURE — 82565 ASSAY OF CREATININE: CPT | Performed by: PHYSICAL MEDICINE & REHABILITATION

## 2021-05-13 PROCEDURE — 250N000013 HC RX MED GY IP 250 OP 250 PS 637: Performed by: PHYSICAL MEDICINE & REHABILITATION

## 2021-05-13 PROCEDURE — 97110 THERAPEUTIC EXERCISES: CPT | Mod: GP | Performed by: PHYSICAL THERAPIST

## 2021-05-13 PROCEDURE — 36416 COLLJ CAPILLARY BLOOD SPEC: CPT | Performed by: PHYSICAL MEDICINE & REHABILITATION

## 2021-05-13 PROCEDURE — 99233 SBSQ HOSP IP/OBS HIGH 50: CPT | Mod: 24 | Performed by: PHYSICAL MEDICINE & REHABILITATION

## 2021-05-13 RX ORDER — MAGNESIUM HYDROXIDE 1200 MG/15ML
LIQUID ORAL
Status: DISPENSED
Start: 2021-05-13 | End: 2021-05-13

## 2021-05-13 RX ADMIN — PANTOPRAZOLE SODIUM 40 MG: 40 TABLET, DELAYED RELEASE ORAL at 09:04

## 2021-05-13 RX ADMIN — THERA TABS 1 TABLET: TAB at 09:03

## 2021-05-13 RX ADMIN — METOPROLOL TARTRATE 25 MG: 25 TABLET, FILM COATED ORAL at 09:04

## 2021-05-13 RX ADMIN — APIXABAN 5 MG: 2.5 TABLET, FILM COATED ORAL at 09:04

## 2021-05-13 RX ADMIN — ASPIRIN 81 MG CHEWABLE TABLET 81 MG: 81 TABLET CHEWABLE at 09:04

## 2021-05-13 RX ADMIN — APIXABAN 5 MG: 2.5 TABLET, FILM COATED ORAL at 20:24

## 2021-05-13 RX ADMIN — METOPROLOL TARTRATE 25 MG: 25 TABLET, FILM COATED ORAL at 20:24

## 2021-05-13 NOTE — PROGRESS NOTES
Clinic Care Coordination Contact    Situation: Patient chart reviewed by care coordinator.    Background: Patient was hospitalized at [facility] from 4/27/21 to 5/8/21 with diagnosis of bilateral pulmonary embolism, s/p emergent pulmonary embolectomy.  CC RN following patient's ARU admission.    Assessment: Per chart review, patient remains admitted at [facility].  Per MD note 5/12/21, tentative discharge planned for 5/19/21; no specific discharge disposition details noted at this time.    Plan/Recommendations: Care Coordinator will review patient's chart again in approximately 1 week to get updates on patient's status and assess discharge disposition; will outreach to patient/care team as indicated.    Jeffrey Mabry, RN  Clinic Care Coordinator  [facility]  Carmella Marcelino [place], [facility]  Ph: [phone]

## 2021-05-13 NOTE — PROGRESS NOTES
[facility] notified regarding issues discussed with trach supplies/portable suction machine not at home.  Patient states that portable suction was supposed to be set up but never heard back from anyone.  [facility] will send paperwork for trach portable suction and supplies to be filled out and order will be placed for home discharge.  Awaiting paperwork.  Patient and family opted to try to go through one place for trach supplies.  Process started. PLC to do refresher teaching on trach cares/suction before discharge to be set up with son who is in town.  Continue plan of care.

## 2021-05-13 NOTE — PLAN OF CARE
FOCUS/GOAL  Pain management, Mobility, and Safety management    ASSESSMENT, INTERVENTIONS AND CONTINUING PLAN FOR GOAL:  Patient is alert and oriented x 4. Able to make his needs known. Denied pain. Refused trach care. Trach site is clean and dry. Intermittent productive coughing noted. Denied SOB. PIV on left arm is patent. Mod I using walker for transfers. Continent of bladder using the urinal at bedside. Refused shower and wanted bed bath instead. Last bowel movement was yesterday.

## 2021-05-13 NOTE — PLAN OF CARE
VS: VSS.    O2: 98% on room air. Denies SOB, denies difficulty breathing. Lung sounds clear. Trach, no secretion build up on shift.    Output: Voiding spontaneously.    Last BM: LBM 5/11/2021.    Activity: Mod I to chair. OT anticipating updating to Mod I in room.    Up for meals? Encouraged.    Skin: Intact except trach.    Pain: Denies.    CMS: Intact except R hand 3-5th digits numbness and tingling.    Dressing: CDI. Dressings changed today. Trach cares completed 0700, see charting, suctioned per RT at this time.    Diet: Regular, thin. Takes pills whole. Drank ensure for breakfast.    LDA: Trach, incision.    Equipment: Walker, gait belt.    Plan: Continuing therapy.    Additional Info: OT verbalized anticipated weekend discharge - Saturday or Sunday. Son OK to transfer pt per OT assessment.

## 2021-05-13 NOTE — PLAN OF CARE
Pt AOx4 denies pain, CP, SOB, N/V. Pt has baseline N/T in RUE fingers up to elbow. Pt O2 spot check at 98%. Pt had blood tinge sputum suctioned out of trach this am by RT, trach cares and inner canula changed 0700. Trach site, gauze, and ties CDI. Emergency equipment hanging at bedside, extra trach same size and smaller, suction, O2. Pt lungs clear bilaterally. Pt continent of B/B, uses bedside urinal. Pt calls appropriately, bed alarm on at night, bed low, call light in reach at all times.

## 2021-05-13 NOTE — PLAN OF CARE
Discharge Planner OT   Discharge plan: return home alone  Precautions: trach, falls  Current status:   G/H: SBA-mod. I with walker  UB dressing: SBA-mod. I   LB dressing: SBA for socks and shoes sitting in low chair, pt able to doff socks w/ hands and use sock aid to don  Bathing: SBA     Assessment: Pt increasing mobility and g/h standing at sink. Working on progressing to mod. I in room. Son educated in EC, transfers and sternal precautions.     Other Barriers to discharge: 25+ steps, patient lining up intermittent assistance, equipment to be determined

## 2021-05-13 NOTE — PLAN OF CARE
"Discharge Planner Post-Acute Rehab PT:     Discharge Plan: pt lives alone in an apt with 25 stairs and B rails; previously very limiting pre hospital and pt is looking for a new apt due to this; TBA     Precautions: fall, trach (communication due to trach), sternal      Current Status:  Bed Mobility: ind  Transfer: sba  Gait: up to 400' x 2, 4ww, sba  Stairs: 6\" and 4\", reciprocal, rails, sba  Balance: uneven terrain w/ 4ww, no LOB     Assessment:  Pt ordering 4ww from [facility]. Pt is now I in room without ad to bathroom and back to bed. Pt was able to perform stairs x 20, but very tiring for pt, O2 sats stable with all activity.  Pt's son states he and pt will have trache training on Saturday afternoon, and would like discharge after that if OK with team, will discuss Sat.     Other Barriers to Discharge (DME, Family Training, etc): functional tolerance, stairs,   "

## 2021-05-13 NOTE — PROGRESS NOTES
"  [facility]   Acute Rehabilitation Unit  Daily progress note    INTERVAL HISTORY  Raj Warren was seen and examined at bedside this morning.  No acute events reported overnight.  He states that sleep is \"ok\", he is using HME which is less disruptive to him than trach dome.  He feels breathing and secretions are unchanged. He has mild soreness about sternal incision site, but manageable.  Continues with unchanged numbness in right ulnar side of hand/fingers.  His son arrived from out of town yesterday and will be visiting for a few weeks.  Patient states plan will be to discharge to a different apartment without stairs and then look at other longer term options once he leaves.  Patient doesn't like hospital food, and states his fluid intake is fluctuating, encouraged consistent adequate intake.  Cr this AM stable at 0.92.    Functionally, he currently needs standby assist for transfers, ambulating up to 400' x2 with standby assist and 4WW, and doing 6\" and 4\" stairs with standby assist.  He needs standby assist to don/doff shoes seated in low chair.  He is modified independent in chair to chair transfers in room.    MEDICATIONS    apixaban ANTICOAGULANT  5 mg Oral BID     aspirin  81 mg Oral Daily     metoprolol tartrate  25 mg Oral BID     multivitamin, therapeutic  1 tablet Oral Daily     pantoprazole  40 mg Oral QAM AC     pneumococcal vaccine  0.5 mL Intramuscular Prior to discharge     sodium chloride (PF)  3 mL Intracatheter Q8H     sodium chloride 0.9% (bottle)            acetaminophen, albuterol, lidocaine 4%, lidocaine (buffered or not buffered), menthol (Topical Analgesic) 2.5%, - MEDICATION INSTRUCTIONS -, senna-docusate, sodium chloride (PF), tetrahydrozoline     PHYSICAL EXAM  /75   Pulse 97   Temp 98.7  F (37.1  C) (Oral)   Resp 16   Ht 1.803 m (5' 11\")   Wt 90.6 kg (199 lb 11.2 oz)   SpO2 98%   BMI 27.85 kg/m    Gen: NAD, pleasant and " cooperative  HEENT: NC/AT, trach without secretions noted  Cardio: RRR, no murmurs  Pulm: non-labored on room air, able to cover trach to speak, but only in short sentences before becoming short of breath  Abd: soft, non-tender, non-distended, bowel sounds present  Ext: warm, well-perfused, no edema or calf tenderness in bilateral lower extremities  Neuro/MSK: AAOx3, follows commands, responds appropriately.      LABS  CBC RESULTS:   Recent Labs   Lab Test 05/11/21  1023 05/08/21  0630 05/07/21  0700   WBC 7.7 8.1 8.0   RBC 4.04* 3.72* 3.77*   HGB 12.1* 11.3* 11.1*   HCT 37.6* 33.9* 34.1*   MCV 93 91 91   MCH 30.0 30.4 29.4   MCHC 32.2 33.3 32.6   RDW 15.2* 15.0 15.2*   * 392 383     Last Basic Metabolic Panel:  Recent Labs   Lab Test 05/13/21  0743 05/12/21  0752 05/11/21  1720 05/11/21  1023   NA  --  141 135 129*   POTASSIUM  --  4.0 4.5 4.6   CHLORIDE  --  113* 112* 103   CO2  --  22 17* 18*   ANIONGAP  --  6 6 8   GLC  --  117* 93 129*   BUN  --  13 14 13   CR 0.92 0.88 0.84 0.76   GFRESTIMATED 84 87 89 >90   TELMA  --  8.6 8.5 9.1         Rehabilitation - continue comprehensive acute inpatient rehabilitation program with multidisciplinary approach including therapies, rehab nursing, and physiatry following. See interval history for updates.      ASSESSMENT AND PLAN  Raj Warren is a 70 year old male with laryngeal cancer and tracheostomy placement on 4/12/21 who suffered recent bilateral pulmonary emboli s/p embolectomy and IVC filter placement on 4/30/21 with prolonged hospitalization.  He was transferred to ARU on 05/08 for acute rehabilitation.     # Bilateral pulmonary embolisms s/p embolectomy and IVC filter placed on 4/30.  - IVC filter in place  - Eliquis 5 mg bid  - ASA 81 mg daily     # Squamous cell carcinoma of larynx  - Tracheostomy placed on 04/12 d/t laryngeal squamous cell carcinoma with airway compromise.  - Supplemental oxygen PRN  - Continue trach cares. Though patient had trach prior  "to admission, may be helpful to review cares per nursing.  - Albuterol nebulizers q4h prn  - Follow up ENT and Oncology after discharge (see below) - will ultimately require laryngectomy     # DMII  - Pre op A1c: 5.6  - Continue sliding scale insulin.   - Will check BG BID and based on these values, will determine whether he requires metformin or other DM medications.   - At this time blood glucose appears well-controlled, ranging from 80s-120s     # HTN:   - Metoprolol 25 mg bid  - Normotensive at this time, several soft BP, suspect may be due to inadequate fluid intake.  Encourage fluids.     # Pain:   - Acetaminophen 650 mg q4h prn for pain  - Gabapentin 100 mg at bedtime prn  - methocarbamol 500 mg q6h prn for muscle spasms  - oxycodone 5 mg q4h prn for severe pain    #Anemia  Stable.  - Trend CBC    #Elevated lactic acid, resolved  Triggered due to tachycardia and tachypnea.  Afebrile.  Denies urinary symptoms.  Surgical incisions without signs of infection.  No changes in respiratory status to indicate infection.  Improved with 500cc bolus IV NS to 1.3.    #Hyponatremia, resolved.  Noted at 129 on 5/11, improved to 135 after 500cc NS IV bolus.  Further increase to 141 on 5/12.  Patient noted to have eaten lunch at outside food truck.  - Trend BMP    #Left neck/upper back pain.  New onset 5/11.  No radiation, aggravated by activity.  Tightness and tenderness to palpation on exam in left trapezius and paraspinal muscles.  Patient declines to use heat or cold therapy, as he feels these \"affect his bones\".  Agreeable to trial of topical.  - Bengay PRN      1. Adjustment to disability:  Clinical psychology to eval and treat as needed.   2. FEN: Regular diet thin liquids.  3. Bowel: Continent. Miralax and Senna-docusate scheduled for now.  4. Bladder: Continent  5. DVT Prophylaxis: Anticoagulated with apixaban  6. GI Prophylaxis: Pantoprazole 40 mg qday.  7. Code: full - per discussion at admission, patient states " he would like to think more about this, but for now decided to remain full code.   8. Disposition: goal for home.  Per patient, given his current apartment has many stairs, son is working on cleaning out another apartment where he can discharge to, which has no stairs, and then he will look at other long-term options after discharge.  9. ELOS: 2 weeks.  Discharge tentatively set for 5/18.  10. Follow up Appointments on Discharge: PCP, PM&R, ENT surgery, oncology (Dr. Jamin Bean) on 5/27/21 at 2:20pm, cardiology (Dr. Lopez) on 7/8/21 at 12:45pm, cardiothoracic surgery (Jeanine/Susanna/Quique Pantoja with Dr. Singh at [facility]) on 5/19/21 at 2:30pm      Patient discussed with Dr. William Gill, PM&R Staff Physician    MELLISSA ScottC  Physical Medicine & Rehabilitation

## 2021-05-13 NOTE — PROGRESS NOTES
Discharge pending trach education and PLC. Will keep discharge date of Tues 05/18 at this time until PLC education is confirmed and pt/pt son are comfortable with managing at home. If education completed and all discharge plans in place, could consider moving up the date. Pt prefers to leave this weekend.     RN staff spoke with pt and pt son, HC set up with [facility]. No SW needs at this time. No IMM. SW available if needs arise.     SADA Herring, [facility] Acute Rehab Unit   Phone: [phone]  I   Pager: [phone]

## 2021-05-14 ENCOUNTER — APPOINTMENT (OUTPATIENT)
Dept: OCCUPATIONAL THERAPY | Facility: CLINIC | Age: 71
End: 2021-05-14
Payer: COMMERCIAL

## 2021-05-14 ENCOUNTER — APPOINTMENT (OUTPATIENT)
Dept: PHYSICAL THERAPY | Facility: CLINIC | Age: 71
End: 2021-05-14
Payer: COMMERCIAL

## 2021-05-14 LAB — LACTATE BLD-SCNC: NORMAL MMOL/L (ref 0.7–2)

## 2021-05-14 PROCEDURE — 99233 SBSQ HOSP IP/OBS HIGH 50: CPT | Mod: 24 | Performed by: PHYSICAL MEDICINE & REHABILITATION

## 2021-05-14 PROCEDURE — 97530 THERAPEUTIC ACTIVITIES: CPT | Mod: GO

## 2021-05-14 PROCEDURE — 80048 BASIC METABOLIC PNL TOTAL CA: CPT | Performed by: PHYSICIAN ASSISTANT

## 2021-05-14 PROCEDURE — 999N000157 HC STATISTIC RCP TIME EA 10 MIN

## 2021-05-14 PROCEDURE — 97116 GAIT TRAINING THERAPY: CPT | Mod: GP

## 2021-05-14 PROCEDURE — 250N000013 HC RX MED GY IP 250 OP 250 PS 637: Performed by: PHYSICAL MEDICINE & REHABILITATION

## 2021-05-14 PROCEDURE — 97110 THERAPEUTIC EXERCISES: CPT | Mod: GP

## 2021-05-14 PROCEDURE — 128N000003 HC R&B REHAB

## 2021-05-14 PROCEDURE — 97535 SELF CARE MNGMENT TRAINING: CPT | Mod: GO

## 2021-05-14 PROCEDURE — 250N000013 HC RX MED GY IP 250 OP 250 PS 637

## 2021-05-14 PROCEDURE — 97530 THERAPEUTIC ACTIVITIES: CPT | Mod: GP

## 2021-05-14 RX ORDER — ALBUTEROL SULFATE 90 UG/1
2 AEROSOL, METERED RESPIRATORY (INHALATION) EVERY 4 HOURS PRN
Qty: 8 G | Refills: 0 | Status: SHIPPED | OUTPATIENT
Start: 2021-05-14 | End: 2021-06-25

## 2021-05-14 RX ORDER — ASPIRIN 81 MG/1
81 TABLET, CHEWABLE ORAL DAILY
Qty: 60 TABLET | Refills: 0 | Status: SHIPPED | OUTPATIENT
Start: 2021-05-15 | End: 2021-05-20 | Stop reason: ALTCHOICE

## 2021-05-14 RX ORDER — METOPROLOL TARTRATE 25 MG/1
25 TABLET, FILM COATED ORAL 2 TIMES DAILY
Qty: 60 TABLET | Refills: 0 | Status: SHIPPED | OUTPATIENT
Start: 2021-05-14 | End: 2021-05-24

## 2021-05-14 RX ORDER — PANTOPRAZOLE SODIUM 40 MG/1
40 TABLET, DELAYED RELEASE ORAL
Qty: 30 TABLET | Refills: 0 | Status: SHIPPED | OUTPATIENT
Start: 2021-05-15 | End: 2021-06-08

## 2021-05-14 RX ADMIN — PANTOPRAZOLE SODIUM 40 MG: 40 TABLET, DELAYED RELEASE ORAL at 07:46

## 2021-05-14 RX ADMIN — APIXABAN 5 MG: 2.5 TABLET, FILM COATED ORAL at 20:19

## 2021-05-14 RX ADMIN — THERA TABS 1 TABLET: TAB at 07:46

## 2021-05-14 RX ADMIN — METOPROLOL TARTRATE 25 MG: 25 TABLET, FILM COATED ORAL at 07:46

## 2021-05-14 RX ADMIN — APIXABAN 5 MG: 2.5 TABLET, FILM COATED ORAL at 07:46

## 2021-05-14 RX ADMIN — ASPIRIN 81 MG CHEWABLE TABLET 81 MG: 81 TABLET CHEWABLE at 07:46

## 2021-05-14 RX ADMIN — METOPROLOL TARTRATE 25 MG: 25 TABLET, FILM COATED ORAL at 20:19

## 2021-05-14 NOTE — PLAN OF CARE
Patient is alert and oriented x4. Able to make needs known. Mod I in room with walker. VSS. Denies pain, denies CP,  sob, denies distress, denies N/V. Intermittent productive cough. Erinley #8 cuffed. Trach care done per plan of care. Low BP at 99/80, Pt was asymptomatic. Rechecked BP improved. Sternal incision CDI, BRYANT. No care concern. continue with POC.

## 2021-05-14 NOTE — PLAN OF CARE
"Discharge Planner Post-Acute Rehab PT:      Discharge Plan: pt lives alone in an apt with 25 stairs and B rails; previously very limiting pre hospital and pt is looking for a new apt due to this; TBA     Precautions: fall, trach (communication due to trach), sternal      Current Status:  Bed Mobility: ind  Transfer: sba  Gait: up to 400' x 2, 4ww, sba  Stairs: 6\" and 4\", reciprocal, rails, sba  Balance: uneven terrain w/ 4ww, no LOB     Assessment: Pt has passed IND day and is ready to discharge Saturday after his trach training.     Other Barriers to Discharge (DME, Family Training, etc): functional tolerance, stairs,   "

## 2021-05-14 NOTE — PROGRESS NOTES
CLINICAL NUTRITION SERVICES - REASSESSMENT NOTE     Nutrition Prescription    RECOMMENDATIONS FOR MDs/PROVIDERS TO ORDER:  None today     Malnutrition Status:    Non-severe malnutrition in the context of acute illness    Recommendations already ordered by Registered Dietitian (RD):  On 5/12 RD added to diet order that pt is Approved for peds menu items. Pt likes apples, grapes, cantaloupe, cereal, and the Ensure supplement. Encourage pt to try mac-n-cheese and tacos from peds menu    On 5/12 RD updated room service order to appropriate, requesting unit staff assist pt call for breakfast and evening meals, pt will have outside food for lunch.    On 5/12 RD adjusted supplement order to Chocolate Ensure Enlive TID at all meals (auto send)    Future/Additional Recommendations:  Monitor meal/supplement intakes  Monitor weight trends      EVALUATION OF THE PROGRESS TOWARD GOALS   Diet: Regular  Supplement: Ensure Enlive bid with meals and Magic Cup daily with meal  Intake: %, pt is consuming outside foods as well        NEW FINDINGS   MAR reviewed. Labs reviewed. No new weight to assess from admission. RD present during care team rounds on 5/12 and visited pt at bedside on that date. Reviewed concerns about varying po intakes due to pt with dislike of hospital foods, reviewed additional options for peds menu, reviewed supplement orders, pt is taking Ensure, did not like Magic cup, agreeing to adjusting supplements to Ensure TID at all meals, agreeing to having peds menu options for tacos. RD encouraged pt to at least attempt some of  meals, pt agreeing to order breakfast/evening meals from , eat outside foods for lunch, and provided food favorites from  menu.     Wt Readings from Last 10 Encounters:   05/08/21 90.6 kg (199 lb 11.2 oz)   05/07/21 87.7 kg (193 lb 5.5 oz)   04/22/21 93.9 kg (207 lb)   04/17/21 97.7 kg (215 lb 6.2 oz)   02/19/18 98 kg (216 lb)     Weight assessment:  Significant 7.4% weight loss in not quite 1 month.     MALNUTRITION  % Intake: < 75% for > 7 days (non-severe)  % Weight Loss: > 5% in 1 month (severe)  Subcutaneous Fat Loss: None observed  Muscle Loss: None observed  Fluid Accumulation/Edema: None noted  Malnutrition Diagnosis: Non-severe malnutrition in the context of acute illness     Previous Goals   Patient to consume % of nutritionally adequate meal trays TID, or the equivalent with supplements/snacks.  Evaluation: Not met    Previous Nutrition Diagnosis  Unintended weight loss related to increased needs and likely decreased intake as evidenced by 7% weight loss PTA   Evaluation: Somewhat resolved/diagnosis adjusted to below     CURRENT NUTRITION DIAGNOSIS  Inadequate oral intake related to food preference/dislike of hospital food as evidenced by pt self reports with meal intakes varying from % with significant weight loss over 1 month       INTERVENTIONS  Implementation  Medical food supplement therapy - orders adjusted above   Modify composition of meals/snacks - orders adjusted above     Goals  Patient to consume % of nutritionally adequate meal trays TID, or the equivalent with supplements/snacks.    Monitoring/Evaluation  Progress toward goals will be monitored and evaluated per protocol.    Margo Durant RD, CNSC, LD  ARU RD pager: [phone]

## 2021-05-14 NOTE — PLAN OF CARE
"  VS: Blood pressure 118/73, pulse 96, temperature 97.7  F (36.5  C), temperature source Oral, resp. rate 19, height 1.803 m (5' 11\"), weight 90.6 kg (199 lb 11.2 oz), SpO2 100 %.     O2: Room air, pt denies SOB.    Output: Pt uses urinal independently.   Last BM: 5/11.   Activity: Modified Independent with walker.   Skin: Pt has trach site and chest incision. No other skin concerns.   Pain: Pt denied pain throughout shift.   CMS: Pt states that he has N/T in RUE from fingers to mid arm. It has been unchanged.   Dressing: Pt has dressing at trach site, dressing and trach ties are CDI.   Diet: Regular diet, thin liquid.   LDA: Trach site, scar/incision on chest.   Equipment: Walker, BVM, Shiley size 8, suction, trach supplies.   Plan: Continue with plan of care.   Additional Info: Pt Aox4, Pt slept for most of night. Pt stated that he did not want suctioning, he coughed a lot up on his own.       "

## 2021-05-14 NOTE — PROGRESS NOTES
"  [facility]   Acute Rehabilitation Unit  Daily progress note    INTERVAL HISTORY  Raj Warren was seen and examined at bedside this morning.  No acute events reported overnight.  He states that sleep is \"ok\".  He notes stable, mild sternal pain under incision.  Reports that shortness of breath and secretions \"come and go\".  He expresses some concerns regarding trach supplies and cares, will follow up with director of nursing.  He and his son are attending [facility] tomorrow for trach care refresher.  He is hopeful to discharge afterwards as he is anxious to find a new apartment and meet with oncology to discuss next steps for cancer treatment.    Functionally, he is independent with bed mobility, needs standby assist for transfers, can ambulate up to 400' x2 with 4WW and standby assist, stairs x20 but very fatiguing.  Oxygen sats stable with activity.  He needs standby assist to mod I with dressing, grooming/hygiene, bathing.    MEDICATIONS    apixaban ANTICOAGULANT  5 mg Oral BID     aspirin  81 mg Oral Daily     metoprolol tartrate  25 mg Oral BID     multivitamin, therapeutic  1 tablet Oral Daily     pantoprazole  40 mg Oral QAM AC     pneumococcal vaccine  0.5 mL Intramuscular Prior to discharge     sodium chloride (PF)  3 mL Intracatheter Q8H        acetaminophen, albuterol, lidocaine 4%, lidocaine (buffered or not buffered), menthol (Topical Analgesic) 2.5%, - MEDICATION INSTRUCTIONS -, senna-docusate, sodium chloride (PF), tetrahydrozoline     PHYSICAL EXAM  /74 (BP Location: Left arm)   Pulse 95   Temp 98.4  F (36.9  C) (Oral)   Resp 18   Ht 1.803 m (5' 11\")   Wt 90.6 kg (199 lb 11.2 oz)   SpO2 99%   BMI 27.85 kg/m    Gen: NAD, pleasant and cooperative  HEENT: NC/AT, trach without secretions noted  Cardio: RRR, no murmurs  Pulm: non-labored on room air, able to cover trach to speak, but only in short sentences before becoming short of breath  Abd: soft, " non-tender, non-distended, bowel sounds present  Ext: warm, well-perfused, no edema or calf tenderness in bilateral lower extremities  Neuro/MSK: AAOx3, follows commands, responds appropriately.    Skin: sternal incision CDI, chest tube sites not visualized today    LABS  CBC RESULTS:   Recent Labs   Lab Test 05/11/21  1023 05/08/21  0630 05/07/21  0700   WBC 7.7 8.1 8.0   RBC 4.04* 3.72* 3.77*   HGB 12.1* 11.3* 11.1*   HCT 37.6* 33.9* 34.1*   MCV 93 91 91   MCH 30.0 30.4 29.4   MCHC 32.2 33.3 32.6   RDW 15.2* 15.0 15.2*   * 392 383     Last Basic Metabolic Panel:  Recent Labs   Lab Test 05/13/21  0743 05/12/21  0752 05/11/21  1720 05/11/21  1023   NA  --  141 135 129*   POTASSIUM  --  4.0 4.5 4.6   CHLORIDE  --  113* 112* 103   CO2  --  22 17* 18*   ANIONGAP  --  6 6 8   GLC  --  117* 93 129*   BUN  --  13 14 13   CR 0.92 0.88 0.84 0.76   GFRESTIMATED 84 87 89 >90   TELMA  --  8.6 8.5 9.1         Rehabilitation - continue comprehensive acute inpatient rehabilitation program with multidisciplinary approach including therapies, rehab nursing, and physiatry following. See interval history for updates.      ASSESSMENT AND PLAN  Raj Warren is a 70 year old male with laryngeal cancer and tracheostomy placement on 4/12/21 who suffered recent bilateral pulmonary emboli s/p embolectomy and IVC filter placement on 4/30/21 with prolonged hospitalization.  He was transferred to ARU on 05/08 for acute rehabilitation.     # Bilateral pulmonary embolisms s/p embolectomy and IVC filter placed on 4/30.  - IVC filter in place  - Eliquis 5 mg bid  - ASA 81 mg daily     # Squamous cell carcinoma of larynx  - Tracheostomy placed on 04/12 d/t laryngeal squamous cell carcinoma with airway compromise.  - Supplemental oxygen PRN  - Continue trach cares. Though patient had trach prior to admission, may be helpful to review cares per nursing.  - Albuterol nebulizers q4h prn  - Follow up ENT and Oncology after discharge (see below) -  "will ultimately require laryngectomy     # DMII  - Pre op A1c: 5.6  - Continue sliding scale insulin.   - Will check BG BID and based on these values, will determine whether he requires metformin or other DM medications.   - Blood glucose remains well-controlled, ranging from 80s-120s     # HTN:   - Metoprolol 25 mg bid  - Normotensive at this time, several soft BP, suspect may be due to inadequate fluid intake.  Encourage fluids.     # Pain:   Pain has been manageable with just Tylenol PRN  - Acetaminophen 650 mg q4h prn for pain    #Anemia  Stable  - Last Hgb stable at 12.1 on 5/11  - Repeat CBC today    #Elevated lactic acid, resolved  Triggered due to tachycardia and tachypnea.  Afebrile.  Denies urinary symptoms.  Surgical incisions without signs of infection.  No changes in respiratory status to indicate infection.  Improved with 500cc bolus IV NS to 1.3.    #Hyponatremia, resolved.  Noted at 129 on 5/11, improved to 135 after 500cc NS IV bolus.  Further increase to 141 on 5/12.  Patient noted to have eaten lunch at outside food truck.  - Repeat BMP today    #Left neck/upper back pain.  New onset 5/11.  No radiation, aggravated by activity.  Tightness and tenderness to palpation on exam in left trapezius and paraspinal muscles.  Patient declines to use heat or cold therapy, as he feels these \"affect his bones\".  Agreeable to trial of topical.  - Bengay PRN      1. Adjustment to disability:  Clinical psychology to eval and treat as needed.   2. FEN: Regular diet thin liquids.  3. Bowel: Continent. Miralax and Senna-docusate scheduled for now.  4. Bladder: Continent  5. DVT Prophylaxis: Anticoagulated with apixaban  6. GI Prophylaxis: Pantoprazole 40 mg qday.  7. Code: full - per discussion at admission, patient states he would like to think more about this, but for now decided to remain full code.   8. Disposition: goal for home.  Per patient, given his current apartment has many stairs, son is working on " cleaning out another apartment where he can discharge to, which has no stairs, and then he will look at other long-term options after discharge.  9. ELOS: 2 weeks.  Discharge tentatively set for 5/18.  10. Follow up Appointments on Discharge: PCP, PM&R, ENT surgery, oncology (Dr. Jamin Bean) on 5/27/21 at 2:20pm, cardiology (Dr. Lopez) on 7/8/21 at 12:45pm, cardiothoracic surgery (Jeanine/Susanna/Quique Pantoja with Dr. Singh at [facility]) on 5/19/21 at 2:30pm      Patient discussed with Dr. William Gill, PM&R Staff Physician    MELLISSA ScottC  Physical Medicine & Rehabilitation

## 2021-05-14 NOTE — PLAN OF CARE
Discharge Planner OT   Discharge plan: return home alone  Precautions: trach, falls  Current status:   G/H: SBA-mod. I with walker  UB dressing: mod. I   LB dressing: mod I pants   Feet  socks and shoes sitting in low chair, pt able to doff socks w/ hands and use sock aid to don  Bathing:mod I sponge bathe due to trach and will do at home     Assessment:pt able to verbalize and demo fine motor hep good sternal precautions with dressing and g/h.OT focus simple meal prep, kitchen mobility and home management to increase independence in ADLs/IADLs. Pt competed a simple meal of egg and toast using FWW during ambulation. Pt able to grab items from fridge and kitchen tools from high and low cupboards. Used technique of islanding to transfer objects from one surface to another. Used kitchen counter for support when standing and reaching for objects. Pt demonstrated good safety awareness and used hot kitchen appliances and tools appropriately. and room mobility with out walker retrieving items,  son attended family ed of simple food prep and mobility    Other Barriers to discharge:plan to discharge wth son  To prior living situation and move from there to 1 level living standard toilet functional for pt and pt will sponge bath

## 2021-05-14 NOTE — PROGRESS NOTES
Met pt son in the webb, assisted him with getting FMLA ppwk off his phone. Forwarded the ppwk to the PA. Pt son also asking for letter with statement RE: pt hospitalization to present to [facility] in hopes of getting out of his lease. PA notified. Pt son denied additional needs. SW explained this all to the pt as well, pt expressed gratitude and denied additional needs.     SADA Herring, [facility] Acute Rehab Unit   Phone: [phone]  I   Pager: [phone]

## 2021-05-14 NOTE — PLAN OF CARE
FOCUS/GOAL  Medication management, Medical management, Mobility, Skin integrity, and Safety management    ASSESSMENT, INTERVENTIONS AND CONTINUING PLAN FOR GOAL:      Pt is alert and oriented x 4. Continent of bowel and bladder. Mod-I in room with walker. Reg diet, thin liquids. Denied pain, nausea and vomiting, SOB. Chest incision is CDI. Trach site care done per care plan. Extra inner cannulas ordered for pt. Baseline numbness in right hand. Pt will discharge from ARU tomorrow. Will continue with plan of care.

## 2021-05-14 NOTE — DISCHARGE SUMMARY
[facility]   Acute Rehabilitation Unit  Discharge summary     Date of Admission: 5/8/2021  Date of Discharge: 5/15/2021  Disposition: home with family and home nursing/therapies  Primary Care Physician: Nuvia Brown  Attending physician: William Gill MD      DISCHARGE DIAGNOSIS      Bilateral pulmonary embolisms s/p embolectomy and IVC filter placement    Squamous cell carcinoma of larynx    DMII    HTN    Anemia      BRIEF SUMMARY  Raj Warren is a 71 year old male with laryngeal cancer and tracheostomy placement on 4/12/21 who suffered recent bilateral pulmonary emboli s/p embolectomy and IVC filter placement on 4/30/21 with prolonged hospitalization.  He was transferred to ARU on 05/08 for acute rehabilitation.     REHABILITATION COURSE  Current status:   G/H: SBA-mod. I with walker  UB dressing: mod. I   LB dressing: mod I pants   Feet  socks and shoes sitting in low chair, pt able to doff socks w/ hands and use sock aid to don  Bathing:mod I sponge bathe due to trach and will do at home     Assessment:pt able to verbalize and demo fine motor hep good sternal precautions with dressing and g/h.OT focus simple meal prep, kitchen mobility and home management to increase independence in ADLs/IADLs. Pt competed a simple meal of egg and toast using FWW during ambulation. Pt able to grab items from fridge and kitchen tools from high and low cupboards. Used technique of islanding to transfer objects from one surface to another. Used kitchen counter for support when standing and reaching for objects. Pt demonstrated good safety awareness and used hot kitchen appliances and tools appropriately. and room mobility with out walker retrieving items,  son attended family ed of simple food prep and mobility    Other Barriers to discharge:plan to discharge wth son  To prior living situation and     MEDICAL COURSE  # Bilateral pulmonary embolisms s/p embolectomy and IVC  filter placed on 4/30.  IVC filter in place  - Continue Eliquis 5 mg BID  - Continue ASA 81 mg daily  - Follow up with cardiology, cardiothoracic surgery     # Squamous cell carcinoma of larynx  Tracheostomy placed on 04/12 d/t laryngeal squamous cell carcinoma with airway compromise.  - Continue trach cares. Trach care education refresher provided to patient and son prior to discharge.  Also recommended to obtain home portable suction unit to continue suction as needed.  - Follow up ENT and Oncology after discharge (see below) - will ultimately require laryngectomy     # DMII  Pre op A1c: 5.6.  Blood glucose has been well-controlled during ARU stay, in 80s-120s, so no oral medications or insulin were used.  - Follow up with PCP for repeat A1c as indicated     # HTN:   Generally normotensive at this time, several soft BP, suspect may be due to inadequate fluid intake.   - Encourage fluids.  - Continue metoprolol 25 mg BID  - Monitor BP at home, follow up with PCP for ongoing management     # Pain:   Pain has been manageable with just Tylenol PRN  - Continue acetaminophen 650 mg q4h prn for pain     #Anemia  Stable  - Last Hgb stable at 12.1 on 5/11  - Repeat CBC on 5/15 with Hgb 11.1     #Elevated lactic acid, resolved  Triggered due to tachycardia and tachypnea.  Afebrile.  Denies urinary symptoms.  Surgical incisions without signs of infection.  No changes in respiratory status to indicate infection.  Improved with 500cc bolus IV NS to 1.3.     #Hyponatremia, resolved.  Noted at 129 on 5/11, improved to 135 after 500cc NS IV bolus.  Further increase to 141 on 5/12.  Patient noted to have eaten lunch at outside food truck.  - Repeat BMP 5/15 with Na 138    DISCHARGE MEDICATIONS  Current Discharge Medication List      START taking these medications    Details   albuterol (PROAIR HFA/PROVENTIL HFA/VENTOLIN HFA) 108 (90 Base) MCG/ACT inhaler Inhale 2 puffs into the lungs every 4 hours as needed for wheezing  Qty: 8 g,  Refills: 0    Comments: Pharmacy may dispense brand covered by insurance (Proair, or proventil or ventolin or generic albuterol inhaler)  Associated Diagnoses: Malignant neoplasm of larynx (H); Bilateral pulmonary embolism (H)         CONTINUE these medications which have CHANGED    Details   apixaban ANTICOAGULANT (ELIQUIS) 5 MG tablet Take 1 tablet (5 mg) by mouth 2 times daily  Qty: 60 tablet, Refills: 0    Associated Diagnoses: Bilateral pulmonary embolism (H)      aspirin (ASA) 81 MG chewable tablet Take 1 tablet (81 mg) by mouth daily  Qty: 60 tablet, Refills: 0    Associated Diagnoses: Bilateral pulmonary embolism (H)      metoprolol tartrate (LOPRESSOR) 25 MG tablet Take 1 tablet (25 mg) by mouth 2 times daily  Qty: 60 tablet, Refills: 0    Associated Diagnoses: Benign essential hypertension      pantoprazole (PROTONIX) 40 MG EC tablet Take 1 tablet (40 mg) by mouth every morning (before breakfast)  Qty: 30 tablet, Refills: 0    Associated Diagnoses: Gastroesophageal reflux disease with esophagitis without hemorrhage         CONTINUE these medications which have NOT CHANGED    Details   acetaminophen (TYLENOL) 325 MG tablet Take 2 tablets (650 mg) by mouth every 4 hours as needed for other (For optimal non-opioid multimodal pain management to improve pain control.)  Qty:      Associated Diagnoses: Bilateral pulmonary embolism (H)      multivitamin w/minerals (THERA-VIT-M) tablet Take 1 tablet by mouth daily  Qty:      Associated Diagnoses: Bilateral pulmonary embolism (H)         STOP taking these medications       albuterol (PROVENTIL) (2.5 MG/3ML) 0.083% neb solution Comments:   Reason for Stopping:         carboxymethylcellulose PF (REFRESH PLUS) 0.5 % ophthalmic solution Comments:   Reason for Stopping:         gabapentin (NEURONTIN) 100 MG capsule Comments:   Reason for Stopping:         methocarbamol (ROBAXIN) 500 MG tablet Comments:   Reason for Stopping:         oxyCODONE (ROXICODONE) 5 MG tablet  Comments:   Reason for Stopping:         polyethylene glycol (MIRALAX) 17 GM/Dose powder Comments:   Reason for Stopping:         senna-docusate (SENOKOT-S/PERICOLACE) 8.6-50 MG tablet Comments:   Reason for Stopping:                 DISCHARGE INSTRUCTIONS AND FOLLOW UP  Discharge Procedure Orders   Home care nursing referral   Referral Priority: Routine Referral Type: Home Health Therapies & Aides   Number of Visits Requested: 1     Home Care PT Referral for Hospital Discharge   Referral Priority: Routine Referral Type: Home Health Therapies & Aides   Number of Visits Requested: 1     Home Care OT Referral for Hospital Discharge   Referral Priority: Routine   Number of Visits Requested: 1     Reason for your hospital stay   Order Comments: You were admitted for rehabilitation after your thrombectomy surgery for pulmonary embolism.     Adult [facility] Follow-up and recommended labs and tests   Order Comments: Follow up with primary care provider, Nuvia Brown, within 7 days for hospital follow- up.  The following labs/tests are recommended: CBC, BMP.      Follow up with PM&R    Follow up with ENT surgery    Follow up with oncology (Dr. Jamin Bean) on 5/27/21 at 2:20pm.    Follow up with cardiology (Dr. Lopez) on 7/8/21 at 12:45pm.    Follow up with cardiothoracic surgery (Jeanine/Susanna/Quique ODOM-Jarrett with Dr. Singh at [facility]) on 5/19/21 at 2:30pm.    Appointments on [place] and/or [facility] (with [facility] or [facility] provider or service). Call [phone] if you haven't heard regarding these appointments within 7 days of discharge.     Activity   Order Comments: Your activity upon discharge: activity as tolerated and as directed by therapies.  We recommend using walker for mobility, and standby assist for another person for transfers, walking, and stairs.    No lifting more than 10 lbs for 8-12 weeks.  No driving for 1 month.     Order Specific Question Answer Comments   Is  discharge order? Yes      Monitor and record   Order Comments: blood pressure daily  weight every day.  Call provider if more than 2 pound weight gain in 24 hours, or more than 5 pounds in 1 week.     Wound care and dressings   Order Comments: Instructions to care for your wound at home:  You have been provided education regarding ongoing tracheostomy cares at home.  Continue as directed with daily replacement of inner cannula and trach ties, and daily cleaning of outer surfaces and skin.     Discharge Instructions     Full Code     Order Specific Question Answer Comments   Code status determined by: Discussion with patient/ legal decision maker      Diet   Order Comments: Follow this diet upon discharge:       Regular Diet Adult, Thin Liquids     Order Specific Question Answer Comments   Is discharge order? Yes           PHYSICAL EXAMINATION    Most recent Vital Signs:   Vitals:    05/13/21 2017 05/13/21 2023 05/14/21 0745 05/14/21 0900   BP:  118/73 117/78 107/74   BP Location:  Right arm Right arm Left arm   Pulse:  96 95 95   Resp: 18 19 20 18   Temp:   97.3  F (36.3  C) 98.4  F (36.9  C)   TempSrc:   Oral Oral   SpO2:  100% 99% 99%   Weight:       Height:         Gen: NAD, pleasant and cooperative  HEENT: NC/AT, trach without secretions noted  Cardio: RRR, no murmurs  Pulm: non-labored on room air, able to cover trach to speak, but only in short sentences before becoming short of breath  Abd: soft, non-tender, non-distended, bowel sounds present  Ext: warm, well-perfused, no edema or calf tenderness in bilateral lower extremities  Neuro/MSK: AAOx3, follows commands, responds appropriately.    Skin: sternal incision CDI, chest tube sites not visualized today                  Discharge summary was forwarded to Nuvia Brown (PCP) at the time of discharge, so as to bridge from hospital to outpatient care.     It was our pleasure to care for Raj Warren during this hospitalization. Please do not hesitate to  contact me should there be questions regarding the hospital course or discharge plan.        Choco Erazo DO  Physical Medicine & Rehabilitation      I, Choco Erazo DO, saw and evaluated this patient prior to discharge. 35 minutes spent in discharge, including >50% in counseling and coordination of care, medication review and plan of care recommended on follow up.

## 2021-05-14 NOTE — PROGRESS NOTES
Trach supplies ordered through [facility]- [phone].  They will deliver portable suction for discharge to home to unit today. Nursing to make sure patient takes this with him at discharge.  Trach supplies will be sent to his new address which will be :  [address] [place] [address] [address] [facility] [address]. [facility] homecare referral sent for SN, PT/OT services. Continue discharge planning with potential discharge after PLC training tomorrow at 1 pm.

## 2021-05-15 ENCOUNTER — HOSPITAL ENCOUNTER (OUTPATIENT)
Dept: EDUCATION SERVICES | Facility: CLINIC | Age: 71
End: 2021-05-15
Attending: PHYSICAL MEDICINE & REHABILITATION
Payer: COMMERCIAL

## 2021-05-15 ENCOUNTER — PATIENT OUTREACH (OUTPATIENT)
Dept: CARE COORDINATION | Facility: CLINIC | Age: 71
End: 2021-05-15

## 2021-05-15 VITALS
TEMPERATURE: 96.6 F | BODY MASS INDEX: 26.54 KG/M2 | OXYGEN SATURATION: 98 % | RESPIRATION RATE: 20 BRPM | HEART RATE: 98 BPM | HEIGHT: 71 IN | WEIGHT: 189.6 LBS | DIASTOLIC BLOOD PRESSURE: 78 MMHG | SYSTOLIC BLOOD PRESSURE: 131 MMHG

## 2021-05-15 LAB
ANION GAP SERPL CALCULATED.3IONS-SCNC: 6 MMOL/L (ref 3–14)
BUN SERPL-MCNC: 10 MG/DL (ref 7–30)
CALCIUM SERPL-MCNC: 8.8 MG/DL (ref 8.5–10.1)
CHLORIDE SERPL-SCNC: 110 MMOL/L (ref 94–109)
CO2 SERPL-SCNC: 22 MMOL/L (ref 20–32)
CREAT SERPL-MCNC: 0.81 MG/DL (ref 0.66–1.25)
ERYTHROCYTE [DISTWIDTH] IN BLOOD BY AUTOMATED COUNT: 14.6 % (ref 10–15)
GFR SERPL CREATININE-BSD FRML MDRD: >90 ML/MIN/{1.73_M2}
GLUCOSE SERPL-MCNC: 98 MG/DL (ref 70–99)
HCT VFR BLD AUTO: 32.8 % (ref 40–53)
HGB BLD-MCNC: 11.1 G/DL (ref 13.3–17.7)
MCH RBC QN AUTO: 30.2 PG (ref 26.5–33)
MCHC RBC AUTO-ENTMCNC: 33.8 G/DL (ref 31.5–36.5)
MCV RBC AUTO: 89 FL (ref 78–100)
PLATELET # BLD AUTO: 380 10E9/L (ref 150–450)
POTASSIUM SERPL-SCNC: 3.9 MMOL/L (ref 3.4–5.3)
RBC # BLD AUTO: 3.68 10E12/L (ref 4.4–5.9)
SODIUM SERPL-SCNC: 138 MMOL/L (ref 133–144)
WBC # BLD AUTO: 5 10E9/L (ref 4–11)

## 2021-05-15 PROCEDURE — 36415 COLL VENOUS BLD VENIPUNCTURE: CPT | Performed by: PHYSICIAN ASSISTANT

## 2021-05-15 PROCEDURE — 80048 BASIC METABOLIC PNL TOTAL CA: CPT | Performed by: PHYSICIAN ASSISTANT

## 2021-05-15 PROCEDURE — 250N000013 HC RX MED GY IP 250 OP 250 PS 637: Performed by: PHYSICAL MEDICINE & REHABILITATION

## 2021-05-15 PROCEDURE — 85027 COMPLETE CBC AUTOMATED: CPT | Performed by: PHYSICIAN ASSISTANT

## 2021-05-15 PROCEDURE — 250N000013 HC RX MED GY IP 250 OP 250 PS 637

## 2021-05-15 PROCEDURE — 99239 HOSP IP/OBS DSCHRG MGMT >30: CPT | Mod: 24 | Performed by: PHYSICAL MEDICINE & REHABILITATION

## 2021-05-15 RX ADMIN — PANTOPRAZOLE SODIUM 40 MG: 40 TABLET, DELAYED RELEASE ORAL at 08:50

## 2021-05-15 RX ADMIN — APIXABAN 5 MG: 2.5 TABLET, FILM COATED ORAL at 08:50

## 2021-05-15 RX ADMIN — ASPIRIN 81 MG CHEWABLE TABLET 81 MG: 81 TABLET CHEWABLE at 08:50

## 2021-05-15 RX ADMIN — METOPROLOL TARTRATE 25 MG: 25 TABLET, FILM COATED ORAL at 08:50

## 2021-05-15 RX ADMIN — THERA TABS 1 TABLET: TAB at 08:50

## 2021-05-15 ASSESSMENT — MIFFLIN-ST. JEOR: SCORE: 1647.15

## 2021-05-15 NOTE — DISCHARGE INSTRUCTIONS
Trach supplies ordered through [facility]- [phone]- they will deliver all supplies to your new address at discharge.  Please call number noted if there are any issues.  Marii is who we have been working with regarding trach supplies for home.    Follow Up Appointment(s):  - Cardiology  You are scheduled for an appointment with Dr. Singh's team on Wednesday, May 19th at 2:30 PM.  You are scheduled to see Dr. Lopez on July 8th at 12:45 PM.    Address  [facility]                          [address]                           [address]                          [place] [address]  Phone   [phone]    - Oncology  You are scheduled to see Dr. Bean on Thursday, May 27th at 2:20 PM.     Address  [facility]                          [address]                          [address]                          [place] [address]  Phone   [phone]    - Primary Care   Please call [phone] to scheduled a post-hospital follow up with Dr. Kirk Brown.    Address  [facility]                          [address]                          [address]                          [place] [address]  Phone   [phone]    - PM&R  Please call [phone] to schedule a post-hospital follow up with Dr. Gill,    Address  [facility] PM&R [facility]                          [address] [address]  Phone  [phone]    - ENT Surgery   Please call [phone] to schedule a post-hospital follow up to consider further treatment for squamous cell carcinoma of larynx.    Address  [facility]                          [address]                          [address]                          [place] [address]  Phone   [phone]

## 2021-05-15 NOTE — PLAN OF CARE
"  VS: /73 (BP Location: Right arm)   Pulse 98   Temp 97.4  F (36.3  C) (Axillary)   Resp 20   Ht 1.803 m (5' 11\")   Wt 90.6 kg (199 lb 11.2 oz)   SpO2 99%   BMI 27.85 kg/m    Patient is A&O x 4, able to make needs known   O2: 99% at RA   Output: Uses a urinal, staff empties   Last BM: 5/14   Activity: MOD I in room with a walker   Skin: intact   Pain: Denies pain   CMS: intact   Dressing: CD&I, changed   Diet: Regular diet, take med's whole   LDA: None   Equipment: walker   Plan: Continue to monitor   Additional Info: CBC and BMP was ordered at 1500, Pt refused labs drawn. MD notified, Ok to draw labs in am. After using the BR , VS were taken  and triggered a septic alert, , orders entered, refused labs drawn. Pt was explained reasons why but still refused.  Trach cares Completed.  Pt will discharge tomorrow.       "

## 2021-05-15 NOTE — PROGRESS NOTES
Spoke with nursing staff regarding labs from today. Primary team states in their progress note they plan for repeat CBC and BMP today. However, nursing staff informs on-call provider that patient refused these labs to be drawn around 1500 today. Lab is inquiring if they still need to be drawn today. No current orders for labs to be drawn today exist. There are labs ordered for tomorrow at 0600. Reviewed patient's recent labs. With stable labs and patient refusal, will just plan on lab draw tomorrow morning.       Yue Sultana, DO  PGY4 PM&R Resident

## 2021-05-15 NOTE — PLAN OF CARE
FOCUS/GOAL  Medical management    ASSESSMENT, INTERVENTIONS AND CONTINUING PLAN FOR GOAL:  Pt is alert and oriented. Awake most of the time watching TV. Denies pain. Noted for blood streak sputum from the trach. Pt cough intermittently. Pt feels that something is obstructing his trach and finally cough out dry clotted blood. Change inner cannula and clean trach site. New dressing on. Incision to chest area BRYANT.

## 2021-05-15 NOTE — PLAN OF CARE
Occupational Therapy Discharge Summary    Reason for therapy discharge:    Discharged to home.    Progress towards therapy goal(s). See goals on Care Plan in [facility] electronic health record for goal details.  Goals met    Therapy recommendation(s):    Continued therapy is recommended.  Rationale/Recommendations:  cardiac rehab.

## 2021-05-15 NOTE — PLAN OF CARE
FOCUS/GOAL  Bowel management, Bladder management, Medication management, Pain management, Medical management, Mobility, Skin integrity, and Safety management    ASSESSMENT, INTERVENTIONS AND CONTINUING PLAN FOR GOAL:    Pt is alert and oriented x 4. Continent of bowel and bladder. Mod-I in room with walker. Reg diet, thin liquids. Denied pain, nausea and vomiting, SOB. Chest incision is CDI. Trach site care done per care plan. Baseline numbness in right hand. Pt received discharge education and discharge medications. Pt will be discharged from ARU per plan of care.    Update: pt is discharged.

## 2021-05-16 NOTE — CONSULTS
Met patient at bedside with son for trach class. Pt verbalizes feeling comfortable with all trach cares and answered all teachback questions. Reviewed, suction, lavage, site cares and when to call 911. Pt has all necessary trach supplies and homecare will meet him between 4-8pm per son.    Literature given: Handwashing and Skin Care,Caring for Your Tracheostomy.

## 2021-05-17 NOTE — PROGRESS NOTES
[facility]: Post-Discharge Note  SITUATION                                                      Admission:    Admission Date: 05/08/21   Reason for Admission: Bilateral pulmonary embolisms s/p embolectomy and IVC filter placement  Discharge:   Discharge Date: 05/15/21  Discharge Diagnosis: Bilateral pulmonary embolisms s/p embolectomy and IVC filter placement    BACKGROUND                                                      BRIEF SUMMARY  Raj Warren is a 71 year old male with laryngeal cancer and tracheostomy placement on 4/12/21 who suffered recent bilateral pulmonary emboli s/p embolectomy and IVC filter placement on 4/30/21 with prolonged hospitalization.  He was transferred to ARU on 05/08 for acute rehabilitation.     ASSESSMENT      Discharge Assessment  Patient reports symptoms are: Improved  Does the patient have all of their medications?: Yes  Does patient know what their new medications are for?: Yes  Does patient have a follow-up appointment scheduled?: Yes  Does patient have any other questions or concerns?: No    Post-op  Did the patient have surgery or a procedure: Yes  Incision: healing  Drainage: No  Bleeding: none  Fever: No  Chills: No  Redness: No  Warmth: No  Swelling: No  Incision site pain: No  Eating & Drinking: eating and drinking without complaints/concerns  PO Intake: regular diet  Bowel Function: normal  Urinary Status: voiding without complaint/concerns        PLAN                                                      Outpatient Plan:  Follow up with primary care provider, Nuvia Brown, within 7 days for hospital follow- up.  The following labs/tests are recommended: CBC, BMP.       Follow up with PM&R     Follow up with ENT surgery     Follow up with oncology (Dr. Jamin Bean) on 5/27/21 at 2:20pm.     Follow up with cardiology (Dr. Lopez) on 7/8/21 at 12:45pm.     Follow up with cardiothoracic surgery (Jeanine/Susanna/Quique Pantoja with Dr. Singh at [facility]) on 5/19/21 at 2:30pm.    Future Appointments   Date Time Provider Department Center   5/19/2021  2:30 PM [facility] CARDIOTHORACIC SURGERY, LEI MENDOZA [facility]   5/27/2021  2:20 PM Rosa Bean MD [facility]   7/8/2021 12:45 PM Erasmo Lopez MD [facility] CLIN           Noy Pepe, CMA

## 2021-05-18 ENCOUNTER — PATIENT OUTREACH (OUTPATIENT)
Dept: NURSING | Facility: CLINIC | Age: 71
End: 2021-05-18
Payer: COMMERCIAL

## 2021-05-18 SDOH — ECONOMIC STABILITY: FOOD INSECURITY: WITHIN THE PAST 12 MONTHS, THE FOOD YOU BOUGHT JUST DIDN'T LAST AND YOU DIDN'T HAVE MONEY TO GET MORE.: NEVER TRUE

## 2021-05-18 SDOH — ECONOMIC STABILITY: INCOME INSECURITY: HOW HARD IS IT FOR YOU TO PAY FOR THE VERY BASICS LIKE FOOD, HOUSING, MEDICAL CARE, AND HEATING?: NOT HARD AT ALL

## 2021-05-18 SDOH — ECONOMIC STABILITY: FOOD INSECURITY: WITHIN THE PAST 12 MONTHS, YOU WORRIED THAT YOUR FOOD WOULD RUN OUT BEFORE YOU GOT MONEY TO BUY MORE.: NEVER TRUE

## 2021-05-18 SDOH — ECONOMIC STABILITY: TRANSPORTATION INSECURITY
IN THE PAST 12 MONTHS, HAS LACK OF TRANSPORTATION KEPT YOU FROM MEETINGS, WORK, OR FROM GETTING THINGS NEEDED FOR DAILY LIVING?: NO

## 2021-05-18 SDOH — ECONOMIC STABILITY: TRANSPORTATION INSECURITY
IN THE PAST 12 MONTHS, HAS THE LACK OF TRANSPORTATION KEPT YOU FROM MEDICAL APPOINTMENTS OR FROM GETTING MEDICATIONS?: NO

## 2021-05-18 ASSESSMENT — ACTIVITIES OF DAILY LIVING (ADL): DEPENDENT_IADLS:: TRANSPORTATION

## 2021-05-18 NOTE — PROGRESS NOTES
Clinic Care Coordination Contact    Clinic Care Coordination Contact  OUTREACH    Referral Information:  Referral Source: IP Handoff    Primary Diagnosis: Other (include Comment box)(bilateral pulmonary embolism)    Chief Complaint   Patient presents with     Clinic Care Coordination - Initial     Clinic Care Coordination - Post Hospital        [place] Utilization: recent hospitalization for bilateral pulmonary embolism  Clinic Utilization  Difficulty keeping appointments:: No  Compliance Concerns: No  No-Show Concerns: No  No PCP office visit in Past Year: No  Utilization    Last refreshed: 5/17/2021 11:36 AM: Hospital Admissions 3           Last refreshed: 5/17/2021 11:36 AM: ED Visits 3           Last refreshed: 5/17/2021 11:36 AM: No Show Count (past year) 3              Current as of: 5/17/2021 11:36 AM            Clinical Concerns:  CC SW spoke with pt regarding his overall wellbeing. Pt shared that so far he is managing at home and doesn't have any concerns. His upcoming appointments were discussed, pt is aware of each of them and is planning to attend. He is currently using Ly[employer] to get to appointments and to do errands.     He has spoken with Home Care and they will be coming today.    Current Medical Concerns:  Bilateral pulmonary embolism    Current Behavioral Concerns: none    Education Provided to patient: CC role   Pain  Pain (GOAL):: No  Health Maintenance Reviewed: Up to date  Clinical Pathway: None    Medication Management:  Post-discharge medication reconciliation status: Discharge medications reviewed and reconciled.  Changed medications per note/orders (see AVS).     Functional Status:  Dependent ADLs:: Independent  Dependent IADLs:: Transportation  Bed or wheelchair confined:: No  Fallen 2 or more times in the past year?: No  Any fall with injury in the past year?: No    Living Situation:  Current living arrangement:: I live alone  Type of residence:: Apartment    Lifestyle & Psychosocial  Needs:     Social Needs     Financial resource strain: Not hard at all     Food insecurity     Worry: Never true     Inability: Never true     Transportation needs     Medical: No     Non-medical: No     Inadequate nutrition (GOAL):: No  Tube Feeding: No  Inadequate activity/exercise (GOAL):: No  Significant changes in sleep pattern (GOAL): No  Transportation means:: Other(Lyft)     Pentecostal or spiritual beliefs that impact treatment:: No  Mental health DX:: No  Mental health management concern (GOAL):: No  Informal Support system:: Children, Family   Socioeconomic History     Marital status: Single     Spouse name: Not on file     Number of children: Not on file     Years of education: Not on file     Highest education level: Not on file     Tobacco Use     Smoking status: Former Smoker     Types: Cigarettes     Quit date: 1989     Years since quittin.3     Smokeless tobacco: Never Used     Tobacco comment: quit in   had smoked about 1/2 pack a day then cut back   Substance and Sexual Activity     Alcohol use: No     Drug use: No     Sexual activity: Yes     Partners: Female      Resources and Interventions:  Current Resources:   Skilled Home Care Services: Skilled Nursing, Physicial Therapy, Occupational Therapy  Community Resources: Home Care        Employment Status: employed full-time)   )    Advance Care Plan/Directive  Advanced Care Plans/Directives on file:: No    Referrals Placed: None     Patient/Caregiver understanding: Pt reports understanding and denies any additional questions or concerns at this times. ERICA CC engaged in AIDET communication during encounter.       Future Appointments              Tomorrow P CARDIOTHORACIC SURGERY, [place] [facility]    In 2 days Nuvia Brown MD [facility]     In 1 week Rosa Bean MD [facility]    In 1 month Erasmo Lopez MD [facility]        Plan: At this time, pt denies outstanding need for connection or referral to resources or assistance navigating recommended follow up care. No further outreaches will be made at this time unless a new referral is made or a change in the pt's status occurs. Patient was provided with [facility] contact information and encouraged to call with any questions or concerns.    Jeanne Lantigua [facility]  Clinic Care Coordinator  [facility] Yanna Marion  [facility]  [phone]  [email or web address]

## 2021-05-18 NOTE — LETTER
[facility] CARE COORDINATION  [address] [place] [address]    May 18, 2021    Raj Warren  [address]  [facility] [address]      Dear Raj,    I am a clinic care coordinator who works with Nuvia rBown MD at [facility]. I wanted to thank you for spending the time to talk with me.  Below is a description of clinic care coordination and how I can further assist you.      The clinic care coordination team is made up of a registered nurse,  and community health worker who understand the health care system. The goal of clinic care coordination is to help you manage your health and improve access to the health care system in the most efficient manner. The team can assist you in meeting your health care goals by providing education, coordinating services, strengthening the communication among your providers and supporting you with any resource needs.    Please feel free to contact me at [phone] with any questions or concerns. We are focused on providing you with the highest-quality healthcare experience possible and that all starts with you.     Sincerely,     SADA Horne

## 2021-05-19 ENCOUNTER — OFFICE VISIT (OUTPATIENT)
Dept: CARDIOLOGY | Facility: CLINIC | Age: 71
End: 2021-05-19
Payer: COMMERCIAL

## 2021-05-19 VITALS
DIASTOLIC BLOOD PRESSURE: 79 MMHG | OXYGEN SATURATION: 100 % | WEIGHT: 199 LBS | BODY MASS INDEX: 27.75 KG/M2 | SYSTOLIC BLOOD PRESSURE: 114 MMHG | HEART RATE: 111 BPM

## 2021-05-19 DIAGNOSIS — I26.99 BILATERAL PULMONARY EMBOLISM (H): Primary | ICD-10-CM

## 2021-05-19 PROCEDURE — 99024 POSTOP FOLLOW-UP VISIT: CPT | Performed by: PHYSICIAN ASSISTANT

## 2021-05-19 NOTE — PROGRESS NOTES
Pt comes to the clinic for routine f/u of bilateral pulmonary emboli s/p emergent pulmonary embolectomy by Dr Singh on 4/28/21. Pt was initially admitted to the hospital on 4/27/21 and discharged to ARU on 5/8/21. His hospitalization was unremarkable. He had a trach prior to surgery. 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 Pt had an IVC filter placed on 4/30.    He states that pain is being controlled. He has been using aspirin for pain. Talked with pt about using tylenol instead of aspirin for pain control. Breathing is ok. Talks with trach covered. Appetite is eating but gets filled up quickly. Denies any nausea. BMs are back to normal. Denies any popping/clicking in his breast bone. Denies any F/C/NS. Strength is improved. Lives on the 3rd floor. 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 Pt is sleeping ok.    Up in chair, comfortable, -O2, +trach  /79   Pulse 111   Wt 90.3 kg (199 lb)   SpO2 100%   BMI 27.75 kg/m    CV - reg rate, -edema LE  Pulm - fairly clear   Derm - sternal incision D/I   Chest tubes site - R side minimal drainage,  -erythema or warmth    A/P:  Pt is progressing well from a surgical stand point. Pt had many questions about chemo/radiation and when the IVC stent will be removed. Pt has f/u with PCP and oncology. Pt is to continue with his aspirin 81 mg and eliquis 5 mg bid. All questions/concerns were addressed. Reviewed surgical restrictions. No need for further f/u with surgery unless questions/concerns arise. Continue to monitor.

## 2021-05-19 NOTE — PATIENT INSTRUCTIONS
No lifting over 10 lbs x 8 weeks after surgery. After the 8 weeks gradually increase what you are lifting.  Ok to start driving 4 weeks after surgery.  Continue to work with home health.  If you notice any increased redness, tenderness, or drainage from your incision please call your surgeon at [phone].  Ok to cover chest tube site with dressing during the day but keep open to air at night.

## 2021-05-20 ENCOUNTER — OFFICE VISIT (OUTPATIENT)
Dept: FAMILY MEDICINE | Facility: CLINIC | Age: 71
End: 2021-05-20
Payer: COMMERCIAL

## 2021-05-20 ENCOUNTER — TELEPHONE (OUTPATIENT)
Dept: FAMILY MEDICINE | Facility: CLINIC | Age: 71
End: 2021-05-20

## 2021-05-20 VITALS
HEIGHT: 71 IN | DIASTOLIC BLOOD PRESSURE: 80 MMHG | OXYGEN SATURATION: 99 % | TEMPERATURE: 97.7 F | SYSTOLIC BLOOD PRESSURE: 117 MMHG | BODY MASS INDEX: 27.72 KG/M2 | HEART RATE: 94 BPM | WEIGHT: 198 LBS

## 2021-05-20 DIAGNOSIS — R06.1 STRIDOR: ICD-10-CM

## 2021-05-20 DIAGNOSIS — I10 BENIGN ESSENTIAL HYPERTENSION: ICD-10-CM

## 2021-05-20 DIAGNOSIS — R06.09 OTHER FORM OF DYSPNEA: ICD-10-CM

## 2021-05-20 DIAGNOSIS — Z93.0 TRACHEOSTOMY IN PLACE (H): ICD-10-CM

## 2021-05-20 DIAGNOSIS — C32.9 MALIGNANT NEOPLASM OF LARYNX (H): ICD-10-CM

## 2021-05-20 DIAGNOSIS — I26.99 BILATERAL PULMONARY EMBOLISM (H): Primary | ICD-10-CM

## 2021-05-20 DIAGNOSIS — D50.9 IRON DEFICIENCY ANEMIA, UNSPECIFIED IRON DEFICIENCY ANEMIA TYPE: ICD-10-CM

## 2021-05-20 LAB — HGB BLD-MCNC: 12.6 G/DL (ref 13.3–17.7)

## 2021-05-20 PROCEDURE — 36415 COLL VENOUS BLD VENIPUNCTURE: CPT | Performed by: INTERNAL MEDICINE

## 2021-05-20 PROCEDURE — 85018 HEMOGLOBIN: CPT | Performed by: INTERNAL MEDICINE

## 2021-05-20 PROCEDURE — 99495 TRANSJ CARE MGMT MOD F2F 14D: CPT | Performed by: INTERNAL MEDICINE

## 2021-05-20 ASSESSMENT — MIFFLIN-ST. JEOR: SCORE: 1685.25

## 2021-05-20 NOTE — TELEPHONE ENCOUNTER
Attempted to return call to Carlisle with [facility] to OK requested orders.     Phone number listed is incorrect.    Called [facility] for correct number---updated.     Returned call to Carlisle. OK'd requested orders.  Orders will be faxed to PCP for review and signature.   Bhumika Quezada RN

## 2021-05-20 NOTE — TELEPHONE ENCOUNTER
Reason for Call:  Home Health Care    Luca with [facility] called regarding (reason for call):    Orders are needed for this patient.     Skilled Nursing: wound care 2x4 weeks, 1x 5 weeks    Phone Number Homecare Nurse can be reached at: [phone]    Can we leave a detailed message on this number? YES    Phone number patient can be reached at: [phone]    Best Time: Any    Call taken on 5/20/2021 at 11:35 AM by Hannah Antonio

## 2021-05-20 NOTE — PROGRESS NOTES
Subjective   Raj is a 71 year old who presents for the following health issues    HPI       Hospital Follow-up Visit:    Hospital/Nursing Home/IP Rehab Facility: [facility]  Date of Admission: 05/08/2021  Date of Discharge: 05/15/2021  Reason(s) for Admission:       Bilateral pulmonary embolisms s/p embolectomy and IVC filter placement    Squamous cell carcinoma of larynx    DMII    HTN    Anemia      Was your hospitalization related to COVID-19? No   Problems taking medications regularly:  None  Medication changes since discharge: None  Problems adhering to non-medication therapy:  None    Summary of hospitalization:  hospital discharge summary reviewed  Diagnostic Tests/Treatments reviewed.  Follow up needed: Oncology  Other Healthcare Providers Involved in Patient s Care:         Specialist appointment - ENT clinic  Update since discharge: stable.  Post Discharge Medication Reconciliation: discharge medications reconciled and changed, per note/orders.  Plan of care communicated with patient          Chief Complaint:     Post hospital discharge follow up of recent hospitalization for bilateral PE in the setting of malignant neoplasm of the larynx s/p tracheostomy    HPI:   Tabatha Warren is a very pleasant 71 year old male with history of stridor due to larynx mass who presents to Internal Medicine clinic today for post hospital discharge follow up of recent hospitalization for bilateral PE in the setting of malignant neoplasm of the larynx s/p tracheostomy. Patient is compliant with his Eliquis medication. He is due for a refill of the Eliquis anticoagulation medication at this time. Patient is also due for a refill of his metoprolol BP medication at this time. No further dyspnea symptoms at this time. Patient is scheduled for both oncology, ENT clinic follow up appointments going forward. No signs of acute tracheostomy obstruction or surgical wound site infection or bleeding  at this time. Pain is well controlled on tylenol medication. No chest pain, headaches, fever or chills at this time.    Current Medications:     Current Outpatient Medications   Medication Sig Dispense Refill     acetaminophen (TYLENOL) 325 MG tablet Take 2 tablets (650 mg) by mouth every 4 hours as needed for other (For optimal non-opioid multimodal pain management to improve pain control.)       albuterol (PROAIR HFA/PROVENTIL HFA/VENTOLIN HFA) 108 (90 Base) MCG/ACT inhaler Inhale 2 puffs into the lungs every 4 hours as needed for wheezing 8 g 0     apixaban ANTICOAGULANT (ELIQUIS) 5 MG tablet Take 1 tablet (5 mg) by mouth 2 times daily 60 tablet 0     metoprolol tartrate (LOPRESSOR) 25 MG tablet Take 1 tablet (25 mg) by mouth 2 times daily 60 tablet 0     multivitamin w/minerals (THERA-VIT-M) tablet Take 1 tablet by mouth daily       pantoprazole (PROTONIX) 40 MG EC tablet Take 1 tablet (40 mg) by mouth every morning (before breakfast) 30 tablet 0         Allergies:      Allergies   Allergen Reactions     Pollen Extract             Past Medical History:     Past Medical History:   Diagnosis Date     Allergic state          Past Surgical History:     Past Surgical History:   Procedure Laterality Date     IR IVC FILTER PLACEMENT  4/30/2021     LARYNGOSCOPY N/A 4/12/2021    Procedure: LARYNGOSCOPY;  Surgeon: Choco Johnson MD;  Location:  OR     REPAIR ANEURYSM ASCENDING AORTA N/A 4/28/2021    Procedure: PULMONARY THROMBECTOMY;  Surgeon: Yumi Singh MD;  Location:  OR     TRACHEOSTOMY  4/12/2021    Procedure: CREATION, TRACHEOSTOMY;  Surgeon: Choco Johnson MD;  Location:  OR         Family Medical History:     Family History   Problem Relation Age of Onset     Circulatory Mother         blood clots     Alcohol/Drug Father         alcohol drank heavily     Alcohol/Drug Brother         alcohol         Social History:     Social History     Socioeconomic History     Marital status: Single      Spouse name: Not on file     Number of children: Not on file     Years of education: Not on file     Highest education level: Not on file   Occupational History     Not on file   Social Needs     Financial resource strain: Not hard at all     Food insecurity     Worry: Never true     Inability: Never true     Transportation needs     Medical: No     Non-medical: No   Tobacco Use     Smoking status: Former Smoker     Types: Cigarettes     Quit date: 1989     Years since quittin.4     Smokeless tobacco: Never Used     Tobacco comment: quit in   had smoked about 1/2 pack a day then cut back   Substance and Sexual Activity     Alcohol use: No     Drug use: No     Sexual activity: Yes     Partners: Female   Lifestyle     Physical activity     Days per week: Not on file     Minutes per session: Not on file     Stress: Not on file   Relationships     Social connections     Talks on phone: Not on file     Gets together: Not on file     Attends Yazidi service: Not on file     Active member of club or organization: Not on file     Attends meetings of clubs or organizations: Not on file     Relationship status: Not on file     Intimate partner violence     Fear of current or ex partner: Not on file     Emotionally abused: Not on file     Physically abused: Not on file     Forced sexual activity: Not on file   Other Topics Concern     Parent/sibling w/ CABG, MI or angioplasty before 65F 55M? Not Asked   Social History Narrative     Not on file           Review of System:     Constitutional: Negative for fever or chills  Skin: Negative for rashes  Eyes: positive for allergic conjunctivitis  Ears/Nose/Throat: positive for recent diagnosis of larynx mass with stridor  Respiratory: positive for dyspnea  Cardiovascular: Negative for chest pain  Gastrointestinal: Negative for nausea, vomiting  Genitourinary: Negative for dysuria, hematuria  Musculoskeletal: Negative for myalgias  Neurologic: Negative for  "headaches  Psychiatric: Negative for depression, anxiety  Hematologic/Lymphatic/Immunologic: Negative  Endocrine: Negative  Behavioral: Negative for tobacco use       Physical Exam:   /80 (BP Location: Right arm, Patient Position: Sitting, Cuff Size: Adult Regular)   Pulse 94   Temp 97.7  F (36.5  C) (Temporal)   Ht 1.803 m (5' 11\")   Wt 89.8 kg (198 lb)   SpO2 99%   BMI 27.62 kg/m      GENERAL: alert and no distress  EYES: eyes grossly normal to inspection, and conjunctivae and sclerae normal  HENT: Normocephalic atraumatic. Tracheostomy present without signs of acute tracheostomy obstruction or surgical wound site infection or bleeding at this time.  NECK: supple  RESP: lungs clear to auscultation   CV: regular rate and rhythm, normal S1 S2  LYMPH: no peripheral edema   ABDOMEN: nondistended  MS: no gross musculoskeletal defects noted  SKIN: no suspicious lesions or rashes  NEURO: Alert & Oriented x 3.   PSYCH: mentation appears normal, affect normal        Diagnostic Test Results:     Diagnostic Test Results:  Labs reviewed in [facility]    EXAM: CT SOFT TISSUE NECK W CONTRAST  LOCATION: [facility]  DATE/TIME: 4/12/2021 2:54 AM     INDICATION: Dyspnea. Stridor.  COMPARISON: None.  CONTRAST: 115 mL Isovue 370.  TECHNIQUE: Routine CT soft tissue neck with IV contrast. Multiplanar reformats. Dose reduction techniques were used.     FINDINGS:      MUCOSAL SPACES/SOFT TISSUES: The adenoid soft tissues as well as oropharyngeal soft tissues demonstrate normal enhancement without mass lesion. There is an enlarged and enhancing mass centered within the right true vocal fold with involvement of the   right aryepiglottic fold, right false vocal folds, posterior hypopharynx (asymmetric to the right), right proximal wall of the infraglottic trachea. This mass measures approximately 3.9 x 1.6 cm in greatest axial dimensions and approximately 3.9 cm in   cranial caudal dimensions. This mass appears to " involve the right thyroid cartilage. Additionally, there is abnormal thickening and enhancement involving the left aryepiglottic fold. The right prelaryngeal fat planes have been infiltrated by enhancing   soft tissue. The mass effaces the laryngeal airway. Normal parapharyngeal space and subcutaneous soft tissues. Normal oral cavity,  spaces, and floor of mouth structures.     LYMPH NODES: No pathologic lymph nodes by size or morphology criteria.      SALIVARY GLANDS: Normal parotid and submandibular glands.     THYROID: Normal.      VESSELS: Vascular structures of the neck are patent.     VISUALIZED INTRACRANIAL/ORBITS/SINUSES: No abnormality of the visualized intracranial compartment or orbits. Minimal paranasal sinus mucosal thickening. The mastoid air cells are clear.     OTHER: Multilevel degenerative changes of cervical spine. The included lung apices are clear.                                                                      IMPRESSION:   1.  Enhancing soft tissue mass involving the right larynx measuring 3.9 x 1.6 x 3.9 cm (AP x TV x CC), centered within the right true vocal fold with involvement of the right aryepiglottic fold, right false vocal fold, right infraglottic trachea,   posterior hypopharynx and left aryepiglottic fold. There is effacement of the laryngeal airway. ENT consultation and correlation for airway compromise is recommended.  2.  No pathologic-appearing lymphadenopathy.        Dr. Roger Porras discussed results with Dr. Perdomo on 4/12/2021 3:42 AM.              ASSESSMENT/PLAN:     (I26.99) Bilateral pulmonary embolism (H)  (primary encounter diagnosis)  (D50.9) Iron deficiency anemia, unspecified iron deficiency anemia type  Comment: post hospital discharge follow up of recent hospitalization for bilateral PE in the setting of maligant neoplasm of the larynx. Patient's iron deficiency anemia has improved from prior baseline.   Plan: Hemoglobin  Continue oncology  clinic follow up going forward. I have refilled the patient's Eliquis medication for anticoagulation therapy for bilateral PE treatment going forward.    (R06.09) Other form of dyspnea  (Z93.0) Tracheostomy in place (H)  (R06.1) Stridor  (C32.9) Malignant neoplasm of larynx (H)  (primary encounter diagnosis)  Comment: post hospital discharge follow up of recent hospitalization for stridor due to malignant neoplasm of the larynx s/p tracheostomy. No further dyspnea symptoms at this time. Patient is scheduled for both oncology, ENT clinic follow up appointments going forward. No signs of acute tracheostomy obstruction or surgical wound site infection or bleeding at this time.  Plan: continue oncology, ENT clinics follow up going forward. Continue tylenol pain medicaiton for pain relief going forward.     (I10) Benign essential hypertension  Comment: stable, patient is due for a refill of metoprolol BP medication.  Plan: metoprolol tartrate (LOPRESSOR) 25 MG tablet      Follow Up Plan:     Patient is instructed to return to Internal Medicine clinic for follow-up visit in 1 month.        Nuvia Brown MD  Internal Medicine  [facility]

## 2021-05-24 RX ORDER — METOPROLOL TARTRATE 25 MG/1
25 TABLET, FILM COATED ORAL 2 TIMES DAILY
Qty: 60 TABLET | Refills: 3 | Status: SHIPPED | OUTPATIENT
Start: 2021-05-24 | End: 2021-06-08

## 2021-05-27 ENCOUNTER — ONCOLOGY VISIT (OUTPATIENT)
Dept: ONCOLOGY | Facility: CLINIC | Age: 71
End: 2021-05-27
Attending: INTERNAL MEDICINE
Payer: COMMERCIAL

## 2021-05-27 VITALS
WEIGHT: 197.2 LBS | TEMPERATURE: 98.1 F | OXYGEN SATURATION: 100 % | SYSTOLIC BLOOD PRESSURE: 121 MMHG | DIASTOLIC BLOOD PRESSURE: 85 MMHG | BODY MASS INDEX: 27.61 KG/M2 | HEIGHT: 71 IN | RESPIRATION RATE: 20 BRPM | HEART RATE: 104 BPM

## 2021-05-27 DIAGNOSIS — C14.0 PRIMARY SQUAMOUS CELL CARCINOMA OF THROAT (H): ICD-10-CM

## 2021-05-27 DIAGNOSIS — C32.9 LARYNGEAL SQUAMOUS CELL CARCINOMA (H): Primary | ICD-10-CM

## 2021-05-27 PROCEDURE — G0463 HOSPITAL OUTPT CLINIC VISIT: HCPCS

## 2021-05-27 PROCEDURE — 99215 OFFICE O/P EST HI 40 MIN: CPT | Performed by: INTERNAL MEDICINE

## 2021-05-27 RX ORDER — MULTIVIT WITH MINERALS/LUTEIN
1 TABLET ORAL DAILY
Status: ON HOLD | COMMUNITY
End: 2021-07-16

## 2021-05-27 ASSESSMENT — MIFFLIN-ST. JEOR: SCORE: 1681.62

## 2021-05-27 ASSESSMENT — PAIN SCALES - GENERAL: PAINLEVEL: NO PAIN (0)

## 2021-05-27 NOTE — PATIENT INSTRUCTIONS
1. CT neck and CT chest in 1-2 week.  2. ENT appointment at [facility].  3. See me in 2-3 weeks.  4. Continue eliquis.      [facility]  [phone]   Dr Johnson  June 14 1:45pm   [address] [facility]  [address] [place] [id number]

## 2021-05-27 NOTE — LETTER
"    5/27/2021         RE: Raj Warren  [address]  [facility] [address]        Dear Colleague,    Thank you for referring your patient, Raj Warren, to the [facility]. Please see a copy of my visit note below.      Oncology Rooming Note    May 27, 2021 2:06 PM   Raj Warren is a 71 year old male who presents for:    Chief Complaint   Patient presents with     Oncology Clinic Visit     Malignant neoplasm of larynx     Initial Vitals: There were no vitals taken for this visit. Estimated body mass index is 27.62 kg/m  as calculated from the following:    Height as of 5/20/21: 1.803 m (5' 11\").    Weight as of 5/20/21: 89.8 kg (198 lb). There is no height or weight on file to calculate BSA.  Data Unavailable Comment: Data Unavailable   No LMP for male patient.  Allergies reviewed: Yes  Medications reviewed: Yes    Medications: Medication refills not needed today.  Pharmacy name entered into [facility]:    [id number] - [place], [place] - [address] AT [facility] & [address] [place], [place] [address] #[id number] - [place], [place] - [address] AT [address]    Clinical concerns: None       Wendy Fonseca MA              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    1.  Patient was evaluated in 2019 for hoarseness of voice.  He was seen by ENT and found to have right vocal cord mass.    -He had biopsy done on 08/07/2019. Pathology revealed moderately-differentiated invasive squamous cell carcinoma.  2.  The patient was seen in the Emergency Room on 04/12/2021 for shortness of breath.    -CT neck revealed an enhancing soft tissue mass involving the right larynx measuring 3.9 cm.  3.  Patient had laryngoscopy with biopsy and tracheostomy done on 04/12/2021.  There was an endolaryngeal mass obscuring the laryngeal landmarks.  Mass appeared to be based on right endolarynx.  Pathology revealed invasive " keratinizing moderately-differentiated squamous cell carcinoma.  4.  PET scan on 04/26/2021 revealed hypermetabolic mass involving the supraglottic, glottic and subglottic larynx.  There was a hypermetabolic left level IV lymph node.  There was also hypermetabolic nodule in right middle lobe.  5. CT chest angiogram on 04/27/2021 revealed a large bilateral pulmonary embolism in all the lobes.  There was evidence of right cardiac strain.  -Echocardiogram on 04/27/2021 revealed clot in transit in the right ventricle.  6.  The patient had emergency pulmonary embolectomy on 04/28/2021.  Pathology revealed organizing clot.  -Patient is on Eliquis.     SUBJECTIVE:  Mr. Warren is a 71-year-old gentleman with laryngeal squamous cell carcinoma.  The patient recently was in the hospital with massive pulmonary embolism for which he had surgery done.  He is recovering from it.  He is on Eliquis.     The patient's overall condition is improving.  No chest pain.  Shortness of breath have resolved.  No abdominal pain, nausea or vomiting.  Appetite is fairly good.  No urinary bowel complaints.  No bleeding.     PHYSICAL EXAMINATION:    He is alert and oriented x 3.  Not in any distress.  He has a tracheostomy tube.  Rest of systems not examined.     ASSESSMENT:     1.  A 71-year-old gentleman with laryngeal squamous cell carcinoma.  2.  Massive pulmonary embolism, status post embolectomy.  He is on Eliquis.     PLAN:     1.  I had a long discussion with the patient regarding his squamous cell carcinoma of the larynx.  Discussed regarding treatment options.  Discussed regarding laryngectomy.  I also discussed regarding concurrent chemoradiation.     I told the patient that he should be evaluated by ENT at the [facility].  He missed his previous appointment.  I would like their opinion regarding laryngectomy.  Because of his pulmonary embolism, I doubt they will recommend surgery.     If no surgery recommended, then I  would recommend chemoradiation.  He can get weekly cisplatin with radiation.  Further discussion after he has met with the ENT at [facility].     2.  The patient has pulmonary embolism.  He is on Eliquis, which will be continued.    3.  Discussed regarding imaging studies.  We will get a CT neck and also CT chest to make sure that there is no new disease.    4.  The patient had few questions, which were all answered.  We will see him back after the CT scans.     TOTAL FACE TO FACE TIME SPENT:  40 minutes, more than 50% of the time spent in counseling and coordination of care.       This office note has been dictated.          Again, thank you for allowing me to participate in the care of your patient.        Sincerely,        Rosa Bean MD

## 2021-05-27 NOTE — PROGRESS NOTES
"  Oncology Rooming Note    May 27, 2021 2:06 PM   Raj Warren is a 71 year old male who presents for:    Chief Complaint   Patient presents with     Oncology Clinic Visit     Malignant neoplasm of larynx     Initial Vitals: There were no vitals taken for this visit. Estimated body mass index is 27.62 kg/m  as calculated from the following:    Height as of 5/20/21: 1.803 m (5' 11\").    Weight as of 5/20/21: 89.8 kg (198 lb). There is no height or weight on file to calculate BSA.  Data Unavailable Comment: Data Unavailable   No LMP for male patient.  Allergies reviewed: Yes  Medications reviewed: Yes    Medications: Medication refills not needed today.  Pharmacy name entered into [facility]:    [facility] #[id number] - [place], [place] - [address] AT [facility] & [address] [place] [address] [facility] #[id number] - [place], [place] - [address] AT [address]    Clinical concerns: None       Wendy Fonseca MA            "

## 2021-06-03 ENCOUNTER — HOSPITAL ENCOUNTER (OUTPATIENT)
Dept: CT IMAGING | Facility: CLINIC | Age: 71
End: 2021-06-03
Attending: INTERNAL MEDICINE
Payer: COMMERCIAL

## 2021-06-03 DIAGNOSIS — C32.9 LARYNGEAL SQUAMOUS CELL CARCINOMA (H): ICD-10-CM

## 2021-06-03 PROCEDURE — 71260 CT THORAX DX C+: CPT

## 2021-06-03 PROCEDURE — 70491 CT SOFT TISSUE NECK W/DYE: CPT

## 2021-06-03 PROCEDURE — 250N000011 HC RX IP 250 OP 636: Performed by: INTERNAL MEDICINE

## 2021-06-03 PROCEDURE — 250N000009 HC RX 250: Performed by: INTERNAL MEDICINE

## 2021-06-03 RX ORDER — IOPAMIDOL 755 MG/ML
125 INJECTION, SOLUTION INTRAVASCULAR ONCE
Status: COMPLETED | OUTPATIENT
Start: 2021-06-03 | End: 2021-06-03

## 2021-06-03 RX ADMIN — IOPAMIDOL 125 ML: 755 INJECTION, SOLUTION INTRAVENOUS at 17:16

## 2021-06-03 RX ADMIN — SODIUM CHLORIDE 70 ML: 9 INJECTION, SOLUTION INTRAVENOUS at 17:16

## 2021-06-08 ENCOUNTER — TELEPHONE (OUTPATIENT)
Dept: FAMILY MEDICINE | Facility: CLINIC | Age: 71
End: 2021-06-08

## 2021-06-08 DIAGNOSIS — K21.00 GASTROESOPHAGEAL REFLUX DISEASE WITH ESOPHAGITIS WITHOUT HEMORRHAGE: ICD-10-CM

## 2021-06-08 DIAGNOSIS — I10 BENIGN ESSENTIAL HYPERTENSION: ICD-10-CM

## 2021-06-08 DIAGNOSIS — I26.99 BILATERAL PULMONARY EMBOLISM (H): ICD-10-CM

## 2021-06-08 RX ORDER — METOPROLOL TARTRATE 25 MG/1
25 TABLET, FILM COATED ORAL 2 TIMES DAILY
Qty: 60 TABLET | Refills: 3 | Status: SHIPPED | OUTPATIENT
Start: 2021-06-08 | End: 2021-08-09

## 2021-06-08 RX ORDER — PANTOPRAZOLE SODIUM 40 MG/1
40 TABLET, DELAYED RELEASE ORAL
Qty: 30 TABLET | Refills: 0 | Status: ON HOLD | OUTPATIENT
Start: 2021-06-08 | End: 2021-07-08

## 2021-06-08 NOTE — TELEPHONE ENCOUNTER
Diamond , calling [facility] .Ana Bay RN  Sent to wrong pharmacy and pantoprazole is to go with Lis.Ana Bay RN

## 2021-06-09 ENCOUNTER — ONCOLOGY VISIT (OUTPATIENT)
Dept: ONCOLOGY | Facility: CLINIC | Age: 71
End: 2021-06-09
Attending: INTERNAL MEDICINE
Payer: COMMERCIAL

## 2021-06-09 VITALS
TEMPERATURE: 98 F | BODY MASS INDEX: 27.34 KG/M2 | WEIGHT: 196 LBS | OXYGEN SATURATION: 98 % | HEART RATE: 99 BPM | RESPIRATION RATE: 16 BRPM | SYSTOLIC BLOOD PRESSURE: 108 MMHG | DIASTOLIC BLOOD PRESSURE: 72 MMHG

## 2021-06-09 DIAGNOSIS — C32.9 LARYNGEAL SQUAMOUS CELL CARCINOMA (H): Primary | ICD-10-CM

## 2021-06-09 PROCEDURE — G0463 HOSPITAL OUTPT CLINIC VISIT: HCPCS

## 2021-06-09 PROCEDURE — 99215 OFFICE O/P EST HI 40 MIN: CPT | Performed by: INTERNAL MEDICINE

## 2021-06-09 ASSESSMENT — PAIN SCALES - GENERAL: PAINLEVEL: NO PAIN (0)

## 2021-06-09 NOTE — LETTER
"    6/9/2021         RE: Raj Warren  [address]  [facility] [address]        Dear Colleague,    Thank you for referring your patient, Raj Warren, to the [facility]. Please see a copy of my visit note below.    Oncology Rooming Note    June 9, 2021 2:41 PM   Raj Warren is a 71 year old male who presents for:    Chief Complaint   Patient presents with     Oncology Clinic Visit     Initial Vitals: /72   Pulse 99   Temp 98  F (36.7  C) (Oral)   Resp 16   Wt 88.9 kg (196 lb)   SpO2 98%   BMI 27.34 kg/m   Estimated body mass index is 27.34 kg/m  as calculated from the following:    Height as of 5/27/21: 1.803 m (5' 11\").    Weight as of this encounter: 88.9 kg (196 lb). Body surface area is 2.11 meters squared.  No Pain (0) Comment: Data Unavailable   No LMP for male patient.  Allergies reviewed: Yes  Medications reviewed: Yes    Medications: Medication refills not needed today.  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    1.  Patient was evaluated in 2019 for hoarseness of voice.  He was seen by ENT and found to have right vocal cord mass.    -He had biopsy done on 08/07/2019. Pathology revealed moderately-differentiated invasive squamous cell carcinoma.  2.  The patient was seen in the Emergency Room on 04/12/2021 for shortness of breath.    -CT neck revealed an enhancing soft tissue mass involving the right larynx measuring 3.9 cm.  3.  Patient had laryngoscopy with biopsy and tracheostomy done on 04/12/2021.  There was an endolaryngeal mass obscuring the laryngeal landmarks.  Mass appeared to be based on right endolarynx.  Pathology revealed invasive keratinizing moderately-differentiated squamous cell carcinoma.  4.  PET " scan on 04/26/2021 revealed hypermetabolic mass involving the supraglottic, glottic and subglottic larynx.  There was a hypermetabolic left level IV lymph node.  There was also hypermetabolic nodule in right middle lobe.  5. CT chest angiogram on 04/27/2021 revealed a large bilateral pulmonary embolism in all the lobes.  There was evidence of right cardiac strain.  -Echocardiogram on 04/27/2021 revealed clot in transit in the right ventricle.  6.  The patient had emergency pulmonary embolectomy on 04/28/2021.  Pathology revealed organizing clot.  -Patient is on Eliquis.    SUBJECTIVE:  Mr. Warren is a 71-year-old gentleman with laryngeal squamous cell carcinoma with left neck lymphadenopathy.  The patient has been recommended laryngectomy.  He missed his appointment at [facility].     He is here for followup.  CT neck and chest was done on 06/03/2021.  It again reveals a large mass involving right greater than left true vocal cord.  [place] of tumor has mildly decreased.  There is a left level 4 lymph node.     The patient's overall condition is stable.  Not in any severe pain.  No chest pain.  No shortness of breath.  No coughing or vomiting blood.     PHYSICAL EXAMINATION:    He is alert and oriented x 3.  Not in any distress.    Rest of systems not examined.     ASSESSMENT:    1.  A 71-year-old gentleman with laryngeal squamous cell carcinoma.  2.  Pulmonary embolism secondary to malignancy. Patient is on Eliquis.     PLAN:    1.  CT scan was reviewed with the patient.  I explained to him that his malignancy is stable.     Again, discussed regarding treatment.  Again, discussed regarding laryngectomy.  The patient is not very keen on having laryngectomy.  He had multiple questions, which were all discussed.  After discussion, patient is agreeable to go to the [facility] to be evaluated by ENT team.     We also discussed regarding chemoradiation in case if he is not a surgical candidate  because of recent pulmonary embolism, or if he chooses not to undergo laryngectomy.  He had multiple questions regarding radiation chemotherapy, which were all discussed.I explained to the patient that he needs to make a decision regarding treatment soon.  Otherwise, disease will continue to progress.    2.  He will continue on Eliquis.    3.  I will be seeing him in 3-4 weeks.  I am hoping he gets an appointment to see ENT before that.      TOTAL FACE-TO-FACE TIME SPENT:  40 minutes, most of time spent in counseling and coordination of care.    This office note has been dictated.          Again, thank you for allowing me to participate in the care of your patient.        Sincerely,        Rosa Bean MD

## 2021-06-09 NOTE — PROGRESS NOTES
"Oncology Rooming Note    June 9, 2021 2:41 PM   Raj Warren is a 71 year old male who presents for:    Chief Complaint   Patient presents with     Oncology Clinic Visit     Initial Vitals: /72   Pulse 99   Temp 98  F (36.7  C) (Oral)   Resp 16   Wt 88.9 kg (196 lb)   SpO2 98%   BMI 27.34 kg/m   Estimated body mass index is 27.34 kg/m  as calculated from the following:    Height as of 5/27/21: 1.803 m (5' 11\").    Weight as of this encounter: 88.9 kg (196 lb). Body surface area is 2.11 meters squared.  No Pain (0) Comment: Data Unavailable   No LMP for male patient.  Allergies reviewed: Yes  Medications reviewed: Yes    Medications: Medication refills not needed today.  Pharmacy name entered into [employer]: [facility] #[id number] - [place], [place] - [address] AT [address]    Clinical concerns: no      Yeni Ahmadi CMA            "

## 2021-06-09 NOTE — PATIENT INSTRUCTIONS
1. Appointment with ENT at [facility] for laryngeal cancer.  2. See me in 3-4 weeks.    Please call to schedule

## 2021-06-10 ENCOUNTER — TELEPHONE (OUTPATIENT)
Dept: ONCOLOGY | Facility: CLINIC | Age: 71
End: 2021-06-10

## 2021-06-10 NOTE — TELEPHONE ENCOUNTER
Called Raj, at the request of Dr. Bean, to remind him of his upcoming visit with ENT on 6/18 at the [facility].  Provided address.  Reinforced importance of appointment.  He verbalized understanding and said that he plans to go to the appointment.  Explained that he will get a call from our  to schedule follow-up with Dr. Bean.    Email obtained and verified via readback.  Appointment itinary sent to him via email.      Discussed convenience of [employer] and encouraged patient to sign up.  [employer] invite sent to him via email.      Cali New, ARLETN, RN, OCN  Oncology Care Coordinator  [facility]

## 2021-06-10 NOTE — TELEPHONE ENCOUNTER
FUTURE VISIT INFORMATION      FUTURE VISIT INFORMATION:    Date: 6/18/2021    Time: 2:20PM    Location[place] [facility]  REFERRAL INFORMATION:    Referring provider:  Rosa Bean MD    Referring providers clinic:  [facility]     Reason for visit/diagnosis  referred by Dr. Rosa Bean, laryngeal cancer    RECORDS REQUESTED FROM:       Clinic name Comments Records Status Imaging Status   [facility]  6/9/2021 note from Rosa Bean MD Epic    Imaging 6/3/2021 CT Chest and CT NEck   4/26/2021 PET   4/16/2021 XR Video Swallow Epic PACS   [facility]  4/12/2021 - 4/17/2021 Hospital Encounter   4/12/2021 LARYNGOSCOPY; CREATION, TRACHEOSTOMY EPIC    Pathology  4/12/2021 right laryns (Specimen #: [id number] )  8/7/2019 Vocal cord mass biopsy, right (Specimen #: [id number] ) [facility] 4/21/2021 note from Dr Sethi [facility]

## 2021-06-12 NOTE — PROGRESS NOTES
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    1.  Patient was evaluated in 2019 for hoarseness of voice.  He was seen by ENT and found to have right vocal cord mass.    -He had biopsy done on 08/07/2019. Pathology revealed moderately-differentiated invasive squamous cell carcinoma.  2.  The patient was seen in the Emergency Room on 04/12/2021 for shortness of breath.    -CT neck revealed an enhancing soft tissue mass involving the right larynx measuring 3.9 cm.  3.  Patient had laryngoscopy with biopsy and tracheostomy done on 04/12/2021.  There was an endolaryngeal mass obscuring the laryngeal landmarks.  Mass appeared to be based on right endolarynx.  Pathology revealed invasive keratinizing moderately-differentiated squamous cell carcinoma.  4.  PET scan on 04/26/2021 revealed hypermetabolic mass involving the supraglottic, glottic and subglottic larynx.  There was a hypermetabolic left level IV lymph node.  There was also hypermetabolic nodule in right middle lobe.  5. CT chest angiogram on 04/27/2021 revealed a large bilateral pulmonary embolism in all the lobes.  There was evidence of right cardiac strain.  -Echocardiogram on 04/27/2021 revealed clot in transit in the right ventricle.  6.  The patient had emergency pulmonary embolectomy on 04/28/2021.  Pathology revealed organizing clot.  -Patient is on Eliquis.     SUBJECTIVE:  Mr. Warren is a 71-year-old gentleman with laryngeal squamous cell carcinoma.  The patient recently was in the hospital with massive pulmonary embolism for which he had surgery done.  He is recovering from it.  He is on Eliquis.     The patient's overall condition is improving.  No chest pain.  Shortness of breath have resolved.  No abdominal pain, nausea or vomiting.  Appetite is fairly good.  No urinary bowel complaints.  No bleeding.     PHYSICAL EXAMINATION:    He is alert and oriented x 3.  Not in any distress.  He has a tracheostomy  tube.  Rest of systems not examined.     ASSESSMENT:     1.  A 71-year-old gentleman with laryngeal squamous cell carcinoma.  2.  Massive pulmonary embolism, status post embolectomy.  He is on Eliquis.     PLAN:     1.  I had a long discussion with the patient regarding his squamous cell carcinoma of the larynx.  Discussed regarding treatment options.  Discussed regarding laryngectomy.  I also discussed regarding concurrent chemoradiation.     I told the patient that he should be evaluated by ENT at the [facility].  He missed his previous appointment.  I would like their opinion regarding laryngectomy.  Because of his pulmonary embolism, I doubt they will recommend surgery.     If no surgery recommended, then I would recommend chemoradiation.  He can get weekly cisplatin with radiation.  Further discussion after he has met with the ENT at [facility].     2.  The patient has pulmonary embolism.  He is on Eliquis, which will be continued.    3.  Discussed regarding imaging studies.  We will get a CT neck and also CT chest to make sure that there is no new disease.    4.  The patient had few questions, which were all answered.  We will see him back after the CT scans.     TOTAL FACE TO FACE TIME SPENT:  40 minutes, more than 50% of the time spent in counseling and coordination of care.

## 2021-06-13 NOTE — PROGRESS NOTES
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    1.  Patient was evaluated in 2019 for hoarseness of voice.  He was seen by ENT and found to have right vocal cord mass.    -He had biopsy done on 08/07/2019. Pathology revealed moderately-differentiated invasive squamous cell carcinoma.  2.  The patient was seen in the Emergency Room on 04/12/2021 for shortness of breath.    -CT neck revealed an enhancing soft tissue mass involving the right larynx measuring 3.9 cm.  3.  Patient had laryngoscopy with biopsy and tracheostomy done on 04/12/2021.  There was an endolaryngeal mass obscuring the laryngeal landmarks.  Mass appeared to be based on right endolarynx.  Pathology revealed invasive keratinizing moderately-differentiated squamous cell carcinoma.  4.  PET scan on 04/26/2021 revealed hypermetabolic mass involving the supraglottic, glottic and subglottic larynx.  There was a hypermetabolic left level IV lymph node.  There was also hypermetabolic nodule in right middle lobe.  5. CT chest angiogram on 04/27/2021 revealed a large bilateral pulmonary embolism in all the lobes.  There was evidence of right cardiac strain.  -Echocardiogram on 04/27/2021 revealed clot in transit in the right ventricle.  6.  The patient had emergency pulmonary embolectomy on 04/28/2021.  Pathology revealed organizing clot.  -Patient is on Eliquis.    SUBJECTIVE:  Mr. Warren is a 71-year-old gentleman with laryngeal squamous cell carcinoma with left neck lymphadenopathy.  The patient has been recommended laryngectomy.  He missed his appointment at [facility].     He is here for followup.  CT neck and chest was done on 06/03/2021.  It again reveals a large mass involving right greater than left true vocal cord.  [place] of tumor has mildly decreased.  There is a left level 4 lymph node.     The patient's overall condition is stable.  Not in any severe pain.  No chest pain.  No shortness of  breath.  No coughing or vomiting blood.     PHYSICAL EXAMINATION:    He is alert and oriented x 3.  Not in any distress.    Rest of systems not examined.     ASSESSMENT:    1.  A 71-year-old gentleman with laryngeal squamous cell carcinoma.  2.  Pulmonary embolism secondary to malignancy. Patient is on Eliquis.     PLAN:    1.  CT scan was reviewed with the patient.  I explained to him that his malignancy is stable.     Again, discussed regarding treatment.  Again, discussed regarding laryngectomy.  The patient is not very keen on having laryngectomy.  He had multiple questions, which were all discussed.  After discussion, patient is agreeable to go to the [facility] to be evaluated by ENT team.     We also discussed regarding chemoradiation in case if he is not a surgical candidate because of recent pulmonary embolism, or if he chooses not to undergo laryngectomy.  He had multiple questions regarding radiation chemotherapy, which were all discussed.I explained to the patient that he needs to make a decision regarding treatment soon.  Otherwise, disease will continue to progress.    2.  He will continue on Eliquis.    3.  I will be seeing him in 3-4 weeks.  I am hoping he gets an appointment to see ENT before that.      TOTAL FACE-TO-FACE TIME SPENT:  40 minutes, most of time spent in counseling and coordination of care.

## 2021-06-14 ENCOUNTER — TELEPHONE (OUTPATIENT)
Dept: ONCOLOGY | Facility: CLINIC | Age: 71
End: 2021-06-14

## 2021-06-14 NOTE — TELEPHONE ENCOUNTER
Patient is not signed [facility] for [employer] [facility]. Mailed appointment information to patient and tried calling patient as well.     ----- Message from Rosa Bean MD sent at 6/9/2021  5:57 PM CDT -----  Please call patient and remind him of ENT appointment at [facility].  Patient has a tracheostomy.  It is hard for him to talk on the phone.  Please send my chart/e-mail appointment reminder.    bk  ----- Message -----  From: Marilou Sethi MD  Sent: 6/9/2021   3:46 PM CDT  To: Rosa Bean MD    Ok, we will get the patient scheduled.  Marilou Herman  ----- Message -----  From: Rosa Bean MD  Sent: 6/9/2021   3:36 PM CDT  To: Marilou Sethi MD    Hi Dr. Sethi,    This patient was a no show last time with you. He is now willing to see you. He has laryngeal cancer.    I would appreciate if you can see him.    Rosa Bean MD

## 2021-06-18 ENCOUNTER — PRE VISIT (OUTPATIENT)
Dept: OTOLARYNGOLOGY | Facility: CLINIC | Age: 71
End: 2021-06-18

## 2021-06-18 ENCOUNTER — THERAPY VISIT (OUTPATIENT)
Dept: SPEECH THERAPY | Facility: CLINIC | Age: 71
End: 2021-06-18
Payer: COMMERCIAL

## 2021-06-18 ENCOUNTER — OFFICE VISIT (OUTPATIENT)
Dept: OTOLARYNGOLOGY | Facility: CLINIC | Age: 71
End: 2021-06-18
Payer: COMMERCIAL

## 2021-06-18 VITALS — OXYGEN SATURATION: 99 % | WEIGHT: 195.55 LBS | BODY MASS INDEX: 27.38 KG/M2 | TEMPERATURE: 96.8 F | HEIGHT: 71 IN

## 2021-06-18 DIAGNOSIS — C32.9 MALIGNANT NEOPLASM OF LARYNX (H): Primary | ICD-10-CM

## 2021-06-18 DIAGNOSIS — R49.1 APHONIA: ICD-10-CM

## 2021-06-18 PROCEDURE — 31575 DIAGNOSTIC LARYNGOSCOPY: CPT | Mod: 51 | Performed by: OTOLARYNGOLOGY

## 2021-06-18 PROCEDURE — 31615 TRCHEOBRNCHSC EST TRACHS INC: CPT | Performed by: OTOLARYNGOLOGY

## 2021-06-18 PROCEDURE — 99245 OFF/OP CONSLTJ NEW/EST HI 55: CPT | Mod: 25 | Performed by: OTOLARYNGOLOGY

## 2021-06-18 PROCEDURE — 92522 EVALUATE SPEECH PRODUCTION: CPT | Mod: GN | Performed by: SPEECH-LANGUAGE PATHOLOGIST

## 2021-06-18 ASSESSMENT — PAIN SCALES - GENERAL: PAINLEVEL: MODERATE PAIN (5)

## 2021-06-18 ASSESSMENT — MIFFLIN-ST. JEOR: SCORE: 1674.13

## 2021-06-18 NOTE — LETTER
6/18/2021       RE: Raj Warren  [address]  [facility][1] [address]     Dear Colleague,    Thank you for referring your patient, Raj Warren, to the [facility] [place] at [facility]. Please see a copy of my visit note below.    Dear Dr. Bean:    I had the pleasure of meeting Raj Warren in consultation today at the [facility] at your request.     History of Present Illness:  Raj Warren is a T4aN0 SCC of the larynx. The patient presented to the ED with a week history of shortness of breath, along with symptoms of sore throat, bilateral ear pain. He had stridor vs wheezing, mild increased work of breathing, and hoarse voice at that time. He was treated with steroids and nebulizers with improvement in his symptoms and was discharged.  He had recurrent symptoms and was seen in the ED again on 4/12/2021. He had a CT scan performed on 4/12/2021 which demonstrated a 3.9 x 1.6 x 3.9 cm mass involving the right true cord, right AE fold, right false cord, posterior hypopharynx, proximal trachea, with involvement of the thyroid cartilage, involvement of the prelaryngeal tissue, no abnormal nodes. Dr Willoughby (local ENT), scope exam was performed, and patient was admitted for a trach and DL/bx. Dr Johnson and Dr Sauceda performed this procedure on 4/13/2021. A tracheal window at 2nd tracheal ring was performed with placement of an 8 cuffed Shiley. Pathology showed moderately differentiated SCC. He was seen by Dr Bean of medical oncology on 4/13/2021. He was followed by local ENT team during his hospitalization. He was recommended for chemoradiation. He was seen by Dr Mclaughlin of [facility], planning 7 weeks treatment, and CT simulation was done. He did have a VSS done while inpatient without evidence of aspiration. The patient was referred here for discussion of possible salvage laryngectomy  after chemoradiation. He had a PET scan on 4/26/2021 which showed an FDG avid mass in the larynx involving the supraglottis/glottis/subglottis, hypermetabolic left level 4 node, FDG avid nodule in the right lung thought to be infectious/inflammatory. He was then admitted to the hospital locally from 4/27/2021 to 5/8/2021 after presenting with worsening shortness of breath, found to have bilateral PE with significant clot burden including the right pulmonary artery and the right ventricle. He was taken to the OR for pulmonary embolectomy and RV thrombectomy. He had IVC filter placed 4/30/2021. His course was complicated by hemodynamic instability requiring pressors, poor healing of his sternal incision requiring wound vac placement. He was discharged on eliquis. He was sent to rehab after his admission, discharged 5/15/2021. He saw Dr Bean in follow-up on 5/27/2021 and was recommended for repeat imaging. This showed slight decrease in size of the tumor, no distant disease.     The patient says today that he has some discomfort associated with his trach. He is requesting it to be changed. He has hoarseness. He is able to eat everything he wants. He says that his PEG was removed at his most recent hospitalization. His last video swallow study was in April 2021. He denies having any food or liquids coming out from his trach. He denies any neck masses. Does have some right ear pain.     He last saw the dentist about a year ago.    No restrictions to reading/writing, no issues with hand dexterity.      Past medical history: SCC of larynx, PE    Past surgical history: Pulmonary embolectomy, RV thrombectomy (sternotomy), trach, DL and biopsy    Social history: Former smoker 1/2 ppd x 8 yrs, quit in 1989. Sober from alcohol. Lives alone. His family does not live in the immediate [place] area. He is a retired .     Family history: denies H&N cancer history    MEDICATIONS:     Current Outpatient  Medications   Medication Sig Dispense Refill     apixaban ANTICOAGULANT (ELIQUIS) 5 MG tablet Take 1 tablet (5 mg) by mouth 2 times daily 60 tablet 1     metoprolol tartrate (LOPRESSOR) 25 MG tablet Take 1 tablet (25 mg) by mouth 2 times daily 60 tablet 3     pantoprazole (PROTONIX) 40 MG EC tablet Take 1 tablet (40 mg) by mouth every morning (before breakfast) 30 tablet 0     acetaminophen (TYLENOL) 325 MG tablet Take 2 tablets (650 mg) by mouth every 4 hours as needed for other (For optimal non-opioid multimodal pain management to improve pain control.) (Patient not taking: Reported on 2021)       albuterol (PROAIR HFA/PROVENTIL HFA/VENTOLIN HFA) 108 (90 Base) MCG/ACT inhaler Inhale 2 puffs into the lungs every 4 hours as needed for wheezing 8 g 0     multivitamin (CENTRUM SILVER) tablet Take 1 tablet by mouth daily         ALLERGIES:    Allergies   Allergen Reactions     Pollen Extract        HABITS/SOCIAL HISTORY:    Former smoker 1/2 ppd x 8 yrs, quit in . Sober from alcohol.   Lives alone. His family does not live in the immediate [place] area.   He is a retired .       Social History     Socioeconomic History     Marital status: Single     Spouse name: Not on file     Number of children: Not on file     Years of education: Not on file     Highest education level: Not on file   Occupational History     Not on file   Social Needs     Financial resource strain: Not hard at all     Food insecurity     Worry: Never true     Inability: Never true     Transportation needs     Medical: No     Non-medical: No   Tobacco Use     Smoking status: Former Smoker     Types: Cigarettes     Quit date: 1989     Years since quittin.4     Smokeless tobacco: Never Used     Tobacco comment: quit in   had smoked about 1/2 pack a day then cut back   Substance and Sexual Activity     Alcohol use: No     Drug use: No     Sexual activity: Yes     Partners: Female   Lifestyle     Physical  "activity     Days per week: Not on file     Minutes per session: Not on file     Stress: Not on file   Relationships     Social connections     Talks on phone: Not on file     Gets together: Not on file     Attends Christianity service: Not on file     Active member of club or organization: Not on file     Attends meetings of clubs or organizations: Not on file     Relationship status: Not on file     Intimate partner violence     Fear of current or ex partner: Not on file     Emotionally abused: Not on file     Physically abused: Not on file     Forced sexual activity: Not on file   Other Topics Concern     Parent/sibling w/ CABG, MI or angioplasty before 65F 55M? Not Asked   Social History Narrative     Not on file       PAST MEDICAL HISTORY:   Past Medical History:   Diagnosis Date     Allergic state         PAST SURGICAL HISTORY:   Past Surgical History:   Procedure Laterality Date     IR IVC FILTER PLACEMENT  4/30/2021     LARYNGOSCOPY N/A 4/12/2021    Procedure: LARYNGOSCOPY;  Surgeon: Choco Johnson MD;  Location:  OR     REPAIR ANEURYSM ASCENDING AORTA N/A 4/28/2021    Procedure: PULMONARY THROMBECTOMY;  Surgeon: Yumi Singh MD;  Location:  OR     TRACHEOSTOMY  4/12/2021    Procedure: CREATION, TRACHEOSTOMY;  Surgeon: Choco Johnson MD;  Location:  OR       FAMILY HISTORY:    Family History   Problem Relation Age of Onset     Circulatory Mother         blood clots     Alcohol/Drug Father         alcohol drank heavily     Alcohol/Drug Brother         alcohol       REVIEW OF SYSTEMS:  12 point ROS was negative other than the symptoms noted above in the HPI.  Patient Supplied Answers to Review of Systems  No flowsheet data found.      PHYSICAL EXAMINATION:   Temp 96.8  F (36  C) (Temporal)   Ht 1.803 m (5' 11\")   Wt 88.7 kg (195 lb 8.8 oz)   SpO2 99%   BMI 27.27 kg/m    Appearance:   normal; NAD, age-appropriate appearance, well-developed, normal habitus   Communication:  " communicates verbally with finger occlusion of trach but with hoarse voice with poor projection   Head/Face:   inspection -  Normal; no scars or visible lesions   Salivary glands -  Normal size, no tenderness, swelling, or palpable masses   Facial strength -  Normal and symmetric    Skin:  normal, no rash   Ears:  auricle (AD) -  normal  EAC (AD) -  normal  TM (AD) -  Normal, no effusion  auricle (AS) -  normal  EAC (AS) -  normal  TM (AS) -  Normal, no effusion  Normal clinical speech reception   Nose:  Ext. inspection -  Normal  Internal Inspection -  Normal mucosa, septum, and turbinates   Nasopharynx:  normal mucosa, no masses   Oral Cavity:  lips -  Normal mucosa, oral competence, and stoma size   Age-appropriate dentition, healthy gingival mucosa   Hard palate, buccal, floor of mouth mucosa normal   Tongue - normal movement, no lesions   Oropharynx:  mucosa -  Normal, no lesions  soft palate -  Normal, no lesions, no asymmetry, normal elevation  tonsils -  Normal, no exudates, no abnormal lesions, symmetric   Hypopharynx:  Normal pyriform sinus and pharyngeal wall mucosa   No pooled secretions    Larynx:  Epiglottis is normal  Fullness of the right AE fold and arytenoid, normal left AE fold and arytenoid  Fullness of the right false cord  Limited view of left true cord without masses, unable to see right cord  Vocal cords in fixed position with no glottic opening  No ability to see into subglottis   Neck: No visible mass or asymmetry   No palpable masses  8 cuffed shiley in place   Lymphatic:  no abnormal nodes   Cardiovascular:  warm, pink, well-perfused extremities without swelling, tenderness, or edema   Respiratory:  Normal respiratory effort, no stridor   Neuro/Psych.:  mood/affect -  normal  mental status -  normal       PROCEDURES:   Flexible fiberoptic laryngoscopy: Scope exam was indicated due to laryngeal cancer. Verbal consent was obtained. The nasal cavity was prepped with an aerosolized solution  of topical anesthetic and vasoconstrictive agent. The scope was passed through the anterior nasal cavity and advanced. Inspection of the nasopharynx revealed no gross abnormality. The base of tongue and vallecula are normal. There was a questionable vallecula cyst to the right of midline. The epiglottis is normal. There is fullness with normal overlying mucosa of the right AE fold, arytenoid, false cord. The left arytenoid and AE fold are normal. The right true cord cannot be visualized. Partial view of the left true cord is normal. The vocal cords are fixed in position with no glottic opening.  Pyriform sinuses are symmetric. The airway is patent. Procedure tolerated well with no immediate complications noted.    Flexible tracheoscopy: Scope exam was performed through the trach after it was changed to verify position. The mikhail and bronchi were visualized. There was some blood present from the trach change, minimal. Bleeding improved after aerosolized afrin.       Trach change: Verbal consent was obtained from the patient. The 8 cuffed Shiley was removed with difficulty. A 8 cuffless was placed without issue. The patient did have some hemoptysis with trach change likely secondary to his anticoagulation. This was addressed with aerosolized afrin which resolved the bleeding.            RESULTS REVIEWED:   I reviewed the outside ENT notes, the notes from radiation and medical oncology, CT scan reports    I independently reviewed the CT scan images - tumor with concerns for extralaryngeal spread    Care discussed with SLP team and Dr Kennedy    I reviewed the discharge summaries from admissions in April/May 2021      IMPRESSION AND PLAN:   Raj Warren is a 70 year old man with a T4N0 SCC of the larynx. He underwent [facility] by [facility].    Dr Kennedy and I had a lengthy discussion with the patient about management of advanced laryngeal cancer. We discussed that treatment consists of an upfront  laryngectomy followed by postoperative radiation with or without chemotherapy vs definitive chemoradiation.     On my review of his imaging I am concerned that there is extralaryngeal spread of his tumor. In that case he would be recommended for a total laryngectomy followed by postoperative radiation with or without chemotherapy pending final pathology. If we can get negative margins and there is no CAMERON he could potentially avoid chemotherapy.    Dr Kennedy and I both reviewed that we are both also very concerned that he has a nonfunctional larynx as evidenced by his trach dependence. He has a poor voice. He is taking things normally by mouth. We discussed upfront chemoradiation is unlikely to improve his laryngeal function and potentially leave him requiring a salvage laryngectomy. We discussed the complexity of a salvage laryngectomy including the need for free flap for soft tissue coverage, increased risk of wound healing complications with increased fistula risk and prolonged hospitalization.     I spent time discussing a laryngectomy and bilateral neck dissections in detail. We discussed the anatomical changes that would occur. We reviewed communication options for postoperatively including TEP, electrolarynx, writing, text to voice apps. This was also reviewed and reinforced by our SLP team in clinic. I did discuss the risk of fistula formation with this surgery and the need for him to be NPO. We discussed NG tube vs PEG tube.     His situation is complicated by the large PE he had that required a sternotomy and is on eliquis currently. We discussed that we would have to stop the anticoagulation for surgery if he were even cleared to undergo it. He would be at increased risk of PE/DVT (does have a filter in place), as well as increased bleeding risk. He would likely have to go back on the anticoagulation shortly after surgery and bleeding can require return trips to the OR and blood transfusions.     Dr Kennedy  spent time discussing postoperative radiation with the patient today.     I would like him to see the PAC clinic to see if they will even clear him for surgery. If they will, and he is amenable, I would recommend an upfront TL followed by postoperative radiation with or without chemotherapy pending pathology for the best oncologic outcome. He was given our clinic contact information. We will review his case at tumor board on Friday and await recommendations from the anesthesia clinic. I would tentatively schedule him for surgery on 7/8 if he is cleared and wishes to proceed.     Thank you very much for the opportunity to participate in the care of your patient.      Marilou Sethi MD, M.D.  Otolaryngology- Head & Neck Surgery      This note was dictated with voice recognition software and then edited. Please excuse any unintentional errors.     CC:  Rosa Bean MD  [facility]  [address] [address]      Serg Kennedy MD  Department of Radiation Oncology  [facility]

## 2021-06-18 NOTE — LETTER
2021      RE: Raj Warren  [address]  [facility] [address]          Department of Radiation Oncology  [facility]  [address] [place] [address]  [phone]       Consultation Note    Name: Raj Warren MRN: [id number]   : 1950   Date of Service: 2021  Referring: Dr. 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 He was lost to follow-up, however, and did not receive treatment. More recently, he presented to the ED with acute dyspnea on 2021. He was treated with nebulizers and racemic epinephrine and discharged home after his symptoms improved. One week thereafter, on 2021, he presented once more to the ED with a sore throat and stridor. He was found to have a large laryngeal mass on imaging, was intubated and admitted, and underwent an awake tracheostomy and DL with biopsies. Surgical pathology (specimen: [id number]) revealed a moderately differentiated squamous cell carcinoma. He was seen by a local radiation oncologist who recommended treatment with chemoradiation therapy following completion of his staging work-up and he was discharged from the hospital on 2021.     A 2021 PET/CT revealed an FDG-avid laryngeal mass centered within the right true vocal cords with involvement of the AE folds, bilateral false cords and extension to the subglottic larynx with involvement of the preepiglottic fat. There was a 9 mm FDG-avid left level IV lymph node but no evidence of distant metastatic disease.    On 2021, Mr. Warren presented to the ED once again with worsening dyspnea with work-up revealing large bilateral pulmonary emboli for which he underwent emergent  pulmonary embolectomy. Postoperatively, he was transitioned to Eliquis twice daily and discharged on 5/8/2021. He was seen by his outside medical oncologist for follow-up who recommended a referral to the [facility] for consideration of a possible laryngectomy versus chemoradiation therapy. He presents to ENT multi D clinic today for a further discussion on this matter.    Past Medical History:    Pulmonary embolism    Squamous cell carcinoma of the larynx    Allergic conjunctivitis    Atopic rhinitis    Past Surgical History:    Pulmonary embolectomy    RV thrombectomy    DL and biopsies    Chemotherapy History:  None    Radiation History:  None    Pregnant: Not Applicable  Implanted Cardiac Devices: No    Medications:  Current Outpatient Medications   Medication Sig Dispense Refill     apixaban ANTICOAGULANT (ELIQUIS) 5 MG tablet Take 1 tablet (5 mg) by mouth 2 times daily 60 tablet 1     metoprolol tartrate (LOPRESSOR) 25 MG tablet Take 1 tablet (25 mg) by mouth 2 times daily 60 tablet 3     multivitamin (CENTRUM SILVER) tablet Take 1 tablet by mouth daily       pantoprazole (PROTONIX) 40 MG EC tablet Take 1 tablet (40 mg) by mouth every morning (before breakfast) 30 tablet 0      Allergies:    Pollen extract    Social History:  Tobacco: Former smoker. 0.5 ppd x 8 years. Quit in 1989.   Alcohol: None  Employment: Retired [employer]  Lives in [place], [place]    Family History:    No history of head and neck cancer    Review of Systems   A 10-point review of systems was performed. Pertinent findings include moderate discomfort associate with this tracheostomy which he  request to be changed, ongoing hoarseness and right-sided otalgia.    Physical Exam   ECOG Status: 1    Vitals:  Weight: 88.7 kg  Pain: 5/10    General: Mildly fatigued different appearing 71-year-old gentleman seated comfortably in an examination chair no acute distress  HEENT: NC/AT.  EOMI.  No rhinorrhea or epistaxis.  Moist  mucous membranes.  No visible oral cavity lesions or thrush.  Neck: Tracheostomy is midline with mild surrounding crusted secretions.  No palpable cervical adenopathy bilaterally.  Pulmonary: No wheezing, stridor or respiratory distress  Skin: Normal color and turgor    Dr. Sethi performed a flexible nasopharyngoscopy in clinic. Please see her note for complete details regarding his procedure. Briefly, this demonstrated a large mass involving the right false cord, AE fold and arytenoid with extension and involvement of the left false cord and arytenoid. The left cord was able to be partially visualized while the right cord was obstructed by tumor. The cords themselves appeared to be fixed with minimal movement bilaterally.    Imaging/Path/Labs   Imaging: Reviewed    Path: Reviewed    Labs: Reviewed    Assessment    Mr. Warren is a 71 year old male with a cT4a N0 M0 squamous cell carcinoma of the larynx.     Plan   Dr. Sethi and I had a long discussion with Mr. Warren regarding his work-up to date. He has significant cricoid cartilage destruction by this tumor on imaging with clinical examination revealing a largely nonfunctional larynx. Both Dr. Sethi and I discussed that standard of care treatment of tumors such as his would be upfront surgical resection with a total laryngectomy and bilateral neck dissection followed by tumor-directed adjuvant therapy. His case is complicated, though, by his recent bilateral pulmonary emboli requiring embolectomy for which he is now on therapeutic doses of anticoagulation. Dr. Sethi is planning to review his case with our pre-operative anesthesia clinic and I will defer to her team regarding his fitness for surgery.    I discussed that he will almost certainly require adjuvant radiation therapy following surgical resection and I reviewed the logistics as well as briefly touched upon the acute and late-term toxicities associated with this. I also discussed that if he is not felt to  be a surgical candidate, we could consider moving forward with definitive-intent chemoradiation therapy although I fully expect that he will require a salvage laryngectomy following treatment for either disease control or, alternatively, palliative purposes if he does not regain adequate laryngeal function after chemoradiation.    In the interim, Dr. Sethi and I will have him meet with our SLP team and we will plan to present his case at our upcoming head and neck tumor board on Friday, 6/25/2021.    Serg Kennedy MD/PhD    Dept of Radiation Oncology  [facility]

## 2021-06-18 NOTE — NURSING NOTE
"Chief Complaint   Patient presents with     Consult     referred by dr. Rosa Bean, largngeal cancer       Temperature 96.8  F (36  C), temperature source Temporal, height 1.803 m (5' 11\"), weight 88.7 kg (195 lb 8.8 oz), SpO2 99 %.    Patria Costa, EMT    "

## 2021-06-18 NOTE — PROGRESS NOTES
Dear Dr. Bean:    I had the pleasure of meeting Raj Warren in consultation today at the [facility] at your request.     History of Present Illness:  Raj Warren is a T4aN0 SCC of the larynx. The patient presented to the ED with a week history of shortness of breath, along with symptoms of sore throat, bilateral ear pain. He had stridor vs wheezing, mild increased work of breathing, and hoarse voice at that time. He was treated with steroids and nebulizers with improvement in his symptoms and was discharged.  He had recurrent symptoms and was seen in the ED again on 4/12/2021. He had a CT scan performed on 4/12/2021 which demonstrated a 3.9 x 1.6 x 3.9 cm mass involving the right true cord, right AE fold, right false cord, posterior hypopharynx, proximal trachea, with involvement of the thyroid cartilage, involvement of the prelaryngeal tissue, no abnormal nodes. Dr Willoughby (local ENT), scope exam was performed, and patient was admitted for a trach and DL/bx. Dr Johnson and Dr Sauceda performed this procedure on 4/13/2021. A tracheal window at 2nd tracheal ring was performed with placement of an 8 cuffed Shiley. Pathology showed moderately differentiated SCC. He was seen by Dr Bean of medical oncology on 4/13/2021. He was followed by local ENT team during his hospitalization. He was recommended for chemoradiation. He was [place] Mcl[place] [facility] 7 weeks treatment, and CT simulation was done. He did have a VSS done while inpatient without evidence of aspiration. The patient was referred here for discussion of possible salvage laryngectomy after chemoradiation. He had a PET scan on 4/26/2021 which showed an FDG avid mass in the larynx involving the supraglottis/glottis/subglottis, hypermetabolic left level 4 node, FDG avid nodule in the right lung thought to be infectious/inflammatory. He was then admitted to the hospital locally from 4/27/2021 to 5/8/2021 after  presenting with worsening shortness of breath, found to have bilateral PE with significant clot burden including the right pulmonary artery and the right ventricle. He was taken to the OR for pulmonary embolectomy and RV thrombectomy. He had IVC filter placed 4/30/2021. His course was complicated by hemodynamic instability requiring pressors, poor healing of his sternal incision requiring wound vac placement. He was discharged on eliquis. He was sent to rehab after his admission, discharged 5/15/2021. He saw Dr Bean in follow-up on 5/27/2021 and was recommended for repeat imaging. This showed slight decrease in size of the tumor, no distant disease.     The patient says today that he has some discomfort associated with his trach. He is requesting it to be changed. He has hoarseness. He is able to eat everything he wants. He says that his PEG was removed at his most recent hospitalization. His last video swallow study was in April 2021. He denies having any food or liquids coming out from his trach. He denies any neck masses. Does have some right ear pain.     He last saw the dentist about a year ago.    No restrictions to reading/writing, no issues with hand dexterity.      Past medical history: SCC of larynx, PE    Past surgical history: Pulmonary embolectomy, RV thrombectomy (sternotomy), trach, DL and biopsy    Social history: Former smoker 1/2 ppd x 8 yrs, quit in 1989. Sober from alcohol. Lives alone. His family does not live in the immediate [place] area. He is a retired .     Family history: denies H&N cancer history    MEDICATIONS:     Current Outpatient Medications   Medication Sig Dispense Refill     apixaban ANTICOAGULANT (ELIQUIS) 5 MG tablet Take 1 tablet (5 mg) by mouth 2 times daily 60 tablet 1     metoprolol tartrate (LOPRESSOR) 25 MG tablet Take 1 tablet (25 mg) by mouth 2 times daily 60 tablet 3     pantoprazole (PROTONIX) 40 MG EC tablet Take 1 tablet (40 mg) by mouth every  morning (before breakfast) 30 tablet 0     acetaminophen (TYLENOL) 325 MG tablet Take 2 tablets (650 mg) by mouth every 4 hours as needed for other (For optimal non-opioid multimodal pain management to improve pain control.) (Patient not taking: Reported on 2021)       albuterol (PROAIR HFA/PROVENTIL HFA/VENTOLIN HFA) 108 (90 Base) MCG/ACT inhaler Inhale 2 puffs into the lungs every 4 hours as needed for wheezing 8 g 0     multivitamin (CENTRUM SILVER) tablet Take 1 tablet by mouth daily         ALLERGIES:    Allergies   Allergen Reactions     Pollen Extract        HABITS/SOCIAL HISTORY:    Former smoker 1/2 ppd x 8 yrs, quit in . Sober from alcohol.   Lives alone. His family does not live in the immediate [place] area.   He is a retired .       Social History     Socioeconomic History     Marital status: Single     Spouse name: Not on file     Number of children: Not on file     Years of education: Not on file     Highest education level: Not on file   Occupational History     Not on file   Social Needs     Financial resource strain: Not hard at all     Food insecurity     Worry: Never true     Inability: Never true     Transportation needs     Medical: No     Non-medical: No   Tobacco Use     Smoking status: Former Smoker     Types: Cigarettes     Quit date: 1989     Years since quittin.4     Smokeless tobacco: Never Used     Tobacco comment: quit in   had smoked about 1/2 pack a day then cut back   Substance and Sexual Activity     Alcohol use: No     Drug use: No     Sexual activity: Yes     Partners: Female   Lifestyle     Physical activity     Days per week: Not on file     Minutes per session: Not on file     Stress: Not on file   Relationships     Social connections     Talks on phone: Not on file     Gets together: Not on file     Attends Jainism service: Not on file     Active member of club or organization: Not on file     Attends meetings of clubs or  "organizations: Not on file     Relationship status: Not on file     Intimate partner violence     Fear of current or ex partner: Not on file     Emotionally abused: Not on file     Physically abused: Not on file     Forced sexual activity: Not on file   Other Topics Concern     Parent/sibling w/ CABG, MI or angioplasty before 65F 55M? Not Asked   Social History Narrative     Not on file       PAST MEDICAL HISTORY:   Past Medical History:   Diagnosis Date     Allergic state         PAST SURGICAL HISTORY:   Past Surgical History:   Procedure Laterality Date     IR IVC FILTER PLACEMENT  4/30/2021     LARYNGOSCOPY N/A 4/12/2021    Procedure: LARYNGOSCOPY;  Surgeon: Choco Johnson MD;  Location:  OR     REPAIR ANEURYSM ASCENDING AORTA N/A 4/28/2021    Procedure: PULMONARY THROMBECTOMY;  Surgeon: Yumi Singh MD;  Location:  OR     TRACHEOSTOMY  4/12/2021    Procedure: CREATION, TRACHEOSTOMY;  Surgeon: Choco Johnson MD;  Location:  OR       FAMILY HISTORY:    Family History   Problem Relation Age of Onset     Circulatory Mother         blood clots     Alcohol/Drug Father         alcohol drank heavily     Alcohol/Drug Brother         alcohol       REVIEW OF SYSTEMS:  12 point ROS was negative other than the symptoms noted above in the HPI.  Patient Supplied Answers to Review of Systems  No flowsheet data found.      PHYSICAL EXAMINATION:   Temp 96.8  F (36  C) (Temporal)   Ht 1.803 m (5' 11\")   Wt 88.7 kg (195 lb 8.8 oz)   SpO2 99%   BMI 27.27 kg/m    Appearance:   normal; NAD, age-appropriate appearance, well-developed, normal habitus   Communication:  communicates verbally with finger occlusion of trach but with hoarse voice with poor projection   Head/Face:   inspection -  Normal; no scars or visible lesions   Salivary glands -  Normal size, no tenderness, swelling, or palpable masses   Facial strength -  Normal and symmetric    Skin:  normal, no rash   Ears:  auricle (AD) -  normal  EAC " (AD) -  normal  TM (AD) -  Normal, no effusion  auricle (AS) -  normal  EAC (AS) -  normal  TM (AS) -  Normal, no effusion  Normal clinical speech reception   Nose:  Ext. inspection -  Normal  Internal Inspection -  Normal mucosa, septum, and turbinates   Nasopharynx:  normal mucosa, no masses   Oral Cavity:  lips -  Normal mucosa, oral competence, and stoma size   Age-appropriate dentition, healthy gingival mucosa   Hard palate, buccal, floor of mouth mucosa normal   Tongue - normal movement, no lesions   Oropharynx:  mucosa -  Normal, no lesions  soft palate -  Normal, no lesions, no asymmetry, normal elevation  tonsils -  Normal, no exudates, no abnormal lesions, symmetric   Hypopharynx:  Normal pyriform sinus and pharyngeal wall mucosa   No pooled secretions    Larynx:  Epiglottis is normal  Fullness of the right AE fold and arytenoid, normal left AE fold and arytenoid  Fullness of the right false cord  Limited view of left true cord without masses, unable to see right cord  Vocal cords in fixed position with no glottic opening  No ability to see into subglottis   Neck: No visible mass or asymmetry   No palpable masses  8 cuffed shiley in place   Lymphatic:  no abnormal nodes   Cardiovascular:  warm, pink, well-perfused extremities without swelling, tenderness, or edema   Respiratory:  Normal respiratory effort, no stridor   Neuro/Psych.:  mood/affect -  normal  mental status -  normal       PROCEDURES:   Flexible fiberoptic laryngoscopy: Scope exam was indicated due to laryngeal cancer. Verbal consent was obtained. The nasal cavity was prepped with an aerosolized solution of topical anesthetic and vasoconstrictive agent. The scope was passed through the anterior nasal cavity and advanced. Inspection of the nasopharynx revealed no gross abnormality. The base of tongue and vallecula are normal. There was a questionable vallecula cyst to the right of midline. The epiglottis is normal. There is fullness with normal  overlying mucosa of the right AE fold, arytenoid, false cord. The left arytenoid and AE fold are normal. The right true cord cannot be visualized. Partial view of the left true cord is normal. The vocal cords are fixed in position with no glottic opening.  Pyriform sinuses are symmetric. The airway is patent. Procedure tolerated well with no immediate complications noted.    Flexible tracheoscopy: Scope exam was performed through the trach after it was changed to verify position. The mikhail and bronchi were visualized. There was some blood present from the trach change, minimal. Bleeding improved after aerosolized afrin.       Trach change: Verbal consent was obtained from the patient. The 8 cuffed Shiley was removed with difficulty. A 8 cuffless was placed without issue. The patient did have some hemoptysis with trach change likely secondary to his anticoagulation. This was addressed with aerosolized afrin which resolved the bleeding.            RESULTS REVIEWED:   I reviewed the outside ENT notes, the notes from radiation and medical oncology, CT scan reports    I independently reviewed the CT scan images - tumor with concerns for extralaryngeal spread    Care discussed with SLP team and Dr Kennedy    I reviewed the discharge summaries from admissions in April/May 2021      IMPRESSION AND PLAN:   Raj Warren is a 70 year old man with a T4N0 SCC of the larynx. He [facility]    Dr Kennedy and I had a lengthy discussion with the patient about management of advanced laryngeal cancer. We discussed that treatment consists of an upfront laryngectomy followed by postoperative radiation with or without chemotherapy vs definitive chemoradiation.     On my review of his imaging I am concerned that there is extralaryngeal spread of his tumor. In that case he would be recommended for a total laryngectomy followed by postoperative radiation with or without chemotherapy pending final  pathology. If we can get negative margins and there is no CAMERON he could potentially avoid chemotherapy.    Dr Kennedy and I both reviewed that we are both also very concerned that he has a nonfunctional larynx as evidenced by his trach dependence. He has a poor voice. He is taking things normally by mouth. We discussed upfront chemoradiation is unlikely to improve his laryngeal function and potentially leave him requiring a salvage laryngectomy. We discussed the complexity of a salvage laryngectomy including the need for free flap for soft tissue coverage, increased risk of wound healing complications with increased fistula risk and prolonged hospitalization.     I spent time discussing a laryngectomy and bilateral neck dissections in detail. We discussed the anatomical changes that would occur. We reviewed communication options for postoperatively including TEP, electrolarynx, writing, text to voice apps. This was also reviewed and reinforced by our SLP team in clinic. I did discuss the risk of fistula formation with this surgery and the need for him to be NPO. We discussed NG tube vs PEG tube.     His situation is complicated by the large PE he had that required a sternotomy and is on eliquis currently. We discussed that we would have to stop the anticoagulation for surgery if he were even cleared to undergo it. He would be at increased risk of PE/DVT (does have a filter in place), as well as increased bleeding risk. He would likely have to go back on the anticoagulation shortly after surgery and bleeding can require return trips to the OR and blood transfusions.     Dr Kennedy spent time discussing postoperative radiation with the patient today.     I would like him to see the PAC clinic to see if they will even clear him for surgery. If they will, and he is amenable, I would recommend an upfront TL followed by postoperative radiation with or without chemotherapy pending pathology for the best oncologic outcome. He  was given our clinic contact information. We will review his case at tumor board on Friday and await recommendations from the anesthesia clinic. I would tentatively schedule him for surgery on 7/8 if he is cleared and wishes to proceed.     Thank you very much for the opportunity to participate in the care of your patient.      Marilou Sethi MD, M.D.  Otolaryngology- Head & Neck Surgery      This note was dictated with voice recognition software and then edited. Please excuse any unintentional errors.         CC:  Rosa Bean MD  [facility]  [address] [facility] [address] [address]      Serg Kennedy MD  Department of Radiation Oncology  [facility]

## 2021-06-21 NOTE — TELEPHONE ENCOUNTER
FUTURE VISIT INFORMATION      SURGERY INFORMATION:    Date: 21    [facility]: [facility]    Surgeon:  Marilou Sethi MD    Anesthesia Type:  general    Procedure: LARYNGOSCOPY bilateral neck dissections possible tracheoesophageal puncture    Consult: ov     RECORDS REQUESTED FROM:       [facility]    Pertinent Medical History: Bilateral pulmonary embolism    Most recent EKG+ Tracin21    Most recent ECHO: 21

## 2021-06-22 ENCOUNTER — PATIENT OUTREACH (OUTPATIENT)
Dept: OTOLARYNGOLOGY | Facility: CLINIC | Age: 71
End: 2021-06-22

## 2021-06-22 DIAGNOSIS — Z11.59 ENCOUNTER FOR SCREENING FOR OTHER VIRAL DISEASES: ICD-10-CM

## 2021-06-22 NOTE — TELEPHONE ENCOUNTER
Called patient with update that PAC appointment is scheduled for tomorrow 6/23 in person and we are likely planning for surgery on 7/8. We will review plan at tumor board on Friday and contact him with recommendation. Patient is in agreement. He will proceed with PAC tomorrow and was encouraged to call with further questions or concerns.       Sheree Mccall, RN, BSN

## 2021-06-22 NOTE — PROGRESS NOTES
Preoperative Assessment Center Medication History Note    Medication history completed on June 22, 2021 by this writer. See Epic admission navigator for prior to admission medications. Operating room staff will still need to confirm medications and last dose information on day of surgery.     Medication history interview sources  Patient interview: Yes  Care Everywhere records: Yes  Surescripts pharmacy refill records: Yes      Changes made to PTA medication list (reason)  Added: none  Deleted: acetaminophen  Changed: none    Additional medication history information (including reliability of information, actions taken by pharmacist):    -- No recent (within 30 days) course of antibiotics  -- No recent (within 30 days) course of systemic steroids  -- Patient declines being on any other prescription or over-the-counter medications    Prior to Admission medications    Medication Sig Last Dose Taking? Auth Provider   apixaban ANTICOAGULANT (ELIQUIS) 5 MG tablet Take 1 tablet (5 mg) by mouth 2 times daily Taking Yes Nuvia Brown MD   metoprolol tartrate (LOPRESSOR) 25 MG tablet Take 1 tablet (25 mg) by mouth 2 times daily Taking Yes Nuvia Brown MD   multivitamin (CENTRUM SILVER) tablet Take 1 tablet by mouth daily Taking Yes Reported, Patient   pantoprazole (PROTONIX) 40 MG EC tablet Take 1 tablet (40 mg) by mouth every morning (before breakfast) Taking Yes Nuvia Brown MD   albuterol (PROAIR HFA/PROVENTIL HFA/VENTOLIN HFA) 108 (90 Base) MCG/ACT inhaler Inhale 2 puffs into the lungs every 4 hours as needed for wheezing  Patient not taking: Reported on 6/22/2021 Not Taking  Michelle Butt PA-C        Medication history completed by: Chaitanya Hood, [facility]

## 2021-06-22 NOTE — TELEPHONE ENCOUNTER
FUTURE VISIT INFORMATION        SURGERY INFORMATION:    Date: 21    [facility]    Surgeon:  Marilou Sethi MD    Anesthesia Type:  general    Procedure: LARYNGOSCOPY bilateral neck dissections possible tracheoesophageal puncture    Consult: ov      RECORDS REQUESTED FROM:         [facility]     Pertinent Medical History: Bilateral pulmonary embolism     Most recent EKG+ Tracin21     Most recent ECHO: 21

## 2021-06-23 ENCOUNTER — E-CONSULT (OUTPATIENT)
Dept: NEUROLOGY | Facility: CLINIC | Age: 71
End: 2021-06-23

## 2021-06-23 ENCOUNTER — PRE VISIT (OUTPATIENT)
Dept: SURGERY | Facility: CLINIC | Age: 71
End: 2021-06-23

## 2021-06-23 ENCOUNTER — OFFICE VISIT (OUTPATIENT)
Dept: SURGERY | Facility: CLINIC | Age: 71
End: 2021-06-23
Payer: COMMERCIAL

## 2021-06-23 ENCOUNTER — ANESTHESIA EVENT (OUTPATIENT)
Dept: SURGERY | Facility: CLINIC | Age: 71
End: 2021-06-23

## 2021-06-23 VITALS
WEIGHT: 197 LBS | SYSTOLIC BLOOD PRESSURE: 129 MMHG | HEART RATE: 85 BPM | RESPIRATION RATE: 20 BRPM | DIASTOLIC BLOOD PRESSURE: 88 MMHG | HEIGHT: 71 IN | OXYGEN SATURATION: 100 % | BODY MASS INDEX: 27.58 KG/M2 | TEMPERATURE: 98.3 F

## 2021-06-23 DIAGNOSIS — C32.9 SQUAMOUS CELL CARCINOMA OF LARYNX (H): ICD-10-CM

## 2021-06-23 DIAGNOSIS — I26.99 BILATERAL PULMONARY EMBOLISM (H): Primary | ICD-10-CM

## 2021-06-23 DIAGNOSIS — Z01.818 PRE-OP EXAM: Primary | ICD-10-CM

## 2021-06-23 DIAGNOSIS — R20.0 NUMBNESS OF RIGHT HAND: ICD-10-CM

## 2021-06-23 LAB
ABO + RH BLD: NORMAL
ABO + RH BLD: NORMAL
ANION GAP SERPL CALCULATED.3IONS-SCNC: 7 MMOL/L (ref 3–14)
BASOPHILS # BLD AUTO: 0 10E9/L (ref 0–0.2)
BASOPHILS NFR BLD AUTO: 0.5 %
BLD GP AB SCN SERPL QL: NORMAL
BLOOD BANK CMNT PATIENT-IMP: NORMAL
BUN SERPL-MCNC: 9 MG/DL (ref 7–30)
CALCIUM SERPL-MCNC: 9.2 MG/DL (ref 8.5–10.1)
CHLORIDE SERPL-SCNC: 110 MMOL/L (ref 94–109)
CO2 SERPL-SCNC: 23 MMOL/L (ref 20–32)
CREAT SERPL-MCNC: 0.83 MG/DL (ref 0.66–1.25)
DIFFERENTIAL METHOD BLD: NORMAL
EOSINOPHIL # BLD AUTO: 0.2 10E9/L (ref 0–0.7)
EOSINOPHIL NFR BLD AUTO: 3 %
ERYTHROCYTE [DISTWIDTH] IN BLOOD BY AUTOMATED COUNT: 12.7 % (ref 10–15)
GFR SERPL CREATININE-BSD FRML MDRD: 90 ML/MIN/{1.73_M2}
GLUCOSE SERPL-MCNC: 85 MG/DL (ref 70–99)
HCT VFR BLD AUTO: 43.2 % (ref 40–53)
HGB BLD-MCNC: 13.9 G/DL (ref 13.3–17.7)
IMM GRANULOCYTES # BLD: 0 10E9/L (ref 0–0.4)
IMM GRANULOCYTES NFR BLD: 0.4 %
LYMPHOCYTES # BLD AUTO: 2.2 10E9/L (ref 0.8–5.3)
LYMPHOCYTES NFR BLD AUTO: 39.8 %
MCH RBC QN AUTO: 29.3 PG (ref 26.5–33)
MCHC RBC AUTO-ENTMCNC: 32.2 G/DL (ref 31.5–36.5)
MCV RBC AUTO: 91 FL (ref 78–100)
MONOCYTES # BLD AUTO: 0.6 10E9/L (ref 0–1.3)
MONOCYTES NFR BLD AUTO: 10.2 %
NEUTROPHILS # BLD AUTO: 2.6 10E9/L (ref 1.6–8.3)
NEUTROPHILS NFR BLD AUTO: 46.1 %
NRBC # BLD AUTO: 0 10*3/UL
NRBC BLD AUTO-RTO: 0 /100
PLATELET # BLD AUTO: 318 10E9/L (ref 150–450)
POTASSIUM SERPL-SCNC: 3.9 MMOL/L (ref 3.4–5.3)
RBC # BLD AUTO: 4.74 10E12/L (ref 4.4–5.9)
SODIUM SERPL-SCNC: 140 MMOL/L (ref 133–144)
SPECIMEN EXP DATE BLD: NORMAL
WBC # BLD AUTO: 5.6 10E9/L (ref 4–11)

## 2021-06-23 PROCEDURE — 85025 COMPLETE CBC W/AUTO DIFF WBC: CPT | Performed by: PATHOLOGY

## 2021-06-23 PROCEDURE — 86900 BLOOD TYPING SEROLOGIC ABO: CPT | Performed by: PATHOLOGY

## 2021-06-23 PROCEDURE — 86850 RBC ANTIBODY SCREEN: CPT | Performed by: PATHOLOGY

## 2021-06-23 PROCEDURE — 99204 OFFICE O/P NEW MOD 45 MIN: CPT | Mod: 24 | Performed by: NURSE PRACTITIONER

## 2021-06-23 PROCEDURE — 36415 COLL VENOUS BLD VENIPUNCTURE: CPT | Performed by: PATHOLOGY

## 2021-06-23 PROCEDURE — 86901 BLOOD TYPING SEROLOGIC RH(D): CPT | Performed by: PATHOLOGY

## 2021-06-23 PROCEDURE — 99207 PR NO CHARGE LOS: CPT | Performed by: PSYCHIATRY & NEUROLOGY

## 2021-06-23 PROCEDURE — 80048 BASIC METABOLIC PNL TOTAL CA: CPT | Performed by: PATHOLOGY

## 2021-06-23 ASSESSMENT — PAIN SCALES - GENERAL: PAINLEVEL: MODERATE PAIN (5)

## 2021-06-23 ASSESSMENT — LIFESTYLE VARIABLES: TOBACCO_USE: 1

## 2021-06-23 ASSESSMENT — MIFFLIN-ST. JEOR: SCORE: 1680.72
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
## 2021-06-23 NOTE — PROGRESS NOTES
ALL SMARTFIELDS MUST BE COMPLETED FOR PATIENT CARE AND BILLING    6/23/2021     E-Consult has been denied due to: Complexity of question, needs in-person referral.  Cannot eval for nerve impingement with E-consult.  Needs in person    Interprofessional consultation requested by:  Courtney Sosa APRN CNP      Clinical Question/Purpose: MY CLINICAL QUESTION IS: Post surgery on 4/28/2021,pPatient has had new onset numbness in the right last three fingers which has improved to the last two fingers.  Please evaluate for possible impingement.     Patient assessment and information reviewed: Clinical question    Recommendations: Nerve impingement requires in person exam to assess.         The recommendations provided in this E-Consult are based on the clinical data available to me at this time, and are furnished without the benefit of a comprehensive in-person or virtual patient evaluation, Any new clinical issues or changes in patient status since the filing of this E-Consult will need to be taken into account when assessing these recommendations. Please contact me if you have further questions.    My total time spent reviewing clinical information and formulating assessment was 5 minutes.    Report sent automatically to requesting provider once signed.     Charge codes - [id number] (No charge code)    Aaliyah Washington DO
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
## 2021-06-23 NOTE — PATIENT INSTRUCTIONS
Preparing for Your Surgery      Name:  Raj Warren   MRN:  [id number]   :  1950   Today's Date:  2021       Arriving for surgery:  Surgery date:  21  Arrival time:  5:30AM    Restrictions due to COVID 19:       One visitor is allowed in the Pre Op area. When you go into surgery, one visitor is allowed to wait in the Surgery Waiting Room       (provided there is enough space to social distance).   After surgery- Two visitors are allowed at a time if you have a private room and one visitor is allowed for those in a semi-private room.   Every 4 days the visitor(s) can rotate. During the 4 day period, the visitor(s) must be consistent. No visitors under the age of 18 years old.   Visiting Hours: 8 am - 8:30 pm   No ill visitors.   All visitors must wear face mask.    [employer] parking is available for anyone with mobility limitations or disabilities.  ([place]  24 hours/ 7 days a week; [facility]  7 am- 3:30 pm, Mon- Fri)    Please come to:     [facility] [address]  [address] [place] [address]    When entering the hospital you will be asked COVID screening questions, you will then be directed to Registration.  Registration will direct you to the 3rd floor preop waiting room.  Please ask if you need a wheelchair or escort to the 3rd floor Surgery waiting area. Preop number- [phone].     What can I eat or drink?  -  You may eat and drink normally for up to 8 hours before your surgery. (Until 21, 11:30PM)  -  You may have clear liquids until 2 hours before surgery. (Until 21, 5:30AM)    Examples of clear liquids:  Water  Clear broth  Juices (apple, white grape, white cranberry  and cider) without pulp  Noncarbonated, powder based beverages  (lemonade and Van-Aid)  Sodas (Sprite, 7-Up, ginger ale and seltzer)  Coffee or tea (without milk or cream)  Gatorade    -  No Alcohol for at least 24 hours before surgery     Which  medicines can I take?    You will be called with instructions regarding holding Apixaban(Eliquis) prior to surgery.   Hold Multivitamins for 7 days before surgery.  Hold Supplements for 7 days before surgery.  Hold Ibuprofen (Advil, Motrin) for 1 day before surgery--unless otherwise directed by surgeon.  Hold Naproxen (Aleve) for 4 days before surgery.        -  PLEASE TAKE these medications the day of surgery:    Metoprolol(Lopressor)   Pantoprazole(Protonix)    How do I prepare myself?  - Please take 2 showers before surgery using Scrubcare or Hibiclens soap.    Use this soap only from the neck to your toes.     Leave the soap on your skin for one minute--then rinse thoroughly.      You may use your own shampoo and conditioner; no other hair products.   - Please remove all jewelry and body piercings.  - No lotions, deodorants or fragrance.  - Bring your ID and insurance card.    - All patients are required to have a Covid-19 test within 4 days of surgery/procedure.      -Patients will be contacted by the [facility] scheduling team within 1 week of surgery to make an appointment.      - Patients may call the Scheduling team at [phone] if they have not been scheduled within 4 days of  surgery.        Questions or Concerns:    - For any questions regarding the day of surgery or your hospital stay, please contact the Pre Admission Nursing Office at [phone].       - If you have health changes between today and your surgery please call your surgeon.       For questions after surgery please call your surgeons office.

## 2021-06-24 ENCOUNTER — PRE VISIT (OUTPATIENT)
Dept: NEUROLOGY | Facility: CLINIC | Age: 71
End: 2021-06-24

## 2021-06-24 ENCOUNTER — PRE VISIT (OUTPATIENT)
Dept: SURGERY | Facility: CLINIC | Age: 71
End: 2021-06-24

## 2021-06-24 NOTE — TELEPHONE ENCOUNTER
FUTURE VISIT INFORMATION      FUTURE VISIT INFORMATION:    Date: 6/25/2021    Time: 345pm    Location: [facility]  REFERRAL INFORMATION:    Referring provider:  Courtney Sosa APRN CNP     Referring providers clinic:  Anesthesiology     Reason for visit/diagnosis  Hand Numbness     RECORDS REQUESTED FROM:       Clinic name Comments Records Status Imaging Status   Internal AI Sosa-6/23/2021 Epic N/A          [employer] [facility]-2/8/2016 Care Everywhere Requested to PACS                       6/24/2021-Request for Images faxed to [employer]-MR @ 539am    6/28/2021-[employer] Images now in PACS-MR @ 526am

## 2021-06-25 ENCOUNTER — OFFICE VISIT (OUTPATIENT)
Dept: NEUROLOGY | Facility: CLINIC | Age: 71
End: 2021-06-25
Payer: COMMERCIAL

## 2021-06-25 VITALS
BODY MASS INDEX: 26.71 KG/M2 | DIASTOLIC BLOOD PRESSURE: 83 MMHG | HEART RATE: 74 BPM | WEIGHT: 190.8 LBS | OXYGEN SATURATION: 97 % | RESPIRATION RATE: 16 BRPM | SYSTOLIC BLOOD PRESSURE: 126 MMHG | HEIGHT: 71 IN

## 2021-06-25 DIAGNOSIS — R20.2 NUMBNESS AND TINGLING IN RIGHT HAND: Primary | ICD-10-CM

## 2021-06-25 DIAGNOSIS — R20.0 NUMBNESS AND TINGLING IN RIGHT HAND: Primary | ICD-10-CM

## 2021-06-25 PROCEDURE — 99204 OFFICE O/P NEW MOD 45 MIN: CPT | Performed by: INTERNAL MEDICINE

## 2021-06-25 ASSESSMENT — PAIN SCALES - GENERAL: PAINLEVEL: NO PAIN (0)

## 2021-06-25 ASSESSMENT — MIFFLIN-ST. JEOR: SCORE: 1652.59

## 2021-06-25 NOTE — LETTER
6/25/2021       RE: Raj Warren  [address]  [facility] [address]     Dear Colleague,    Thank you for referring your patient, Raj Warren, to the [facility] [place] [facility]. Please see a copy of my visit note below.    [facility] Neurology Consultation    Raj Warren MRN# [id number]   Age: 71 year old YOB: 1950     Requesting physician: Nuiva Quiroz     Reason for Consultation: Right hand numbness      History of Presenting Symptoms:   Raj Warren is a 71 year old male who presents today for evaluation of right hand numbness.     Patient was hospitalized in April 2021 for pulmonary embolism. He required interventional surgery to remove blood clots. Patient reports that after waking up with the surgery he noticed numbness in the right upper extremity, specifically in the 3-5 fingers, the medial palms, and distal portion of the medial forearm. Symptoms have mildly improved since then. He no longer has numbness in the medial forearm or the 3rd digit. He reports some tingling in areas of numbness, but it is not painful. He notes possibly mild weakness in the hand, but attributes it more to the numbness.     Patient denies any right shoulder or arm pain after waking up from surgery. He denies any neck pain radiating down into the arm.      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)         Past Surgical  History:     Past Surgical History:   Procedure Laterality Date     IR IVC FILTER PLACEMENT  2021     LARYNGOSCOPY N/A 2021    Procedure: LARYNGOSCOPY;  Surgeon: Choco Johnson MD;  Location: [facility] OR     REPAIR ANEURYSM ASCENDING AORTA N/A 2021    Procedure: PULMONARY THROMBECTOMY;  Surgeon: Yumi Singh MD;  Location: [facility] OR     TRACHEOSTOMY  2021    Procedure: CREATION, TRACHEOSTOMY;  Surgeon: Choco Johnson MD;  Location:  OR        Social History:     Social History     Tobacco Use     Smoking status: Former Smoker     Types: Cigarettes     Quit date: 1989     Years since quittin.5     Smokeless tobacco: Never Used     Tobacco comment: quit in   had smoked about 1/2 pack a day then cut back   Substance Use Topics     Alcohol use: No     Comment: No alcohol since      Drug use: No        Family History:     Family History   Problem Relation Age of Onset     Circulatory Mother         blood clots     Alcohol/Drug Father         alcohol drank heavily     Alcohol/Drug Brother         alcohol        Medications:     Current Outpatient Medications   Medication Sig     albuterol (PROAIR HFA/PROVENTIL HFA/VENTOLIN HFA) 108 (90 Base) MCG/ACT inhaler Inhale 2 puffs into the lungs every 4 hours as needed for wheezing (Patient not taking: Reported on 2021)     apixaban ANTICOAGULANT (ELIQUIS) 5 MG tablet Take 1 tablet (5 mg) by mouth 2 times daily     metoprolol tartrate (LOPRESSOR) 25 MG tablet Take 1 tablet (25 mg) by mouth 2 times daily     multivitamin (CENTRUM SILVER) tablet Take 1 tablet by mouth daily     pantoprazole (PROTONIX) 40 MG EC tablet Take 1 tablet (40 mg) by mouth every morning (before breakfast)     No current facility-administered medications for this visit.         Allergies:     Allergies   Allergen Reactions     Pollen Extract         Review of Systems:   As above      Physical Exam:   Vitals: /83   Pulse 74   Resp 16   Ht 1.803 m  "(5' 11\")   Wt 86.5 kg (190 lb 12.8 oz)   SpO2 97%   BMI 26.61 kg/m     General: Seated comfortably in no acute distress.  Lungs: breathing comfortably  Extremities: no edema  Skin: No rashes  Neurologic:     Mental Status: Fully alert, attentive. Normal memory and fund of knowledge. Language normal, speech clear and fluent, no paraphasic errors.      Cranial Nerves: Visual fields intact. PERRL. EOMI with normal smooth pursuit. Facial sensation intact/symmetric. Facial movements symmetric. Hearing not formally tested but intact to conversation. Palate elevation symmetric, uvula midline. No dysarthria. Shoulder shrug strong bilaterally. Tongue protrusion midline.     Motor: No tremors or other abnormal movements observed. Muscle tone normal throughout. Normal/symmetric rapid finger tapping. Strength is 4+/5 in finger extension, FDI, ADM, FDP 3-4. Strength 5/5 throughout upper and lower extremities.     Deep Tendon Reflexes: 1+/symmetric throughout upper and lower extremities. No clonus. Toes downgoing bilaterally.     Sensory: Decreased sensation to light touch, temperature in the 4-5th digits and medial palmar right hand. Otherwise, intact/symmetric to light touch, pinprick, temperature, throughout upper and lower extremities. Vibration is 10 seconds in the bilateral great toes, normal in the hands. Negative Romberg.      Coordination: Finger-nose-finger and heel-shin intact without dysmetria. Rapid alternating movements intact/symmetric with normal speed and rhythm.     Gait: Normal, steady casual gait. Able to walk on toes, heels and tandem without difficulty.         Data: Pertinent prior to visit   Imaging:  CT chest 6/3/2021  IMPRESSION:  1. Resolved infiltrate seen from comparison study.  2. No metastatic disease demonstrated.    Procedures:  None    Laboratory:  None         Assessment and Plan:   Assessment:  Raj Warren is a 71 year old male who presents today for evaluation of right hand numbness. " Symptoms developed in April 2021 after waking up from surgery. On examination he has decreased sensation in the right 4th/5th fingers and medial palm. There is mild weakness in finger extension, ADM/FDI, and FDP 3-4. Previously patient had some numbness in the third digit and medial forearm. Based on the history I suspect that numbness is likely related to compression/stretch injury that occurred cheryl-operatively. Medial cord localization of injury given initial reported deficits and current examination. Symptoms have mildly improved since onset. Patient has laryngeal carcinoma and we discussed possible, but low likelihood of infiltrative lesion into the plexus. History is much more consistent with cheryl-operative injury. We discussed further investigation with EMG. Patient has surgery with laryngeal carcinoma coming up in July. He would like to focus on this for the time being and then consider EMG afterwards.     Valdemar Rodriguez MD   of Neurology  [facility]      The total time of this encounter today amounted to 55 minutes. This time included time spent with the patient, prep work, ordering tests, and performing post visit documentation.        Again, thank you for allowing me to participate in the care of your patient.      Sincerely,    Valdemar Rodriguez MD

## 2021-06-25 NOTE — PROGRESS NOTES
[facility] Neurology Consultation    Raj Warren MRN# [id number]   Age: 71 year old YOB: 1950     Requesting physician: Nuvia Quiroz     Reason for Consultation: Right hand numbness      History of Presenting Symptoms:   Raj Warren is a 71 year old male who presents today for evaluation of right hand numbness.     Patient was hospitalized in April 2021 for pulmonary embolism. He required interventional surgery to remove blood clots. Patient reports that after waking up with the surgery he noticed numbness in the right upper extremity, specifically in the 3-5 fingers, the medial palms, and distal portion of the medial forearm. Symptoms have mildly improved since then. He no longer has numbness in the medial forearm or the 3rd digit. He reports some tingling in areas of numbness, but it is not painful. He notes possibly mild weakness in the hand, but attributes it more to the numbness.     Patient denies any right shoulder or arm pain after waking up from surgery. He denies any neck pain radiating down into the arm.      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)         Past Surgical History:     Past Surgical History:   Procedure Laterality Date     IR IVC FILTER PLACEMENT  4/30/2021     LARYNGOSCOPY N/A 4/12/2021    Procedure: LARYNGOSCOPY;  Surgeon: Choco Johnson MD;  Location: [facility] OR     REPAIR ANEURYSM ASCENDING AORTA N/A 4/28/2021    Procedure: PULMONARY THROMBECTOMY;  Surgeon: Francisco  "Yumi Torres MD;  Location:  OR     TRACHEOSTOMY  2021    Procedure: CREATION, TRACHEOSTOMY;  Surgeon: Choco Johnson MD;  Location:  OR        Social History:     Social History     Tobacco Use     Smoking status: Former Smoker     Types: Cigarettes     Quit date: 1989     Years since quittin.5     Smokeless tobacco: Never Used     Tobacco comment: quit in   had smoked about 1/2 pack a day then cut back   Substance Use Topics     Alcohol use: No     Comment: No alcohol since      Drug use: No        Family History:     Family History   Problem Relation Age of Onset     Circulatory Mother         blood clots     Alcohol/Drug Father         alcohol drank heavily     Alcohol/Drug Brother         alcohol        Medications:     Current Outpatient Medications   Medication Sig     albuterol (PROAIR HFA/PROVENTIL HFA/VENTOLIN HFA) 108 (90 Base) MCG/ACT inhaler Inhale 2 puffs into the lungs every 4 hours as needed for wheezing (Patient not taking: Reported on 2021)     apixaban ANTICOAGULANT (ELIQUIS) 5 MG tablet Take 1 tablet (5 mg) by mouth 2 times daily     metoprolol tartrate (LOPRESSOR) 25 MG tablet Take 1 tablet (25 mg) by mouth 2 times daily     multivitamin (CENTRUM SILVER) tablet Take 1 tablet by mouth daily     pantoprazole (PROTONIX) 40 MG EC tablet Take 1 tablet (40 mg) by mouth every morning (before breakfast)     No current facility-administered medications for this visit.         Allergies:     Allergies   Allergen Reactions     Pollen Extract         Review of Systems:   As above      Physical Exam:   Vitals: /83   Pulse 74   Resp 16   Ht 1.803 m (5' 11\")   Wt 86.5 kg (190 lb 12.8 oz)   SpO2 97%   BMI 26.61 kg/m     General: Seated comfortably in no acute distress.  Lungs: breathing comfortably  Extremities: no edema  Skin: No rashes  Neurologic:     Mental Status: Fully alert, attentive. Normal memory and fund of knowledge. Language normal, speech clear and " fluent, no paraphasic errors.      Cranial Nerves: Visual fields intact. PERRL. EOMI with normal smooth pursuit. Facial sensation intact/symmetric. Facial movements symmetric. Hearing not formally tested but intact to conversation. Palate elevation symmetric, uvula midline. No dysarthria. Shoulder shrug strong bilaterally. Tongue protrusion midline.     Motor: No tremors or other abnormal movements observed. Muscle tone normal throughout. Normal/symmetric rapid finger tapping. Strength is 4+/5 in finger extension, FDI, ADM, FDP 3-4. Strength 5/5 throughout upper and lower extremities.     Deep Tendon Reflexes: 1+/symmetric throughout upper and lower extremities. No clonus. Toes downgoing bilaterally.     Sensory: Decreased sensation to light touch, temperature in the 4-5th digits and medial palmar right hand. Otherwise, intact/symmetric to light touch, pinprick, temperature, throughout upper and lower extremities. Vibration is 10 seconds in the bilateral great toes, normal in the hands. Negative Romberg.      Coordination: Finger-nose-finger and heel-shin intact without dysmetria. Rapid alternating movements intact/symmetric with normal speed and rhythm.     Gait: Normal, steady casual gait. Able to walk on toes, heels and tandem without difficulty.         Data: Pertinent prior to visit   Imaging:  CT chest 6/3/2021  IMPRESSION:  1. Resolved infiltrate seen from comparison study.  2. No metastatic disease demonstrated.    Procedures:  None    Laboratory:  None         Assessment and Plan:   Assessment:  Raj Warren is a 71 year old male who presents today for evaluation of right hand numbness. Symptoms developed in April 2021 after waking up from surgery. On examination he has decreased sensation in the right 4th/5th fingers and medial palm. There is mild weakness in finger extension, ADM/FDI, and FDP 3-4. Previously patient had some numbness in the third digit and medial forearm. Based on the history I suspect  that numbness is likely related to compression/stretch injury that occurred cheryl-operatively. Medial cord localization of injury given initial reported deficits and current examination. Symptoms have mildly improved since onset. Patient has laryngeal carcinoma and we discussed possible, but low likelihood of infiltrative lesion into the plexus. History is much more consistent with cheryl-operative injury. We discussed further investigation with EMG. Patient has surgery with laryngeal carcinoma coming up in July. He would like to focus on this for the time being and then consider EMG afterwards.     Valdemar Rodriguez MD   of Neurology  [facility]      The total time of this encounter today amounted to 55 minutes. This time included time spent with the patient, prep work, ordering tests, and performing post visit documentation.

## 2021-06-28 ENCOUNTER — PATIENT OUTREACH (OUTPATIENT)
Dept: OTOLARYNGOLOGY | Facility: CLINIC | Age: 71
End: 2021-06-28

## 2021-06-28 NOTE — PROGRESS NOTES
Called and left another message for patient to review tumor board discussion and recommendations. Left direct line for return call.     Sheree Mccall, RN, BSN

## 2021-06-28 NOTE — PROGRESS NOTES
Department of Radiation Oncology  [address] [address]  [phone]       Consultation Note    Name: Raj Warren MRN: [id number]   : 1950   Date of Service: 2021  Referring: Dr. 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 He was lost to follow-up, however, and did not receive treatment. More recently, he presented to the ED with acute dyspnea on 2021. He was treated with nebulizers and racemic epinephrine and discharged home after his symptoms improved. One week thereafter, on 2021, he presented once more to the ED with a sore throat and stridor. He was found to have a large laryngeal mass on imaging, was intubated and admitted, and underwent an awake tracheostomy and DL with biopsies. Surgical pathology (specimen: [id number]) revealed a moderately differentiated squamous cell carcinoma. He was seen by a local radiation oncologist who recommended treatment with chemoradiation therapy following completion of his staging work-up and he was discharged from the hospital on 2021.     A 2021 PET/CT revealed an FDG-avid laryngeal mass centered within the right true vocal cords with involvement of the AE folds, bilateral false cords and extension to the subglottic larynx with involvement of the preepiglottic fat. There was a 9 mm FDG-avid left level IV lymph node but no evidence of distant metastatic disease.    On 2021, Mr. Warren presented to the ED once again with worsening dyspnea with work-up revealing large bilateral pulmonary emboli for which he underwent emergent pulmonary embolectomy. Postoperatively, he was transitioned to Eliquis twice daily and  discharged on 5/8/2021. He was seen by his outside medical oncologist for follow-up who recommended a referral to the [facility] for consideration of a possible laryngectomy versus chemoradiation therapy. He presents to ENT multi D clinic today for a further discussion on this matter.    Past Medical History:    Pulmonary embolism    Squamous cell carcinoma of the larynx    Allergic conjunctivitis    Atopic rhinitis    Past Surgical History:    Pulmonary embolectomy    RV thrombectomy    DL and biopsies    Chemotherapy History:  None    Radiation History:  None    Pregnant: Not Applicable  Implanted Cardiac Devices: No    Medications:  Current Outpatient Medications   Medication Sig Dispense Refill     apixaban ANTICOAGULANT (ELIQUIS) 5 MG tablet Take 1 tablet (5 mg) by mouth 2 times daily 60 tablet 1     metoprolol tartrate (LOPRESSOR) 25 MG tablet Take 1 tablet (25 mg) by mouth 2 times daily 60 tablet 3     multivitamin (CENTRUM SILVER) tablet Take 1 tablet by mouth daily       pantoprazole (PROTONIX) 40 MG EC tablet Take 1 tablet (40 mg) by mouth every morning (before breakfast) 30 tablet 0      Allergies:    Pollen extract    Social History:  Tobacco: Former smoker. 0.5 ppd x 8 years. Quit in 1989.   Alcohol: None  Employment: Retired [employer]  Lives in [place], [place]    Family History:    No history of head and neck cancer    Review of Systems   A 10-point review of systems was performed. Pertinent findings include moderate discomfort associate with this tracheostomy which he  request to be changed, ongoing hoarseness and right-sided otalgia.    Physical Exam   ECOG Status: 1    Vitals:  Weight: 88.7 kg  Pain: 5/10    General: Mildly fatigued different appearing 71-year-old gentleman seated comfortably in an examination chair no acute distress  HEENT: NC/AT.  EOMI.  No rhinorrhea or epistaxis.  Moist mucous membranes.  No visible oral cavity lesions or thrush.  Neck: Tracheostomy is  midline with mild surrounding crusted secretions.  No palpable cervical adenopathy bilaterally.  Pulmonary: No wheezing, stridor or respiratory distress  Skin: Normal color and turgor    Dr. Sethi performed a flexible nasopharyngoscopy in clinic. Please see her note for complete details regarding his procedure. Briefly, this demonstrated a large mass involving the right false cord, AE fold and arytenoid with extension and involvement of the left false cord and arytenoid. The left cord was able to be partially visualized while the right cord was obstructed by tumor. The cords themselves appeared to be fixed with minimal movement bilaterally.    Imaging/Path/Labs   Imaging: Reviewed    Path: Reviewed    Labs: Reviewed    Assessment    Mr. Warren is a 71 year old male with a cT4a N0 M0 squamous cell carcinoma of the larynx.     Plan   Dr. Sethi and I had a long discussion with Mr. Warren regarding his work-up to date. He has significant cricoid cartilage destruction by this tumor on imaging with clinical examination revealing a largely nonfunctional larynx. Both Dr. Sethi and I discussed that standard of care treatment of tumors such as his would be upfront surgical resection with a total laryngectomy and bilateral neck dissection followed by tumor-directed adjuvant therapy. His case is complicated, though, by his recent bilateral pulmonary emboli requiring embolectomy for which he is now on therapeutic doses of anticoagulation. Dr. Sethi is planning to review his case with our pre-operative anesthesia clinic and I will defer to her team regarding his fitness for surgery.    I discussed that he will almost certainly require adjuvant radiation therapy following surgical resection and I reviewed the logistics as well as briefly touched upon the acute and late-term toxicities associated with this. I also discussed that if he is not felt to be a surgical candidate, we could consider moving forward with definitive-intent  chemoradiation therapy although I fully expect that he will require a salvage laryngectomy following treatment for either disease control or, alternatively, palliative purposes if he does not regain adequate laryngeal function after chemoradiation.    In the interim, Dr. Sethi and I will have him meet with our SLP team and we will plan to present his case at our upcoming head and neck tumor board on Friday, 6/25/2021.    Serg Kennedy MD/PhD    Dept of Radiation Oncology  [facility]

## 2021-06-29 ENCOUNTER — TELEPHONE (OUTPATIENT)
Dept: CARDIOLOGY | Facility: CLINIC | Age: 71
End: 2021-06-29

## 2021-06-29 NOTE — PHARMACY - PREOPERATIVE ASSESSMENT CENTER
Anticoagulation Note - Preoperative Assessment Center (PAC) Pharmacist     Patient was interviewed on June 22, 2021 as a part of PAC clinic appointment. The purpose of this note is to document the perioperative anticoagulation plan outlined by the providers caring for Raj Warren.      Current Regimen  Anticoagulation Regimen as of June 22, 2021: apixaban (Eliquis) 5 mg PO BID   Indication: h/o BL PE 4/27/21 w/ significant clot burden requiring pulmonary embolectomy and RV thrombectomy   Prescriber:  Dr. Brown (PCP) also sees Dr. Bean (oncology)   Expected Duration of therapy: undetermined   Current medications that may interact with this include: none           Creatinine   Date Value Ref Range Status   05/15/2021 0.81 0.66 - 1.25 mg/dL Final         Perioperative plan  Raj Warren is being seen for perioperative assessment/clearance prior to procedure with Dr Sethi currently scheduled on 7/8/21 for scheduled for Laryngoscopy, laryngectomy, bilateral neck dissections and possible tracheoesophageal puncture.  Plan for upcoming procedure was coordinated with hematology, oncology and surgery team and is as follows:   -Stop eliquis 48 to 72 hours before surgery.   -Start lovenox 1 mg/kg every 12 hours. Lovenox to be started 12 hours after last eliquis.   -Stop lovenox 24 hours before surgery. (skip evening before and morning of surgery)   -Consult hematology for post-op anti-coagulation.     Called patient on 7/2/21 to discuss plan, he will have Rx called into his home [facility] pharmacy in [place]. Patient will be stopping Eliquis after PM dose on Sunday 7/4/21 an will start lovenox 80 mg subcutaneous every 12 hours on 7/5 AM. He will take 2 doses (AM and PM) on 7/5 and on 7/6.  On 7/7 he will take only 1 dose in the AM (before 7 AM) and this will be his last dose of lovenox prior to surgery.   Patient was given this writers phone number and he wrote down the instructions as we talked. We went through basic  lovenox teaching instructions, and he was given a website to view further administration techniques and also encouraged to discuss in detail with the pharmacist at [facility] where he fills the Rx.         Resumption of anticoagulation after procedure will be based on surgery team assessment of bleeding risks and complications.  This plan may require re-assessment and modification by his primary team in the perioperative setting depending on patients clinical situation.          Chaitanya Hood, [facility]

## 2021-06-29 NOTE — TELEPHONE ENCOUNTER
Spoke with Diamond, home health nurse, regarding Raj's appt with Dr. Lopez. Patient has been scheduled for laryngoscopy at [facility] on 7/8/21 and has been told he will require 1 week TCU stay post-operatively. Rescheduled appt with Dr Lopez to 8/3/21. Diamond was with patient at this time, relayed appt information.    Pauline Dodge RN  [facility]

## 2021-06-30 ENCOUNTER — ANCILLARY PROCEDURE (OUTPATIENT)
Dept: CARDIOLOGY | Facility: CLINIC | Age: 71
End: 2021-06-30
Attending: INTERNAL MEDICINE
Payer: COMMERCIAL

## 2021-06-30 DIAGNOSIS — I26.99 BILATERAL PULMONARY EMBOLISM (H): ICD-10-CM

## 2021-06-30 PROCEDURE — 93306 TTE W/DOPPLER COMPLETE: CPT | Performed by: INTERNAL MEDICINE

## 2021-07-01 ENCOUNTER — DOCUMENTATION ONLY (OUTPATIENT)
Dept: SURGERY | Facility: CLINIC | Age: 71
End: 2021-07-01

## 2021-07-01 NOTE — PROGRESS NOTES
Reviewed the below echocardiogram.  Echocardiogram with two-dimensional, color and spectral Doppler performed.  ______________________________________________________________________________  Interpretation Summary  Global and regional left ventricular function is normal with an EF of 60-65%.  Right ventricular function, chamber size, wall motion, and thickness are  normal.  Pulmonary artery systolic pressure is normal.  The inferior vena cava is normal.  No pericardial effusion is present.  RV size,function and PA pressure has normalized since previous study..

## 2021-07-02 ENCOUNTER — TELEPHONE (OUTPATIENT)
Dept: ONCOLOGY | Facility: CLINIC | Age: 71
End: 2021-07-02

## 2021-07-02 DIAGNOSIS — C32.8 MALIGNANT NEOPLASM OF OVERLAPPING SITES OF LARYNX (H): Primary | ICD-10-CM

## 2021-07-02 NOTE — TELEPHONE ENCOUNTER
Dana calling from pre-op at [facility] surgery with Dr. Sethi ([phone]). Requesting Dr. Bean send script for Lovenox for upcoming surgery 7/8.    Requesting Rx:  Lovenox 80mg every 12 hours starting 7/5 AM for 5 doses (through Wed AM, surgery Thurs 7/8)    [facility] ([phone])    Will route to Kyree Jurado NP to review and advise as Dr. Bean is not in clinic today.     Kyree sent Rx as requested, writer updated Dana.     Jenelle Lopez, BSN, RN, PHN, OCN  Oncology Care Coordinator  [facility]

## 2021-07-05 ENCOUNTER — PREP FOR PROCEDURE (OUTPATIENT)
Dept: SURGERY | Facility: CLINIC | Age: 71
End: 2021-07-05

## 2021-07-06 ENCOUNTER — PATIENT OUTREACH (OUTPATIENT)
Dept: OTOLARYNGOLOGY | Facility: CLINIC | Age: 71
End: 2021-07-06

## 2021-07-06 DIAGNOSIS — Z11.59 ENCOUNTER FOR SCREENING FOR OTHER VIRAL DISEASES: ICD-10-CM

## 2021-07-06 LAB
LABORATORY COMMENT REPORT: NORMAL
SARS-COV-2 RNA RESP QL NAA+PROBE: NEGATIVE
SARS-COV-2 RNA RESP QL NAA+PROBE: NORMAL
SPECIMEN SOURCE: NORMAL
SPECIMEN SOURCE: NORMAL

## 2021-07-06 PROCEDURE — U0005 INFEC AGEN DETEC AMPLI PROBE: HCPCS | Mod: 90 | Performed by: PATHOLOGY

## 2021-07-06 PROCEDURE — U0003 INFECTIOUS AGENT DETECTION BY NUCLEIC ACID (DNA OR RNA); SEVERE ACUTE RESPIRATORY SYNDROME CORONAVIRUS 2 (SARS-COV-2) (CORONAVIRUS DISEASE [COVID-19]), AMPLIFIED PROBE TECHNIQUE, MAKING USE OF HIGH THROUGHPUT TECHNOLOGIES AS DESCRIBED BY CMS-2020-01-R: HCPCS | Mod: 90 | Performed by: PATHOLOGY

## 2021-07-06 PROCEDURE — 99000 SPECIMEN HANDLING OFFICE-LAB: CPT | Performed by: PATHOLOGY

## 2021-07-06 NOTE — PROGRESS NOTES
Spoke with patient regarding plan for adding PEG tube placement to his scheduled procedure on Thursday. Patient is in agreement with this plan. We will proceed with PEG placement along with total laryngectomy.    Reviewed all questions with patient and patient was encouraged to call with further questions or concerns.       Sheree Mccall, RN, BSN

## 2021-07-07 ENCOUNTER — ANESTHESIA EVENT (OUTPATIENT)
Dept: SURGERY | Facility: CLINIC | Age: 71
DRG: 011 | End: 2021-07-07
Payer: COMMERCIAL

## 2021-07-07 DIAGNOSIS — K21.00 GASTROESOPHAGEAL REFLUX DISEASE WITH ESOPHAGITIS WITHOUT HEMORRHAGE: ICD-10-CM

## 2021-07-07 ASSESSMENT — LIFESTYLE VARIABLES: TOBACCO_USE: 1
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
## 2021-07-08 ENCOUNTER — HOSPITAL ENCOUNTER (INPATIENT)
Facility: CLINIC | Age: 71
LOS: 9 days | Discharge: HOME | DRG: 011 | End: 2021-07-17
Attending: OTOLARYNGOLOGY | Admitting: OTOLARYNGOLOGY
Payer: COMMERCIAL

## 2021-07-08 ENCOUNTER — ANESTHESIA (OUTPATIENT)
Dept: SURGERY | Facility: CLINIC | Age: 71
DRG: 011 | End: 2021-07-08
Payer: COMMERCIAL

## 2021-07-08 DIAGNOSIS — G89.18 ACUTE POST-OPERATIVE PAIN: ICD-10-CM

## 2021-07-08 DIAGNOSIS — C32.9 MALIGNANT NEOPLASM OF LARYNX (H): ICD-10-CM

## 2021-07-08 DIAGNOSIS — C32.9 LARYNGEAL CANCER (H): Primary | ICD-10-CM

## 2021-07-08 DIAGNOSIS — J18.9 PNEUMONIA OF LEFT LOWER LOBE DUE TO INFECTIOUS ORGANISM: ICD-10-CM

## 2021-07-08 LAB
ABO + RH BLD: NORMAL
ABO + RH BLD: NORMAL
BLD GP AB SCN SERPL QL: NORMAL
BLOOD BANK CMNT PATIENT-IMP: NORMAL
GLUCOSE BLDC GLUCOMTR-MCNC: 85 MG/DL (ref 70–99)
SPECIMEN EXP DATE BLD: NORMAL

## 2021-07-08 PROCEDURE — 250N000009 HC RX 250: Performed by: NURSE ANESTHETIST, CERTIFIED REGISTERED

## 2021-07-08 PROCEDURE — 999N000015 HC STATISTIC ARTERIAL MONITORING DAILY

## 2021-07-08 PROCEDURE — 258N000003 HC RX IP 258 OP 636: Performed by: OTOLARYNGOLOGY

## 2021-07-08 PROCEDURE — 120N000002 HC R&B MED SURG/OB UMMC

## 2021-07-08 PROCEDURE — 258N000003 HC RX IP 258 OP 636: Performed by: NURSE ANESTHETIST, CERTIFIED REGISTERED

## 2021-07-08 PROCEDURE — 999N001017 HC STATISTIC GLUCOSE BY METER IP

## 2021-07-08 PROCEDURE — 07T20ZZ RESECTION OF LEFT NECK LYMPHATIC, OPEN APPROACH: ICD-10-PCS | Performed by: OTOLARYNGOLOGY

## 2021-07-08 PROCEDURE — 250N000011 HC RX IP 250 OP 636: Performed by: OTOLARYNGOLOGY

## 2021-07-08 PROCEDURE — 3E0G76Z INTRODUCTION OF NUTRITIONAL SUBSTANCE INTO UPPER GI, VIA NATURAL OR ARTIFICIAL OPENING: ICD-10-PCS | Performed by: THORACIC SURGERY (CARDIOTHORACIC VASCULAR SURGERY)

## 2021-07-08 PROCEDURE — 86850 RBC ANTIBODY SCREEN: CPT | Performed by: NURSE PRACTITIONER

## 2021-07-08 PROCEDURE — 250N000009 HC RX 250: Performed by: OTOLARYNGOLOGY

## 2021-07-08 PROCEDURE — 0DH63UZ INSERTION OF FEEDING DEVICE INTO STOMACH, PERCUTANEOUS APPROACH: ICD-10-PCS | Performed by: OTOLARYNGOLOGY

## 2021-07-08 PROCEDURE — 88331 PATH CONSLTJ SURG 1 BLK 1SPC: CPT | Mod: 26 | Performed by: PATHOLOGY

## 2021-07-08 PROCEDURE — 36415 COLL VENOUS BLD VENIPUNCTURE: CPT | Performed by: NURSE PRACTITIONER

## 2021-07-08 PROCEDURE — 250N000011 HC RX IP 250 OP 636: Performed by: ANESTHESIOLOGY

## 2021-07-08 PROCEDURE — 272N000001 HC OR GENERAL SUPPLY STERILE: Performed by: OTOLARYNGOLOGY

## 2021-07-08 PROCEDURE — 999N000157 HC STATISTIC RCP TIME EA 10 MIN

## 2021-07-08 PROCEDURE — C1769 GUIDE WIRE: HCPCS | Performed by: OTOLARYNGOLOGY

## 2021-07-08 PROCEDURE — 86900 BLOOD TYPING SEROLOGIC ABO: CPT | Performed by: NURSE PRACTITIONER

## 2021-07-08 PROCEDURE — 250N000011 HC RX IP 250 OP 636: Performed by: STUDENT IN AN ORGANIZED HEALTH CARE EDUCATION/TRAINING PROGRAM

## 2021-07-08 PROCEDURE — 88307 TISSUE EXAM BY PATHOLOGIST: CPT | Mod: 26 | Performed by: PATHOLOGY

## 2021-07-08 PROCEDURE — 250N000011 HC RX IP 250 OP 636: Performed by: NURSE ANESTHETIST, CERTIFIED REGISTERED

## 2021-07-08 PROCEDURE — 88311 DECALCIFY TISSUE: CPT | Mod: TC | Performed by: OTOLARYNGOLOGY

## 2021-07-08 PROCEDURE — 360N000077 HC SURGERY LEVEL 4, PER MIN: Performed by: OTOLARYNGOLOGY

## 2021-07-08 PROCEDURE — 0CJS8ZZ INSPECTION OF LARYNX, VIA NATURAL OR ARTIFICIAL OPENING ENDOSCOPIC: ICD-10-PCS | Performed by: OTOLARYNGOLOGY

## 2021-07-08 PROCEDURE — 250N000013 HC RX MED GY IP 250 OP 250 PS 637: Performed by: NURSE ANESTHETIST, CERTIFIED REGISTERED

## 2021-07-08 PROCEDURE — 88331 PATH CONSLTJ SURG 1 BLK 1SPC: CPT | Mod: TC | Performed by: OTOLARYNGOLOGY

## 2021-07-08 PROCEDURE — 88311 DECALCIFY TISSUE: CPT | Mod: 26 | Performed by: PATHOLOGY

## 2021-07-08 PROCEDURE — 07T10ZZ RESECTION OF RIGHT NECK LYMPHATIC, OPEN APPROACH: ICD-10-PCS | Performed by: OTOLARYNGOLOGY

## 2021-07-08 PROCEDURE — 999N000141 HC STATISTIC PRE-PROCEDURE NURSING ASSESSMENT: Performed by: OTOLARYNGOLOGY

## 2021-07-08 PROCEDURE — 88307 TISSUE EXAM BY PATHOLOGIST: CPT | Mod: TC | Performed by: OTOLARYNGOLOGY

## 2021-07-08 PROCEDURE — 250N000025 HC SEVOFLURANE, PER MIN: Performed by: OTOLARYNGOLOGY

## 2021-07-08 PROCEDURE — 86901 BLOOD TYPING SEROLOGIC RH(D): CPT | Performed by: NURSE PRACTITIONER

## 2021-07-08 PROCEDURE — 710N000010 HC RECOVERY PHASE 1, LEVEL 2, PER MIN: Performed by: OTOLARYNGOLOGY

## 2021-07-08 PROCEDURE — 88305 TISSUE EXAM BY PATHOLOGIST: CPT | Mod: 26 | Performed by: PATHOLOGY

## 2021-07-08 PROCEDURE — 0CTS0ZZ RESECTION OF LARYNX, OPEN APPROACH: ICD-10-PCS | Performed by: OTOLARYNGOLOGY

## 2021-07-08 PROCEDURE — 88305 TISSUE EXAM BY PATHOLOGIST: CPT | Mod: TC | Performed by: OTOLARYNGOLOGY

## 2021-07-08 PROCEDURE — 370N000017 HC ANESTHESIA TECHNICAL FEE, PER MIN: Performed by: OTOLARYNGOLOGY

## 2021-07-08 RX ORDER — OXYCODONE HYDROCHLORIDE 10 MG/1
10 TABLET ORAL EVERY 4 HOURS PRN
Status: DISCONTINUED | OUTPATIENT
Start: 2021-07-08 | End: 2021-07-17 | Stop reason: HOSPADM

## 2021-07-08 RX ORDER — ALBUTEROL SULFATE 0.83 MG/ML
2.5 SOLUTION RESPIRATORY (INHALATION) EVERY 4 HOURS PRN
Status: DISCONTINUED | OUTPATIENT
Start: 2021-07-08 | End: 2021-07-08 | Stop reason: HOSPADM

## 2021-07-08 RX ORDER — SODIUM CHLORIDE, SODIUM LACTATE, POTASSIUM CHLORIDE, CALCIUM CHLORIDE 600; 310; 30; 20 MG/100ML; MG/100ML; MG/100ML; MG/100ML
INJECTION, SOLUTION INTRAVENOUS CONTINUOUS
Status: DISCONTINUED | OUTPATIENT
Start: 2021-07-08 | End: 2021-07-10

## 2021-07-08 RX ORDER — PANTOPRAZOLE SODIUM 40 MG/1
40 TABLET, DELAYED RELEASE ORAL
Qty: 90 TABLET | Refills: 2 | Status: SHIPPED | OUTPATIENT
Start: 2021-07-08 | End: 2021-08-09

## 2021-07-08 RX ORDER — AMPICILLIN AND SULBACTAM 2; 1 G/1; G/1
3 INJECTION, POWDER, FOR SOLUTION INTRAMUSCULAR; INTRAVENOUS EVERY 6 HOURS
Status: COMPLETED | OUTPATIENT
Start: 2021-07-08 | End: 2021-07-10

## 2021-07-08 RX ORDER — FENTANYL CITRATE 50 UG/ML
INJECTION, SOLUTION INTRAMUSCULAR; INTRAVENOUS PRN
Status: DISCONTINUED | OUTPATIENT
Start: 2021-07-08 | End: 2021-07-08

## 2021-07-08 RX ORDER — NALOXONE HYDROCHLORIDE 0.4 MG/ML
0.4 INJECTION, SOLUTION INTRAMUSCULAR; INTRAVENOUS; SUBCUTANEOUS
Status: ACTIVE | OUTPATIENT
Start: 2021-07-08 | End: 2021-07-09

## 2021-07-08 RX ORDER — LIDOCAINE HYDROCHLORIDE 20 MG/ML
INJECTION, SOLUTION INFILTRATION; PERINEURAL PRN
Status: DISCONTINUED | OUTPATIENT
Start: 2021-07-08 | End: 2021-07-08

## 2021-07-08 RX ORDER — EPHEDRINE SULFATE 50 MG/ML
INJECTION, SOLUTION INTRAMUSCULAR; INTRAVENOUS; SUBCUTANEOUS PRN
Status: DISCONTINUED | OUTPATIENT
Start: 2021-07-08 | End: 2021-07-08

## 2021-07-08 RX ORDER — AMOXICILLIN 250 MG
1 CAPSULE ORAL 2 TIMES DAILY
Status: DISCONTINUED | OUTPATIENT
Start: 2021-07-08 | End: 2021-07-17 | Stop reason: HOSPADM

## 2021-07-08 RX ORDER — ONDANSETRON 2 MG/ML
4 INJECTION INTRAMUSCULAR; INTRAVENOUS EVERY 6 HOURS
Status: COMPLETED | OUTPATIENT
Start: 2021-07-08 | End: 2021-07-10

## 2021-07-08 RX ORDER — FENTANYL CITRATE 50 UG/ML
25-50 INJECTION, SOLUTION INTRAMUSCULAR; INTRAVENOUS
Status: DISCONTINUED | OUTPATIENT
Start: 2021-07-08 | End: 2021-07-08 | Stop reason: HOSPADM

## 2021-07-08 RX ORDER — HYDROMORPHONE HCL IN WATER/PF 6 MG/30 ML
0.4 PATIENT CONTROLLED ANALGESIA SYRINGE INTRAVENOUS
Status: DISCONTINUED | OUTPATIENT
Start: 2021-07-08 | End: 2021-07-10

## 2021-07-08 RX ORDER — ONDANSETRON 2 MG/ML
4 INJECTION INTRAMUSCULAR; INTRAVENOUS EVERY 6 HOURS PRN
Status: DISCONTINUED | OUTPATIENT
Start: 2021-07-08 | End: 2021-07-08

## 2021-07-08 RX ORDER — ONDANSETRON 2 MG/ML
4 INJECTION INTRAMUSCULAR; INTRAVENOUS EVERY 6 HOURS
Status: CANCELLED | OUTPATIENT
Start: 2021-07-08 | End: 2021-07-10

## 2021-07-08 RX ORDER — DEXMEDETOMIDINE HYDROCHLORIDE 4 UG/ML
0.2-1.2 INJECTION, SOLUTION INTRAVENOUS CONTINUOUS
Status: DISCONTINUED | OUTPATIENT
Start: 2021-07-08 | End: 2021-07-08 | Stop reason: HOSPADM

## 2021-07-08 RX ORDER — ONDANSETRON 2 MG/ML
4 INJECTION INTRAMUSCULAR; INTRAVENOUS EVERY 6 HOURS
Status: DISCONTINUED | OUTPATIENT
Start: 2021-07-08 | End: 2021-07-08

## 2021-07-08 RX ORDER — HYDROMORPHONE HYDROCHLORIDE 1 MG/ML
.3-.5 INJECTION, SOLUTION INTRAMUSCULAR; INTRAVENOUS; SUBCUTANEOUS EVERY 5 MIN PRN
Status: DISCONTINUED | OUTPATIENT
Start: 2021-07-08 | End: 2021-07-08 | Stop reason: HOSPADM

## 2021-07-08 RX ORDER — OXYCODONE HYDROCHLORIDE 5 MG/1
5 TABLET ORAL EVERY 4 HOURS PRN
Status: DISCONTINUED | OUTPATIENT
Start: 2021-07-08 | End: 2021-07-17 | Stop reason: HOSPADM

## 2021-07-08 RX ORDER — LABETALOL HYDROCHLORIDE 5 MG/ML
10 INJECTION, SOLUTION INTRAVENOUS
Status: DISCONTINUED | OUTPATIENT
Start: 2021-07-08 | End: 2021-07-08 | Stop reason: HOSPADM

## 2021-07-08 RX ORDER — NALOXONE HYDROCHLORIDE 0.4 MG/ML
0.2 INJECTION, SOLUTION INTRAMUSCULAR; INTRAVENOUS; SUBCUTANEOUS
Status: ACTIVE | OUTPATIENT
Start: 2021-07-08 | End: 2021-07-09

## 2021-07-08 RX ORDER — ONDANSETRON 2 MG/ML
INJECTION INTRAMUSCULAR; INTRAVENOUS PRN
Status: DISCONTINUED | OUTPATIENT
Start: 2021-07-08 | End: 2021-07-08

## 2021-07-08 RX ORDER — PROPOFOL 10 MG/ML
INJECTION, EMULSION INTRAVENOUS PRN
Status: DISCONTINUED | OUTPATIENT
Start: 2021-07-08 | End: 2021-07-08

## 2021-07-08 RX ORDER — PANTOPRAZOLE SODIUM 40 MG/1
40 TABLET, DELAYED RELEASE ORAL
Status: DISCONTINUED | OUTPATIENT
Start: 2021-07-09 | End: 2021-07-08

## 2021-07-08 RX ORDER — SODIUM CHLORIDE, SODIUM LACTATE, POTASSIUM CHLORIDE, CALCIUM CHLORIDE 600; 310; 30; 20 MG/100ML; MG/100ML; MG/100ML; MG/100ML
INJECTION, SOLUTION INTRAVENOUS CONTINUOUS PRN
Status: DISCONTINUED | OUTPATIENT
Start: 2021-07-08 | End: 2021-07-08

## 2021-07-08 RX ORDER — ONDANSETRON 4 MG/1
4 TABLET, ORALLY DISINTEGRATING ORAL EVERY 30 MIN PRN
Status: DISCONTINUED | OUTPATIENT
Start: 2021-07-08 | End: 2021-07-08 | Stop reason: HOSPADM

## 2021-07-08 RX ORDER — PHENYLEPHRINE HCL IN 0.9% NACL 50MG/250ML
.1-1 PLASTIC BAG, INJECTION (ML) INTRAVENOUS CONTINUOUS
Status: DISCONTINUED | OUTPATIENT
Start: 2021-07-08 | End: 2021-07-08 | Stop reason: HOSPADM

## 2021-07-08 RX ORDER — METOPROLOL TARTRATE 25 MG/1
25 TABLET, FILM COATED ORAL 2 TIMES DAILY
Status: DISCONTINUED | OUTPATIENT
Start: 2021-07-08 | End: 2021-07-17 | Stop reason: HOSPADM

## 2021-07-08 RX ORDER — ONDANSETRON 2 MG/ML
4 INJECTION INTRAMUSCULAR; INTRAVENOUS EVERY 30 MIN PRN
Status: DISCONTINUED | OUTPATIENT
Start: 2021-07-08 | End: 2021-07-08 | Stop reason: HOSPADM

## 2021-07-08 RX ORDER — PANTOPRAZOLE SODIUM 40 MG/1
40 TABLET, DELAYED RELEASE ORAL
Status: DISCONTINUED | OUTPATIENT
Start: 2021-07-09 | End: 2021-07-14

## 2021-07-08 RX ORDER — ONDANSETRON 2 MG/ML
4 INJECTION INTRAMUSCULAR; INTRAVENOUS EVERY 6 HOURS PRN
Status: DISCONTINUED | OUTPATIENT
Start: 2021-07-08 | End: 2021-07-17 | Stop reason: HOSPADM

## 2021-07-08 RX ORDER — PROCHLORPERAZINE MALEATE 5 MG
5 TABLET ORAL EVERY 6 HOURS PRN
Status: DISCONTINUED | OUTPATIENT
Start: 2021-07-08 | End: 2021-07-17 | Stop reason: HOSPADM

## 2021-07-08 RX ORDER — BISACODYL 10 MG
10 SUPPOSITORY, RECTAL RECTAL DAILY PRN
Status: DISCONTINUED | OUTPATIENT
Start: 2021-07-08 | End: 2021-07-14

## 2021-07-08 RX ORDER — DOCUSATE SODIUM 100 MG/1
100 CAPSULE, LIQUID FILLED ORAL 2 TIMES DAILY
Status: DISCONTINUED | OUTPATIENT
Start: 2021-07-08 | End: 2021-07-10

## 2021-07-08 RX ORDER — HYDRALAZINE HYDROCHLORIDE 20 MG/ML
2.5-5 INJECTION INTRAMUSCULAR; INTRAVENOUS EVERY 10 MIN PRN
Status: DISCONTINUED | OUTPATIENT
Start: 2021-07-08 | End: 2021-07-08 | Stop reason: HOSPADM

## 2021-07-08 RX ORDER — OXYCODONE HYDROCHLORIDE 5 MG/1
5 TABLET ORAL EVERY 4 HOURS PRN
Status: DISCONTINUED | OUTPATIENT
Start: 2021-07-08 | End: 2021-07-09

## 2021-07-08 RX ORDER — DEXAMETHASONE SODIUM PHOSPHATE 10 MG/ML
10 INJECTION, SOLUTION INTRAMUSCULAR; INTRAVENOUS ONCE
Status: COMPLETED | OUTPATIENT
Start: 2021-07-08 | End: 2021-07-08

## 2021-07-08 RX ORDER — SODIUM CHLORIDE, SODIUM LACTATE, POTASSIUM CHLORIDE, CALCIUM CHLORIDE 600; 310; 30; 20 MG/100ML; MG/100ML; MG/100ML; MG/100ML
INJECTION, SOLUTION INTRAVENOUS CONTINUOUS
Status: DISCONTINUED | OUTPATIENT
Start: 2021-07-08 | End: 2021-07-08 | Stop reason: HOSPADM

## 2021-07-08 RX ORDER — AMPICILLIN AND SULBACTAM 2; 1 G/1; G/1
3 INJECTION, POWDER, FOR SOLUTION INTRAMUSCULAR; INTRAVENOUS
Status: COMPLETED | OUTPATIENT
Start: 2021-07-08 | End: 2021-07-08

## 2021-07-08 RX ORDER — ESMOLOL HYDROCHLORIDE 10 MG/ML
INJECTION INTRAVENOUS PRN
Status: DISCONTINUED | OUTPATIENT
Start: 2021-07-08 | End: 2021-07-08

## 2021-07-08 RX ORDER — ACETAMINOPHEN 160 MG
TABLET,DISINTEGRATING ORAL PRN
Status: DISCONTINUED | OUTPATIENT
Start: 2021-07-08 | End: 2021-07-08 | Stop reason: HOSPADM

## 2021-07-08 RX ORDER — ACETAMINOPHEN 325 MG/1
975 TABLET ORAL EVERY 8 HOURS
Status: DISCONTINUED | OUTPATIENT
Start: 2021-07-08 | End: 2021-07-09

## 2021-07-08 RX ORDER — AMPICILLIN AND SULBACTAM 1; .5 G/1; G/1
1.5 INJECTION, POWDER, FOR SOLUTION INTRAMUSCULAR; INTRAVENOUS SEE ADMIN INSTRUCTIONS
Status: DISCONTINUED | OUTPATIENT
Start: 2021-07-08 | End: 2021-07-08 | Stop reason: HOSPADM

## 2021-07-08 RX ORDER — POLYETHYLENE GLYCOL 3350 17 G/17G
17 POWDER, FOR SOLUTION ORAL DAILY
Status: DISCONTINUED | OUTPATIENT
Start: 2021-07-09 | End: 2021-07-17 | Stop reason: HOSPADM

## 2021-07-08 RX ORDER — HYDROMORPHONE HCL IN WATER/PF 6 MG/30 ML
0.2 PATIENT CONTROLLED ANALGESIA SYRINGE INTRAVENOUS
Status: DISCONTINUED | OUTPATIENT
Start: 2021-07-08 | End: 2021-07-10

## 2021-07-08 RX ORDER — ACETAMINOPHEN 325 MG/1
650 TABLET ORAL EVERY 4 HOURS PRN
Status: DISCONTINUED | OUTPATIENT
Start: 2021-07-11 | End: 2021-07-09

## 2021-07-08 RX ORDER — ONDANSETRON 4 MG/1
4 TABLET, ORALLY DISINTEGRATING ORAL EVERY 6 HOURS PRN
Status: DISCONTINUED | OUTPATIENT
Start: 2021-07-08 | End: 2021-07-17 | Stop reason: HOSPADM

## 2021-07-08 RX ADMIN — SODIUM CHLORIDE, POTASSIUM CHLORIDE, SODIUM LACTATE AND CALCIUM CHLORIDE: 600; 310; 30; 20 INJECTION, SOLUTION INTRAVENOUS at 08:15

## 2021-07-08 RX ADMIN — PHENYLEPHRINE HYDROCHLORIDE 100 MCG: 10 INJECTION INTRAVENOUS at 10:28

## 2021-07-08 RX ADMIN — AMPICILLIN AND SULBACTAM 1.5 G: 1; .5 INJECTION, POWDER, FOR SOLUTION INTRAMUSCULAR; INTRAVENOUS at 12:10

## 2021-07-08 RX ADMIN — ONDANSETRON 4 MG: 2 INJECTION INTRAMUSCULAR; INTRAVENOUS at 15:17

## 2021-07-08 RX ADMIN — ONDANSETRON 4 MG: 2 INJECTION INTRAMUSCULAR; INTRAVENOUS at 16:46

## 2021-07-08 RX ADMIN — ROCURONIUM BROMIDE 20 MG: 10 INJECTION INTRAVENOUS at 12:38

## 2021-07-08 RX ADMIN — HYDROMORPHONE HYDROCHLORIDE 0.5 MG: 1 INJECTION, SOLUTION INTRAMUSCULAR; INTRAVENOUS; SUBCUTANEOUS at 16:55

## 2021-07-08 RX ADMIN — HYDROMORPHONE HYDROCHLORIDE 0.4 MG: 0.2 INJECTION, SOLUTION INTRAMUSCULAR; INTRAVENOUS; SUBCUTANEOUS at 20:48

## 2021-07-08 RX ADMIN — LIDOCAINE HYDROCHLORIDE 100 MG: 20 INJECTION, SOLUTION INFILTRATION; PERINEURAL at 08:05

## 2021-07-08 RX ADMIN — PHENYLEPHRINE HYDROCHLORIDE 100 MCG: 10 INJECTION INTRAVENOUS at 10:34

## 2021-07-08 RX ADMIN — POLYETHYLENE GLYCOL 400 AND PROPYLENE GLYCOL 1 DROP: 4; 3 SOLUTION/ DROPS OPHTHALMIC at 08:10

## 2021-07-08 RX ADMIN — DEXMEDETOMIDINE 0.3 MCG/KG/HR: 100 INJECTION, SOLUTION, CONCENTRATE INTRAVENOUS at 09:08

## 2021-07-08 RX ADMIN — FENTANYL CITRATE 50 MCG: 50 INJECTION, SOLUTION INTRAMUSCULAR; INTRAVENOUS at 08:39

## 2021-07-08 RX ADMIN — PHENYLEPHRINE HYDROCHLORIDE 100 MCG: 10 INJECTION INTRAVENOUS at 09:45

## 2021-07-08 RX ADMIN — PHENYLEPHRINE HYDROCHLORIDE 100 MCG: 10 INJECTION INTRAVENOUS at 10:48

## 2021-07-08 RX ADMIN — MIDAZOLAM 1 MG: 1 INJECTION INTRAMUSCULAR; INTRAVENOUS at 07:48

## 2021-07-08 RX ADMIN — HYDROMORPHONE HYDROCHLORIDE 0.5 MG: 1 INJECTION, SOLUTION INTRAMUSCULAR; INTRAVENOUS; SUBCUTANEOUS at 18:25

## 2021-07-08 RX ADMIN — PHENYLEPHRINE HYDROCHLORIDE 100 MCG: 10 INJECTION INTRAVENOUS at 10:16

## 2021-07-08 RX ADMIN — ROCURONIUM BROMIDE 10 MG: 10 INJECTION INTRAVENOUS at 13:31

## 2021-07-08 RX ADMIN — FENTANYL CITRATE 25 MCG: 50 INJECTION, SOLUTION INTRAMUSCULAR; INTRAVENOUS at 16:46

## 2021-07-08 RX ADMIN — AMPICILLIN SODIUM AND SULBACTAM SODIUM 3 G: 2; 1 INJECTION, POWDER, FOR SOLUTION INTRAMUSCULAR; INTRAVENOUS at 08:12

## 2021-07-08 RX ADMIN — AMPICILLIN AND SULBACTAM 1.5 G: 1; .5 INJECTION, POWDER, FOR SOLUTION INTRAMUSCULAR; INTRAVENOUS at 10:10

## 2021-07-08 RX ADMIN — PHENYLEPHRINE HYDROCHLORIDE 100 MCG: 10 INJECTION INTRAVENOUS at 08:55

## 2021-07-08 RX ADMIN — AMPICILLIN AND SULBACTAM 1.5 G: 1; .5 INJECTION, POWDER, FOR SOLUTION INTRAMUSCULAR; INTRAVENOUS at 14:09

## 2021-07-08 RX ADMIN — REMIFENTANIL HYDROCHLORIDE 0.1 MCG/KG/MIN: 1 INJECTION, POWDER, LYOPHILIZED, FOR SOLUTION INTRAVENOUS at 10:10

## 2021-07-08 RX ADMIN — ROCURONIUM BROMIDE 50 MG: 10 INJECTION INTRAVENOUS at 08:07

## 2021-07-08 RX ADMIN — FENTANYL CITRATE 200 MCG: 50 INJECTION, SOLUTION INTRAMUSCULAR; INTRAVENOUS at 08:05

## 2021-07-08 RX ADMIN — AMPICILLIN SODIUM AND SULBACTAM SODIUM 3 G: 2; 1 INJECTION, POWDER, FOR SOLUTION INTRAMUSCULAR; INTRAVENOUS at 22:13

## 2021-07-08 RX ADMIN — FENTANYL CITRATE 50 MCG: 50 INJECTION, SOLUTION INTRAMUSCULAR; INTRAVENOUS at 09:38

## 2021-07-08 RX ADMIN — FENTANYL CITRATE 50 MCG: 50 INJECTION, SOLUTION INTRAMUSCULAR; INTRAVENOUS at 16:12

## 2021-07-08 RX ADMIN — PROPOFOL 50 MG: 10 INJECTION, EMULSION INTRAVENOUS at 08:09

## 2021-07-08 RX ADMIN — PHENYLEPHRINE HYDROCHLORIDE 100 MCG: 10 INJECTION INTRAVENOUS at 09:49

## 2021-07-08 RX ADMIN — FENTANYL CITRATE 25 MCG: 50 INJECTION, SOLUTION INTRAMUSCULAR; INTRAVENOUS at 17:42

## 2021-07-08 RX ADMIN — Medication 5 MG: at 10:38

## 2021-07-08 RX ADMIN — FENTANYL CITRATE 50 MCG: 50 INJECTION, SOLUTION INTRAMUSCULAR; INTRAVENOUS at 17:14

## 2021-07-08 RX ADMIN — FENTANYL CITRATE 50 MCG: 50 INJECTION, SOLUTION INTRAMUSCULAR; INTRAVENOUS at 10:07

## 2021-07-08 RX ADMIN — PROPOFOL 150 MG: 10 INJECTION, EMULSION INTRAVENOUS at 08:06

## 2021-07-08 RX ADMIN — SODIUM CHLORIDE, POTASSIUM CHLORIDE, SODIUM LACTATE AND CALCIUM CHLORIDE: 600; 310; 30; 20 INJECTION, SOLUTION INTRAVENOUS at 14:14

## 2021-07-08 RX ADMIN — ESMOLOL HYDROCHLORIDE 20 MG: 10 INJECTION, SOLUTION INTRAVENOUS at 08:26

## 2021-07-08 RX ADMIN — ROCURONIUM BROMIDE 10 MG: 10 INJECTION INTRAVENOUS at 14:37

## 2021-07-08 RX ADMIN — ROCURONIUM BROMIDE 10 MG: 10 INJECTION INTRAVENOUS at 14:25

## 2021-07-08 RX ADMIN — SUGAMMADEX 200 MG: 100 INJECTION, SOLUTION INTRAVENOUS at 15:46

## 2021-07-08 RX ADMIN — FENTANYL CITRATE 100 MCG: 50 INJECTION, SOLUTION INTRAMUSCULAR; INTRAVENOUS at 09:06

## 2021-07-08 RX ADMIN — FENTANYL CITRATE 50 MCG: 50 INJECTION, SOLUTION INTRAMUSCULAR; INTRAVENOUS at 09:17

## 2021-07-08 RX ADMIN — HYDROMORPHONE HYDROCHLORIDE 0.5 MG: 1 INJECTION, SOLUTION INTRAMUSCULAR; INTRAVENOUS; SUBCUTANEOUS at 17:41

## 2021-07-08 RX ADMIN — ENOXAPARIN SODIUM 40 MG: 40 INJECTION SUBCUTANEOUS at 08:42

## 2021-07-08 RX ADMIN — PHENYLEPHRINE HYDROCHLORIDE 100 MCG: 10 INJECTION INTRAVENOUS at 10:30

## 2021-07-08 RX ADMIN — SODIUM CHLORIDE, POTASSIUM CHLORIDE, SODIUM LACTATE AND CALCIUM CHLORIDE: 600; 310; 30; 20 INJECTION, SOLUTION INTRAVENOUS at 08:00

## 2021-07-08 RX ADMIN — ONDANSETRON 4 MG: 2 INJECTION INTRAMUSCULAR; INTRAVENOUS at 22:09

## 2021-07-08 RX ADMIN — DEXAMETHASONE SODIUM PHOSPHATE 10 MG: 10 INJECTION, SOLUTION INTRAMUSCULAR; INTRAVENOUS at 08:40

## 2021-07-08 RX ADMIN — Medication 0.3 MCG/KG/MIN: at 10:50

## 2021-07-08 RX ADMIN — Medication 5 MG: at 10:48

## 2021-07-08 RX ADMIN — PHENYLEPHRINE HYDROCHLORIDE 100 MCG: 10 INJECTION INTRAVENOUS at 09:48

## 2021-07-08 RX ADMIN — PHENYLEPHRINE HYDROCHLORIDE 100 MCG: 10 INJECTION INTRAVENOUS at 09:53

## 2021-07-08 RX ADMIN — ROCURONIUM BROMIDE 30 MG: 10 INJECTION INTRAVENOUS at 12:34

## 2021-07-08 ASSESSMENT — MIFFLIN-ST. JEOR: SCORE: 1685.13

## 2021-07-08 ASSESSMENT — ACTIVITIES OF DAILY LIVING (ADL): ADLS_ACUITY_SCORE: 18

## 2021-07-08 NOTE — BRIEF OP NOTE
[facility]    Brief Operative Note    Pre-operative diagnosis: Malignant neoplasm of larynx (H) [C32.9]  Post-operative diagnosis Same as pre-operative diagnosis    Procedure: Procedure(s):  LARYNGOSCOPY  LARYNGECTOMY  bilateral neck dissections  INSERTION, PEG TUBE with Esophagogastroduodenoscopy  Surgeon: Surgeon(s) and Role:  Panel 1:     * Marilou Sethi MD - Primary     * Jeremias Jim MD - Resident - Assisting  Panel 2:     * Kg Montaño MD - Primary     * Raymond Lemon MD - Resident - Assisting  Anesthesia: General   Estimated blood loss: 200 ml  Drains: Jose-Jacobs  Specimens:   ID Type Source Tests Collected by Time Destination   A : Left Level 2A Tissue Other SURGICAL PATHOLOGY EXAM Marilou Sethi MD 7/8/2021 10:40 AM    B : Left Level 3 Tissue Other SURGICAL PATHOLOGY EXAM Marilou Sethi MD 7/8/2021 10:40 AM    C : Left Level 4 Tissue Other SURGICAL PATHOLOGY EXAM Marilou Sethi MD 7/8/2021 10:41 AM    D : Left Level 2B Tissue Other SURGICAL PATHOLOGY EXAM Marilou Sethi MD 7/8/2021 10:43 AM    E : Right Level 2a Tissue Neck SURGICAL PATHOLOGY EXAM Marilou Sethi MD 7/8/2021 11:59 AM    F : Right Level 3 Tissue Neck SURGICAL PATHOLOGY EXAM Marilou Sethi MD 7/8/2021 12:04 PM    G : Right Level 4 Tissue Neck SURGICAL PATHOLOGY EXAM Marilou Sethi MD 7/8/2021 12:07 PM    H : Right Level 2B Tissue Neck SURGICAL PATHOLOGY EXAM Marilou Sethi MD 7/8/2021 12:09 PM    I : Left Vallecula Mucosa Margin  Tissue Other SURGICAL PATHOLOGY EXAM Marilou Sethi MD 7/8/2021  1:10 PM    J : Right Vallecula Mucosa Margin  Tissue Other SURGICAL PATHOLOGY EXAM Marilou Sethi MD 7/8/2021  1:12 PM    K : Left Post Cricoid Mucosa Margin  Tissue Other SURGICAL PATHOLOGY EXAM Marilou Sethi MD 7/8/2021  1:12 PM    L : Right Post Cricoid Mucosa Margin  Tissue Other SURGICAL PATHOLOGY EXAM Marilou Sethi,  MD 7/8/2021  1:13 PM    M : Left Pharyngeal Wall Mucosa Margin  Tissue Other SURGICAL PATHOLOGY EXAM Marilou Sethi MD 7/8/2021  1:13 PM    N : Right Pharyngeal Wall Mucosa Margin  Tissue Other SURGICAL PATHOLOGY EXAM Marilou Sethi MD 7/8/2021  1:13 PM    O : Post Tracheal Wall Mucosa Margin  Tissue Other SURGICAL PATHOLOGY EXAM Marilou Sethi MD 7/8/2021  1:13 PM    P : Anterior Tracheal Wall Mucosa Margin  Tissue Other SURGICAL PATHOLOGY EXAM Marilou Sethi MD 7/8/2021  1:14 PM    Q : Total Laryngectomy  Tissue Other SURGICAL PATHOLOGY EXAM Marilou Sethi MD 7/8/2021  1:14 PM      Findings:   procedure as expected. 4 BRITTNI drains placed. .  Complications: None.  Implants: * No implants in log *

## 2021-07-08 NOTE — OP NOTE
Preoperative diagnosis:                        Laryngeal cancer  Postoperative diagnosis:                      As above    Procedure:   1. EGD  2. PEG insertion    Anesthesia: General   Surgeon: Kg Montaño   Resident surgeon: Raymond Lemon  EBL:  Less than 5 cc    Complications: none immediate  Findings:  PEG at 2 cm at the skin     Description of procedure  The patient was brought to the room and placed supine upon the table.  After confirming the patient's identity and the consent, appropriate monitoring devices were placed.  General anesthesia was administered and the patient was intubated.   The endoscope was passed into the posterior pharynx and into the esophagus without difficulty.   The GE junction was located at 40 cm from the incisors and there was no evidence of a hernia.  The stomach was easily entered.  The stomach was insufflated and the proposed PEG site showed 1 to 1 transmission of pressure.  The finder needle entered the stomach easily and a snare was used to pull the guide wire out of the mouth.  A 20 Fr PEG tube was then passed through the esophagus into the stomach and the PEG was secured with a bumper at 2 cm at the skin.  The PEG appeared appropriately placed against the stomach wall.  The air was aspirated and the endoscope was withdrawn.  A sterile dressing was placed.  The catheter was placed to gravity drainage.

## 2021-07-08 NOTE — H&P
SURGICAL ICU ADMISSION NOTE  7/8/2021    PRIMARY TEAM: Otolaryngology   PRIMARY PHYSICIAN: Marilou Sethi MD    REASON FOR CRITICAL CARE ADMISSION: Close hemodynamic monitoring and airway management s/p laryngectomy     ADMITTING PHYSICIAN: Jarrod Esquivel MD    ASSESSMENT: Raj Warren is a 71 y.o. male with PMH T2DM, HTN, GERD, anemia, recent bilateral pulmonary emboli w/ RV thrombus s/p pulmonary embolectomy, RV thrombectomy and IVC filter placement, and T4aN0 SCC of the larynx POD #0 s/p laryngoscopy, laryngectomy, bilateral neck dissections, and EGD w/ insertion of PEG tube.     PLAN:   Neuro/ pain/ sedation:  - Monitor neurological status. Notify the MD for any acute changes in exam.  - Precedex gtt   - Oxycodone PRN, Tylenol Rodney + PRN, Dilaudid IV PRN ***     Pulmonary care:   #Tracheostomy   - Trach dome.   - Supplemental oxygen to keep saturation above 92%.  - Tracheostomy initially performed 4/12/21.  - Albuterol q4 hr PRN      HEENT:  #SCC of the larynx s/p laryngectomy & bilateral neck dissections      - ***   - SLP consult for POD #1     Cardiovascular:    #HTN  - Monitor hemodynamic status.   - Goal MAP > 65   - Goal Systolic BP > 90 but < 160    - Labetalol prn for S BP >160   - PTA metoprolol     GI care:   #GERD  - NPO except ice chips and medications.  - PEG-J  - Bowel regimen   - Protonix    Fluids/ Electrolytes/ Nutrition:   - LR @ 100 ml/hr for IV fluid hydration  -ICU electrolyte replacement protocol***  -No indication for parenteral nutrition.  -Nutrition consulted. ***     Renal/ Fluid Balance:    -Urine output is adequate so far.  -Will continue to monitor intake and output.     Endocrine:    #T2DM  - DM well controlled (A1c 5.6), not on PTA insulin   - Sliding scale for diabetes management.  - Hypoglycemia protocol      ID/ Antibiotics:  - Halie-op Unasyn (end 7/10)     Heme:     #Pulmonary embolism and RV thrombus s/p IVC filter placement   #Anemia  - B/L pulmonary emboli and  RV thrombus s/p embolectomy, thrombectomy, and IVC filter placement in 04/2021.   - Hemoglobin 13.9 pre-op visit.   -***  -***  - Holding PTA Eliquis, bridged Lovenox prior to surgery      Prophylaxis:    - Mechanical prophylaxis for DVT.   - No chemical DVT prophylaxis due to high risk of bleeding. ***     MSK:    -PT and OT consulted.     Lines/ tubes/ drains:  - Trach  - PIV x2  - art line   - BRITTNI x4: L lateral neck, L medial neck, R lateral neck, R medial neck  - PEG-J  - Mera      Disposition:  -Surgical ICU.     Patient seen, findings and plan discussed with surgical ICU staff.    Phoebe Saez, MS4    - - - - - - - - - - - - - - - - - - - - - - - - - - - - - - - - - - - - - - - - - - - - - - - - - - - - - - - - - - - - - - - - - - - - - - - -     HISTORY PRESENTING ILLNESS:   Raj Warren is a 71 year old male with T4aN0 SCC of the larynx. He was initially diagnosed in 8/2019 but lost to follow up. He presented symptomatic to the ED in 4/2021 and found to have a large laryngeal mass on imaging, was intubated and admitted, and underwent an awake tracheostomy and DL with biopsies. He is now POD #0 s/p laryngoscopy, laryngectomy, bilateral neck dissections, and insertion of PEG tube w/ esophagogastroduodenoscopy.     REVIEW OF SYSTEMS: 10 point ROS neg other than the symptoms noted above in the HPI.    PAST MEDICAL HISTORY:    has a past medical history of Allergic state, Anemia, and Tracheostomy in place (H).    SURGICAL HISTORY:    has a past surgical history that includes Laryngoscopy (N/A, 4/12/2021); Tracheostomy (4/12/2021); Repair aneurysm ascending aorta (N/A, 4/28/2021); and IR IVC Filter Placement (4/30/2021).    SOCIAL HISTORY:    reports that he quit smoking about 32 years ago. His smoking use included cigarettes. He has never used smokeless tobacco. He reports that he does not drink alcohol or use drugs.    FAMILY HISTORY: Clotting issues in mother. No problems with anesthesia.     ALLERGIES:       Allergies   Allergen Reactions     Pollen Extract      MEDICATIONS:  No current facility-administered medications on file prior to encounter.   apixaban ANTICOAGULANT (ELIQUIS) 5 MG tablet, Take 1 tablet (5 mg) by mouth 2 times daily  metoprolol tartrate (LOPRESSOR) 25 MG tablet, Take 1 tablet (25 mg) by mouth 2 times daily  multivitamin (CENTRUM SILVER) tablet, Take 1 tablet by mouth daily      PHYSICAL EXAMINATION:  Temp:  [98.3  F (36.8  C)] 98.3  F (36.8  C)  Pulse:  [91] 91  Resp:  [20] 20  BP: (139)/(91) 139/91  SpO2:  [100 %] 100 %  @[facility] [place]@    LABS: Reviewed.   Arterial Blood Gases   No lab results found in last 7 days.  Complete Blood Count   No lab results found in last 7 days.  Basic Metabolic Panel  No lab results found in last 7 days.  Liver Function Tests  No lab results found in last 7 days.  Pancreatic Enzymes  No lab results found in last 7 days.  Coagulation Profile  No lab results found in last 7 days.  Lactate  Invalid input(s): LACTATE    IMAGING:  No results found for this or any previous visit (from the past 24 hour(s)).

## 2021-07-08 NOTE — PROGRESS NOTES
1640 ENT Dr. Jim paged with request for Post-Procedure orders, specifically Admit, Transfer, Fluids and any Lab orders; will continue to monitor.    1700 Post-Procedure Orders placed;

## 2021-07-08 NOTE — ANESTHESIA PROCEDURE NOTES
Airway       Patient location during procedure: OR  Staff -        Anesthesiologist:  Behrens, Christopher J, MD       Performed By: anesthesiologist  Consent for Airway        Urgency: elective  Indications and Patient Condition       Indications for airway management: cheryl-procedural       Induction type:intravenous       Mask difficulty assessment: 0 - not attempted    Final Airway Details       Final airway type: trach/surgical airway    Trach/Surgical Airway Details        Type: Tracheostomy       Brand: ETT (wire reinforced)       Style: Cuffed       Size: 7      Post intubation assessment        Placement verified by: capnometry, equal breath sounds and chest rise        Number of attempts at approach: 1       Secured with: sutures       Ease of procedure: easy       Dentition: Intact and Unchanged    Medication(s) Administered   Medication Administration Time: 7/8/2021 8:08 AM    Additional Comments       Existing 8.0 cuffless Shiley removed by ENT surgeon and resident.  Replaced with 7.0 wire reinforced ETT. Sutured to chest by surgeon.

## 2021-07-08 NOTE — TELEPHONE ENCOUNTER
Prescription approved per [facility] Refill Protocol.    Agnes Heredia RN  [facility]

## 2021-07-08 NOTE — TELEPHONE ENCOUNTER
Pantoprazole 40 mg tablets    Summary: Take 1 tablet (40 mg) by mouth every morning (before breakfast), Disp-30 tablet, R-0, E-Prescribe   Dose, Route, Frequency: 40 mg, Oral, EVERY MORNING BEFORE BREAKFAST  Start: 6/8/2021  Ord/Sold: 6/8/2021

## 2021-07-08 NOTE — ANESTHESIA CARE TRANSFER NOTE
Patient: Raj Warren    Procedure(s):  LARYNGOSCOPY  LARYNGECTOMY  bilateral neck dissections  INSERTION, PEG TUBE with Esophagogastroduodenoscopy    Diagnosis: Malignant neoplasm of larynx (H) [C32.9]  Diagnosis Additional Information: No value filed.    Anesthesia Type:   General     Note:    Oropharynx: oropharynx clear of all foreign objects and spontaneously breathing  Level of Consciousness: awake  Oxygen Supplementation: blow-by O2  Level of Supplemental Oxygen (L/min / FiO2): 2  Independent Airway: airway patency satisfactory and stable  Dentition: dentition unchanged  Vital Signs Stable: post-procedure vital signs reviewed and stable  Report to RN Given: handoff report given  Patient transferred to: PACU    Handoff Report: Identifed the Patient, Identified the Reponsible Provider, Reviewed the pertinent medical history, Discussed the surgical course, Reviewed Intra-OP anesthesia mangement and issues during anesthesia, Set expectations for post-procedure period and Allowed opportunity for questions and acknowledgement of understanding      Vitals: (Last set prior to Anesthesia Care Transfer)  CRNA VITALS  7/8/2021 1533 - 7/8/2021 1619      7/8/2021             NIBP:  115/79    Pulse:  86    NIBP Mean:  92    ART BP:  119/66    ART Mean:  86    Temp:  37.1  C (98.8  F)    SpO2:  100 %    Resp Rate (observed):  18        Electronically Signed By: TARUN Oliveira CRNA  July 8, 2021  4:19 PM

## 2021-07-08 NOTE — OP NOTE
Date of Procedure: 7/8/2021    Attending Physician: Marilou Sethi MD    Resident Physicians: Casey Jim MD    Procedure Performed:  Direct laryngoscopy  Total laryngectomy  Bilateral neck dissection (levels II-IV)  Cricopharyngeal myotomy    Preoperative Diagnosis:   1. Squamous cell carcinoma of larynx  2. Secondary malignancy of lymph nodes  3. History of pulmonary embolism  4. Chronic anticoagulation    Postoperative Diagnosis: same    Anesthesia: General    Blood loss: 100 cc    Specimens:   ID Type Source Tests Collected by Time Destination   A : Left Level 2A Tissue Other SURGICAL PATHOLOGY EXAM Marilou Sethi MD 7/8/2021 10:40 AM     B : Left Level 3 Tissue Other SURGICAL PATHOLOGY EXAM Marilou Sethi MD 7/8/2021 10:40 AM     C : Left Level 4 Tissue Other SURGICAL PATHOLOGY EXAM Marilou Sethi MD 7/8/2021 10:41 AM     D : Left Level 2B Tissue Other SURGICAL PATHOLOGY EXAM Marilou Sethi MD 7/8/2021 10:43 AM     E : Right Level 2a Tissue Neck SURGICAL PATHOLOGY EXAM Marilou Sethi MD 7/8/2021 11:59 AM     F : Right Level 3 Tissue Neck SURGICAL PATHOLOGY EXAM Marilou Sethi MD 7/8/2021 12:04 PM     G : Right Level 4 Tissue Neck SURGICAL PATHOLOGY EXAM Marilou Sethi MD 7/8/2021 12:07 PM     H : Right Level 2B Tissue Neck SURGICAL PATHOLOGY EXAM Marilou Sethi MD 7/8/2021 12:09 PM     I : Left Vallecula Mucosa Margin  Tissue Other SURGICAL PATHOLOGY EXAM Marilou Sethi MD 7/8/2021  1:10 PM     J : Right Vallecula Mucosa Margin  Tissue Other SURGICAL PATHOLOGY EXAM Marilou Sethi MD 7/8/2021  1:12 PM     K : Left Post Cricoid Mucosa Margin  Tissue Other SURGICAL PATHOLOGY EXAM Marilou Sethi MD 7/8/2021  1:12 PM     L : Right Post Cricoid Mucosa Margin  Tissue Other SURGICAL PATHOLOGY EXAM Marilou Sethi MD 7/8/2021  1:13 PM     M : Left Pharyngeal Wall Mucosa Margin  Tissue Other SURGICAL PATHOLOGY EXAM Marilou Sethi MD 7/8/2021  1:13 PM     N :  Right Pharyngeal Wall Mucosa Margin  Tissue Other SURGICAL PATHOLOGY EXAM Marilou Sethi MD 7/8/2021  1:13 PM     O : Post Tracheal Wall Mucosa Margin  Tissue Other SURGICAL PATHOLOGY EXAM Marilou Sethi MD 7/8/2021  1:13 PM     P : Anterior Tracheal Wall Mucosa Margin  Tissue Other SURGICAL PATHOLOGY EXAM Marilou Sethi MD 7/8/2021  1:14 PM     Q : Total Laryngectomy  Tissue Other SURGICAL PATHOLOGY EXAM Marilou Sethi MD 7/8/2021  1:14 PM        Implants:  BRITTNI drain x 4    Complications: None    Findings:  Submucosal tumor of the larynx with fixation of cords, narrowed glottis  Bilateral neck dissections performed levels II-IV with preservation of spinal accessory nerve, hypoglossal nerve, vagus nerve; no grossly abnormal nodes with just mildly enlarged bilateral level II nodes  Laryngectomy performed with negative margins circumferentially  No evidence of leak on testing of pharyngeal closure  Preservation of bilateral thyroid lobes, with preservation of superior thyroid pedicle    Indications:  Raj Warren is a 71 year old man with a squamous cell carcinoma of the larynx. The patient presented to the local ED with shortness of breath, along with symptoms of sore throat, bilateral ear pain. He had a CT scan performed on 4/12/2021 which demonstrated a 3.9 x 1.6 x 3.9 cm mass involving the right true cord, right AE fold, right false cord, posterior hypopharynx, proximal trachea, with involvement of the thyroid cartilage, involvement of the prelaryngeal tissue, no abnormal nodes. Dr Willoughby performed a scope exam, and patient was admitted for a trach and DL/bx by Dr Johnson and Dr Sauceda on 4/13/2021. Pathology showed moderately differentiated SCC. He was seen by Dr Bean of medical oncology on 4/13/2021. He was recommended for chemoradiation. He was seen by Dr Mclaughlin of [facility], planning 7 weeks treatment. He did have a VSS done while inpatient without evidence of aspiration. He had a PET scan  on 4/26/2021 which showed an FDG avid mass in the larynx involving the supraglottis/glottis/subglottis, hypermetabolic left level 4 node, FDG avid nodule in the right lung thought to be infectious/inflammatory. He was then admitted to the hospital locally from 4/27/2021 to 5/8/2021 after presenting with worsening shortness of breath, found to have bilateral PE with significant clot burden including the right pulmonary artery and the right ventricle. He was taken to the OR for pulmonary embolectomy and RV thrombectomy. He had IVC filter placed 4/30/2021. In June 2021 the patient was referred here for discussion of possible salvage laryngectomy after chemoradiation. After extensive preoperative counseling, the patient was recommended for a total laryngectomy for definitive management of the tumor.    Description of Procedure:  After informed consent was obtained, the patient was brought back to main operating room and placed in a supine position.  The bed had already been turned 180 degrees from anesthesia. General anesthesia was induced through the [place] and the tube was switched out to a 7.0 wire reinforced endotracheal tube. An arterial line and taylor were placed. Dr Montaño's team performed PEG tube placement (see separately dictated note).      The arms were tucked and a shoulder roll was placed. A brief time out was performed. The thermoplast dental guards were placed to protect the teeth. The Dedo laryngoscope was introduced and used to visualize the larynx. The patient had a normal appearing base of tongue and vallecula. The epiglottis was normal.  The pyriform sinuses and postcricoid area were normal. The patient had an endolaryngeal tumor with fixation of the cords. The scope and dental guards were removed.      The patient was then prepped and draped in sterile fashion for the planned surgical procedure.  A marking pen was used to gracia an apron incision in the neck with a 1 cm margin circumferenatially  around the tracheostomy site.  The incision was injected with 1% lidocaine with 1:100,000 epinephrine.  A #15 blade was used to make an incision through the skin down to the platysma.  The monopolar cautery was used to divide the platysma.  Subplatysmal flaps were raised superiorly up to the level of the hyoid medially and the mandible laterally.  Subplatysmal flaps were also raised inferiorly down past to the level of the clavicle.       We turned our attention to the left neck dissection.  The fascia was released off the SCM working from inferior to superior, and releasing it along its medial border.  We continued to release the fascia working toward the floor of the neck. The digastric muscle was identified inferior to the submandibular gland and traced posteriorly toward the mastoid tip, defining the superior border of our dissection. We continued to release the fascia of the SCM and the spinal accessory nerve was defined.  This was traced up superiorly towards its junction with the internal jugular vein. The lateral border of the internal jugular vein was defined. We then continued to release the fibrofatty contents of levels II through IV towards the floor of the neck and the cervical rootlets were defined. Grasping the contents of levels II through IV of the neck, we then released the specimen off the cervical rootlets along the floor of the neck using the monopolar cautery.  The omohyoid muscle was divided as it crossed the internal jugular vein. As we were releasing the specimen inferiorly in level IV, care was taken to clip the specimen in level IV to reduce the risk of a chyle leak.  We defined the inferior border of our dissection at the clavicle. The lateral border of the internal jugular vein was defined in level IV.  We then continued to release the fibrofatty contents of levels II through IV towards the carotid sheath. These were then bluntly dissected off the carotid artery and the internal jugular  vein working medially.  The transverse cervical vessels were preserved. We then defined the medial border of our dissection.  The monopolar cautery was used to release the fibrofatty contents off the medial border of the omohyoid. The fibrofatty contents along the medial border of the internal jugular vein were released to be taken in continuity with the neck dissection specimen. The superior thyroid pedicle was traced and preserved given the plans to preserve the thyroid. The neck dissection specimen was released off the internal jugular vein and then connected to the medial portion of the dissection. The specimen was divided into its respective level then handed off to nursing for permanent pathology. The spinal accessory nerve was freed circumferentially. An Allis clamp was used to grasp the contents of level IIB. The Metzenbaum scissors were used to release the contents of level IIB from the floor of the neck. The specimen was handed off to nursing for permanent pathology.      We then started to mobilize the larynx on the left side.  The carotid was bluntly dissected so it could be released off the laryngeal cartilage laterally. The midline raphe of the strap muscles was defined. The strap muscles were divided at the sternum on the left side. The strap muscles were dissected off the thyroid gland and retracted superiorly. The thyroid isthmus was divided on the left side of the tracheostomy site and the thyroid gland was reflected laterally. The superior thyroid pedicle was traced and preserved. The lateral border of the thyroid cartilage was identified and the constrictor muscles were released off the thyroid ala.  A [place] was used to release the mucosa on the posterior aspect of the cartilage so it could be preserved for use in reconstruction.  The suprahyoid musculature was released off the hyoid working medial to lateral.  The entire cornu of the hyoid was released until the left side of the larynx was fully  mobilized.      We then turned our attention to the right neck dissection. We started to release the fascia from the SCM and release it along its medial border. The inferior border of the submandibular gland was identified and released using monopolar cautery. From here we were able to identify the digastric muscle which defined the superior border of our dissection. This was traced posteriorly towards the mastoid tip.  The floor the neck was identified. We identified the spinal accessory nerve on the right side and this was traced superiorly towards the internal jugular vein. The lateral border of the internal jugular vein was identified. The spinal accessory nerve was freed up along its inferior border. We then continued to release the contents of levels II through IV along the floor of the neck. The cervical rootlets were identified. The omohyoid was divided as it crossed the lateral neck.  The inferior border of the dissection was defined at the clavicle. We then continued to release the fibrofatty contents of levels II through IV off the rootlets working towards the carotid sheath. We then continued to release the medial border of the dissection along the omohyoid and release it off of the anterior surface of the carotid sheath. Once again the superior thyroid pedicle was dissected out and preserved. The lateral and medial borders of the neck dissection were then connected. Once the specimen was completely released, it was divided into its respective levels and handed off to nursing for permanent pathology. The spinal accessory nerve was freed circumferentially with scissors. An Allis clamp was used to grasp the contents of level IIB and the Metzenbaum scissors were used to release the contents of level IIB from the floor of the neck. The specimen was handed off to nursing for permanent pathology. Hemostasis was achieved with bipolar cautery.      We then started to mobilize the larynx on the right side. The  medial border of the carotid artery was defined and dissected away from the trachea. The strap muscles were divided inferiorly down to the clavicle with the Harm[place]. The strap muscles were dissected off the thyroid gland reflected superiorly. The thyroid isthmus was divided on the right side of the tracheostomy and the thyroid gland was released off the trachea, reflecting it laterally. The thyroid gland was pedicled off the superior thyroid vessels which had been traced and preserved.  We then mobilized the remainder of the thyroid cartilage releasing the constrictor muscles. A freer was used to release the musculature off the posterior border of the cartilage to preserve as much mucosa as possible. The superior border of the hyoid bone was released on the right side. We then continued to release the rest of the suprahyoid musculature until the entire right side of the larynx was mobilized.     The anterior wall of the trachea was cleaned of any soft tissue below the level of the tracheostomy. The cuff was deflated on the ET tube and a 15 blade was used to make an incision approximately one tracheal rings below the trachestomy. The endotracheal tube was slowly withdrawn and Beveled cuts were made with curved scissors along the lateral tracheal walls. A wire reinforced endotracheal tube was placed in the stoma and end-tidal CO2 was verified.      A [place] retractor was placed within the oral cavity and down into the vallecula on the left side.  Monopolar cautery was used to enter the left vallecula.  An Allis clamp was used to grasp the epiglottis and retract it out of the larynx.  The superior cut was completed along the left vallecula towards the midline and then into the right vallecula.  At this point, we were able to better visualize the larynx and the monopolar cautery was used to make the pharyngeal cuts following the aryepiglottic folds, working from superior to inferiorly.  We then transitioned to our  transverse cut at the postcricoid space.  We then used the monopolar cautery to connect our superior laryngeal cuts down to the tracheal cut to release the entire laryngectomy specimen.  The anterior esophageal wall was left intact without any mucosal injuries. The laryngectomy specimen was inspected and the specimen appeared to have adequate gross margins. Mucosal margins were sent from the specimen including the lateral pharyngeal walls, base of tongue, postcricoid space, posterior and anterior tracheal wall and sent for frozen section.  All of these were ultimately negative for carcinoma.     A cricopharyngeal myotomy was performed along the anterior esophageal wall using a 15 blade.  The sternal heads of the sternocleidomastoid muscle were released bilaterally.  Hemostasis was ensured with bipolar cautery. Given the plans for postoperative anticoagulation considerable time was spent ensuring hemostasis. The stoma was then matured using 3-0 PDS in half mattress fashion along the anterior wall.       We then turned our attention to the pharyngeal closure. The neopharynx was closed in straight line fashion. A modified K[place] stitch was used to close the base of tongue to itself superiorly. Inferiorly a modified [place] stitch using a 3-0 vicryl was used to close the pharyngeal mucosa to itself. The closure was tested with dilute hydrogen peroxide which did not showed a leak.  A second layer of closure was performed along the entire base of tongue using the adjacent tongue base tissue and then the constrictors were used to reinforce the pharyngeal closure inferiorly.  We took care to ensure that the closure was not too tight to cause stricturing.      Hemostasis was again ensured in the neck. Multiple sustained valsalva maneuvers were performed. The neck was irrigated with 2 L of saline. Four BRITTNI drains are placed in the neck, 2 in each lateral neck gutter, and 2 in the medial neck along the pharynx.  The drains  were secured with a 3-0 nylon suture.  The incision was closed deeply with a buried interrupted 3-0 Vicryl.  The skin was then closed with a running 5-0 prolene.  The posterior wall of the stoma was matured to the neck skin using buried 3-0 Vicryl followed by a running 4-0 chromic.      The patient was handed back to Anesthesia for extubation and taken to the PACU in stable condition.  The patient tolerated the procedure well with no immediate complications.      I was present for and participated in the entire procedure.      Marilou Sethi MD    Department of Otolaryngology

## 2021-07-09 ENCOUNTER — APPOINTMENT (OUTPATIENT)
Dept: SPEECH THERAPY | Facility: CLINIC | Age: 71
DRG: 011 | End: 2021-07-09
Attending: OTOLARYNGOLOGY
Payer: COMMERCIAL

## 2021-07-09 ENCOUNTER — APPOINTMENT (OUTPATIENT)
Dept: OCCUPATIONAL THERAPY | Facility: CLINIC | Age: 71
DRG: 011 | End: 2021-07-09
Attending: OTOLARYNGOLOGY
Payer: COMMERCIAL

## 2021-07-09 LAB
ALBUMIN SERPL-MCNC: 2.9 G/DL (ref 3.4–5)
ANION GAP SERPL CALCULATED.3IONS-SCNC: 4 MMOL/L (ref 3–14)
BUN SERPL-MCNC: 9 MG/DL (ref 7–30)
CA-I BLD-MCNC: 4.4 MG/DL (ref 4.4–5.2)
CALCIUM SERPL-MCNC: 8.4 MG/DL (ref 8.5–10.1)
CHLORIDE SERPL-SCNC: 105 MMOL/L (ref 94–109)
CO2 SERPL-SCNC: 27 MMOL/L (ref 20–32)
CREAT SERPL-MCNC: 0.75 MG/DL (ref 0.66–1.25)
ERYTHROCYTE [DISTWIDTH] IN BLOOD BY AUTOMATED COUNT: 12.8 % (ref 10–15)
GFR SERPL CREATININE-BSD FRML MDRD: >90 ML/MIN/{1.73_M2}
GLUCOSE BLDC GLUCOMTR-MCNC: 113 MG/DL (ref 70–99)
GLUCOSE BLDC GLUCOMTR-MCNC: 95 MG/DL (ref 70–99)
GLUCOSE SERPL-MCNC: 107 MG/DL (ref 70–99)
HCT VFR BLD AUTO: 36.5 % (ref 40–53)
HGB BLD-MCNC: 12.1 G/DL (ref 13.3–17.7)
MAGNESIUM SERPL-MCNC: 2 MG/DL (ref 1.6–2.3)
MCH RBC QN AUTO: 29 PG (ref 26.5–33)
MCHC RBC AUTO-ENTMCNC: 33.2 G/DL (ref 31.5–36.5)
MCV RBC AUTO: 88 FL (ref 78–100)
PHOSPHATE SERPL-MCNC: 3.9 MG/DL (ref 2.5–4.5)
PLATELET # BLD AUTO: 260 10E9/L (ref 150–450)
POTASSIUM SERPL-SCNC: 3.8 MMOL/L (ref 3.4–5.3)
PREALB SERPL IA-MCNC: 12 MG/DL (ref 15–45)
PTH-INTACT SERPL-MCNC: 70 PG/ML (ref 18–80)
RBC # BLD AUTO: 4.17 10E12/L (ref 4.4–5.9)
SODIUM SERPL-SCNC: 136 MMOL/L (ref 133–144)
TSH SERPL DL<=0.005 MIU/L-ACNC: 1.1 MU/L (ref 0.4–4)
WBC # BLD AUTO: 11.1 10E9/L (ref 4–11)

## 2021-07-09 PROCEDURE — 999N000043 HC STATISTIC CTO2 CONT OXYGEN TECH TIME EA 90 MIN

## 2021-07-09 PROCEDURE — 250N000013 HC RX MED GY IP 250 OP 250 PS 637: Performed by: STUDENT IN AN ORGANIZED HEALTH CARE EDUCATION/TRAINING PROGRAM

## 2021-07-09 PROCEDURE — 85027 COMPLETE CBC AUTOMATED: CPT | Performed by: STUDENT IN AN ORGANIZED HEALTH CARE EDUCATION/TRAINING PROGRAM

## 2021-07-09 PROCEDURE — 250N000013 HC RX MED GY IP 250 OP 250 PS 637: Performed by: PHYSICIAN ASSISTANT

## 2021-07-09 PROCEDURE — 92507 TX SP LANG VOICE COMM INDIV: CPT | Mod: GN

## 2021-07-09 PROCEDURE — 999N000157 HC STATISTIC RCP TIME EA 10 MIN

## 2021-07-09 PROCEDURE — 250N000013 HC RX MED GY IP 250 OP 250 PS 637: Performed by: ANESTHESIOLOGY

## 2021-07-09 PROCEDURE — 83735 ASSAY OF MAGNESIUM: CPT | Performed by: STUDENT IN AN ORGANIZED HEALTH CARE EDUCATION/TRAINING PROGRAM

## 2021-07-09 PROCEDURE — 250N000011 HC RX IP 250 OP 636: Performed by: OTOLARYNGOLOGY

## 2021-07-09 PROCEDURE — 82330 ASSAY OF CALCIUM: CPT | Performed by: STUDENT IN AN ORGANIZED HEALTH CARE EDUCATION/TRAINING PROGRAM

## 2021-07-09 PROCEDURE — 250N000011 HC RX IP 250 OP 636: Performed by: STUDENT IN AN ORGANIZED HEALTH CARE EDUCATION/TRAINING PROGRAM

## 2021-07-09 PROCEDURE — 83970 ASSAY OF PARATHORMONE: CPT | Performed by: STUDENT IN AN ORGANIZED HEALTH CARE EDUCATION/TRAINING PROGRAM

## 2021-07-09 PROCEDURE — 84134 ASSAY OF PREALBUMIN: CPT | Performed by: STUDENT IN AN ORGANIZED HEALTH CARE EDUCATION/TRAINING PROGRAM

## 2021-07-09 PROCEDURE — 92522 EVALUATE SPEECH PRODUCTION: CPT | Mod: GN

## 2021-07-09 PROCEDURE — 999N001017 HC STATISTIC GLUCOSE BY METER IP

## 2021-07-09 PROCEDURE — 99223 1ST HOSP IP/OBS HIGH 75: CPT | Mod: GC | Performed by: INTERNAL MEDICINE

## 2021-07-09 PROCEDURE — 97110 THERAPEUTIC EXERCISES: CPT | Mod: GO | Performed by: OCCUPATIONAL THERAPIST

## 2021-07-09 PROCEDURE — 36415 COLL VENOUS BLD VENIPUNCTURE: CPT | Performed by: STUDENT IN AN ORGANIZED HEALTH CARE EDUCATION/TRAINING PROGRAM

## 2021-07-09 PROCEDURE — 97535 SELF CARE MNGMENT TRAINING: CPT | Mod: GO | Performed by: OCCUPATIONAL THERAPIST

## 2021-07-09 PROCEDURE — 258N000003 HC RX IP 258 OP 636: Performed by: STUDENT IN AN ORGANIZED HEALTH CARE EDUCATION/TRAINING PROGRAM

## 2021-07-09 PROCEDURE — 120N000002 HC R&B MED SURG/OB UMMC

## 2021-07-09 PROCEDURE — 250N000009 HC RX 250: Performed by: STUDENT IN AN ORGANIZED HEALTH CARE EDUCATION/TRAINING PROGRAM

## 2021-07-09 PROCEDURE — 97165 OT EVAL LOW COMPLEX 30 MIN: CPT | Mod: GO | Performed by: OCCUPATIONAL THERAPIST

## 2021-07-09 PROCEDURE — 84443 ASSAY THYROID STIM HORMONE: CPT | Performed by: STUDENT IN AN ORGANIZED HEALTH CARE EDUCATION/TRAINING PROGRAM

## 2021-07-09 PROCEDURE — 80069 RENAL FUNCTION PANEL: CPT | Performed by: STUDENT IN AN ORGANIZED HEALTH CARE EDUCATION/TRAINING PROGRAM

## 2021-07-09 PROCEDURE — 97530 THERAPEUTIC ACTIVITIES: CPT | Mod: GO | Performed by: OCCUPATIONAL THERAPIST

## 2021-07-09 RX ORDER — GINSENG 100 MG
CAPSULE ORAL 3 TIMES DAILY
Status: COMPLETED | OUTPATIENT
Start: 2021-07-09 | End: 2021-07-09

## 2021-07-09 RX ORDER — CHLORHEXIDINE GLUCONATE ORAL RINSE 1.2 MG/ML
15 SOLUTION DENTAL 4 TIMES DAILY
Status: DISCONTINUED | OUTPATIENT
Start: 2021-07-09 | End: 2021-07-17 | Stop reason: HOSPADM

## 2021-07-09 RX ORDER — DEXTROSE MONOHYDRATE 100 MG/ML
INJECTION, SOLUTION INTRAVENOUS CONTINUOUS PRN
Status: DISCONTINUED | OUTPATIENT
Start: 2021-07-09 | End: 2021-07-17 | Stop reason: HOSPADM

## 2021-07-09 RX ORDER — NICOTINE POLACRILEX 4 MG
15-30 LOZENGE BUCCAL
Status: DISCONTINUED | OUTPATIENT
Start: 2021-07-09 | End: 2021-07-17 | Stop reason: HOSPADM

## 2021-07-09 RX ORDER — DEXTROSE MONOHYDRATE 25 G/50ML
25-50 INJECTION, SOLUTION INTRAVENOUS
Status: DISCONTINUED | OUTPATIENT
Start: 2021-07-09 | End: 2021-07-17 | Stop reason: HOSPADM

## 2021-07-09 RX ORDER — ACETAMINOPHEN 325 MG/1
975 TABLET ORAL EVERY 8 HOURS
Status: DISCONTINUED | OUTPATIENT
Start: 2021-07-09 | End: 2021-07-15

## 2021-07-09 RX ADMIN — CHLORHEXIDINE GLUCONATE 15 ML: 1.2 RINSE ORAL at 15:26

## 2021-07-09 RX ADMIN — AMPICILLIN SODIUM AND SULBACTAM SODIUM 3 G: 2; 1 INJECTION, POWDER, FOR SOLUTION INTRAMUSCULAR; INTRAVENOUS at 20:14

## 2021-07-09 RX ADMIN — HYDROMORPHONE HYDROCHLORIDE 0.4 MG: 0.2 INJECTION, SOLUTION INTRAMUSCULAR; INTRAVENOUS; SUBCUTANEOUS at 20:14

## 2021-07-09 RX ADMIN — CHLORHEXIDINE GLUCONATE 15 ML: 1.2 RINSE ORAL at 20:13

## 2021-07-09 RX ADMIN — ONDANSETRON 4 MG: 2 INJECTION INTRAMUSCULAR; INTRAVENOUS at 15:13

## 2021-07-09 RX ADMIN — HYDROMORPHONE HYDROCHLORIDE 0.4 MG: 0.2 INJECTION, SOLUTION INTRAMUSCULAR; INTRAVENOUS; SUBCUTANEOUS at 04:13

## 2021-07-09 RX ADMIN — SENNOSIDES AND DOCUSATE SODIUM 1 TABLET: 8.6; 5 TABLET ORAL at 08:29

## 2021-07-09 RX ADMIN — ONDANSETRON 4 MG: 2 INJECTION INTRAMUSCULAR; INTRAVENOUS at 03:57

## 2021-07-09 RX ADMIN — HYDROMORPHONE HYDROCHLORIDE 0.4 MG: 0.2 INJECTION, SOLUTION INTRAMUSCULAR; INTRAVENOUS; SUBCUTANEOUS at 02:07

## 2021-07-09 RX ADMIN — METOPROLOL TARTRATE 25 MG: 25 TABLET, FILM COATED ORAL at 08:05

## 2021-07-09 RX ADMIN — METOPROLOL TARTRATE 25 MG: 25 TABLET, FILM COATED ORAL at 20:15

## 2021-07-09 RX ADMIN — PANTOPRAZOLE SODIUM 40 MG: 40 TABLET, DELAYED RELEASE ORAL at 08:05

## 2021-07-09 RX ADMIN — ONDANSETRON 4 MG: 2 INJECTION INTRAMUSCULAR; INTRAVENOUS at 21:34

## 2021-07-09 RX ADMIN — OXYCODONE HYDROCHLORIDE 5 MG: 5 TABLET ORAL at 12:22

## 2021-07-09 RX ADMIN — ENOXAPARIN SODIUM 40 MG: 40 INJECTION SUBCUTANEOUS at 11:18

## 2021-07-09 RX ADMIN — BACITRACIN ZINC: 500 OINTMENT TOPICAL at 08:05

## 2021-07-09 RX ADMIN — AMPICILLIN SODIUM AND SULBACTAM SODIUM 3 G: 2; 1 INJECTION, POWDER, FOR SOLUTION INTRAMUSCULAR; INTRAVENOUS at 00:31

## 2021-07-09 RX ADMIN — ONDANSETRON 4 MG: 2 INJECTION INTRAMUSCULAR; INTRAVENOUS at 09:16

## 2021-07-09 RX ADMIN — AMPICILLIN SODIUM AND SULBACTAM SODIUM 3 G: 2; 1 INJECTION, POWDER, FOR SOLUTION INTRAMUSCULAR; INTRAVENOUS at 14:38

## 2021-07-09 RX ADMIN — SENNOSIDES AND DOCUSATE SODIUM 1 TABLET: 8.6; 5 TABLET ORAL at 20:15

## 2021-07-09 RX ADMIN — Medication 15 ML: at 14:38

## 2021-07-09 RX ADMIN — OXYCODONE HYDROCHLORIDE 5 MG: 5 TABLET ORAL at 17:18

## 2021-07-09 RX ADMIN — SODIUM CHLORIDE, POTASSIUM CHLORIDE, SODIUM LACTATE AND CALCIUM CHLORIDE: 600; 310; 30; 20 INJECTION, SOLUTION INTRAVENOUS at 23:10

## 2021-07-09 RX ADMIN — AMPICILLIN SODIUM AND SULBACTAM SODIUM 3 G: 2; 1 INJECTION, POWDER, FOR SOLUTION INTRAMUSCULAR; INTRAVENOUS at 08:02

## 2021-07-09 RX ADMIN — HYDROMORPHONE HYDROCHLORIDE 0.4 MG: 0.2 INJECTION, SOLUTION INTRAMUSCULAR; INTRAVENOUS; SUBCUTANEOUS at 07:32

## 2021-07-09 RX ADMIN — HYDROMORPHONE HYDROCHLORIDE 0.4 MG: 0.2 INJECTION, SOLUTION INTRAMUSCULAR; INTRAVENOUS; SUBCUTANEOUS at 00:14

## 2021-07-09 RX ADMIN — BACITRACIN ZINC: 500 OINTMENT TOPICAL at 21:34

## 2021-07-09 RX ADMIN — CHLORHEXIDINE GLUCONATE 15 ML: 1.2 RINSE ORAL at 12:09

## 2021-07-09 RX ADMIN — HYDROMORPHONE HYDROCHLORIDE 0.4 MG: 0.2 INJECTION, SOLUTION INTRAMUSCULAR; INTRAVENOUS; SUBCUTANEOUS at 15:26

## 2021-07-09 RX ADMIN — OXYCODONE HYDROCHLORIDE 5 MG: 5 TABLET ORAL at 09:16

## 2021-07-09 RX ADMIN — BACITRACIN ZINC: 500 OINTMENT TOPICAL at 14:38

## 2021-07-09 RX ADMIN — SODIUM CHLORIDE, POTASSIUM CHLORIDE, SODIUM LACTATE AND CALCIUM CHLORIDE: 600; 310; 30; 20 INJECTION, SOLUTION INTRAVENOUS at 02:03

## 2021-07-09 RX ADMIN — SODIUM CHLORIDE, POTASSIUM CHLORIDE, SODIUM LACTATE AND CALCIUM CHLORIDE: 600; 310; 30; 20 INJECTION, SOLUTION INTRAVENOUS at 12:16

## 2021-07-09 ASSESSMENT — ACTIVITIES OF DAILY LIVING (ADL)
ADLS_ACUITY_SCORE: 18
IADL_COMMENTS: OT: PT WAS IND
ADLS_ACUITY_SCORE: 17

## 2021-07-09 NOTE — PROGRESS NOTES
CLINICAL NUTRITION SERVICES - ASSESSMENT NOTE     Nutrition Prescription    RECOMMENDATIONS FOR MDs/PROVIDERS TO ORDER:  - Total fluids per Provider  - Recommend ordering lytes replacement protocol for K+, Mg and PO4    Malnutrition Status:    Patient does not meet two of the established criteria necessary for diagnosing malnutrition but is at risk for malnutrition      Recommendations already ordered by Registered Dietitian (RD):  - Enter orders to initiate TF via PEG with TwoCal HN @ 10 ml/hr x 8 hrs. If tolerated and lytes within accepatable limits (K+ >3.0, Mg++ > 1.5 and PO4 > 3.0), increase rate by 10 ml every 8 hrs to goal of 55 mL/hr. Regimen provides 1320 mL/day, 2640 kcals (29 kcal/kg/day), 111 g PRO (1.2 g/kg/day), 924 mL H2O, 289 g CHO and 7 g Fiber daily.  - Order multivitamin/mineral (Certavite 15 ml/day) via TF to help ensure micronutrient needs are met due to  potential for interruptions to infusion.  - Order daily check of K+, Mg and PO4 until TF adv to goal rate to evaluate for refeeding and need for lyte replacement   -  H2O flushes of 30 ml every 4 hrs.    Future/Additional Recommendations:  - Monitor lytes for refeeding  with start.adv to goal rate  - Monitor wt for maintenance  - Monitor for transition to bolus feeds with recommendations as follows; Once patient is tolerating continuous goal TF rate for at least 8 hrs, would transition to bolus TFs as follows; Stop continuous TF for 1-2 hrs to let stomach empty prior to starting gravity feeding. Then begin first bolus with 0.5 cans (125 ml) and if tolerates after approx 4 hrs without GI complaints and/or residuals < 500 ml, rec adv each subsequent bolus by 0.5 cans (120 ml) every ~4 hrs until reach goal regimen on TwoCal HN of 2 cans TID which provides 1422 ml per day, 2844 kcals (32 kcal/kg/day), 119 g PRO (1.3 g/kg/day), 995 mL H2O, 311 g CHO and 7 g Fiber daily.    *Do not give feedings at night while patient asleep (during the day  "only).    Example: Change H2O flushes to 120 ml H2O before and after each bolus TID, plus additional 120 ml H20 QID  (to provide an addtl 1200 ml H2O) to meet est fld needs.       REASON FOR ASSESSMENT  Raj Warren is a/an 71 year old male assessed by the dietitian for Provider Order - Registered Dietitian to Assess and Order TF per Medical Nutrition Therapy Protocol  - Pt is s/p PEG placed yesterday (per Thoracic Surgery)    NUTRITION HISTORY  Unable to obtain d/t pt unable to speak and unable to utilizing writing board. Per chart review, noted pt was previously on TF in early May.     CURRENT NUTRITION ORDERS  Diet: NPO since 7/8    LABS  K+ , Mg++ and PO4 WNL       MEDICATIONS  Zofran every 6 hrs (IV)  MIVF's @ 100 ml/hr    ANTHROPOMETRICS  Height: 180.3 cm (5' 11\")  Most Recent Weight: 89.8 kg (197 lb 15.6 oz) - admit (7/8)  IBW: 78 kg  BMI: Overweight BMI 25-29.9  Weight History: Pt whispers that he weighed 215 lbs in April (loss of 18 lbs, or  >8% loss x 3 mos). Pt wasn't quite clear as to cause of wt loss.   Wt Readings from Last 10 Encounters:   07/08/21 89.8 kg (197 lb 15.6 oz)   06/25/21 86.5 kg (190 lb 12.8 oz)   06/23/21 89.4 kg (197 lb)   06/18/21 88.7 kg (195 lb 8.8 oz)   06/09/21 88.9 kg (196 lb)   05/27/21 89.4 kg (197 lb 3.2 oz)   05/20/21 89.8 kg (198 lb)   05/19/21 90.3 kg (199 lb)   05/15/21 86 kg (189 lb 9.6 oz)   05/07/21 87.7 kg (193 lb 5.5 oz)       Dosing Weight: 90 kg (based on admit wt of 89.8 kg on admit)    ASSESSED NUTRITION NEEDS  Estimated Energy Needs: 1740-2758 kcals/day (25 - 30 kcals/kg)  Justification: Maintenance  Estimated Protein Needs: 108-135 grams protein/day (1.2 - 1.5 grams of pro/kg)  Justification: Post-op  Estimated Fluid Needs: (1 mL/kcal)   Justification: Maintenance      PHYSICAL FINDINGS  See malnutrition section below.    MALNUTRITION  % Intake: Unable to assess  % Weight Loss: > 7.5% in 3 months (severe)  Subcutaneous Fat Loss: None observed  Muscle Loss: None " observed  Fluid Accumulation/Edema: None noted  Malnutrition Diagnosis: Patient does not meet two of the established criteria necessary for diagnosing malnutrition but is at risk for malnutrition    NUTRITION DIAGNOSIS  Unintended weight loss related to question inadequate nutritional needs vs hypermetabolism with illness as evidenced by wt loss of 18 lbs (>8% loss) x 3 mos, NPO status and consult received for TF therapy.    INTERVENTIONS  Implementation  Nutrition Education: Provided education on plan for TF therapy to pt  Enteral Nutrition - Initiate  Feeding tube flush  Multivitamin/mineral supplement therapy     Goals  1. Total avg nutritional intake to meet a minimum of 25 kcal/kg and 1.2 g PRO/kg daily (per dosing wt 90 kg).  2. Wt not to decline < 89 kg     Monitoring/Evaluatin  Progress toward goals will be monitored and evaluated per protocol.    La Triplett RD,LD  6A page [phone]

## 2021-07-09 NOTE — PROGRESS NOTES
07/09/21 0900   Quick Adds   Type of Visit Initial Occupational Therapy Evaluation   Living Environment   People in home alone   Current Living Arrangements apartment   Home Accessibility stairs within home   Number of Stairs, Within Home, Primary   (27)   Stair Railings, Within Home, Primary railings on both sides of stairs   Transportation Anticipated car, drives self;family or friend will provide   Self-Care   Usual Activity Tolerance good   Current Activity Tolerance fair   Equipment Currently Used at Home none   Disability/Function   Hearing Difficulty or Deaf no   Concentrating, Remembering or Making Decisions Difficulty no   Difficulty Communicating no   Difficulty Eating/Swallowing no   Walking or Climbing Stairs Difficulty no   Dressing/Bathing Difficulty no   Toileting issues no   Doing Errands Independently Difficulty (such as shopping) no   Fall history within last six months no   Change in Functional Status Since Onset of Current Illness/Injury no   General Information   Onset of Illness/Injury or Date of Surgery 07/08/21   Referring Physician Jeremias Jim MD   Additional Occupational Profile Info/Pertinent History of Current Problem Raj Warren is a 71 year old male with a PMH significant for T2DM, HTN, and recent bilateral PE (PTA apixaban anticoagulation), right ventricular clot burden s/p sternotomy, embolectomy/RV thrombectomy, and IVC filter placement, now s/p total laryngectomy (without thyroidectomy) and bilateral modified radical neck dissection (II-V) for treatment of [id number] larygeal SCC. Radiotherapy recommended, with possibility of chemotherapy depending on final pathology.   Existing Precautions/Restrictions fall  (B radial neck dissection)   Cognitive Status Examination   Cognitive Status Comments OT: No concerns at this time   Visual Perception   Impact of Vision Impairment on Function (Vision) OT: No acute changes noted   Range of Motion Comprehensive   Comment, General Range of  Motion OT: limited 2/2 pain   Strength Comprehensive (MMT)   Comment, General Manual Muscle Testing (MMT) Assessment OT: NT 2/2 pain   Bed Mobility   Comment (Bed Mobility) OT: CGA   Transfers   Transfer Comments OT: CGA   Balance   Balance Comments OT: CGA   Instrumental Activities of Daily Living (IADL)   IADL Comments OT: Pt was ind   Clinical Impression   Criteria for Skilled Therapeutic Interventions Met (OT) yes   OT Diagnosis decreased ind in I/ADLS   OT Problem List-Impairments impacting ADL problems related to;activity tolerance impaired;balance;strength;pain;mobility;post-surgical precautions   ADL comments/analysis decreased ind in I/ADLS   Assessment of Occupational Performance 1-3 Performance Deficits   Planned Therapy Interventions (OT) ADL retraining;IADL retraining;bed mobility training;strengthening;transfer training;ROM;home program guidelines;progressive activity/exercise   Clinical Decision Making Complexity (OT) low complexity   Therapy Frequency (OT) Daily   Predicted Duration of Therapy 7/21/21   Risk & Benefits of therapy have been explained evaluation/treatment results reviewed;care plan/treatment goals reviewed;patient   OT Discharge Planning    OT Discharge Recommendation (DC Rec) Transitional Care Facility;home with home care occupational therapy;Home with assist   OT Rationale for DC Rec OT: if pt were to discharge today would require TCU, anitipate that once medically approrpaite pt would be approriate to discharge home w/ home therapy   Total Evaluation Time (Minutes)   Total Evaluation Time (Minutes) 5

## 2021-07-09 NOTE — PROGRESS NOTES
07/09/21 1130   General Information   Onset of Illness/Injury or Date of Surgery 07/08/21   Referring Physician Jeremias Jim MD   Patient/Family Therapy Goal Statement (SLP) None stated   Pertinent History of Current Problem Raj Warren is a 71-year-old male with a PMH significant for TIIDM (diet-controlled), HTN (on metoprolol PTA), and recent bilateral PE (on apixaban PTA) with right ventricular clot burden s/p sternotomy, embolectomy/RV thrombectomy, and IVC filter placement in 04/2021. Patient also has a history of laryngeal SCCa and is now POD#1 s/p total laryngectomy, and bilateral neck dissection (II-V) on 7/8. Per discussion with ENT, OK for larytube and HME. OK for loose ties around neck, but no electrolarynx. LPK kit and education provided per MD orders.    Type of Evaluation   Type of Evaluation Alaryngeal Communication   Alaryngeal Communication   Orders Received Laryngectomy Pulmonary Training;Education   Preoperative Functioning Speech;Swallowing   Date of Surgery 07/08/21   Primary Site of Tumor laryngeal SCCa   Patient received preoperative counseling? Yes   Extent of surgery Total;Neck dissection   Oxygen supply Trach dome;with humidity   [place] with Stoma Cares no   Primary Communication Method Gestures;Mouthing words;Written expression  (eventual TEP per pre-op notes)   Postoperative Assessment Laryngectomy Pulmonary Training;Electrolarynx   Speech pt was utilizing finger occlusion via trach PTA   Swallowing pt was eating a regular diet and thin liquids PTA   Laryngectomy Pulmonary Training   Laryngectomy Tube Brand ATOS   Laryngectomy Tube Size 9/55   HME Brand ATOS   Patient and Family Education Extensive education provided for pt and son re: anatomical and sensory changes s/p total laryngectomy, guidelines for larytube/HME use, cleaning instructions, and important safety considerations s/p TL   Forms Completed yes   Laryngectomy Pulmonary Training Comments Provided pt with LPK  kit. ATOS 9/55 larytube with HME placed in stoma. Pt tolerated larytube and HME placement for 40 minutes with VSS. With use of mirror, pt was able to remove larytube from stoma and replace with min cues.    Electrolarynx   Electrolarynx Comments no electrolarynx training yet per ENT    General Therapy Interventions   Planned Therapy Interventions Communication   Communication   (LPK education and larytube/HME tolerance)   SLP Therapy Assessment/Plan   Criteria for Skilled Therapeutic Interventions Met (SLP Eval) yes;treatment indicated   SLP Diagnosis s/p total laryngectomy   Rehab Potential (SLP Eval) good, to achieve stated therapy goals   Therapy Frequency (SLP Eval) daily   Predicted Duration of Therapy Intervention (SLP Eval) 2 weeks   Comment, Therapy Assessment/Plan (SLP) Pt seen for ST evaluation s/p total laryngectomy, POD#1. Pt and pt's son provided with laryngectomy pulmonary kit, including larytube ATOS 9/55 and HMEs. SLP placed larytube and HME without incident/with minimal coughing, pt tolerated for 40 minutes with VSS. With use of mirror, pt was able to remove and replace larytube and HME with min cues. Extensive education provided re: anatomical changes s/p laryngectomy, purpose of larytube/HME, HME guidelines, and SLP role and POC while pt is inpatient. RN education provided re: larytube/HME use. Pt and son very engaged with many questions which were all answered within scope of practice. Per ENT, OK for loose trach ties to keep larytube in place. Per ENT, pt clear to wear larytube throughout day as tolerated. Pt can receive O2 via trach dome with HME in place, however, humidity should be off if HME is in place. HME should be changed at least every 24 hours or more frequently if visibly soiled. If pt is demonstrating increased difficulty breathing, remove larytube and HME. Larytube should be cleaned with provided brushes at least daily, or more frequently as needed. SLP to follow daily and will  require OP SLP follow up at ENT clinic.    Therapy Plan Review/Discharge Plan (SLP)   Therapy Plan Review (SLP) evaluation/treatment results reviewed;care plan/treatment goals reviewed;risks/benefits reviewed;current/potential barriers reviewed;participants voiced agreement with care plan;participants included;patient;son   Demonstrates Need for Referral to Another Service (SLP) clinical nutrition services/dietitian;physical therapist;respiratory therapist;occupational therapist   SLP Discharge Planning    SLP Discharge Recommendation (DC Rec) home with outpatient speech therapy   SLP Rationale for DC Rec  Pt with new communication and swallow needs s/p laryngectomy; pt will benefit from ongoing ST targeting these deficits   SLP Brief overview of current status   Per ENT, OK for loose trach ties to keep larytube in place. Recommend pt wear larytube throughout day as tolerated. Pt can receive O2 via trach dome with HME in place, however, humidity should be off if HME is in place. HME should be changed at least every 24 hours or more frequently if visibly soiled. If pt is demonstrating increased difficulty breathing, remove larytube and HME. Larytube should be cleaned with provided brushes at least daily, or more frequently as needed.    Total Evaluation Time   Total Evaluation Time (Minutes) 50

## 2021-07-09 NOTE — PROGRESS NOTES
"Otolaryngology Progress Note  July 9, 2021    Subjective and Interval Events: Patient had no acute events overnight. He has been tachycardic to the 110s to 121. This morning he denies difficulty breathing, nausea, and abdominal fullness. He has no concerns. He has been declining some laryngectomy care.    Objective: /80 (BP Location: Left arm)   Pulse 119   Temp 99.4  F (37.4  C) (Oral)   Resp 16   Ht 1.803 m (5' 11\")   Wt 89.8 kg (197 lb 15.6 oz)   SpO2 97%   BMI 27.61 kg/m     General: resting comfortably and not in acute distress   Neuro: patient alert, oriented, and answering questions appropriately   HEENT: neck incisions soft, flat, and well-approximated; BRITTNI drains x4 with moderate amount of serous-serosanguinous drainage; stoma well-approximated; large piece of crust removed from proximal trachea with Johanna forceps   Pulmonary: breathing comfortably on room air without stridor or stertor      BRITTNI Drains:  - JP1, Left lateral neck: 13, 5  - JP2, Left medial neck: 13, 15  - JP3, Right lateral neck: 25, 15  - JP4, Right medial neck: 30, 15    Labs:  - BMP wnl except for hypocalcemia  - Mg, phosph wnl  - Albumin low at 2.9  - Prealbumin low at 12  - iCal wnl at 4.4  - TSH wnl at 1.1  - CBC with expected leukocytosis and postoperative anemia      Assessment & Plan: Raj Warren is a 71-year-old male with a PMH significant for TIIDM (diet-controlled), HTN (on metoprolol PTA), and recent bilateral PE (on apixaban PTA) with right ventricular clot burden s/p sternotomy, embolectomy/RV thrombectomy, and IVC filter placement in 04/2021. Patient also has a history of laryngeal SCCa and is now POD#1 s/p total laryngectomy, and bilateral neck dissection (II-V) on 7/8.    Neuro:  - Analgesia: tylenol q8h; oxycodone and dilaudid PRN    HEENT:  - Oral care- Peridex QID; normal saline mouth rinses q8h and PRN; suction with red Martin catheter only  - Strict NPO  - Incision care- cleanse incisions with normal " saline/half-strength peroxide q8h and apply Bacitracin q8h x1 day, then transition to Aquaphor q8h  - Scheduled pantoprazole every day; scheduled zofran q6h x48 hours --> PRN  - Routine laryngectomy care-   - Cleanse incisio   - Okay for Nicola tube at all times with HME filter   - No electrolarynx   - Humidified air at all times HME filter not on   - Saline lavage q2h and PRN for obstruction   - Contact otolaryngology on-call if patient noted to have large obstructive clot/crust or if patient develops difficulty breathing    Respiratory:   - Patient has a laryngectomy and has NO AIRWAY FROM ABOVE  - Patient must have flexible suction catheters at the bedside at all times  - Patient must be on humidified air at all times that Nicola tube and HME filter are not in place  - Continuous pulse oximetry at all times; supplemental O2 PRN to keep sats >90%.    CV/heme:   - PMH of HTN and bilateral PEs- on metoprolol and apixiban PTA  - Hematology consultation for assistance with anti-coagulation (currently on prophylactic lovenox)  - Continue to monitor tachycardia    FEN/GI:  - mIVF- LR @ 100 mL/hr until tube feeds initiated  - PEG placed by Thoracic surgery on 7/8- cleared for use, per their service  - PPI scheduled, zofran scheduled x48 hours, per laryngectomy protocol  - Nutrition consultation for tube feed recommendations- will start at low, trickle rate, and advance slowly  - Albumin low at 2.9; Prealbumin low at 12; TSH wnl at 1.1  - Bowel regimen: senna-docusate BID; miralax daily    :  - Discontinue taylor    Endo:   - PMH of TIIDM- diet-controlled prior to admission  - ISS    ID:  - Unasyn x48 hrs    PPX:  - SCDs currently  - Lovenox 40mg    Consults:  - PT/OT  - Nutrition  - SLP  - PLC    Dispo: Pending clinical course  - PT/OT- evaluations pending  - PLC- 7/9 at 13:30 and TBD      -- Patient and above plan discussed with staff, Dr. Navdeep Banegas MD  Otolaryngology- Head and Neck Surgery  Please  contact ENT with questions by dialing [phone] and entering job [phone] when prompted.

## 2021-07-09 NOTE — PLAN OF CARE
6A PT: Hold. Per OT, pt mobility limited by pain, anticipating needs will be met with single service as pt medically progresses. PT holding until 7/12 to monitor progress, will initiate/defer as appropriate.

## 2021-07-09 NOTE — CONSULTS
07/09/21 1430 Ama Cordova, RN     Patient, son and daughter seen at bedside for HME and Laryngectomy training. Had trach class in the past. Reviewed site care, changing and cleaning trach ties, Lavaging, if needed, RD with suctioning on a model. States he understands all information presented. Used speaking board to communicate. Feeding tube class scheduled for 7/12/21 @ 0900.  Literature given: Handwashing and Skin Care, and Caring for Yourself After a Laryngectomy. Has HME kit at bedside.

## 2021-07-09 NOTE — PLAN OF CARE
Status: Pt POD#0, S/P Bilateral neck dissection, laryngectomy, laryngoscopy, PEG tube placement and EGD. Arrived on 6a at 1930.   Vitals: VSS ex intermittent tachycardia, max pulse 108 this shift.   Neuros: A&ox4, strength 5/5, N/T to right pinky and ring finger. Pt able to make needs known through mouthing words, declined to use communication board.  IV: LR infusing @100ml/hr in Right PIV, Left PIV TKO inbetween Abx.  Labs/Electrolytes: To be drawn in the morning.   Resp/trach: ON 30% HTD, O2 sats in high 90s. Denies SOB. Laryngectomy site clear, no suction indicated since arrival on unit. Pt educated regarding suctioning/lavage. Emergency supplies at bedside, pt on continuous pulse ox.  Diet: NPO. G tube in place, clamped until tomorrow morning. Provider paged regarding if gravity drainage needed.   Bowel status: BS+, normoactive. LBM unknown.   : Mera in place, cares completed in PACU.  Drains: 4 Brittni drains at neck site, emptied this shift with minimal output.   Skin: Preventative sacral mepilex in place. Bilateral neck incision intact, laryngectomy stoma open/clean. PEG tube insertion site WDL. BRITTNI insertion sites intact.   Pain: Endorsed neck pain with swallowing. Controlled with IV dilaudidx1  Activity: Not OOB this shift.   Social: Yusuf Hahn updated by PACU about transfer to 6A.  Plan: Continue to monitor post op. Scheduled IV zofran.     Arrived from: PACU  Belongings/meds:  None.   2 RN Skin Assessment Completed by:  Raymond Arizmendi RN.   Non-intact findings documented (yes/no/NA): yes, see above.

## 2021-07-09 NOTE — PROGRESS NOTES
"Patient doing well.  Incision intact, stoma patent, BRITTNI drains with serous/serosanguinous drainage.   Working with SLP for anny tube/HME.  Will hold on electrolarynx for now.    Will need hematology input on anticoagulation. Patient high risk of bleeding that can be life threatening, need to delay as long as possible. Per discussion preop with Oncology, patient has ivc filter which they felt would help provide some protection.  For now will do prophylactic dosing of lovenox, first dose given in OR on 7/8.      Preop discussion with anesthesia preop staff and pharmacy about postop anticoagulation options included consideration of:    Could use a low dose straight rate heparin infusion or we do have a \"very-low\" dose heparin infusion that I have seen used in some patients where we have this bleed/clotting concern. When using this \"very l ow\" dose heparin protocol they aim for a lower anti Xa goal of 0.15-0.35 (versus low dose heparin infusion goal of 0.25-0.5).  This  is weight based dosing to target anti Xa - can use order set in Epic for it.  It is separate from the usual heparin order set (at bottom of order set list).  Recommend to use the one without a bolus to decrease initial bleeding risk. If bleeding occurs, reverse immediately with protamine.      RUTHIE Sethi MD     Department of Otolaryngology   "

## 2021-07-09 NOTE — CONSULTS
[facility]    Hematology Consult Note  Patient Name: Raj Warren   MRN: [id number]  YOB: 1950      Date of Service: July 9, 2021  Admission Date: 7/8/2021  Hospital Day # 1      Reason for Consult: postop anticoagulation, hx of PE      Assessment & Plan   Raj Warren is a 71 year old male with a PMH of squamous cell carcinoma of the larynx and PE  who was admitted on 7/8/2021 for planned bilateral next dissection, Laryngectomy, and PEG tube placement    At this time would recommend ongoing anticoagulation for extensive PE. Given time course and ~ 3 months of anticoagulation completed it is reasonable for anticoagulation to be held shortly in the immediate post op period. When able however would restart anticoagulation to complete at least 6 months of anticoagulation, however ideally would continue therapy until provoking factor (cancer) is treated and in remission. Pending post op course and daily risk of bleeding could consider starting full intensity heparin gtt if continued concern for bleeding. If patient remains stable would consider restarting apixaban at prior to admission dosing of 5mg BID. If primary team decides to start heparin gtt, can transition back to apixaban at above dosing when stable from bleeding standpoint. Ideally would expect patient to be able to start full intensity anticoagulation ~48 hours post op.     Hematology/Oncology Problem list:   # Large bilateral pulmonary embolism with evidence of right heart strain (4/27/2021)  # Squamous cell carcinoma of the larynx    Summary of Recommendations:    Would suggest continuation of anticoagulation for treatment of PE when safe for surgical standpoint    If concern for acute bleeding would recommend starting heparin gtt at full intensity. If remains stable with no bleeding would then switch back to prior to admission apixaban 5mg BID.    Would recommend at least 6 months of  "anticoagulation for treatment of cancer provoked PE, however might need continued treatment until cancer cured. Definitive duration of therapy can be determined by outpatient oncologist.     Thank you for involving us in the care of this patient. We will continue to follow during the hospitalization.    Patient was seen and plan of care developed with Dr. Beaver.    Demetrius Garcia MD MS  Hematology  Fellow      ______________________________________________________________________      History of Present Illness   Raj Warren is a 71 year old male with a PMH of squamous cell carcinoma of the larynx and PE  who was admitted on 7/8/2021 for planned bilateral next dissection, Laryngectomy, and PEG tube placement      Adapted from admission H&P:  \"Mr. Warren has laryngeal cancer with tracheostomy placement 4/12/21 who suffered recent bilateral pulmonary embolism with RV  Thrombus s/p pulmonary embolectomy, RV thrombectomy and IVC filter placement on 4/30/21 with Dr. Singh with prolonged hospitalization. He was discharged to [facility] on 5/8/21 for acute rehab. Since 5/15/21 he has been at home with family and home nursing/therapies.    Mr. Warren was seen in otolaryngology consultation with Dr. Sethi on 6/18/2021 at the [facility] for ongoing management SCC larynx. At that visit, Dr. Sethi did a flexible fiberoptic laryngoscopy, flex tracheoscopy and trach change. Dr. Sethi discussed with patient that treatment of SCC of larynx consists of an upfront laryngectomy  followed by postoperative radiation with or without chemiotherapy vs definitive chemoradiation\"       Imaging Summary:  CT CHEST PULMONARY EMBOLISM WITH CONTRAST 4/27/2021  IMPRESSION:  1.  Large bilateral pulmonary embolism leading to all lobes of both  lungs. Central thrombus burden leads to the bilateral main pulmonary  arteries. There is evidence of right cardiac " strain. The chambers of  the right heart are dilated and there is leftward bowing of the  intraventricular septum. Further, there is curvilinear apparent  filling defect within the right ventricular lumen suggesting thrombus  within the right ventricle.  2.  Small patchy ground-glass nodular opacities at the left upper  lobe. Recommend follow-up CT chest in three months for surveillance.  3.  Soft tissue thickening at the vocal folds again noted.  4.  Appropriate positioning of a newly identified tracheostomy.    US LOWER EXTREMITY VENOUS DUPLEX BILATERAL 4/27/2021   IMPRESSION:  1.  No deep venous thrombosis in the bilateral lower extremities      CT CHEST WITH CONTRAST 6/3/2021  IMPRESSION:  1. Resolved infiltrate seen from comparison study.  2. No metastatic disease demonstrated.      Review of Systems    ROS negative except noted above.     Past Medical History    Past Medical History:   Diagnosis Date     Allergic state      Anemia      Tracheostomy in place (H)        Past Surgical History   Past Surgical History:   Procedure Laterality Date     DISSECTION RADICAL NECK BILATERAL Bilateral 7/8/2021    Procedure: bilateral neck dissections;  Surgeon: Marilou Sethi MD;  Location: [facility] OR     ENDOSCOPIC INSERTION TUBE GASTROSTOMY Left 7/8/2021    Procedure: INSERTION, PEG TUBE with Esophagogastroduodenoscopy;  Surgeon: Kg Montaño MD;  Location: [facility] OR     IR IVC FILTER PLACEMENT  4/30/2021     LARYNGECTOMY N/A 7/8/2021    Procedure: LARYNGECTOMY;  Surgeon: Marilou Sethi MD;  Location: [facility] OR     LARYNGOSCOPY N/A 4/12/2021    Procedure: LARYNGOSCOPY;  Surgeon: Choco Johnson MD;  Location: [facility] OR     LARYNGOSCOPY N/A 7/8/2021    Procedure: LARYNGOSCOPY;  Surgeon: Marilou Sethi MD;  Location: [facility] OR     REPAIR ANEURYSM ASCENDING AORTA N/A 4/28/2021    Procedure: PULMONARY THROMBECTOMY;  Surgeon: Yumi Singh MD;  Location: [facility] OR     TRACHEOSTOMY  4/12/2021    Procedure: CREATION,  TRACHEOSTOMY;  Surgeon: Choco Johnson MD;  Location:  OR       Social History   Social History     Tobacco Use     Smoking status: Former Smoker     Types: Cigarettes     Quit date: 1989     Years since quittin.5     Smokeless tobacco: Never Used     Tobacco comment: quit in   had smoked about 1/2 pack a day then cut back   Substance Use Topics     Alcohol use: No     Comment: No alcohol since      Drug use: No       Family History   Family History   Problem Relation Age of Onset     Circulatory Mother         blood clots     Alcohol/Drug Father         alcohol drank heavily     Alcohol/Drug Brother         alcohol       Prior to Admission Medications   Prior to Admission Medications   Prescriptions Last Dose Informant Patient Reported? Taking?   apixaban ANTICOAGULANT (ELIQUIS) 5 MG tablet Past Week at Unknown time  No Yes   Sig: Take 1 tablet (5 mg) by mouth 2 times daily   enoxaparin ANTICOAGULANT (LOVENOX) 80 MG/0.8ML syringe 2021 at 0800  No Yes   Sig: Inject 0.8 mLs (80 mg) Subcutaneous 2 times daily for 5 doses   metoprolol tartrate (LOPRESSOR) 25 MG tablet 2021 at 2000  No Yes   Sig: Take 1 tablet (25 mg) by mouth 2 times daily   multivitamin (CENTRUM SILVER) tablet 2021 at 0800  Yes Yes   Sig: Take 1 tablet by mouth daily   pantoprazole (PROTONIX) 40 MG EC tablet   No No   Sig: Take 1 tablet (40 mg) by mouth every morning (before breakfast)      Facility-Administered Medications: None       Allergies      Allergies   Allergen Reactions     Pollen Extract        Physical Exam   Vital Signs: Temp: 98.8  F (37.1  C) Temp src: Axillary BP: 132/76 Pulse: 121   Resp: 16 SpO2: 97 % O2 Device: None (Room air) Oxygen Delivery: 30 LPM  Weight: 197 lbs 15.57 oz      GENERAL: Sleeping awakes easily to voice  HEENT: AT/NC,  EOMI,   RESP: Non labored breathing, surgical drains in place, no active bleeding  ABDOMEN: non distended  SKIN: Warm and dry, no jaundice or rash on exposed  skin  NEURO: moving upper extremities equally, writing on pad to communicate  PSYCH: mood appropriate      Data     Results for orders placed or performed during the hospital encounter of 07/08/21 (from the past 24 hour(s))   Basic metabolic panel   Result Value Ref Range    Sodium 136 133 - 144 mmol/L    Potassium 3.8 3.4 - 5.3 mmol/L    Chloride 105 94 - 109 mmol/L    Carbon Dioxide 27 20 - 32 mmol/L    Anion Gap 4 3 - 14 mmol/L    Glucose 107 (H) 70 - 99 mg/dL    Urea Nitrogen 9 7 - 30 mg/dL    Creatinine 0.75 0.66 - 1.25 mg/dL    GFR Estimate >90 >60 mL/min/[1.73_m2]    GFR Estimate If Black >90 >60 mL/min/[1.73_m2]    Calcium 8.4 (L) 8.5 - 10.1 mg/dL   Magnesium   Result Value Ref Range    Magnesium 2.0 1.6 - 2.3 mg/dL   Calcium ionized whole blood   Result Value Ref Range    Calcium Ionized Whole Blood 4.4 4.4 - 5.2 mg/dL   Phosphorus   Result Value Ref Range    Phosphorus 3.9 2.5 - 4.5 mg/dL   CBC with platelets   Result Value Ref Range    WBC 11.1 (H) 4.0 - 11.0 10e9/L    RBC Count 4.17 (L) 4.4 - 5.9 10e12/L    Hemoglobin 12.1 (L) 13.3 - 17.7 g/dL    Hematocrit 36.5 (L) 40.0 - 53.0 %    MCV 88 78 - 100 fl    MCH 29.0 26.5 - 33.0 pg    MCHC 33.2 31.5 - 36.5 g/dL    RDW 12.8 10.0 - 15.0 %    Platelet Count 260 150 - 450 10e9/L   TSH with free T4 reflex   Result Value Ref Range    TSH 1.10 0.40 - 4.00 mU/L   Parathyroid Hormone Intact   Result Value Ref Range    Parathyroid Hormone Intact 70 18 - 80 pg/mL   Albumin level   Result Value Ref Range    Albumin 2.9 (L) 3.4 - 5.0 g/dL   Prealbumin   Result Value Ref Range    Prealbumin 12 (L) 15 - 45 mg/dL         I have personally reviewed the following labs/imaging:  CBC  Recent Labs   Lab 07/09/21  0841   WBC 11.1*   RBC 4.17*   HGB 12.1*   HCT 36.5*   MCV 88   MCH 29.0   MCHC 33.2   RDW 12.8        CMP  Recent Labs   Lab 07/09/21  0841      POTASSIUM 3.8   CHLORIDE 105   CO2 27   ANIONGAP 4   *   BUN 9   CR 0.75   GFRESTIMATED >90    GFRESTBLACK >90   TELMA 8.4*   MAG 2.0   PHOS 3.9   ALBUMIN 2.9*     INRNo lab results found in last 7 days.

## 2021-07-09 NOTE — PROGRESS NOTES
[facility]   Patient is currently open to home care services with [facility]. The patient is currently receiving RN services. [facility]  and team have been notified of patient admission. [facility] liaison will continue to follow patient during stay. If appropriate provide orders to resume home care at time of discharge.    Teresita Domínguez RN BSN  [facility] Liaison  [phone]

## 2021-07-09 NOTE — PLAN OF CARE
Status: Pt POD#1, S/P Bilateral neck dissection, laryngectomy, laryngoscopy, PEG tube placement and EGD.  Vitals: VSS ex intermittent tachycardia  Neuros: A&ox4, strength 5/5, N/T to right pinky and ring finger. Writes to communicate.  IV: LR infusing @100ml/hr in Right PIV, Left PIV TKO inbetween Abx.  Labs/Electrolytes: To be drawn in the morning.   Resp/trach: On 21% HTD, O2 sats in high 90s. Denies SOB. Laryngectomy site clear. No suction required. Pt declining lavages citing headache with coughing. Educated on rationale for lavages and risks associated with not utilizing ordered lavages.   Diet: NPO. G tube set to gravity drain.  Bowel status: BS+, normoactive. LBM unknown.   : Mera removed at 0430 this AM  Drains: 4 Brittni drains at neck site.  Skin: Preventative sacral mepilex in place. Bilateral neck incision intact, laryngectomy stoma open/clean. PEG tube insertion site WDL. BRITTNI insertion sites intact.   Pain: Moderately controlled with IV dilaudid. Unable to give pain meds through G tube this shift per orders.  Activity: Not OOB this shift.   Plan: Continue to monitor post op.

## 2021-07-09 NOTE — PROGRESS NOTES
Speech-Language Pathology Department   EVALUATION  [facility]  Pre-operative Alaryngeal Communication Evaluation      06/18/21 1600   General Patient Information   Start of Care Date 06/18/21   Referring Physician Dr. Marilou Sethi   Orders Eval and Treat   Orders Comment Pre-operative alaryngeal communication evaluation   Orders Date 06/18/21   Medical Diagnosis Laryngeal cancer   Precautions/Limitations swallowing precautions   Surgical/Medical history reviewed Yes   Pertinent history of current problem Raj Warren is a 71-year-old man who presented with a T4aN0 SCC of the larynx. He was recommended to undergo chemoradiation and was referred to Dr. Sethi to discuss salvage laryngectomy after chemoradiation. She is recommending a total laryngectomy prior to any chemotherapy or radiation treatment which will provide the best oncologic outcome. Mr. Anderson was seen today for pre-operative alaryngeal communication evaluation. He had tracheostomy placed due to tumor burden and difficulty with breathing.    Hearing WNL for conversational level of speech   Primary Communication Method Other  (Speaking via finger occlusion of tracheostomy)   Preoperative Assessment   Patient/family Pre-operative Counseling Completed   Pre- And Post-anatomy Completed   3 Methods Of Communication Completed   Functional Anatomy Changes Completed   Electrolarynx Introduction/demonstration Completed   In-health Anatomic Diagrams Completed   Post Operative Therapy Plan Completed   Other Will be a good primary TEP candidate   Clinical Impressions   Clinical Impressions Raj Warren participated in pre-operative alaryngeal commuinication evaluation today. He was trained on changes in anatomy and physiology, communication options after surgery, changes in eating/drinking and communication along with trajectory of cares following surgery. He was given the pre-operative packet from AT. All  information within was reviewed. Pt demonstrated understanding of information presented. He will reach out if there are other questions prior to surgery. He is interested in a peer visitor. Will work on coordination of this visit prior to surgery. Pt is a TEP candidate and would benefit from primary TEP placement if Dr. Sethi is able during his surgery. Mr. Warren will benefit from evaluation and treatment after surgery to address alaryngeal voicing, swallowing and pulmonary rehabilitation.    Alaryngeal Goal 1   Goal Identifier Alaryngeal voice options   Goal Description Pt will demonstrate understanding of alaryngeal voicing options and changes in functional anatomy prior to total laryngectomy surgery independently in all encounters   Target Date 07/18/21   Communication with other professionals   Communication with other professionals Discussed with Rafita Sethi and Brent after evaluation   Education   Learner Patient;Family   Readiness Acceptance   Method Booklet/handout;Explanation;Demonstration   Response Demonstrates understanding   Total Evaluation Time   Sound production (artic, phonology, apraxia, dysarthria) Minutes (29628) 45   Total Evaluation Time 45       Thank you for the referral of Raj Warren. If you have any questions about this report, please contact me using the information below.      Haley Padilla MS, CCC-SLP  Speech-Language Pathology  [facility] [place]  Department of Otolaryngology/D&T - 4th floor  Pager: [phone]  Phone: [phone]  Email: [email or web address]

## 2021-07-09 NOTE — PLAN OF CARE
Status: POD#1 s/p total laryngectomy, and bilateral neck dissection  Vitals: VSS  Neuros: Intact ex numbness around incision site and right pinky and ring finger  IV: PIV infusing LR at 100mL/hr  Labs/Electrolytes: WNL  Resp/trach: #9 Larytube in place with HME, lavaged 3x, suctioned 1x for thick blood streaked secretions, patient oral suctioning independently with red chayo   Diet: NPO, PEG with TF started at 1500 at 10mL/hr goal of 55mL/hr  Bowel status: No BM today, BS+, Senna given  : Voiding spontaneously in urinal  Skin: Bi-lateral neck incisions with sutures, cleansed with NS and Bacitracin applied, four BRITTNI's to neck with serosang drainage  Pain: Neck pain controlled with Oxycodone and IV Dilaudid 1x  Activity: Up with A1  Social: Cooperative with cares, son at bedside supportive  Plan: Margy PLC completed today, TF PLC Monday at 0900, discharge home when medically stable, continue to monitor and follow POC

## 2021-07-10 ENCOUNTER — APPOINTMENT (OUTPATIENT)
Dept: CARDIOLOGY | Facility: CLINIC | Age: 71
DRG: 011 | End: 2021-07-10
Attending: NURSE PRACTITIONER
Payer: COMMERCIAL

## 2021-07-10 ENCOUNTER — APPOINTMENT (OUTPATIENT)
Dept: SPEECH THERAPY | Facility: CLINIC | Age: 71
DRG: 011 | End: 2021-07-10
Attending: OTOLARYNGOLOGY
Payer: COMMERCIAL

## 2021-07-10 ENCOUNTER — APPOINTMENT (OUTPATIENT)
Dept: CT IMAGING | Facility: CLINIC | Age: 71
DRG: 011 | End: 2021-07-10
Attending: NURSE PRACTITIONER
Payer: COMMERCIAL

## 2021-07-10 LAB
ALBUMIN UR-MCNC: 10 MG/DL
ANION GAP SERPL CALCULATED.3IONS-SCNC: 6 MMOL/L (ref 3–14)
APPEARANCE UR: CLEAR
BILIRUB UR QL STRIP: NEGATIVE
BUN SERPL-MCNC: 8 MG/DL (ref 7–30)
CALCIUM SERPL-MCNC: 8.1 MG/DL (ref 8.5–10.1)
CHLORIDE SERPL-SCNC: 102 MMOL/L (ref 94–109)
CO2 SERPL-SCNC: 27 MMOL/L (ref 20–32)
COLOR UR AUTO: ABNORMAL
CREAT SERPL-MCNC: 0.86 MG/DL (ref 0.66–1.25)
CRP SERPL-MCNC: 120 MG/L (ref 0–8)
ERYTHROCYTE [DISTWIDTH] IN BLOOD BY AUTOMATED COUNT: 12.8 % (ref 10–15)
GFR SERPL CREATININE-BSD FRML MDRD: 88 ML/MIN/{1.73_M2}
GLUCOSE BLDC GLUCOMTR-MCNC: 101 MG/DL (ref 70–99)
GLUCOSE BLDC GLUCOMTR-MCNC: 102 MG/DL (ref 70–99)
GLUCOSE BLDC GLUCOMTR-MCNC: 114 MG/DL (ref 70–99)
GLUCOSE BLDC GLUCOMTR-MCNC: 115 MG/DL (ref 70–99)
GLUCOSE BLDC GLUCOMTR-MCNC: 118 MG/DL (ref 70–99)
GLUCOSE SERPL-MCNC: 113 MG/DL (ref 70–99)
GLUCOSE UR STRIP-MCNC: NEGATIVE MG/DL
HCT VFR BLD AUTO: 34.2 % (ref 40–53)
HGB BLD-MCNC: 11.3 G/DL (ref 13.3–17.7)
HGB UR QL STRIP: NEGATIVE
KETONES UR STRIP-MCNC: NEGATIVE MG/DL
LEUKOCYTE ESTERASE UR QL STRIP: NEGATIVE
MAGNESIUM SERPL-MCNC: 2 MG/DL (ref 1.6–2.3)
MCH RBC QN AUTO: 29.2 PG (ref 26.5–33)
MCHC RBC AUTO-ENTMCNC: 33 G/DL (ref 31.5–36.5)
MCV RBC AUTO: 88 FL (ref 78–100)
NITRATE UR QL: NEGATIVE
PH UR STRIP: 6 PH (ref 5–7)
PHOSPHATE SERPL-MCNC: 3.5 MG/DL (ref 2.5–4.5)
PLATELET # BLD AUTO: 245 10E9/L (ref 150–450)
POTASSIUM SERPL-SCNC: 3.7 MMOL/L (ref 3.4–5.3)
PROCALCITONIN SERPL-MCNC: <0.05 NG/ML
RBC # BLD AUTO: 3.87 10E12/L (ref 4.4–5.9)
RBC #/AREA URNS AUTO: 1 /HPF (ref 0–2)
SODIUM SERPL-SCNC: 135 MMOL/L (ref 133–144)
SOURCE: ABNORMAL
SP GR UR STRIP: 1.03 (ref 1–1.03)
SQUAMOUS #/AREA URNS AUTO: 0 /HPF (ref 0–1)
UROBILINOGEN UR STRIP-MCNC: NORMAL MG/DL (ref 0–2)
WBC # BLD AUTO: 9.6 10E9/L (ref 4–11)
WBC #/AREA URNS AUTO: 1 /HPF (ref 0–5)

## 2021-07-10 PROCEDURE — 84145 PROCALCITONIN (PCT): CPT | Performed by: OTOLARYNGOLOGY

## 2021-07-10 PROCEDURE — 250N000013 HC RX MED GY IP 250 OP 250 PS 637: Performed by: OTOLARYNGOLOGY

## 2021-07-10 PROCEDURE — 71275 CT ANGIOGRAPHY CHEST: CPT

## 2021-07-10 PROCEDURE — 250N000011 HC RX IP 250 OP 636: Performed by: STUDENT IN AN ORGANIZED HEALTH CARE EDUCATION/TRAINING PROGRAM

## 2021-07-10 PROCEDURE — 85027 COMPLETE CBC AUTOMATED: CPT | Performed by: STUDENT IN AN ORGANIZED HEALTH CARE EDUCATION/TRAINING PROGRAM

## 2021-07-10 PROCEDURE — 86140 C-REACTIVE PROTEIN: CPT | Performed by: OTOLARYNGOLOGY

## 2021-07-10 PROCEDURE — 36415 COLL VENOUS BLD VENIPUNCTURE: CPT | Performed by: OTOLARYNGOLOGY

## 2021-07-10 PROCEDURE — 250N000011 HC RX IP 250 OP 636: Performed by: OTOLARYNGOLOGY

## 2021-07-10 PROCEDURE — 71275 CT ANGIOGRAPHY CHEST: CPT | Mod: 26 | Performed by: RADIOLOGY

## 2021-07-10 PROCEDURE — 250N000013 HC RX MED GY IP 250 OP 250 PS 637: Performed by: STUDENT IN AN ORGANIZED HEALTH CARE EDUCATION/TRAINING PROGRAM

## 2021-07-10 PROCEDURE — 92507 TX SP LANG VOICE COMM INDIV: CPT | Mod: GN

## 2021-07-10 PROCEDURE — 87040 BLOOD CULTURE FOR BACTERIA: CPT | Performed by: NURSE PRACTITIONER

## 2021-07-10 PROCEDURE — 36415 COLL VENOUS BLD VENIPUNCTURE: CPT | Performed by: NURSE PRACTITIONER

## 2021-07-10 PROCEDURE — 93010 ELECTROCARDIOGRAM REPORT: CPT | Performed by: INTERNAL MEDICINE

## 2021-07-10 PROCEDURE — 93321 DOPPLER ECHO F-UP/LMTD STD: CPT | Mod: 26 | Performed by: INTERNAL MEDICINE

## 2021-07-10 PROCEDURE — 85027 COMPLETE CBC AUTOMATED: CPT | Performed by: OTOLARYNGOLOGY

## 2021-07-10 PROCEDURE — 250N000013 HC RX MED GY IP 250 OP 250 PS 637: Performed by: PHYSICIAN ASSISTANT

## 2021-07-10 PROCEDURE — 81001 URINALYSIS AUTO W/SCOPE: CPT | Performed by: NURSE PRACTITIONER

## 2021-07-10 PROCEDURE — 999N000128 HC STATISTIC PERIPHERAL IV START W/O US GUIDANCE

## 2021-07-10 PROCEDURE — 80048 BASIC METABOLIC PNL TOTAL CA: CPT | Performed by: OTOLARYNGOLOGY

## 2021-07-10 PROCEDURE — 258N000003 HC RX IP 258 OP 636: Performed by: NURSE PRACTITIONER

## 2021-07-10 PROCEDURE — 93325 DOPPLER ECHO COLOR FLOW MAPG: CPT | Mod: 26 | Performed by: INTERNAL MEDICINE

## 2021-07-10 PROCEDURE — 999N001017 HC STATISTIC GLUCOSE BY METER IP

## 2021-07-10 PROCEDURE — 99207 PR APP CREDIT; MD BILLING SHARED VISIT: CPT | Performed by: NURSE PRACTITIONER

## 2021-07-10 PROCEDURE — 83735 ASSAY OF MAGNESIUM: CPT | Performed by: OTOLARYNGOLOGY

## 2021-07-10 PROCEDURE — 99207 PR CONSULT E&M CHANGED TO INITIAL LEVEL: CPT | Performed by: INTERNAL MEDICINE

## 2021-07-10 PROCEDURE — 93325 DOPPLER ECHO COLOR FLOW MAPG: CPT

## 2021-07-10 PROCEDURE — 120N000002 HC R&B MED SURG/OB UMMC

## 2021-07-10 PROCEDURE — 93308 TTE F-UP OR LMTD: CPT | Mod: 26 | Performed by: INTERNAL MEDICINE

## 2021-07-10 PROCEDURE — 99222 1ST HOSP IP/OBS MODERATE 55: CPT | Performed by: INTERNAL MEDICINE

## 2021-07-10 PROCEDURE — 84100 ASSAY OF PHOSPHORUS: CPT | Performed by: OTOLARYNGOLOGY

## 2021-07-10 PROCEDURE — 93005 ELECTROCARDIOGRAM TRACING: CPT

## 2021-07-10 RX ORDER — HYDROMORPHONE HCL IN WATER/PF 6 MG/30 ML
0.4 PATIENT CONTROLLED ANALGESIA SYRINGE INTRAVENOUS
Status: DISCONTINUED | OUTPATIENT
Start: 2021-07-10 | End: 2021-07-13

## 2021-07-10 RX ORDER — NALOXONE HYDROCHLORIDE 0.4 MG/ML
0.2 INJECTION, SOLUTION INTRAMUSCULAR; INTRAVENOUS; SUBCUTANEOUS
Status: DISCONTINUED | OUTPATIENT
Start: 2021-07-10 | End: 2021-07-17 | Stop reason: HOSPADM

## 2021-07-10 RX ORDER — OXYCODONE HYDROCHLORIDE 5 MG/1
5 TABLET ORAL EVERY 4 HOURS
Status: DISCONTINUED | OUTPATIENT
Start: 2021-07-10 | End: 2021-07-17 | Stop reason: HOSPADM

## 2021-07-10 RX ORDER — HYDROMORPHONE HCL IN WATER/PF 6 MG/30 ML
0.2 PATIENT CONTROLLED ANALGESIA SYRINGE INTRAVENOUS EVERY 4 HOURS PRN
Status: DISCONTINUED | OUTPATIENT
Start: 2021-07-10 | End: 2021-07-13

## 2021-07-10 RX ORDER — IOPAMIDOL 755 MG/ML
66 INJECTION, SOLUTION INTRAVASCULAR ONCE
Status: COMPLETED | OUTPATIENT
Start: 2021-07-10 | End: 2021-07-10

## 2021-07-10 RX ORDER — NALOXONE HYDROCHLORIDE 0.4 MG/ML
0.4 INJECTION, SOLUTION INTRAMUSCULAR; INTRAVENOUS; SUBCUTANEOUS
Status: DISCONTINUED | OUTPATIENT
Start: 2021-07-10 | End: 2021-07-17 | Stop reason: HOSPADM

## 2021-07-10 RX ORDER — MINERAL OIL/HYDROPHIL PETROLAT
OINTMENT (GRAM) TOPICAL
Status: DISCONTINUED | OUTPATIENT
Start: 2021-07-10 | End: 2021-07-17 | Stop reason: HOSPADM

## 2021-07-10 RX ADMIN — ONDANSETRON 4 MG: 2 INJECTION INTRAMUSCULAR; INTRAVENOUS at 10:07

## 2021-07-10 RX ADMIN — OXYCODONE HYDROCHLORIDE 5 MG: 5 TABLET ORAL at 11:05

## 2021-07-10 RX ADMIN — CHLORHEXIDINE GLUCONATE 15 ML: 1.2 RINSE ORAL at 12:44

## 2021-07-10 RX ADMIN — ACETAMINOPHEN 975 MG: 325 TABLET, FILM COATED ORAL at 22:32

## 2021-07-10 RX ADMIN — CHLORHEXIDINE GLUCONATE 15 ML: 1.2 RINSE ORAL at 15:17

## 2021-07-10 RX ADMIN — WHITE PETROLATUM: 1.75 OINTMENT TOPICAL at 10:08

## 2021-07-10 RX ADMIN — SODIUM CHLORIDE, POTASSIUM CHLORIDE, SODIUM LACTATE AND CALCIUM CHLORIDE 500 ML: 600; 310; 30; 20 INJECTION, SOLUTION INTRAVENOUS at 14:06

## 2021-07-10 RX ADMIN — AMPICILLIN SODIUM AND SULBACTAM SODIUM 3 G: 2; 1 INJECTION, POWDER, FOR SOLUTION INTRAMUSCULAR; INTRAVENOUS at 08:55

## 2021-07-10 RX ADMIN — OXYCODONE HYDROCHLORIDE 5 MG: 5 TABLET ORAL at 18:04

## 2021-07-10 RX ADMIN — DOCUSATE SODIUM 100 MG: 50 LIQUID ORAL at 10:07

## 2021-07-10 RX ADMIN — METOPROLOL TARTRATE 25 MG: 25 TABLET, FILM COATED ORAL at 20:44

## 2021-07-10 RX ADMIN — CHLORHEXIDINE GLUCONATE 15 ML: 1.2 RINSE ORAL at 20:44

## 2021-07-10 RX ADMIN — SENNOSIDES AND DOCUSATE SODIUM 1 TABLET: 8.6; 5 TABLET ORAL at 20:44

## 2021-07-10 RX ADMIN — WHITE PETROLATUM: 1.75 OINTMENT TOPICAL at 22:34

## 2021-07-10 RX ADMIN — OXYCODONE HYDROCHLORIDE 5 MG: 5 TABLET ORAL at 14:06

## 2021-07-10 RX ADMIN — AMPICILLIN SODIUM AND SULBACTAM SODIUM 3 G: 2; 1 INJECTION, POWDER, FOR SOLUTION INTRAMUSCULAR; INTRAVENOUS at 02:35

## 2021-07-10 RX ADMIN — ONDANSETRON 4 MG: 2 INJECTION INTRAMUSCULAR; INTRAVENOUS at 15:17

## 2021-07-10 RX ADMIN — IOPAMIDOL 66 ML: 755 INJECTION, SOLUTION INTRAVENOUS at 12:15

## 2021-07-10 RX ADMIN — HYDROMORPHONE HYDROCHLORIDE 0.4 MG: 0.2 INJECTION, SOLUTION INTRAMUSCULAR; INTRAVENOUS; SUBCUTANEOUS at 12:44

## 2021-07-10 RX ADMIN — AMPICILLIN SODIUM AND SULBACTAM SODIUM 3 G: 2; 1 INJECTION, POWDER, FOR SOLUTION INTRAMUSCULAR; INTRAVENOUS at 14:06

## 2021-07-10 RX ADMIN — OXYCODONE HYDROCHLORIDE 5 MG: 5 TABLET ORAL at 22:32

## 2021-07-10 RX ADMIN — CHLORHEXIDINE GLUCONATE 15 ML: 1.2 RINSE ORAL at 08:52

## 2021-07-10 RX ADMIN — DOCUSATE SODIUM 100 MG: 50 LIQUID ORAL at 20:44

## 2021-07-10 RX ADMIN — METOPROLOL TARTRATE 25 MG: 25 TABLET, FILM COATED ORAL at 08:54

## 2021-07-10 RX ADMIN — OXYCODONE HYDROCHLORIDE 10 MG: 10 TABLET ORAL at 06:36

## 2021-07-10 RX ADMIN — OXYCODONE HYDROCHLORIDE 10 MG: 10 TABLET ORAL at 02:35

## 2021-07-10 RX ADMIN — SENNOSIDES AND DOCUSATE SODIUM 1 TABLET: 8.6; 5 TABLET ORAL at 08:53

## 2021-07-10 RX ADMIN — ACETAMINOPHEN 975 MG: 325 TABLET, FILM COATED ORAL at 14:15

## 2021-07-10 RX ADMIN — WHITE PETROLATUM: 1.75 OINTMENT TOPICAL at 15:17

## 2021-07-10 RX ADMIN — Medication 15 ML: at 08:52

## 2021-07-10 RX ADMIN — PANTOPRAZOLE SODIUM 40 MG: 40 TABLET, DELAYED RELEASE ORAL at 08:53

## 2021-07-10 RX ADMIN — ENOXAPARIN SODIUM 40 MG: 40 INJECTION SUBCUTANEOUS at 10:07

## 2021-07-10 RX ADMIN — ONDANSETRON 4 MG: 2 INJECTION INTRAMUSCULAR; INTRAVENOUS at 02:59

## 2021-07-10 ASSESSMENT — ACTIVITIES OF DAILY LIVING (ADL)
ADLS_ACUITY_SCORE: 17

## 2021-07-10 NOTE — PROGRESS NOTES
"Otolaryngology Progress Note  July 10, 2021    Subjective and Interval Events: Patient had no acute events overnight. He has been tachycardic to the 110s to 121. Low grade fever. No difficulty breathing or nausea/fullness with tube feeds.    Objective: /62 (BP Location: Right arm)   Pulse 133   Temp 98.7  F (37.1  C) (Oral)   Resp 18   Ht 1.803 m (5' 11\")   Wt 89.8 kg (197 lb 15.6 oz)   SpO2 92%   BMI 27.61 kg/m     General: resting comfortably and not in acute distress   Neuro: patient alert, oriented, and answering questions appropriately   HEENT: neck incisions soft, flat, and well-approximated; no pinpoint tenderness. anny tube clean, no crusting around stoma or in airway. BRITTNI drains x4 with moderate amount of serous-serosanguinous drainage; stoma well-approximated;   Pulmonary: breathing comfortably on room air without stridor or stertor      BRITTNI Drains:  - JP1, Left lateral neck: 13, 5  - JP2, Left medial neck: 13, 15  - JP3, Right lateral neck: 25, 15  - JP4, Right medial neck: 30, 15    Labs:  - BMP wnl except for hypocalcemia  - Mg, phosph wnl  - Albumin low at 2.9  - Prealbumin low at 12  - iCal wnl at 4.4  - TSH wnl at 1.1  - CBC with expected leukocytosis and postoperative anemia    Assessment & Plan: Raj Warren is a 71-year-old male with a PMH significant for TIIDM (diet-controlled), HTN (on metoprolol PTA), and recent bilateral PE (on apixaban PTA) with right ventricular clot burden s/p sternotomy, embolectomy/RV thrombectomy, and IVC filter placement in 04/2021. Patient also has a history of laryngeal SCCa and is now s/p total laryngectomy, and bilateral neck dissection (II-V) on 7/8.    Neuro:  - Analgesia: tylenol q8h; oxycodone and dilaudid PRN    HEENT:  - Oral care- Peridex QID; normal saline mouth rinses q8h and PRN; suction with red Martin catheter only  - Strict NPO  - Incision care- cleanse incisions with normal saline/half-strength peroxide q8h and apply Bacitracin q8h x1 " day, then transition to Aquaphor q8h  - Scheduled pantoprazole every day; scheduled zofran q6h x48 hours --> PRN  - Routine laryngectomy care-   - Cleanse incisions   - Okay for Nicola tube at all times with HME filter   - No electrolarynx   - Humidified air at all times HME filter not on   - Saline lavage q2h and PRN for obstruction   - Contact otolaryngology on-call if patient noted to have large obstructive clot/crust or if patient develops difficulty breathing    Respiratory:   - Patient has a laryngectomy and has NO AIRWAY FROM ABOVE  - Patient must have flexible suction catheters at the bedside at all times  - Patient must be on humidified air at all times that Nicola tube and HME filter are not in place  - Continuous pulse oximetry at all times; supplemental O2 PRN to keep sats >90%.    CV/heme:   - PMH of HTN and bilateral Pes (IVC filter in place)- on metoprolol; apixiban PTA (holding)   - Hematology consultation for assistance with anti-coagulation (currently on prophylactic lovenox): pending timing of resumption of therapeutic (full-intensity) anticoagulation. Eventually will need to resume apixiban for additional 3m (total 6m course)   -Medicine consult given tachycardia, h/o PE and not on therapeutic dosing. Pending rec's and work-up    - Continue to monitor tachycardia    FEN/GI:  - PEG placed by Thoracic surgery on 7/8  - PPI scheduled, zofran scheduled x48 hours, per laryngectomy protocol  - Nutrition consult- advancing slowly to goal. Will have pt stay at goal for at least 24 hours prior to transition to bolus   - Albumin low at 2.9; Prealbumin low at 12; TSH wnl at 1.1  - Bowel regimen: senna-docusate BID; miralax daily    :  - voiding independently     Endo:   - PMH of TIIDM- diet-controlled prior to admission  - ISS    ID: low grade fever, monitoring   - Unasyn x48 hrs    PPX:  - SCDs currently  - Lovenox 40mg    Consults:  - PT/OT  - Nutrition  - SLP  - PLC    Dispo:  - PT/OT- TCU  - PLC- anny  tube completed, TF Monday     -- Patient and above plan discussed with staff, Dr. Navdeep Ferris MD PGY2

## 2021-07-10 NOTE — PROGRESS NOTES
I rounded on the pain on 7/10/2021.  Surgically is doing well.  He has no crusting in his stoma now that he is started using a Margy tube with HME.  He is having issues with tachycardia, was up to the 120s to 130s when I rounded.  Discussed with him that we are trying to balance his risk of bleeding with his risk of clot.  Hematology has recommended restarting anticoagulation when able.  He is been getting a prophylactic dose of Lovenox since the time of surgery.  I am concerned about risk of bleeding and him not eager to start full anticoagulation.  I discussed with him that bleeding could be life-threatening but a pulmonary embolus could also be life-threatening for him.  He does have the IVC filter in but did not have DVTs at the time of his previous PE.  If you start anticoagulation would start with a very low-dose heparin drip to minimize the risk of bleeding.  Would not do nonreversible agents given the risk of bleeding.  We will ask medicine to weigh in on his tachycardia today.      Marilou Sethi MD    Department of Otolaryngology

## 2021-07-10 NOTE — CONSULTS
Care Management Initial Consult    General Information  Assessment completed with:  Adult Jovani kearney  Type of CM/SW Visit: Offer D/C Planning    Primary Care Provider verified and updated as needed: Yes   Readmission within the last 30 days: planned readmission   Return Category: Planned Surgery    Reason for Consult: discharge planning  Advance Care Planning:   Not addressed       Communication Assessment  Patient's communication style: spoken language (English or Bilingual)    Hearing Difficulty or Deaf: no   Wear Glasses or Blind: yes    Cognitive  Cognitive/Neuro/Behavioral: WDL  Level of Consciousness: alert  Arousal Level: opens eyes spontaneously  Orientation: oriented x 4  Mood/Behavior: calm, cooperative  Best Language: 0 - No aphasia  Speech: laryngectomy    Living Environment:   People in home: alone     Current living Arrangements: apartment      Able to return to prior arrangements: yes     Family/Social Support:  Care provided by: self  Provides care for:            Description of Support System:   Adult children     Current Resources:   Patient receiving home care services: Yes  Skilled Home Care Services: Skilled Nursing  Community Resources: DME, Home Care  Equipment currently used at home: Portable suction   Supplies currently used at home: Trach supplies    Employment/Financial:  Employment Status: retired, worked for [employer]       Lifestyle & Psychosocial Needs:     Social Needs     Financial resource strain: Not hard at all     Food insecurity     Worry: Never true     Inability: Never true     Transportation needs     Medical: No     Non-medical: No     Socioeconomic History     Marital status: Single     Spouse name: Not on file     Number of children: Not on file     Years of education: Not on file     Highest education level: Not on file     Tobacco Use     Smoking status: Former Smoker     Types: Cigarettes     Quit date: 1989     Years since quittin.5     Smokeless  tobacco: Never Used     Tobacco comment: quit in 1989  had smoked about 1/2 pack a day then cut back   Substance and Sexual Activity     Alcohol use: No     Comment: No alcohol since 1989     Drug use: No     Sexual activity: Yes     Partners: Female       Functional Status:  Prior to admission patient needed assistance:  Independent       ________________________________________      Preoperative Diagnosis:   1. Squamous cell carcinoma of larynx  2. Secondary malignancy of lymph nodes  3. History of pulmonary embolism  4. Chronic anticoagulation         Procedure Performed by ENT, 7-  Direct laryngoscopy  Total laryngectomy  Bilateral neck dissection (levels II-IV)  Cricopharyngeal myotomy     Procedure by Thoracic Surgery:  EGD; PEG insertion.   _________________________________________    4-12 to 4-: Pt was hospitalized at [facility] with dyspnea and stridor; laryngeal mass. Trach placed on 4-12. Discharged home.   4-27 to 5-:  Hospitalized at [facility] with worsening shortness of breath. Found to have bilateral PE. 4-28: pulmonary embolectomy done. 4-30: IVC filter placed. [facility] to  [facility].   5-08 to 5-:   [facility]. Discharged home.     Additional Information:  Informed by Teresita Domínguez, RN Liaison, [facility]; pt is currently open to them for home RN services. In 6A Discharge Rounds informed pt is s/p a laryngectomy & g-tube placement. Tube feeding has not started yet.   Went to meet pt this afternoon but he was sleeping soundly. Noted pt's son had attended the PLC class today. Called son, Jovani and explained I help with discharge planning. Jovani said pt lives alone in an apartment. Jovani lives in [place]. Pt has 15 steps to enter apartment then everything is on 1 level. Bathroom has a tub/shower combination. Pt was independent with bathing, dressing and meds prior to admission. Pt is retired, worked for [employer].  Jovani said pt had suction at home. He was not sure of  the medical supplier, the box said [employer].  Informed Jovani we encourage pt & family to practice cares prior to discharge. I will order laryngectomy and tube feeding supplies for home. Jovani was OK with any medical supplier if the current supplier could not provide.   Per RNCC notes from other admissions trach supplies were ordered from [facility] and [facility]. 4:10pm: faxed laryngectomy & tube feeding supply orders to [facility]. Left a voice message inquiring if they provide these supplies. Will follow-up with them on Monday, 7-12.   Informed by ST Pauline, she sent the [employer] script for HME supplies to [facility].     --RNCC will follow-up on Monday, 7-12 with [facility] for laryngectomy & tube feeding supplies. Clarify with [facility] what supplies they were providing.   --Pt & family to practice laryngectomy and tube feeding cares.       Bonnie Herrera RN Care Coordinator  [address], [facility]  Phone:  [phone]  Fax:  [phone]    [facility]  Phone:  [phone]

## 2021-07-10 NOTE — CONSULTS
[facility]  Consult Note - Hospitalist Service, Gold 7   Date of Admission:  7/8/2021  Consult Requested by: ENT, Dr. Sethi  Reason for Consult: Hx of bilateral PE s/p with R ventricular clot curden s/p sternotomy, embolectomy/RV thrombectomy and IVC filter (4/2021), HTN, Type II DM and laryngeal SCC s/p total laryngectomy and b/l neck dissection (II-V) on 7/8 now with fevers and tachycardia.    Assessment & Plan   Raj Warren is a 71 year old male admitted on 7/8/2021. He has a history of SCC of larynx s/p s/p trach placement (4/12), bilateral next dissection, laryngectomy, and PEG tube placement (7/8), and extensive bilateral PE's, s/p RV thrombectomy, pulmonary embolectomy, and ICV placement 4/28 with Dr. Singh. Other medical history includes DM2, HTN, anemia, anticoagulated, GERD. Today medicine is consulted for acute onset of tachycardia and low grade fever.    #Acute tachycardia  #Low grade fevers  Patient had a temp of 100.9 overnight, 98.7 today. Also tachycardic today 120-130's. Infective vs. response to acute surgical/incisional pain.  Stat CT PE shows left basilar atelectasis and consolidation with elevation of the left hemidiaphragm and small left pleural effusion, no PE.  Possible PNA on chest CT with elevated CRP to 120, however, afebrile today and not hypoxic.   - Fluid bolus 500 mL now, monitor HR post bolus   - Stat EKG shows: sinus tachycardia  - Stat echo shows: RV function is normal to mildly reduced based on limited windows. Estimated pulmonary artery systolic pressure is 31 mmHg plus right atrial pressure.  - UA w/ culture and blood cx x 2 sent, will follow   - If febrile or any clinical decompensation, recommend starting IV Vanco/Zosyn for HAP coverage   - Add on CRP and procal    #Mild hypocalcemia  #Mild hypoalbunemia  Acute hypocalcemia 8.1 (9.2), hypoalbuminemia 2.9, and iCa 4.4. Asymptomatic on assessment.   - Repeat labs in the am: BMP,  ionized Ca, Albumin  - Consider calcium repletion if Ca still low tomorrow    #Hx of large bilateral pulmonary emboli with evidence of right heart strain (4/27/2021) s/p pulmonary embolectomy, RV thrombectomy, ICV filter placement  #Chronic anticoagulation  Admitted to [facility] 4/27-5/6 after p/w shortness of breath after hospitalization for tracheostomy placement 4/13).  Patient was found to have large bilateral PE's with evidence of right heart strain and RV thrombus on CT angio 4/27 suspected from malignancy. Underwent emergent pulmonary embolectomy, RV thrombectomy, ICV filter placement at [facility] with Dr. Singh on 4/28/21.  [facility], 5/15 discharged home.  Stopped eliquis and started therapeutic lovenox 7/5-7/7.  S/p laryngectomy procedure on 7/8 and remains on prophylactic Lovenox at this time. Stat CT today showed no evidence of pulmonary embolism.  Hematology consulted.   - Given negative CT PE, no acute need to anticoagulate now, however, should be anticoagulated ASAP when safe from a surgical standpoint.  Will defer AC plan to ENT/heme.   - Per heme: If concern for bleeding, trial of full intensity heparin so that if he bleeds the therapy could be stopped.  If stable on full anticoagulation for 24-48 hours then could safely change to apixaban.    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 Montaño) and total laryngectomy, bilateral neck dissection, cricopharyngeal myotomy Dr. 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 4/12/21 presented to the ED with dyspnea and found to have a right larynx soft tissue mass, tracheotomy performed by ENT. Patient had another ENT consult 6/18 with Dr. Sethi and a flexible laryngoscopy/trach exchange was done, with plan for surgery and radiation  +/- chemo. Now admitted to [facility] s/p total laryngectomy without major complications.  - Continue HEENT incision cares (please see ENT's note)  - Continue peridex oral rinse    #DMII  Diet-controlled. Glucose's have been 's.   - Continue sugar checks as ordered  - Hypoglycemia protocol    #HTN  Normotensive during inpatient stay.  - Continue Metoprolol 25 mg BID    #Acute anemia  Likely in the setting of intraoperative blood loss. Hgb 11.3 (13.9).  No current s/s of bleeding.   - Labs: CBC tomorrow am  - Consider transfusion of RBCs if symptomatic and < 7    #GERD  - Continue Pantoprazole EC 40 mg daily    #R hand numbness   Following surgery 4/2021.  Evalauted by neurology 6/2021 and this is felt most likely due to compression/stretch injury that occurred cheryl-operatively.  Symptoms have mildly improved since onset.  - Plan for EMG as OP with Neurology after medically stable and discharged   - Continue to monitor     The patient's care was discussed with the primary physician, Dr. Smith.  Internal medicine will continue to follow along in the care of this very pleasant patient.  Thank you so much for involving us in his care.  Please page for any intercurrent medical issues that may arise.    Leslee Lazaro NP  [facility]  Securely message with the Vocera Web Console (learn more here)  Text page via [employer] Paging/Directory  _______________________________________________________________    Chief Complaint   Tachycardia, low grade fever overnight    History is obtained from the patient     History of Present Illness   Raj Warren is a 71 year old male who is admitted on 7/8 s/p total laryngectomy, bilateral neck dissection, cricopharyngeal myotomy and PEG tube placement. Internal medicine was consulted today for new onset tachycardia and low grade fever of 100.9 overnight. On assessment of the patient today, he is down to 98.7 and heart rate  120-130's on continuous monitoring. Patient is sitting upright in a chair, and is able to mouth words during history and exam. He appears in no apparent distress, alert and oriented.   He denies fevers, chills, nausea, vomiting, chest pain, shortness of breath. Does share that he has R hand numbness but this is chronic for him.     Review of Systems   CONSTITUTIONAL: NEGATIVE for fever, chills, change in weight  ENT/MOUTH: NEGATIVE for ear and mouth problems, runny nose, hx laryngeal cancer  RESP: NEGATIVE for significant cough, SOB, CP  CV: NEGATIVE for chest pain, palpitations or peripheral edema.   GI: Negative for masses, distention, upset, tenderness. Negative for blood in stool  : Denies any urinary problems   NEURO: Denies headaches, seizures      Past Medical History    I have reviewed this patient's medical history and updated it with pertinent information if needed.   Past Medical History:   Diagnosis Date     Allergic state      Anemia      Tracheostomy in place (H)        Past Surgical History   I have reviewed this patient's surgical history and updated it with pertinent information if needed.  Past Surgical History:   Procedure Laterality Date     DISSECTION RADICAL NECK BILATERAL Bilateral 7/8/2021    Procedure: bilateral neck dissections;  Surgeon: Marilou Sethi MD;  Location: [facility] OR     ENDOSCOPIC INSERTION TUBE GASTROSTOMY Left 7/8/2021    Procedure: INSERTION, PEG TUBE with Esophagogastroduodenoscopy;  Surgeon: Kg Montaño MD;  Location: [facility] OR     IR IVC FILTER PLACEMENT  4/30/2021     LARYNGECTOMY N/A 7/8/2021    Procedure: LARYNGECTOMY;  Surgeon: Marilou Sethi MD;  Location: [facility] OR     LARYNGOSCOPY N/A 4/12/2021    Procedure: LARYNGOSCOPY;  Surgeon: Choco Johnson MD;  Location: [facility] OR     LARYNGOSCOPY N/A 7/8/2021    Procedure: LARYNGOSCOPY;  Surgeon: Marilou Sethi MD;  Location: [facility] OR     REPAIR ANEURYSM ASCENDING AORTA N/A 4/28/2021    Procedure: PULMONARY  THROMBECTOMY;  Surgeon: Yumi Singh MD;  Location:  OR     TRACHEOSTOMY  2021    Procedure: CREATION, TRACHEOSTOMY;  Surgeon: Choco Johnson MD;  Location:  OR       Social History   I have reviewed this patient's social history and updated it with pertinent information if needed.  Social History     Tobacco Use     Smoking status: Former Smoker     Types: Cigarettes     Quit date: 1989     Years since quittin.5     Smokeless tobacco: Never Used     Tobacco comment: quit in   had smoked about 1/2 pack a day then cut back   Substance Use Topics     Alcohol use: No     Comment: No alcohol since      Drug use: No       Family History   I have reviewed this patient's family history and updated it with pertinent information if needed.  Family History   Problem Relation Age of Onset     Circulatory Mother         blood clots     Alcohol/Drug Father         alcohol drank heavily     Alcohol/Drug Brother         alcohol       Medications   Current Facility-Administered Medications   Medication     acetaminophen (TYLENOL) tablet 975 mg     benzocaine-menthol (CEPACOL) 15-3.6 MG lozenge 1 lozenge     bisacodyl (DULCOLAX) Suppository 10 mg     chlorhexidine (PERIDEX) 0.12 % solution 15 mL     dextrose 10% infusion     glucose gel 15-30 g    Or     dextrose 50 % injection 25-50 mL    Or     glucagon injection 1 mg     docusate (COLACE) 50 MG/5ML liquid 100 mg     enoxaparin ANTICOAGULANT (LOVENOX) injection 40 mg     HYDROmorphone (DILAUDID) injection 0.2 mg    Or     HYDROmorphone (DILAUDID) injection 0.4 mg     magnesium hydroxide (MILK OF MAGNESIA) suspension 30 mL     metoprolol tartrate (LOPRESSOR) tablet 25 mg     mineral oil-hydrophilic petrolatum (AQUAPHOR)     multivitamins w/minerals (CERTAVITE) liquid 15 mL     naloxone (NARCAN) injection 0.2 mg    Or     naloxone (NARCAN) injection 0.4 mg    Or     naloxone (NARCAN) injection 0.2 mg    Or     naloxone (NARCAN) injection 0.4  mg     ondansetron (ZOFRAN-ODT) ODT tab 4 mg    Or     ondansetron (ZOFRAN) injection 4 mg     oxyCODONE (ROXICODONE) tablet 5 mg     oxyCODONE (ROXICODONE) tablet 5 mg    Or     oxyCODONE IR (ROXICODONE) tablet 10 mg     pantoprazole (PROTONIX) EC tablet 40 mg     polyethylene glycol (MIRALAX) Packet 17 g     prochlorperazine (COMPAZINE) injection 5 mg    Or     prochlorperazine (COMPAZINE) tablet 5 mg     senna-docusate (SENOKOT-S/PERICOLACE) 8.6-50 MG per tablet 1 tablet     sodium chloride (PF) 0.9% PF flush 3 mL     sodium chloride (PF) 0.9% PF flush 3 mL     sodium chloride (PF) 0.9% PF flush 3 mL     sodium phosphate (FLEET ENEMA) 1 enema       Allergies   Allergies   Allergen Reactions     Pollen Extract        Physical Exam   Vital Signs: Temp: 98.7  F (37.1  C) Temp src: Oral BP: 103/62 Pulse: 133   Resp: 18 SpO2: 92 % O2 Device: None (Room air)    Weight: 197 lbs 15.57 oz    CONSTITUTIONAL: very pleasant, well-appearing man in no apparent distress.   ENT: has large neck incision that is c/d/i with 4 BRITTNI drains also c/d/i draining small amount of serosanguinous fluid. Trach in place  RESP: normal work of breathing on room air. LS clear throughout all fields. No crackles, wheezing, stridor.  CV: Denies chest pain, palpitations or peripheral edema. RRR, S1 S2, tachycardic on auscultation  GI: Abdomen soft, non-tender, non-distended. BS active all quadrants, denies n/v/d.   : Denies any urinary problems like hematuria, pain, burning  NEURO: alert and oriented x3, no focal deficits noted, patient is aphonic and mouths words.      Data   I personally reviewed the EKG tracing showing sinus tachycardia  and the chest CT image(s) showing no sign of pulmonary embolism.     ROUTINE IP LABS   CMP   Recent Labs   Lab 07/10/21  0731 07/09/21  0841    136   POTASSIUM 3.7 3.8   CHLORIDE 102 105   CO2 27 27   ANIONGAP 6 4   * 107*   BUN 8 9   CR 0.86 0.75   TELMA 8.1* 8.4*   MAG 2.0 2.0   PHOS 3.5 3.9    ALBUMIN  --  2.9*     CBC   Recent Labs   Lab 07/10/21  0731 07/09/21  0841   WBC 9.6 11.1*   RBC 3.87* 4.17*   HGB 11.3* 12.1*   HCT 34.2* 36.5*   MCV 88 88   MCH 29.2 29.0   MCHC 33.0 33.2   RDW 12.8 12.8    260     INR No lab results found in last 7 days.  CRP and ESR   Recent Labs   Lab 07/10/21  0731   .0*

## 2021-07-11 ENCOUNTER — APPOINTMENT (OUTPATIENT)
Dept: SPEECH THERAPY | Facility: CLINIC | Age: 71
DRG: 011 | End: 2021-07-11
Attending: OTOLARYNGOLOGY
Payer: COMMERCIAL

## 2021-07-11 ENCOUNTER — APPOINTMENT (OUTPATIENT)
Dept: OCCUPATIONAL THERAPY | Facility: CLINIC | Age: 71
DRG: 011 | End: 2021-07-11
Attending: OTOLARYNGOLOGY
Payer: COMMERCIAL

## 2021-07-11 LAB
ANION GAP SERPL CALCULATED.3IONS-SCNC: 5 MMOL/L (ref 3–14)
BUN SERPL-MCNC: 10 MG/DL (ref 7–30)
CALCIUM SERPL-MCNC: 8.5 MG/DL (ref 8.5–10.1)
CHLORIDE SERPL-SCNC: 102 MMOL/L (ref 94–109)
CO2 SERPL-SCNC: 29 MMOL/L (ref 20–32)
CREAT SERPL-MCNC: 0.78 MG/DL (ref 0.66–1.25)
ERYTHROCYTE [DISTWIDTH] IN BLOOD BY AUTOMATED COUNT: 12.9 % (ref 10–15)
GFR SERPL CREATININE-BSD FRML MDRD: >90 ML/MIN/{1.73_M2}
GLUCOSE BLDC GLUCOMTR-MCNC: 129 MG/DL (ref 70–99)
GLUCOSE BLDC GLUCOMTR-MCNC: 133 MG/DL (ref 70–99)
GLUCOSE BLDC GLUCOMTR-MCNC: 98 MG/DL (ref 70–99)
GLUCOSE SERPL-MCNC: 128 MG/DL (ref 70–99)
HCT VFR BLD AUTO: 33.8 % (ref 40–53)
HGB BLD-MCNC: 11.3 G/DL (ref 13.3–17.7)
MAGNESIUM SERPL-MCNC: 2.1 MG/DL (ref 1.6–2.3)
MCH RBC QN AUTO: 29.7 PG (ref 26.5–33)
MCHC RBC AUTO-ENTMCNC: 33.4 G/DL (ref 31.5–36.5)
MCV RBC AUTO: 89 FL (ref 78–100)
PHOSPHATE SERPL-MCNC: 4.4 MG/DL (ref 2.5–4.5)
PLATELET # BLD AUTO: 229 10E9/L (ref 150–450)
POTASSIUM SERPL-SCNC: 3.6 MMOL/L (ref 3.4–5.3)
RBC # BLD AUTO: 3.8 10E12/L (ref 4.4–5.9)
SODIUM SERPL-SCNC: 136 MMOL/L (ref 133–144)
WBC # BLD AUTO: 8.3 10E9/L (ref 4–11)

## 2021-07-11 PROCEDURE — 97535 SELF CARE MNGMENT TRAINING: CPT | Mod: GO | Performed by: OCCUPATIONAL THERAPIST

## 2021-07-11 PROCEDURE — 85027 COMPLETE CBC AUTOMATED: CPT | Performed by: NURSE PRACTITIONER

## 2021-07-11 PROCEDURE — 250N000013 HC RX MED GY IP 250 OP 250 PS 637: Performed by: STUDENT IN AN ORGANIZED HEALTH CARE EDUCATION/TRAINING PROGRAM

## 2021-07-11 PROCEDURE — 99233 SBSQ HOSP IP/OBS HIGH 50: CPT | Performed by: INTERNAL MEDICINE

## 2021-07-11 PROCEDURE — 999N001017 HC STATISTIC GLUCOSE BY METER IP

## 2021-07-11 PROCEDURE — 258N000003 HC RX IP 258 OP 636: Performed by: STUDENT IN AN ORGANIZED HEALTH CARE EDUCATION/TRAINING PROGRAM

## 2021-07-11 PROCEDURE — 93010 ELECTROCARDIOGRAM REPORT: CPT | Performed by: INTERNAL MEDICINE

## 2021-07-11 PROCEDURE — 250N000011 HC RX IP 250 OP 636: Performed by: OTOLARYNGOLOGY

## 2021-07-11 PROCEDURE — 250N000013 HC RX MED GY IP 250 OP 250 PS 637: Performed by: PHYSICIAN ASSISTANT

## 2021-07-11 PROCEDURE — 80048 BASIC METABOLIC PNL TOTAL CA: CPT | Performed by: NURSE PRACTITIONER

## 2021-07-11 PROCEDURE — 97110 THERAPEUTIC EXERCISES: CPT | Mod: GO | Performed by: OCCUPATIONAL THERAPIST

## 2021-07-11 PROCEDURE — 92507 TX SP LANG VOICE COMM INDIV: CPT | Mod: GN

## 2021-07-11 PROCEDURE — 82962 GLUCOSE BLOOD TEST: CPT | Performed by: CLINICAL NURSE SPECIALIST

## 2021-07-11 PROCEDURE — 250N000013 HC RX MED GY IP 250 OP 250 PS 637: Performed by: OTOLARYNGOLOGY

## 2021-07-11 PROCEDURE — 36415 COLL VENOUS BLD VENIPUNCTURE: CPT | Performed by: NURSE PRACTITIONER

## 2021-07-11 PROCEDURE — 83735 ASSAY OF MAGNESIUM: CPT | Performed by: NURSE PRACTITIONER

## 2021-07-11 PROCEDURE — 120N000002 HC R&B MED SURG/OB UMMC

## 2021-07-11 PROCEDURE — 82962 GLUCOSE BLOOD TEST: CPT

## 2021-07-11 PROCEDURE — 84100 ASSAY OF PHOSPHORUS: CPT | Performed by: NURSE PRACTITIONER

## 2021-07-11 PROCEDURE — 999N000127 HC STATISTIC PERIPHERAL IV START W US GUIDANCE

## 2021-07-11 RX ADMIN — OXYCODONE HYDROCHLORIDE 10 MG: 10 TABLET ORAL at 13:55

## 2021-07-11 RX ADMIN — ACETAMINOPHEN 975 MG: 325 TABLET, FILM COATED ORAL at 22:06

## 2021-07-11 RX ADMIN — OXYCODONE HYDROCHLORIDE 5 MG: 5 TABLET ORAL at 20:07

## 2021-07-11 RX ADMIN — POLYETHYLENE GLYCOL 3350 17 G: 17 POWDER, FOR SOLUTION ORAL at 08:34

## 2021-07-11 RX ADMIN — CHLORHEXIDINE GLUCONATE 15 ML: 1.2 RINSE ORAL at 20:09

## 2021-07-11 RX ADMIN — DOCUSATE SODIUM 100 MG: 50 LIQUID ORAL at 20:09

## 2021-07-11 RX ADMIN — ACETAMINOPHEN 975 MG: 325 TABLET, FILM COATED ORAL at 06:32

## 2021-07-11 RX ADMIN — METOPROLOL TARTRATE 25 MG: 25 TABLET, FILM COATED ORAL at 20:07

## 2021-07-11 RX ADMIN — Medication 15 ML: at 08:34

## 2021-07-11 RX ADMIN — OXYCODONE HYDROCHLORIDE 5 MG: 5 TABLET ORAL at 06:33

## 2021-07-11 RX ADMIN — SODIUM CHLORIDE, POTASSIUM CHLORIDE, SODIUM LACTATE AND CALCIUM CHLORIDE 500 ML: 600; 310; 30; 20 INJECTION, SOLUTION INTRAVENOUS at 08:35

## 2021-07-11 RX ADMIN — DOCUSATE SODIUM 100 MG: 50 LIQUID ORAL at 08:34

## 2021-07-11 RX ADMIN — SENNOSIDES AND DOCUSATE SODIUM 1 TABLET: 8.6; 5 TABLET ORAL at 08:32

## 2021-07-11 RX ADMIN — WHITE PETROLATUM: 1.75 OINTMENT TOPICAL at 08:34

## 2021-07-11 RX ADMIN — OXYCODONE HYDROCHLORIDE 5 MG: 5 TABLET ORAL at 06:32

## 2021-07-11 RX ADMIN — OXYCODONE HYDROCHLORIDE 5 MG: 5 TABLET ORAL at 18:20

## 2021-07-11 RX ADMIN — OXYCODONE HYDROCHLORIDE 5 MG: 5 TABLET ORAL at 02:40

## 2021-07-11 RX ADMIN — CHLORHEXIDINE GLUCONATE 15 ML: 1.2 RINSE ORAL at 11:50

## 2021-07-11 RX ADMIN — CHLORHEXIDINE GLUCONATE 15 ML: 1.2 RINSE ORAL at 08:34

## 2021-07-11 RX ADMIN — ENOXAPARIN SODIUM 40 MG: 40 INJECTION SUBCUTANEOUS at 09:48

## 2021-07-11 RX ADMIN — ACETAMINOPHEN 975 MG: 325 TABLET, FILM COATED ORAL at 13:56

## 2021-07-11 RX ADMIN — OXYCODONE HYDROCHLORIDE 5 MG: 5 TABLET ORAL at 02:00

## 2021-07-11 RX ADMIN — WHITE PETROLATUM: 1.75 OINTMENT TOPICAL at 16:21

## 2021-07-11 RX ADMIN — OXYCODONE HYDROCHLORIDE 5 MG: 5 TABLET ORAL at 22:06

## 2021-07-11 RX ADMIN — PANTOPRAZOLE SODIUM 40 MG: 40 TABLET, DELAYED RELEASE ORAL at 08:32

## 2021-07-11 RX ADMIN — CHLORHEXIDINE GLUCONATE 15 ML: 1.2 RINSE ORAL at 16:21

## 2021-07-11 RX ADMIN — METOPROLOL TARTRATE 25 MG: 25 TABLET, FILM COATED ORAL at 08:33

## 2021-07-11 RX ADMIN — OXYCODONE HYDROCHLORIDE 10 MG: 10 TABLET ORAL at 09:48

## 2021-07-11 RX ADMIN — SENNOSIDES AND DOCUSATE SODIUM 1 TABLET: 8.6; 5 TABLET ORAL at 20:07

## 2021-07-11 ASSESSMENT — ACTIVITIES OF DAILY LIVING (ADL)
ADLS_ACUITY_SCORE: 17

## 2021-07-11 NOTE — DOWNTIME EVENT NOTE
The EMR was down for 4 hours on 7/11/2021.    Lexi Cortez was responsible for completing the paper charting during this time period.     The following information was re-entered into the system by Ivanna Devine RN: MAR    The following information will remain in the paper chart: Lab value, Glucose    Ivanna Devine RN  7/11/2021

## 2021-07-11 NOTE — PLAN OF CARE
Status: POD#3 s/p total laryngectomy, and bilateral neck dissection  Vitals: VSS ex tachycardic improved from yesterday, HR in 100's  Neuros: Intact ex numbness around incision site and right pinky and ring finger  IV: PIV saline locked between antibiotics  Labs/Electrolytes: WNL  Resp/trach: #9 Larytube in place with HME, lavaged 3x, no suction required, patient oral suctioning independently with red chayo, patient demonstrates proper technique for cleaning/inserting anny tube and placing HME, patient lavaged self 1x with supervision  Diet: NPO, PEG with TF at goal of 55mL/hr, patient administered medications to self under supervision today  Bowel status: No BM today, BS+, Senna and colace given  : Voiding spontaneously with low output, MD notified, 500mL LR bolus given  Skin: Bi-lateral neck incisions with sutures, patient cleansed with NS and applied Aquaphor with supervision, three BRITTNI's to neck with serosang drainage, R neck BRITTNI removed this AM, patient c/o increased swelling and pressure in right ear, MD Ferris notified, assessed patient at bedside, (stated continue to monitor)  Pain: Neck pain controlled with Oxycodone and Tylenol  Activity: Up with A1, ambulated in hallways 3x with RN today, up in chair 2x today  Social: Cooperative with vikki, son at bedside supportive  Plan: Anny PLC completed 7/9, TF PLC Monday at 0900, plan to transition to bolus TF tomorrow, continue to have patient complete cares with supervision until independent, discharge home when medically stable, continue to monitor and follow POC

## 2021-07-11 NOTE — PLAN OF CARE
PT - Cancel/defer. Plan for pt to return home when medically ready with no further PT follow-up.  OT is following pt while inpatient.  Per chart review and discussion with interdisciplinary team, pt does not require skilled inpatient physical therapy at this time. Pt indep sup <> sit <> stand. Pt indep ambulates on level without AD. Pt denies falls in last 6 months.  Pt denies any concerns related to falls, balance, mobility, or ADLs. Pt reports has 24/7 physical A available at home, and agrees to ask for A if needed. Please encourage pt to walk halls at least 4x/day while inpatient.  Please re-consult as needed if patient experiences a change in functional mobility or goals requiring skilled inpatient physical therapy.

## 2021-07-11 NOTE — PROGRESS NOTES
[facility]    Medicine Progress Note - Hospitalist Service, Gold 7       Date of Admission:  7/8/2021    Assessment & Plan         Raj Warren is a 71 year old male admitted on 7/8/2021. He has a history of SCC of larynx s/p s/p trach placement (4/12), bilateral next dissection, laryngectomy, and PEG tube placement (7/8), and extensive bilateral PE's, s/p RV thrombectomy, pulmonary embolectomy, and ICV placement 4/28 with Dr. Singh. Other medical history includes DM2, HTN, anemia, anticoagulated, GERD. Today medicine is consulted for acute onset of tachycardia and low grade fever.    Recommendations for Today:  - Agree with LR bolus for low UOP and tachycardia  - Continue to monitor for signs of infection, no need for antibiotics currently  - Anticoagulation when safe from ENT perspective  - Recommend checking CRP in AM to ensure downward trend (ordered)     #Acute tachycardia  #Low grade fevers  Patient had a temp of 100.9 overnight, 98.7 today. Also tachycardic today 120-130's. Infective vs. response to acute surgical/incisional pain.  Stat CT PE shows left basilar atelectasis and consolidation with elevation of the left hemidiaphragm and small left pleural effusion, no PE. Possible PNA on chest CT with elevated CRP to 120, however, afebrile today and not hypoxic. Echo shows: RV function is normal to mildly reduced based on limited windows. Estimated pulmonary artery systolic pressure is 31 mmHg plus right atrial pressure. CRP elevated but difficult to interpret given recent surgery.   - Repeat fluid bolus 500 mL per ENT  - UA w/ culture and blood cx x 2 sent, will follow   - If febrile or any clinical decompensation, recommend starting IV Vanco/Zosyn for HAP coverage      #Mild hypocalcemia  #Mild hypoalbunemia  Improving. Acute hypocalcemia 8.1 (9.2), hypoalbuminemia 2.9, and iCa 4.4. Asymptomatic on assessment.   - Continue to monitor and replete as  necessary     #Hx of large bilateral pulmonary emboli with evidence of right heart strain (4/27/2021) s/p pulmonary embolectomy, RV thrombectomy, ICV filter placement  #Chronic anticoagulation  Admitted to [facility] 4/27-5/6 after p/w shortness of breath after hospitalization for tracheostomy placement 4/13).  Patient was found to have large bilateral PE's with evidence of right heart strain and RV thrombus on CT angio 4/27 suspected from malignancy. Underwent emergent pulmonary embolectomy, RV thrombectomy, ICV filter placement at [facility] with Dr. Singh on 4/28/21.  [facility], 5/15 discharged home.  Stopped eliquis and started therapeutic lovenox 7/5-7/7.  S/p laryngectomy procedure on 7/8 and remains on prophylactic Lovenox at this time. Stat CT today showed no evidence of pulmonary embolism.  Hematology consulted.   - Given negative CT PE, no acute need to anticoagulate now, however, should be anticoagulated ASAP when safe from a surgical standpoint.  Will defer AC plan to ENT/heme.   - Per heme: If concern for bleeding, trial of full intensity heparin so that if he bleeds the therapy could be stopped.  If stable on full anticoagulation for 24-48 hours then could safely change to apixaban.     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 Montaño) and total laryngectomy, bilateral neck dissection, cricopharyngeal myotomy Dr. 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 4/12/21 presented to the ED with dyspnea and found to have a right larynx soft tissue mass, tracheotomy performed by ENT. Patient had another ENT consult 6/18 with Dr. Sethi and a flexible laryngoscopy/trach exchange was done, with plan for surgery and radiation +/- chemo. Now admitted to [facility] s/p total  laryngectomy without major complications.  - Continue HEENT incision cares (please see ENT's note)  - Continue peridex oral rinse     #DMII  Diet-controlled. Glucose's have been 's.   - Continue sugar checks as ordered  - Hypoglycemia protocol     #HTN  Normotensive during inpatient stay.  - Continue Metoprolol 25 mg BID  - If tachycardia continues with hypertension can increase dose     #Acute anemia  Likely in the setting of intraoperative blood loss. Hgb 11.3 (13.9).  No current s/s of bleeding.   - Consider transfusion of RBCs if symptomatic and < 7     #GERD  - Continue Pantoprazole EC 40 mg daily     #R hand numbness   Following surgery 4/2021.  Evalauted by neurology 6/2021 and this is felt most likely due to compression/stretch injury that occurred cheryl-operatively.  Symptoms have mildly improved since onset.  - Plan for EMG as OP with Neurology after medically stable and discharged   - Continue to monitor        Diet: NPO for Medical/Clinical Reasons Except for: No Exceptions  Adult Formula Drip Feeding: Continuous TwoCal HN; Gastrostomy; Goal Rate: 55; mL/hr; Medication - Feeding Tube Flush Frequency: At least 15-30 mL water before and after medication administration and with tube clogging; Amount to Send (Nutrition us...    DVT Prophylaxis: Enoxaparin (Lovenox) SQ  Mera Catheter: Not present  Central Lines: None  Code Status: Full Code      Disposition Plan   Expected discharge: Per Primary     The patient's care was discussed with the Bedside Nurse, Patient and Patient's Family.    Thank you for this interesting consult. Internal Medicine recommendations communicated via this note. We will continue to follow along with you, please don't hesitate to reach out with questions or concerns.     Satinder Smith MD  Hospitalist Service, [address]  Securely message with the [employer] Web Console (learn more here)  Text page via [employer]  "Paging/Directory  Please see sign in/sign out for up to date coverage information    Risk Factors Present on Admission                ______________________________________________________________________    Interval History   No acute events overnight. This morning, heart rates improved. Patient reports no shortness of breath, chest pains, palpitations. Asking what caused the tachycardia.     4 Point ROS otherwise negative.     Data reviewed today: I reviewed all medications, new labs and imaging results over the last 24 hours. I personally reviewed no images or EKG's today.    Physical Exam   /66 (BP Location: Left arm)   Pulse 106   Temp 99.1  F (37.3  C) (Oral)   Resp 16   Ht 1.803 m (5' 11\")   Wt 89.8 kg (197 lb 15.6 oz)   SpO2 95%   BMI 27.61 kg/m    General: AAOx3, well appearing man in NAD  Skin: no rashes or bruising  HEENT: Neck supple, no lymphadenopathy, trach in place, 1 right drain and 2 left drains remain  CV: mildly tachycardic, normal S1S2, no murmur  Resp: CTAB, no wheeze, rhonchi   Abd: Soft, non-tender, nondistended, BS+, no masses appreciated  Extremities: warm and well perfused, mild pedal edema  Neuro: No lateralizing symptoms or focal neurologic deficits   Psych: Mood and affect  appropriate for situation      Data   No results found for this or any previous visit (from the past 24 hour(s)).  "

## 2021-07-11 NOTE — PROGRESS NOTES
"Otolaryngology Progress Note    Subjective and Interval Events: Extensive PE and cardiac work-up per medicine. CT negative for PE, left basilar atelectasis. Breathing comfortably, pain is controlled. Wants to go outside.  No BM yet.     Objective: /66 (BP Location: Left arm)   Pulse 106   Temp 99.1  F (37.3  C) (Oral)   Resp 16   Ht 1.803 m (5' 11\")   Wt 89.8 kg (197 lb 15.6 oz)   SpO2 95%   BMI 27.61 kg/m     General: resting comfortably and not in acute distress   Neuro: patient alert, oriented, and answering questions appropriately. Family at bedside.    HEENT: neck incisions soft, flat, and well-approximated; no pinpoint tenderness. Margy tube clean, no crusting around stoma or in airway. BRITTNI drains w/ moderate amount of serous-serosanguinous drainage; stoma well-approximated;   Pulmonary: breathing comfortably on room air without stridor or stertor    BRITTNI Drains: right lateral drain (JP3) removed     Labs: wnl, hgb stable, no leukocytosis     Assessment & Plan: Raj Warren is a 71-year-old male with a PMH significant for TIIDM (diet-controlled), HTN (on metoprolol PTA), and recent bilateral PE (on apixaban PTA) with right ventricular clot burden s/p sternotomy, embolectomy/RV thrombectomy, and IVC filter placement in 04/2021. Patient also has a history of laryngeal SCCa and is now s/p total laryngectomy, and bilateral neck dissection (II-V) on 7/8.     Neuro:  - Analgesia: tylenol q8h; rosalba. And PRN oxycodone and dilaudid PRN    HEENT:  - Oral care- Peridex QID; normal saline mouth rinses q8h and PRN; suction with red Martin catheter only  - Strict NPO  - Incision care- cleanse incisions with normal saline/half-strength peroxide q8h and apply Bacitracin q8h x1 day, then transition to Aquaphor q8h  - Scheduled pantoprazole every day; scheduled zofran q6h x48 hours --> PRN  - Routine laryngectomy care-   - Cleanse incisions   - Okay for Nicola tube at all times with HME filter   - No " electrolarynx   - Humidified air at all times HME filter not on   - Saline lavage q2h and PRN for obstruction   - Contact otolaryngology on-call if patient noted to have large obstructive clot/crust or if patient develops difficulty breathing    Respiratory:   - Patient has a laryngectomy and has NO AIRWAY FROM ABOVE  - Patient must have flexible suction catheters at the bedside at all times  - Patient must be on humidified air at all times that Nicola tube and HME filter are not in place  - Continuous pulse oximetry at all times; supplemental O2 PRN to keep sats >90%.    CV/heme:   - PMH of HTN and bilateral Pes (IVC filter in place)- on metoprolol; apixiban PTA (holding)   - Hematology consultation for assistance with anti-coagulation (currently on prophylactic lovenox): pending timing of resumption of therapeutic (full-intensity) anticoagulation. Eventually will need to resume apixiban for additional 3m (total 6m course)   -Negative CT PE 7/10, L basilar consolidation. Medicine following.   - Continue to monitor tachycardia    FEN/GI:  - PEG placed by Thoracic surgery on 7/8  - PPI scheduled, zofran scheduled x48 hours, per laryngectomy protocol  - Nutrition consult- anticipate transition to bolus 7/12 pending tolerance of continuous TF at goal   - Albumin low at 2.9; Prealbumin low at 12; TSH wnl at 1.1  - Bowel regimen: senna-docusate BID; miralax daily    :  - voiding independently     Endo:   - PMH of TIIDM- diet-controlled prior to admission  - ISS    ID: afebrile, no leukocytosis. CT with L basilar consolidation, if febrile per medicine would start vanc, zosyn for HAP  - s/p Unasyn x48 hrs    PPX: Lovenox 40mg    Consults: PT/OT (shoulder exercises provided)   - Nutrition  - SLP  - PLC    Dispo:  - PT/OT- TCU  - PLC- anny tube completed, TF Monday     -- Patient and above plan discussed with staff, Dr. Navdeep Ferris MD PGY2

## 2021-07-11 NOTE — PLAN OF CARE
Status: POD#3 s/p total laryngectomy, and bilateral neck dissection  Vitals: VSS ex tachy in 110's.   Neuros: Intact ex numbness around incision site and right pinky and ring finger  IV: PIV SL  Labs/Electrolytes: WNL  Resp/trach: #9 Larytube in place with HME, lavaged 3x, no suction required, patient oral suctioning independently with red chayo.  Diet: NPO, PEG with TF advanced to 55cc/hr (goal) at 0300.  Bowel status: No BM. BS+, Senna and colace given  : Pt did not void this shift. BS at 0600 for 260cc.   Skin: Bilateral neck incisions sutured, cleansed with NS and Aquaphor applied. 4 BRITTNI's to neck with serosanguinous drainage  Pain: Neck pain controlled. c/o  considerable L lateral shoulder pain, relief found with 10mg oxy and tylenol  Activity: Up with A1 +GB. not OOB this shift.   Social: Cooperative with cares  Plan: Anny PLC completed 7/9, TF PLC Monday at 0900, continue to encourage independence with anny and PEG cares. Discharge home when medically stable, continue to monitor and follow POC

## 2021-07-12 ENCOUNTER — HOME INFUSION (PRE-WILLOW HOME INFUSION) (OUTPATIENT)
Dept: PHARMACY | Facility: CLINIC | Age: 71
End: 2021-07-12

## 2021-07-12 ENCOUNTER — APPOINTMENT (OUTPATIENT)
Dept: SPEECH THERAPY | Facility: CLINIC | Age: 71
DRG: 011 | End: 2021-07-12
Attending: OTOLARYNGOLOGY
Payer: COMMERCIAL

## 2021-07-12 ENCOUNTER — APPOINTMENT (OUTPATIENT)
Dept: EDUCATION SERVICES | Facility: CLINIC | Age: 71
DRG: 011 | End: 2021-07-12
Attending: OTOLARYNGOLOGY
Payer: COMMERCIAL

## 2021-07-12 LAB
ANION GAP SERPL CALCULATED.3IONS-SCNC: 7 MMOL/L (ref 3–14)
BUN SERPL-MCNC: 11 MG/DL (ref 7–30)
CALCIUM SERPL-MCNC: 8.6 MG/DL (ref 8.5–10.1)
CHLORIDE BLD-SCNC: 102 MMOL/L (ref 94–109)
CO2 SERPL-SCNC: 27 MMOL/L (ref 20–32)
CREAT SERPL-MCNC: 0.65 MG/DL (ref 0.66–1.25)
CRP SERPL-MCNC: 140 MG/L (ref 0–8)
ERYTHROCYTE [DISTWIDTH] IN BLOOD BY AUTOMATED COUNT: 12.7 % (ref 10–15)
GFR SERPL CREATININE-BSD FRML MDRD: >90 ML/MIN/1.73M2
GLUCOSE BLD-MCNC: 114 MG/DL (ref 70–99)
GLUCOSE BLDC GLUCOMTR-MCNC: 121 MG/DL (ref 70–99)
GLUCOSE BLDC GLUCOMTR-MCNC: 131 MG/DL (ref 70–99)
GLUCOSE BLDC GLUCOMTR-MCNC: 93 MG/DL (ref 70–99)
GLUCOSE BLDC GLUCOMTR-MCNC: 99 MG/DL (ref 70–99)
HCT VFR BLD AUTO: 33.3 % (ref 40–53)
HGB BLD-MCNC: 11.1 G/DL (ref 13.3–17.7)
INTERPRETATION ECG - MUSE: NORMAL
INTERPRETATION ECG - MUSE: NORMAL
MAGNESIUM SERPL-MCNC: 2.1 MG/DL (ref 1.6–2.3)
MCH RBC QN AUTO: 29.4 PG (ref 26.5–33)
MCHC RBC AUTO-ENTMCNC: 33.3 G/DL (ref 31.5–36.5)
MCV RBC AUTO: 88 FL (ref 78–100)
PHOSPHATE SERPL-MCNC: 3.2 MG/DL (ref 2.5–4.5)
PLATELET # BLD AUTO: 302 10E3/UL (ref 150–450)
POTASSIUM BLD-SCNC: 3.6 MMOL/L (ref 3.4–5.3)
RBC # BLD AUTO: 3.77 10E6/UL (ref 4.4–5.9)
SODIUM SERPL-SCNC: 136 MMOL/L (ref 133–144)
WBC # BLD AUTO: 7.1 10E3/UL (ref 4–11)

## 2021-07-12 PROCEDURE — 250N000013 HC RX MED GY IP 250 OP 250 PS 637: Performed by: STUDENT IN AN ORGANIZED HEALTH CARE EDUCATION/TRAINING PROGRAM

## 2021-07-12 PROCEDURE — 82962 GLUCOSE BLOOD TEST: CPT

## 2021-07-12 PROCEDURE — 92526 ORAL FUNCTION THERAPY: CPT | Mod: GN

## 2021-07-12 PROCEDURE — 86140 C-REACTIVE PROTEIN: CPT | Performed by: INTERNAL MEDICINE

## 2021-07-12 PROCEDURE — 120N000002 HC R&B MED SURG/OB UMMC

## 2021-07-12 PROCEDURE — 92507 TX SP LANG VOICE COMM INDIV: CPT | Mod: GN

## 2021-07-12 PROCEDURE — 36415 COLL VENOUS BLD VENIPUNCTURE: CPT | Performed by: STUDENT IN AN ORGANIZED HEALTH CARE EDUCATION/TRAINING PROGRAM

## 2021-07-12 PROCEDURE — 250N000013 HC RX MED GY IP 250 OP 250 PS 637: Performed by: OTOLARYNGOLOGY

## 2021-07-12 PROCEDURE — 83735 ASSAY OF MAGNESIUM: CPT | Performed by: OTOLARYNGOLOGY

## 2021-07-12 PROCEDURE — 250N000013 HC RX MED GY IP 250 OP 250 PS 637: Performed by: PHYSICIAN ASSISTANT

## 2021-07-12 PROCEDURE — 250N000011 HC RX IP 250 OP 636: Performed by: OTOLARYNGOLOGY

## 2021-07-12 PROCEDURE — 80048 BASIC METABOLIC PNL TOTAL CA: CPT | Performed by: STUDENT IN AN ORGANIZED HEALTH CARE EDUCATION/TRAINING PROGRAM

## 2021-07-12 PROCEDURE — 99232 SBSQ HOSP IP/OBS MODERATE 35: CPT | Performed by: INTERNAL MEDICINE

## 2021-07-12 PROCEDURE — 85027 COMPLETE CBC AUTOMATED: CPT | Performed by: STUDENT IN AN ORGANIZED HEALTH CARE EDUCATION/TRAINING PROGRAM

## 2021-07-12 PROCEDURE — 84100 ASSAY OF PHOSPHORUS: CPT | Performed by: OTOLARYNGOLOGY

## 2021-07-12 RX ORDER — MAGNESIUM CARB/ALUMINUM HYDROX 105-160MG
296 TABLET,CHEWABLE ORAL ONCE
Status: COMPLETED | OUTPATIENT
Start: 2021-07-12 | End: 2021-07-12

## 2021-07-12 RX ORDER — BISACODYL 10 MG
10 SUPPOSITORY, RECTAL RECTAL ONCE
Status: DISCONTINUED | OUTPATIENT
Start: 2021-07-12 | End: 2021-07-12

## 2021-07-12 RX ADMIN — CHLORHEXIDINE GLUCONATE 15 ML: 1.2 RINSE ORAL at 11:35

## 2021-07-12 RX ADMIN — POLYETHYLENE GLYCOL 3350 17 G: 17 POWDER, FOR SOLUTION ORAL at 08:44

## 2021-07-12 RX ADMIN — CHLORHEXIDINE GLUCONATE 15 ML: 1.2 RINSE ORAL at 08:45

## 2021-07-12 RX ADMIN — SENNOSIDES AND DOCUSATE SODIUM 1 TABLET: 8.6; 5 TABLET ORAL at 20:35

## 2021-07-12 RX ADMIN — WHITE PETROLATUM: 1.75 OINTMENT TOPICAL at 08:46

## 2021-07-12 RX ADMIN — ACETAMINOPHEN 325 MG: 325 TABLET, FILM COATED ORAL at 19:04

## 2021-07-12 RX ADMIN — WHITE PETROLATUM: 1.75 OINTMENT TOPICAL at 16:30

## 2021-07-12 RX ADMIN — DOCUSATE SODIUM 100 MG: 50 LIQUID ORAL at 20:35

## 2021-07-12 RX ADMIN — DOCUSATE SODIUM 100 MG: 50 LIQUID ORAL at 08:44

## 2021-07-12 RX ADMIN — MAGNESIUM CITRATE 296 ML: 1.75 LIQUID ORAL at 14:06

## 2021-07-12 RX ADMIN — SENNOSIDES AND DOCUSATE SODIUM 1 TABLET: 8.6; 5 TABLET ORAL at 08:44

## 2021-07-12 RX ADMIN — PANTOPRAZOLE SODIUM 40 MG: 40 TABLET, DELAYED RELEASE ORAL at 08:45

## 2021-07-12 RX ADMIN — CHLORHEXIDINE GLUCONATE 15 ML: 1.2 RINSE ORAL at 14:53

## 2021-07-12 RX ADMIN — Medication 15 ML: at 08:45

## 2021-07-12 RX ADMIN — CHLORHEXIDINE GLUCONATE 15 ML: 1.2 RINSE ORAL at 20:34

## 2021-07-12 RX ADMIN — ENOXAPARIN SODIUM 40 MG: 40 INJECTION SUBCUTANEOUS at 11:35

## 2021-07-12 RX ADMIN — METOPROLOL TARTRATE 25 MG: 25 TABLET, FILM COATED ORAL at 20:34

## 2021-07-12 RX ADMIN — METOPROLOL TARTRATE 25 MG: 25 TABLET, FILM COATED ORAL at 08:45

## 2021-07-12 RX ADMIN — WHITE PETROLATUM: 1.75 OINTMENT TOPICAL at 00:00

## 2021-07-12 ASSESSMENT — ACTIVITIES OF DAILY LIVING (ADL)
ADLS_ACUITY_SCORE: 15
ADLS_ACUITY_SCORE: 15
ADLS_ACUITY_SCORE: 17
ADLS_ACUITY_SCORE: 17
ADLS_ACUITY_SCORE: 15
ADLS_ACUITY_SCORE: 17

## 2021-07-12 ASSESSMENT — MIFFLIN-ST. JEOR: SCORE: 1679.81

## 2021-07-12 NOTE — PROGRESS NOTES
"Otolaryngology Progress Note  July 12, 2021     Interval Events/Subjective: No acute overnight events, Tmax 100.3, tachycardia slightly improved. This morning he mentions the swelling of his right ear that developed yesterday. He reports some pinpoint tenderness on the left side of his neck.   Objective: /68 (BP Location: Right arm)   Pulse 110   Temp 100.3  F (37.9  C) (Oral)   Resp 16   Ht 1.803 m (5' 11\")   Wt 89.8 kg (197 lb 15.6 oz)   SpO2 93%   BMI 27.61 kg/m     General: resting comfortably and not in acute distress   Neuro: patient alert, oriented, and answering questions appropriately. Family at bedside.    HEENT: neck incisions soft, flat, and well-approximated; slight pinpoint tenderness at the left medial neck. Margy tube clean, no crusting around stoma or in airway. BRITTNI drains w/ moderate amount of serous-serosanguinous drainage; stoma well-approximated, no palpable fluid collection or leak.   Pulmonary: breathing comfortably on room air without stridor or stertor    BRITTNI Drains: (yesterday/since midnight)  Drain #1 8.5/-  Drain #2 2.5/-   Drain #4 4.5/ -     Labs: wnl, hgb stable, no leukocytosis     Assessment & Plan: Raj Warren is a 71-year-old male with a PMH significant for TIIDM (diet-controlled), HTN (on metoprolol PTA), and recent bilateral PE (on apixaban PTA) with right ventricular clot burden s/p sternotomy, embolectomy/RV thrombectomy, and IVC filter placement in 04/2021. Patient also has a history of laryngeal SCCa and is now s/p total laryngectomy, and bilateral neck dissection (II-V) on 7/8.     Neuro:  - Analgesia: tylenol q8h; oxycodone 5 mg q4h and 5 - 10 mg q4h PRN, and dilaudid PRN    HEENT:  - Oral care- Peridex QID; normal saline mouth rinses q8h and PRN; suction with red Martin catheter only  - Strict NPO  - Incision care- cleanse incisions with normal saline/half-strength peroxide q8h and apply Bacitracin q8h x1 day, then transition to Aquaphor q8h  - Scheduled " pantoprazole every day; scheduled zofran q6h x48 hours --> PRN  - BRITTNI drains x3 - remove lateral left neck drain today  - Routine laryngectomy cares   - Okay for Anny tube at all times with HME filter   - No electrolarynx    Respiratory:   - Patient has a laryngectomy and has NO AIRWAY FROM ABOVE  - Patient must have flexible suction catheters at the bedside at all times  - Patient must be on humidified air at all times that Nicola tube and HME filter are not in place  - Continuous pulse oximetry at all times; supplemental O2 PRN to keep sats >90%.    CV/heme:   - PMH of HTN and bilateral Pes (IVC filter in place)- on metoprolol 25 mg BID; apixiban PTA (holding)   - Hematology consultation for assistance with anti-coagulation (currently on prophylactic lovenox): pending timing of resumption of therapeutic (full-intensity) anticoagulation. Eventually will need to resume apixiban for additional 3m (total 6m course)   -Negative CT PE 7/10, L basilar consolidation. Medicine following.   - Continue to monitor tachycardia    FEN/GI:  - PEG placed by Thoracic surgery on 7/8  - PPI scheduled, zofran scheduled x48 hours, per laryngectomy protocol  - Nutrition consult- anticipate transition to bolus 7/12 pending tolerance of continuous TF at goal   - Albumin low at 2.9; Prealbumin low at 12; TSH wnl at 1.1  - Bowel regimen: senna-docusate BID; miralax daily    :  - voiding independently     Endo:   - PMH of TIIDM- diet-controlled prior to admission  - ISS    ID: afebrile, no leukocytosis. CT with L basilar consolidation, if febrile per medicine would start vanc, zosyn for HAP  - s/p Unasyn x48 hrs    PPX: Lovenox 40mg    Consults: PT/OT (shoulder exercises provided), Nutrition, SLP, PLC    Dispo:  - PT/OT- TCU  - PLC- anny tube completed, TF Monday     -- Patient and above plan discussed with staff, Dr. Navdeep Thomas MD PGY2  Otolaryngology - Head & Neck Surgery

## 2021-07-12 NOTE — PROGRESS NOTES
[facility]    Medicine Progress Note - Hospitalist Service, Gold 7       Date of Admission:  7/8/2021    Assessment & Plan             Raj Warren is a 71 year old male admitted on 7/8/2021. He has a history of SCC of larynx s/p s/p trach placement (4/12), bilateral next dissection, laryngectomy, and PEG tube placement (7/8), and extensive bilateral PE's, s/p RV thrombectomy, pulmonary embolectomy, and ICV placement 4/28 with Dr. Singh. Other medical history includes DM2, HTN, anemia, anticoagulated, GERD. Today medicine is consulted for acute onset of tachycardia and low grade fever.    Recommendations for Today:  - Continue to monitor for fevers  - ENT to consider starting low dose anticoagulation after drain removal     #Acute tachycardia - Improving  #Low grade fevers - Improving  Patient had a temp of 100.9 overnight 7/9. Also tachycardic to 120-130's. Infective vs. response to acute surgical/incisional pain.  Stat CT PE showed left basilar atelectasis and consolidation with elevation of the left hemidiaphragm and small left pleural effusion, no PE. Possible PNA on chest CT with elevated CRP to 120, however, afebrile and not hypoxic. Echo shows: RV function is normal to mildly reduced based on limited windows. Estimated pulmonary artery systolic pressure is 31 mmHg plus right atrial pressure. CRP elevated but difficult to interpret given recent surgery. Tachycardia improving.   - UA w/ culture and blood cx x 2 sent, NGTD  - If febrile or any clinical decompensation, recommend starting broad spectrum antibiotics for HAP     #Mild hypocalcemia  #Mild hypoalbunemia  Improving. Acute hypocalcemia 8.1 (9.2), hypoalbuminemia 2.9, and iCa 4.4. Asymptomatic on assessment.   - Continue to monitor and replete as necessary     #Hx of large bilateral pulmonary emboli with evidence of right heart strain (4/27/2021) s/p pulmonary embolectomy, RV thrombectomy, ICV filter  placement  #Chronic anticoagulation  Admitted to [facility] 4/27-5/6 after p/w shortness of breath after hospitalization for tracheostomy placement 4/13).  Patient was found to have large bilateral PE's with evidence of right heart strain and RV thrombus on CT angio 4/27 suspected from malignancy. Underwent emergent pulmonary embolectomy, RV thrombectomy, ICV filter placement at [facility] with Dr. Singh on 4/28/21.  Discharged to [facility] on 5/8/21, 5/15 discharged home.  Stopped eliquis and started therapeutic lovenox 7/5-7/7.  S/p laryngectomy procedure on 7/8 and remains on prophylactic Lovenox at this time. Stat CT today showed no evidence of pulmonary embolism.  Hematology consulted.   - Given negative CT PE, no acute need to anticoagulate now, however, should be anticoagulated ASAP when safe from a surgical standpoint.  Will defer AC plan to ENT/heme.   - Per heme: If concern for bleeding, trial of full intensity heparin so that if he bleeds the therapy could be stopped.  If stable on full anticoagulation for 24-48 hours then could safely change to apixaban.     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 Montaño) and total laryngectomy, bilateral neck dissection, cricopharyngeal myotomy Dr. 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 4/12/21 presented to the ED with dyspnea and found to have a right larynx soft tissue mass, tracheotomy performed by ENT. Patient had another ENT consult 6/18 with Dr. Sethi and a flexible laryngoscopy/trach exchange was done, with plan for surgery and radiation +/- chemo. Now admitted to [facility] s/p total laryngectomy without major complications.  - Continue HEENT incision cares (please see ENT's note)  - Continue peridex oral rinse     #DMII  Diet-controlled. Glucose's  have been 's.   - Continue sugar checks as ordered  - Hypoglycemia protocol     #HTN  Normotensive during inpatient stay.  - Continue Metoprolol 25 mg BID  - Can increase if becomes hypertensive with tachycardia     #Acute anemia  Likely in the setting of intraoperative blood loss. Hgb 11.3 (13.9).  No current s/s of bleeding.   - Consider transfusion of RBCs if symptomatic and < 7     #GERD  - Continue Pantoprazole EC 40 mg daily     #R hand numbness   Following surgery 4/2021.  Evalauted by neurology 6/2021 and this is felt most likely due to compression/stretch injury that occurred cheryl-operatively.  Symptoms have mildly improved since onset.  - Plan for EMG as OP with Neurology after medically stable and discharged   - Continue to monitor          Diet: NPO for Medical/Clinical Reasons Except for: No Exceptions  Adult Formula Drip Feeding: Continuous TwoCal HN; Gastrostomy; Goal Rate: 55; mL/hr; Medication - Feeding Tube Flush Frequency: At least 15-30 mL water before and after medication administration and with tube clogging; Amount to Send (Nutrition us...  Adult Formula Bolus Feeding: TID TwoCal HN; Route: Gastrostomy; 6; Can(s); Additional free water (mL): 120 mL before and after each bolus feeding; Medication - Feeding Tube Flush Frequency: At least 15-30 mL water before and after medication admin...    DVT Prophylaxis: Enoxaparin (Lovenox) SQ  Mera Catheter: Not present  Central Lines: None  Code Status: Full Code      Disposition Plan   Expected discharge: Per Primary     The patient's care was discussed with the Bedside Nurse, Care Coordinator/, Patient and Primary team.    Thank you for this interesting consult. Internal Medicine recommendations communicated via this note and in person. We will continue to follow along with you, please don't hesitate to reach out with questions or concerns.       Satinder Smith MD  Hospitalist Service, [address]  "[facility]  Securely message with the [employer] Web Console (learn more here)  Text page via [facility] Paging/Directory  Please see sign in/sign out for up to date coverage information    Risk Factors Present on Admission                ______________________________________________________________________    Interval History   No acute events overnight. Low grade temp to 100.3 but remains hemodynamically stable without hypoxia. Feeling somewhat better today. Does endorse some increased pain in his neck today.     4 Point ROS otherwise negative.     Data reviewed today: I reviewed all medications, new labs and imaging results over the last 24 hours. I personally reviewed no images or EKG's today.    Physical Exam   /79 (BP Location: Right arm)   Pulse 107   Temp 99.2  F (37.3  C) (Oral)   Resp 16   Ht 1.803 m (5' 11\")   Wt 89.3 kg (196 lb 12.8 oz)   SpO2 94%   BMI 27.45 kg/m    General: AAOx3, well appearing man in NAD  Skin: no rashes or bruising  HEENT: Neck supple, tracheostomy in place, 3 wes drains remain (2L, 1R)  CV: mildly tachycardic, normal S1S2, no murmur  Resp: CTAB, no wheeze, rhonchi   Abd: Soft, non-tender, non-distended, BS+, no masses appreciated  Extremities: warm and well perfused, no edema    "

## 2021-07-12 NOTE — PROGRESS NOTES
Therapy: ENTERAL TF   Insurance: [facility] MEDICARE     Patient Raj Warren meets Medicare criteria for enteral TF, her [facility] Medicare plan will cover 80/20 until her OOP of $2500 is met so far she s met $2002.58. Nursing is only covered if patient is considered medically homebound if not I charges $90.00 per visit.     Please note: anticipated length of need of 90 days or greater must be documented in order for insurance to cover    In reference to admission date 07/08/2021 [facility] 6A to check Enteral TF coverage.       Please contact Intake with any questions, [phone] or In Basket pool,  Home Infusion ([phone]).

## 2021-07-12 NOTE — PLAN OF CARE
Vitals: VSS ex tachycardia continues  Neuros: intact  IV: PIV SL  Labs/Electrolytes: wnl  Resp/trach: #9 anny tube with HME in place; lavaged q4h- no suction requirements; pt able to clear secretions with cough; suctions mouth with red kaylen. Pt able to complete anny cares on own with supervision- continue to educate.   Diet: Transition to bolus feeding today; completed TF/PEG PLC today. Able to give self TF and meds via PEG with supervision- continue to educate.   Bowel status: LBM unknown; abd rounded; passing flatus; +bowel sounds. Mag citrate ordered and pt would only take 1/2 the bottle- continue to monitor.  : voiding spnt  Skin: bilat neck inc intact with sutures; cared for as ordered. One BRITTNI removed today by MD- one to L neck and one to R neck remain.  Pain: mild pain to PEG site; declines meds  Activity: up independenly; ambulated unit x2  Updates this shift: Pt eager to learn and complete all cares independently.

## 2021-07-12 NOTE — PROGRESS NOTES
Brief ENT note    Left lateral drain (#1) removed without issues.    Anju Batista, MS4    May Cameron MD   Otolaryngology-Head & Neck Surgery PGY1  Please contact ENT with questions by dialing [phone] and entering job code [phone] when prompted.

## 2021-07-12 NOTE — PROGRESS NOTES
Care Management Follow Up    Length of Stay (days): 4    Expected Discharge Date:       Concerns to be Addressed: discharge planning     Patient plan of care discussed at interdisciplinary rounds: Yes    Anticipated Discharge Disposition: DME, Home Care     Anticipated Discharge Services:  Home care  Anticipated Discharge DME: Other (see comment) (Laryngectomy & tube feeding supplies. )    Patient/family educated on Medicare website which has current facility and service quality ratings:    Education Provided on the Discharge Plan:    Patient/Family in Agreement with the Plan: yes    Referrals Placed by CM/SW:    Private pay costs discussed: Not applicable    Additional Information:  Patient is open to [facility] for skilled nursing. Patient is s/p laryngectomy & g-tube placement. Spoke to the following DME companies and patient has DME through [employer] (4ww), [facility] ([facility] and [facility] (Suction machine and trach supplies- although trach supplies haven't been reordered since May). [facility] liaison is looking into if his nurse was ordering trach supplies through [employer]. Also made a referral to [facility] to check benefits. RNCC to contact [facility] [employer] or [facility] when it is determined which agency will be the primary DME supplier for patient. Per notes,  [facility] sent the ATOS script for HME supplies to [facility].     2:06 PM  Patients home care nurse has not had to order trach supplies for patient due to him having a 90 day supply. Called [employer] and patient had one month of trach supplies sent in April. Left VM for Marii at [facility] to notify her that they were the last to supply trach supplies and requested laryngectomy supplies and tube feeding (if pt doesn't have infusion benefits). RNCC to follow up.     [employer]   PH: [phone]   F: [phone]    [facility] [employer]   PH:  [phone]  F: [phone]    [facility]  Phone: [phone]  Fax: [phone]      Charley Vega RN  [phone]

## 2021-07-12 NOTE — PLAN OF CARE
Status: POD#4 s/p total laryngectomy, and bilateral neck dissection  Vitals: VSS ex tachy in 110's.   Neuros: Intact ex numbness around incision site and right pinky and ring finger  IV: PIV SL  Labs/Electrolytes: WNL  Resp/trach: #9 Larytube in place with HME, lavaged 3x, no suction required, patient oral suctioning independently with red chayo.  Diet: NPO, PEG with TF at 55cc/hr (goal)   Bowel status: No BM. BS+, Senna and colace given  : voiding spontaneously using urinal  Skin: Bilateral neck incisions sutured, cleansed with NS and Aquaphor applied. 3 BRITTNI's to neck with small amount of serosanguinous drainage  Pain: mild neck pt. Pt refused scheduled pain meds overnight.   Activity: Up with A1 +GB.  Social: Cooperative with cares  Plan: Anny PLC completed 7/9, TF PLC today at 0900, continue to encourage independence with anny and PEG cares. Discharge home when medically stable, continue to monitor and follow POC

## 2021-07-12 NOTE — CONSULTS
Met with Rja to go over PEG and TF education. Pt was able to demonstrate how to properly clean and care for his PEG tube, administer medications, and set up bolus feedings via the gravity bag, plunger, and syringe method. He asked appropriate questions.    Literature given: Handwashing and Skin Care, Tube Feeding at Home-Bolus Method Using Syringe and Plunger, Tube Feedings at Home-[place] Method, Caring for G-tube at Home

## 2021-07-13 ENCOUNTER — APPOINTMENT (OUTPATIENT)
Dept: OCCUPATIONAL THERAPY | Facility: CLINIC | Age: 71
DRG: 011 | End: 2021-07-13
Attending: OTOLARYNGOLOGY
Payer: COMMERCIAL

## 2021-07-13 ENCOUNTER — APPOINTMENT (OUTPATIENT)
Dept: SPEECH THERAPY | Facility: CLINIC | Age: 71
DRG: 011 | End: 2021-07-13
Attending: OTOLARYNGOLOGY
Payer: COMMERCIAL

## 2021-07-13 ENCOUNTER — HOME INFUSION (PRE-WILLOW HOME INFUSION) (OUTPATIENT)
Dept: PHARMACY | Facility: CLINIC | Age: 71
End: 2021-07-13

## 2021-07-13 LAB
ANION GAP SERPL CALCULATED.3IONS-SCNC: 5 MMOL/L (ref 3–14)
BUN SERPL-MCNC: 10 MG/DL (ref 7–30)
CALCIUM SERPL-MCNC: 9.3 MG/DL (ref 8.5–10.1)
CHLORIDE BLD-SCNC: 107 MMOL/L (ref 94–109)
CO2 SERPL-SCNC: 28 MMOL/L (ref 20–32)
CREAT SERPL-MCNC: 0.68 MG/DL (ref 0.66–1.25)
ERYTHROCYTE [DISTWIDTH] IN BLOOD BY AUTOMATED COUNT: 12.5 % (ref 10–15)
ERYTHROCYTE [DISTWIDTH] IN BLOOD BY AUTOMATED COUNT: 12.7 % (ref 10–15)
GFR SERPL CREATININE-BSD FRML MDRD: >90 ML/MIN/1.73M2
GLUCOSE BLD-MCNC: 144 MG/DL (ref 70–99)
GLUCOSE BLDC GLUCOMTR-MCNC: 146 MG/DL (ref 70–99)
HCT VFR BLD AUTO: 35.5 % (ref 40–53)
HCT VFR BLD AUTO: 36.6 % (ref 40–53)
HGB BLD-MCNC: 11.7 G/DL (ref 13.3–17.7)
HGB BLD-MCNC: 11.8 G/DL (ref 13.3–17.7)
HOLD SPECIMEN: NORMAL
MAGNESIUM SERPL-MCNC: 2.4 MG/DL (ref 1.6–2.3)
MCH RBC QN AUTO: 29 PG (ref 26.5–33)
MCH RBC QN AUTO: 29.3 PG (ref 26.5–33)
MCHC RBC AUTO-ENTMCNC: 32.2 G/DL (ref 31.5–36.5)
MCHC RBC AUTO-ENTMCNC: 33 G/DL (ref 31.5–36.5)
MCV RBC AUTO: 89 FL (ref 78–100)
MCV RBC AUTO: 90 FL (ref 78–100)
PHOSPHATE SERPL-MCNC: 2.4 MG/DL (ref 2.5–4.5)
PHOSPHATE SERPL-MCNC: 2.4 MG/DL (ref 2.5–4.5)
PLATELET # BLD AUTO: 314 10E3/UL (ref 150–450)
PLATELET # BLD AUTO: 359 10E3/UL (ref 150–450)
POTASSIUM BLD-SCNC: 4 MMOL/L (ref 3.4–5.3)
PROCALCITONIN SERPL-MCNC: <0.05 NG/ML
RBC # BLD AUTO: 4 10E6/UL (ref 4.4–5.9)
RBC # BLD AUTO: 4.07 10E6/UL (ref 4.4–5.9)
SODIUM SERPL-SCNC: 140 MMOL/L (ref 133–144)
WBC # BLD AUTO: 7 10E3/UL (ref 4–11)
WBC # BLD AUTO: 8.5 10E3/UL (ref 4–11)

## 2021-07-13 PROCEDURE — 92507 TX SP LANG VOICE COMM INDIV: CPT | Mod: GN

## 2021-07-13 PROCEDURE — 250N000011 HC RX IP 250 OP 636: Performed by: PHYSICIAN ASSISTANT

## 2021-07-13 PROCEDURE — 250N000013 HC RX MED GY IP 250 OP 250 PS 637: Performed by: STUDENT IN AN ORGANIZED HEALTH CARE EDUCATION/TRAINING PROGRAM

## 2021-07-13 PROCEDURE — 250N000013 HC RX MED GY IP 250 OP 250 PS 637: Performed by: PHYSICIAN ASSISTANT

## 2021-07-13 PROCEDURE — 84100 ASSAY OF PHOSPHORUS: CPT | Performed by: PHYSICIAN ASSISTANT

## 2021-07-13 PROCEDURE — 82962 GLUCOSE BLOOD TEST: CPT

## 2021-07-13 PROCEDURE — 87205 SMEAR GRAM STAIN: CPT | Performed by: PHYSICIAN ASSISTANT

## 2021-07-13 PROCEDURE — 97110 THERAPEUTIC EXERCISES: CPT | Mod: GO

## 2021-07-13 PROCEDURE — 250N000011 HC RX IP 250 OP 636: Performed by: OTOLARYNGOLOGY

## 2021-07-13 PROCEDURE — 87040 BLOOD CULTURE FOR BACTERIA: CPT | Performed by: PHYSICIAN ASSISTANT

## 2021-07-13 PROCEDURE — 120N000002 HC R&B MED SURG/OB UMMC

## 2021-07-13 PROCEDURE — 82374 ASSAY BLOOD CARBON DIOXIDE: CPT | Performed by: STUDENT IN AN ORGANIZED HEALTH CARE EDUCATION/TRAINING PROGRAM

## 2021-07-13 PROCEDURE — 85027 COMPLETE CBC AUTOMATED: CPT | Performed by: STUDENT IN AN ORGANIZED HEALTH CARE EDUCATION/TRAINING PROGRAM

## 2021-07-13 PROCEDURE — 83735 ASSAY OF MAGNESIUM: CPT | Performed by: OTOLARYNGOLOGY

## 2021-07-13 PROCEDURE — 97530 THERAPEUTIC ACTIVITIES: CPT | Mod: GO

## 2021-07-13 PROCEDURE — 99233 SBSQ HOSP IP/OBS HIGH 50: CPT | Performed by: INTERNAL MEDICINE

## 2021-07-13 PROCEDURE — 82565 ASSAY OF CREATININE: CPT | Performed by: STUDENT IN AN ORGANIZED HEALTH CARE EDUCATION/TRAINING PROGRAM

## 2021-07-13 PROCEDURE — 36415 COLL VENOUS BLD VENIPUNCTURE: CPT | Performed by: PHYSICIAN ASSISTANT

## 2021-07-13 PROCEDURE — 84100 ASSAY OF PHOSPHORUS: CPT | Performed by: OTOLARYNGOLOGY

## 2021-07-13 PROCEDURE — 85027 COMPLETE CBC AUTOMATED: CPT | Performed by: PHYSICIAN ASSISTANT

## 2021-07-13 PROCEDURE — 999N000128 HC STATISTIC PERIPHERAL IV START W/O US GUIDANCE

## 2021-07-13 PROCEDURE — 84145 PROCALCITONIN (PCT): CPT | Performed by: INTERNAL MEDICINE

## 2021-07-13 PROCEDURE — 36415 COLL VENOUS BLD VENIPUNCTURE: CPT | Performed by: STUDENT IN AN ORGANIZED HEALTH CARE EDUCATION/TRAINING PROGRAM

## 2021-07-13 PROCEDURE — 999N000203 HC STATISTICAL VASC ACCESS NURSE TIME, 16-31 MINUTES

## 2021-07-13 RX ORDER — AMPICILLIN AND SULBACTAM 2; 1 G/1; G/1
3 INJECTION, POWDER, FOR SOLUTION INTRAMUSCULAR; INTRAVENOUS EVERY 6 HOURS
Status: DISCONTINUED | OUTPATIENT
Start: 2021-07-13 | End: 2021-07-17 | Stop reason: HOSPADM

## 2021-07-13 RX ORDER — HEPARIN SODIUM 10000 [USP'U]/100ML
0-5000 INJECTION, SOLUTION INTRAVENOUS CONTINUOUS
Status: DISCONTINUED | OUTPATIENT
Start: 2021-07-13 | End: 2021-07-16

## 2021-07-13 RX ORDER — BISACODYL 10 MG
10 SUPPOSITORY, RECTAL RECTAL ONCE
Status: DISCONTINUED | OUTPATIENT
Start: 2021-07-13 | End: 2021-07-14

## 2021-07-13 RX ADMIN — Medication 15 ML: at 08:49

## 2021-07-13 RX ADMIN — METOPROLOL TARTRATE 25 MG: 25 TABLET, FILM COATED ORAL at 20:00

## 2021-07-13 RX ADMIN — PANTOPRAZOLE SODIUM 40 MG: 40 TABLET, DELAYED RELEASE ORAL at 08:49

## 2021-07-13 RX ADMIN — WHITE PETROLATUM: 1.75 OINTMENT TOPICAL at 08:49

## 2021-07-13 RX ADMIN — AMPICILLIN SODIUM AND SULBACTAM SODIUM 3 G: 2; 1 INJECTION, POWDER, FOR SOLUTION INTRAMUSCULAR; INTRAVENOUS at 17:50

## 2021-07-13 RX ADMIN — HEPARIN SODIUM 1050 UNITS/HR: 10000 INJECTION, SOLUTION INTRAVENOUS at 16:45

## 2021-07-13 RX ADMIN — CHLORHEXIDINE GLUCONATE 15 ML: 1.2 RINSE ORAL at 20:00

## 2021-07-13 RX ADMIN — CHLORHEXIDINE GLUCONATE 15 ML: 1.2 RINSE ORAL at 12:18

## 2021-07-13 RX ADMIN — POTASSIUM & SODIUM PHOSPHATES POWDER PACK 280-160-250 MG 1 PACKET: 280-160-250 PACK at 20:00

## 2021-07-13 RX ADMIN — WHITE PETROLATUM: 1.75 OINTMENT TOPICAL at 00:03

## 2021-07-13 RX ADMIN — CHLORHEXIDINE GLUCONATE 15 ML: 1.2 RINSE ORAL at 08:49

## 2021-07-13 RX ADMIN — SENNOSIDES AND DOCUSATE SODIUM 1 TABLET: 8.6; 5 TABLET ORAL at 20:00

## 2021-07-13 RX ADMIN — WHITE PETROLATUM: 1.75 OINTMENT TOPICAL at 16:54

## 2021-07-13 RX ADMIN — AMPICILLIN SODIUM AND SULBACTAM SODIUM 3 G: 2; 1 INJECTION, POWDER, FOR SOLUTION INTRAMUSCULAR; INTRAVENOUS at 22:11

## 2021-07-13 RX ADMIN — ENOXAPARIN SODIUM 40 MG: 40 INJECTION SUBCUTANEOUS at 10:00

## 2021-07-13 RX ADMIN — POTASSIUM & SODIUM PHOSPHATES POWDER PACK 280-160-250 MG 1 PACKET: 280-160-250 PACK at 14:48

## 2021-07-13 RX ADMIN — CHLORHEXIDINE GLUCONATE 15 ML: 1.2 RINSE ORAL at 16:45

## 2021-07-13 RX ADMIN — METOPROLOL TARTRATE 25 MG: 25 TABLET, FILM COATED ORAL at 08:49

## 2021-07-13 RX ADMIN — AMPICILLIN SODIUM AND SULBACTAM SODIUM 3 G: 2; 1 INJECTION, POWDER, FOR SOLUTION INTRAMUSCULAR; INTRAVENOUS at 14:55

## 2021-07-13 ASSESSMENT — ACTIVITIES OF DAILY LIVING (ADL)
ADLS_ACUITY_SCORE: 15
PATIENT_/_FAMILY_COMMUNICATION_STYLE: SPOKEN LANGUAGE (ENGLISH OR BILINGUAL)
ADLS_ACUITY_SCORE: 15

## 2021-07-13 NOTE — PROGRESS NOTES
[facility]    Medicine Progress Note - Hospitalist Service, Gold 7       Date of Admission:  7/8/2021    Assessment & Plan           Raj Warren is a 71 year old male admitted on 7/8/2021. He has a history of SCC of larynx s/p s/p trach placement (4/12), bilateral next dissection, laryngectomy, and PEG tube placement (7/8), and extensive bilateral PE's, s/p RV thrombectomy, pulmonary embolectomy, and ICV placement 4/28 with Dr. Singh. Other medical history includes DM2, HTN, anemia, anticoagulated, GERD. Today medicine is consulted for acute onset of tachycardia and low grade fever.     Recommendations for Today:  - Continue to monitor for fevers  - agree with starting Unasyn and repeat BC and sputum c/s.  If still febrile, may have to add  - ENT to consider starting low dose anticoagulation after drain removal     #Acute tachycardia  #Low grade fevers   # HCAP  Patient had a temp of 100.9 overnight 7/9. Also tachycardic to 120-130's. Infective vs. response to acute surgical/incisional pain.  Stat CT PE showed left basilar atelectasis and consolidation with elevation of the left hemidiaphragm and small left pleural effusion, no PE. Possible PNA on chest CT with elevated CRP to 120, however, afebrile and not hypoxic. Echo shows: RV function is normal to mildly reduced based on limited windows. Estimated pulmonary artery systolic pressure is 31 mmHg plus right atrial pressure. CRP elevated but difficult to interpret given recent surgery.   - UA w/ culture and blood cx x 2 sent, NGTD    #Mild hypocalcemia  #Mild hypoalbunemia  Improving. Acute hypocalcemia 8.1 (9.2), hypoalbuminemia 2.9, and iCa 4.4. Asymptomatic on assessment.   - Continue to monitor and replete as necessary     #Hx of large bilateral pulmonary emboli with evidence of right heart strain (4/27/2021) s/p pulmonary embolectomy, RV thrombectomy, ICV filter placement  #Chronic anticoagulation  Admitted to  [facility] 4/27-5/6 after p/w shortness of breath after hospitalization for tracheostomy placement 4/13).  Patient was found to have large bilateral PE's with evidence of right heart strain and RV thrombus on CT angio 4/27 suspected from malignancy. Underwent emergent pulmonary embolectomy, RV thrombectomy, ICV filter placement at [facility] with Dr. Singh on 4/28/21.  Discharged to [facility] on 5/8/21, 5/15 discharged home.  Stopped eliquis and started therapeutic lovenox 7/5-7/7.  S/p laryngectomy procedure on 7/8 and remains on prophylactic Lovenox at this time. Stat CT today showed no evidence of pulmonary embolism.  Hematology consulted.   - Given negative CT PE, no acute need to anticoagulate now, however, should be anticoagulated ASAP when safe from a surgical standpoint.  Will defer AC plan to ENT/heme.   - Per heme: If concern for bleeding, trial of full intensity heparin so that if he bleeds the therapy could be stopped.  If stable on full anticoagulation for 24-48 hours then could safely change to apixaban.     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 Montaño) and total laryngectomy, bilateral neck dissection, cricopharyngeal myotomy Dr. 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 4/12/21 presented to the ED with dyspnea and found to have a right larynx soft tissue mass, tracheotomy performed by ENT. Patient had another ENT consult 6/18 with Dr. Sethi and a flexible laryngoscopy/trach exchange was done, with plan for surgery and radiation +/- chemo. Now admitted to [facility] s/p total laryngectomy without major complications.  - Continue HEENT incision cares (please see ENT's note)     #DMII  Diet-controlled.   - Continue sugar checks  Recent Labs   Lab 07/13/21  0757 07/13/21  0634 07/13/21  0356  07/13/21  0009 07/12/21  2030 07/12/21  1744 07/11/21  0638 07/11/21  0150 07/10/21  2249 07/10/21  1614 07/10/21  0808 07/10/21  0556 07/10/21  0216   * 93 88 122* 99 93   < >  --   --   --   --   --   --    BGM  --   --   --   --   --   --   --  129* 115* 118* 114* 101* 102*    < > = values in this interval not displayed.     - Hypoglycemia protocol     #HTN  - Continue Metoprolol 25 mg BID  - Can increase if becomes hypertensive with tachycardia     #Acute anemia  Likely in the setting of intraoperative blood loss. Hgb 11.3 (13.9).  No current s/s of bleeding.   - Consider transfusion of RBCs if symptomatic and < 7     #GERD  - Continue Pantoprazole EC 40 mg daily     #R hand numbness   Following surgery 4/2021.  Evalauted by neurology 6/2021 and this is felt most likely due to compression/stretch injury that occurred cheryl-operatively.  Symptoms have mildly improved since onset.  - Plan for EMG as OP with Neurology after medically stable and discharged   - Continue to monitor     The patient's care was discussed with the Bedside Nurse, Care Coordinator/, Patient and Primary team.    Raji Corbin MD, MD  Hospitalist Service, [address]  Securely message with the Vocera Web Console (learn more here)  Text page via [facility] Paging/Directory  Please see sign in/sign out for up to date coverage information    Risk Factors Present on Admission                ______________________________________________________________________    Interval History   Still had low grade fever last night. Not having BM . Passing gas  No vomiting.   4 point ROS done and neg unless mentioned    Data reviewed today: I reviewed all medications, new labs and imaging results over the last 24 hours. I personally reviewed the chest CT image(s) showing as below.    Physical Exam   BP (!) 145/67 (BP Location: Right arm)   Pulse 107   Temp 98.4  F (36.9  C) (Oral)   Resp 16  "  Ht 1.803 m (5' 11\")   Wt 89.3 kg (196 lb 12.8 oz)   SpO2 98%   BMI 27.45 kg/m    Gen- pleasant   HEENT-  ALOK  Neck- trach. Drains in place - further as per ENT   CVS- I+II+ no m/r/g  RS- CTAB  Abdo- soft, no tenderness . No g/r/r, G-tube  Ext- no edema     Data    BMPRecent Labs   Lab 07/13/21  0757 07/13/21  0634 07/13/21  0356 07/13/21  0009 07/12/21  1059 07/11/21  0638 07/10/21  0731 07/10/21  0731     --   --   --  136 136  --  135   POTASSIUM 4.0  --   --   --  3.6 3.6  --  3.7   CHLORIDE 107  --   --   --  102 102  --  102   TELMA 9.3  --   --   --  8.6 8.5  --  8.1*   CO2 28  --   --   --  27 29  --  27   BUN 10  --   --   --  11 10  --  8   CR 0.68  --   --   --  0.65* 0.78  --  0.86   * 93 88 122* 114* 128*   < > 113*    < > = values in this interval not displayed.     CBC  Recent Labs   Lab 07/13/21 0757 07/12/21  1059 07/11/21  0638 07/10/21  0731 07/10/21  0731   WBC 7.0 7.1 8.3  --  9.6   RBC 4.00* 3.77* 3.80*  --  3.87*   HGB 11.7* 11.1* 11.3*   < > 11.3*   HCT 35.5* 33.3* 33.8*  --  34.2*   MCV 89 88 89  --  88   MCH 29.3 29.4 29.7  --  29.2   MCHC 33.0 33.3 33.4  --  33.0   RDW 12.5 12.7 12.9  --  12.8    302 229  --  245    < > = values in this interval not displayed.     INRNo lab results found in last 7 days.  LFTs  Recent Labs   Lab 07/09/21  0841   ALBUMIN 2.9*      PANCNo lab results found in last 7 days.    CT Chest: CTA pulmonary angiogram  7/10  IMPRESSION: No evidence of pulmonary embolism. Left basilar   atelectasis and consolidation, cannot exclude infection or aspiration.   Postoperative changes from laryngectomy.   No results found for this or any previous visit (from the past 24 hour(s)).  "

## 2021-07-13 NOTE — PROGRESS NOTES
Home Infusion  Raj is expected to discharge within a few days and will be going home on enteral feeds.  Benefits have been checked and pt will have 80/20 coverage until max OOP of $2,500 is met and then will have 100% coverage through his BCBS plan.    Spoke with patient to relay benefits and offer choice of agency for the enteral services.  Patient states he would like to think about his options and does not want to choose a company to provide tube feeding supplies at this time. RNCC updated.     Kaley Almanza RN / Nurse Liaison  [facility]  [email or web address]  Cell [phone] M-F/ [facility] office [phone] *24/7

## 2021-07-13 NOTE — PROGRESS NOTES
Vascular Access Services Notes:    Ultrasound-guided lab draw done without complication. Attempted x1 in the right upper forearm using a 20 gauge x 1.75 inch BB PIV needle.  was present to assist.    Time spent with pt - 30 minutes      BOY Johns, RN [facility]

## 2021-07-13 NOTE — PLAN OF CARE
Status: POD#5 s/p total laryngectomy, and bilateral neck dissection  Vitals: VSS ex tachycardic improved, HR in 100's  Neuros: Intact ex numbness around incision site and right pinky and ring finger  IV: PIV saline locked between antibiotics  Labs/Electrolytes: WNL  Resp/trach: #9 Larytube in place with HME, lavaged 1x, no suction required, patient refusing Q4hr lavages, eduacted patient on importance of lavage to prevent mucous plug and to maintain airway patency patient oral suctioning independently with red chayo, patient demonstrates proper technique for cleaning/inserting anny tube and placing HME  Diet: NPO, PEG with bolus TF's patient tolerated 3.5 cans today, needs additional 2.5 cans to meet goal of 6 cans  Bowel status: No BM today, BS faint, Patient refused all bowel medications, educated patient on importance of medications  : Voiding spontaneously   Skin: Bi-lateral neck incisions with sutures, patient cleansed with NS and applied Aquaphor with supervision, two BRITTNI's to neck one on left and one on right  Pain: Patient has facial grimacing but refuses pain medications  Activity: Up independent, ambulated in hallway 1x, up in chair for four hours todqay  Social: Cooperative with cares, son at bedside supportive  Plan: Anny PLC completed 7/9, TF PLC completed 7/12, continue to have patient complete cares with supervision until independent, discharge home when medically stable, continue to monitor and follow POC  Update: Awaiting blood cultures, Lab unable to draw with two attempts per ENT do not given Unasyn prior to blood culture draw

## 2021-07-13 NOTE — PLAN OF CARE
Status: POD#5 s/p total laryngectomy, and bilateral neck dissection  Vitals: VSS ex continues to have low grade temp (Tmax 99.1), and tachy in 100's.  Neuros: Intact ex numbness around incision site and right pinky and ring finger  IV: PIV SL  Labs/Electrolytes: WNL  Resp/trach: #9 Larytube in place with HME, lavaged Q4h, no suction required as patient is able to clear secretions with strong cough. Pt is able to complete Anny cares with supervision.   Diet: NPO. Bolus TF initiated on days (7/12). The pt was advanced to 1 can at 2100, and tolerated well. The pt refused bolus TF overnight. TF due to advance to 1.5 cans bolus this AM with at goal of 2 cans TID=6 cans total daily. The pt is able to medications via peg with supervision.  Bowel status: BS+ in all quadrants, passing gas. Unknown last BM. Pt refused second half of mag citrate.   : voiding spontaneously using urinal  Skin: Bilateral neck incisions sutured, cleansed with NS and Aquaphor applied. 2 BRITTNI's to neck with small amount of serosanguinous drainage.  Pain: mild-moderate neck pain. Pt continues to refuse scheduled pain meds.  Activity: Up with A1 +GB  Plan: Anny PLC completed 7/9, TF PLC completed 7/12, continue to encourage independence with anny and PEG cares. Discharge home when medically stable, continue to monitor and follow POC    Addendum: the pt refused 6am Lavage. Pt started 1.5 can TF bolus at 0630

## 2021-07-13 NOTE — PROGRESS NOTES
"Otolaryngology Progress Note  July 13, 2021    S: No acute overnight events. Tmax 100.6. Tube feeds started, tolerating well. PLC completed, continuing to practice cares. This morning denies shortness of breath, increased work of breathing. His pain is controlled at baseline, but does have some tenderness with palpation of the left neck.    O: /87 (BP Location: Right arm)   Pulse 100   Temp 99.1  F (37.3  C) (Oral)   Resp 16   Ht 1.803 m (5' 11\")   Wt 89.3 kg (196 lb 12.8 oz)   SpO2 98%   BMI 27.45 kg/m     General: resting comfortably and not in acute distress               Neuro: patient alert, oriented, and answering questions appropriately.                HEENT: neck incisions soft, flat, and well-approximated; slight pinpoint tenderness at the left medial neck. Anny tube clean, no crusting around stoma or in airway. BRITTNI drains w/ scant serous drainage; stoma well-approximated, no palpable fluid collection or leak.               Pulmonary: breathing comfortably on room air without stridor or stertor, HME and anny tube in place      Intake/Output Summary (Last 24 hours) at 7/13/2021 0511  Last data filed at 7/12/2021 2259  Gross per 24 hour   Intake 1365 ml   Output 481.5 ml   Net 883.5 ml     BRITTNI drain output(s): (last 24 hours)/(last shift)  Drain 1: 2/2 ml  Drain 2: 2/2.5 ml      LABS:  ROUTINE IP LABS (Last four results)  BMP  Recent Labs   Lab 07/13/21  0356 07/13/21  0009 07/12/21  2030 07/12/21  1744 07/12/21  1059 07/11/21  0638 07/10/21  0731 07/10/21  0731 07/09/21  0841   NA  --   --   --   --  136 136  --  135 136   POTASSIUM  --   --   --   --  3.6 3.6  --  3.7 3.8   CHLORIDE  --   --   --   --  102 102  --  102 105   CO2  --   --   --   --  27 29  --  27 27   BUN  --   --   --   --  11 10  --  8 9   CR  --   --   --   --  0.65* 0.78  --  0.86 0.75   GLC 88 122* 99 93 114* 128*   < > 113* 107*    < > = values in this interval not displayed.     Recent Labs   Lab 07/12/21  1059 " 07/11/21  0638 07/10/21  0731 07/09/21  0841   TELMA 8.6 8.5 8.1* 8.4*   MAG 2.1 2.1 2.0 2.0   PHOS 3.2 4.4 3.5 3.9       CBC  Recent Labs   Lab 07/12/21  1059 07/11/21  0638 07/10/21  0731 07/09/21  0841   WBC 7.1 8.3 9.6 11.1*   HGB 11.1* 11.3* 11.3* 12.1*   HCT 33.3* 33.8* 34.2* 36.5*    229 245 260     Recent Labs   Lab Test 07/12/21  1059 07/10/21  0731   .0* 120.0*     Cultures:  Recent Labs   Lab 07/10/21  1338 07/10/21  1331   CULT No growth after 2 days No growth after 2 days       A/P: Raj Warren is a 71-year-old male with a PMH significant for TIIDM (diet-controlled), HTN (on metoprolol PTA), and recent bilateral PE (on apixaban PTA) with right ventricular clot burden s/p sternotomy, embolectomy/RV thrombectomy, and IVC filter placement in 04/2021. Patient also has a history of laryngeal SCCa and is now s/p total laryngectomy, and bilateral neck dissection (II-V) on 7/8.      Neuro:  - Analgesia: tylenol q8h; oxycodone 5 mg q4h and 5 - 10 mg q4h PRN     HEENT:  - Oral care- Peridex QID; normal saline mouth rinses q8h and PRN; suction with red Martin catheter only  - Strict NPO  - Incision care, BRITTNI drains x2 in place, routine laryngectomy cares               - Okay for Margy tube at all times with HME filter               - No electrolarynx    Respiratory:   - Patient has a laryngectomy and has NO AIRWAY FROM ABOVE  - Patient must have flexible suction catheters at the bedside at all times  - Patient must be on humidified air at all times that Nicola tube and HME filter are not in place  - Continuous pulse oximetry at all times; supplemental O2 PRN to keep sats >90%.     CV/heme:   - PMH of HTN and bilateral Pes (IVC filter in place)- on metoprolol 25 mg BID; apixiban PTA (holding)   - Hematology consultation for assistance with anti-coagulation (currently on prophylactic lovenox): pending timing of resumption of therapeutic (full-intensity) anticoagulation. Eventually will need to resume  apixiban for additional 3m (total 6m course)   -Negative CT PE 7/10, L basilar consolidation. Medicine following.   - Continue to monitor tachycardia     FEN/GI:  - PEG placed by Thoracic surgery on 7/8  - PPI scheduled 40 mg, zofran scheduled x48 hours, per laryngectomy protocol  - Nutrition consult- anticipate transition to bolus 7/12 pending tolerance of continuous TF at goal   - Albumin low at 2.9; Prealbumin low at 12; TSH wnl at 1.1  - Bowel regimen: senna-docusate BID; miralax daily - magnesium citrate today, then enema if no BM     :  - voiding independently      Endo:   - PMH of TIIDM- diet-controlled prior to admission  - ISS     ID: afebrile, no leukocytosis. CT with L basilar consolidation.  7/12, increased from prior. Will discuss with Medicine recommendations for beginning antibiotics.  - s/p Unasyn x48 hrs     PPX: Lovenox 40mg     Consults: PT/OT (shoulder exercises provided), Nutrition, SLP, PLC     Dispo:  - PT - defer; OT: home w/home OT  - PLC- anny tube completed, TF completed 7/12     -- Patient and above plan discussed with staff, Dr. Navdeep Thomas MD PGY3  Otolaryngology - Head & Neck Surgery   Please page the on-call resident with questions. If after hours, contact ENT with questions by dialing * [phone] and entering job code [phone] when prompted.

## 2021-07-14 ENCOUNTER — APPOINTMENT (OUTPATIENT)
Dept: SPEECH THERAPY | Facility: CLINIC | Age: 71
DRG: 011 | End: 2021-07-14
Attending: OTOLARYNGOLOGY
Payer: COMMERCIAL

## 2021-07-14 DIAGNOSIS — C32.9 MALIGNANT NEOPLASM OF LARYNX (H): Primary | ICD-10-CM

## 2021-07-14 LAB
ANION GAP SERPL CALCULATED.3IONS-SCNC: 5 MMOL/L (ref 3–14)
BUN SERPL-MCNC: 13 MG/DL (ref 7–30)
CALCIUM SERPL-MCNC: 8.7 MG/DL (ref 8.5–10.1)
CHLORIDE BLD-SCNC: 107 MMOL/L (ref 94–109)
CO2 SERPL-SCNC: 26 MMOL/L (ref 20–32)
CREAT SERPL-MCNC: 0.69 MG/DL (ref 0.66–1.25)
ERYTHROCYTE [DISTWIDTH] IN BLOOD BY AUTOMATED COUNT: 12.7 % (ref 10–15)
GFR SERPL CREATININE-BSD FRML MDRD: >90 ML/MIN/1.73M2
GLUCOSE BLD-MCNC: 152 MG/DL (ref 70–99)
GLUCOSE BLDC GLUCOMTR-MCNC: 98 MG/DL (ref 70–99)
HCT VFR BLD AUTO: 37.6 % (ref 40–53)
HGB BLD-MCNC: 12.1 G/DL (ref 13.3–17.7)
MAGNESIUM SERPL-MCNC: 2.4 MG/DL (ref 1.6–2.3)
MCH RBC QN AUTO: 28.9 PG (ref 26.5–33)
MCHC RBC AUTO-ENTMCNC: 32.2 G/DL (ref 31.5–36.5)
MCV RBC AUTO: 90 FL (ref 78–100)
PHOSPHATE SERPL-MCNC: 2.1 MG/DL (ref 2.5–4.5)
PLATELET # BLD AUTO: 361 10E3/UL (ref 150–450)
POTASSIUM BLD-SCNC: 3.9 MMOL/L (ref 3.4–5.3)
RBC # BLD AUTO: 4.18 10E6/UL (ref 4.4–5.9)
SODIUM SERPL-SCNC: 138 MMOL/L (ref 133–144)
UFH PPP CHRO-ACNC: 0.32 IU/ML
UFH PPP CHRO-ACNC: 0.37 IU/ML
WBC # BLD AUTO: 8.2 10E3/UL (ref 4–11)

## 2021-07-14 PROCEDURE — 250N000013 HC RX MED GY IP 250 OP 250 PS 637: Performed by: STUDENT IN AN ORGANIZED HEALTH CARE EDUCATION/TRAINING PROGRAM

## 2021-07-14 PROCEDURE — 92507 TX SP LANG VOICE COMM INDIV: CPT | Mod: GN

## 2021-07-14 PROCEDURE — 36415 COLL VENOUS BLD VENIPUNCTURE: CPT | Performed by: STUDENT IN AN ORGANIZED HEALTH CARE EDUCATION/TRAINING PROGRAM

## 2021-07-14 PROCEDURE — 85520 HEPARIN ASSAY: CPT | Performed by: OTOLARYNGOLOGY

## 2021-07-14 PROCEDURE — 80048 BASIC METABOLIC PNL TOTAL CA: CPT | Performed by: STUDENT IN AN ORGANIZED HEALTH CARE EDUCATION/TRAINING PROGRAM

## 2021-07-14 PROCEDURE — 250N000011 HC RX IP 250 OP 636: Performed by: PHYSICIAN ASSISTANT

## 2021-07-14 PROCEDURE — 250N000013 HC RX MED GY IP 250 OP 250 PS 637: Performed by: OTOLARYNGOLOGY

## 2021-07-14 PROCEDURE — 84100 ASSAY OF PHOSPHORUS: CPT | Performed by: OTOLARYNGOLOGY

## 2021-07-14 PROCEDURE — 85520 HEPARIN ASSAY: CPT | Performed by: PHYSICIAN ASSISTANT

## 2021-07-14 PROCEDURE — 82962 GLUCOSE BLOOD TEST: CPT

## 2021-07-14 PROCEDURE — 120N000002 HC R&B MED SURG/OB UMMC

## 2021-07-14 PROCEDURE — 83735 ASSAY OF MAGNESIUM: CPT | Performed by: OTOLARYNGOLOGY

## 2021-07-14 PROCEDURE — 36415 COLL VENOUS BLD VENIPUNCTURE: CPT | Performed by: OTOLARYNGOLOGY

## 2021-07-14 PROCEDURE — 250N000013 HC RX MED GY IP 250 OP 250 PS 637: Performed by: PHYSICIAN ASSISTANT

## 2021-07-14 PROCEDURE — 85027 COMPLETE CBC AUTOMATED: CPT | Performed by: STUDENT IN AN ORGANIZED HEALTH CARE EDUCATION/TRAINING PROGRAM

## 2021-07-14 RX ORDER — OXYCODONE HYDROCHLORIDE 5 MG/1
5-10 TABLET ORAL EVERY 4 HOURS PRN
Qty: 15 TABLET | Refills: 0 | Status: SHIPPED | OUTPATIENT
Start: 2021-07-14 | End: 2021-07-16

## 2021-07-14 RX ORDER — BISACODYL 10 MG
10 SUPPOSITORY, RECTAL RECTAL DAILY PRN
Status: DISCONTINUED | OUTPATIENT
Start: 2021-07-14 | End: 2021-07-17 | Stop reason: HOSPADM

## 2021-07-14 RX ADMIN — CHLORHEXIDINE GLUCONATE 15 ML: 1.2 RINSE ORAL at 16:53

## 2021-07-14 RX ADMIN — Medication 15 ML: at 08:43

## 2021-07-14 RX ADMIN — SENNOSIDES AND DOCUSATE SODIUM 1 TABLET: 8.6; 5 TABLET ORAL at 08:43

## 2021-07-14 RX ADMIN — BISACODYL 10 MG: 10 SUPPOSITORY RECTAL at 12:59

## 2021-07-14 RX ADMIN — AMPICILLIN SODIUM AND SULBACTAM SODIUM 3 G: 2; 1 INJECTION, POWDER, FOR SOLUTION INTRAMUSCULAR; INTRAVENOUS at 11:39

## 2021-07-14 RX ADMIN — WHITE PETROLATUM: 1.75 OINTMENT TOPICAL at 01:07

## 2021-07-14 RX ADMIN — POTASSIUM & SODIUM PHOSPHATES POWDER PACK 280-160-250 MG 1 PACKET: 280-160-250 PACK at 08:43

## 2021-07-14 RX ADMIN — METOPROLOL TARTRATE 25 MG: 25 TABLET, FILM COATED ORAL at 08:42

## 2021-07-14 RX ADMIN — HEPARIN SODIUM 900 UNITS/HR: 10000 INJECTION, SOLUTION INTRAVENOUS at 18:39

## 2021-07-14 RX ADMIN — PANTOPRAZOLE SODIUM 40 MG: 40 TABLET, DELAYED RELEASE ORAL at 10:19

## 2021-07-14 RX ADMIN — CHLORHEXIDINE GLUCONATE 15 ML: 1.2 RINSE ORAL at 19:51

## 2021-07-14 RX ADMIN — CHLORHEXIDINE GLUCONATE 15 ML: 1.2 RINSE ORAL at 08:43

## 2021-07-14 RX ADMIN — AMPICILLIN SODIUM AND SULBACTAM SODIUM 3 G: 2; 1 INJECTION, POWDER, FOR SOLUTION INTRAMUSCULAR; INTRAVENOUS at 05:13

## 2021-07-14 RX ADMIN — POLYETHYLENE GLYCOL 3350 17 G: 17 POWDER, FOR SOLUTION ORAL at 08:43

## 2021-07-14 RX ADMIN — WHITE PETROLATUM: 1.75 OINTMENT TOPICAL at 16:54

## 2021-07-14 RX ADMIN — METOPROLOL TARTRATE 25 MG: 25 TABLET, FILM COATED ORAL at 19:56

## 2021-07-14 RX ADMIN — AMPICILLIN SODIUM AND SULBACTAM SODIUM 3 G: 2; 1 INJECTION, POWDER, FOR SOLUTION INTRAMUSCULAR; INTRAVENOUS at 16:53

## 2021-07-14 RX ADMIN — CHLORHEXIDINE GLUCONATE 15 ML: 1.2 RINSE ORAL at 11:39

## 2021-07-14 RX ADMIN — WHITE PETROLATUM: 1.75 OINTMENT TOPICAL at 08:56

## 2021-07-14 RX ADMIN — AMPICILLIN SODIUM AND SULBACTAM SODIUM 3 G: 2; 1 INJECTION, POWDER, FOR SOLUTION INTRAMUSCULAR; INTRAVENOUS at 22:25

## 2021-07-14 ASSESSMENT — ACTIVITIES OF DAILY LIVING (ADL)
ADLS_ACUITY_SCORE: 15

## 2021-07-14 NOTE — PROGRESS NOTES
"Otolaryngology Progress Note  July 14, 2021     S: No acute overnight events, very low intensity heparin drip started yesterday. This morning denies pain, shortness of breath. Amenable to suppository.    O: /79   Pulse 89   Temp 97.7  F (36.5  C) (Axillary)   Resp 16   Ht 1.803 m (5' 11\")   Wt 89.3 kg (196 lb 12.8 oz)   SpO2 97%   BMI 27.45 kg/m     General: resting comfortably and not in acute distress               Neuro: patient alert, oriented, and answering questions appropriately.                HEENT: neck incisions soft, flat, and well-approximated; slight pinpoint tenderness at the left medial neck. Anny tube clean, no crusting around stoma or in airway. BRITTNI drains w/ scant serous drainage; stoma well-approximated, no palpable fluid collection or leak.               Pulmonary: breathing comfortably on room air without stridor or stertor, HME and anny tube in place      Intake/Output Summary (Last 24 hours) at 7/13/2021 0511  Last data filed at 7/12/2021 2259  Gross per 24 hour   Intake 1365 ml   Output 481.5 ml   Net 883.5 ml     RBITTNI drain output(s): (last 24 hours)/(last shift)  Drain 2: 7/1 ml  Drain 4: 7.5/2 ml      LABS:    [facility]  Recent Labs   Lab 07/14/21  0100 07/13/21 2057 07/13/21 0757 07/13/21  0634 07/12/21  1059 07/11/21  0638 07/10/21  0731 07/10/21  0731   NA  --   --  140  --  136 136  --  135   POTASSIUM  --   --  4.0  --  3.6 3.6  --  3.7   CHLORIDE  --   --  107  --  102 102  --  102   CO2  --   --  28  --  27 29  --  27   BUN  --   --  10  --  11 10  --  8   CR  --   --  0.68  --  0.65* 0.78  --  0.86   GLC 98 146* 144* 93 114* 128*   < > 113*    < > = values in this interval not displayed.     Recent Labs   Lab 07/13/21  1110 07/13/21 0757 07/12/21  1059 07/11/21  0638 07/10/21  0731   TELMA  --  9.3 8.6 8.5 8.1*   MAG  --  2.4* 2.1 2.1 2.0   PHOS 2.4* 2.4* 3.2 4.4 3.5       CBC  Recent Labs   Lab 07/13/21  1636 07/13/21  0757 07/12/21  1059 07/11/21  0638   WBC 8.5 7.0 7.1 8.3 "   HGB 11.8* 11.7* 11.1* 11.3*   HCT 36.6* 35.5* 33.3* 33.8*    314 302 229     Recent Labs   Lab Test 07/12/21  1059 07/10/21  0731   .0* 120.0*     Cultures:  Recent Labs   Lab 07/10/21  1338 07/10/21  1331   CULT No growth after 3 days No growth after 3 days       A/P: Raj Warren is a 71-year-old male with a PMH significant for TIIDM (diet-controlled), HTN, and recent bilateral PE (on apixaban PTA) with right ventricular clot burden s/p sternotomy, embolectomy/RV thrombectomy, and IVC filter placement in 04/2021. Patient also has a history of laryngeal SCCa and is now s/p total laryngectomy, and bilateral neck dissection (II-V) on 7/8.      Neuro: Tylenol q8h; oxycodone 5 mg q4h and 5 - 10 mg q4h PRN - not requiring     HEENT:  - Oral care- Peridex QID; normal saline mouth rinses q8h and PRN; suction with red Martin catheter only  - Incision care, BRITTNI drains x2 in place, routine laryngectomy cares               - Okay for Margy tube at all times with HME filter               - No electrolarynx    Respiratory: Patient has a laryngectomy and has NO AIRWAY FROM ABOVE  - Routine laryngectomy cares     CV/heme:   - PMH of HTN and bilateral PEs (IVC filter in place)- on metoprolol 25 mg BID; apixiban PTA (holding)   - Hematology consultation; very low intensity heparin drip started 7/13.. Eventually will need to resume apixiban for additional 3m (total 6m course)   -Negative CT PE 7/10, L basilar consolidation. Medicine following.   - Continue to monitor tachycardia, improved     FEN/GI: Strict NPO, s/p PEG placed by Thoracic surgery on 7/8  - PPI scheduled 40 mg, zofran scheduled x48 hours, per laryngectomy protocol  - Nutrition consult- tolerating bolus TFs  - Albumin low at 2.9; Prealbumin low at 12; TSH wnl at 1.1  - Bowel regimen: agreeable to suppository today, last BM unknown     : Voiding independently      Endo: Hx of diet-controlled TIIDM, not requiring ISS     ID: Unasyn started 7/13 for  presumed HAP (CT with L basilar consolidation, rising CRP) - f/u with Medicine re: duration     PPX: therapeutic heparin drip started     Consults: PT (defer)/OT (home w/home OT) shoulder exercises provided), Nutrition, SLP, PLC (laryngectomy and TF teaching done)     Dispo: pending anticoagulation regimen, cultures, home TF and laryngectomy supplies       -- Patient and above plan discussed with staff, Dr. Navdeep Thomas MD PGY3  Otolaryngology - Head & Neck Surgery   Please page the on-call resident with questions. If after hours, contact ENT with questions by dialing [phone] and entering job code [phone] when prompted.

## 2021-07-14 NOTE — PLAN OF CARE
Status: POD #6 s/p total laryngectomy and bilateral neck dissection  Vitals: VSS on RA  Neuros: A&O x4, numbness around the incision site and in his R pinky and ring finger, writes to communicate  IV: One PIV is SL, other is running Heparin gtt at 900 units/hr  Labs/Electrolytes: Phos is trending down, getting neutraphos replacement  Resp/trach: Lung sounds are clear, #9 Laryngectomy tube in place w/ HME, lavaged x2 and suctioned x1 over 12 hr shift, strong cough  Diet: NPO, 4/6 cans given so far today  Bowel status: Bowel sounds are active, pt had a large BM today  : Voiding spontaneously  Skin: Neck incision is WDL and BRYANT, x1 BRITTNI to L neck, had 6 mL of output at 1630  Pain: Complained of mild pain, declined pain meds  Activity: Independent  Social: Pt's fiance called and was updated, pt also has his cellphone to help keep in contact with family and friends  Plan: Will continue to monitor and follow POC, continue to encourage independence  Updates this shift: Pt was able to have a large BM this shift after a supp

## 2021-07-14 NOTE — PLAN OF CARE
Status: POD#6 s/p total laryngectomy, and bilateral neck dissection  Vitals: VSS ex intermittent tachycardic  Neuros: A&Ox4.  Numbness around incision site, right pinky and ring finger  IV: PIV x2, running heparin at 9ml/hr, and other PIV at TKO b/w abx.  Labs/Electrolytes: Hep xA scheduled to be redrawn at 0900.  Resp/trach: #9 Larytube in place, with HME.  Lavaged x1, no suction required.  Has strong cough.  Per kwan shift report, patient has been demonstrating proper techniques to cleaning/inserting anny tube, placing HME, and understands the importance of lavage, to prevent mucus plugs. Oral suctioning independently with red chayo  Diet: NPO, PEG with bolusTFs.    Bowel status: No BM this shift.  Would like suppository this am.  Unknown date of last BM  : Voiding spont  Skin: Neck incision with sutures, bilateral neck.  Cleansed with NS and Aquaphor applied as per order.  JPx2,one from right neck, one from left neck, with low volume serosanguinous output.  Pain: denies  Activity: SBA (help with tubing)  Plan: Anny PLC completed 7/9, TF PLC completed 7/12.  Continue to encourage patient to complete his cares independently.

## 2021-07-14 NOTE — PROGRESS NOTES
Patient was not in the room so could not be seen  EMR reviewed: Tachycardia is better.  Would complete course of antibiotic for total 7 days.  Please call/page me with any questions    Raji Corbin MD, FACP   Internal Medicine/ Hospitalist (Pager- [phone])

## 2021-07-14 NOTE — PLAN OF CARE
Status: POD#5 s/p total laryngectomy, and bilateral neck dissection  Vitals: VSS ex tachycardic intermittent   Neuros: Intact ex numbness around incision site and right pinky and ring finger  IV: PIVx2 -running heparin at 10.5ml/hr. saline locked between antibiotics  Labs/Electrolytes: Hep xA draw at 2300. Phos replaced redraw in morning.  Resp/trach: #9 Larytube in place with HME, lavaged 1x, suction required x2, patient has been refusing Q4hr lavages but was compliant this shift., eduacted patient on importance of lavage to prevent mucous plug, patient oral suctioning independently with red chayo, patient demonstrates proper technique for cleaning/inserting anny tube and placing HME  Diet: NPO, PEG with bolus TF's patient tolerated 5.5 cans today,   Bowel status: No BM today, BS faint, patient refused suppository and would like it tomorrow morning. Senna given this shift. educated patient on importance of medications  : Voiding spontaneously   Skin: Bi-lateral neck incisions with sutures, patient cleansed with NS and applied Aquaphor with supervision, two BRITTNI's to neck one on left and one on right  Pain: Patient has facial grimacing but refuses pain medications  Activity: Up independent, ambulated in hallway 1x,   Plan: Anny PLC completed 7/9, TF PLC completed 7/12, continue to have patient complete cares with supervision until independent, discharge home when medically stable, continue to monitor and follow POC  Update: Lab called to come up and draw hep xA level when possible.

## 2021-07-14 NOTE — PROGRESS NOTES
Care Management Follow Up    Length of Stay (days):  5    Expected Discharge Date:  7-?     Concerns to be Addressed: discharge planning     Patient plan of care discussed at interdisciplinary rounds: Yes    Anticipated Discharge Disposition: DME, Home Care     Anticipated Discharge Services:    Anticipated Discharge DME: Laryngectomy & tube feeding supplies.     Referrals Placed by CM/SW:  [facility]. Resume [employer] [facility]  Private pay costs discussed: Not applicable   ______________________________________    Preoperative Diagnosis:   1. Squamous cell carcinoma of larynx  2. Secondary malignancy of lymph nodes  3. History of pulmonary embolism  4. Chronic anticoagulation    Procedure Performed 7-  Direct laryngoscopy  Total laryngectomy  Bilateral neck dissection (levels II-IV)  Cricopharyngeal myotomy    Procedure 7-08:  PEG placement by Thoracic Surgery    Past history includes:  Bilateral PEs; s/p RV thrombectomy; pulmonary embolectomy; ICV filter placement on 4-. DM2, HTN, anemia, GERD.   _____________________________________    Additional Information  In 6A Discharge Rounds LEI Carmona reported pt has been having low grade fevers, tachycardia, will follow-up with Medicine. Pt was on anticoagulation prior to admission. Plan to restart anticoagulation, will start on a Heparin drip and watch for bleeding for 24 hours. Anticipate discharge home on 7-15 at the earliest.     [facility] faxed order forms; forms completed by LEI Carmona and faxed back to [place]. Informed Elke at [place] discharge 7-15 at the earliest.   Received voice message from Marii at [place] this AM, was not able to call her back.      Bonnie Herrera, RN Care Coordinator  [address], [facility] (laryngectomy & tube feeding supplies)  Phone:  [phone]  Fax:  [phone]    [facility]  Phone:  [phone]

## 2021-07-14 NOTE — PROGRESS NOTES
Brief Otolaryngology Progress Note    The right neck BRITTNI drain was removed without complication.    May Cameron MD   Otolaryngology-Head & Neck Surgery PGY1  Please contact ENT with questions by dialing * [phone] and entering job code [phone] when prompted.

## 2021-07-15 ENCOUNTER — APPOINTMENT (OUTPATIENT)
Dept: OCCUPATIONAL THERAPY | Facility: CLINIC | Age: 71
DRG: 011 | End: 2021-07-15
Attending: OTOLARYNGOLOGY
Payer: COMMERCIAL

## 2021-07-15 LAB
ANION GAP SERPL CALCULATED.3IONS-SCNC: 8 MMOL/L (ref 3–14)
BACTERIA ASPIRATE CULT: NO GROWTH
BUN SERPL-MCNC: 15 MG/DL (ref 7–30)
CALCIUM SERPL-MCNC: 9 MG/DL (ref 8.5–10.1)
CHLORIDE BLD-SCNC: 108 MMOL/L (ref 94–109)
CO2 SERPL-SCNC: 24 MMOL/L (ref 20–32)
CREAT SERPL-MCNC: 0.7 MG/DL (ref 0.66–1.25)
CRP SERPL-MCNC: 55 MG/L (ref 0–8)
ERYTHROCYTE [DISTWIDTH] IN BLOOD BY AUTOMATED COUNT: 12.6 % (ref 10–15)
GFR SERPL CREATININE-BSD FRML MDRD: >90 ML/MIN/1.73M2
GLUCOSE BLD-MCNC: 96 MG/DL (ref 70–99)
GLUCOSE BLDC GLUCOMTR-MCNC: 100 MG/DL (ref 70–99)
GLUCOSE BLDC GLUCOMTR-MCNC: 107 MG/DL (ref 70–99)
GLUCOSE BLDC GLUCOMTR-MCNC: 110 MG/DL (ref 70–99)
GLUCOSE BLDC GLUCOMTR-MCNC: 130 MG/DL (ref 70–99)
GRAM STAIN RESULT: NORMAL
GRAM STAIN RESULT: NORMAL
HCT VFR BLD AUTO: 37.5 % (ref 40–53)
HGB BLD-MCNC: 12.2 G/DL (ref 13.3–17.7)
MAGNESIUM SERPL-MCNC: 2.4 MG/DL (ref 1.6–2.3)
MCH RBC QN AUTO: 29.5 PG (ref 26.5–33)
MCHC RBC AUTO-ENTMCNC: 32.5 G/DL (ref 31.5–36.5)
MCV RBC AUTO: 91 FL (ref 78–100)
PHOSPHATE SERPL-MCNC: 3.4 MG/DL (ref 2.5–4.5)
PLATELET # BLD AUTO: 415 10E3/UL (ref 150–450)
POTASSIUM BLD-SCNC: 4.2 MMOL/L (ref 3.4–5.3)
RBC # BLD AUTO: 4.14 10E6/UL (ref 4.4–5.9)
SARS-COV-2 RNA RESP QL NAA+PROBE: NEGATIVE
SARS-COV-2 RNA RESP QL NAA+PROBE: NEGATIVE
SODIUM SERPL-SCNC: 140 MMOL/L (ref 133–144)
UFH PPP CHRO-ACNC: 0.18 IU/ML
WBC # BLD AUTO: 7.3 10E3/UL (ref 4–11)

## 2021-07-15 PROCEDURE — 250N000013 HC RX MED GY IP 250 OP 250 PS 637: Performed by: OTOLARYNGOLOGY

## 2021-07-15 PROCEDURE — 80048 BASIC METABOLIC PNL TOTAL CA: CPT | Performed by: STUDENT IN AN ORGANIZED HEALTH CARE EDUCATION/TRAINING PROGRAM

## 2021-07-15 PROCEDURE — 83735 ASSAY OF MAGNESIUM: CPT | Performed by: OTOLARYNGOLOGY

## 2021-07-15 PROCEDURE — 84100 ASSAY OF PHOSPHORUS: CPT | Performed by: OTOLARYNGOLOGY

## 2021-07-15 PROCEDURE — 99232 SBSQ HOSP IP/OBS MODERATE 35: CPT | Performed by: INTERNAL MEDICINE

## 2021-07-15 PROCEDURE — 120N000002 HC R&B MED SURG/OB UMMC

## 2021-07-15 PROCEDURE — 250N000013 HC RX MED GY IP 250 OP 250 PS 637: Performed by: STUDENT IN AN ORGANIZED HEALTH CARE EDUCATION/TRAINING PROGRAM

## 2021-07-15 PROCEDURE — 250N000013 HC RX MED GY IP 250 OP 250 PS 637: Performed by: PHYSICIAN ASSISTANT

## 2021-07-15 PROCEDURE — 97530 THERAPEUTIC ACTIVITIES: CPT | Mod: GO | Performed by: OCCUPATIONAL THERAPIST

## 2021-07-15 PROCEDURE — 250N000011 HC RX IP 250 OP 636: Performed by: PHYSICIAN ASSISTANT

## 2021-07-15 PROCEDURE — 86140 C-REACTIVE PROTEIN: CPT | Performed by: STUDENT IN AN ORGANIZED HEALTH CARE EDUCATION/TRAINING PROGRAM

## 2021-07-15 PROCEDURE — 85520 HEPARIN ASSAY: CPT | Performed by: PHYSICIAN ASSISTANT

## 2021-07-15 PROCEDURE — 97535 SELF CARE MNGMENT TRAINING: CPT | Mod: GO | Performed by: OCCUPATIONAL THERAPIST

## 2021-07-15 PROCEDURE — U0003 INFECTIOUS AGENT DETECTION BY NUCLEIC ACID (DNA OR RNA); SEVERE ACUTE RESPIRATORY SYNDROME CORONAVIRUS 2 (SARS-COV-2) (CORONAVIRUS DISEASE [COVID-19]), AMPLIFIED PROBE TECHNIQUE, MAKING USE OF HIGH THROUGHPUT TECHNOLOGIES AS DESCRIBED BY CMS-2020-01-R: HCPCS | Performed by: OTOLARYNGOLOGY

## 2021-07-15 PROCEDURE — 85027 COMPLETE CBC AUTOMATED: CPT | Performed by: STUDENT IN AN ORGANIZED HEALTH CARE EDUCATION/TRAINING PROGRAM

## 2021-07-15 PROCEDURE — 36415 COLL VENOUS BLD VENIPUNCTURE: CPT | Performed by: PHYSICIAN ASSISTANT

## 2021-07-15 RX ORDER — ACETAMINOPHEN 325 MG/1
975 TABLET ORAL EVERY 8 HOURS PRN
Status: DISCONTINUED | OUTPATIENT
Start: 2021-07-15 | End: 2021-07-17 | Stop reason: HOSPADM

## 2021-07-15 RX ADMIN — WHITE PETROLATUM: 1.75 OINTMENT TOPICAL at 16:10

## 2021-07-15 RX ADMIN — METOPROLOL TARTRATE 25 MG: 25 TABLET, FILM COATED ORAL at 20:16

## 2021-07-15 RX ADMIN — METOPROLOL TARTRATE 25 MG: 25 TABLET, FILM COATED ORAL at 08:33

## 2021-07-15 RX ADMIN — CHLORHEXIDINE GLUCONATE 15 ML: 1.2 RINSE ORAL at 12:13

## 2021-07-15 RX ADMIN — WHITE PETROLATUM: 1.75 OINTMENT TOPICAL at 08:33

## 2021-07-15 RX ADMIN — AMPICILLIN SODIUM AND SULBACTAM SODIUM 3 G: 2; 1 INJECTION, POWDER, FOR SOLUTION INTRAMUSCULAR; INTRAVENOUS at 06:05

## 2021-07-15 RX ADMIN — OXYCODONE HYDROCHLORIDE 5 MG: 5 TABLET ORAL at 21:29

## 2021-07-15 RX ADMIN — PANTOPRAZOLE SODIUM 40 MG: 40 TABLET, DELAYED RELEASE ORAL at 08:33

## 2021-07-15 RX ADMIN — WHITE PETROLATUM: 1.75 OINTMENT TOPICAL at 01:29

## 2021-07-15 RX ADMIN — SENNOSIDES AND DOCUSATE SODIUM 1 TABLET: 8.6; 5 TABLET ORAL at 08:34

## 2021-07-15 RX ADMIN — AMPICILLIN SODIUM AND SULBACTAM SODIUM 3 G: 2; 1 INJECTION, POWDER, FOR SOLUTION INTRAMUSCULAR; INTRAVENOUS at 10:28

## 2021-07-15 RX ADMIN — AMPICILLIN SODIUM AND SULBACTAM SODIUM 3 G: 2; 1 INJECTION, POWDER, FOR SOLUTION INTRAMUSCULAR; INTRAVENOUS at 23:01

## 2021-07-15 RX ADMIN — CHLORHEXIDINE GLUCONATE 15 ML: 1.2 RINSE ORAL at 20:16

## 2021-07-15 RX ADMIN — HEPARIN SODIUM 900 UNITS/HR: 10000 INJECTION, SOLUTION INTRAVENOUS at 21:20

## 2021-07-15 RX ADMIN — SENNOSIDES AND DOCUSATE SODIUM 1 TABLET: 8.6; 5 TABLET ORAL at 20:16

## 2021-07-15 RX ADMIN — CHLORHEXIDINE GLUCONATE 15 ML: 1.2 RINSE ORAL at 08:34

## 2021-07-15 RX ADMIN — Medication 15 ML: at 08:33

## 2021-07-15 RX ADMIN — CHLORHEXIDINE GLUCONATE 15 ML: 1.2 RINSE ORAL at 16:10

## 2021-07-15 RX ADMIN — AMPICILLIN SODIUM AND SULBACTAM SODIUM 3 G: 2; 1 INJECTION, POWDER, FOR SOLUTION INTRAMUSCULAR; INTRAVENOUS at 17:22

## 2021-07-15 ASSESSMENT — ACTIVITIES OF DAILY LIVING (ADL)
ADLS_ACUITY_SCORE: 14
ADLS_ACUITY_SCORE: 14
WEAR_GLASSES_OR_BLIND: NO
ADLS_ACUITY_SCORE: 14
ADLS_ACUITY_SCORE: 14
ADLS_ACUITY_SCORE: 15
ADLS_ACUITY_SCORE: 14

## 2021-07-15 NOTE — PLAN OF CARE
Status: POD #7 s/p total laryngectomy and bilateral neck dissection  Vitals: VSS on RA  Neuros: A&O x4, numbness around the incision site and in his R pinky and ring finger, writes to communicate  IV: One PIV is SL, other is running Heparin gtt at 900 units/hr  Labs/Electrolytes: WDL  Resp/trach: Lung sounds are clear, #9 Laryngectomy tube in place w/ HME, lavaged x1, strong cough  Diet: NPO, 2/6 cans given so far today  Bowel status: Bowel sounds are active, LBM 7/14  : Voiding spontaneously  Skin: Neck incision is WDL and BRYANT, x1 BRITTNI to L neck  Pain: Complained of mild pain, declined pain meds  Activity: Independent  Social: No phone calls for the patient this shift  Plan: Will continue to monitor and follow POC, continue to encourage independence  Updates this shift: Pt was compliant with his cares

## 2021-07-15 NOTE — PROGRESS NOTES
PEG check:  S/p PEG by Thoracic team on 7/8, POD-7.    On Exam:  PEG in place, 2 cm at skin, bumper not tight, able to spin, patient tolerating feeds, abdomen soft and non distended, no tenderness, no redness or discharge around PEG    Patient states that he is having bowel movements.    A/P:  POD-7 s/p PEG, PEG functioning well, no signs of infection    - Continue feeds through PEG tube  - Continue care as per primary team.  - Thoracic surgery will follow for PEG checks.

## 2021-07-15 NOTE — PROGRESS NOTES
[facility]    Medicine Progress Note - Hospitalist Service, Gold 7       Date of Admission:  7/8/2021    Assessment & Plan           Raj Warren is a 71 year old male admitted on 7/8/2021. He has a history of SCC of larynx s/p s/p trach placement (4/12), bilateral next dissection, laryngectomy, and PEG tube placement (7/8), and extensive bilateral PE's, s/p RV thrombectomy, pulmonary embolectomy, and ICV placement 4/28 with Dr. Singh. Other medical history includes DM2, HTN, anemia, anticoagulated, GERD. Today medicine is consulted for acute onset of tachycardia and low grade fever.     Recommendations for Today:  - Ct Unasyn and complete total 7 days of Abx        #Acute tachycardia  #Low grade fevers   # HCAP  Patient had a temp of 100.9 overnight 7/9. Also tachycardic to 120-130's. Infective vs. response to acute surgical/incisional pain.  Stat CT PE showed left basilar atelectasis and consolidation with elevation of the left hemidiaphragm and small left pleural effusion, no PE. Possible PNA on chest CT with elevated CRP to 120, however, afebrile and not hypoxic. Echo shows: RV function is normal to mildly reduced based on limited windows. Estimated pulmonary artery systolic pressure is 31 mmHg plus right atrial pressure. CRP elevated but difficult to interpret given recent surgery.   - UA w/ culture and blood cx x 2 sent, NGTD    #Hx of large bilateral pulmonary emboli with evidence of right heart strain (4/27/2021) s/p pulmonary embolectomy, RV thrombectomy, ICV filter placement  #Chronic anticoagulation  Admitted to [facility] 4/27-5/6 after p/w shortness of breath after hospitalization for tracheostomy placement 4/13).  Patient was found to have large bilateral PE's with evidence of right heart strain and RV thrombus on CT angio 4/27 suspected from malignancy. Underwent emergent pulmonary embolectomy, RV thrombectomy, ICV filter placement at [facility]  with Dr. Singh on 4/28/21.  Discharged to [facility] on 5/8/21, 5/15 discharged home.  Stopped eliquis and started therapeutic lovenox 7/5-7/7.  S/p laryngectomy procedure on 7/8 and remains on prophylactic Lovenox at this time. Stat CT today showed no evidence of pulmonary embolism.  Hematology consulted.   - Given negative CT PE, no acute need to anticoagulate now, however, should be anticoagulated ASAP when safe from a surgical standpoint.  Will defer AC plan to ENT/heme.   - Per heme: If concern for bleeding, trial of full intensity heparin so that if he bleeds the therapy could be stopped.  If stable on full anticoagulation for 24-48 hours then could safely change to apixaban.     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 Montaño) and total laryngectomy, bilateral neck dissection, cricopharyngeal myotomy Dr. 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 4/12/21 presented to the ED with dyspnea and found to have a right larynx soft tissue mass, tracheotomy performed by ENT. Patient had another ENT consult 6/18 with Dr. Sethi and a flexible laryngoscopy/trach exchange was done, with plan for surgery and radiation +/- chemo. Now admitted to [facility] s/p total laryngectomy without major complications.  - Continue HEENT incision cares (please see ENT's note)     #DMII  Diet-controlled.   - Continue sugar checks  Recent Labs   Lab 07/15/21  1223 07/15/21  0750 07/15/21  0704 07/15/21  0350 07/14/21  0839 07/14/21  0100 07/11/21  0638 07/11/21  0150 07/10/21  2249 07/10/21  1614 07/10/21  0808 07/10/21  0556 07/10/21  0216   * 100* 96 110* 152* 98   < >  --   --   --   --   --   --    BGM  --   --   --   --   --   --   --  129* 115* 118* 114* 101* 102*    < > = values in this interval not displayed.     -  "Hypoglycemia protocol     #HTN  - Continue Metoprolol 25 mg BID  - Can increase if becomes hypertensive with tachycardia     #Acute anemia  Likely in the setting of intraoperative blood loss. Hgb 11.3 (13.9).  No current s/s of bleeding.   - Consider transfusion of RBCs if symptomatic and < 7     #GERD  - Continue Pantoprazole EC 40 mg daily     #R hand numbness   Following surgery 4/2021.  Evalauted by neurology 6/2021 and this is felt most likely due to compression/stretch injury that occurred cheryl-operatively.  Symptoms have mildly improved since onset.  - Plan for EMG as OP with Neurology after medically stable and discharged   - Continue to monitor     The patient's care was discussed with the Bedside Nurse, Care Coordinator/, Patient and Primary team.    Raji Corbin MD, MD  Hospitalist Service, [address]  Securely message with the Vocera Web Console (learn more here)  Text page via [employer] Paging/Directory  Please see sign in/sign out for up to date coverage information    Risk Factors Present on Admission                ______________________________________________________________________    Interval History   Doing better. Denies any fever/ SOB.   4 point ROS done and neg unless mentioned    Data reviewed today: I reviewed all medications, new labs and imaging results over the last 24 hours. I personally reviewed no images or EKG's today.    Physical Exam   /76 (BP Location: Left arm)   Pulse 114   Temp 96.9  F (36.1  C) (Axillary)   Resp 16   Ht 1.803 m (5' 11\")   Wt 89.3 kg (196 lb 12.8 oz)   SpO2 100%   BMI 27.45 kg/m    Gen- pleasant   HEENT-  ALOK  Neck- trach. Drains in place - further as per ENT   CVS- I+II+ no m/r/g  RS- CTAB  Abdo- soft, no tenderness . No g/r/r, G-tube  Ext- no edema     Data    BMP  Recent Labs   Lab 07/15/21  1223 07/15/21  0750 07/15/21  0704 07/15/21  0350 07/14/21  0839 07/13/21  0757 " 07/12/21  1059   NA  --   --  140  --  138 140 136   POTASSIUM  --   --  4.2  --  3.9 4.0 3.6   CHLORIDE  --   --  108  --  107 107 102   TELMA  --   --  9.0  --  8.7 9.3 8.6   CO2  --   --  24  --  26 28 27   BUN  --   --  15  --  13 10 11   CR  --   --  0.70  --  0.69 0.68 0.65*   * 100* 96 110* 152* 144* 114*     CBC  Recent Labs   Lab 07/15/21  0704 07/14/21  0839 07/13/21  1636 07/13/21  0757   WBC 7.3 8.2 8.5 7.0   RBC 4.14* 4.18* 4.07* 4.00*   HGB 12.2* 12.1* 11.8* 11.7*   HCT 37.5* 37.6* 36.6* 35.5*   MCV 91 90 90 89   MCH 29.5 28.9 29.0 29.3   MCHC 32.5 32.2 32.2 33.0   RDW 12.6 12.7 12.7 12.5    361 359 314     INRNo lab results found in last 7 days.  LFTs  Recent Labs   Lab 07/09/21  0841   ALBUMIN 2.9*      PANCNo lab results found in last 7 days.    CT Chest: CTA pulmonary angiogram  7/10  IMPRESSION: No evidence of pulmonary embolism. Left basilar   atelectasis and consolidation, cannot exclude infection or aspiration.   Postoperative changes from laryngectomy.   No results found for this or any previous visit (from the past 24 hour(s)).

## 2021-07-15 NOTE — PROGRESS NOTES
"Otolaryngology Progress Note  July 14, 2021     S: No acute events overnight. No signs of bleeding on the low-intensity heparin drip. Doing well with margy cares.      O: /74   Pulse 98   Temp 98.5  F (36.9  C) (Oral)   Resp 16   Ht 1.803 m (5' 11\")   Wt 89.3 kg (196 lb 12.8 oz)   SpO2 95%   BMI 27.45 kg/m     General: resting comfortably and not in acute distress              Neuro: patient alert, oriented, and answering questions appropriately.               HEENT: neck incisions soft, flat, and well-approximated. Margy tube clean, no crusting around stoma or in airway. BRITTNI drains w/ scant serous drainage; stoma well-approximated, no palpable fluid collection or leak.              Pulmonary: breathing comfortably on room air without stridor or stertor, HME and margy tube in place      BRITTNI drain output(s): (last 24 hours)/(last shift)  Drain 2: NR/7.5/0 ml    LABS:    BMP  Recent Labs   Lab 07/15/21  0350 07/14/21  0839 07/14/21  0100 07/13/21 2057 07/13/21  0757 07/12/21  1059 07/11/21  1230 07/11/21  0638   NA  --  138  --   --  140 136  --  136   POTASSIUM  --  3.9  --   --  4.0 3.6  --  3.6   CHLORIDE  --  107  --   --  107 102  --  102   CO2  --  26  --   --  28 27  --  29   BUN  --  13  --   --  10 11  --  10   CR  --  0.69  --   --  0.68 0.65*  --  0.78   * 152* 98 146* 144* 114*   < > 128*    < > = values in this interval not displayed.     Recent Labs   Lab 07/14/21  0839 07/13/21  1110 07/13/21  0757 07/12/21  1059 07/11/21  0638   TELMA 8.7  --  9.3 8.6 8.5   MAG 2.4*  --  2.4* 2.1 2.1   PHOS 2.1* 2.4* 2.4* 3.2 4.4       CBC  Recent Labs   Lab 07/14/21  0839 07/13/21  1636 07/13/21  0757 07/12/21  1059   WBC 8.2 8.5 7.0 7.1   HGB 12.1* 11.8* 11.7* 11.1*   HCT 37.6* 36.6* 35.5* 33.3*    359 314 302     Cultures:  Recent Labs   Lab 07/10/21  1338 07/10/21  1331   CULT No growth after 4 days No growth after 4 days       A/P: Raj Warren is a 71-year-old male with a PMH significant " for TIIDM (diet-controlled), HTN, and recent bilateral PE (on apixaban PTA) with right ventricular clot burden s/p sternotomy, embolectomy/RV thrombectomy, and IVC filter placement in 04/2021. Patient also has a history of laryngeal SCCa and is now s/p total laryngectomy, and bilateral neck dissection (II-V) on 7/8.      Neuro: Tylenol q8h; oxycodone 5 mg q4h and 5 - 10 mg q4h PRN - not requiring     HEENT:  - Oral care- Peridex QID; normal saline mouth rinses q8h and PRN; suction with red Martin catheter only  - Incision care, BRITTNI drains x2 in place, routine laryngectomy cares               - Okay for Margy tube at all times with HME filter               - No electrolarynx    Respiratory: Patient has a laryngectomy and has NO AIRWAY FROM ABOVE  - Routine laryngectomy cares     CV/heme:   - PMH of HTN and bilateral PEs (IVC filter in place)- on metoprolol 25 mg BID; apixiban PTA (holding)   - Hematology consultation; very low intensity heparin drip started 7/13.. Eventually will need to resume apixiban for additional 3m (total 6m course)   -Negative CT PE 7/10, L basilar consolidation. Medicine following.   - Continue to monitor tachycardia, improved     FEN/GI: Strict NPO, s/p PEG placed by Thoracic surgery on 7/8  - PPI scheduled 40 mg, zofran scheduled x48 hours, per laryngectomy protocol  - Nutrition consult- tolerating bolus TFs  - Albumin low at 2.9; Prealbumin low at 12; TSH wnl at 1.1  - Bowel regimen: +BM 7/14     : Voiding independently      Endo: Hx of diet-controlled TIIDM, not requiring ISS     ID: Unasyn started 7/13 for presumed HAP (CT with L basilar consolidation, rising CRP) - complete 7 day course of Unasyn/Augmentin per medicine     PPX: therapeutic heparin drip, consider resuming apixaban today     Consults: PT (defer)/OT (home w/home OT) shoulder exercises provided, Nutrition, SLP, PLC (laryngectomy and TF teaching done)     Dispo: pending anticoagulation regimen, cultures, home TF and  laryngectomy supplies       -- Patient and above plan to be discussed with staff, Dr. Navdeep Schwartz MD  Otolaryngology-Head & Neck Surgery PGY-4  Please contact ENT by dialing * [phone] and entering job code [phone].

## 2021-07-15 NOTE — PROGRESS NOTES
Thoracic Surgery Brief Progress Note      Patient seen postop after PEG tube placement.  Patient denies pain at insertion site. Discussed PEG tube adjustment with patient.     PEG site nonerythematous, no induration. Bumper not tight, able to spin.  2cm at skin.   Abdomen soft, nondistended.     A/P:      Mr. Warren is a 71 year old male with SCC of larynx, now s/p laryngectomy and PEG tube placement on 7/8.     - PEG tube ok to use for meds, g tube feeds  - Continue care per primary team  - Thoracic surgery will follow for PEG checks.

## 2021-07-15 NOTE — PLAN OF CARE
[id number]  Status: POD #6 s/p total laryngectomy and bilateral neck dissection  Vitals: Tachycardic at times, max temp of 99.5 oral (declined tylenol after multiple attempts), declined continuous pulse ox   Neuros: A&O x4, numbness around the incision site and in his R pinky and ring finger, writes to communicate. Flat   IV: One PIV is TKO between IV antx, other is running Heparin gtt at 900 units/hr  Labs/Electrolytes: Phos is trending down, getting neutraphos replacement, hep 10A redraw tomorrow AM  Resp/trach: Lung sounds are clear, #9 Laryngectomy tube in place w/ HME, did not need suction or lavage this evening, cares done per order. Strong productive cough  Diet: NPO, 6/6 cans completed, 120 ml flushes before and after  Bowel status: Bowel sounds are active, pt had a large BM today  : Voiding   Skin: Neck incision is WDL and BRYANT, x1 BRITTNI to L neck. Cares completed   Pain: Denied  Activity: Independent  Plan: Encourage independence with cares, continue with POC

## 2021-07-15 NOTE — PLAN OF CARE
Status: POD #7 s/p total laryngectomy and bilateral neck dissection  Vitals: VSS on RA. Tachycardic intermittently. Refuses to wear continuous pulse ox.   Neuros: A&O x4, numbness around the incision site and in his R pinky and ring finger, writes to communicate. Flat   IV: One PIV is TKO between IV antx, other is running Heparin gtt at 900 units/hr  Labs/Electrolytes: Phos is trending down, getting neutraphos replacement, hep 10A redraw tomorrow AM  Resp/trach: LS clear, #9 Laryngectomy tube in place w/ HME, Suctioned 2x (shallow suctioning), Lavaged 1x. cares done per order. Strong productive cough. Pt completes all Margy cares and suctioning  Diet: NPO. 6 TF can goal  Bowel status: BS+, LBM 7/14  : Voiding via urinal    Skin: Neck incision is WDL and BRYANT, x1 BRITTNI to L neck. Cares completed   Pain: Denied  Activity: Independent  Plan: Encourage independence with cares

## 2021-07-16 ENCOUNTER — APPOINTMENT (OUTPATIENT)
Dept: SPEECH THERAPY | Facility: CLINIC | Age: 71
DRG: 011 | End: 2021-07-16
Attending: OTOLARYNGOLOGY
Payer: COMMERCIAL

## 2021-07-16 DIAGNOSIS — C32.9 MALIGNANT NEOPLASM OF LARYNX (H): Primary | ICD-10-CM

## 2021-07-16 LAB
ANION GAP SERPL CALCULATED.3IONS-SCNC: 8 MMOL/L (ref 3–14)
BUN SERPL-MCNC: 16 MG/DL (ref 7–30)
CALCIUM SERPL-MCNC: 9 MG/DL (ref 8.5–10.1)
CHLORIDE BLD-SCNC: 108 MMOL/L (ref 94–109)
CO2 SERPL-SCNC: 23 MMOL/L (ref 20–32)
CREAT SERPL-MCNC: 0.76 MG/DL (ref 0.66–1.25)
ERYTHROCYTE [DISTWIDTH] IN BLOOD BY AUTOMATED COUNT: 13 % (ref 10–15)
GFR SERPL CREATININE-BSD FRML MDRD: >90 ML/MIN/1.73M2
GLUCOSE BLD-MCNC: 90 MG/DL (ref 70–99)
HCT VFR BLD AUTO: 37.7 % (ref 40–53)
HGB BLD-MCNC: 12.1 G/DL (ref 13.3–17.7)
MAGNESIUM SERPL-MCNC: 2.5 MG/DL (ref 1.6–2.3)
MCH RBC QN AUTO: 28.7 PG (ref 26.5–33)
MCHC RBC AUTO-ENTMCNC: 32.1 G/DL (ref 31.5–36.5)
MCV RBC AUTO: 89 FL (ref 78–100)
PHOSPHATE SERPL-MCNC: 4.4 MG/DL (ref 2.5–4.5)
PLATELET # BLD AUTO: 435 10E3/UL (ref 150–450)
POTASSIUM BLD-SCNC: 4.2 MMOL/L (ref 3.4–5.3)
RBC # BLD AUTO: 4.22 10E6/UL (ref 4.4–5.9)
SODIUM SERPL-SCNC: 139 MMOL/L (ref 133–144)
UFH PPP CHRO-ACNC: <0.1 IU/ML
WBC # BLD AUTO: 7.6 10E3/UL (ref 4–11)

## 2021-07-16 PROCEDURE — 84100 ASSAY OF PHOSPHORUS: CPT | Performed by: OTOLARYNGOLOGY

## 2021-07-16 PROCEDURE — 250N000013 HC RX MED GY IP 250 OP 250 PS 637: Performed by: STUDENT IN AN ORGANIZED HEALTH CARE EDUCATION/TRAINING PROGRAM

## 2021-07-16 PROCEDURE — 250N000013 HC RX MED GY IP 250 OP 250 PS 637: Performed by: PHYSICIAN ASSISTANT

## 2021-07-16 PROCEDURE — 85520 HEPARIN ASSAY: CPT | Performed by: PHYSICIAN ASSISTANT

## 2021-07-16 PROCEDURE — 80048 BASIC METABOLIC PNL TOTAL CA: CPT | Performed by: STUDENT IN AN ORGANIZED HEALTH CARE EDUCATION/TRAINING PROGRAM

## 2021-07-16 PROCEDURE — 92507 TX SP LANG VOICE COMM INDIV: CPT | Mod: GN

## 2021-07-16 PROCEDURE — 250N000013 HC RX MED GY IP 250 OP 250 PS 637: Performed by: OTOLARYNGOLOGY

## 2021-07-16 PROCEDURE — 36415 COLL VENOUS BLD VENIPUNCTURE: CPT | Performed by: STUDENT IN AN ORGANIZED HEALTH CARE EDUCATION/TRAINING PROGRAM

## 2021-07-16 PROCEDURE — 250N000011 HC RX IP 250 OP 636: Performed by: PHYSICIAN ASSISTANT

## 2021-07-16 PROCEDURE — 85027 COMPLETE CBC AUTOMATED: CPT | Performed by: STUDENT IN AN ORGANIZED HEALTH CARE EDUCATION/TRAINING PROGRAM

## 2021-07-16 PROCEDURE — 83735 ASSAY OF MAGNESIUM: CPT | Performed by: OTOLARYNGOLOGY

## 2021-07-16 PROCEDURE — 120N000002 HC R&B MED SURG/OB UMMC

## 2021-07-16 RX ORDER — POLYETHYLENE GLYCOL 3350 17 G/17G
1 POWDER, FOR SOLUTION ORAL DAILY
Qty: 507 G | Refills: 0 | Status: SHIPPED | OUTPATIENT
Start: 2021-07-16 | End: 2021-08-15

## 2021-07-16 RX ORDER — MINERAL OIL/HYDROPHIL PETROLAT
OINTMENT (GRAM) TOPICAL EVERY 8 HOURS
Qty: 198 G | Refills: 0 | Status: SHIPPED | OUTPATIENT
Start: 2021-07-17 | End: 2021-12-13

## 2021-07-16 RX ORDER — AMOXICILLIN 250 MG
1 CAPSULE ORAL 2 TIMES DAILY
Qty: 30 TABLET | Refills: 0 | Status: SHIPPED | OUTPATIENT
Start: 2021-07-16 | End: 2021-07-31

## 2021-07-16 RX ORDER — ACETAMINOPHEN 325 MG/1
650 TABLET ORAL EVERY 8 HOURS PRN
Qty: 90 TABLET | Refills: 0 | Status: SHIPPED | OUTPATIENT
Start: 2021-07-16 | End: 2022-10-13

## 2021-07-16 RX ADMIN — PANTOPRAZOLE SODIUM 40 MG: 40 TABLET, DELAYED RELEASE ORAL at 08:52

## 2021-07-16 RX ADMIN — WHITE PETROLATUM: 1.75 OINTMENT TOPICAL at 08:54

## 2021-07-16 RX ADMIN — METOPROLOL TARTRATE 25 MG: 25 TABLET, FILM COATED ORAL at 08:54

## 2021-07-16 RX ADMIN — AMPICILLIN SODIUM AND SULBACTAM SODIUM 3 G: 2; 1 INJECTION, POWDER, FOR SOLUTION INTRAMUSCULAR; INTRAVENOUS at 18:52

## 2021-07-16 RX ADMIN — Medication 15 ML: at 08:52

## 2021-07-16 RX ADMIN — METOPROLOL TARTRATE 25 MG: 25 TABLET, FILM COATED ORAL at 20:07

## 2021-07-16 RX ADMIN — SENNOSIDES AND DOCUSATE SODIUM 1 TABLET: 8.6; 5 TABLET ORAL at 08:53

## 2021-07-16 RX ADMIN — BISACODYL 10 MG: 10 SUPPOSITORY RECTAL at 15:30

## 2021-07-16 RX ADMIN — AMPICILLIN SODIUM AND SULBACTAM SODIUM 3 G: 2; 1 INJECTION, POWDER, FOR SOLUTION INTRAMUSCULAR; INTRAVENOUS at 05:03

## 2021-07-16 RX ADMIN — APIXABAN 5 MG: 5 TABLET, FILM COATED ORAL at 20:07

## 2021-07-16 RX ADMIN — WHITE PETROLATUM: 1.75 OINTMENT TOPICAL at 23:01

## 2021-07-16 RX ADMIN — CHLORHEXIDINE GLUCONATE 15 ML: 1.2 RINSE ORAL at 08:53

## 2021-07-16 RX ADMIN — WHITE PETROLATUM: 1.75 OINTMENT TOPICAL at 01:13

## 2021-07-16 RX ADMIN — OXYCODONE HYDROCHLORIDE 5 MG: 5 TABLET ORAL at 20:07

## 2021-07-16 RX ADMIN — CHLORHEXIDINE GLUCONATE 15 ML: 1.2 RINSE ORAL at 18:52

## 2021-07-16 RX ADMIN — SENNOSIDES AND DOCUSATE SODIUM 1 TABLET: 8.6; 5 TABLET ORAL at 20:06

## 2021-07-16 RX ADMIN — CHLORHEXIDINE GLUCONATE 15 ML: 1.2 RINSE ORAL at 20:07

## 2021-07-16 RX ADMIN — POLYETHYLENE GLYCOL 3350 17 G: 17 POWDER, FOR SOLUTION ORAL at 08:53

## 2021-07-16 RX ADMIN — AMPICILLIN SODIUM AND SULBACTAM SODIUM 3 G: 2; 1 INJECTION, POWDER, FOR SOLUTION INTRAMUSCULAR; INTRAVENOUS at 11:30

## 2021-07-16 RX ADMIN — APIXABAN 5 MG: 5 TABLET, FILM COATED ORAL at 08:53

## 2021-07-16 RX ADMIN — WHITE PETROLATUM: 1.75 OINTMENT TOPICAL at 15:30

## 2021-07-16 ASSESSMENT — ACTIVITIES OF DAILY LIVING (ADL)
ADLS_ACUITY_SCORE: 14

## 2021-07-16 NOTE — PLAN OF CARE
Status: POD #7 s/p total laryngectomy and bilateral neck dissection  Vitals: VSS on RA  Neuros: A&O x4, numbness in his R pinky and ring finger, writes/mouths words to communicate, able to make needs known.   IV: PIV is SL in between antibiotics, other is running Heparin gtt at 900 units/hr, recheck in AM.   Labs/Electrolytes: WDL, BG WDL.   Resp/trach: Lung sounds are clear, #9 Laryngectomy tube in place w/ HME, lavaged and suctioned x1, strong cough, anny stoma site cares completed, pt declined to wash/cleanse anny tube this shift, education provided on difference between suctioning and lavaging vs cleansing cleaning. Needs reinforcement. Refusing pulse ox at this time.  Diet: NPO, 6/6 cans completed this shift.   Bowel status: Bowel sounds are active, LBM 7/14, took bowel med.  : Voiding spontaneously  Skin: Neck incision is WDL and BRYANT, abdominal incision at gastrostomy tube intact, gauze/dressing changed this shift. Aquaphor applied to neck incision as ordered.   Pain: Complained of mild pain at incisional site, given oxy x1  scheduled.   Activity: Independent  Social: Pt updates family via texting.  Plan: Will continue to monitor and follow POC. Continue to educate on G-tube and anny tube cares.   Updates this shift: Pt was compliant with his cares.

## 2021-07-16 NOTE — PLAN OF CARE
Status: POD #8 s/p total laryngectomy and bilateral neck dissection.  Vitals: VSS. On RA. Refuses cont pulse ox.  Neuros: A&Ox4. Writes/mouths to communicate.   IV: Heparin drip held at 0600 by provider, starting on eliquis. PIV is SL in between antibiotics.  Labs/Electrolytes: Lab Anti XA recheck in AM.  Resp/trach: #9 Laryngectomy tube in place w/ HME, lavaged x1 this shift. Pt cleaned inner cannula x2 and changed trach ties x1 this shift. Continues to refuse pulse ox.  Diet: NPO. TF goal of 6 cans a day. 0/6 cans completed this shift.  Bowel status: No BM's this shift. LBM 7/14.  : Voids w/ no difficulty, uses bedside urinal.  Skin: Neck incision is WDL and BRYANT, abdominal incision at gastrostomy tube intact. Aquaphor applied to neck incision as ordered.   Pain: Pt denies pain, refused scheduled oxy's this shift.  Activity: Independent.  Plan: Will continue to monitor and follow POC. Continue to educate on G-tube and anny tube cares.

## 2021-07-16 NOTE — PROGRESS NOTES
CLINICAL NUTRITION SERVICES - REASSESSMENT NOTE   Nutrition Prescription    RECOMMENDATIONS FOR MDs/PROVIDERS TO ORDER:  Given pt will possibly discharge soon, writer increased FWF to meet hydration needs (should help with constipation as well).    Malnutrition Status:    Patient does not meet two of the established criteria necessary for diagnosing malnutrition but is at risk for malnutrition    Recommendations already ordered by Registered Dietitian (RD):  -Continue TF as ordered.  -Adjusted FWF from patency to meet hydration needs: 120 mL before and after each bolus + 130 mL QID for a total (including free water from TF) of 2235 mL free water (~25 mL/kg) to meet hydration needs    Future/Additional Recommendations:  Monitor tolerance to EN support.     EVALUATION OF THE PROGRESS TOWARD GOALS   Diet: NPO    Enteral Access: PEG    FWF: 30 mL q4h (patency) + a comment in the EN order for 120 mL free water before and after bolus (insufficient to meet hydration needs)    Nutrition Support: EN  -7/9 - 7/12: TwoCal HN @ 10 ml/hr x 8 hrs. If tolerated and lytes within accepatable limits (K+ >3.0, Mg++ > 1.5 and PO4 > 3.0), increase rate by 10 ml every 8 hrs to goal of 55 mL/hr. Regimen provides 1320 mL/day, 2640 kcals (29 kcal/kg/day), 111 g PRO (1.2 g/kg/day), 924 mL H2O, 289 g CHO and 7 g Fiber daily.     -7/12 - Current: transitioned to bolus feeds with goal of 2 cans TID on TwoCal HN which provides 1422 ml per day, 2844 kcals (32 kcal/kg/day), 119 g PRO (1.3 g/kg/day), 995 mL H2O, 311 g CHO and 7 g Fiber daily.    Enteral Intake: Reached goal continuous feeds @ 0300 on 7/11, then reached goal bolus feeds @ 2000 on 7/12 per I/Os. Tolerating goal bolus feeds. Pt denies any nausea/abdominal pain.  -->7-day average enteral nutrition infusions: 916 mL TF daily = 1832 kcals (20 kcal/kg, or 81% minimum assessed kcal needs) and 77 g protein (0.9 g protein/kg, or 71% minimum assessed protein needs).     NEW FINDINGS     -Wt  trends: Wt stable per wt available.  07/12/21 1048 89.3 kg (196 lb 12.8 oz)   07/08/21 0547 89.8 kg (197 lb 15.6 oz)      -Labs: Reviewed, unremarkable.    -GI: PEG. Last BM 7/14 - constipated (RN was going to give suppository after writer's visit).     -Respiratory: S/p total laryngectomy. Trach, room air.    -Meds & Vitamin/Mineral Supplementation: Reviewed, notable for: Certavite     MALNUTRITION  % Intake: Decreased intake does not meet criteria  % Weight Loss: > 7.5% in 3 months (severe) - from PTA, no significant wt loss this admit  Subcutaneous Fat Loss: None observed  Muscle Loss: None observed  Fluid Accumulation/Edema: None noted  Malnutrition Diagnosis: Patient does not meet two of the established criteria necessary for diagnosing malnutrition but is at risk for malnutrition    Previous Goals   1. Total avg nutritional intake to meet a minimum of 25 kcal/kg and 1.2 g PRO/kg daily (per dosing wt 90 kg).  Evaluation: Not met    2. Wt not to decline < 89 kg  Evaluation: Met per limited wt this admit    Previous Nutrition Diagnosis  Unintended weight loss related to question inadequate nutritional needs vs hypermetabolism with illness as evidenced by wt loss of 18 lbs (>8% loss) x 3 mos, NPO status and consult received for TF therapy.  Evaluation: New dx below    CURRENT NUTRITION DIAGNOSIS  Inadequate protein-energy intake related to interruptions and slow advancement to goal EN regimen as evidenced by 7-day average enteral nutrition infusions providing 1832 kcals (20 kcal/kg, or 81% minimum assessed kcal needs) and 77 g protein (0.9 g protein/kg, or 71% minimum assessed protein needs).     INTERVENTIONS  Implementation  -Collaboration with other nutrition professionals: Coordinated to have extra TF sent (8 cans TwoCal HN) in case patient discharges tomorrow. TF supplies to be delivered Monday.  -Collaboration with other providers: Discussed pt with bedside RN, ENT MD, and RNCC. Possible discharge tomorrow  vs Monday pending attending physician's review.  -Enteral Nutrition - Continue as ordered  -Nutrition education: Provided education on RD role, reviewed TF basics, answered questions.    Goals  Total avg nutritional intake to meet a minimum of 25 kcal/kg and 1.2 g PRO/kg daily (per dosing wt 90 kg).    Monitoring/Evaluation  Progress toward goals will be monitored and evaluated per protocol.     Judy Brunner RD, LD  Pager: [phone]

## 2021-07-16 NOTE — PROGRESS NOTES
"Otolaryngology Progress Note    S: No acute events overnight, last BRITTNI drain removed. No bleeding from incision. Heparin stopped this morning. Breathing comfortably. No concerns today.     O: BP (!) 141/71 (BP Location: Right arm)   Pulse 98   Temp 97.7  F (36.5  C) (Axillary)   Resp 16   Ht 1.803 m (5' 11\")   Wt 89.3 kg (196 lb 12.8 oz)   SpO2 99%   BMI 27.45 kg/m     General: resting comfortably and not in acute distress. Writing on board.               Neuro: patient alert, oriented, and answering questions appropriately.               HEENT: neck incisions soft, flat, and well-approximated. Margy tube clean, minimal crusting around stoma (removed) or in airway. Stoma well-approximated, no palpable fluid collection or leak.              Pulmonary: breathing comfortably on room air without stridor or stertor, HME and margy tube in place    LABS:  BMP  Recent Labs   Lab 07/15/21  2223 07/15/21  1223 07/15/21  0750 07/15/21  0704 07/14/21  0839 07/13/21  0757 07/12/21  1059   NA  --   --   --  140 138 140 136   POTASSIUM  --   --   --  4.2 3.9 4.0 3.6   CHLORIDE  --   --   --  108 107 107 102   CO2  --   --   --  24 26 28 27   BUN  --   --   --  15 13 10 11   CR  --   --   --  0.70 0.69 0.68 0.65*   * 130* 100* 96 152* 144* 114*     Recent Labs   Lab 07/15/21  0704 07/14/21  0839 07/13/21  1110 07/13/21  0757 07/12/21  1059   TELMA 9.0 8.7  --  9.3 8.6   MAG 2.4* 2.4*  --  2.4* 2.1   PHOS 3.4 2.1* 2.4* 2.4* 3.2       CBC  Recent Labs   Lab 07/15/21  0704 07/14/21  0839 07/13/21  1636 07/13/21  0757   WBC 7.3 8.2 8.5 7.0   HGB 12.2* 12.1* 11.8* 11.7*   HCT 37.5* 37.6* 36.6* 35.5*    361 359 314     Cultures:  Recent Labs   Lab 07/10/21  1338 07/10/21  1331   CULT No growth after 5 days No growth after 5 days       A/P: Raj Warren is a 71-year-old male with a PMH significant for TIIDM (diet-controlled), HTN, and recent bilateral PE (on apixaban PTA) with right ventricular clot burden s/p " sternotomy, embolectomy/RV thrombectomy, and IVC filter placement in 04/2021. Patient also has a history of laryngeal SCCa and is now s/p total laryngectomy, and bilateral neck dissection (II-V) on 7/8.      Neuro: Tylenol q8h; oxycodone 5 mg q4h and 5 - 10 mg q4h PRN - not requiring     HEENT:  - Oral care- Peridex QID; normal saline mouth rinses q8h and PRN; suction with red Martin catheter only  - Incision care, BRITTNI drains x2 in place, routine laryngectomy cares               - Okay for Margy tube at all times with HME filter               - No electrolarynx    Respiratory: Patient has a laryngectomy and has NO AIRWAY FROM ABOVE  - Routine laryngectomy cares     CV/heme:   - PMH of HTN and bilateral PEs (IVC filter in place)- on metoprolol 25 mg BID; apixiban PTA   - Hematology consultation; very low intensity heparin drip started 7/13-7/16. Resuming apixiban today  -Negative CT PE 7/10, L basilar consolidation. Medicine following.   - Continue to monitor tachycardia, improved     FEN/GI: Strict NPO, s/p PEG placed by Thoracic surgery on 7/8  - PPI scheduled 40 mg, zofran scheduled x48 hours, per laryngectomy protocol  - Nutrition consult- tolerating bolus TFs  - Albumin low at 2.9; Prealbumin low at 12; TSH wnl at 1.1  - Bowel regimen: +BM 7/14     : Voiding independently      Endo: Hx of diet-controlled TIIDM, not requiring ISS     ID: Unasyn started 7/13 for presumed HAP (CT with L basilar consolidation, rising CRP) - complete 7 day course of Unasyn/Augmentin per medicine      PPX: discontinued therapeutic heparin at 0600, will start apixiban today and monitor closely for bleeding.      Consults: PT (defer)/OT (home w/home OT) shoulder exercises provided, Nutrition, SLP, PLC (laryngectomy and TF teaching done)     Dispo: anticipate 7/17. Working on ensuring supplies are ready     -- Patient and above plan to be discussed with staff, Dr. Navdeep Ferris MD PGY2

## 2021-07-16 NOTE — PROGRESS NOTES
Head & Neck Tumor Conference Note        Status: Established (last presented 6/25/2021)  Staff: Dr. Marilou Sethi    Tumor Site: ***  Tumor Pathology: ***  Tumor Stage: ***  Tumor Treatment: ***    Reason for Review: Review imaging***, path, and POC    Brief History: Raj Warren is a T4aN0 SCC of the larynx. The patient presented to the ED with a week history of shortness of breath, along with symptoms of sore throat, bilateral ear pain. He had stridor vs wheezing, mild increased work of breathing, and hoarse voice at that time. He was treated with steroids and nebulizers with improvement in his symptoms and was discharged.  He had recurrent symptoms and was seen in the ED again on 4/12/2021. He had a CT scan performed on 4/12/2021 which demonstrated a 3.9 x 1.6 x 3.9 cm mass involving the right true cord, right AE fold, right false cord, posterior hypopharynx, proximal trachea, with involvement of the thyroid cartilage, involvement of the prelaryngeal tissue, no abnormal nodes. Dr Willoughby (local ENT), scope exam was performed, and patient was admitted for a trach and DL/bx. Dr Johnson and Dr Sauceda performed this procedure on 4/13/2021. Pathology showed moderately differentiated SCC. He was seen by Dr Bean of medical oncology on 4/13/2021. He was followed by local ENT team during his hospitalization. He was recommended for chemoradiation. He was seen by Dr Mclaughlin of [facility], planning 7 weeks treatment, and CT simulation was done. He did have a VSS done while inpatient without evidence of aspiration. The patient was referred here for discussion of possible salvage laryngectomy after chemoradiation. He had a PET scan on 4/26/2021 which showed an FDG avid mass in the larynx involving the supraglottis/glottis/subglottis, hypermetabolic left level 4 node, FDG avid nodule in the right lung thought to be infectious/inflammatory. He was then admitted to the hospital locally from 4/27/2021 to 5/8/2021 after presenting  with worsening shortness of breath, found to have bilateral PE with significant clot burden including the right pulmonary artery and the right ventricle. He was taken to the OR for pulmonary embolectomy and RV thrombectomy. He had IVC filter placed 2021. His course was complicated by hemodynamic instability requiring pressors, poor healing of his sternal incision requiring wound vac placement. He was discharged on eliquis. He was sent to rehab after his admission, discharged 5/15/2021. He saw Dr Bean in follow-up on 2021 and was recommended for repeat imaging. This showed slight decrease in size of the tumor, no distant disease.      Dr Kennedy and I had a lengthy discussion with the patient about management of advanced laryngeal cancer. We discussed that treatment consists of an upfront laryngectomy followed by postoperative radiation with or without chemotherapy vs definitive chemoradiation.      On my review of his imaging I am concerned that there is extralaryngeal spread of his tumor. In that case he would be recommended for a total laryngectomy followed by postoperative radiation with or without chemotherapy pending final pathology. If we can get negative margins and there is no CAMERON he could potentially avoid chemotherapy. He underwent total laryngectomy and bilateral neck dissections on 2021.     Pertinent PMH: TIIDM (diet-controlled), HTN, and recent bilateral PE (on apixaban) with right ventricular clot burden s/p sternotomy, embolectomy/RV thrombectomy, and IVC filter placement in 2021    Smoking Hx:   Social History     Tobacco Use     Smoking status: Former Smoker     Types: Cigarettes     Quit date: 1989     Years since quittin.5     Smokeless tobacco: Never Used     Tobacco comment: quit in   had smoked about 1/2 pack a day then cut back   Substance Use Topics     Alcohol use: No     Comment: No alcohol since      Drug use: No     Imagin21 PET  CT    IMPRESSION: In this patient with a new diagnosis of squamous cell  carcinoma of the larynx:     1. Hypermetabolic mass involving the supraglottic, glottic and  subglottic larynx. This corresponds with the squamous cell carcinoma.      2. Hypermetabolic left level 4 lymph node likely representing  metastasis.     3. New hypermetabolic nodule in the right middle lobe, new since  4/12/2021. Given short time interval, this is less likely to be  metastasis, infection or inflammation is favored, possibly from  aspiration.      4. Borderline avid small subcarinal lymph node favor physiologic or  reactive to the lung process.     5. Cholelithiasis without evidence of acute cholecystitis.     I have personally reviewed the examination and initial interpretation  and I agree with the findings.    Pathology:   Prelim 7/8/2021    Tumor Board Recommendation:   Discussion: ***    Plan: ***    Nica Thomas MD, PGY-3  Otolaryngology- Head and Neck Surgery    Documentation / Disclaimer Cancer Tumor Board Note  Cancer tumor board recommendations do not override what is determined to be reasonable care and treatment, which is dependent on the circumstances of a patient's case; the patient's medical, social, and personal concerns; and the clinical judgment of the oncologist [physician].

## 2021-07-16 NOTE — PROGRESS NOTES
Brief Otolaryngology Note  7/16/2021    Patient has done well with transition off of heparin drip back to apixaban at PTA dose. Transition was discussed with pharmacy. There is no need for further anti-Xa monitoring- these were discontinued. Also, tentative plan is for patient to discharge tomorrow. Humidity cannot be delivered until Monday, 7/19. Patient instructed to continue to wear HME valve on Nicola tube at all times.      Nic Banegas MD  Otolaryngology- Head and Neck Surgery  Please contact ENT with questions by dialing [phone] and entering job code [phone] when prompted.

## 2021-07-16 NOTE — PROGRESS NOTES
EMR reviewed: Tachycardia is better. No new concerns of pneumonia.    Would complete course of antibiotic for total 7 days. Can convert to Augmentin 875mg bid  via feeding tube if discharging soon.    Hospitalist / Medicine team will sign off.   Please call/page me with any questions     Raji Corbin MD, FACP   Internal Medicine/ Hospitalist (Pager- [phone])

## 2021-07-17 VITALS
SYSTOLIC BLOOD PRESSURE: 102 MMHG | RESPIRATION RATE: 18 BRPM | HEART RATE: 99 BPM | BODY MASS INDEX: 27.55 KG/M2 | HEIGHT: 71 IN | OXYGEN SATURATION: 98 % | DIASTOLIC BLOOD PRESSURE: 70 MMHG | WEIGHT: 196.8 LBS | TEMPERATURE: 97.3 F

## 2021-07-17 LAB
ANION GAP SERPL CALCULATED.3IONS-SCNC: 8 MMOL/L (ref 3–14)
BACTERIA SPEC CULT: NO GROWTH
BACTERIA SPEC CULT: NO GROWTH
BUN SERPL-MCNC: 17 MG/DL (ref 7–30)
CALCIUM SERPL-MCNC: 9.1 MG/DL (ref 8.5–10.1)
CHLORIDE BLD-SCNC: 107 MMOL/L (ref 94–109)
CO2 SERPL-SCNC: 22 MMOL/L (ref 20–32)
COPATH REPORT: NORMAL
CREAT SERPL-MCNC: 0.75 MG/DL (ref 0.66–1.25)
ERYTHROCYTE [DISTWIDTH] IN BLOOD BY AUTOMATED COUNT: 12.9 % (ref 10–15)
GFR SERPL CREATININE-BSD FRML MDRD: >90 ML/MIN/1.73M2
GLUCOSE BLD-MCNC: 100 MG/DL (ref 70–99)
HCT VFR BLD AUTO: 37.3 % (ref 40–53)
HGB BLD-MCNC: 12.2 G/DL (ref 13.3–17.7)
Lab: NORMAL
Lab: NORMAL
MAGNESIUM SERPL-MCNC: 2.5 MG/DL (ref 1.6–2.3)
MCH RBC QN AUTO: 29 PG (ref 26.5–33)
MCHC RBC AUTO-ENTMCNC: 32.7 G/DL (ref 31.5–36.5)
MCV RBC AUTO: 89 FL (ref 78–100)
PHOSPHATE SERPL-MCNC: 4.5 MG/DL (ref 2.5–4.5)
PLATELET # BLD AUTO: 446 10E3/UL (ref 150–450)
POTASSIUM BLD-SCNC: 4.4 MMOL/L (ref 3.4–5.3)
RBC # BLD AUTO: 4.2 10E6/UL (ref 4.4–5.9)
SODIUM SERPL-SCNC: 137 MMOL/L (ref 133–144)
SPECIMEN SOURCE: NORMAL
SPECIMEN SOURCE: NORMAL
WBC # BLD AUTO: 8.6 10E3/UL (ref 4–11)

## 2021-07-17 PROCEDURE — 250N000013 HC RX MED GY IP 250 OP 250 PS 637: Performed by: STUDENT IN AN ORGANIZED HEALTH CARE EDUCATION/TRAINING PROGRAM

## 2021-07-17 PROCEDURE — 36415 COLL VENOUS BLD VENIPUNCTURE: CPT | Performed by: STUDENT IN AN ORGANIZED HEALTH CARE EDUCATION/TRAINING PROGRAM

## 2021-07-17 PROCEDURE — 80048 BASIC METABOLIC PNL TOTAL CA: CPT | Performed by: STUDENT IN AN ORGANIZED HEALTH CARE EDUCATION/TRAINING PROGRAM

## 2021-07-17 PROCEDURE — 250N000013 HC RX MED GY IP 250 OP 250 PS 637: Performed by: OTOLARYNGOLOGY

## 2021-07-17 PROCEDURE — 83735 ASSAY OF MAGNESIUM: CPT | Performed by: OTOLARYNGOLOGY

## 2021-07-17 PROCEDURE — 250N000013 HC RX MED GY IP 250 OP 250 PS 637: Performed by: PHYSICIAN ASSISTANT

## 2021-07-17 PROCEDURE — 85027 COMPLETE CBC AUTOMATED: CPT | Performed by: STUDENT IN AN ORGANIZED HEALTH CARE EDUCATION/TRAINING PROGRAM

## 2021-07-17 PROCEDURE — 84100 ASSAY OF PHOSPHORUS: CPT | Performed by: OTOLARYNGOLOGY

## 2021-07-17 PROCEDURE — 250N000011 HC RX IP 250 OP 636: Performed by: PHYSICIAN ASSISTANT

## 2021-07-17 RX ADMIN — WHITE PETROLATUM: 1.75 OINTMENT TOPICAL at 09:54

## 2021-07-17 RX ADMIN — Medication 15 ML: at 09:53

## 2021-07-17 RX ADMIN — APIXABAN 5 MG: 5 TABLET, FILM COATED ORAL at 09:53

## 2021-07-17 RX ADMIN — AMPICILLIN SODIUM AND SULBACTAM SODIUM 3 G: 2; 1 INJECTION, POWDER, FOR SOLUTION INTRAMUSCULAR; INTRAVENOUS at 06:38

## 2021-07-17 RX ADMIN — OXYCODONE HYDROCHLORIDE 5 MG: 5 TABLET ORAL at 10:01

## 2021-07-17 RX ADMIN — AMPICILLIN SODIUM AND SULBACTAM SODIUM 3 G: 2; 1 INJECTION, POWDER, FOR SOLUTION INTRAMUSCULAR; INTRAVENOUS at 02:04

## 2021-07-17 RX ADMIN — CHLORHEXIDINE GLUCONATE 15 ML: 1.2 RINSE ORAL at 09:53

## 2021-07-17 RX ADMIN — METOPROLOL TARTRATE 25 MG: 25 TABLET, FILM COATED ORAL at 09:53

## 2021-07-17 RX ADMIN — PANTOPRAZOLE SODIUM 40 MG: 40 TABLET, DELAYED RELEASE ORAL at 09:53

## 2021-07-17 ASSESSMENT — ACTIVITIES OF DAILY LIVING (ADL)
ADLS_ACUITY_SCORE: 15

## 2021-07-17 NOTE — PLAN OF CARE
Occupational Therapy Discharge Summary    Reason for therapy discharge:    Discharged to home.    Progress towards therapy goal(s). See goals on Care Plan in [facility] electronic health record for goal details.  Goals partially met.  Barriers to achieving goals:   discharge from facility.    Therapy recommendation(s):    No further therapy is recommended.  REC assist from family as needed for heavier IADLs to maintain post-surgical precautions.

## 2021-07-17 NOTE — PLAN OF CARE
Status: Pt s/p total laryngectomy and bilateral neck dissection 7-8  Vitals: VSS with HME, refusing continuous pulse ox. Tachy intermittently   Neuros: Writes to communicate, numbness to R pinky/ring finger, unchanged   IV: PIV SL between IV antx   Labs/Electrolytes: Eliquis restarted yesterday after heparin gtt discontinued   Resp/trach: #8 anny tube in place with HME, lavage/suctioned x1 (patient did this himself). Cleansed tube x1. Good productive cough and clean dry stoma   Diet: NPO, bolus TF via PEG with goal of 6 cans/day, all 6 done yesterday. 120 ml flushes before and after  Bowel status: No BM overnight   : Voiding   Skin: Neck incision, pt performed own cares. Gtube site cleansed   Pain: Oxycodone given x1 for L shoulder pain  Activity: Ind  Plan: Leaving today, humidity supplies being delivered Monday. Continue with POC

## 2021-07-17 NOTE — PLAN OF CARE
Status: POD #8 s/p total laryngectomy and bilateral neck dissection.  Vitals: VSS. On RA. Refuses cont pulse ox.  Neuros: A&Ox4. Writes/mouths to communicate.   IV: Heparin drip discontinued at 0600 per orders, starting on eliquis. PIV is SL in between antibiotics.  Labs/Electrolytes: WNL  Resp/trach: #9 Laryngectomy tube in place w/ HME, pt does all own cares and declined need to lavage. Pt cleaned inner cannula x2. Continues to refuse pulse ox.   Diet: NPO. TF goal of 6 cans a day. 6/6 cans completed this shift.  Bowel status: BM today after requested suppository.  : Voids w/ no difficulty, uses bedside urinal.  Skin: Neck incision is WDL and BRYANT, abdominal incision at gastrostomy tube intact. Aquaphor applied to neck incision as ordered.   Pain: Pt denies pain, refused scheduled oxy's this shift.  Activity: Independent. Walked in webb.  Plan: Will continue to monitor and follow POC. Continue to educate on G-tube and anny tube cares. Likely discharge tomorrow to home.

## 2021-07-17 NOTE — DISCHARGE INSTRUCTIONS
[facility]  Phone:  [phone]  Fax:  [phone]    Laryngectomy supplies:    Cool mist humidity by trach dome   Large gloves   Cotton tip applicators   0.9% sodium chloride 3 mL puffs for lavage   0.9% sodium chloride 1000 mL bottles   Portable suction machine   14 Korean suction catheters   12 Korean red montaño catheters     Tube feeding supplies:  Two Michael HN  [place] bags  60 ml syringes    Contact [facility] if you need more supplies or have questions about the equipment.   ____________________________________________________________________________

## 2021-07-17 NOTE — PROGRESS NOTES
Care Management Follow Up    Length of Stay (days):  8    Expected Discharge Date: 07/17/2021     Concerns to be Addressed: discharge planning     Patient plan of care discussed at interdisciplinary rounds: Yes    Anticipated Discharge Disposition:  Home     Anticipated Discharge Services:  Skilled home care  Anticipated Discharge DME:  Laryngectomy & tube feeding supplies.     Referrals Placed by CM/SW:  [facility]. [facility] (resume)  Private pay costs discussed: Not applicable    Additional Information  In 6A Discharge Rounds Dr Cameron reported plan is to discontinue Heparin drip and start oral anticoagulation, will watch pt for a day for signs of bleeding. Anticipate pt will be ready for discharge tomorrow. Informed her [facility] will not be able to deliver humidity per trach dome on the weekend (today is Friday). Inquired about plan for IV antibiotics, she will follow-up with Medicine, anticipate pt will change to oral antibiotics at discharge.   This AM called [facility] and confirmed they are not able to deliver supplies on the weekend). Confirmed they will also provide home tube feeding supplies.   Met with pt to discuss discharge plan. Pt unable to speak, writes to communicate; he mouths words a lot, but that was difficult to understand. Wearing HME. Pt reports he is feeling OK and able to do cares. He requested more info on the [employer] HME kit in his room. I will ask Speech Therapy to see him. Pt said he lives alone. Pt said there isn't anyone to stay with him or check on him after discharge. He did mention a lady friend but did not say she could help.   Pt said he plans to take a [employer] (taxi) ride home. I said we can help him get a taxi ride arranged at discharged. Pt said he has 27 stairs up to his apartment; pt said he has done stairs here and feels he can do them.   Informed him [facility] will meet him at home with humidity supplies. Pt didn't understand why he needed humidity  machine if he was wearing the HMEs. I explained sometimes the HME isn't worn at night, then he would need the humidity per trach dome. I will ask ENT doctors to speak with him.   Instructed pt if he has any difficulty breathing or coughing up secretions to call 911.   Spoke with ST Kristine and informed her pt had questions about the [employer] HME kit. She will see pt today.  Received call from ENT Med Student inquiring about supply delivery and if supplies could be delivered today instead. Repeated that [facility] could not deliver on the weekend. I will ask if they can deliver to pt's home early, today; but sometimes suppliers can't due to insurance.   Called [facility] and asked if they could deliver humidity to pt's home today, discharge is tomorrow. Their RT could meet someone at home this afternoon.   Spoke with pt and asked if there was anyone available to take delivery of humidity supplies at home and he said no. Called pt's son, Jovani and inquired if he was available to take supply delivery. Jovani said he was back in [place] and there was no one.  Called [facility] and told them no one is available to meet home delivery today.     This afternoon Dr Banegas inquired about the status of delivery of humidity supplies. I informed him [facility] could have met someone at pt's home this afternoon, but pt doesn't have anyone, his son is back in [place]. Delivery of supplies to the hospital: humidity is usually set up at home & RT instructs them on the equipment in the home. Also pt has 27 stairs up to his apartment, he is taking a taxi home; if supplies are delivered to the hospital pt will have to carry all his humidity & tube feeding supplies up 27 stairs (I don't think a taxi company will do this). [facility] can deliver humidity supplies to pt's home on Monday, 7-19. Informed him pt does not understand why he needs the humidity machine if he is wearing the HMEs. Dr Banegas will ask attending  if pt can discharge with HMEs tomorrow and humidity machine delivery on 7-19.   Spoke with nurse, Zena and updated her.     Later this afternoon the Dietician informed me pt said there was a problem with discharge because of tube feeding supply delivery. Informed her this was not the case; the issue was the humidity. The ENT Med Student was also there and requested an update. Repeated what I discussed with Dr Banegas earlier this afternoon. Dietician will provide extra tube feeding formula for pt to take home.     Nursing to send pt home with about 2 day supply of tube feeding supplies.     Message left for Weekend RNCC, pt may need assist with a taxi ride home.   --Resume skilled home care services thru [facility].       Bonnie Herrera RN Care Coordinator  [address], [facility]  Phone:  [phone]    [facility]  Phone:  [phone]  Fax:  [phone]

## 2021-07-17 NOTE — DISCHARGE SUMMARY
Discharge Summary  Raj Warren  [id number]  1950    Date of Admission: 7/8/2021  Date of Discharge: 7/17/2021     Admission Diagnosis: Malignant neoplasm of larynx (H) [C32.9]  History of PE  Chronic anticoagulation  Discharge Diagnosis: Malignant neoplasm of larynx, laryngectomy, PEG    Procedures:  Date: 7/8/2021  Procedure(s):  LARYNGOSCOPY  LARYNGECTOMY  bilateral neck dissections  INSERTION, PEG TUBE with Esophagogastroduodenoscopy    Pathology: Pending      HPI: Raj Warren is a 71 year old male with history of  T4aN0 SCC of the larynx   He had recurrent symptoms and was seen in the ED again on 4/12/2021. He had a CT scan performed on 4/12/2021 which demonstrated a 3.9 x 1.6 x 3.9 cm mass involving the right true cord, right AE fold, right false cord, posterior hypopharynx, proximal trachea, with involvement of the thyroid cartilage, involvement of the prelaryngeal tissue, no abnormal nodes.He had a scope exam performed by a local ENT, and patient was admitted for a trach and DL/bx. After that admission, he presented again to the emergency department and was found to have bilateral PE with significant clot burden including the right pulmonary artery and the right ventricle. He was taken to the OR for pulmonary embolectomy, and had an IVC filter placed 4/30/2021. He saw Dr Bean in follow-up on 5/27/2021 and was recommended for repeat imaging. This showed slight decrease in size of the tumor, no distant disease. Given his nonfunctional larynx, and extralaryngeal spread of tumor, he was recommended for a total laryngectomy after extensive preoperative planning given his high risk with previous PE and chronic anticoagulation.    It was recommended that he undergo operative intervention and the patient consented to the above procedure after detailed explanation of the risks and benefits of said procedure.    Hospital Course: The patient was admitted to the hospital and underwent the above mentioned  procedure. He tolerated the procedure without any intra- or cheryl-operative complications. Please see the operative report for full details of the procedure. The patient was admitted for post-operative monitoring. During his stay, the hematology and internal medicine teams were involved in his care by assisting with management of his  anticoagulation regimen, as well as a left basilar pneumonia which he developed while inpatient. He did have sustained tachycardia and given his history he did undergo a CTA of the chest to rule out recurrent PE, which was negative other than for some bibasilar atelectasis. His anticoagulation was addressed first with prophylactic dosing of lovenox, then with a very low intensity heparin drip with NO bolusing. On his final hospital day, he was transitioned to his home regimen of eliquis. Given his CT findings, medicine recommended treating him as a presumptive pneumonia which was treated with Unasyn inpatient, and he was discharged with augmentin to complete a full 7 day course of antibiotics. He had tube feed and laryngectomy education while inpatient, and was discharged with enough tube feed formula to get him through until his tube feeds would be delivered at home.    At discharge, the patient's pain was well controlled, the patient was voiding on his own, and was ambulating and tolerating the recommended tube feeding diet.    He has follow-up scheduled with Dr. Sethi on July 21st, and a swallow study scheduled for the same day. Radiation oncology will also be arranged, and they will reach out to him to schedule this. He will be scheduled for dental clearance prior to radiation.     Discharge Exam:  Vitals:    07/16/21 2000 07/16/21 2300 07/17/21 0353 07/17/21 0739   BP: (!) 130/94 114/67 102/60 102/70   BP Location: Right arm  Right arm Right arm   Pulse: 112 105 99 99   Resp: 18 18 18 18   Temp: 99  F (37.2  C) 98.7  F (37.1  C) 98.8  F (37.1  C) 97.3  F (36.3  C)   TempSrc: Oral  Oral Oral Oral   SpO2: 98% 98% 98% 98%   Weight:       Height:           General: A&O x 3, No acute distress  Neuro: patient alert, oriented, and answering questions appropriately.  HEENT: neck incisions soft, flat and well-approximated with no bleeding or drainage. Stoma well-approximated, no palpable fluid collection or leak. Margy tube is clean, with HME filter over the top.  Respiratory: Breathing non-labored on room air, no stridor, no accessory muscle use.     Discharge Medications:   Raj Warren   Home Medication Instructions ALESSANDRA:[id number]    Printed on:07/17/21 1229   Medication Information                      acetaminophen (TYLENOL) 325 MG tablet  2 tablets (650 mg) by Per G Tube route every 8 hours as needed for mild pain or fever             amoxicillin-clavulanate (AUGMENTIN) 875-125 MG tablet  Take 1 tablet by mouth 2 times daily for 4 days             apixaban ANTICOAGULANT (ELIQUIS) 5 MG tablet  1 tablet (5 mg) by Per G Tube route 2 times daily             metoprolol tartrate (LOPRESSOR) 25 MG tablet  Take 1 tablet (25 mg) by mouth 2 times daily             mineral oil-hydrophilic petrolatum (AQUAPHOR) external ointment  Apply topically every 8 hours             multivitamins w/minerals (CERTAVITE) liquid  15 mLs by Per Feeding Tube route daily for 16 days             pantoprazole (PROTONIX) 2 mg/mL SUSP suspension  20 mLs (40 mg) by Per G Tube route every morning (before breakfast)             pantoprazole (PROTONIX) 40 MG EC tablet  Take 1 tablet (40 mg) by mouth every morning (before breakfast)             polyethylene glycol (MIRALAX) 17 GM/Dose powder  Take 17 g (1 capful) by mouth daily             senna-docusate (SENOKOT-S/PERICOLACE) 8.6-50 MG tablet  Take 1 tablet by mouth 2 times daily for 30 doses                 Discharge Procedure Orders   Oncology/Hematology Adult Referral   Standing Status: Future   Referral Priority: Routine Referral Type: Consultation   Requested Specialty:  "Medical Oncology     Home care nursing referral     Reason for your hospital stay   Order Comments: You were in the hospital for post-operative monitoring after surgery.     Activity   Order Comments: No heavy lifting greater than 10 lbs and no strenuous exercise for 2 weeks or until follow up appointment. No driving while taking narcotic pain medications.     Order Specific Question Answer Comments   Is discharge order? Yes      When to contact your care team   Order Comments: Please notify your doctor if you experience wound breakdown, sustained bleeding from the wound site, or increasing redness, swelling, and/or purulent malorodorous discharge from the wound site which may indicate infection. If you feel it is acute, or experience sudden changes in breathing, chest pain, or excessive sleepiness/somnolence please return to the emergency department or call 911. If you have questions or concerns during the day please call ENT clinic and [phone]. If at night you can call [facility] at [phone] and ask for the \"ENT resident on call\".     Wound care and dressings   Order Comments: Keep incisions clean and dry. Apply Aquaphor ointment to incisions three times daily to keep moist. You may shower, do not soak, scrub, or submerge incisions under water. If you have a surgical drain, do not get the drain site wet until 24 hours after the drain has been removed.     Follow Up and recommended labs and tests   Order Comments: You have a swallow study on July 21st at 2:00 PM, and a follow-up appointment with Dr. Sethi in the ENT clinic, on July 21st at 4:00 PM. Please call the clinic with questions/concerns: [phone].    Otolaryngology/ENT Clinic:  [facility]  [address] [place] [id number]    Follow up with your Hematologist in about 1 month.    Radiation oncology will reach out to you for follow-up.     Miscellaneous DME Order   Order " Comments: Equipment being ordered: Laryngectomy supplies    Cool mist humidity by trach dome  Large gloves  Cotton tip applicators  0.9% sodium chloride 3 mL puffs for lavage  0.9% sodium chloride 1000 mL bottles  Portable suction machine  14 Japanese suction catheters  12 Japanese red montaño catheters    Treatment Diagnosis: laryngeal cancer, s/p laryngectomy    I, the undersigned, certify that the above prescribed supplies are medically necessary for this patient and is both reasonable and necessary in reference to accepted standards of medical and necessary in reference to accepted standards of medical practice in the treatment of this patient's condition and is not prescribed as a convenience.     Order Specific Question Answer Comments   DME Provider: [facility]    DME Item Needed: laryngectomy supplies    Length of Need: 99 months      Miscellaneous DME Order   Order Comments: Equipment being ordered: Gastric bolus tube feeding supplies via gastrostomy tube  Formula: TwoCal HN, 5 cans per day  Gravity feeding bags  60 mL syringes    Treatment Diagnosis: laryngeal cancer      I, the undersigned, certify that the above prescribed supplies are medically necessary for this patient and is both reasonable and necessary in reference to accepted standards of medical and necessary in reference to accepted standards of medical practice in the treatment of this patient's condition and is not prescribed as a convenience.     Order Specific Question Answer Comments   DME Provider: [facility]    DME Item Needed: enteral tube feeding supplies    Length of Need: 3 months      Diet   Order Comments: Follow this diet upon discharge: nothing by mouth     Order Specific Question Answer Comments   Is discharge order? Yes        Dispo: To home in good condition. All of the patient's questions/concerns have been addressed at this time. They are comfortable and independent in all cares.     May Cameron,  PGY1  Otolaryngology-Head & Neck Surgery  Please contact ENT by dialing [phone] and entering job code [phone].      Teaching statement:  I saw the patient on the day of discharge. I have edited the resident's note to reflect my findings. Patient comfortable going home. F/u plan discussed with patient.    Marilou Sethi MD    Department of Otolaryngology

## 2021-07-17 NOTE — PROGRESS NOTES
Care Management Discharge Note    Discharge Date: 07/17/2021       Discharge Disposition: DME, Home Care    Discharge Services:  [facility] Home care  [facility] for Trach Care supplies    Discharge DME: Other (see comment) (Laryngectomy & tube feeding supplies. )    Discharge Transportation: [facility] #[phone]      Education Provided on the Discharge Plan:  ENT Team, Zena bedside Nurse and Patient.  Patient/Family in Agreement with the Plan: yes    Handoff Referral Completed:  Yes  Additional Information:  Per ENT, Pt medically stable for discharge to home today.  Pt discharged with Margy tube with HME filter. Tube Feeding supplies sent with Pt for weekend.  [facility] will deliver supplies on Monday. Pt very eager o discharge today. [facility] provided discharge to home today.      Yane Horan RN   6B care coordinator #[phone]

## 2021-07-17 NOTE — PLAN OF CARE
Speech Language Therapy Discharge Summary    Reason for therapy discharge:    Discharged to home with outpatient therapy.    Progress towards therapy goal(s). See goals on Care Plan in [facility] electronic health record for goal details.  Goals met    Therapy recommendation(s):    Continued therapy is recommended.  Rationale/Recommendations:  Pt will benefit from ongoing SLP services s/p total laryngectomy.

## 2021-07-17 NOTE — PLAN OF CARE
Status: Pt s/p total laryngectomy and bilateral neck dissection   Vitals: VSS with HME, refusing continuous pulse ox. Tachy intermittently   Neuros: Writes to communicate, numbness to R pinky/ring finger, unchanged   IV: PIV removed prior to discharge  Labs/Electrolytes: none today  Resp/trach: #8 anny tube in place with HME, lavage/suctioned x1 (patient did this himself). Cleansed tube x1. Good productive cough and clean dry stoma   Diet: NPO, bolus TF via PEG with goal of 2 cans/day, pt states he will continue with lunch bolus when he gets home. Pt aware of 120 ml flushes before and after and also QID 130cc amounts  Bowel status: BM yesterday  : Voiding   Skin: Neck incision, pt performed own cares. Gtube site cleansed overnight  Pain: Oxycodone given x1 for incisional prior to discharge  Activity: Ind in room and halls.  Plan: Pt discharged to home from  at 1200 today. Pt reports understanding of POC, meds, TF, and follow up. Meds picked up at [facility]. Humidity and additional TF supplies being delivered Monday. Pt was picked up by [facility] that was arranged by care coordinator.

## 2021-07-18 LAB
BACTERIA BLD CULT: NO GROWTH
BACTERIA BLD CULT: NO GROWTH

## 2021-07-19 ENCOUNTER — PATIENT OUTREACH (OUTPATIENT)
Dept: CARE COORDINATION | Facility: CLINIC | Age: 71
End: 2021-07-19

## 2021-07-19 DIAGNOSIS — Z71.89 OTHER SPECIFIED COUNSELING: ICD-10-CM

## 2021-07-19 NOTE — TELEPHONE ENCOUNTER
Clinic Care Coordination Contact  [facility]    Clinical Data: Care Coordinator Outreach  Outreach attempted x 1. Patient is unable to speak due to laryngectomy and information in demographics states to leave message for patient.    Left message on patient's voice mail with detailed information to keep appointment as scheduled in 2 days with ENT surgeon. Also left ENT contact information with questions or concerns prior to appointment.   Plan: Care Coordinator will do no further outreaches at this time.  Charley Padilla RN

## 2021-07-19 NOTE — ANESTHESIA POSTPROCEDURE EVALUATION
Patient: Raj Warren    Procedure(s):  LARYNGOSCOPY  LARYNGECTOMY  bilateral neck dissections  INSERTION, PEG TUBE with Esophagogastroduodenoscopy    Diagnosis:Malignant neoplasm of larynx (H) [C32.9]  Diagnosis Additional Information: No value filed.    Anesthesia Type:  General    Note:  Disposition: Admission   Postop Pain Control: Uneventful            Sign Out: Well controlled pain   PONV: No   Neuro/Psych: Uneventful            Sign Out: Acceptable/Baseline neuro status   Airway/Respiratory: Uneventful            Sign Out: Acceptable/Baseline resp. status   CV/Hemodynamics: Uneventful            Sign Out: Acceptable CV status   Other NRE: NONE   DID A NON-ROUTINE EVENT OCCUR? No           Last vitals:  Vitals:    07/16/21 2300 07/17/21 0353 07/17/21 0739   BP: 114/67 102/60 102/70   Pulse: 105 99 99   Resp: 18 18 18   Temp: 37.1  C (98.7  F) 37.1  C (98.8  F) 36.3  C (97.3  F)   SpO2: 98% 98% 98%       Last vitals prior to Anesthesia Care Transfer:  CRNA VITALS  7/8/2021 1533 - 7/8/2021 1633      7/8/2021             NIBP:  115/79    Pulse:  86    NIBP Mean:  92    ART BP:  119/66    ART Mean:  86    Temp:  37.1  C (98.8  F)    SpO2:  100 %    Resp Rate (observed):  18          Electronically Signed By: Christopher J. Behrens, MD  July 19, 2021  12:42 PM

## 2021-07-20 ENCOUNTER — TELEPHONE (OUTPATIENT)
Dept: FAMILY MEDICINE | Facility: CLINIC | Age: 71
End: 2021-07-20

## 2021-07-20 NOTE — TELEPHONE ENCOUNTER
[facility]  Requesting orders for:  Skilled nursing 1x week for 3 weeks,   Every other week for 6 weeks,   3 PRN visit.     Orders approved.   Callback: [phone]- okay to leave detailed voicemail.    Agnes Heredia RN  [facility]

## 2021-07-21 ENCOUNTER — OFFICE VISIT (OUTPATIENT)
Dept: OTOLARYNGOLOGY | Facility: CLINIC | Age: 71
End: 2021-07-21
Payer: COMMERCIAL

## 2021-07-21 ENCOUNTER — ANCILLARY PROCEDURE (OUTPATIENT)
Dept: GENERAL RADIOLOGY | Facility: CLINIC | Age: 71
End: 2021-07-21
Attending: OTOLARYNGOLOGY
Payer: COMMERCIAL

## 2021-07-21 ENCOUNTER — THERAPY VISIT (OUTPATIENT)
Dept: SPEECH THERAPY | Facility: CLINIC | Age: 71
End: 2021-07-21
Payer: COMMERCIAL

## 2021-07-21 VITALS
WEIGHT: 192.46 LBS | TEMPERATURE: 96.8 F | HEART RATE: 101 BPM | HEIGHT: 71 IN | BODY MASS INDEX: 26.94 KG/M2 | OXYGEN SATURATION: 100 %

## 2021-07-21 DIAGNOSIS — R49.1 APHONIA: Primary | ICD-10-CM

## 2021-07-21 DIAGNOSIS — C32.9 MALIGNANT NEOPLASM OF LARYNX (H): Primary | ICD-10-CM

## 2021-07-21 DIAGNOSIS — R13.12 OROPHARYNGEAL DYSPHAGIA: ICD-10-CM

## 2021-07-21 DIAGNOSIS — C32.9 MALIGNANT NEOPLASM OF LARYNX (H): ICD-10-CM

## 2021-07-21 PROCEDURE — 92522 EVALUATE SPEECH PRODUCTION: CPT | Mod: GN | Performed by: SPEECH-LANGUAGE PATHOLOGIST

## 2021-07-21 PROCEDURE — 74230 X-RAY XM SWLNG FUNCJ C+: CPT | Mod: GC | Performed by: RADIOLOGY

## 2021-07-21 PROCEDURE — 92611 MOTION FLUOROSCOPY/SWALLOW: CPT | Mod: GN | Performed by: SPEECH-LANGUAGE PATHOLOGIST

## 2021-07-21 PROCEDURE — 99024 POSTOP FOLLOW-UP VISIT: CPT | Performed by: OTOLARYNGOLOGY

## 2021-07-21 PROCEDURE — 92507 TX SP LANG VOICE COMM INDIV: CPT | Mod: GN | Performed by: SPEECH-LANGUAGE PATHOLOGIST

## 2021-07-21 ASSESSMENT — MIFFLIN-ST. JEOR: SCORE: 1660.13

## 2021-07-21 ASSESSMENT — PAIN SCALES - GENERAL: PAINLEVEL: NO PAIN (0)

## 2021-07-21 NOTE — PROGRESS NOTES
Dear Dr. Bean:    I had the pleasure of seeing Raj Warren in follow-up today at the [facility].     History of Present Illness:  Raj Warren is a T4aN0 SCC of the larynx. The patient presented to the ED with a week history of shortness of breath, along with symptoms of sore throat, bilateral ear pain. He had stridor vs wheezing, mild increased work of breathing, and hoarse voice at that time. He was treated with steroids and nebulizers with improvement in his symptoms and was discharged.  He had recurrent symptoms and was seen in the ED again on 4/12/2021. He had a CT scan performed on 4/12/2021 which demonstrated a 3.9 x 1.6 x 3.9 cm mass involving the right true cord, right AE fold, right false cord, posterior hypopharynx, proximal trachea, with involvement of the thyroid cartilage, involvement of the prelaryngeal tissue, no abnormal nodes. Dr Willoughby (local ENT), scope exam was performed, and patient was admitted for a trach and DL/bx. Dr Johnson and Dr Sauceda performed this procedure on 4/13/2021. A tracheal window at 2nd tracheal ring was performed with placement of an 8 cuffed Shiley. Pathology showed moderately differentiated SCC. He was seen by Dr Bean of medical oncology on 4/13/2021. He was followed by local ENT team during his hospitalization. He was recommended for chemoradiation. [place] [facility] [place], and CT simulation was done. He did have a VSS done while inpatient without evidence of aspiration. The patient was referred here for discussion of possible salvage laryngectomy after chemoradiation. He had a PET scan on 4/26/2021 which showed an FDG avid mass in the larynx involving the supraglottis/glottis/subglottis, hypermetabolic left level 4 node, FDG avid nodule in the right lung thought to be infectious/inflammatory. He was then admitted to the hospital locally from 4/27/2021 to 5/8/2021 after presenting with  worsening shortness of breath, found to have bilateral PE with significant clot burden including the right pulmonary artery and the right ventricle. He was taken to the OR for pulmonary embolectomy and RV thrombectomy. He had IVC filter placed 4/30/2021. His course was complicated by hemodynamic instability requiring pressors, poor healing of his sternal incision requiring wound vac placement. He was discharged on eliquis. He was sent to rehab after his admission, discharged 5/15/2021. He saw Dr Bean in follow-up on 5/27/2021 and was recommended for repeat imaging. This showed slight decrease in size of the tumor, no distant disease.     He was seen as a new patient on 6/18/2021. After extensive preoperative counseling especially in light of his recent PE and anticoagulation, the recommendations was for surgery given the cartilaginous erosion seen on imaging. He was taken to the OR on 7/8/2021 for a DL, total laryngectomy, bilateral neck dissections, cricopharyngeal myotomy. His hospital course was uncomplicated, was restarted on his anticoagulation. His final pathology demonstrated a 6 cm SCC with invasion through the thyroid cartilage, involved the cricoid cartilage, involved the right pyriform sinus, extended to the left side of the tracheostomy site, PNI present, focal lymphatic invasion, 0/75 nodes. The main specimen had a positive margin at the pyriform sinus, margins were taken from the main specimen and were negative (named right pharyngeal wall mucosal margin).  He had a swallow study earlier today which showed no evidence of a leak. He is happy to be at home. He is inquiring about removal of the PEG tube. He had some confusion about the dental referral that was placed for pre-radiation clearance.        MEDICATIONS:     Current Outpatient Medications   Medication Sig Dispense Refill     acetaminophen (TYLENOL) 325 MG tablet 2 tablets (650 mg) by Per G Tube route every 8 hours as needed for mild pain or  fever 90 tablet 0     apixaban ANTICOAGULANT (ELIQUIS) 5 MG tablet 1 tablet (5 mg) by Per G Tube route 2 times daily 60 tablet 0     metoprolol tartrate (LOPRESSOR) 25 MG tablet Take 1 tablet (25 mg) by mouth 2 times daily 60 tablet 3     mineral oil-hydrophilic petrolatum (AQUAPHOR) external ointment Apply topically every 8 hours 198 g 0     multivitamins w/minerals (CERTAVITE) liquid 15 mLs by Per Feeding Tube route daily for 16 days 236 mL 0     pantoprazole (PROTONIX) 2 mg/mL SUSP suspension 20 mLs (40 mg) by Per G Tube route every morning (before breakfast) 800 mL 0     pantoprazole (PROTONIX) 40 MG EC tablet Take 1 tablet (40 mg) by mouth every morning (before breakfast) 90 tablet 2     polyethylene glycol (MIRALAX) 17 GM/Dose powder Take 17 g (1 capful) by mouth daily 507 g 0     senna-docusate (SENOKOT-S/PERICOLACE) 8.6-50 MG tablet Take 1 tablet by mouth 2 times daily for 30 doses 30 tablet 0       ALLERGIES:    Allergies   Allergen Reactions     Pollen Extract        HABITS/SOCIAL HISTORY:    Former smoker  ppd x 8 yrs, quit in . Sober from alcohol.   Lives alone. His family does not live in the immediate [place] area.   He is a retired .       Social History     Socioeconomic History     Marital status: Single     Spouse name: Not on file     Number of children: Not on file     Years of education: Not on file     Highest education level: Not on file   Occupational History     Not on file   Tobacco Use     Smoking status: Former Smoker     Types: Cigarettes     Quit date: 1989     Years since quittin.5     Smokeless tobacco: Never Used     Tobacco comment: quit in   had smoked about 1/2 pack a day then cut back   Substance and Sexual Activity     Alcohol use: No     Comment: No alcohol since      Drug use: No     Sexual activity: Yes     Partners: Female   Other Topics Concern     Parent/sibling w/ CABG, MI or angioplasty before 65F 55M? Not Asked   Social  History Narrative     Not on file     Social Determinants of Health     Financial Resource Strain: Low Risk      Difficulty of Paying Living Expenses: Not hard at all   Food Insecurity: No Food Insecurity     Worried About Running Out of Food in the Last Year: Never true     Ran Out of Food in the Last Year: Never true   Transportation Needs: No Transportation Needs     Lack of Transportation (Medical): No     Lack of Transportation (Non-Medical): No   Physical Activity:      Days of Exercise per Week:      Minutes of Exercise per Session:    Stress:      Feeling of Stress :    Social Connections:      Frequency of Communication with Friends and Family:      Frequency of Social Gatherings with Friends and Family:      Attends Buddhist Services:      Active Member of Clubs or Organizations:      Attends Club or Organization Meetings:      Marital Status:    Intimate Partner Violence:      Fear of Current or Ex-Partner:      Emotionally Abused:      Physically Abused:      Sexually Abused:        PAST MEDICAL HISTORY:   Past Medical History:   Diagnosis Date     Allergic state      Anemia      Malignant neoplasm of larynx (H) 4/20/2021     Pulmonary emboli (H) 4/28/2021     Tracheostomy in place (H)         PAST SURGICAL HISTORY:   Past Surgical History:   Procedure Laterality Date     DISSECTION RADICAL NECK BILATERAL Bilateral 7/8/2021    Procedure: bilateral neck dissections;  Surgeon: Marilou Sethi MD;  Location: [facility] OR     ENDOSCOPIC INSERTION TUBE GASTROSTOMY Left 7/8/2021    Procedure: INSERTION, PEG TUBE with Esophagogastroduodenoscopy;  Surgeon: Kg Montaño MD;  Location: [facility] OR     IR IVC FILTER PLACEMENT  4/30/2021     LARYNGECTOMY N/A 7/8/2021    Procedure: LARYNGECTOMY;  Surgeon: Marilou Sethi MD;  Location: [facility] OR     LARYNGOSCOPY N/A 4/12/2021    Procedure: LARYNGOSCOPY;  Surgeon: Choco Johnson MD;  Location: [facility] OR     LARYNGOSCOPY N/A 7/8/2021    Procedure: LARYNGOSCOPY;  Surgeon:  "Marilou Sethi MD;  Location: [facility] OR     REPAIR ANEURYSM ASCENDING AORTA N/A 4/28/2021    Procedure: PULMONARY THROMBECTOMY;  Surgeon: Yumi Singh MD;  Location:  OR     TRACHEOSTOMY  4/12/2021    Procedure: CREATION, TRACHEOSTOMY;  Surgeon: Choco Johnson MD;  Location:  OR       FAMILY HISTORY:    Family History   Problem Relation Age of Onset     Circulatory Mother         blood clots     Alcohol/Drug Father         alcohol drank heavily     Alcohol/Drug Brother         alcohol       REVIEW OF SYSTEMS:  12 point ROS was negative other than the symptoms noted above in the HPI.  Patient Supplied Answers to Review of Systems  No flowsheet data found.      PHYSICAL EXAMINATION:   Pulse 101   Temp 96.8  F (36  C) (Temporal)   Ht 1.803 m (5' 11\")   Wt 87.3 kg (192 lb 7.4 oz)   SpO2 100%   BMI 26.84 kg/m    Patient in NAD  Breathing comfortably on room air  Mouthes words to communicate  Healing apron neck incision, sutures in place, no swelling, no skin changes, no point tenderness  Margy tube with HME in place without crusting      PROCEDURES:     RESULTS REVIEWED:     SPECIMEN(S):   A: Lymph node, left level 2A   B: Lymph node, left level 3   C: Lymph node, left level 4   D: Lymph node, left level 2B   E: Lymph node, right level 2A   F: Lymph node, right level 3   G: Lymph node, right level 4   H: Lymph node, right level 2B   I: Left vallecula mucosal margin   J: Right vallecula mucosa margin   K: Left post cricoid mucosa margin   L: Right post cricoid mucosa margin   M: Left pharyngeal wall mucosa margin   N: Right pharyngeal wall mucosa margin   O: Post tracheal wall mucosa margin   P: Anterior tracheal wall mucosal margin   Q: Laryngectomy     FINAL DIAGNOSIS:   A. LYMPH NODES, LEFT LEVEL 2A, NECK DISSECTION:   - Nineteen lymph nodes, negative for malignancy (0/19).     B. LYMPH NODES, LEFT LEVEL 3, NECK DISSECTION:   - Six lymph nodes, negative for malignancy (0/6).     C. LYMPH NODES, " LEFT LEVEL 4, NECK DISSECTION:   - Five lymph nodes, negative for malignancy (0/5).     D. LYMPH NODES, LEFT LEVEL 2B, NECK DISSECTION:   - Five lymph nodes, negative for malignancy (0/5).     E. LYMPH NODES, RIGHT LEVEL 2A, NECK DISSECTION:   - Sixteen lymph nodes, negative for malignancy (0/16).     F. LYMPH NODES, RIGHT LEVEL 3, NECK DISSECTION:   - Seven lymph nodes, negative for malignancy (0/7).     G. LYMPH NODES, RIGHT LEVEL 4, NECK DISSECTION:   - Twelve lymph nodes, negative for malignancy (0/12).     H. LYMPH NODES, RIGHT LEVEL 2B, NECK DISSECTION:   - Five lymph nodes, negative for malignancy (0/5).     I. LEFT VALLECULA MUCOSAL MARGIN, EXCISION:   - Squamous mucosa, negative for dysplasia or malignancy.     J. RIGHT VALLECULA MUCOSA MARGIN, EXCISION:   - Squamous mucosa, negative for dysplasia or malignancy.     K. LEFT POST CRICOID MUCOSA MARGIN, EXCISION:   - Squamous mucosa, negative for dysplasia or malignancy.     L. RIGHT POST CRICOID MUCOSA MARGIN, EXCISION:   - Squamous mucosa, negative for dysplasia or malignancy.     M. LEFT PHARYNGEAL WALL MUCOSA MARGIN, EXCISION:   - Squamous mucosa with focal mild dysplasia, negative for high-grade   dysplasia or malignancy.     N. RIGHT PHARYNGEAL WALL MUCOSA MARGIN, EXCISION:   - Squamous mucosa, negative for dysplasia or malignancy.     O. POST TRACHEAL WALL MUCOSA MARGIN, EXCISION:   - Fibroconnective tissue, smooth muscle and minor salivary gland tissue,   negative for malignancy.     P. ANTERIOR TRACHEAL WALL MUCOSAL MARGIN, EXCISION:   - Respiratory mucosa, negative for malignancy.     Q. LARYNX, TOTAL LARYNGECTOMY:   - RESIDUAL INVASIVE KERATINIZING SQUAMOUS CELL CARCINOMA, moderately   differentiated, approximately 6 cm in   greatest dimension.   - Tumor involves the right false and true vocal cords with fixation, the   left false and true vocal cords, the   right arytenoid and cricoid cartilage, invades through thyroid cartilage   and extends into  the right pyriform   sinus and to the left of the tracheostomy site.   - The tumor shows limited or no therapy effect.   - Tumor extends to inked right pyriform sinus margin; closest uninvolved   margin is deep at 3 mm.   - Focal lymphatic invasion is observed.   - Perineural invasion is present (multiple small caliber nerves involded).   - AJCC pathologic staging is ypT4a N0.   - See synoptic report.     Report Name: Larynx (Supraglottis, Glottis, Subglottis)        Status: Submitted Checklist Inst: 1      Last Updated By: Abhijeet Vargas M.D., [facility], 07/17/2021   16:56:53   Part(s) Involved:   Q: Laryngectomy     Synoptic Report:     SPECIMEN     Procedure:         - Total laryngectomy     TUMOR     Tumor Site:         - Larynx, glottis       Transglottic Extension:           - Present       Larynx, Glottis Subtype:           - True vocal cord           - with subglottic extension     Tumor Laterality:         - Right         - Left         - Midline     Histologic Type:           - Squamous cell carcinoma, conventional (keratinizing)     Histologic Grade:         - G2: Moderately differentiated     Tumor Focality:         - Unifocal     Tumor Size: 6 Centimeters (cm)     Lymphovascular Invasion:         - Present     Perineural Invasion:         - Present     MARGINS     Margins:         - Involved by invasive tumor       Margin(s), per Orientation:           right pyriform sinus     LYMPH NODES     Number of Lymph Nodes Involved:         - 0     Number of Lymph Nodes Examined:         75     PATHOLOGIC STAGE CLASSIFICATION (PTNM, AJCC 8TH EDITION)     Primary Tumor (pT):         - pT4a     Regional Lymph Nodes (pN):         - pN0       IMPRESSION AND PLAN:   Raj Warren is a 70 year old man with a T4N0 SCC of the larynx. He underwent [facility]. He was taken to the OR on 7/8/2021 for a total laryngectomy with bilateral neck dissections.     Pathology was reviewed with him  today. The report indicates a positive margin on the right pyriform sinus - these were not true margins, margin from this area was negative. He has no positive nodes.    I discussed with him the recommendations for postoperative radiation. I would like to review his pathology at tumor board but given the margin being close rather than negative, I anticipate radiation alone.     I discussed with him the recommendations for the pre-radiation dental clearance. He is having problems with scheduling because of not being able to talk so we will try to help facilitate.     He had a video swallow study today which I reviewed closely with SLP. We have cleared him for a liquid diet. I explained to him the need to keep the PEG to supplement his nutrition now and then during radiation.    I would like to see him back in 10-14 days.       Thank you very much for the opportunity to participate in the care of your patient.      Marilou Sethi MD, M.D.  Otolaryngology- Head & Neck Surgery      This note was dictated with voice recognition software and then edited. Please excuse any unintentional errors.         CC:  Rosa Bean MD  [facility] [address] [facility]  [address] [address]      Serg Kennedy MD  Department of Radiation Oncology  [facility]

## 2021-07-21 NOTE — LETTER
7/21/2021       RE: Raj Warren  [address]  [facility] [address]     Dear Colleague,    Thank you for referring your patient, Raj Warren, to the [facility] EAR NOSE AND THROAT [facility]INIC [place] [facility]. Please see a copy of my visit note below.    Dear Dr. Bean:    I had the pleasure of seeing Raj Warren in follow-up today at the [facility].     History of Present Illness:  Raj Warren is a T4aN0 SCC of the larynx. The patient presented to the ED with a week history of shortness of breath, along with symptoms of sore throat, bilateral ear pain. He had stridor vs wheezing, mild increased work of breathing, and hoarse voice at that time. He was treated with steroids and nebulizers with improvement in his symptoms and was discharged.  He had recurrent symptoms and was seen in the ED again on 4/12/2021. He had a CT scan performed on 4/12/2021 which demonstrated a 3.9 x 1.6 x 3.9 cm mass involving the right true cord, right AE fold, right false cord, posterior hypopharynx, proximal trachea, with involvement of the thyroid cartilage, involvement of the prelaryngeal tissue, no abnormal nodes. Dr Willoughby (local ENT), scope exam was performed, and patient was admitted for a trach and DL/bx. Dr Johnson and Dr Sauceda performed this procedure on 4/13/2021. A tracheal window at 2nd tracheal ring was performed with placement of an 8 cuffed Shiley. Pathology showed moderately differentiated SCC. He was seen by Dr Bean of medical oncology on 4/13/2021. He was followed by local ENT team during his hospitalization. He was recommended for chemoradiation. He was [place] by Dr Mclaughlin of [facility] [place], planning 7 weeks treatment, and CT simulation was done. He did have a VSS done while inpatient without evidence of aspiration. The patient was referred here for discussion of possible salvage laryngectomy after  chemoradiation. He had a PET scan on 4/26/2021 which showed an FDG avid mass in the larynx involving the supraglottis/glottis/subglottis, hypermetabolic left level 4 node, FDG avid nodule in the right lung thought to be infectious/inflammatory. He was then admitted to the hospital locally from 4/27/2021 to 5/8/2021 after presenting with worsening shortness of breath, found to have bilateral PE with significant clot burden including the right pulmonary artery and the right ventricle. He was taken to the OR for pulmonary embolectomy and RV thrombectomy. He had IVC filter placed 4/30/2021. His course was complicated by hemodynamic instability requiring pressors, poor healing of his sternal incision requiring wound vac placement. He was discharged on eliquis. He was sent to rehab after his admission, discharged 5/15/2021. He saw Dr Bean in follow-up on 5/27/2021 and was recommended for repeat imaging. This showed slight decrease in size of the tumor, no distant disease.     He was seen as a new patient on 6/18/2021. After extensive preoperative counseling especially in light of his recent PE and anticoagulation, the recommendations was for surgery given the cartilaginous erosion seen on imaging. He was taken to the OR on 7/8/2021 for a DL, total laryngectomy, bilateral neck dissections, cricopharyngeal myotomy. His hospital course was uncomplicated, was restarted on his anticoagulation. His final pathology demonstrated a 6 cm SCC with invasion through the thyroid cartilage, involved the cricoid cartilage, involved the right pyriform sinus, extended to the left side of the tracheostomy site, PNI present, focal lymphatic invasion, 0/75 nodes. The main specimen had a positive margin at the pyriform sinus, margins were taken from the main specimen and were negative (named right pharyngeal wall mucosal margin).  He had a swallow study earlier today which showed no evidence of a leak. He is happy to be at home. He is  inquiring about removal of the PEG tube. He had some confusion about the dental referral that was placed for pre-radiation clearance.        MEDICATIONS:     Current Outpatient Medications   Medication Sig Dispense Refill     acetaminophen (TYLENOL) 325 MG tablet 2 tablets (650 mg) by Per G Tube route every 8 hours as needed for mild pain or fever 90 tablet 0     apixaban ANTICOAGULANT (ELIQUIS) 5 MG tablet 1 tablet (5 mg) by Per G Tube route 2 times daily 60 tablet 0     metoprolol tartrate (LOPRESSOR) 25 MG tablet Take 1 tablet (25 mg) by mouth 2 times daily 60 tablet 3     mineral oil-hydrophilic petrolatum (AQUAPHOR) external ointment Apply topically every 8 hours 198 g 0     multivitamins w/minerals (CERTAVITE) liquid 15 mLs by Per Feeding Tube route daily for 16 days 236 mL 0     pantoprazole (PROTONIX) 2 mg/mL SUSP suspension 20 mLs (40 mg) by Per G Tube route every morning (before breakfast) 800 mL 0     pantoprazole (PROTONIX) 40 MG EC tablet Take 1 tablet (40 mg) by mouth every morning (before breakfast) 90 tablet 2     polyethylene glycol (MIRALAX) 17 GM/Dose powder Take 17 g (1 capful) by mouth daily 507 g 0     senna-docusate (SENOKOT-S/PERICOLACE) 8.6-50 MG tablet Take 1 tablet by mouth 2 times daily for 30 doses 30 tablet 0       ALLERGIES:    Allergies   Allergen Reactions     Pollen Extract        HABITS/SOCIAL HISTORY:    Former smoker / ppd x 8 yrs, quit in . Sober from alcohol.   Lives alone. His family does not live in the immediate [place] area.   He is a retired .       Social History     Socioeconomic History     Marital status: Single     Spouse name: Not on file     Number of children: Not on file     Years of education: Not on file     Highest education level: Not on file   Occupational History     Not on file   Tobacco Use     Smoking status: Former Smoker     Types: Cigarettes     Quit date: 1989     Years since quittin.5     Smokeless tobacco: Never  Used     Tobacco comment: quit in 1989  had smoked about 1/2 pack a day then cut back   Substance and Sexual Activity     Alcohol use: No     Comment: No alcohol since 1989     Drug use: No     Sexual activity: Yes     Partners: Female   Other Topics Concern     Parent/sibling w/ CABG, MI or angioplasty before 65F 55M? Not Asked   Social History Narrative     Not on file     Social Determinants of Health     Financial Resource Strain: Low Risk      Difficulty of Paying Living Expenses: Not hard at all   Food Insecurity: No Food Insecurity     Worried About Running Out of Food in the Last Year: Never true     Ran Out of Food in the Last Year: Never true   Transportation Needs: No Transportation Needs     Lack of Transportation (Medical): No     Lack of Transportation (Non-Medical): No   Physical Activity:      Days of Exercise per Week:      Minutes of Exercise per Session:    Stress:      Feeling of Stress :    Social Connections:      Frequency of Communication with Friends and Family:      Frequency of Social Gatherings with Friends and Family:      Attends Orthodox Services:      Active Member of Clubs or Organizations:      Attends Club or Organization Meetings:      Marital Status:    Intimate Partner Violence:      Fear of Current or Ex-Partner:      Emotionally Abused:      Physically Abused:      Sexually Abused:        PAST MEDICAL HISTORY:   Past Medical History:   Diagnosis Date     Allergic state      Anemia      Malignant neoplasm of larynx (H) 4/20/2021     Pulmonary emboli (H) 4/28/2021     Tracheostomy in place (H)         PAST SURGICAL HISTORY:   Past Surgical History:   Procedure Laterality Date     DISSECTION RADICAL NECK BILATERAL Bilateral 7/8/2021    Procedure: bilateral neck dissections;  Surgeon: Marilou Sethi MD;  Location: [facility] OR     ENDOSCOPIC INSERTION TUBE GASTROSTOMY Left 7/8/2021    Procedure: INSERTION, PEG TUBE with Esophagogastroduodenoscopy;  Surgeon: Kg Montaño MD;   "Location: [facility] OR     IR IVC FILTER PLACEMENT  4/30/2021     LARYNGECTOMY N/A 7/8/2021    Procedure: LARYNGECTOMY;  Surgeon: Marilou Sethi MD;  Location: [facility] OR     LARYNGOSCOPY N/A 4/12/2021    Procedure: LARYNGOSCOPY;  Surgeon: Choco Johnson MD;  Location: [facility] OR     LARYNGOSCOPY N/A 7/8/2021    Procedure: LARYNGOSCOPY;  Surgeon: Marilou Sethi MD;  Location: [facility] OR     REPAIR ANEURYSM ASCENDING AORTA N/A 4/28/2021    Procedure: PULMONARY THROMBECTOMY;  Surgeon: Yumi Singh MD;  Location: [facility] OR     TRACHEOSTOMY  4/12/2021    Procedure: CREATION, TRACHEOSTOMY;  Surgeon: Choco Johnson MD;  Location: [facility] OR       FAMILY HISTORY:    Family History   Problem Relation Age of Onset     Circulatory Mother         blood clots     Alcohol/Drug Father         alcohol drank heavily     Alcohol/Drug Brother         alcohol       REVIEW OF SYSTEMS:  12 point ROS was negative other than the symptoms noted above in the HPI.  Patient Supplied Answers to Review of Systems  No flowsheet data found.      PHYSICAL EXAMINATION:   Pulse 101   Temp 96.8  F (36  C) (Temporal)   Ht 1.803 m (5' 11\")   Wt 87.3 kg (192 lb 7.4 oz)   SpO2 100%   BMI 26.84 kg/m    Patient in NAD  Breathing comfortably on room air  Mouthes words to communicate  Healing apron neck incision, sutures in place, no swelling, no skin changes, no point tenderness  Margy tube with HME in place without crusting      PROCEDURES:     RESULTS REVIEWED:     SPECIMEN(S):   A: Lymph node, left level 2A   B: Lymph node, left level 3   C: Lymph node, left level 4   D: Lymph node, left level 2B   E: Lymph node, right level 2A   F: Lymph node, right level 3   G: Lymph node, right level 4   H: Lymph node, right level 2B   I: Left vallecula mucosal margin   J: Right vallecula mucosa margin   K: Left post cricoid mucosa margin   L: Right post cricoid mucosa margin   M: Left pharyngeal wall mucosa margin   N: Right pharyngeal wall mucosa margin   O: Post " tracheal wall mucosa margin   P: Anterior tracheal wall mucosal margin   Q: Laryngectomy     FINAL DIAGNOSIS:   A. LYMPH NODES, LEFT LEVEL 2A, NECK DISSECTION:   - Nineteen lymph nodes, negative for malignancy (0/19).     B. LYMPH NODES, LEFT LEVEL 3, NECK DISSECTION:   - Six lymph nodes, negative for malignancy (0/6).     C. LYMPH NODES, LEFT LEVEL 4, NECK DISSECTION:   - Five lymph nodes, negative for malignancy (0/5).     D. LYMPH NODES, LEFT LEVEL 2B, NECK DISSECTION:   - Five lymph nodes, negative for malignancy (0/5).     E. LYMPH NODES, RIGHT LEVEL 2A, NECK DISSECTION:   - Sixteen lymph nodes, negative for malignancy (0/16).     F. LYMPH NODES, RIGHT LEVEL 3, NECK DISSECTION:   - Seven lymph nodes, negative for malignancy (0/7).     G. LYMPH NODES, RIGHT LEVEL 4, NECK DISSECTION:   - Twelve lymph nodes, negative for malignancy (0/12).     H. LYMPH NODES, RIGHT LEVEL 2B, NECK DISSECTION:   - Five lymph nodes, negative for malignancy (0/5).     I. LEFT VALLECULA MUCOSAL MARGIN, EXCISION:   - Squamous mucosa, negative for dysplasia or malignancy.     J. RIGHT VALLECULA MUCOSA MARGIN, EXCISION:   - Squamous mucosa, negative for dysplasia or malignancy.     K. LEFT POST CRICOID MUCOSA MARGIN, EXCISION:   - Squamous mucosa, negative for dysplasia or malignancy.     L. RIGHT POST CRICOID MUCOSA MARGIN, EXCISION:   - Squamous mucosa, negative for dysplasia or malignancy.     M. LEFT PHARYNGEAL WALL MUCOSA MARGIN, EXCISION:   - Squamous mucosa with focal mild dysplasia, negative for high-grade   dysplasia or malignancy.     N. RIGHT PHARYNGEAL WALL MUCOSA MARGIN, EXCISION:   - Squamous mucosa, negative for dysplasia or malignancy.     O. POST TRACHEAL WALL MUCOSA MARGIN, EXCISION:   - Fibroconnective tissue, smooth muscle and minor salivary gland tissue,   negative for malignancy.     P. ANTERIOR TRACHEAL WALL MUCOSAL MARGIN, EXCISION:   - Respiratory mucosa, negative for malignancy.     Q. LARYNX, TOTAL LARYNGECTOMY:    - RESIDUAL INVASIVE KERATINIZING SQUAMOUS CELL CARCINOMA, moderately   differentiated, approximately 6 cm in   greatest dimension.   - Tumor involves the right false and true vocal cords with fixation, the   left false and true vocal cords, the   right arytenoid and cricoid cartilage, invades through thyroid cartilage   and extends into the right pyriform   sinus and to the left of the tracheostomy site.   - The tumor shows limited or no therapy effect.   - Tumor extends to inked right pyriform sinus margin; closest uninvolved   margin is deep at 3 mm.   - Focal lymphatic invasion is observed.   - Perineural invasion is present (multiple small caliber nerves involded).   - AJCC pathologic staging is ypT4a N0.   - See synoptic report.     Report Name: Larynx (Supraglottis, Glottis, Subglottis)        Status: Submitted Checklist Inst: 1      Last Updated By: Abhijeet Vargas M.D., [facility], 07/17/2021   16:56:53   Part(s) Involved:   Q: Laryngectomy     Synoptic Report:     SPECIMEN     Procedure:         - Total laryngectomy     TUMOR     Tumor Site:         - Larynx, glottis       Transglottic Extension:           - Present       Larynx, Glottis Subtype:           - True vocal cord           - with subglottic extension     Tumor Laterality:         - Right         - Left         - Midline     Histologic Type:           - Squamous cell carcinoma, conventional (keratinizing)     Histologic Grade:         - G2: Moderately differentiated     Tumor Focality:         - Unifocal     Tumor Size: 6 Centimeters (cm)     Lymphovascular Invasion:         - Present     Perineural Invasion:         - Present     MARGINS     Margins:         - Involved by invasive tumor       Margin(s), per Orientation:           right pyriform sinus     LYMPH NODES     Number of Lymph Nodes Involved:         - 0     Number of Lymph Nodes Examined:         75     PATHOLOGIC STAGE CLASSIFICATION (PTNM, AJCC 8TH EDITION)     Primary Tumor  (pT):         - pT4a     Regional Lymph Nodes (pN):         - pN0       IMPRESSION AND PLAN:   Raj Warren is a 70 year old man with a T4N0 SCC of the larynx. He [facility]. He was taken to the OR on 7/8/2021 for a total laryngectomy with bilateral neck dissections.     Pathology was reviewed with him today. The report indicates a positive margin on the right pyriform sinus - these were not true margins, margin from this area was negative. He has no positive nodes.    I discussed with him the recommendations for postoperative radiation. I would like to review his pathology at tumor board but given the margin being close rather than negative, I anticipate radiation alone.     I discussed with him the recommendations for the pre-radiation dental clearance. He is having problems with scheduling because of not being able to talk so we will try to help facilitate.     He had a video swallow study today which I reviewed closely with SLP. We have cleared him for a liquid diet. I explained to him the need to keep the PEG to supplement his nutrition now and then during radiation.    I would like to see him back in 10-14 days.       Thank you very much for the opportunity to participate in the care of your patient.      Marilou Sethi MD, M.D.  Otolaryngology- Head & Neck Surgery      This note was dictated with voice recognition software and then edited. Please excuse any unintentional errors.     CC:  Rosa Bean MD  [facility]  [address] [address]    Serg Kennedy MD  Department of Radiation Oncology  [facility]

## 2021-07-22 ENCOUNTER — APPOINTMENT (OUTPATIENT)
Dept: RADIATION ONCOLOGY | Facility: CLINIC | Age: 71
End: 2021-07-22
Attending: RADIOLOGY
Payer: COMMERCIAL

## 2021-07-22 DIAGNOSIS — C32.9 MALIGNANT NEOPLASM OF LARYNX (H): ICD-10-CM

## 2021-07-22 DIAGNOSIS — R13.10 DYSPHAGIA: Primary | ICD-10-CM

## 2021-07-22 PROCEDURE — 77334 RADIATION TREATMENT AID(S): CPT | Performed by: RADIOLOGY

## 2021-07-22 PROCEDURE — 77334 RADIATION TREATMENT AID(S): CPT | Mod: 26 | Performed by: RADIOLOGY

## 2021-07-22 NOTE — PROGRESS NOTES
Mr. Warren presents to radiation oncology clinic today to undergo a CT simulation session for adjuvant radiotherapy planning purposes. Briefly, he underwent a total laryngectomy and bilateral lymph node dissection of levels 2-4 on 7/8/2021 by Dr. Sethi. Surgical pathology (specimen: [id number]) revealed a 6 cm moderately differentiated squamous cell carcinoma invading through the thyroid cartilage with positive lymphovascular and perineural invasion. The surgical margin was positive at the right piriform however separately submitted specimens from this region demonstrated no evidence of residual tumor with only focal areas of dysplasia. There was no evidence of lymph nodes within the bilateral dissected cervical lymphatics. The final stage was pT4a N0 M0.    I reviewed with Mr. Warren my plan to administer adjuvant radiotherapy given his high risk features including advanced tumor stage as well as lymphovascular and perineural invasion. I specifically proposed a treatment plan consisting of 60 Gy/30 fractions to the tumor bed and dissected lymphatics with a 6 Gy/3 fraction sequential boost to the right piriform region. I reviewed the potential acute and late-term toxicities associated with head and neck radiation therapy with Mr. Warren at length and answered his questions to his stated satisfaction. He was amenable to proceeding with treatment and signed informed consent to that effect. He then underwent a CT simulation session and I will tentatively have him return to clinic on Wednesday, 8/4/2021, for initiation of therapy. In the interim, I will arrange for him to be seen by my colleagues in dentistry for pre-radiotherapy dental clearance and will also have him continue to follow-up with our SLP and oncology nutrition team for cares during his radiotherapy.    Serg Kennedy MD/PhD    Dept of Radiation Oncology  [facility]

## 2021-07-22 NOTE — PROGRESS NOTES
Radiation Therapy Patient Education    Person involved with teaching: Patient    Patient educational needs for self management of treatment-related side effects assessment completed.  [facility] Patient Ed tab contains Patient Learning Assessment    Education Materials Given  Radiation Therapy for Head and Neck    Educational Topics Discussed  Side effects expected, Pain management, Skin care, Nutrition and weight loss and When to call MD/RN    Response To Teaching  Verbalizes understanding    GYN Only  Vaginal Dilator-given and educated: N/A    Referrals sent: Dental, Speech and Swallowing and Nutrition    Chemotherapy?  No

## 2021-07-22 NOTE — PROGRESS NOTES
Speech-Language Pathology Department   EVALUATION  [facility]  Alaryngeal Communication Evaluation    07/21/21 1600   General Patient Information   Start of Care Date 07/21/21   Referring Physician Dr. Marilou Sethi   Orders Eval and Treat   Orders Comment Alaryngeal Communication Evaluation   Medical Diagnosis Malignant neoplasm of larynx; aphonia   Onset of illness/injury or Date of Surgery 07/08/21  (date of surgery)   Precautions/Limitations swallowing precautions   Surgical/Medical history reviewed Yes   Pertinent history of current problem Raj Warren is a 71 year old male with a PMH significant for T4aN0 SCC larynx s/p total laryngectomy, bilateral neck dissections (levels II-IV) and cricopharyngeal myotomy on 7/8/21. His case will be presented at tumor board to determine possible role of adjuvant chemo and/or radiation. He presents today for his first post-op visit unaccompanied. Reports stomal cares are going well so far at home.    Hearing WNL for conversational level of speech   Vision Glasses/contacts   Dentition Adequate   Present Hydration/Nutrition Method Gastrostomy  (cleared to begin clear liquids only today)   Diet Starting PO today with clear liquids only; PEG   Living Status Independent; reports he lives alone   Primary Communication Method Written expression   Postoperative Assessment   Postoperative Assessment Electrolarynx;Insertion/Replacement   Electrolarynx   Model Bent    Placement Cheek;Intraoral Adapter  (Left)   Speech Intelligibility ~50% to a trained listener   Factors Impacting Speech Intelligibility Rapid Rate Of Speech;Improper Phrasing;Absence Of Stress/emphasis;Lack Of Plosive/fricative Features;Lack Of Exaggerated Movement/orality;Distracters (stoma Noise, Facial Grimace, Neck Movements, Exertion)   Insertion/replacement   Insertion/replacement Laryngectomy Tube Brand;Laryngectomy Tube Brand Size;Laryngectomy Tube Brand  Length;Hme Brand   Laryngectomy Tube Brand Atos   Laryngectomy Tube Brand Size 9   Laryngectomy Tube Brand Length 55   Hme Brand Atos Xtra Flow   Impressions And Recommendations   Impressions And Recommendations Communication Diagnosis;Summary;Recommendations;Frequency / Duration Of Treatment   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 9/55 larytube is in place with larystraps and Xtraflow HME in place. 9/55 larytube fits appropriately in the stoma. Pt able to independently don/doff larytube and HMEs. Trained pt on how to clean larytube and rationale/usage of HMEs. Per MD, pt cleared to use electrolarynx with cheek placement or intraoral adaptor. Left cheek placement is adequate, as well as intraoral adaptor use. Pt demonstrates ~50% intelligibility to a trained listener. Factors that limit intelligibility include stoma blast, lack of over articulation, phrasing/timing, and fast rate. Trained pt on strategies to increase intelligibility and communication strategies. Trained pt on use of written expression as back up method of communication. Pt will continue to benefit from daily use of pulmonary rehab supplies including but not limited to larytube, baseplates, HMEs, etc.    Alaryngeal Goals   Alaryngeal Goals 1;2;3   Alaryngeal Goal 1   Goal Identifier Pulmonary Rehab   Goal Description 1.Pt will demonstrated understanding and use for all pulmonary rehab options (i.e. baseplates, HMEs, larytube, etc) for maintaining good pulmonary function post laryngectomy 100% of the time independently.  "  Target Date 10/19/21   Alaryngeal Goal 2   Goal Identifier EL Placement   Goal Description 2. Pt will demonstrated adequate cheek and/or neck electrolarynx placement in the \"sweet spot\" independently in 4/5 opportunities   Target Date 10/19/21   Alaryngeal Goal 3   Goal Identifier EL Intelligibility   Goal Description 3. Pt will use electrolarynx in conversation with ~90% or greater intelligibility as judged by a trained listener 80% of the time   Target Date 10/19/21   General Therapy Interventions   Planned Therapy Interventions Communication   Communication Electrolarynx instruction;Improve speech intelligibility  (pulmonary rehab)   Equipment   Equipment 9/55 larytube; larystraps; XtraFlow HMEs   Communication with other professionals   Communication with other professionals Discussed with Dr. Sethi   Plan   Home program Daily use of pulmonary rehab supplies (larytube, HMEs, etc); EL practice   Plan for next session f/u next week to try different types of electrolarynges and continue working on EL placement/intelligibility   Education   Learner Patient   Readiness Acceptance   Method Booklet/handout;Explanation;Demonstration   Response Verbalizes understanding;Demonstrates understanding   Prescriptions Heat/moisture Exchanger (hme);Other  (9/55 larytube, larystrap)   Total Evaluation Time   Sound production (artic, phonology, apraxia, dysarthria) Minutes (31064) 20   Total Evaluation Time 20     Thank you for the referral of Raj Warren. If you have any questions about this report, please contact me using the information below.     LALA Shoemaker ([email or web address]), MA, CCC-SLP   Speech Language Pathologist  [place] Trained Vocologist   [facility]  Dept. of Otolaryngology  Department of Rehabilitation Services  [address] [place] [id number]  Email: [email or web address]   Pronouns: she/her/hers    "

## 2021-07-22 NOTE — DISCHARGE INSTRUCTIONS
Begin Clear Liquids Today:  Clear liquids:  Any liquid you can see through. This includes clear broth, tea or coffee with no cream or milk added, cranberry juice, Jell-O, popsicles (without bits of fruit, fruit pulp or yogurt in them), apple juice, ginger ale, lemon-lime soda, grape juice, sports drinks that don't have color    Starting on Wednesday 7/28/21: Full Liquids  Full liquid:  strained creamy soups, tea, juice, jell-o, milkshakes, pudding, popsicles, yogurt, custard, frozen yogurt, plain ice cream, boost, ensure, resource, other liquid supplements, tea or coffee with milk, cream,  sugar, or honey. Cream of wheat, grits, oatmeal, cream of rice  Continue tube feeds as primary source of nutrition/hydration and for medications      Signs to Watch For:  -Redness or swelling in face/head/neck/around stoma, fever, chills, nirali leakage through neck or around stoma  ***If you notice these stop drinking by mouth and come to the [facility]      LALA Shoemaker (macie), MA, CCC-SLP   Speech Language Pathologist  MARTIR Trained Vocologist   [facility] [place]  Dept. of Otolaryngology  Department of Rehabilitation Services  [address] [place] [id number]  Email: [email or web address]   Phone: [phone]  Pronouns: she/her/hers

## 2021-07-22 NOTE — PROGRESS NOTES
This is a recent snapshot of the patient's [facility] medical record.  For current drug dose and complete information and questions, call [phone] or In Basket pool, [facility] home infusion ([id number])  CSN Number:  [id number]

## 2021-07-23 ENCOUNTER — TUMOR CONFERENCE (OUTPATIENT)
Dept: ONCOLOGY | Facility: CLINIC | Age: 71
End: 2021-07-23
Payer: COMMERCIAL

## 2021-07-23 NOTE — PROGRESS NOTES
Called and spoke with patient while home care nurse was present today. Reviewed with them that pathology was reviewed at tumor board and the recommendation is to proceed with radiation alone. Patient saw Dr. Kennedy yesterday and will continue with plan to start radiation first week of August.   Dental clinic appointment in process.     Patient indicated he understood and was encouraged to send message with any further questions or concerns.     Sheree Mccall, RN, BSN

## 2021-07-23 NOTE — PROGRESS NOTES
Head & Neck Tumor Conference Note         Status: Established (last presented 6/25/2021)  Staff: Dr. Marilou Sethi     Tumor Site: larynx  Tumor Pathology: moderately differentiated SCC  Tumor Stage: [id number]  Tumor Treatment: total laryngectomy, neck dissection 7/8/2021     Reason for Review: Review path, and POC     Brief History: Raj Warren is a T4aN0 SCC of the larynx. The patient presented to the ED with a week history of shortness of breath, along with symptoms of sore throat, bilateral ear pain. He had stridor vs wheezing, mild increased work of breathing, and hoarse voice at that time. He was treated with steroids and nebulizers with improvement in his symptoms and was discharged.  He had recurrent symptoms and was seen in the ED again on 4/12/2021. He had a CT scan performed on 4/12/2021 which demonstrated a 3.9 x 1.6 x 3.9 cm mass involving the right true cord, right AE fold, right false cord, posterior hypopharynx, proximal trachea, with involvement of the thyroid cartilage, involvement of the prelaryngeal tissue, no abnormal nodes. Dr Willoughby (local ENT), scope exam was performed, and patient was admitted for a trach and DL/bx. Dr Johnson and Dr Sauceda performed this procedure on 4/13/2021. Pathology showed moderately differentiated SCC. He was seen by Dr Bean of medical oncology on 4/13/2021. He was followed by local ENT team during his hospitalization. He was recommended for chemoradiation. He was seen by Dr Mclaughlin of [facility], planning 7 weeks treatment, and CT simulation was done. He did have a VSS done while inpatient without evidence of aspiration. The patient was referred here for discussion of possible salvage laryngectomy after chemoradiation. He had a PET scan on 4/26/2021 which showed an FDG avid mass in the larynx involving the supraglottis/glottis/subglottis, hypermetabolic left level 4 node, FDG avid nodule in the right lung thought to be infectious/inflammatory. He was then admitted  to the hospital locally from 2021 to 2021 after presenting with worsening shortness of breath, found to have bilateral PE with significant clot burden including the right pulmonary artery and the right ventricle. He was taken to the OR for pulmonary embolectomy and RV thrombectomy. He had IVC filter placed 2021. His course was complicated by hemodynamic instability requiring pressors, poor healing of his sternal incision requiring wound vac placement. He was discharged on eliquis. He was sent to rehab after his admission, discharged 5/15/2021. He saw Dr Bean in follow-up on 2021 and was recommended for repeat imaging. This showed slight decrease in size of the tumor, no distant disease.      Patient was seen by Dr. Sethi in consultation 2021. After extensive preoperative counseling especially in light of his recent PE and anticoagulation, the recommendations was for surgery given the cartilaginous erosion seen on imaging. Patient  underwent total laryngectomy and bilateral neck dissections on 2021. Post-op course was uncomplicated, he was restarted on his anticoagulation. He had a swallow study 2021 which showed no evidence of a leak.      Pertinent PMH: TIIDM (diet-controlled), HTN, and recent bilateral PE (on apixaban) with right ventricular clot burden s/p sternotomy, embolectomy/RV thrombectomy, and IVC filter placement in 2021     Smoking Hx:   Social History            Tobacco Use     Smoking status: Former Smoker       Types: Cigarettes       Quit date: 1989       Years since quittin.5     Smokeless tobacco: Never Used     Tobacco comment: quit in   had smoked about 1/2 pack a day then cut back   Substance Use Topics     Alcohol use: No       Comment: No alcohol since      Drug use: No      Imagin21 PET CT     IMPRESSION: In this patient with a new diagnosis of squamous cell  carcinoma of the larynx:     1. Hypermetabolic mass involving the  supraglottic, glottic and  subglottic larynx. This corresponds with the squamous cell carcinoma.      2. Hypermetabolic left level 4 lymph node likely representing  metastasis.     3. New hypermetabolic nodule in the right middle lobe, new since  4/12/2021. Given short time interval, this is less likely to be  metastasis, infection or inflammation is favored, possibly from  aspiration.      4. Borderline avid small subcarinal lymph node favor physiologic or  reactive to the lung process.     5. Cholelithiasis without evidence of acute cholecystitis.     CT CHEST PE protocol 7/10/2021  FINDINGS: Surgical changes of laryngectomy with soft tissue  thickening, subcutaneous emphysema along the surgical resection bed  and upper esophagus. Mildly patulous esophagus containing debris.  Surgical drains remain in place bilaterally. Left basilar atelectasis  and consolidation with elevation of the left hemidiaphragm.. Small  left pleural effusion.     Linear atelectasis right lower lobe. No evidence of pulmonary  embolism. The heart, Main pulmonary artery and aorta are not enlarged.  No mediastinal, hilar or axillary adenopathy.     Evaluation of the upper abdomen is limited. Trace foci of  pneumoperitoneum. Percutaneous gastrostomy tube. Cholelithiasis.     Bones: Median sternotomy changes. No suspicious bony lesions.                                                                      IMPRESSION: No evidence of pulmonary embolism. Left basilar  atelectasis and consolidation, cannot exclude infection or aspiration.  Postoperative changes from laryngectomy.    Pathology:   Patient Name: LUPE HALL   MR#: [id number]   Specimen #: [id number]   Collected: 7/8/2021   Received: 7/8/2021   Reported: 7/17/2021 17:02   Ordering Phy(s): ELKE GUSMAN     For improved result formatting, select 'View Enhanced Report Format' under    Linked Documents section.     ORIGINAL REPORT:     SPECIMEN(S):   A: Lymph node, left level 2A    B: Lymph node, left level 3   C: Lymph node, left level 4   D: Lymph node, left level 2B   E: Lymph node, right level 2A   F: Lymph node, right level 3   G: Lymph node, right level 4   H: Lymph node, right level 2B   I: Left vallecula mucosal margin   J: Right vallecula mucosa margin   K: Left post cricoid mucosa margin   L: Right post cricoid mucosa margin   M: Left pharyngeal wall mucosa margin   N: Right pharyngeal wall mucosa margin   O: Post tracheal wall mucosa margin   P: Anterior tracheal wall mucosal margin   Q: Laryngectomy     FINAL DIAGNOSIS:   A. LYMPH NODES, LEFT LEVEL 2A, NECK DISSECTION:   - Nineteen lymph nodes, negative for malignancy (0/19).     B. LYMPH NODES, LEFT LEVEL 3, NECK DISSECTION:   - Six lymph nodes, negative for malignancy (0/6).     C. LYMPH NODES, LEFT LEVEL 4, NECK DISSECTION:   - Five lymph nodes, negative for malignancy (0/5).     D. LYMPH NODES, LEFT LEVEL 2B, NECK DISSECTION:   - Five lymph nodes, negative for malignancy (0/5).     E. LYMPH NODES, RIGHT LEVEL 2A, NECK DISSECTION:   - Sixteen lymph nodes, negative for malignancy (0/16).     F. LYMPH NODES, RIGHT LEVEL 3, NECK DISSECTION:   - Seven lymph nodes, negative for malignancy (0/7).     G. LYMPH NODES, RIGHT LEVEL 4, NECK DISSECTION:   - Twelve lymph nodes, negative for malignancy (0/12).     H. LYMPH NODES, RIGHT LEVEL 2B, NECK DISSECTION:   - Five lymph nodes, negative for malignancy (0/5).     I. LEFT VALLECULA MUCOSAL MARGIN, EXCISION:   - Squamous mucosa, negative for dysplasia or malignancy.     J. RIGHT VALLECULA MUCOSA MARGIN, EXCISION:   - Squamous mucosa, negative for dysplasia or malignancy.     K. LEFT POST CRICOID MUCOSA MARGIN, EXCISION:   - Squamous mucosa, negative for dysplasia or malignancy.     L. RIGHT POST CRICOID MUCOSA MARGIN, EXCISION:   - Squamous mucosa, negative for dysplasia or malignancy.     M. LEFT PHARYNGEAL WALL MUCOSA MARGIN, EXCISION:   - Squamous mucosa with focal mild dysplasia,  negative for high-grade   dysplasia or malignancy.     N. RIGHT PHARYNGEAL WALL MUCOSA MARGIN, EXCISION:   - Squamous mucosa, negative for dysplasia or malignancy.     O. POST TRACHEAL WALL MUCOSA MARGIN, EXCISION:   - Fibroconnective tissue, smooth muscle and minor salivary gland tissue,   negative for malignancy.     P. ANTERIOR TRACHEAL WALL MUCOSAL MARGIN, EXCISION:   - Respiratory mucosa, negative for malignancy.     Q. LARYNX, TOTAL LARYNGECTOMY:   - RESIDUAL INVASIVE KERATINIZING SQUAMOUS CELL CARCINOMA, moderately   differentiated, approximately 6 cm in   greatest dimension.   - Tumor involves the right false and true vocal cords with fixation, the   left false and true vocal cords, the   right arytenoid and cricoid cartilage, invades through thyroid cartilage   and extends into the right pyriform   sinus and to the left of the tracheostomy site.   - The tumor shows limited or no therapy effect.   - Tumor extends to inked right pyriform sinus margin; closest uninvolved   margin is deep at 3 mm.   - Focal lymphatic invasion is observed.   - Perineural invasion is present (multiple small caliber nerves involded).   - AJCC pathologic staging is ypT4a N0.   - See synoptic report.      Tumor Board Recommendation:   Discussion: Mr. Warren is a 71M with a history of pulmonary embolism on anticoagulation and [id number] SCC of the larynx now s/p total laryngectomy with Dr. Sethi 7/8/2021. Pathology was discussed today at tumor board. The positive margin noted on the main specimen is not a true margin as margins were taken off the specimen. The area that corresponds to is the lateral pharygneal wall which is negative. After discussion, patient is indicated for adjuvant radiation therapy alone.    He has visited with Dr. Kennedy and will begin treatment in August.     Plan: radiation alone    Royce Hernandez MD ENT    Documentation / Disclaimer Cancer Tumor Board Note  Cancer tumor board recommendations do not  override what is determined to be reasonable care and treatment, which is dependent on the circumstances of a patient's case; the patient's medical, social, and personal concerns; and the clinical judgment of the oncologist [physician].

## 2021-07-23 NOTE — PROGRESS NOTES
Speech-Language Pathology Department   EVALUATION  [facility]  Video Swallow Study Evaluation    07/21/21 1400   General Information   Type Of Visit Initial   Start Of Care Date 07/21/21   Referring Physician Dr. Marilou Sethi   Orders Evaluate And Treat   Orders Comment Video Swallow Study Evaluation   Medical Diagnosis Malignant neoplasm of larynx   Onset Of Illness/injury Or Date Of Surgery 07/08/21  (date of surgery)   Precautions/limitations Swallowing Precautions   Hearing Functional in 1:1 setting   Pertinent History of Current Problem/OT: Additional Occupational Profile Info Raj Warren is a 71 year old male with a PMH significant for T4aN0 SCC larynx s/p total laryngectomy, bilateral neck dissections (levels II-IV) and cricopharyngeal myotomy on 7/8/21. His case will be presented at tumor board to determine possible role of adjuvant chemo and/or radiation. He presents today unaccompanied for video swallow study to assess for extravasion into the tissue/fistula in hopes to initiate PO.    Respiratory Status Room air  (via laryngectomy stoma with 9/55 larytube and HME in place)   Prior Level Of Function Swallowing   Prior Level Of Function Comment NPO with PEG since surgery   Living Environment [place]  (Pt reports he lives alone)   General Observations Pt is pleasant and cooperative throughout evaluation.    Patient/family Goals To initiate PO   Clinical Swallow Evaluation   Oral Musculature generally intact   Structural Abnormalities none present   Dentition present and adequate   Mucosal Quality adequate   Mandibular Strength and Mobility intact   Oral Labial Strength and Mobility WFL   Lingual Strength and Mobility WFL   Velar Elevation intact   Buccal Strength and Mobility intact   Laryngeal Function Cough;Swallow   Oral Musculature Comments All WFL except laryngeal function 2/2 total laryngectomy   Additional evaluation(s) completed today Yes    Rationale for completing additional evaluation To further assess neopharyngeal phase to assess for leak/fistula   VFSS Evaluation   VFSS Additional Documentation Yes   VFSS Eval: Radiology   Radiologist Resident   Views Taken left lateral;A/P  (Oblique )   Physical Location of Procedure [facility]   VFSS Eval: Thin Liquid Texture Trial   Mode of Presentation, Thin Liquid cup;self-fed   Order of Presentation Omnipaque: 2,3,4,7,8  Thin: 10,11,13   Still: 1,5,6,9,12   Preparatory Phase WFL   Oral Phase, Thin Liquid WFL   Pharyngeal Phase, Thin Liquid   (residual in neopharynx)   Rosenbek's Penetration Aspiration Scale: Thin Liquid Trial Results 1 - no aspiration, contrast does not enter airway   Diagnostic Statement No evidence of extravasion into the tissue or leak/fistula. Adequate bolus flow through neopharynx. Mild stasis in pseudovalleculae that is cleared with repeated, dry swallows.    Swallow Compensations   Swallow Compensations Multiple swallow   Results No difficulties noted   Educational Assessment   Barriers to Learning No barriers   Esophageal Phase of Swallow   Patient reports or presents with symptoms of esophageal dysphagia No   General Therapy Interventions   Planned Therapy Interventions Dysphagia Treatment   Dysphagia treatment Oropharyngeal exercise training;Modified diet education;Instruction of safe swallow strategies;Compensatory strategies for swallowing   Swallow Eval: Clinical Impressions   Skilled Criteria for Therapy Intervention Skilled criteria met.  Treatment indicated.   Dysphagia Outcome Severity Scale (KING) Level 5 - KING   Treatment Diagnosis Mild oropharyngeal dysphagia expected to worsen during upcoming XRT   Diet texture recommendations Clear liquid Diet;Full liquid Diet  (Advance to full liquids as outlined)   Recommended Feeding/Eating Techniques alternate between small bites and sips of food/liquid;maintain upright posture during/after eating for 30 mins;small sips/bites    Rehab Potential good, to achieve stated therapy goals   Predicted Duration of Therapy Intervention (days/wks) 2x/month for 6 months   Anticipated Discharge Disposition home w/ outpatient services   Risks and Benefits of Treatment have been explained. Yes   Patient, family and/or staff in agreement with Plan of Care Yes   Clinical Impression Comments Pt presents today with mild oropharyngeal dysphagia expected to worsen during upcoming XRT. Oral motor examination all WFL except for laryngeal function 2/2 total laryngectomy. Pt assessed with Omnipaque contrast and thin liquid barium. No evidence of extravasion into the tissue or leak/fistula. Adequate bolus flow through neopharynx. Mild stasis in pseudovalleculae that is cleared with repeated, dry swallows. After review and discussion with MD recommend the following. Pt can initiate PO intake with 1 week of clear liquids followed by 1 week of full liquids, then 1 week of purees and thins finally advancing to soft solids and thin liquids. Pt to continue using PEG as primary source of nutrition/hydration and for all medications. Trained pt re: anatomy/physiology of swallowing, results of today's study, and diet recommendations. Trained pt on s/sx of possible extravasion/fistula to watch for and if these are present to stop PO and [facility]. Pt will be undergoing XRT and thus, swallow function is expected to decline. Pt will benefit from ongoing SLP services to train side effects of XRT on swallowing, ensure diet tolerance and guide diet advancement and train pharyngeal strengthening exercises to maximize swallow function during XRT.    Swallow Goals   SLP Swallow Goals 1;2   Swallow Goal 1   Goal Identifier PO   Goal Description 1. Pt will tolerate regular, moist textures and thin liquids with reported feeling of stasis in 9/10 PO trials.    Target Date 10/19/21   Swallow Goal 2   Goal Identifier Exercises   Goal Description 2. Pt will  maximize swallow function by completing 4/4 oropharyngeal and jaw strengthening/ROM exercises 3x/day with minimal written and/or verbal cues and 100% accuracy.    Target Date 10/19/21   Total Session Time   SLP Suresh: VideoFluoroscopic Swallow function Minutes (68365) 30   Total Evaluation Time 30     Thank you for the referral of Raj Warren. If you have any questions about this report, please contact me using the information below.     LALA Shoemaker MA (music), CCC-SLP   Speech Language Pathologist  [place] Trained Vocologist   [facility] [place]  Dept. of Otolaryngology  Department of Rehabilitation Services  [address] [place] [id number]  Email: [email or web address]   Pronouns: she/her/hers

## 2021-07-28 ENCOUNTER — TELEPHONE (OUTPATIENT)
Dept: RADIATION ONCOLOGY | Facility: CLINIC | Age: 71
End: 2021-07-28

## 2021-07-28 NOTE — TELEPHONE ENCOUNTER
Nutrition Education Scheduling Outreach #1:    Call to patient to schedule. No answer/busy.    Plan for 2nd outreach attempt within 1 week.    Teto Harris  [place] [facility]  Diabetes and Nutrition Scheduling

## 2021-07-29 ENCOUNTER — THERAPY VISIT (OUTPATIENT)
Dept: SPEECH THERAPY | Facility: CLINIC | Age: 71
End: 2021-07-29
Payer: COMMERCIAL

## 2021-07-29 DIAGNOSIS — R13.12 OROPHARYNGEAL DYSPHAGIA: ICD-10-CM

## 2021-07-29 DIAGNOSIS — C32.9 MALIGNANT NEOPLASM OF LARYNX (H): Primary | ICD-10-CM

## 2021-07-29 DIAGNOSIS — R49.1 APHONIA: ICD-10-CM

## 2021-07-29 PROCEDURE — 92507 TX SP LANG VOICE COMM INDIV: CPT | Mod: GN | Performed by: SPEECH-LANGUAGE PATHOLOGIST

## 2021-08-03 ENCOUNTER — OFFICE VISIT (OUTPATIENT)
Dept: CARDIOLOGY | Facility: CLINIC | Age: 71
End: 2021-08-03
Payer: COMMERCIAL

## 2021-08-03 VITALS
DIASTOLIC BLOOD PRESSURE: 82 MMHG | WEIGHT: 193.5 LBS | OXYGEN SATURATION: 100 % | BODY MASS INDEX: 27.09 KG/M2 | HEIGHT: 71 IN | SYSTOLIC BLOOD PRESSURE: 117 MMHG | HEART RATE: 97 BPM

## 2021-08-03 DIAGNOSIS — I26.09 OTHER ACUTE PULMONARY EMBOLISM WITH ACUTE COR PULMONALE (H): Primary | ICD-10-CM

## 2021-08-03 PROCEDURE — 99213 OFFICE O/P EST LOW 20 MIN: CPT | Performed by: INTERNAL MEDICINE

## 2021-08-03 ASSESSMENT — MIFFLIN-ST. JEOR: SCORE: 1664.84

## 2021-08-03 NOTE — LETTER
8/3/2021    Nuvia Brown MD  [address]  [place] [address]    RE: Raj Briana       Dear Colleague,    I had the pleasure of seeing Raj Briana in the [facility].    HPI and Plan:   See dictation    Orders Placed This Encounter   Procedures     Glucose by meter       No orders of the defined types were placed in this encounter.      There are no discontinued medications.      Encounter Diagnosis   Name Primary?     Other acute pulmonary embolism with acute cor pulmonale (H) Yes       CURRENT MEDICATIONS:  Current Outpatient Medications   Medication Sig Dispense Refill     acetaminophen (TYLENOL) 325 MG tablet 2 tablets (650 mg) by Per G Tube route every 8 hours as needed for mild pain or fever 90 tablet 0     apixaban ANTICOAGULANT (ELIQUIS) 5 MG tablet 1 tablet (5 mg) by Per G Tube route 2 times daily 60 tablet 0     metoprolol tartrate (LOPRESSOR) 25 MG tablet Take 1 tablet (25 mg) by mouth 2 times daily 60 tablet 3     mineral oil-hydrophilic petrolatum (AQUAPHOR) external ointment Apply topically every 8 hours 198 g 0     pantoprazole (PROTONIX) 2 mg/mL SUSP suspension 20 mLs (40 mg) by Per G Tube route every morning (before breakfast) 800 mL 0     pantoprazole (PROTONIX) 40 MG EC tablet Take 1 tablet (40 mg) by mouth every morning (before breakfast) (Patient not taking: Reported on 8/3/2021) 90 tablet 2     polyethylene glycol (MIRALAX) 17 GM/Dose powder Take 17 g (1 capful) by mouth daily (Patient not taking: Reported on 8/3/2021) 507 g 0       ALLERGIES     Allergies   Allergen Reactions     Pollen Extract        PAST MEDICAL HISTORY:  Past Medical History:   Diagnosis Date     Allergic state      Anemia      Former smoker      Malignant neoplasm of larynx (H) 04/20/2021     Pulmonary emboli (H) 04/28/2021    Bilateral-s/p embolectomy and IVC filter placement     S/P percutaneous endoscopic gastrostomy (PEG) tube placement (H)       Tracheostomy in place (H)        PAST SURGICAL HISTORY:  Past Surgical History:   Procedure Laterality Date     DISSECTION RADICAL NECK BILATERAL Bilateral 2021    Procedure: bilateral neck dissections;  Surgeon: Marilou Sethi MD;  Location: [facility]     ENDOSCOPIC INSERTION TUBE GASTROSTOMY Left 2021    Procedure: INSERTION, PEG TUBE with Esophagogastroduodenoscopy;  Surgeon: Kg Montaño MD;  Location: [facility]     IR IVC FILTER PLACEMENT  2021     LARYNGECTOMY N/A 2021    Procedure: LARYNGECTOMY;  Surgeon: Marilou Sethi MD;  Location: [facility]     LARYNGOSCOPY N/A 2021    Procedure: LARYNGOSCOPY;  Surgeon: Choco Johnson MD;  Location: [facility]     LARYNGOSCOPY N/A 2021    Procedure: LARYNGOSCOPY;  Surgeon: Marilou Sethi MD;  Location: [facility]     REPAIR ANEURYSM ASCENDING AORTA N/A 2021    Procedure: PULMONARY THROMBECTOMY;  Surgeon: Yumi Singh MD;  Location: [facility]     TRACHEOSTOMY  2021    Procedure: CREATION, TRACHEOSTOMY;  Surgeon: Choco Johnson MD;  Location: [facility]       FAMILY HISTORY:  Family History   Problem Relation Age of Onset     Circulatory Mother         blood clots     Alcohol/Drug Father         alcohol drank heavily     Alcohol/Drug Brother         alcohol       SOCIAL HISTORY:  Social History     Socioeconomic History     Marital status: Single     Spouse name: None     Number of children: None     Years of education: None     Highest education level: None   Occupational History     None   Tobacco Use     Smoking status: Former Smoker     Types: Cigarettes     Quit date: 1989     Years since quittin.6     Smokeless tobacco: Never Used     Tobacco comment: quit in   had smoked about 1/2 pack a day then cut back   Substance and Sexual Activity     Alcohol use: No     Comment: No alcohol since      Drug use: No     Sexual activity: Yes     Partners: Female   Other Topics Concern     Parent/sibling w/ CABG, MI or  "angioplasty before 65F 55M? Not Asked   Social History Narrative     None     Social Determinants of Health     Financial Resource Strain: Low Risk      Difficulty of Paying Living Expenses: Not hard at all   Food Insecurity: No Food Insecurity     Worried About Running Out of Food in the Last Year: Never true     Ran Out of Food in the Last Year: Never true   Transportation Needs: No Transportation Needs     Lack of Transportation (Medical): No     Lack of Transportation (Non-Medical): No   Physical Activity:      Days of Exercise per Week:      Minutes of Exercise per Session:    Stress:      Feeling of Stress :    Social Connections:      Frequency of Communication with Friends and Family:      Frequency of Social Gatherings with Friends and Family:      Attends Gn[employer]ist Services:      Active Member of Clubs or Organizations:      Attends Club or Organization Meetings:      Marital Status:    Intimate Partner Violence:      Fear of Current or Ex-Partner:      Emotionally Abused:      Physically Abused:      Sexually Abused:        Review of Systems:  Skin:  Negative for bruising;itching     Eyes:  Positive for glasses    ENT:  Negative      Respiratory:  Negative for shortness of breath     Cardiovascular:  palpitations;Negative for;chest pain;fatigue;lightheadedness;dizziness;edema;syncope or near-syncope      Gastroenterology: Positive for   Patient has a G-Tube  Genitourinary:  Negative for prostate problem    Musculoskeletal:  Negative for back pain;joint pain    Neurologic:  Negative for headaches;migraine headaches    Psychiatric:  Negative for sleep disturbances;excessive stress    Heme/Lymph/Imm:  not assessed      Endocrine:  Negative for thyroid disorder;diabetes      Physical Exam:  Vitals: /82   Pulse 97   Ht 1.803 m (5' 11\")   Wt 87.8 kg (193 lb 8 oz)   SpO2 100%   BMI 26.99 kg/m      Constitutional:  cooperative        Skin:  warm and dry to the touch incision healing well        Head:  " normocephalic        Eyes:  conjunctivae and lids unremarkable        Lymph:      ENT:  no pallor or cyanosis        Neck:  JVP normal        Respiratory:  clear to auscultation         Cardiac: regular rhythm;no murmurs, gallops or rubs detected                pulses full and equal                                        GI:  abdomen soft;non-tender        Extremities and Muscular Skeletal:  no edema              Neurological:  no gross motor deficits        Psych:  Alert and Oriented x 3        CC  Nuvia Brown MD  [address]  [place] [address]                  Thank you for allowing me to participate in the care of your patient.      Sincerely,     Erasmo Lopez MD, MD     [facility]  cc:   Nuvia Brown MD  [address]  [place] [address]

## 2021-08-03 NOTE — PROGRESS NOTES
Service Date: 2021    REASON FOR CONSULTATION:  Post-hospitalization followup.    HISTORY OF PRESENT ILLNESS:  The patient is a very pleasant 71-year-old gentleman with a history of laryngeal cancer who underwent a tracheostomy on 2021.  Soon after that, he presented to the hospital with a sudden onset dyspnea on exertion.  He had CT of the chest, which showed large bilateral pulmonary emboli, along with right ventricle clot in transit.  I subsequently evaluated him in the hospital.  We recommended a surgical pulmonary embolectomy.  He underwent successful bilateral surgical pulmonary embolectomy by Dr. Singh in April.  An IVC filter was subsequently placed.  His surgery was unremarkable.    Since his surgery, he has done very well from a cardiac point of view.  A repeat echocardiogram performed last month showed normal LV size and function with an EF of 55%-60% as well as normal RV size and mildly reduced function.  Of note, his echocardiogram at the time of his pulmonary embolism admission showed severe RV dilatation and dysfunction.    ASSESSMENT AND PLAN:  A very pleasant 71-year-old gentleman with laryngeal cancer, status post laryngectomy with trach and recent bilateral massive PE, status post surgical embolectomy who is here for post-hospitalization followup.    Remarkably, he is doing quite well from a cardiopulmonary point of view.  I reviewed his recent echocardiogram.  His RV function is back to normal.  From a cardiac point of view, no further testing is warranted at this point.    He is on Eliquis for anticoagulation, which he will continue indefinitely.    I appreciate the opportunity to participate in his care.    Erasmo Lopez MD        D: 2021   T: 2021   MT: WILLY    Name:     LUPE HALL  MRN:      [id number]        Account:      [id number]   :      1950           Service Date: 2021       Document: [id number]

## 2021-08-03 NOTE — PROGRESS NOTES
HPI and Plan:   See dictation    Orders Placed This Encounter   Procedures     Glucose by meter       No orders of the defined types were placed in this encounter.      There are no discontinued medications.      Encounter Diagnosis   Name Primary?     Other acute pulmonary embolism with acute cor pulmonale (H) Yes       CURRENT MEDICATIONS:  Current Outpatient Medications   Medication Sig Dispense Refill     acetaminophen (TYLENOL) 325 MG tablet 2 tablets (650 mg) by Per G Tube route every 8 hours as needed for mild pain or fever 90 tablet 0     apixaban ANTICOAGULANT (ELIQUIS) 5 MG tablet 1 tablet (5 mg) by Per G Tube route 2 times daily 60 tablet 0     metoprolol tartrate (LOPRESSOR) 25 MG tablet Take 1 tablet (25 mg) by mouth 2 times daily 60 tablet 3     mineral oil-hydrophilic petrolatum (AQUAPHOR) external ointment Apply topically every 8 hours 198 g 0     pantoprazole (PROTONIX) 2 mg/mL SUSP suspension 20 mLs (40 mg) by Per G Tube route every morning (before breakfast) 800 mL 0     pantoprazole (PROTONIX) 40 MG EC tablet Take 1 tablet (40 mg) by mouth every morning (before breakfast) (Patient not taking: Reported on 8/3/2021) 90 tablet 2     polyethylene glycol (MIRALAX) 17 GM/Dose powder Take 17 g (1 capful) by mouth daily (Patient not taking: Reported on 8/3/2021) 507 g 0       ALLERGIES     Allergies   Allergen Reactions     Pollen Extract        PAST MEDICAL HISTORY:  Past Medical History:   Diagnosis Date     Allergic state      Anemia      Former smoker      Malignant neoplasm of larynx (H) 04/20/2021     Pulmonary emboli (H) 04/28/2021    Bilateral-s/p embolectomy and IVC filter placement     S/P percutaneous endoscopic gastrostomy (PEG) tube placement (H)      Tracheostomy in place (H)        PAST SURGICAL HISTORY:  Past Surgical History:   Procedure Laterality Date     DISSECTION RADICAL NECK BILATERAL Bilateral 7/8/2021    Procedure: bilateral neck dissections;  Surgeon: Marilou Sethi MD;   Location: [facility]     ENDOSCOPIC INSERTION TUBE GASTROSTOMY Left 2021    Procedure: INSERTION, PEG TUBE with Esophagogastroduodenoscopy;  Surgeon: Kg Montaño MD;  Location: [facility]     IR IVC FILTER PLACEMENT  2021     LARYNGECTOMY N/A 2021    Procedure: LARYNGECTOMY;  Surgeon: Marilou Sethi MD;  Location: [facility]     LARYNGOSCOPY N/A 2021    Procedure: LARYNGOSCOPY;  Surgeon: Choco Johnson MD;  [facility]     LARYNGOSCOPY N/A 2021    Procedure: LARYNGOSCOPY;  Surgeon: Marilou Sethi MD;  Location: [facility]     REPAIR ANEURYSM ASCENDING AORTA N/A 2021    Procedure: PULMONARY THROMBECTOMY;  Surgeon: Yumi Singh MD;  Location: [facility]     TRACHEOSTOMY  2021    Procedure: CREATION, TRACHEOSTOMY;  Surgeon: Choco Johnson MD;  Location: [facility]       FAMILY HISTORY:  Family History   Problem Relation Age of Onset     Circulatory Mother         blood clots     Alcohol/Drug Father         alcohol drank heavily     Alcohol/Drug Brother         alcohol       SOCIAL HISTORY:  Social History     Socioeconomic History     Marital status: Single     Spouse name: None     Number of children: None     Years of education: None     Highest education level: None   Occupational History     None   Tobacco Use     Smoking status: Former Smoker     Types: Cigarettes     Quit date: 1989     Years since quittin.6     Smokeless tobacco: Never Used     Tobacco comment: quit in   had smoked about 1/2 pack a day then cut back   Substance and Sexual Activity     Alcohol use: No     Comment: No alcohol since      Drug use: No     Sexual activity: Yes     Partners: Female   Other Topics Concern     Parent/sibling w/ CABG, MI or angioplasty before 65F 55M? Not Asked   Social History Narrative     None     Social Determinants of Health     Financial Resource Strain: Low Risk      Difficulty of Paying Living Expenses: Not hard at all   Food Insecurity: No Food Insecurity  "    Worried About Running Out of Food in the Last Year: Never true     Ran Out of Food in the Last Year: Never true   Transportation Needs: No Transportation Needs     Lack of Transportation (Medical): No     Lack of Transportation (Non-Medical): No   Physical Activity:      Days of Exercise per Week:      Minutes of Exercise per Session:    Stress:      Feeling of Stress :    Social Connections:      Frequency of Communication with Friends and Family:      Frequency of Social Gatherings with Friends and Family:      Attends [employer] Services:      Active Member of Clubs or Organizations:      Attends Club or Organization Meetings:      Marital Status:    Intimate Partner Violence:      Fear of Current or Ex-Partner:      Emotionally Abused:      Physically Abused:      Sexually Abused:        Review of Systems:  Skin:  Negative for bruising;itching     Eyes:  Positive for glasses    ENT:  Negative      Respiratory:  Negative for shortness of breath     Cardiovascular:  palpitations;Negative for;chest pain;fatigue;lightheadedness;dizziness;edema;syncope or near-syncope      Gastroenterology: Positive for   Patient has a G-Tube  Genitourinary:  Negative for prostate problem    Musculoskeletal:  Negative for back pain;joint pain    Neurologic:  Negative for headaches;migraine headaches    Psychiatric:  Negative for sleep disturbances;excessive stress    Heme/Lymph/Imm:  not assessed      Endocrine:  Negative for thyroid disorder;diabetes      Physical Exam:  Vitals: /82   Pulse 97   Ht 1.803 m (5' 11\")   Wt 87.8 kg (193 lb 8 oz)   SpO2 100%   BMI 26.99 kg/m      Constitutional:  cooperative        Skin:  warm and dry to the touch incision healing well        Head:  normocephalic        Eyes:  conjunctivae and lids unremarkable        Lymph:      ENT:  no pallor or cyanosis        Neck:  JVP normal        Respiratory:  clear to auscultation         Cardiac: regular rhythm;no murmurs, gallops or rubs " detected                pulses full and equal                                        GI:  abdomen soft;non-tender        Extremities and Muscular Skeletal:  no edema              Neurological:  no gross motor deficits        Psych:  Alert and Oriented x 3        CC  Nuvia Brown MD  [address] [facility] [address]  [place] [address]

## 2021-08-04 ENCOUNTER — THERAPY VISIT (OUTPATIENT)
Dept: SPEECH THERAPY | Facility: CLINIC | Age: 71
End: 2021-08-04
Payer: COMMERCIAL

## 2021-08-04 ENCOUNTER — APPOINTMENT (OUTPATIENT)
Dept: RADIATION ONCOLOGY | Facility: CLINIC | Age: 71
End: 2021-08-04
Attending: RADIOLOGY
Payer: COMMERCIAL

## 2021-08-04 ENCOUNTER — OFFICE VISIT (OUTPATIENT)
Dept: OTOLARYNGOLOGY | Facility: CLINIC | Age: 71
End: 2021-08-04
Payer: COMMERCIAL

## 2021-08-04 VITALS
BODY MASS INDEX: 27.01 KG/M2 | OXYGEN SATURATION: 100 % | WEIGHT: 192.9 LBS | SYSTOLIC BLOOD PRESSURE: 113 MMHG | HEART RATE: 79 BPM | TEMPERATURE: 96.6 F | DIASTOLIC BLOOD PRESSURE: 80 MMHG | HEIGHT: 71 IN

## 2021-08-04 DIAGNOSIS — C32.9 MALIGNANT NEOPLASM OF LARYNX (H): Primary | ICD-10-CM

## 2021-08-04 DIAGNOSIS — R49.1 APHONIA: ICD-10-CM

## 2021-08-04 PROCEDURE — 77386 HC IMRT TREATMENT DELIVERY, COMPLEX: CPT | Performed by: RADIOLOGY

## 2021-08-04 PROCEDURE — 92507 TX SP LANG VOICE COMM INDIV: CPT | Mod: GN | Performed by: SPEECH-LANGUAGE PATHOLOGIST

## 2021-08-04 PROCEDURE — 99024 POSTOP FOLLOW-UP VISIT: CPT | Performed by: OTOLARYNGOLOGY

## 2021-08-04 PROCEDURE — 77331 SPECIAL RADIATION DOSIMETRY: CPT | Performed by: RADIOLOGY

## 2021-08-04 ASSESSMENT — MIFFLIN-ST. JEOR: SCORE: 1662.13

## 2021-08-04 ASSESSMENT — PAIN SCALES - GENERAL: PAINLEVEL: MODERATE PAIN (5)

## 2021-08-04 NOTE — PROGRESS NOTES
Dear Dr. Bean:    I had the pleasure of seeing Raj Warren in follow-up today at the [facility].     History of Present Illness:  Raj Warren is a T4aN0 SCC of the larynx. The patient presented to the ED with a week history of shortness of breath, along with symptoms of sore throat, bilateral ear pain. He had stridor vs wheezing, mild increased work of breathing, and hoarse voice at that time. He was treated with steroids and nebulizers with improvement in his symptoms and was discharged.  He had recurrent symptoms and was seen in the ED again on 4/12/2021. He had a CT scan performed on 4/12/2021 which demonstrated a 3.9 x 1.6 x 3.9 cm mass involving the right true cord, right AE fold, right false cord, posterior hypopharynx, proximal trachea, with involvement of the thyroid cartilage, involvement of the prelaryngeal tissue, no abnormal nodes. Dr Willoughby (local ENT), scope exam was performed, and patient was admitted for a trach and DL/bx. Dr Johnson and Dr Sauceda performed this procedure on 4/13/2021. A tracheal window at 2nd tracheal ring was performed with placement of an 8 cuffed Shiley. Pathology showed moderately differentiated SCC. He was seen by Dr Bean of medical oncology on 4/13/2021. He was followed by local ENT team during his hospitalization. He was recommended for chemoradiation. He was seen by Dr Mclaughlin of [facility], planning 7 weeks treatment, and CT simulation was done. He did have a VSS done while inpatient without evidence of aspiration. The patient was referred here for discussion of possible salvage laryngectomy after chemoradiation. He had a PET scan on 4/26/2021 which showed an FDG avid mass in the larynx involving the supraglottis/glottis/subglottis, hypermetabolic left level 4 node, FDG avid nodule in the right lung thought to be infectious/inflammatory. He was then admitted to the hospital locally from 4/27/2021 to 5/8/2021 after presenting with  worsening shortness of breath, found to have bilateral PE with significant clot burden including the right pulmonary artery and the right ventricle. He was taken to the OR for pulmonary embolectomy and RV thrombectomy. He had IVC filter placed 4/30/2021. His course was complicated by hemodynamic instability requiring pressors, poor healing of his sternal incision requiring wound vac placement. He was discharged on eliquis. He was sent to rehab after his admission, discharged 5/15/2021. He saw Dr Bean in follow-up on 5/27/2021 and was recommended for repeat imaging. This showed slight decrease in size of the tumor, no distant disease.     He was seen as a new patient on 6/18/2021. After extensive preoperative counseling especially in light of his recent PE and anticoagulation, the recommendations was for surgery given the cartilaginous erosion seen on imaging. He was taken to the OR on 7/8/2021 for a DL, total laryngectomy, bilateral neck dissections, cricopharyngeal myotomy. His hospital course was uncomplicated, was restarted on his anticoagulation. His final pathology demonstrated a 6 cm SCC with invasion through the thyroid cartilage, involved the cricoid cartilage, involved the right pyriform sinus, extended to the left side of the tracheostomy site, PNI present, focal lymphatic invasion, 0/75 nodes. The main specimen had a positive margin at the pyriform sinus, margins were taken from the main specimen and were negative (named right pharyngeal wall mucosal margin).      Interval history:  He comes in today for follow-up. He was last seen in July 2021. He had his swallow study at that time. He has been eating, had a subway sandwich without bread yesterday without issue. He is having some issues with shoulder movement. He has some stable right neck pain. He is starting radiation today. He is breathing without issue. He is adjusting to the TL. He thinks that he does not sound right with the electrolarynx. He had  6 family members get diagnosed with COVID, a  4 days ago.      MEDICATIONS:     Current Outpatient Medications   Medication Sig Dispense Refill     acetaminophen (TYLENOL) 325 MG tablet 2 tablets (650 mg) by Per G Tube route every 8 hours as needed for mild pain or fever 90 tablet 0     apixaban ANTICOAGULANT (ELIQUIS) 5 MG tablet 1 tablet (5 mg) by Per G Tube route 2 times daily 60 tablet 0     metoprolol tartrate (LOPRESSOR) 25 MG tablet Take 1 tablet (25 mg) by mouth 2 times daily 60 tablet 3     mineral oil-hydrophilic petrolatum (AQUAPHOR) external ointment Apply topically every 8 hours 198 g 0     pantoprazole (PROTONIX) 2 mg/mL SUSP suspension 20 mLs (40 mg) by Per G Tube route every morning (before breakfast) 800 mL 0     pantoprazole (PROTONIX) 40 MG EC tablet Take 1 tablet (40 mg) by mouth every morning (before breakfast) (Patient not taking: Reported on 8/3/2021) 90 tablet 2     polyethylene glycol (MIRALAX) 17 GM/Dose powder Take 17 g (1 capful) by mouth daily (Patient not taking: Reported on 8/3/2021) 507 g 0       ALLERGIES:    Allergies   Allergen Reactions     Pollen Extract        HABITS/SOCIAL HISTORY:    Former smoker 1/2 ppd x 8 yrs, quit in . Sober from alcohol.   Lives alone. His family does not live in the immediate [place] area.   He is a retired .       Social History     Socioeconomic History     Marital status: Single     Spouse name: Not on file     Number of children: Not on file     Years of education: Not on file     Highest education level: Not on file   Occupational History     Not on file   Tobacco Use     Smoking status: Former Smoker     Types: Cigarettes     Quit date: 1989     Years since quittin.6     Smokeless tobacco: Never Used     Tobacco comment: quit in   had smoked about 1/2 pack a day then cut back   Substance and Sexual Activity     Alcohol use: No     Comment: No alcohol since      Drug use: No     Sexual activity: Yes      Partners: Female   Other Topics Concern     Parent/sibling w/ CABG, MI or angioplasty before 65F 55M? Not Asked   Social History Narrative     Not on file     Social Determinants of Health     Financial Resource Strain: Low Risk      Difficulty of Paying Living Expenses: Not hard at all   Food Insecurity: No Food Insecurity     Worried About Running Out of Food in the Last Year: Never true     Ran Out of Food in the Last Year: Never true   Transportation Needs: No Transportation Needs     Lack of Transportation (Medical): No     Lack of Transportation (Non-Medical): No   Physical Activity:      Days of Exercise per Week:      Minutes of Exercise per Session:    Stress:      Feeling of Stress :    Social Connections:      Frequency of Communication with Friends and Family:      Frequency of Social Gatherings with Friends and Family:      Attends Sikhism Services:      Active Member of Clubs or Organizations:      Attends Club or Organization Meetings:      Marital Status:    Intimate Partner Violence:      Fear of Current or Ex-Partner:      Emotionally Abused:      Physically Abused:      Sexually Abused:        PAST MEDICAL HISTORY:   Past Medical History:   Diagnosis Date     Allergic state      Anemia      Former smoker      Malignant neoplasm of larynx (H) 04/20/2021     Pulmonary emboli (H) 04/28/2021    Bilateral-s/p embolectomy and IVC filter placement     S/P percutaneous endoscopic gastrostomy (PEG) tube placement (H)      Tracheostomy in place (H)         PAST SURGICAL HISTORY:   Past Surgical History:   Procedure Laterality Date     DISSECTION RADICAL NECK BILATERAL Bilateral 7/8/2021    Procedure: bilateral neck dissections;  Surgeon: Marilou Sethi MD;  Location: [facility]     ENDOSCOPIC INSERTION TUBE GASTROSTOMY Left 7/8/2021    Procedure: INSERTION, PEG TUBE with Esophagogastroduodenoscopy;  Surgeon: Kg Montaño MD;  Location: [facility]     IR IVC FILTER PLACEMENT  4/30/2021     LARYNGECTOMY N/A  "7/8/2021    Procedure: LARYNGECTOMY;  Surgeon: Marilou Sethi MD;  Location: [facility]     LARYNGOSCOPY N/A 4/12/2021    Procedure: LARYNGOSCOPY;  Surgeon: Choco Johnson MD;  [facility]: [facility]     LARYNGOSCOPY N/A 7/8/2021    Procedure: LARYNGOSCOPY;  Surgeon: Marilou Sethi MD;  Location: [facility]     REPAIR ANEURYSM ASCENDING AORTA N/A 4/28/2021    Procedure: PULMONARY THROMBECTOMY;  Surgeon: Yumi Singh MD;  [facility]: [facility]     TRACHEOSTOMY  4/12/2021    Procedure: CREATION, TRACHEOSTOMY;  Surgeon: Choco Johnson MD;  [facility]: [facility]       FAMILY HISTORY:    Family History   Problem Relation Age of Onset     Circulatory Mother         blood clots     Alcohol/Drug Father         alcohol drank heavily     Alcohol/Drug Brother         alcohol       REVIEW OF SYSTEMS:  12 point ROS was negative other than the symptoms noted above in the HPI.  Patient Supplied Answers to Review of Systems  No flowsheet data found.      PHYSICAL EXAMINATION:   /80 (BP Location: Right arm, Patient Position: Sitting, Cuff Size: Adult Regular)   Pulse 79   Temp (!) 96.6  F (35.9  C) (Temporal)   Ht 1.803 m (5' 11\")   Wt 87.5 kg (192 lb 14.4 oz)   SpO2 100%   BMI 26.90 kg/m    Patient in NAD  Breathing comfortably on room air  Mouthes words to communicate, communicates well with electrolarynx  Healing apron neck incision, no swelling, no skin changes, no point tenderness  Margy tube with HME in place   Decreased ROM of shoulders    PROCEDURES:       RESULTS REVIEWED:       IMPRESSION AND PLAN:   Raj Warren is a 70 year old man with a T4N0 SCC of the larynx. He underwent a trach by [facility]. He was taken to the OR on 7/8/2021 for a total laryngectomy with bilateral neck dissections.     He is overall doing well. Reassurance was provided. He sounds excellent with the electrolarynx. He was seen by SLP to work on this today. He will continue to follow with them during radiation.     A " PT referral was placed for his shoulders.     I would like to see him back either 10/29 or 11/5 in [facility] clinic.      Thank you very much for the opportunity to participate in the care of your patient.      Marilou Sethi MD, M.D.  Otolaryngology- Head & Neck Surgery      This note was dictated with voice recognition software and then edited. Please excuse any unintentional errors.         CC:  Rosa Bean MD  [address] [place] [address] [address]      Serg Kennedy MD  Department of Radiation Oncology  [facility]

## 2021-08-04 NOTE — LETTER
8/4/2021       RE: Raj Warren  [address]  [facility] [facility] [address]     Dear Colleague,    Thank you for referring your patient, Raj Warren, to the [facility] [place] at [facility]. Please see a copy of my visit note below.    Dear Dr. Bean:    I had the pleasure of seeing Raj Warren in follow-up today at the [facility].     History of Present Illness:  Raj Warren is a T4aN0 SCC of the larynx. The patient presented to the ED with a week history of shortness of breath, along with symptoms of sore throat, bilateral ear pain. He had stridor vs wheezing, mild increased work of breathing, and hoarse voice at that time. He was treated with steroids and nebulizers with improvement in his symptoms and was discharged.  He had recurrent symptoms and was seen in the ED again on 4/12/2021. He had a CT scan performed on 4/12/2021 which demonstrated a 3.9 x 1.6 x 3.9 cm mass involving the right true cord, right AE fold, right false cord, posterior hypopharynx, proximal trachea, with involvement of the thyroid cartilage, involvement of the prelaryngeal tissue, no abnormal nodes. Dr Willoughby (local ENT), scope exam was performed, and patient was admitted for a trach and DL/bx. Dr Johnson and Dr Sauceda performed this procedure on 4/13/2021. A tracheal window at 2nd tracheal ring was performed with placement of an 8 cuffed Shiley. Pathology showed moderately differentiated SCC. He was seen by Dr Bean of medical oncology on 4/13/2021. He was followed by local ENT team during his hospitalization. He was recommended for chemoradiation. He was seen by Dr Mclaughlin of [facility], [place] 7 weeks treatment, and CT simulation was done. He did have a VSS done while inpatient without evidence of aspiration. The patient was referred here for discussion of possible salvage laryngectomy after  chemoradiation. He had a PET scan on 4/26/2021 which showed an FDG avid mass in the larynx involving the supraglottis/glottis/subglottis, hypermetabolic left level 4 node, FDG avid nodule in the right lung thought to be infectious/inflammatory. He was then admitted to the hospital locally from 4/27/2021 to 5/8/2021 after presenting with worsening shortness of breath, found to have bilateral PE with significant clot burden including the right pulmonary artery and the right ventricle. He was taken to the OR for pulmonary embolectomy and RV thrombectomy. He had IVC filter placed 4/30/2021. His course was complicated by hemodynamic instability requiring pressors, poor healing of his sternal incision requiring wound vac placement. He was discharged on eliquis. He was sent to rehab after his admission, discharged 5/15/2021. He saw Dr Bean in follow-up on 5/27/2021 and was recommended for repeat imaging. This showed slight decrease in size of the tumor, no distant disease.     He was seen as a new patient on 6/18/2021. After extensive preoperative counseling especially in light of his recent PE and anticoagulation, the recommendations was for surgery given the cartilaginous erosion seen on imaging. He was taken to the OR on 7/8/2021 for a DL, total laryngectomy, bilateral neck dissections, cricopharyngeal myotomy. His hospital course was uncomplicated, was restarted on his anticoagulation. His final pathology demonstrated a 6 cm SCC with invasion through the thyroid cartilage, involved the cricoid cartilage, involved the right pyriform sinus, extended to the left side of the tracheostomy site, PNI present, focal lymphatic invasion, 0/75 nodes. The main specimen had a positive margin at the pyriform sinus, margins were taken from the main specimen and were negative (named right pharyngeal wall mucosal margin).      Interval history:  He comes in today for follow-up. He was last seen in July 2021. He had his swallow study  at that time. He has been eating, had a subway sandwich without bread yesterday without issue. He is having some issues with shoulder movement. He has some stable right neck pain. He is starting radiation today. He is breathing without issue. He is adjusting to the TL. He thinks that he does not sound right with the electrolarynx. He had 6 family members get diagnosed with COVID, a  4 days ago.      MEDICATIONS:     Current Outpatient Medications   Medication Sig Dispense Refill     acetaminophen (TYLENOL) 325 MG tablet 2 tablets (650 mg) by Per G Tube route every 8 hours as needed for mild pain or fever 90 tablet 0     apixaban ANTICOAGULANT (ELIQUIS) 5 MG tablet 1 tablet (5 mg) by Per G Tube route 2 times daily 60 tablet 0     metoprolol tartrate (LOPRESSOR) 25 MG tablet Take 1 tablet (25 mg) by mouth 2 times daily 60 tablet 3     mineral oil-hydrophilic petrolatum (AQUAPHOR) external ointment Apply topically every 8 hours 198 g 0     pantoprazole (PROTONIX) 2 mg/mL SUSP suspension 20 mLs (40 mg) by Per G Tube route every morning (before breakfast) 800 mL 0     pantoprazole (PROTONIX) 40 MG EC tablet Take 1 tablet (40 mg) by mouth every morning (before breakfast) (Patient not taking: Reported on 8/3/2021) 90 tablet 2     polyethylene glycol (MIRALAX) 17 GM/Dose powder Take 17 g (1 capful) by mouth daily (Patient not taking: Reported on 8/3/2021) 507 g 0       ALLERGIES:    Allergies   Allergen Reactions     Pollen Extract        HABITS/SOCIAL HISTORY:    Former smoker 1/2 ppd x 8 yrs, quit in . Sober from alcohol.   Lives alone. His family does not live in the immediate [place] area.   He is a retired .       Social History     Socioeconomic History     Marital status: Single     Spouse name: Not on file     Number of children: Not on file     Years of education: Not on file     Highest education level: Not on file   Occupational History     Not on file   Tobacco Use     Smoking  status: Former Smoker     Types: Cigarettes     Quit date: 1989     Years since quittin.6     Smokeless tobacco: Never Used     Tobacco comment: quit in   had smoked about 1/2 pack a day then cut back   Substance and Sexual Activity     Alcohol use: No     Comment: No alcohol since      Drug use: No     Sexual activity: Yes     Partners: Female   Other Topics Concern     Parent/sibling w/ CABG, MI or angioplasty before 65F 55M? Not Asked   Social History Narrative     Not on file     Social Determinants of Health     Financial Resource Strain: Low Risk      Difficulty of Paying Living Expenses: Not hard at all   Food Insecurity: No Food Insecurity     Worried About Running Out of Food in the Last Year: Never true     Ran Out of Food in the Last Year: Never true   Transportation Needs: No Transportation Needs     Lack of Transportation (Medical): No     Lack of Transportation (Non-Medical): No   Physical Activity:      Days of Exercise per Week:      Minutes of Exercise per Session:    Stress:      Feeling of Stress :    Social Connections:      Frequency of Communication with Friends and Family:      Frequency of Social Gatherings with Friends and Family:      Attends Cheondoism Services:      Active Member of Clubs or Organizations:      Attends Club or Organization Meetings:      Marital Status:    Intimate Partner Violence:      Fear of Current or Ex-Partner:      Emotionally Abused:      Physically Abused:      Sexually Abused:        PAST MEDICAL HISTORY:   Past Medical History:   Diagnosis Date     Allergic state      Anemia      Former smoker      Malignant neoplasm of larynx (H) 2021     Pulmonary emboli (H) 2021    Bilateral-s/p embolectomy and IVC filter placement     S/P percutaneous endoscopic gastrostomy (PEG) tube placement (H)      Tracheostomy in place (H)         PAST SURGICAL HISTORY:   Past Surgical History:   Procedure Laterality Date     DISSECTION RADICAL NECK  "BILATERAL Bilateral 7/8/2021    Procedure: bilateral neck dissections;  Surgeon: Marilou Sethi MD;  Location: [facility]     ENDOSCOPIC INSERTION TUBE GASTROSTOMY Left 7/8/2021    Procedure: INSERTION, PEG TUBE with Esophagogastroduodenoscopy;  Surgeon: Kg Montaño MD;  Location: [facility]     IR IVC FILTER PLACEMENT  4/30/2021     LARYNGECTOMY N/A 7/8/2021    Procedure: LARYNGECTOMY;  Surgeon: Marilou Sethi MD;  Location: [facility]     LARYNGOSCOPY N/A 4/12/2021    Procedure: LARYNGOSCOPY;  Surgeon: Choco Johnson MD;  [facility]: [facility]     LARYNGOSCOPY N/A 7/8/2021    Procedure: LARYNGOSCOPY;  Surgeon: Mairlou Sethi MD;  Location: [facility] OR     REPAIR ANEURYSM ASCENDING AORTA N/A 4/28/2021    Procedure: PULMONARY THROMBECTOMY;  Surgeon: Yumi Singh MD;  [facility]: [facility]     TRACHEOSTOMY  4/12/2021    Procedure: CREATION, TRACHEOSTOMY;  Surgeon: Choco Johnson MD;  [facility]: [facility]       FAMILY HISTORY:    Family History   Problem Relation Age of Onset     Circulatory Mother         blood clots     Alcohol/Drug Father         alcohol drank heavily     Alcohol/Drug Brother         alcohol       REVIEW OF SYSTEMS:  12 point ROS was negative other than the symptoms noted above in the HPI.  Patient Supplied Answers to Review of Systems  No flowsheet data found.      PHYSICAL EXAMINATION:   /80 (BP Location: Right arm, Patient Position: Sitting, Cuff Size: Adult Regular)   Pulse 79   Temp (!) 96.6  F (35.9  C) (Temporal)   Ht 1.803 m (5' 11\")   Wt 87.5 kg (192 lb 14.4 oz)   SpO2 100%   BMI 26.90 kg/m    Patient in NAD  Breathing comfortably on room air  Mouthes words to communicate, communicates well with electrolarynx  Healing apron neck incision, no swelling, no skin changes, no point tenderness  Margy tube with HME in place   Decreased ROM of shoulders    PROCEDURES:       RESULTS REVIEWED:       IMPRESSION AND PLAN:   Raj Warren is a 70 year old man with a T4N0 SCC of the larynx. " He underwent [facility]. He was taken to the OR on 7/8/2021 for a total laryngectomy with bilateral neck dissections.     He is overall doing well. Reassurance was provided. He sounds excellent with the electrolarynx. He was seen by SLP to work on this today. He will continue to follow with them during radiation.     A PT referral was placed for his shoulders.     I would like to see him back either 10/29 or 11/5 in [facility] clinic.      Thank you very much for the opportunity to participate in the care of your patient.      Marilou Sethi MD, M.D.  Otolaryngology- Head & Neck Surgery      This note was dictated with voice recognition software and then edited. Please excuse any unintentional errors.         CC:  Rosa Bean MD  [facility]  [address] [address]      Serg Kennedy MD  Department of Radiation Oncology  [facility]

## 2021-08-05 ENCOUNTER — OFFICE VISIT (OUTPATIENT)
Dept: RADIATION ONCOLOGY | Facility: CLINIC | Age: 71
End: 2021-08-05
Attending: RADIOLOGY
Payer: COMMERCIAL

## 2021-08-05 VITALS
BODY MASS INDEX: 27.24 KG/M2 | DIASTOLIC BLOOD PRESSURE: 85 MMHG | HEART RATE: 78 BPM | SYSTOLIC BLOOD PRESSURE: 124 MMHG | WEIGHT: 195.3 LBS

## 2021-08-05 DIAGNOSIS — C32.9 MALIGNANT NEOPLASM OF LARYNX (H): Primary | ICD-10-CM

## 2021-08-05 PROCEDURE — 77386 HC IMRT TREATMENT DELIVERY, COMPLEX: CPT | Performed by: RADIOLOGY

## 2021-08-05 NOTE — PROGRESS NOTES
RADIATION ONCOLOGY WEEKLY ON TREATMENT VISIT   Encounter Date: 2021    Patient Name: Raj Warren  MRN: [id number]  : 1950     Disease and Stage: pT4a N0 M0 squamous cell carcinoma of the larynx  Treatment Site: Larynx and bilateral neck  Current Dose/Planned Total Dose: 400 / 6600 cGy  Daily Fraction Size: 200 cGy/day, 5 times/week  Concurrent Chemotherapy: No    Treatment Summary:    2021: Initiation of radiotherapy    21: Weekly RT visit. Current dose of 400 cGy. Tolerating treatment well.    ED visits/Hospitalizations:  None    Missed Treatments:  None    Subjective: Mr. Warren presents to clinic today for his weekly on-treatment visit. He has tolerated the initiation of radiotherapy very well and has no pressing concerns or complaints on examination today. He is eating a regular diet with supplementation of approximately 1 can daily via PEG. He describes minimal throat pain, rated as a 3/10 in severity, which has been improving since surgery. He is currently not requiring any PRN pain medications for treatment of his symptoms. He does state that he has not yet started his fluoride treatments as he was seen by dentistry but does not believe he was provided with fluoride trays.    ROS:   Constitutional  Pain (0-10): 0 (mouth), 3 (throat), 0 (skin)   Fatigue: None    CNS  Headaches: None    ENT  Mucositis: None    Cardiopulmonary  Dyspnea: None    GI  Nausea/vomiting: None    Nutrition  PEG: Yes  Diet: Solid diet with 1 can daily via PEG     Integumentary  Dermatitis: None    Objective:   Current weight: 88.6 kg    BP: 124/85 (sitting), 132/89 (standing)  Pulse: 78 (sitting), 90 (standing)    General: Healthy-appearing 71-year-old gentleman seated comfortably in an examination chair in no acute distress  HEENT: NC/AT.  EOMI.  No rhinorrhea or epistaxis.  Moist mucous membranes.  No mucositis or thrush.  Neck: Trachea is midline.  No crusting around the stoma.  Pulmonary: No wheezing,  stridor or respiratory distress  Skin: Normal color and turgor     Treatment-related toxicities (CTCAE v5.0):  None    Assessment:    Mr. Warren is a 71 year old male with a pT4a N0 M0 squamous cell carcinoma of the larynx status post total laryngectomy and bilateral neck dissection. He is receiving adjuvant radiotherapy for improved locoregional disease control. He is tolerating treatment well with no significant acute radiation-induced toxicities.    Plan:   pT4a N0 M0 squamous cell carcinoma of the larynx:    Continue radiotherapy    Pain management:    Recommend starting acetaminophen as needed for mild to moderate pain     Fluids/Electrolytes/Nutrition:    Continue diet as tolerated with caloric goals as delineated by the oncology dietitian    Dermatitis:    Continue BID Aquaphor application to the bilateral neck    Mucositis:    Contact dentistry for fluoride treatments    Pain control regimen as described above    Recommend starting salt/soda rinses as needed for thickened oral cavity secretions    Mosaiq chart and setup information reviewed  IGRT images reviewed    Medications reviewed    Serg Kennedy MD/PhD    Dept of Radiation Oncology  [facility]

## 2021-08-06 ENCOUNTER — APPOINTMENT (OUTPATIENT)
Dept: RADIATION ONCOLOGY | Facility: CLINIC | Age: 71
End: 2021-08-06
Attending: RADIOLOGY
Payer: COMMERCIAL

## 2021-08-06 PROCEDURE — 77014 PR CT GUIDE FOR PLACEMENT RADIATION THERAPY FIELDS: CPT | Mod: 26 | Performed by: RADIOLOGY

## 2021-08-06 PROCEDURE — 77386 HC IMRT TREATMENT DELIVERY, COMPLEX: CPT | Performed by: RADIOLOGY

## 2021-08-07 ENCOUNTER — HEALTH MAINTENANCE LETTER (OUTPATIENT)
Age: 71
End: 2021-08-07

## 2021-08-09 ENCOUNTER — ONCOLOGY VISIT (OUTPATIENT)
Dept: ONCOLOGY | Facility: CLINIC | Age: 71
End: 2021-08-09
Attending: INTERNAL MEDICINE
Payer: COMMERCIAL

## 2021-08-09 ENCOUNTER — APPOINTMENT (OUTPATIENT)
Dept: RADIATION ONCOLOGY | Facility: CLINIC | Age: 71
End: 2021-08-09
Attending: RADIOLOGY
Payer: COMMERCIAL

## 2021-08-09 VITALS
DIASTOLIC BLOOD PRESSURE: 84 MMHG | HEIGHT: 71 IN | SYSTOLIC BLOOD PRESSURE: 119 MMHG | OXYGEN SATURATION: 100 % | RESPIRATION RATE: 16 BRPM | HEART RATE: 76 BPM | BODY MASS INDEX: 27.27 KG/M2 | WEIGHT: 194.8 LBS | TEMPERATURE: 98.7 F

## 2021-08-09 DIAGNOSIS — I10 BENIGN ESSENTIAL HYPERTENSION: ICD-10-CM

## 2021-08-09 DIAGNOSIS — C32.9 LARYNGEAL CANCER (H): ICD-10-CM

## 2021-08-09 DIAGNOSIS — K21.00 GASTROESOPHAGEAL REFLUX DISEASE WITH ESOPHAGITIS WITHOUT HEMORRHAGE: ICD-10-CM

## 2021-08-09 PROCEDURE — G0463 HOSPITAL OUTPT CLINIC VISIT: HCPCS

## 2021-08-09 PROCEDURE — 77386 HC IMRT TREATMENT DELIVERY, COMPLEX: CPT | Performed by: RADIOLOGY

## 2021-08-09 PROCEDURE — 99213 OFFICE O/P EST LOW 20 MIN: CPT | Performed by: INTERNAL MEDICINE

## 2021-08-09 RX ORDER — PANTOPRAZOLE SODIUM 40 MG/1
40 TABLET, DELAYED RELEASE ORAL
Qty: 90 TABLET | Refills: 2 | Status: SHIPPED | OUTPATIENT
Start: 2021-08-09 | End: 2021-12-13

## 2021-08-09 RX ORDER — METOPROLOL TARTRATE 25 MG/1
25 TABLET, FILM COATED ORAL 2 TIMES DAILY
Qty: 60 TABLET | Refills: 3 | Status: SHIPPED | OUTPATIENT
Start: 2021-08-09 | End: 2022-01-25

## 2021-08-09 ASSESSMENT — MIFFLIN-ST. JEOR: SCORE: 1670.74

## 2021-08-09 ASSESSMENT — PAIN SCALES - GENERAL: PAINLEVEL: SEVERE PAIN (6)

## 2021-08-09 NOTE — LETTER
"    8/9/2021         RE: Raj Warren  [address]  [facility] [address]        Dear Colleague,    Thank you for referring your patient, Raj Warren, to the [facility]. Please see a copy of my visit note below.    Oncology Rooming Note    August 9, 2021 10:35 AM   Raj Warren is a 71 year old male who presents for:    Chief Complaint   Patient presents with     Oncology Clinic Visit     Malignant neoplasm of larynx     Initial Vitals: /84   Pulse 76   Temp 98.7  F (37.1  C) (Oral)   Resp 16   Ht 1.803 m (5' 11\")   Wt 88.4 kg (194 lb 12.8 oz)   SpO2 100%   BMI 27.17 kg/m   Estimated body mass index is 27.17 kg/m  as calculated from the following:    Height as of this encounter: 1.803 m (5' 11\").    Weight as of this encounter: 88.4 kg (194 lb 12.8 oz). Body surface area is 2.1 meters squared.  Severe Pain (6) Comment: Data Unavailable   No LMP for male patient.  Allergies reviewed: Yes  Medications reviewed: Yes    Medications: Refills needed  Pharmacy name entered into [employer]: [facility] #[id number] - [place], [place] - [address] AT [address]    Clinical concerns: Refills needed for Protonix (40mg), Lis Ornelas MA                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  1.  Patient was evaluated in 2019 for hoarseness of voice.  He was seen by ENT and found to have right vocal cord mass.    -He had biopsy done on 08/07/2019. Pathology revealed moderately-differentiated invasive squamous cell carcinoma.    2.  The patient was seen in the Emergency Room on 04/12/2021 for shortness of breath and admitted.   -CT neck revealed an enhancing soft tissue mass involving the right larynx measuring 3.9 cm.  -Patient had laryngoscopy with biopsy and tracheostomy done on 04/12/2021.  There was an endolaryngeal mass obscuring the " laryngeal landmarks.  Mass appeared to be based on right endolarynx.  Pathology revealed invasive keratinizing moderately-differentiated squamous cell carcinoma.    3.  PET scan on 04/26/2021 revealed hypermetabolic mass involving the supraglottic, glottic and subglottic larynx.  There was a hypermetabolic left level IV lymph node.  There was also hypermetabolic nodule in right middle lobe.    4. CT chest angiogram on 04/27/2021 revealed a large bilateral pulmonary embolism in all the lobes.  There was evidence of right cardiac strain.  -Echocardiogram on 04/27/2021 revealed clot in transit in the right ventricle.  -The patient had emergency pulmonary embolectomy on 04/28/2021.  Pathology revealed organizing clot.  -Patient on Eliquis. Long term anti-coagulation recommended.    5. Total laryngectomy with bilateral neck dissection on 07/08/2021.  Pathology reveals 6 cm squamous cell carcinoma involving the right false and true vocal cords with fixation, the left false and true vocal cords and invades through thyroid cartilage.  There is focal lymphatic invasion.  There is perineural invasion. Benign 75 lymph nodes.  pT4a pN0 disease.    6. Post-op radiation.    SUBJECTIVE:  Mr. Warren is a 71-year-old gentleman with laryngeal squamous cell carcinoma.  The patient had total laryngectomy with bilateral neck dissection on 07/08/2021.  Pathology reveals 6 cm squamous cell carcinoma involving the right false and true vocal cords with fixation, the left false and true vocal cords and invades through thyroid cartilage.  There is focal lymphatic invasion.  There is perineural invasion. Benign 75 lymph nodes.  pT4a pN0 disease.     The patient currently getting radiation at [facility].     He feels weak.  No headache. No dizziness.  Some discomfort in the neck area, especially in the left side.  No chest pain or shortness of breath.  No nausea or vomiting.  He is using a G-tube for feeding.  No abdominal pain.   Some irritation around the G-tube site.  No bleeding.     For his pulmonary embolism, he continues on Eliquis.  He is tolerating it well.      All other review of systems negative.     PHYSICAL EXAMINATION:    GENERAL:  He is alert and oriented x 3.  Not in distress.  VITAL SIGNS:  Reviewed.    Rest of systems not examined.     ASSESSMENT:     1.  A 71-year-old gentleman with laryngeal squamous cell carcinoma.  T4 N0 M0 disease.  2.  Pulmonary embolism.     PLAN:     1.  The patient is currently getting radiation.  Overall, he is tolerating it well.     For laryngeal squamous cell carcinoma, he will continue to follow up with ENT at [facility].  Scans as per them.     The patient had multiple questions regarding his laryngeal cancer, which were all discussed.  Surgical pathology was discussed in detail.  I explained to him that none of the lymph nodes have cancer.  He has T4 disease.  He had surgery with curative intent.  Now, he is getting radiation.  Hopefully, he will remain in remission.     2.  The patient had massive pulmonary embolism in 04/2021.  This was secondary to malignancy.  He is on Eliquis, which he will continue.     At this time, I would recommend long-term anticoagulation.  This is being recommended as the patient had massive pulmonary embolism for which he had emergent pulmonary embolectomy.  Also, we have to worry about his malignancy coming back, which will predispose him to further thrombosis.  Because of that, I would recommend that we continue him on indefinite anticoagulation.  The patient is agreeable for it.     3.  I will see him in mid December with labs.  I advised him to call us with any questions or concerns.  I advised him to go to Emergency Room for chest pain, shortness of breath, pain/swelling/redness in the extremities.     TOTAL FACE-TO-FACE TIME SPENT:  25 minutes; more than 50% of time spent in counseling and coordination of care.    This office note has been  dictated.          Again, thank you for allowing me to participate in the care of your patient.        Sincerely,        Rosa Bean MD

## 2021-08-09 NOTE — PROGRESS NOTES
"Oncology Rooming Note    August 9, 2021 10:35 AM   Raj Warren is a 71 year old male who presents for:    Chief Complaint   Patient presents with     Oncology Clinic Visit     Malignant neoplasm of larynx     Initial Vitals: /84   Pulse 76   Temp 98.7  F (37.1  C) (Oral)   Resp 16   Ht 1.803 m (5' 11\")   Wt 88.4 kg (194 lb 12.8 oz)   SpO2 100%   BMI 27.17 kg/m   Estimated body mass index is 27.17 kg/m  as calculated from the following:    Height as of this encounter: 1.803 m (5' 11\").    Weight as of this encounter: 88.4 kg (194 lb 12.8 oz). Body surface area is 2.1 meters squared.  Severe Pain (6) Comment: Data Unavailable   No LMP for male patient.  Allergies reviewed: Yes  Medications reviewed: Yes    Medications: Refills needed  Pharmacy name entered into [employer]: [facility] #[id number] - [place], [place] - [address] AT [address]    Clinical concerns: Refills needed for Protonix (40mg), Lis Ornelas MA              "

## 2021-08-10 ENCOUNTER — APPOINTMENT (OUTPATIENT)
Dept: RADIATION ONCOLOGY | Facility: CLINIC | Age: 71
End: 2021-08-10
Attending: RADIOLOGY
Payer: COMMERCIAL

## 2021-08-10 VITALS
SYSTOLIC BLOOD PRESSURE: 121 MMHG | DIASTOLIC BLOOD PRESSURE: 83 MMHG | BODY MASS INDEX: 27.16 KG/M2 | WEIGHT: 194.7 LBS | HEART RATE: 91 BPM

## 2021-08-10 DIAGNOSIS — C32.9 MALIGNANT NEOPLASM OF LARYNX (H): Primary | ICD-10-CM

## 2021-08-10 PROCEDURE — 77386 HC IMRT TREATMENT DELIVERY, COMPLEX: CPT | Performed by: RADIOLOGY

## 2021-08-10 PROCEDURE — 77427 RADIATION TX MANAGEMENT X5: CPT | Performed by: RADIOLOGY

## 2021-08-10 PROCEDURE — 77336 RADIATION PHYSICS CONSULT: CPT | Performed by: RADIOLOGY

## 2021-08-10 NOTE — LETTER
8/10/2021         RE: Raj Warren  [address]  [facility] [address]        Dear Colleague,    Thank you for referring your patient, Raj Warren, to the [facility] RADIATION ONCOLOGY. Please see a copy of my visit note below.    RADIATION ONCOLOGY WEEKLY ON TREATMENT VISIT   Encounter Date: 8/10/2021    Patient Name: Raj Warren  MRN: [id number]  : 1950     Disease and Stage: pT4a N0 M0 squamous cell carcinoma of the larynx  Treatment Site: Larynx and bilateral neck  Current Dose/Planned Total Dose: 400 / 6600 cGy  Daily Fraction Size: 1000 cGy/day, 5 times/week  Concurrent Chemotherapy: No    Treatment Summary:    2021: Initiation of radiotherapy    ED visits/Hospitalizations:  None    Missed Treatments:  None    Subjective: Mr. Warren presents to clinic today for his weekly on-treatment visit. He has tolerated the initiation of radiotherapy very well and has no pressing concerns or complaints on examination today.      He is currently not requiring any PRN pain medications for treatment of his symptoms.     ROS:   Constitutional  Pain (0-10): 0 (mouth), 3 (throat), 0 (skin)   Fatigue: None    CNS  Headaches: None    ENT  Mucositis: None    Cardiopulmonary  Dyspnea: None    GI  Nausea/vomiting: None    Nutrition  PEG: Yes  Diet: Solid diet with 1 can daily via PEG     Integumentary  Dermatitis: None    Objective:   /83   Pulse 91   Wt 88.3 kg (194 lb 11.2 oz)   BMI 27.16 kg/m     Wt Readings from Last 2 Encounters:   08/10/21 88.3 kg (194 lb 11.2 oz)   21 88.4 kg (194 lb 12.8 oz)     General: Healthy-appearing 71-year-old gentleman seated comfortably in an examination chair in no acute distress  HEENT: NC/AT.  EOMI.  No rhinorrhea or epistaxis.  Moist mucous membranes.  No mucositis or thrush.  Neck: Trachea is midline.  No crusting around the stoma.  Skin: Normal color and turgor     Treatment-related toxicities (CTCAE v5.0):  None    Assessment:     pT4a N0 M0 squamous cell carcinoma of the larynx status post total laryngectomy and bilateral neck dissection. He is receiving adjuvant radiotherapy.    Plan:   pT4a N0 M0 squamous cell carcinoma of the larynx:    Continue radiotherapy    Pain management:    Recommend starting acetaminophen as needed for mild to moderate pain     Fluids/Electrolytes/Nutrition:    Continue diet as tolerated with caloric goals as delineated by the oncology dietitian    Dermatitis:    Continue BID Aquaphor application to the bilateral neck    Mucositis:    Contact dentistry for fluoride treatments    Pain control regimen as described above    Recommend starting salt/soda rinses as needed for thickened oral cavity secretions    Mosaiq chart and setup information reviewed  IGRT images reviewed    Medications reviewed    ALYSSIA Guajardo M.D.  Department of Radiation Oncology  [facility]

## 2021-08-10 NOTE — PROGRESS NOTES
RADIATION ONCOLOGY WEEKLY ON TREATMENT VISIT   Encounter Date: 8/10/2021    Patient Name: Raj Warren  MRN: [id number]  : 1950     Disease and Stage: pT4a N0 M0 squamous cell carcinoma of the larynx  Treatment Site: Larynx and bilateral neck  Current Dose/Planned Total Dose: 400 / 6600 cGy  Daily Fraction Size: 1000 cGy/day, 5 times/week  Concurrent Chemotherapy: No    Treatment Summary:    2021: Initiation of radiotherapy    ED visits/Hospitalizations:  None    Missed Treatments:  None    Subjective: Mr. Warren presents to clinic today for his weekly on-treatment visit. He has tolerated the initiation of radiotherapy very well and has no pressing concerns or complaints on examination today.      He is currently not requiring any PRN pain medications for treatment of his symptoms.     ROS:   Constitutional  Pain (0-10): 0 (mouth), 3 (throat), 0 (skin)   Fatigue: None    CNS  Headaches: None    ENT  Mucositis: None    Cardiopulmonary  Dyspnea: None    GI  Nausea/vomiting: None    Nutrition  PEG: Yes  Diet: Solid diet with 1 can daily via PEG     Integumentary  Dermatitis: None    Objective:   /83   Pulse 91   Wt 88.3 kg (194 lb 11.2 oz)   BMI 27.16 kg/m     Wt Readings from Last 2 Encounters:   08/10/21 88.3 kg (194 lb 11.2 oz)   21 88.4 kg (194 lb 12.8 oz)     General: Healthy-appearing 71-year-old gentleman seated comfortably in an examination chair in no acute distress  HEENT: NC/AT.  EOMI.  No rhinorrhea or epistaxis.  Moist mucous membranes.  No mucositis or thrush.  Neck: Trachea is midline.  No crusting around the stoma.  Skin: Normal color and turgor     Treatment-related toxicities (CTCAE v5.0):  None    Assessment:    pT4a N0 M0 squamous cell carcinoma of the larynx status post total laryngectomy and bilateral neck dissection. He is receiving adjuvant radiotherapy.    Plan:   pT4a N0 M0 squamous cell carcinoma of the larynx:    Continue radiotherapy    Pain  management:    Recommend starting acetaminophen as needed for mild to moderate pain     Fluids/Electrolytes/Nutrition:    Continue diet as tolerated with caloric goals as delineated by the oncology dietitian    Dermatitis:    Continue BID Aquaphor application to the bilateral neck    Mucositis:    Contact dentistry for fluoride treatments    Pain control regimen as described above    Recommend starting salt/soda rinses as needed for thickened oral cavity secretions    Mosaiq chart and setup information reviewed  IGRT images reviewed    Medications reviewed    ALYSSIA Guajardo M.D.  Department of Radiation Oncology  [facility]

## 2021-08-11 ENCOUNTER — APPOINTMENT (OUTPATIENT)
Dept: RADIATION ONCOLOGY | Facility: CLINIC | Age: 71
End: 2021-08-11
Attending: RADIOLOGY
Payer: COMMERCIAL

## 2021-08-11 PROCEDURE — 77386 HC IMRT TREATMENT DELIVERY, COMPLEX: CPT | Performed by: RADIOLOGY

## 2021-08-11 PROCEDURE — 77014 PR CT GUIDE FOR PLACEMENT RADIATION THERAPY FIELDS: CPT | Mod: 26 | Performed by: RADIOLOGY

## 2021-08-12 ENCOUNTER — APPOINTMENT (OUTPATIENT)
Dept: RADIATION ONCOLOGY | Facility: CLINIC | Age: 71
End: 2021-08-12
Attending: RADIOLOGY
Payer: COMMERCIAL

## 2021-08-12 PROCEDURE — 77014 PR CT GUIDE FOR PLACEMENT RADIATION THERAPY FIELDS: CPT | Mod: 26 | Performed by: RADIOLOGY

## 2021-08-12 PROCEDURE — 77386 HC IMRT TREATMENT DELIVERY, COMPLEX: CPT | Performed by: RADIOLOGY

## 2021-08-13 ENCOUNTER — APPOINTMENT (OUTPATIENT)
Dept: RADIATION ONCOLOGY | Facility: CLINIC | Age: 71
End: 2021-08-13
Attending: RADIOLOGY
Payer: COMMERCIAL

## 2021-08-13 PROCEDURE — 77014 PR CT GUIDE FOR PLACEMENT RADIATION THERAPY FIELDS: CPT | Mod: 26 | Performed by: RADIOLOGY

## 2021-08-13 PROCEDURE — 77386 HC IMRT TREATMENT DELIVERY, COMPLEX: CPT | Performed by: RADIOLOGY

## 2021-08-16 ENCOUNTER — APPOINTMENT (OUTPATIENT)
Dept: RADIATION ONCOLOGY | Facility: CLINIC | Age: 71
End: 2021-08-16
Attending: RADIOLOGY
Payer: COMMERCIAL

## 2021-08-16 PROCEDURE — 77014 PR CT GUIDE FOR PLACEMENT RADIATION THERAPY FIELDS: CPT | Mod: 26 | Performed by: RADIOLOGY

## 2021-08-16 PROCEDURE — 77333 RADIATION TREATMENT AID(S): CPT | Mod: 26 | Performed by: RADIOLOGY

## 2021-08-16 PROCEDURE — 77386 HC IMRT TREATMENT DELIVERY, COMPLEX: CPT | Performed by: RADIOLOGY

## 2021-08-16 PROCEDURE — 77333 RADIATION TREATMENT AID(S): CPT | Mod: XU | Performed by: RADIOLOGY

## 2021-08-16 PROCEDURE — 77331 SPECIAL RADIATION DOSIMETRY: CPT | Performed by: RADIOLOGY

## 2021-08-17 ENCOUNTER — APPOINTMENT (OUTPATIENT)
Dept: RADIATION ONCOLOGY | Facility: CLINIC | Age: 71
End: 2021-08-17
Attending: RADIOLOGY
Payer: COMMERCIAL

## 2021-08-17 PROCEDURE — 77014 PR CT GUIDE FOR PLACEMENT RADIATION THERAPY FIELDS: CPT | Mod: 26 | Performed by: RADIOLOGY

## 2021-08-17 PROCEDURE — 77386 HC IMRT TREATMENT DELIVERY, COMPLEX: CPT | Performed by: RADIOLOGY

## 2021-08-17 PROCEDURE — 77336 RADIATION PHYSICS CONSULT: CPT | Performed by: RADIOLOGY

## 2021-08-18 ENCOUNTER — APPOINTMENT (OUTPATIENT)
Dept: RADIATION ONCOLOGY | Facility: CLINIC | Age: 71
End: 2021-08-18
Attending: RADIOLOGY
Payer: COMMERCIAL

## 2021-08-18 PROCEDURE — 77014 PR CT GUIDE FOR PLACEMENT RADIATION THERAPY FIELDS: CPT | Mod: 26 | Performed by: RADIOLOGY

## 2021-08-18 PROCEDURE — 77386 HC IMRT TREATMENT DELIVERY, COMPLEX: CPT | Performed by: RADIOLOGY

## 2021-08-19 ENCOUNTER — OFFICE VISIT (OUTPATIENT)
Dept: RADIATION ONCOLOGY | Facility: CLINIC | Age: 71
End: 2021-08-19
Attending: RADIOLOGY
Payer: COMMERCIAL

## 2021-08-19 ENCOUNTER — THERAPY VISIT (OUTPATIENT)
Dept: SPEECH THERAPY | Facility: CLINIC | Age: 71
End: 2021-08-19
Payer: COMMERCIAL

## 2021-08-19 ENCOUNTER — OFFICE VISIT (OUTPATIENT)
Dept: SURGERY | Facility: CLINIC | Age: 71
End: 2021-08-19
Attending: CLINICAL NURSE SPECIALIST
Payer: COMMERCIAL

## 2021-08-19 VITALS — DIASTOLIC BLOOD PRESSURE: 76 MMHG | SYSTOLIC BLOOD PRESSURE: 111 MMHG | HEART RATE: 100 BPM

## 2021-08-19 DIAGNOSIS — Z53.9 ERRONEOUS ENCOUNTER--DISREGARD: Primary | ICD-10-CM

## 2021-08-19 DIAGNOSIS — R13.12 OROPHARYNGEAL DYSPHAGIA: ICD-10-CM

## 2021-08-19 DIAGNOSIS — C32.9 MALIGNANT NEOPLASM OF LARYNX (H): Primary | ICD-10-CM

## 2021-08-19 DIAGNOSIS — R49.1 APHONIA: ICD-10-CM

## 2021-08-19 PROCEDURE — 99207 PR NO CHG PAT LEFT NOT BEING SEEN: CPT | Performed by: CLINICAL NURSE SPECIALIST

## 2021-08-19 PROCEDURE — 77386 HC IMRT TREATMENT DELIVERY, COMPLEX: CPT | Performed by: RADIOLOGY

## 2021-08-19 PROCEDURE — 92507 TX SP LANG VOICE COMM INDIV: CPT | Mod: GN | Performed by: SPEECH-LANGUAGE PATHOLOGIST

## 2021-08-19 NOTE — PROGRESS NOTES
RADIATION ONCOLOGY WEEKLY ON TREATMENT VISIT   Encounter Date: 2021    Patient Name: Raj Warren  MRN: [id number]  : 1950     Disease and Stage: pT4a N0 M0 squamous cell carcinoma of the larynx  Treatment Site: Larynx and bilateral neck  Current Dose/Planned Total Dose: 2400 / 6600 cGy  Daily Fraction Size: 200 cGy/day, 5 times/week  Concurrent Chemotherapy: No    Treatment Summary:    2021: Initiation of radiotherapy    21: Weekly RT visit. Current dose of 400 cGy. Tolerating treatment well.    08/10/21: Weekly RT visit. Current dose of 1000 cGy. Tolerating treatment well.    21: Weekly RT visit. Current dose of 2400 cGy. Moderate throat pain.    ED visits/Hospitalizations:  None    Missed Treatments:  None    Subjective: Mr. Warren presents to clinic today for his weekly on-treatment visit. He is continuing to tolerate radiotherapy well and has no pressing concerns or complaints on examination. He describes his pain as a 4-6/10 in severity and reports that this remains well-controlled with as needed acetaminophen. He is eating a regular diet by mouth and is currently using his PEG only for flushes. He does have some annoyance with the length of his PEG tubing and reports that this frequently gets caught in his shirt/pants and causes some irritation around the insertion site. His remaining ROS are otherwise negative.    ROS:   Constitutional  Pain (0-10): 4 (mouth), 6 (throat), 4 (skin)   Fatigue: Moderate    CNS  Headaches: None    ENT  Mucositis: Mild    Cardiopulmonary  Dyspnea: None    GI  Nausea/vomiting: None    Nutrition  PEG: Yes (not currently using)  Diet: Regular diet    Integumentary  Dermatitis: Mild    Objective:   Current weight: 88.3 kg  Last week's weight: 88.3 kg  Starting weight: 88.6 kg    BP: 111/76 (sitting), 123/83 (standing)  Pulse: 100 (sitting), 107 (standing)     General: Healthy-appearing 71-year-old gentleman seated comfortably in a chair in no  acute distress  HEENT: NC/AT.  EOMI.  No rhinorrhea or epistaxis.  No oral mucositis or thrush.  Neck: Stoma is midline.  Minimal crusting of secretions around the inferior aspect of the stoma.  Pulmonary: No wheezing, stridor or respiratory distress  Abdomen: PEG site is clean/dry/intact.  No erythema or induration of the skin around the insertion site.  The external PEG tubing extends to wrap around his waist.  Skin: Mild erythema of the bilateral neck.  No desquamation.     Treatment-related toxicities (CTCAE v5.0):  1. Mucositis: Grade 1  2. Dermatitis: Grade 1    Assessment:    Mr. Warren is a 71 year old male with a pT4a N0 M0 squamous cell carcinoma of the larynx status post total laryngectomy and bilateral neck dissection. He is receiving adjuvant radiotherapy for improved locoregional disease control. He is tolerating treatment well with minimal acute radiation-induced toxicities.    Plan:   pT4a N0 M0 squamous cell carcinoma of the larynx:    Continue radiotherapy    Pain management:    Acetaminophen as needed for mild to moderate pain     Fluids/Electrolytes/Nutrition:    Diet as tolerated with caloric goals as delineated by the oncology dietitian    Dermatitis:    BID Aquaphor application to the bilateral neck    Mucositis:    Pain control regimen as described above    Salt/soda rinses as needed for thickened oral cavity secretions    Mosaiq chart and setup information reviewed  IGRT images reviewed    Medications reviewed    Serg Kennedy MD/PhD    Dept of Radiation Oncology  [facility]

## 2021-08-19 NOTE — LETTER
8/19/2021         RE: Raj Warren  [address]  [facility] [address]        Dear Colleague,    Thank you for referring your patient, Raj Warren, to the [facility] CANCER [facility]INIC. Please see a copy of my visit note below.    Patient left without being seen  Appointment cancelled.       Again, thank you for allowing me to participate in the care of your patient.        Sincerely,        TARUN Ugalde CNS

## 2021-08-20 ENCOUNTER — APPOINTMENT (OUTPATIENT)
Dept: RADIATION ONCOLOGY | Facility: CLINIC | Age: 71
End: 2021-08-20
Attending: RADIOLOGY
Payer: COMMERCIAL

## 2021-08-20 PROCEDURE — 77386 HC IMRT TREATMENT DELIVERY, COMPLEX: CPT | Performed by: RADIOLOGY

## 2021-08-20 PROCEDURE — 77014 PR CT GUIDE FOR PLACEMENT RADIATION THERAPY FIELDS: CPT | Mod: 26 | Performed by: RADIOLOGY

## 2021-08-22 NOTE — PROGRESS NOTES
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 Prognostic stage group DLAY (pT4a pN0 M0).  1.  Patient was evaluated in 2019 for hoarseness of voice.  He was seen by ENT and found to have right vocal cord mass.    -He had biopsy done on 08/07/2019. Pathology revealed moderately-differentiated invasive squamous cell carcinoma.    2.  The patient was seen in the Emergency Room on 04/12/2021 for shortness of breath and admitted.   -CT neck revealed an enhancing soft tissue mass involving the right larynx measuring 3.9 cm.  -Patient had laryngoscopy with biopsy and tracheostomy done on 04/12/2021.  There was an endolaryngeal mass obscuring the laryngeal landmarks.  Mass appeared to be based on right endolarynx.  Pathology revealed invasive keratinizing moderately-differentiated squamous cell carcinoma.    3.  PET scan on 04/26/2021 revealed hypermetabolic mass involving the supraglottic, glottic and subglottic larynx.  There was a hypermetabolic left level IV lymph node.  There was also hypermetabolic nodule in right middle lobe.    4. CT chest angiogram on 04/27/2021 revealed a large bilateral pulmonary embolism in all the lobes.  There was evidence of right cardiac strain.  -Echocardiogram on 04/27/2021 revealed clot in transit in the right ventricle.  -The patient had emergency pulmonary embolectomy on 04/28/2021.  Pathology revealed organizing clot.  -Patient on Eliquis. Long term anti-coagulation recommended.    5. Total laryngectomy with bilateral neck dissection on 07/08/2021.  Pathology reveals 6 cm squamous cell carcinoma involving the right false and true vocal cords with fixation, the left false and true vocal cords and invades through thyroid cartilage.  There is focal lymphatic invasion.  There is perineural invasion. Benign 75 lymph nodes.  pT4a pN0 disease.    6. Post-op radiation.    SUBJECTIVE:  Mr. Warren is a 71-year-old gentleman with laryngeal squamous cell  carcinoma.  The patient had total laryngectomy with bilateral neck dissection on 07/08/2021.  Pathology reveals 6 cm squamous cell carcinoma involving the right false and true vocal cords with fixation, the left false and true vocal cords and invades through thyroid cartilage.  There is focal lymphatic invasion.  There is perineural invasion. Benign 75 lymph nodes.  pT4a pN0 disease.     The patient currently getting radiation at [facility].     He feels weak.  No headache. No dizziness.  Some discomfort in the neck area, especially in the left side.  No chest pain or shortness of breath.  No nausea or vomiting.  He is using a G-tube for feeding.  No abdominal pain.  Some irritation around the G-tube site.  No bleeding.     For his pulmonary embolism, he continues on Eliquis.  He is tolerating it well.      All other review of systems negative.     PHYSICAL EXAMINATION:    GENERAL:  He is alert and oriented x 3.  Not in distress.  VITAL SIGNS:  Reviewed.    Rest of systems not examined.     ASSESSMENT:     1.  A 71-year-old gentleman with laryngeal squamous cell carcinoma.  T4 N0 M0 disease.  2.  Pulmonary embolism.     PLAN:     1.  The patient is currently getting radiation.  Overall, he is tolerating it well.     For laryngeal squamous cell carcinoma, he will continue to follow up with ENT at [facility].  Scans as per them.     The patient had multiple questions regarding his laryngeal cancer, which were all discussed.  Surgical pathology was discussed in detail.  I explained to him that none of the lymph nodes have cancer.  He has T4 disease.  He had surgery with curative intent.  Now, he is getting radiation.  Hopefully, he will remain in remission.     2.  The patient had massive pulmonary embolism in 04/2021.  This was secondary to malignancy.  He is on Eliquis, which he will continue.     At this time, I would recommend long-term anticoagulation.  This is being recommended as the  patient had massive pulmonary embolism for which he had emergent pulmonary embolectomy.  Also, we have to worry about his malignancy coming back, which will predispose him to further thrombosis.  Because of that, I would recommend that we continue him on indefinite anticoagulation.  The patient is agreeable for it.     3.  I will see him in mid December with labs.  I advised him to call us with any questions or concerns.  I advised him to go to Emergency Room for chest pain, shortness of breath, pain/swelling/redness in the extremities.     TOTAL FACE-TO-FACE TIME SPENT:  25 minutes; more than 50% of time spent in counseling and coordination of care.

## 2021-08-26 ENCOUNTER — APPOINTMENT (OUTPATIENT)
Dept: RADIATION ONCOLOGY | Facility: CLINIC | Age: 71
End: 2021-08-26
Attending: RADIOLOGY
Payer: COMMERCIAL

## 2021-08-26 VITALS
DIASTOLIC BLOOD PRESSURE: 82 MMHG | WEIGHT: 192.8 LBS | SYSTOLIC BLOOD PRESSURE: 122 MMHG | HEART RATE: 81 BPM | BODY MASS INDEX: 26.89 KG/M2

## 2021-08-26 DIAGNOSIS — C32.9 MALIGNANT NEOPLASM OF LARYNX (H): Primary | ICD-10-CM

## 2021-08-26 PROCEDURE — 77386 HC IMRT TREATMENT DELIVERY, COMPLEX: CPT | Performed by: RADIOLOGY

## 2021-08-26 NOTE — PROGRESS NOTES
RADIATION ONCOLOGY WEEKLY ON TREATMENT VISIT   Encounter Date: 2021    Patient Name: Raj Warren  MRN: [id number]  : 1950     Disease and Stage: pT4a N0 M0 squamous cell carcinoma of the larynx  Treatment Site: Larynx and bilateral neck  Current Dose/Planned Total Dose: 2800 / 6600 cGy  Daily Fraction Size: 200 cGy/day, 5 times/week  Concurrent Chemotherapy: No    Treatment Summary:    2021: Initiation of radiotherapy    21: Weekly RT visit. Current dose of 400 cGy. Tolerating treatment well.    08/10/21: Weekly RT visit. Current dose of 1000 cGy. Tolerating treatment well.    21: Weekly RT visit. Current dose of 2400 cGy. Moderate throat pain.    21: Weekly RT visit. Current dose of 2800 cGy. Moderate throat pain.    ED visits/Hospitalizations:  None    Missed Treatments:  1. 2021 - 2021: 3-day break between fractions 13-14 secondary to machine downtime    Subjective: Mr. Warren presents to clinic today for his weekly on-treatment visit. He reports that he is doing reasonably well on examination and has no pressing concerns or complaints. He continues to have moderate throat pain which he rates as a 6-7/10 in severity. He is currently treating his symptoms with as needed acetaminophen and reports adequate pain relief on this regimen and he feels that he does not require any additional pain meds at this time. He is eating a regular diet by mouth and his weight has been stable over the past week. He was scheduled to see thoracic surgery for evaluation of shortening his PEG however he left the appointment without being seen.    ROS:   Constitutional  Pain (0-10): 6 (mouth), 7 (throat), 6 (skin)   Fatigue: Moderate    CNS  Headaches: None    ENT  Mucositis: Mild    Cardiopulmonary  Dyspnea: None    GI  Nausea/vomiting: None    Nutrition  PEG: Yes (not currently using)  Diet: Regular diet    Integumentary  Dermatitis: Mild to moderate    Objective:   Current weight:  87.5 kg  Last week's weight: 88.3 kg  Starting weight: 88.6 kg    BP: 122/82 (sitting), 129/78 (standing)  Pulse: 81 (sitting), 85 (standing)     General: Healthy-appearing 71-year-old gentleman seated comfortably in a chair in no acute distress  HEENT: NC/AT.  EOMI.  No rhinorrhea or epistaxis.  No oral mucositis or thrush.  Neck: Tracheostomy is midline with intact stoma  Pulmonary: No wheezing, stridor or respiratory distress  Abdomen: PEG site is clean/dry/intact.   Skin: Mild to moderate erythema of the bilateral neck.  No desquamation.     Treatment-related toxicities (CTCAE v5.0):  1. Mucositis: Grade 1  2. Dermatitis: Grade 1    Assessment:    Mr. Warren is a 71 year old male with a pT4a N0 M0 squamous cell carcinoma of the larynx status post total laryngectomy and bilateral neck dissection. He is receiving adjuvant radiotherapy for improved locoregional disease control. He is tolerating treatment well with mild acute radiation-induced toxicities.    Plan:   pT4a N0 M0 squamous cell carcinoma of the larynx:    Continue radiotherapy    Pain management:    Acetaminophen as needed for mild to moderate pain     Fluids/Electrolytes/Nutrition:    Diet as tolerated with caloric goals as delineated by the oncology dietitian    Dermatitis:    BID Aquaphor application to the bilateral neck    Mucositis:    Pain control regimen as described above    Salt/soda rinses as needed for thickened oral cavity secretions    Mosaiq chart and setup information reviewed  IGRT images reviewed    Medications reviewed    Serg Kennedy MD/PhD    Dept of Radiation Oncology  [facility]

## 2021-08-27 ENCOUNTER — APPOINTMENT (OUTPATIENT)
Dept: RADIATION ONCOLOGY | Facility: CLINIC | Age: 71
End: 2021-08-27
Attending: RADIOLOGY
Payer: COMMERCIAL

## 2021-08-27 PROCEDURE — 77336 RADIATION PHYSICS CONSULT: CPT | Performed by: RADIOLOGY

## 2021-08-27 PROCEDURE — 77427 RADIATION TX MANAGEMENT X5: CPT | Performed by: RADIOLOGY

## 2021-08-27 PROCEDURE — 77386 HC IMRT TREATMENT DELIVERY, COMPLEX: CPT | Performed by: RADIOLOGY

## 2021-08-27 PROCEDURE — 77014 PR CT GUIDE FOR PLACEMENT RADIATION THERAPY FIELDS: CPT | Mod: 26 | Performed by: RADIOLOGY

## 2021-08-28 ENCOUNTER — APPOINTMENT (OUTPATIENT)
Dept: RADIATION ONCOLOGY | Facility: CLINIC | Age: 71
End: 2021-08-28
Attending: RADIOLOGY
Payer: COMMERCIAL

## 2021-08-28 PROCEDURE — 77386 HC IMRT TREATMENT DELIVERY, COMPLEX: CPT | Performed by: RADIOLOGY

## 2021-08-28 PROCEDURE — 77014 PR CT GUIDE FOR PLACEMENT RADIATION THERAPY FIELDS: CPT | Mod: 26 | Performed by: RADIOLOGY

## 2021-08-29 ENCOUNTER — APPOINTMENT (OUTPATIENT)
Dept: RADIATION ONCOLOGY | Facility: CLINIC | Age: 71
End: 2021-08-29
Attending: RADIOLOGY
Payer: COMMERCIAL

## 2021-08-29 PROCEDURE — 77014 PR CT GUIDE FOR PLACEMENT RADIATION THERAPY FIELDS: CPT | Mod: 26 | Performed by: RADIOLOGY

## 2021-08-29 PROCEDURE — 77386 HC IMRT TREATMENT DELIVERY, COMPLEX: CPT | Performed by: RADIOLOGY

## 2021-08-29 NOTE — PROGRESS NOTES
Dear Dr. Bean:    I had the pleasure of seeing Raj Warren in follow-up today at the [facility].     History of Present Illness:  Raj Warren is 71 year old man with a T4aN0 SCC of the larynx. The patient presented to the ED with a week history of shortness of breath, along with symptoms of sore throat, bilateral ear pain. He had stridor vs wheezing, mild increased work of breathing, and hoarse voice at that time. He was treated with steroids and nebulizers with improvement in his symptoms and was discharged.  He had recurrent symptoms and was seen in the ED again on 4/12/2021. He had a CT scan performed on 4/12/2021 which demonstrated a 3.9 x 1.6 x 3.9 cm mass involving the right true cord, right AE fold, right false cord, posterior hypopharynx, proximal trachea, with involvement of the thyroid cartilage, involvement of the prelaryngeal tissue, no abnormal nodes. Dr Willoughby (local ENT), scope exam was performed, and patient was admitted for a trach and DL/bx. Dr Johnson and Dr Sauceda performed this procedure on 4/13/2021. A tracheal window at 2nd tracheal ring was performed with placement of an 8 cuffed Shiley. Pathology showed moderately differentiated SCC. He was seen by Dr Bean of medical oncology on 4/13/2021. He was followed by local ENT team during his hospitalization. He was recommended for chemoradiation. He was seen by Dr Mclaughlin of [facility], planning 7 weeks treatment, and CT simulation was done. He did have a VSS done while inpatient without evidence of aspiration. The patient was referred here for discussion of possible salvage laryngectomy after chemoradiation. He had a PET scan on 4/26/2021 which showed an FDG avid mass in the larynx involving the supraglottis/glottis/subglottis, hypermetabolic left level 4 node, FDG avid nodule in the right lung thought to be infectious/inflammatory. He was then admitted to the hospital locally from 4/27/2021 to 5/8/2021 after  presenting with worsening shortness of breath, found to have bilateral PE with significant clot burden including the right pulmonary artery and the right ventricle. He was taken to the OR for pulmonary embolectomy and RV thrombectomy. He had IVC filter placed 4/30/2021. His course was complicated by hemodynamic instability requiring pressors, poor healing of his sternal incision requiring wound vac placement. He was discharged on eliquis. He was sent to rehab after his admission, discharged 5/15/2021. He saw Dr Bean in follow-up on 5/27/2021 and was recommended for repeat imaging. This showed slight decrease in size of the tumor, no distant disease.     He was seen as a new patient on 6/18/2021. After extensive preoperative counseling especially in light of his recent PE and anticoagulation, the recommendations was for surgery given the cartilaginous erosion seen on imaging. He was taken to the OR on 7/8/2021 for a DL, total laryngectomy, bilateral neck dissections, cricopharyngeal myotomy. His hospital course was uncomplicated, was restarted on his anticoagulation. His final pathology demonstrated a 6 cm SCC with invasion through the thyroid cartilage, involved the cricoid cartilage, involved the right pyriform sinus, extended to the left side of the tracheostomy site, PNI present, focal lymphatic invasion, 0/75 nodes. The main specimen had a positive margin at the pyriform sinus, margins were taken from the main specimen and were negative (named right pharyngeal wall mucosal margin).      Interval history:  He comes in today for follow-up. He was last seen in August 2021. He is currently receiving his radiation under the care of Dr Kennedy, started on 8/4/2021. He comes in today for removal of his stoma sutures. He is taking everything by mouth. He is not using his PEG. He says that he has lost 1 lb.       MEDICATIONS:     Current Outpatient Medications   Medication Sig Dispense Refill     acetaminophen (TYLENOL)  325 MG tablet 2 tablets (650 mg) by Per G Tube route every 8 hours as needed for mild pain or fever 90 tablet 0     apixaban ANTICOAGULANT (ELIQUIS) 5 MG tablet 1 tablet (5 mg) by Per G Tube route 2 times daily 60 tablet 11     metoprolol tartrate (LOPRESSOR) 25 MG tablet Take 1 tablet (25 mg) by mouth 2 times daily 60 tablet 3     mineral oil-hydrophilic petrolatum (AQUAPHOR) external ointment Apply topically every 8 hours 198 g 0     pantoprazole (PROTONIX) 40 MG EC tablet Take 1 tablet (40 mg) by mouth every morning (before breakfast) 90 tablet 2       ALLERGIES:    Allergies   Allergen Reactions     Pollen Extract        HABITS/SOCIAL HISTORY:    Former smoker  ppd x 8 yrs, quit in . Sober from alcohol.   Lives alone. His family does not live in the immediate [place] area.   He is a retired .       Social History     Socioeconomic History     Marital status: Single     Spouse name: Not on file     Number of children: Not on file     Years of education: Not on file     Highest education level: Not on file   Occupational History     Not on file   Tobacco Use     Smoking status: Former Smoker     Types: Cigarettes     Quit date: 1989     Years since quittin.6     Smokeless tobacco: Never Used     Tobacco comment: quit in   had smoked about 1/2 pack a day then cut back   Substance and Sexual Activity     Alcohol use: No     Comment: No alcohol since      Drug use: No     Sexual activity: Yes     Partners: Female   Other Topics Concern     Parent/sibling w/ CABG, MI or angioplasty before 65F 55M? Not Asked   Social History Narrative     Not on file     Social Determinants of Health     Financial Resource Strain: Low Risk      Difficulty of Paying Living Expenses: Not hard at all   Food Insecurity: No Food Insecurity     Worried About Running Out of Food in the Last Year: Never true     Ran Out of Food in the Last Year: Never true   Transportation Needs: No Transportation  Needs     Lack of Transportation (Medical): No     Lack of Transportation (Non-Medical): No   Physical Activity:      Days of Exercise per Week:      Minutes of Exercise per Session:    Stress:      Feeling of Stress :    Social Connections:      Frequency of Communication with Friends and Family:      Frequency of Social Gatherings with Friends and Family:      Attends Gnosticism Services:      Active Member of Clubs or Organizations:      Attends Club or Organization Meetings:      Marital Status:    Intimate Partner Violence:      Fear of Current or Ex-Partner:      Emotionally Abused:      Physically Abused:      Sexually Abused:        PAST MEDICAL HISTORY:   Past Medical History:   Diagnosis Date     Allergic state      Anemia      Former smoker      Malignant neoplasm of larynx (H) 04/20/2021     Pulmonary emboli (H) 04/28/2021    Bilateral-s/p embolectomy and IVC filter placement     S/P percutaneous endoscopic gastrostomy (PEG) tube placement (H)      Tracheostomy in place (H)         PAST SURGICAL HISTORY:   Past Surgical History:   Procedure Laterality Date     DISSECTION RADICAL NECK BILATERAL Bilateral 7/8/2021    Procedure: bilateral neck dissections;  Surgeon: Marilou Sethi MD;  Location: [facility]     ENDOSCOPIC INSERTION TUBE GASTROSTOMY Left 7/8/2021    Procedure: INSERTION, PEG TUBE with Esophagogastroduodenoscopy;  Surgeon: Kg Montaño MD;  Location: [facility]     IR IVC FILTER PLACEMENT  4/30/2021     LARYNGECTOMY N/A 7/8/2021    Procedure: LARYNGECTOMY;  Surgeon: Marilou Sethi MD;  Location: [facility]     LARYNGOSCOPY N/A 4/12/2021    Procedure: LARYNGOSCOPY;  Surgeon: Choco Johnson MD;  Location: [facility]     LARYNGOSCOPY N/A 7/8/2021    Procedure: LARYNGOSCOPY;  Surgeon: Marilou Sethi MD;  Location: [facility]     REPAIR ANEURYSM ASCENDING AORTA N/A 4/28/2021    Procedure: PULMONARY THROMBECTOMY;  Surgeon: Yumi Singh MD;  Location: [facility]     TRACHEOSTOMY  4/12/2021     Procedure: CREATION, TRACHEOSTOMY;  Surgeon: Choco Johnson MD;  Location: [facility]       FAMILY HISTORY:    Family History   Problem Relation Age of Onset     Circulatory Mother         blood clots     Alcohol/Drug Father         alcohol drank heavily     Alcohol/Drug Brother         alcohol       REVIEW OF SYSTEMS:  12 point ROS was negative other than the symptoms noted above in the HPI.  Patient Supplied Answers to Review of Systems   ENT ROS 8/30/2021   Ears, Nose, Throat Nasal congestion or drainage, Sore throat, Trouble swallowing, Hoarseness         PHYSICAL EXAMINATION:   /84   Pulse 86   Temp 97.2  F (36.2  C)   Wt 87 kg (191 lb 12.8 oz)   SpO2 100%   BMI 26.75 kg/m    Patient in NAD  Breathing comfortably on room air  Mouthes words to communicate, communicates well with electrolarynx  Healed apron neck incision  Margy tube with HME in place, stoma patent  G tube in place, some crusting around it, silk suture still in place       PROCEDURES:       RESULTS REVIEWED:       IMPRESSION AND PLAN:   Raj Warren is a 71 year old man with a T4N0 SCC of the larynx. He underwent [facility] tr[facility] by [facility]. He was taken to the OR on 7/8/2021 for a total laryngectomy with bilateral neck dissections.     He had his stoma sutures removed today.     He is receiving postoperative radiation.    He should continue to follow with SLP during his treatment.    I would like to see him back either 10/29 or 11/5 in multi D clinic - already scheduled.      Thank you very much for the opportunity to participate in the care of your patient.      Marilou Sethi MD, M.D.  Otolaryngology- Head & Neck Surgery      This note was dictated with voice recognition software and then edited. Please excuse any unintentional errors.         CC:  Rosa Bean MD  [facility]  [address]  [facility] [address]      Serg Kennedy MD  Department of Radiation Oncology  [facility]  [place]

## 2021-08-30 ENCOUNTER — THERAPY VISIT (OUTPATIENT)
Dept: SPEECH THERAPY | Facility: CLINIC | Age: 71
End: 2021-08-30
Payer: COMMERCIAL

## 2021-08-30 ENCOUNTER — OFFICE VISIT (OUTPATIENT)
Dept: OTOLARYNGOLOGY | Facility: CLINIC | Age: 71
End: 2021-08-30
Payer: COMMERCIAL

## 2021-08-30 ENCOUNTER — APPOINTMENT (OUTPATIENT)
Dept: RADIATION ONCOLOGY | Facility: CLINIC | Age: 71
End: 2021-08-30
Attending: RADIOLOGY
Payer: COMMERCIAL

## 2021-08-30 VITALS
DIASTOLIC BLOOD PRESSURE: 84 MMHG | TEMPERATURE: 97.2 F | SYSTOLIC BLOOD PRESSURE: 119 MMHG | BODY MASS INDEX: 26.75 KG/M2 | WEIGHT: 191.8 LBS | HEART RATE: 86 BPM | OXYGEN SATURATION: 100 %

## 2021-08-30 DIAGNOSIS — C32.9 MALIGNANT NEOPLASM OF LARYNX (H): Primary | ICD-10-CM

## 2021-08-30 DIAGNOSIS — R13.12 OROPHARYNGEAL DYSPHAGIA: ICD-10-CM

## 2021-08-30 DIAGNOSIS — C32.9 MALIGNANT NEOPLASM OF LARYNX (H): ICD-10-CM

## 2021-08-30 DIAGNOSIS — R49.1 APHONIA: Primary | ICD-10-CM

## 2021-08-30 PROCEDURE — 92507 TX SP LANG VOICE COMM INDIV: CPT | Mod: GN | Performed by: SPEECH-LANGUAGE PATHOLOGIST

## 2021-08-30 PROCEDURE — 99024 POSTOP FOLLOW-UP VISIT: CPT | Performed by: OTOLARYNGOLOGY

## 2021-08-30 PROCEDURE — 77386 HC IMRT TREATMENT DELIVERY, COMPLEX: CPT | Performed by: RADIOLOGY

## 2021-08-30 ASSESSMENT — PAIN SCALES - GENERAL: PAINLEVEL: NO PAIN (0)

## 2021-08-30 NOTE — LETTER
8/30/2021       RE: Raj Warren  [address]  [facility] [address]     Dear Colleague,    Thank you for referring your patient, Raj Warren, to the [facility] [place] at [facility]. Please see a copy of my visit note below.    Dear Dr. Bean:    I had the pleasure of seeing Raj Warren in follow-up today at the [facility].     History of Present Illness:  Raj Warren is 71 year old man with a T4aN0 SCC of the larynx. The patient presented to the ED with a week history of shortness of breath, along with symptoms of sore throat, bilateral ear pain. He had stridor vs wheezing, mild increased work of breathing, and hoarse voice at that time. He was treated with steroids and nebulizers with improvement in his symptoms and was discharged.  He had recurrent symptoms and was seen in the ED again on 4/12/2021. He had a CT scan performed on 4/12/2021 which demonstrated a 3.9 x 1.6 x 3.9 cm mass involving the right true cord, right AE fold, right false cord, posterior hypopharynx, proximal trachea, with involvement of the thyroid cartilage, involvement of the prelaryngeal tissue, no abnormal nodes. Dr Willoughby (local ENT), scope exam was performed, and patient was admitted for a trach and DL/bx. Dr Johnson and Dr Sauceda performed this procedure on 4/13/2021. A tracheal window at 2nd tracheal ring was performed with placement of an 8 cuffed Shiley. Pathology showed moderately differentiated SCC. He was seen by Dr Bean of medical oncology on 4/13/2021. He was followed by local ENT team during his hospitalization. He was recommended for chemoradiation. [place] [facility], and CT simulation was done. He did have a VSS done while inpatient without evidence of aspiration. The patient was referred here for discussion of possible salvage  laryngectomy after chemoradiation. He had a PET scan on 4/26/2021 which showed an FDG avid mass in the larynx involving the supraglottis/glottis/subglottis, hypermetabolic left level 4 node, FDG avid nodule in the right lung thought to be infectious/inflammatory. He was then admitted to the hospital locally from 4/27/2021 to 5/8/2021 after presenting with worsening shortness of breath, found to have bilateral PE with significant clot burden including the right pulmonary artery and the right ventricle. He was taken to the OR for pulmonary embolectomy and RV thrombectomy. He had IVC filter placed 4/30/2021. His course was complicated by hemodynamic instability requiring pressors, poor healing of his sternal incision requiring wound vac placement. He was discharged on eliquis. He was sent to rehab after his admission, discharged 5/15/2021. He saw Dr Bean in follow-up on 5/27/2021 and was recommended for repeat imaging. This showed slight decrease in size of the tumor, no distant disease.     He was seen as a new patient on 6/18/2021. After extensive preoperative counseling especially in light of his recent PE and anticoagulation, the recommendations was for surgery given the cartilaginous erosion seen on imaging. He was taken to the OR on 7/8/2021 for a DL, total laryngectomy, bilateral neck dissections, cricopharyngeal myotomy. His hospital course was uncomplicated, was restarted on his anticoagulation. His final pathology demonstrated a 6 cm SCC with invasion through the thyroid cartilage, involved the cricoid cartilage, involved the right pyriform sinus, extended to the left side of the tracheostomy site, PNI present, focal lymphatic invasion, 0/75 nodes. The main specimen had a positive margin at the pyriform sinus, margins were taken from the main specimen and were negative (named right pharyngeal wall mucosal margin).      Interval history:  He comes in today for follow-up. He was last seen in August 2021. He is  currently receiving his radiation under the care of Dr Kennedy, started on 2021. He comes in today for removal of his stoma sutures. He is taking everything by mouth. He is not using his PEG. He says that he has lost 1 lb.       MEDICATIONS:     Current Outpatient Medications   Medication Sig Dispense Refill     acetaminophen (TYLENOL) 325 MG tablet 2 tablets (650 mg) by Per G Tube route every 8 hours as needed for mild pain or fever 90 tablet 0     apixaban ANTICOAGULANT (ELIQUIS) 5 MG tablet 1 tablet (5 mg) by Per G Tube route 2 times daily 60 tablet 11     metoprolol tartrate (LOPRESSOR) 25 MG tablet Take 1 tablet (25 mg) by mouth 2 times daily 60 tablet 3     mineral oil-hydrophilic petrolatum (AQUAPHOR) external ointment Apply topically every 8 hours 198 g 0     pantoprazole (PROTONIX) 40 MG EC tablet Take 1 tablet (40 mg) by mouth every morning (before breakfast) 90 tablet 2       ALLERGIES:    Allergies   Allergen Reactions     Pollen Extract        HABITS/SOCIAL HISTORY:    Former smoker 1/2 ppd x 8 yrs, quit in . Sober from alcohol.   Lives alone. His family does not live in the immediate [place] area.   He is a retired .       Social History     Socioeconomic History     Marital status: Single     Spouse name: Not on file     Number of children: Not on file     Years of education: Not on file     Highest education level: Not on file   Occupational History     Not on file   Tobacco Use     Smoking status: Former Smoker     Types: Cigarettes     Quit date: 1989     Years since quittin.6     Smokeless tobacco: Never Used     Tobacco comment: quit in   had smoked about 1/2 pack a day then cut back   Substance and Sexual Activity     Alcohol use: No     Comment: No alcohol since      Drug use: No     Sexual activity: Yes     Partners: Female   Other Topics Concern     Parent/sibling w/ CABG, MI or angioplasty before 65F 55M? Not Asked   Social History Narrative      Not on file     Social Determinants of Health     Financial Resource Strain: Low Risk      Difficulty of Paying Living Expenses: Not hard at all   Food Insecurity: No Food Insecurity     Worried About Running Out of Food in the Last Year: Never true     Ran Out of Food in the Last Year: Never true   Transportation Needs: No Transportation Needs     Lack of Transportation (Medical): No     Lack of Transportation (Non-Medical): No   Physical Activity:      Days of Exercise per Week:      Minutes of Exercise per Session:    Stress:      Feeling of Stress :    Social Connections:      Frequency of Communication with Friends and Family:      Frequency of Social Gatherings with Friends and Family:      Attends Zoroastrian Services:      Active Member of Clubs or Organizations:      Attends Club or Organization Meetings:      Marital Status:    Intimate Partner Violence:      Fear of Current or Ex-Partner:      Emotionally Abused:      Physically Abused:      Sexually Abused:        PAST MEDICAL HISTORY:   Past Medical History:   Diagnosis Date     Allergic state      Anemia      Former smoker      Malignant neoplasm of larynx (H) 04/20/2021     Pulmonary emboli (H) 04/28/2021    Bilateral-s/p embolectomy and IVC filter placement     S/P percutaneous endoscopic gastrostomy (PEG) tube placement (H)      Tracheostomy in place (H)         PAST SURGICAL HISTORY:   Past Surgical History:   Procedure Laterality Date     DISSECTION RADICAL NECK BILATERAL Bilateral 7/8/2021    Procedure: bilateral neck dissections;  Surgeon: Marilou Sethi MD;  [facility]     ENDOSCOPIC INSERTION TUBE GASTROSTOMY Left 7/8/2021    Procedure: INSERTION, PEG TUBE with Esophagogastroduodenoscopy;  Surgeon: Kg Montaño MD;  Location: [facility]     IR IVC FILTER PLACEMENT  4/30/2021     LARYNGECTOMY N/A 7/8/2021    Procedure: LARYNGECTOMY;  Surgeon: Marilou Sethi MD;  Location: [facility]     LARYNGOSCOPY N/A 4/12/2021    Procedure:  LARYNGOSCOPY;  Surgeon: Choco Johnson MD;  Location:  OR     LARYNGOSCOPY N/A 7/8/2021    Procedure: LARYNGOSCOPY;  Surgeon: Marilou Sethi MD;  Location: [facility]     REPAIR ANEURYSM ASCENDING AORTA N/A 4/28/2021    Procedure: PULMONARY THROMBECTOMY;  Surgeon: Yumi Singh MD;  [facility]     TRACHEOSTOMY  4/12/2021    Procedure: CREATION, TRACHEOSTOMY;  Surgeon: Choco Johnson MD;  Location: [facility]       FAMILY HISTORY:    Family History   Problem Relation Age of Onset     Circulatory Mother         blood clots     Alcohol/Drug Father         alcohol drank heavily     Alcohol/Drug Brother         alcohol       REVIEW OF SYSTEMS:  12 point ROS was negative other than the symptoms noted above in the HPI.  Patient Supplied Answers to Review of Systems  [facility] ROS 8/30/2021   Ears, Nose, Throat Nasal congestion or drainage, Sore throat, Trouble swallowing, Hoarseness         PHYSICAL EXAMINATION:   /84   Pulse 86   Temp 97.2  F (36.2  C)   Wt 87 kg (191 lb 12.8 oz)   SpO2 100%   BMI 26.75 kg/m    Patient in NAD  Breathing comfortably on room air  Mouthes words to communicate, communicates well with electrolarynx  Healed apron neck incision  Margy tube with HME in place, stoma patent  G tube in place, some crusting around it, silk suture still in place       PROCEDURES:       RESULTS REVIEWED:       IMPRESSION AND PLAN:   Raj Warren is a 71 year old man with a T4N0 SCC of the larynx. He underwent [facility]. He was taken to the OR on 7/8/2021 for a total laryngectomy with bilateral neck dissections.     He had his stoma sutures removed today.     He is receiving postoperative radiation.    He should continue to follow with [facility] during his treatment.    I would like to see him back either 10/29 or 11/5 in [facility] clinic - already scheduled.      Thank you very much for the opportunity to participate in the care of your patient.      Marilou Sethi,  HAYLEY VALERA.  Otolaryngology- Head & Neck Surgery      This note was dictated with voice recognition software and then edited. Please excuse any unintentional errors.         CC:  Rosa Bean MD  [facility]  [address] [address]      Serg Kennedy MD  Department of Radiation Oncology  [facility]

## 2021-08-30 NOTE — NURSING NOTE
Chief Complaint   Patient presents with     RECHECK     wound check     Blood pressure 119/84, pulse 86, temperature 97.2  F (36.2  C), weight 87 kg (191 lb 12.8 oz), SpO2 100 %.    Ruddy Mckinney LPN

## 2021-08-31 ENCOUNTER — APPOINTMENT (OUTPATIENT)
Dept: RADIATION ONCOLOGY | Facility: CLINIC | Age: 71
End: 2021-08-31
Attending: RADIOLOGY
Payer: COMMERCIAL

## 2021-08-31 PROCEDURE — 77386 HC IMRT TREATMENT DELIVERY, COMPLEX: CPT | Performed by: RADIOLOGY

## 2021-08-31 PROCEDURE — 77014 PR CT GUIDE FOR PLACEMENT RADIATION THERAPY FIELDS: CPT | Mod: 26 | Performed by: RADIOLOGY

## 2021-09-01 ENCOUNTER — APPOINTMENT (OUTPATIENT)
Dept: RADIATION ONCOLOGY | Facility: CLINIC | Age: 71
End: 2021-09-01
Attending: RADIOLOGY
Payer: COMMERCIAL

## 2021-09-01 ENCOUNTER — OFFICE VISIT (OUTPATIENT)
Dept: SURGERY | Facility: CLINIC | Age: 71
End: 2021-09-01
Attending: CLINICAL NURSE SPECIALIST
Payer: COMMERCIAL

## 2021-09-01 VITALS
BODY MASS INDEX: 27.62 KG/M2 | DIASTOLIC BLOOD PRESSURE: 85 MMHG | SYSTOLIC BLOOD PRESSURE: 126 MMHG | WEIGHT: 198 LBS | RESPIRATION RATE: 16 BRPM | OXYGEN SATURATION: 100 % | HEART RATE: 83 BPM | TEMPERATURE: 98.2 F

## 2021-09-01 DIAGNOSIS — Z48.89 SUTURE CHECK: ICD-10-CM

## 2021-09-01 DIAGNOSIS — C32.9 LARYNGEAL CANCER (H): Primary | ICD-10-CM

## 2021-09-01 PROCEDURE — 77427 RADIATION TX MANAGEMENT X5: CPT | Performed by: RADIOLOGY

## 2021-09-01 PROCEDURE — 77386 HC IMRT TREATMENT DELIVERY, COMPLEX: CPT | Performed by: RADIOLOGY

## 2021-09-01 PROCEDURE — G0463 HOSPITAL OUTPT CLINIC VISIT: HCPCS

## 2021-09-01 PROCEDURE — 77336 RADIATION PHYSICS CONSULT: CPT | Performed by: RADIOLOGY

## 2021-09-01 PROCEDURE — 99212 OFFICE O/P EST SF 10 MIN: CPT | Performed by: CLINICAL NURSE SPECIALIST

## 2021-09-01 ASSESSMENT — PAIN SCALES - GENERAL: PAINLEVEL: NO PAIN (0)

## 2021-09-01 NOTE — PROGRESS NOTES
REASON FOR VISIT:  Suture check    PROCEDURES PERFORMED:   EGD, PEG placement    DATE ABOVE PROCEDURES PERFORMED:  7/8/21    SURGEON:  Dr. Kg Montaño    History of Present Illness:  Patient is being followed by ENT (Dr. Marilou Sethi) for laryngeal cancer-  At his recent appt, it was noted that he may have a suture at his PEG site that needs to be removed.    Assessment:   On examination, there is a silk suture in skin at lower left ridge of skin surrounding the PEG.   There is no evidence of erythema or irritation/drainage.  A 50/50 mix of sterile water and hydrogen peroxide was applied with cotton swab to soften the area.  This suture was then removed without incident.    Plan:  Call or return PRN    Total time:  15 minutes

## 2021-09-01 NOTE — LETTER
9/1/2021         RE: Raj Warren  [address]  [facility] [address]        Dear Colleague,    Thank you for referring your patient, Raj Warren, to the [facility]. Please see a copy of my visit note below.    REASON FOR VISIT:  Suture check    PROCEDURES PERFORMED:   EGD, PEG placement    DATE ABOVE PROCEDURES PERFORMED:  7/8/21    SURGEON:  Dr. Kg Montaño    History of Present Illness:  Patient is being followed by ENT (Dr. Marilou Sethi) for laryngeal cancer-  At his recent appt, it was noted that he may have a suture at his PEG site that needs to be removed.    Assessment:   On examination, there is a silk suture in skin at lower left ridge of skin surrounding the PEG.   There is no evidence of erythema or irritation/drainage.  A 50/50 mix of sterile water and hydrogen peroxide was applied with cotton swab to soften the area.  This suture was then removed without incident.    Plan:  Call or return PRN    Total time:  15 minutes      Again, thank you for allowing me to participate in the care of your patient.        Sincerely,        TARUN Granados CNS

## 2021-09-01 NOTE — PROGRESS NOTES
RADIATION ONCOLOGY WEEKLY ON TREATMENT VISIT   Encounter Date: 2021     Patient Name: Raj Warren  MRN: [id number]  : 1950      Disease and Stage: pT4a N0 M0 squamous cell carcinoma of the larynx  Treatment Site: Larynx and bilateral neck  Current Dose/Planned Total Dose:   4200 / 6600 cGy    Daily Fraction Size: 200 cGy/day, 5 times/week  Concurrent Chemotherapy: No     Treatment Summary:    2021: Initiation of radiotherapy    21: Weekly RT visit. Current dose of 400 cGy. Tolerating treatment well.    08/10/21: Weekly RT visit. Current dose of 1000 cGy. Tolerating treatment well.    21: Weekly RT visit. Current dose of 2400 cGy. Moderate throat pain.    21: Weekly RT visit. Current dose of 2800 cGy. Moderate throat pain.    21: Weekly RT visit. Current dose of 4200 cGy. Moderate throat pain.     ED visits/Hospitalizations:  None     Missed Treatments:  1. 2021 - 2021: 3-day break between fractions 13-14 secondary to machine downtime     Subjective: Mr. Warren presents to clinic today for his weekly on-treatment visit. He reports that he is doing reasonably well on examination and has no pressing concerns or complaints. He continues to have moderate throat pain which he rates as a 6-7/10 in severity. He is currently treating his symptoms with as needed acetaminophen and reports adequate pain relief on this regimen and he feels that he does not require any additional pain meds at this time. He is eating a regular diet by mouth and his weight has been stable over the past week. He was scheduled to see thoracic surgery for evaluation of shortening his PEG however he left the appointment without being seen.     ROS:   Constitutional  Pain (0-10): 6 (mouth), 7 (throat), 6 (skin)   Fatigue: Moderate     CNS  Headaches: None     ENT  Mucositis: Mild     Cardiopulmonary  Dyspnea: None     GI  Nausea/vomiting: None     Nutrition  PEG: Yes (not currently  using)  Diet: Regular diet     Integumentary  Dermatitis: Mild to moderate     Objective:   Current weight: 87.5 kg  Last week's weight: 88.3 kg  Starting weight: 88.6 kg     BP: 122/82 (sitting), 129/78 (standing)  Pulse: 81 (sitting), 85 (standing)      General: Healthy-appearing 71-year-old gentleman seated comfortably in a chair in no acute distress  HEENT: NC/AT.  EOMI.  No rhinorrhea or epistaxis.  No oral mucositis or thrush.  Neck: Tracheostomy is midline with intact stoma  Pulmonary: No wheezing, stridor or respiratory distress  Abdomen: PEG site is clean/dry/intact.   Skin: Mild to moderate erythema of the bilateral neck.  No desquamation.      Treatment-related toxicities (CTCAE v5.0):  1. Mucositis: Grade 1  2. Dermatitis: Grade 1     Assessment:    Mr. Warren is a 71 year old male with a pT4a N0 M0 squamous cell carcinoma of the larynx status post total laryngectomy and bilateral neck dissection. He is receiving adjuvant radiotherapy for improved locoregional disease control. He is tolerating treatment well with mild acute radiation-induced toxicities.     Plan:   pT4a N0 M0 squamous cell carcinoma of the larynx:    Continue radiotherapy     Pain management:    Acetaminophen as needed for mild to moderate pain      Fluids/Electrolytes/Nutrition:    Diet as tolerated with caloric goals as delineated by the oncology dietitian     Dermatitis:    BID Aquaphor application to the bilateral neck     Mucositis:    Pain control regimen as described above    Salt/soda rinses as needed for thickened oral cavity secretions     Mosaiq chart and setup information reviewed  IGRT images reviewed     Medications reviewed    Kenny Rowland MD  PGY-5 Resident, Radiation Oncology  [facility]  Phone:[phone]  Pager:[phone]    ***

## 2021-09-01 NOTE — NURSING NOTE
"Oncology Rooming Note    September 1, 2021 8:46 AM   Raj Warren is a 71 year old male who presents for:    Chief Complaint   Patient presents with     Oncology Clinic Visit     Return; PEG check     Initial Vitals: /85   Pulse 83   Temp 98.2  F (36.8  C) (Oral)   Resp 16   Wt 89.8 kg (198 lb)   SpO2 100%   BMI 27.62 kg/m   Estimated body mass index is 27.62 kg/m  as calculated from the following:    Height as of 8/9/21: 1.803 m (5' 11\").    Weight as of this encounter: 89.8 kg (198 lb). Body surface area is 2.12 meters squared.  No Pain (0) Comment: Data Unavailable   No LMP for male patient.  Allergies reviewed: Yes  Medications reviewed: Yes    Medications: Medication refills not needed today.  Pharmacy name entered into [email or web address]: [id number] - [place], [place] - [address] AT [address]    Clinical concerns: Patient states there are no new concerns to discuss with provider.  Faustina was not notified.         Alanna Carreon CMA              "

## 2021-09-02 ENCOUNTER — APPOINTMENT (OUTPATIENT)
Dept: RADIATION ONCOLOGY | Facility: CLINIC | Age: 71
End: 2021-09-02
Attending: RADIOLOGY
Payer: COMMERCIAL

## 2021-09-02 VITALS
DIASTOLIC BLOOD PRESSURE: 81 MMHG | HEART RATE: 91 BPM | WEIGHT: 194.5 LBS | SYSTOLIC BLOOD PRESSURE: 135 MMHG | BODY MASS INDEX: 27.13 KG/M2

## 2021-09-02 DIAGNOSIS — C32.9 MALIGNANT NEOPLASM OF LARYNX (H): Primary | ICD-10-CM

## 2021-09-02 PROCEDURE — 77386 HC IMRT TREATMENT DELIVERY, COMPLEX: CPT | Performed by: RADIOLOGY

## 2021-09-02 NOTE — PROGRESS NOTES
RADIATION ONCOLOGY WEEKLY ON TREATMENT VISIT   Encounter Date: 2021    Patient Name: Raj Warren  MRN: [id number]  : 1950     Disease and Stage: pT4a N0 M0 squamous cell carcinoma of the larynx  Treatment Site: Larynx and bilateral neck  Current Dose/Planned Total Dose: 4000 / 6600 cGy  Daily Fraction Size: 200 cGy/day, 5 times/week  Concurrent Chemotherapy: No    Treatment Summary:    2021: Initiation of radiotherapy    21: Weekly RT visit. Current dose of 400 cGy. Tolerating treatment well.    08/10/21: Weekly RT visit. Current dose of 1000 cGy. Tolerating treatment well.    21: Weekly RT visit. Current dose of 2400 cGy. Moderate throat pain.    21: Weekly RT visit. Current dose of 2800 cGy. Moderate throat pain.    21: Weekly RT visit. Current dose of 4000 cGy. Moderate throat pain.    ED visits/Hospitalizations:  None    Missed Treatments:  1. 2021 - 2021: 3-day break between fractions 13-14 secondary to machine downtime. Missed fractions made up over the following weekend.    Subjective: Mr. Warren presents to clinic today for his weekly on-treatment visit. He is reportedly doing reasonably well with his pain rated as a 7/10 in severity. He is currently controlling this with as needed acetaminophen although he does have oxycodone prescribed to him at home yet has not been using this. He continues to tolerate a solid diet by mouth and his weight is stable over the past week. His remaining ROS are otherwise unchanged from last week with the exception of slight constipation.    ROS:   Constitutional  Pain (0-10): 7 (mouth), 7 (throat), 7 (skin)   Fatigue: Moderate    CNS  Headaches: None    ENT  Mucositis: Moderate    Cardiopulmonary  Dyspnea: None    GI  Nausea/vomiting: None  Constipation: Mild    Nutrition  PEG: Yes (not currently using)  Diet: Regular diet    Integumentary  Dermatitis: Mild to moderate    Objective:   Current weight: 88.2 kg  Last  week's weight: 87.5 kg  Starting weight: 88.6 kg    BP: 135/81 (sitting), 144/85 (standing)  Pulse: 91 (sitting), 93 (standing)     General: Healthy-appearing 71-year-old gentleman seated comfortably in an examination chair in no acute distress  HEENT: NC/AT. EOMI. No rhinorrhea or epistaxis. Mildly dry mucous membranes. No thrush.  Neck: Tracheostomy is midline. Stoma intact with mild amount of thickened secretions along the inferior aspect of the stoma.  Pulmonary: No wheezing, stridor or respiratory distress  Abdomen: PEG site is clean/dry/intact  Skin: Mild to moderate erythema of the bilateral neck. No desquamation.    Treatment-related toxicities (CTCAE v5.0):  1. Mucositis: Grade 2  2. Dermatitis: Grade 1    Assessment:    Mr. Warren is a 71 year old male with a pT4a N0 M0 squamous cell carcinoma of the larynx status post total laryngectomy and bilateral neck dissection. He is receiving adjuvant radiotherapy for improved locoregional disease control. He is tolerating treatment well with the anticipated acute radiation-induced mucositis and dermatitis.    Plan:   pT4a N0 M0 squamous cell carcinoma of the larynx:    Continue radiotherapy    Pain management:    Acetaminophen 1000 mg 3 times daily as needed for mild to moderate pain     Oxycodone 5 mg every 4-6 hours as needed for moderate to severe breakthrough pain    Fluids/Electrolytes/Nutrition:    Diet as tolerated with caloric goals as delineated by the oncology dietitian    Dermatitis:    BID Aquaphor application to the bilateral neck    Mucositis:    Pain control regimen as described above    Salt/soda rinses as needed for thickened oral cavity secretions    Constipation:    Senna 1 tab twice daily    Miralax once daily    Titrate for minimum of 1 soft bowel movement every 1-2 days    Mosaiq chart and setup information reviewed  IGRT images reviewed    Medications reviewed    Serg Kennedy MD/PhD    Dept of Radiation  Oncology  [facility]

## 2021-09-03 ENCOUNTER — APPOINTMENT (OUTPATIENT)
Dept: RADIATION ONCOLOGY | Facility: CLINIC | Age: 71
End: 2021-09-03
Attending: RADIOLOGY
Payer: COMMERCIAL

## 2021-09-03 PROCEDURE — 77386 HC IMRT TREATMENT DELIVERY, COMPLEX: CPT | Performed by: RADIOLOGY

## 2021-09-03 PROCEDURE — 77014 PR CT GUIDE FOR PLACEMENT RADIATION THERAPY FIELDS: CPT | Mod: 26 | Performed by: RADIOLOGY

## 2021-09-07 ENCOUNTER — APPOINTMENT (OUTPATIENT)
Dept: RADIATION ONCOLOGY | Facility: CLINIC | Age: 71
End: 2021-09-07
Attending: RADIOLOGY
Payer: COMMERCIAL

## 2021-09-07 PROCEDURE — 77386 HC IMRT TREATMENT DELIVERY, COMPLEX: CPT | Performed by: RADIOLOGY

## 2021-09-07 PROCEDURE — 77014 PR CT GUIDE FOR PLACEMENT RADIATION THERAPY FIELDS: CPT | Mod: 26 | Performed by: RADIOLOGY

## 2021-09-08 ENCOUNTER — APPOINTMENT (OUTPATIENT)
Dept: RADIATION ONCOLOGY | Facility: CLINIC | Age: 71
End: 2021-09-08
Attending: RADIOLOGY
Payer: COMMERCIAL

## 2021-09-08 PROCEDURE — 77014 PR CT GUIDE FOR PLACEMENT RADIATION THERAPY FIELDS: CPT | Mod: 26 | Performed by: RADIOLOGY

## 2021-09-08 PROCEDURE — 77386 HC IMRT TREATMENT DELIVERY, COMPLEX: CPT | Performed by: RADIOLOGY

## 2021-09-09 ENCOUNTER — OFFICE VISIT (OUTPATIENT)
Dept: RADIATION ONCOLOGY | Facility: CLINIC | Age: 71
End: 2021-09-09
Attending: RADIOLOGY
Payer: COMMERCIAL

## 2021-09-09 VITALS
HEART RATE: 92 BPM | BODY MASS INDEX: 25.8 KG/M2 | DIASTOLIC BLOOD PRESSURE: 80 MMHG | SYSTOLIC BLOOD PRESSURE: 113 MMHG | WEIGHT: 185 LBS

## 2021-09-09 DIAGNOSIS — C32.9 MALIGNANT NEOPLASM OF LARYNX (H): Primary | ICD-10-CM

## 2021-09-09 PROCEDURE — 77336 RADIATION PHYSICS CONSULT: CPT | Performed by: RADIOLOGY

## 2021-09-09 PROCEDURE — 77427 RADIATION TX MANAGEMENT X5: CPT | Performed by: RADIOLOGY

## 2021-09-09 PROCEDURE — 77386 HC IMRT TREATMENT DELIVERY, COMPLEX: CPT | Performed by: RADIOLOGY

## 2021-09-09 NOTE — PROGRESS NOTES
RADIATION ONCOLOGY WEEKLY ON TREATMENT VISIT   Encounter Date: 2021    Patient Name: Raj Warren  MRN: [id number]  : 1950     Disease and Stage: pT4a N0 M0 squamous cell carcinoma of the larynx  Treatment Site: Larynx and bilateral neck  Current Dose/Planned Total Dose: 5000 / 6600 cGy  Daily Fraction Size: 200 cGy/day, 5 times/week  Concurrent Chemotherapy: No    Treatment Summary:    2021: Initiation of radiotherapy    21: Weekly RT visit. Current dose of 400 cGy. Tolerating treatment well.    08/10/21: Weekly RT visit. Current dose of 1000 cGy. Tolerating treatment well.    21: Weekly RT visit. Current dose of 2400 cGy. Moderate throat pain.    21: Weekly RT visit. Current dose of 2800 cGy. Moderate throat pain.    21: Weekly RT visit. Current dose of 4000 cGy. Moderate throat pain.    21: Weekly RT visit. Current dose of 5000 cGy. Moderate throat pain.    ED visits/Hospitalizations:  None    Missed Treatments:  1. 2021 - 2021: 3-day break between fractions 13-14 secondary to machine downtime. Two missed fractions made up over the following weekend.  2. 2021: 1-day break between fractions 22-23 secondary to the Labor Day holiday    Subjective: Mr. Warren presents to clinic today for his weekly on-treatment visit. He continues to have moderate to severe throat pain which he rates as a 7/10 in severity. He remains on as needed acetaminophen and while he does have oxycodone does not yet feel that he requires this regularly for symptom relief. He is eating solid diet by mouth and is not using his PEG for any nutritional supplementation. His weight has been stable over the past week.    ROS:   Constitutional  Pain (0-10): 7 (mouth), 7 (throat), 7 (skin)   Fatigue: Moderate    CNS  Headaches: None    ENT  Mucositis: Moderate    Cardiopulmonary  Dyspnea: None    GI  Nausea/vomiting: None  Constipation: None    Nutrition  PEG: Yes (not currently  using)  Diet: Regular diet    Integumentary  Dermatitis: Mild to moderate    Objective:   Current weight: 83.9 kg  Last week's weight: 88.2 kg  Starting weight: 88.6 kg    BP: 113/80 (sitting), 114/77 (standing)  Pulse: 92 (sitting), 98 (standing)     General: Healthy-appearing 71-year-old gentleman seated comfortably in an examination chair in no acute distress  HEENT: NC/AT. EOMI. No rhinorrhea or epistaxis. Mildly dry mucous membranes. No thrush.  Neck: Tracheostomy is midline. Stoma intact with some minor crusted secretions along the inferior aspect of the stoma.  Pulmonary: No wheezing, stridor or respiratory distress  Skin: Mild to moderate erythema of the bilateral neck. No desquamation.    Treatment-related toxicities (CTCAE v5.0):  1. Mucositis: Grade 2  2. Dermatitis: Grade 1    Assessment:    Mr. Warren is a 71 year old male with a pT4a N0 M0 squamous cell carcinoma of the larynx status post total laryngectomy and bilateral neck dissection. He is receiving adjuvant radiotherapy for improved locoregional disease control. He is tolerating treatment well with the anticipated acute radiation-induced mucositis and dermatitis.    Plan:   pT4a N0 M0 squamous cell carcinoma of the larynx:    Continue radiotherapy    Pain management:    Acetaminophen 1000 mg 3 times daily as needed for mild to moderate pain     Oxycodone 5 mg every 4-6 hours as needed for moderate to severe breakthrough pain    Fluids/Electrolytes/Nutrition:    Diet as tolerated with caloric goals as delineated by the oncology dietitian    Dermatitis:    BID Aquaphor application to the bilateral neck    Mucositis:    Pain control regimen as described above    Salt/soda rinses as needed for thickened oral cavity secretions    Constipation:    Senna 1 tab twice daily    Miralax once daily    Titrate for minimum of 1 soft bowel movement every 1-2 days    Mosaiq chart and setup information reviewed  IGRT images reviewed    Medications  reviewed    Serg Kennedy MD/PhD    Dept of Radiation Oncology  [facility]

## 2021-09-10 ENCOUNTER — APPOINTMENT (OUTPATIENT)
Dept: RADIATION ONCOLOGY | Facility: CLINIC | Age: 71
End: 2021-09-10
Attending: RADIOLOGY
Payer: COMMERCIAL

## 2021-09-10 PROCEDURE — 77014 PR CT GUIDE FOR PLACEMENT RADIATION THERAPY FIELDS: CPT | Mod: 26 | Performed by: RADIOLOGY

## 2021-09-10 PROCEDURE — 77386 HC IMRT TREATMENT DELIVERY, COMPLEX: CPT | Performed by: RADIOLOGY

## 2021-09-13 ENCOUNTER — APPOINTMENT (OUTPATIENT)
Dept: RADIATION ONCOLOGY | Facility: CLINIC | Age: 71
End: 2021-09-13
Attending: RADIOLOGY
Payer: COMMERCIAL

## 2021-09-13 ENCOUNTER — TELEPHONE (OUTPATIENT)
Dept: OTHER | Facility: CLINIC | Age: 71
End: 2021-09-13

## 2021-09-13 PROCEDURE — 77014 PR CT GUIDE FOR PLACEMENT RADIATION THERAPY FIELDS: CPT | Mod: 26 | Performed by: RADIOLOGY

## 2021-09-13 PROCEDURE — 77386 HC IMRT TREATMENT DELIVERY, COMPLEX: CPT | Performed by: RADIOLOGY

## 2021-09-13 NOTE — TELEPHONE ENCOUNTER
----- Message from Rosa Bean MD sent at 8/10/2021  7:02 PM CDT -----  Let us wait till radiation is completed.    BK  ----- Message -----  From: Mayte Collins RN  Sent: 8/10/2021   2:19 PM CDT  To: Rosa Bean MD    Hello Dr. Bean,    Should the IVC filter stay in place until he is finished with radiation?  I see he is back on AC.  Please advise.  Thank you,  Lian Collins RN  IR nurse clinician  [phone]

## 2021-09-14 ENCOUNTER — APPOINTMENT (OUTPATIENT)
Dept: RADIATION ONCOLOGY | Facility: CLINIC | Age: 71
End: 2021-09-14
Attending: RADIOLOGY
Payer: COMMERCIAL

## 2021-09-14 PROCEDURE — 77386 HC IMRT TREATMENT DELIVERY, COMPLEX: CPT | Performed by: RADIOLOGY

## 2021-09-14 PROCEDURE — 77014 PR CT GUIDE FOR PLACEMENT RADIATION THERAPY FIELDS: CPT | Mod: 26 | Performed by: RADIOLOGY

## 2021-09-15 ENCOUNTER — OFFICE VISIT (OUTPATIENT)
Dept: RADIATION ONCOLOGY | Facility: CLINIC | Age: 71
End: 2021-09-15
Attending: RADIOLOGY
Payer: COMMERCIAL

## 2021-09-15 VITALS
HEART RATE: 80 BPM | DIASTOLIC BLOOD PRESSURE: 84 MMHG | SYSTOLIC BLOOD PRESSURE: 118 MMHG | WEIGHT: 195 LBS | BODY MASS INDEX: 27.2 KG/M2

## 2021-09-15 DIAGNOSIS — C32.9 MALIGNANT NEOPLASM OF LARYNX (H): Primary | ICD-10-CM

## 2021-09-15 PROCEDURE — 77336 RADIATION PHYSICS CONSULT: CPT | Performed by: RADIOLOGY

## 2021-09-15 PROCEDURE — 77427 RADIATION TX MANAGEMENT X5: CPT | Performed by: RADIOLOGY

## 2021-09-15 PROCEDURE — 77386 HC IMRT TREATMENT DELIVERY, COMPLEX: CPT | Performed by: RADIOLOGY

## 2021-09-15 NOTE — PROGRESS NOTES
RADIATION ONCOLOGY WEEKLY ON TREATMENT VISIT   Encounter Date: September 15, 2021    Patient Name: Raj Warren  MRN: [id number]  : 1950     Disease and Stage: pT4a N0 M0 squamous cell carcinoma of the larynx  Treatment Site: Larynx and bilateral neck  Current Dose/Planned Total Dose: 6000 / 6600 cGy  Daily Fraction Size: 200 cGy/day, 5 times/week  Concurrent Chemotherapy: No    Treatment Summary:    2021: Initiation of radiotherapy    21: Weekly RT visit. Current dose of 400 cGy. Tolerating treatment well.    08/10/21: Weekly RT visit. Current dose of 1000 cGy. Tolerating treatment well.    21: Weekly RT visit. Current dose of 2400 cGy. Moderate throat pain.    21: Weekly RT visit. Current dose of 2800 cGy. Moderate throat pain.    21: Weekly RT visit. Current dose of 4000 cGy. Moderate throat pain.    21: Weekly RT visit. Current dose of 5000 cGy. Moderate throat pain. 4 kg weight loss over the past week.    09/15/21: Weekly RT visit. Current dose of 6000 cGy. Moderate throat pain.    ED visits/Hospitalizations:  None    Missed Treatments:  1. 2021 - 2021: 3-day break between fractions 13-14 secondary to machine downtime. Two missed fractions made up over the following weekend.  2. 2021: 1-day break between fractions 22-23 secondary to the Labor Day holiday. Missed fraction made up over the following weekend.    Subjective: Mr. Warren presents to clinic today for his weekly on-treatment visit. He describes ongoing moderate throat pain which he rates as a 7/10 in severity. He is currently using occasional Tylenol as needed for relief of his symptoms and reports good control of his pain on this regimen. He does not feel that he requires any additional pain medication at this time. He remains on a p.o. diet with his PEG utilized only for flushes. His weight has returned to his pretreatment baseline as well as likely that the weight from last week with a 4 kg  weight loss was erroneous.    ROS:   Constitutional  Pain (0-10): 7 (mouth), 7 (throat), 7 (skin)   Fatigue: Moderate    CNS  Headaches: None    ENT  Mucositis: Moderate    Cardiopulmonary  Dyspnea: None    GI  Nausea/vomiting: None  Constipation: None    Nutrition  PEG: Yes (not currently using)  Diet: Regular diet    Integumentary  Dermatitis: Moderate    Objective:   Current weight: 88.5 kg  Last week's weight: 83.9 kg  Starting weight: 88.6 kg    BP: 118/84 (sitting), 125/85 (standing)  Pulse: 80 (sitting), 87 (standing)     General: Healthy-appearing 71-year-old gentleman seated comfortably in an examination chair in no acute distress  HEENT: NC/AT.  EOMI.  No rhinorrhea or epistaxis.  Mildly dry mucous membranes.  No evidence of oral thrush.  Neck: Tracheostomy is midline and intact with sequela of minor bleeding along the inferior aspect of the stoma.  Pulmonary: No wheezing, stridor or respiratory distress  Abdomen: PEG site is clean/dry/intact  Skin: Moderate erythema of the bilateral neck.  No desquamation.    Treatment-related toxicities (CTCAE v5.0):  1. Mucositis: Grade 2  2. Dermatitis: Grade 2    Assessment:    Mr. Warren is a 71 year old male with a pT4a N0 M0 squamous cell carcinoma of the larynx status post total laryngectomy and bilateral neck dissection. He is receiving adjuvant radiotherapy for improved locoregional disease control. He is tolerating treatment well with the anticipated acute radiation-induced mucositis and dermatitis.    Plan:   pT4a N0 M0 squamous cell carcinoma of the larynx:    Complete radiotherapy on Monday, 9/20/2021    Follow-up in radiation oncology clinic with NP in 2 weeks for symptom assessment    Follow-up in radiation oncology clinic with MD in 6 weeks    Pain management:    Acetaminophen 1000 mg 3 times daily as needed for mild to moderate pain     Oxycodone 5 mg every 4-6 hours as needed for moderate to severe breakthrough  pain    Fluids/Electrolytes/Nutrition:    P.o. diet with caloric goals as delineated by the oncology dietitian    Discontinue PEG in 2 weeks if he remains on a p.o. diet with stable weight    Dermatitis:    BID Aquaphor application to the bilateral neck    Mucositis:    Pain control regimen as described above    Salt/soda rinses as needed for thickened oral cavity secretions    Constipation:    Senna 1 tab twice daily    Miralax once daily    Titrate for minimum of 1 soft bowel movement every 1-2 days    Mosaiq chart and setup information reviewed  IGRT images reviewed    Medications reviewed    Serg Kennedy MD/PhD    Dept of Radiation Oncology  [facility]

## 2021-09-16 ENCOUNTER — THERAPY VISIT (OUTPATIENT)
Dept: SPEECH THERAPY | Facility: CLINIC | Age: 71
End: 2021-09-16
Payer: COMMERCIAL

## 2021-09-16 ENCOUNTER — APPOINTMENT (OUTPATIENT)
Dept: RADIATION ONCOLOGY | Facility: CLINIC | Age: 71
End: 2021-09-16
Attending: RADIOLOGY
Payer: COMMERCIAL

## 2021-09-16 DIAGNOSIS — C32.9 MALIGNANT NEOPLASM OF LARYNX (H): ICD-10-CM

## 2021-09-16 DIAGNOSIS — R49.1 APHONIA: Primary | ICD-10-CM

## 2021-09-16 DIAGNOSIS — R13.12 OROPHARYNGEAL DYSPHAGIA: ICD-10-CM

## 2021-09-16 PROCEDURE — 92507 TX SP LANG VOICE COMM INDIV: CPT | Mod: GN | Performed by: SPEECH-LANGUAGE PATHOLOGIST

## 2021-09-16 PROCEDURE — 77014 PR CT GUIDE FOR PLACEMENT RADIATION THERAPY FIELDS: CPT | Mod: 26 | Performed by: RADIOLOGY

## 2021-09-16 PROCEDURE — 77386 HC IMRT TREATMENT DELIVERY, COMPLEX: CPT | Performed by: RADIOLOGY

## 2021-09-17 ENCOUNTER — APPOINTMENT (OUTPATIENT)
Dept: RADIATION ONCOLOGY | Facility: CLINIC | Age: 71
End: 2021-09-17
Attending: RADIOLOGY
Payer: COMMERCIAL

## 2021-09-17 PROCEDURE — 77014 PR CT GUIDE FOR PLACEMENT RADIATION THERAPY FIELDS: CPT | Mod: 26 | Performed by: RADIOLOGY

## 2021-09-17 PROCEDURE — 77386 HC IMRT TREATMENT DELIVERY, COMPLEX: CPT | Performed by: RADIOLOGY

## 2021-09-20 ENCOUNTER — APPOINTMENT (OUTPATIENT)
Dept: RADIATION ONCOLOGY | Facility: CLINIC | Age: 71
End: 2021-09-20
Attending: RADIOLOGY
Payer: COMMERCIAL

## 2021-09-20 PROCEDURE — 77336 RADIATION PHYSICS CONSULT: CPT | Performed by: RADIOLOGY

## 2021-09-20 PROCEDURE — 77014 PR CT GUIDE FOR PLACEMENT RADIATION THERAPY FIELDS: CPT | Mod: 26 | Performed by: RADIOLOGY

## 2021-09-20 PROCEDURE — 77386 HC IMRT TREATMENT DELIVERY, COMPLEX: CPT | Performed by: RADIOLOGY

## 2021-09-21 ENCOUNTER — ONCOLOGY VISIT (OUTPATIENT)
Dept: RADIATION ONCOLOGY | Facility: CLINIC | Age: 71
End: 2021-09-21

## 2021-09-29 NOTE — PROCEDURES
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     Treatment Summary:  Treatment Site Dose  Modality From  To  Elapsed Days Fx.  Tumor bed   6,000 cGy 06 X   8/04/2021  9/15/2021  42  30  Operative bed   5,700 cGy 06 X   8/04/2021  9/15/2021  42  30  Elective lymphatics 5,400 cGy 06 X   8/04/2021  9/15/2021  42  30  R pyriform      600 cGy 06 X   9/16/2021  2021   4   3  Cumulative   6,600 cGy 06 X   8/04/2021  2021  47  33          Dose per Fraction:  Tumor bed:   200 cGy  R pyriform:  200 cGy  Operative bed:  190 cGy  Elective lymphatics:  180 cGy        COMMENTS:    Mr. 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 He was unfortunately lost to follow-up until he presented in 2021 with a sore throat and stridor with work-up at the [facility] demonstrating a large primary laryngeal tumor obstructing the airway. He underwent total laryngectomy and bilateral neck dissections on 2021. Pathology revealed a 6 cm moderately differentiated squamous cell carcinoma invading through the thyroid cartilage with perineural and lymphovascular invasion. The surgical margin at the right pyriform was negative albeit close. There was no metastatic adenopathy present within the dissected lymph nodes.     Due to the presence of several risk factors including advanced T-stage, close margins, lymphovascular and perineural invasion, Mr. Warren received adjuvant radiotherapy as described above. The  tumor bed was treated to a dose of 60 Gy in 30 once-daily fractions using 6 MV photons delivered via a 3 non-coplanar arc volumetric modulated arc therapy technique. The remainder of the operative bed received 57 Gy while the elective lymphatics, consisting of the bilateral low level IV, received 54 Gy using a simultaneous integrated boost technique. The region of the close margin at the right pyriform received a 6 Gy in 3 fraction sequential boost for a cumulative dose of 66 Gy to this region.         Mr. Warren tolerated treatment relatively well with the development of grade 2 dermatitis and mucositis by the completion of therapy. His mucositis was managed with frequent salt/baking soda rinses with PRN acetaminophen and oxycodone for pain control. His radiation dermatitis was managed with BID Aquaphor applications to the neck.      Mr. Warren had a PEG placed at the time of his total laryngectomy. He was taking a soft/pureed diet by mouth at the onset of radiotherapy with 1 can daily via PEG for caloric supplementation. He gradually weaned off of PEG feedings and was tolerating a soft to regular diet by mouth by the completion of therapy. He did not experience any weight loss by the completion of radiotherapy compared with his pre-treatment baseline.       Mr. Warren had two separate breaks in therapy. The first was a 3-day break between fractions 13-14 secondary to machine downtime. The second was a 1-day break between fractions 22-23 secondary to the Labor Day holiday. He had 3 fractions made up during the weekends with the 4th added to the end of his treatment course. He did not otherwise require any additional unplanned breaks in therapy.      Acute Toxicity Profile by CTCAE v5.0:  Mucositis: Grade 2  Dermatitis: Grade 2     PAIN MANAGEMENT:     Acetaminophen 1000 mg TID as needed for mild to moderate pain  Oxycodone 5 mg q4-6h as needed for moderate to severe breakthrough symptoms.     FOLLOW UP PLAN:     Follow-up in radiation oncology clinic with NP in 2 weeks for symptom assessment  Follow-up in ENT multiD clinic in 6 weeks     Resident Physician: eKnny Rowland M.D.   Staff Physician: Serg Kennedy M.D.  Physicist: Tank Barboza PhD     CC:    MD Ramon Conklin MD                                     Radiation Oncology:  [facility] [address] [place] [address]

## 2021-10-01 ENCOUNTER — THERAPY VISIT (OUTPATIENT)
Dept: SPEECH THERAPY | Facility: CLINIC | Age: 71
End: 2021-10-01
Payer: COMMERCIAL

## 2021-10-01 DIAGNOSIS — R49.1 APHONIA: ICD-10-CM

## 2021-10-01 DIAGNOSIS — C32.9 MALIGNANT NEOPLASM OF LARYNX (H): Primary | ICD-10-CM

## 2021-10-01 PROCEDURE — 92507 TX SP LANG VOICE COMM INDIV: CPT | Mod: GN | Performed by: SPEECH-LANGUAGE PATHOLOGIST

## 2021-10-02 ENCOUNTER — HEALTH MAINTENANCE LETTER (OUTPATIENT)
Age: 71
End: 2021-10-02

## 2021-10-04 ENCOUNTER — OFFICE VISIT (OUTPATIENT)
Dept: RADIATION ONCOLOGY | Facility: CLINIC | Age: 71
End: 2021-10-04
Attending: RADIOLOGY
Payer: COMMERCIAL

## 2021-10-04 VITALS — BODY MASS INDEX: 26.83 KG/M2 | WEIGHT: 192.4 LBS

## 2021-10-04 DIAGNOSIS — C32.9 MALIGNANT NEOPLASM OF LARYNX (H): Primary | ICD-10-CM

## 2021-10-04 NOTE — PROGRESS NOTES
Radiation Oncology Follow-up Visit  2021    Raj Warren  MRN: [id number]   : 1950     DISEASE TREATED:   Squamous cell carcinoma of the larynx, stage pT4 aN0 M0    RADIATION THERAPY DELIVERED:   6600 cGy completed 2021    SYSTEMIC TREATMENT:  None    INTERVAL SINCE COMPLETION OF RADIATION THERAPY:   2 weeks    SUBJECTIVE/HPI:  Raj Warren is a 71 year old male who is here today for routine 2 week follow up after completing radiation therapy.  Overall he states he is doing well.  He did have a little bit of peeling of his skin right above his trach and that is sore.  He continues to apply Aquaphor.  He has decreased taste but continue to eat well.  He would like his G-tube out.  He has been not been using it for at least 3 weeks.  He is sick of cleaning it.  He is mildly fatigued.  Denies any significant pain.    ROS:  Complete review of systems is negative except for symptoms discussed in subjective above.    Current Outpatient Medications   Medication     acetaminophen (TYLENOL) 325 MG tablet     apixaban ANTICOAGULANT (ELIQUIS) 5 MG tablet     metoprolol tartrate (LOPRESSOR) 25 MG tablet     mineral oil-hydrophilic petrolatum (AQUAPHOR) external ointment     pantoprazole (PROTONIX) 40 MG EC tablet     No current facility-administered medications for this visit.          Allergies   Allergen Reactions     Pollen Extract        Past Medical History:   Diagnosis Date     Allergic state      Anemia      Former smoker      Malignant neoplasm of larynx (H) 2021     Pulmonary emboli (H) 2021    Bilateral-s/p embolectomy and IVC filter placement     S/P percutaneous endoscopic gastrostomy (PEG) tube placement (H)      Tracheostomy in place (H)          PHYSICAL EXAM:  Wt 87.3 kg (192 lb 6.4 oz)   BMI 26.83 kg/m     Wt Readings from Last 4 Encounters:   10/04/21 87.3 kg (192 lb 6.4 oz)   09/15/21 88.5 kg (195 lb)   21 83.9 kg (185 lb)   21 88.2 kg (194 lb 8 oz)      Gen: Alert, in NAD  Eyes: PERRL, EOMI, sclera anicteric  HENT     Head: NC/AT     Ears: TM's clear, no external lesions.     Oral Cavity/Oropharynx: Dry mucous membranes with thickened secretions. No signs of thrush.  No mucositis.  Neck: Moderate erythema of the bilateral neck with patchy areas of  desquamation involving the neck. No lymphedema. No palpable cervical adenopathy.  Pulm: No wheezing, stridor or respiratory distress  CV: Well-perfused, no cyanosis  Musculoskeletal: Normal bulk and tone   Skin: Neck as described above otherwise normal color and turgor.  G-tube is intact.  No signs of infection.    PEG tube removal  Area around G-tube was cleaned well.  Area was draped.  G-tube was removed.  No discharge.  Aquaphor and bandage was applied.  Patient tolerated the procedure extremely well.      LABS AND IMAGING:  Reviewed.    IMPRESSION:   Mr. Warren is a 71 year old male with a squamous cell carcinoma of the Larynx s/p radiation now 2 weeks out since completion of treatment and doing well with slow improvement of acute side effects.    PLAN:   1.  Follow up with Dr. Kennedy in 1 month.   Continue close follow up with ENT and medical oncology.  2. Continue to moisturize with aquaphor and when skin is completely healed can change to preferred moisturizer.  Discussed importance of sun avoidance or sun protection.  3. Fatigue should continue to improve.  4. Continue oral cares with salt and soda rinses.  Routine dental follow up at least every 6 months.  Continue to use fluoride trays or fluoride treatments. Continue jaw, neck and swallowing exercises.  5. Treatment related pain should continue to diminish.  Recommended to slowly wean off pain medications when pain decreases.  6. Continue to push fluids and caloric intake to maintain weight.    7. Keep G-tube site covered with Aquaphor and gauze or Aquaphor and Band-Aid until it is completely healed.  Please notify me if it shows any signs of infection, not  healing, or continuing to drain.    Rosio Blount, HERMELINDA   Radiation Oncology

## 2021-10-04 NOTE — LETTER
10/4/2021         RE: Raj Warren  [address]  [facility] [address]        Dear Colleague,    Thank you for referring your patient, Raj Warren, to the [facility] RADIATION ONCOLOGY. Please see a copy of my visit note below.    Radiation Oncology Follow-up Visit  2021    Raj Warren  MRN: [id number]   : 1950     DISEASE TREATED:   Squamous cell carcinoma of the larynx, stage pT4 aN0 M0    RADIATION THERAPY DELIVERED:   6600 cGy completed 2021    SYSTEMIC TREATMENT:  None    INTERVAL SINCE COMPLETION OF RADIATION THERAPY:   2 weeks    SUBJECTIVE/HPI:  Raj Warren is a 71 year old male who is here today for routine 2 week follow up after completing radiation therapy.  Overall he states he is doing well.  He did have a little bit of peeling of his skin right above his trach and that is sore.  He continues to apply Aquaphor.  He has decreased taste but continue to eat well.  He would like his G-tube out.  He has been not been using it for at least 3 weeks.  He is sick of cleaning it.  He is mildly fatigued.  Denies any significant pain.    ROS:  Complete review of systems is negative except for symptoms discussed in subjective above.    Current Outpatient Medications   Medication     acetaminophen (TYLENOL) 325 MG tablet     apixaban ANTICOAGULANT (ELIQUIS) 5 MG tablet     metoprolol tartrate (LOPRESSOR) 25 MG tablet     mineral oil-hydrophilic petrolatum (AQUAPHOR) external ointment     pantoprazole (PROTONIX) 40 MG EC tablet     No current facility-administered medications for this visit.          Allergies   Allergen Reactions     Pollen Extract        Past Medical History:   Diagnosis Date     Allergic state      Anemia      Former smoker      Malignant neoplasm of larynx (H) 2021     Pulmonary emboli (H) 2021    Bilateral-s/p embolectomy and IVC filter placement     S/P percutaneous endoscopic gastrostomy (PEG) tube placement (H)       Tracheostomy in place (H)          PHYSICAL EXAM:  Wt 87.3 kg (192 lb 6.4 oz)   BMI 26.83 kg/m     Wt Readings from Last 4 Encounters:   10/04/21 87.3 kg (192 lb 6.4 oz)   09/15/21 88.5 kg (195 lb)   09/09/21 83.9 kg (185 lb)   09/02/21 88.2 kg (194 lb 8 oz)     Gen: Alert, in NAD  Eyes: PERRL, EOMI, sclera anicteric  HENT     Head: NC/AT     Ears: TM's clear, no external lesions.     Oral Cavity/Oropharynx: Dry mucous membranes with thickened secretions. No signs of thrush.  No mucositis.  Neck: Moderate erythema of the bilateral neck with patchy areas of  desquamation involving the neck. No lymphedema. No palpable cervical adenopathy.  Pulm: No wheezing, stridor or respiratory distress  CV: Well-perfused, no cyanosis  Musculoskeletal: Normal bulk and tone   Skin: Neck as described above otherwise normal color and turgor.  G-tube is intact.  No signs of infection.    PEG tube removal  Area around G-tube was cleaned well.  Area was draped.  G-tube was removed.  No discharge.  Aquaphor and bandage was applied.  Patient tolerated the procedure extremely well.      LABS AND IMAGING:  Reviewed.    IMPRESSION:   Mr. Warren is a 71 year old male with a squamous cell carcinoma of the Larynx s/p radiation now 2 weeks out since completion of treatment and doing well with slow improvement of acute side effects.    PLAN:   1.  Follow up with Dr. Kennedy in 1 month.   Continue close follow up with ENT and medical oncology.  2. Continue to moisturize with aquaphor and when skin is completely healed can change to preferred moisturizer.  Discussed importance of sun avoidance or sun protection.  3. Fatigue should continue to improve.  4. Continue oral cares with salt and soda rinses.  Routine dental follow up at least every 6 months.  Continue to use fluoride trays or fluoride treatments. Continue jaw, neck and swallowing exercises.  5. Treatment related pain should continue to diminish.  Recommended to slowly wean off pain  medications when pain decreases.  6. Continue to push fluids and caloric intake to maintain weight.    7. Keep G-tube site covered with Aquaphor and gauze or Aquaphor and Band-Aid until it is completely healed.  Please notify me if it shows any signs of infection, not healing, or continuing to drain.    oRsio Blount NP   Radiation Oncology

## 2021-10-08 ENCOUNTER — THERAPY VISIT (OUTPATIENT)
Dept: SPEECH THERAPY | Facility: CLINIC | Age: 71
End: 2021-10-08
Payer: COMMERCIAL

## 2021-10-08 DIAGNOSIS — R13.12 OROPHARYNGEAL DYSPHAGIA: ICD-10-CM

## 2021-10-08 DIAGNOSIS — R49.1 APHONIA: ICD-10-CM

## 2021-10-08 DIAGNOSIS — C32.9 MALIGNANT NEOPLASM OF LARYNX (H): Primary | ICD-10-CM

## 2021-10-08 PROCEDURE — 92526 ORAL FUNCTION THERAPY: CPT | Mod: GN | Performed by: SPEECH-LANGUAGE PATHOLOGIST

## 2021-10-08 PROCEDURE — 92507 TX SP LANG VOICE COMM INDIV: CPT | Mod: GN | Performed by: SPEECH-LANGUAGE PATHOLOGIST

## 2021-10-20 ENCOUNTER — THERAPY VISIT (OUTPATIENT)
Dept: SPEECH THERAPY | Facility: CLINIC | Age: 71
End: 2021-10-20
Payer: COMMERCIAL

## 2021-10-20 DIAGNOSIS — C32.9 MALIGNANT NEOPLASM OF LARYNX (H): Primary | ICD-10-CM

## 2021-10-20 DIAGNOSIS — R49.1 APHONIA: ICD-10-CM

## 2021-10-20 DIAGNOSIS — I89.0 LYMPHEDEMA: ICD-10-CM

## 2021-10-20 PROCEDURE — 92526 ORAL FUNCTION THERAPY: CPT | Mod: GN | Performed by: SPEECH-LANGUAGE PATHOLOGIST

## 2021-10-20 PROCEDURE — 92507 TX SP LANG VOICE COMM INDIV: CPT | Mod: GN | Performed by: SPEECH-LANGUAGE PATHOLOGIST

## 2021-10-29 ENCOUNTER — OFFICE VISIT (OUTPATIENT)
Dept: OTOLARYNGOLOGY | Facility: CLINIC | Age: 71
End: 2021-10-29
Payer: COMMERCIAL

## 2021-10-29 ENCOUNTER — THERAPY VISIT (OUTPATIENT)
Dept: SPEECH THERAPY | Facility: CLINIC | Age: 71
End: 2021-10-29
Payer: COMMERCIAL

## 2021-10-29 VITALS
HEIGHT: 71 IN | HEART RATE: 82 BPM | WEIGHT: 192 LBS | TEMPERATURE: 96 F | BODY MASS INDEX: 26.88 KG/M2 | OXYGEN SATURATION: 100 %

## 2021-10-29 VITALS
WEIGHT: 192 LBS | HEART RATE: 82 BPM | TEMPERATURE: 96 F | OXYGEN SATURATION: 100 % | HEIGHT: 71 IN | BODY MASS INDEX: 26.88 KG/M2

## 2021-10-29 DIAGNOSIS — C32.9 MALIGNANT NEOPLASM OF LARYNX (H): Primary | ICD-10-CM

## 2021-10-29 DIAGNOSIS — R49.1 APHONIA: ICD-10-CM

## 2021-10-29 DIAGNOSIS — E03.4 HYPOTHYROIDISM DUE TO ACQUIRED ATROPHY OF THYROID: ICD-10-CM

## 2021-10-29 DIAGNOSIS — C32.8 CANCER OF OVERLAPPING SITES OF LARYNX (H): ICD-10-CM

## 2021-10-29 PROCEDURE — 31575 DIAGNOSTIC LARYNGOSCOPY: CPT | Performed by: OTOLARYNGOLOGY

## 2021-10-29 PROCEDURE — 99024 POSTOP FOLLOW-UP VISIT: CPT | Performed by: RADIOLOGY

## 2021-10-29 PROCEDURE — 92507 TX SP LANG VOICE COMM INDIV: CPT | Mod: GN | Performed by: SPEECH-LANGUAGE PATHOLOGIST

## 2021-10-29 PROCEDURE — 99213 OFFICE O/P EST LOW 20 MIN: CPT | Mod: 25 | Performed by: OTOLARYNGOLOGY

## 2021-10-29 ASSESSMENT — MIFFLIN-ST. JEOR
SCORE: 1658.04
SCORE: 1658.04

## 2021-10-29 ASSESSMENT — PAIN SCALES - GENERAL: PAINLEVEL: NO PAIN (0)

## 2021-10-29 NOTE — LETTER
10/29/2021       RE: Raj Warren  [address]  [facility] [address]     Dear Colleague,    Thank you for referring your patient, Raj Warren, to the [facility] [place] [facility]. Please see a copy of my visit note below.    Dear Dr. Bean:    I had the pleasure of seeing Raj Warren in follow-up today at the [facility].     History of Present Illness:  Raj Warren is 71 year old man with a T4aN0 SCC of the larynx. The patient presented to the ED with a week history of shortness of breath, along with symptoms of sore throat, bilateral ear pain. He had stridor vs wheezing, mild increased work of breathing, and hoarse voice at that time. He was treated with steroids and nebulizers with improvement in his symptoms and was discharged.  He had recurrent symptoms and was seen in the ED again on 4/12/2021. He had a CT scan performed on 4/12/2021 which demonstrated a 3.9 x 1.6 x 3.9 cm mass involving the right true cord, right AE fold, right false cord, posterior hypopharynx, proximal trachea, with involvement of the thyroid cartilage, involvement of the prelaryngeal tissue, no abnormal nodes. Dr Willoughby (local ENT), scope exam was performed, and patient was admitted for a trach and DL/bx. Dr Johnson and Dr Sauceda performed this procedure on 4/13/2021. A tracheal window at 2nd tracheal ring was performed with placement of an 8 cuffed Shiley. Pathology showed moderately differentiated SCC. He was seen by Dr Bean of medical oncology on 4/13/2021. He was followed by local ENT team during his hospitalization. He was recommended for chemoradiation. He was seen by Dr Mclaughlin of [facility], planning 7 weeks treatment, and CT simulation was done. He did have a VSS done while inpatient without evidence of aspiration. The patient was referred here for discussion of possible salvage  laryngectomy after chemoradiation. He had a PET scan on 4/26/2021 which showed an FDG avid mass in the larynx involving the supraglottis/glottis/subglottis, hypermetabolic left level 4 node, FDG avid nodule in the right lung thought to be infectious/inflammatory. He was then admitted to the hospital locally from 4/27/2021 to 5/8/2021 after presenting with worsening shortness of breath, found to have bilateral PE with significant clot burden including the right pulmonary artery and the right ventricle. He was taken to the OR for pulmonary embolectomy and RV thrombectomy. He had IVC filter placed 4/30/2021. His course was complicated by hemodynamic instability requiring pressors, poor healing of his sternal incision requiring wound vac placement. He was discharged on eliquis. He was sent to rehab after his admission, discharged 5/15/2021. He saw Dr Bean in follow-up on 5/27/2021 and was recommended for repeat imaging. This showed slight decrease in size of the tumor, no distant disease.     He was seen as a new patient on 6/18/2021. After extensive preoperative counseling especially in light of his recent PE and anticoagulation, the recommendations was for surgery given the cartilaginous erosion seen on imaging. He was taken to the OR on 7/8/2021 for a DL, total laryngectomy, bilateral neck dissections, cricopharyngeal myotomy. His hospital course was uncomplicated, was restarted on his anticoagulation. His final pathology demonstrated a 6 cm SCC with invasion through the thyroid cartilage, involved the cricoid cartilage, involved the right pyriform sinus, extended to the left side of the tracheostomy site, PNI present, focal lymphatic invasion, 0/75 nodes. The main specimen had a positive margin at the pyriform sinus, margins were taken from the main specimen and were negative (named right pharyngeal wall mucosal margin).      Interval history:  He comes in today for follow-up. He was last seen in August 2021. He  received postoperative radiation under the care of Dr Kennedy, from 8/4/2021 to 9/20/2021 for a total of 66 Gy. He had 3 missed treatments due to holidays/machine downtime which were made up at the end of treatment. He had his PEG removed by radiation oncology on 10/4/2021. He says things are going ok. He says that he feels like his neck is thick. He says he is having a hard time with people understanding him on the phone which creates some difficulties with things like paying his bills. He is celena to eat everything. Nothing is sticking. He does need water to get things down from the dry mouth. His weight is stable. He has some tingling in his ears. He has no pain. He is using moisturizer on his neck. He has started using the base plate for his anny tube.       MEDICATIONS:     Current Outpatient Medications   Medication Sig Dispense Refill     acetaminophen (TYLENOL) 325 MG tablet 2 tablets (650 mg) by Per G Tube route every 8 hours as needed for mild pain or fever 90 tablet 0     apixaban ANTICOAGULANT (ELIQUIS) 5 MG tablet 1 tablet (5 mg) by Per G Tube route 2 times daily 60 tablet 11     metoprolol tartrate (LOPRESSOR) 25 MG tablet Take 1 tablet (25 mg) by mouth 2 times daily 60 tablet 3     mineral oil-hydrophilic petrolatum (AQUAPHOR) external ointment Apply topically every 8 hours 198 g 0     pantoprazole (PROTONIX) 40 MG EC tablet Take 1 tablet (40 mg) by mouth every morning (before breakfast) 90 tablet 2       ALLERGIES:    Allergies   Allergen Reactions     Pollen Extract        HABITS/SOCIAL HISTORY:    Former smoker 1/2 ppd x 8 yrs, quit in 1989. Sober from alcohol.   Lives alone. His family does not live in the immediate [place] area.   He is a retired .       Social History     Socioeconomic History     Marital status: Single     Spouse name: Not on file     Number of children: Not on file     Years of education: Not on file     Highest education level: Not on file   Occupational  History     Not on file   Tobacco Use     Smoking status: Former Smoker     Types: Cigarettes     Quit date: 1989     Years since quittin.8     Smokeless tobacco: Never Used     Tobacco comment: quit in   had smoked about 1/2 pack a day then cut back   Substance and Sexual Activity     Alcohol use: No     Comment: No alcohol since      Drug use: No     Sexual activity: Yes     Partners: Female   Other Topics Concern     Parent/sibling w/ CABG, MI or angioplasty before 65F 55M? Not Asked   Social History Narrative     Not on file     Social Determinants of Health     Financial Resource Strain: Low Risk      Difficulty of Paying Living Expenses: Not hard at all   Food Insecurity: No Food Insecurity     Worried About Running Out of Food in the Last Year: Never true     Ran Out of Food in the Last Year: Never true   Transportation Needs: No Transportation Needs     Lack of Transportation (Medical): No     Lack of Transportation (Non-Medical): No   Physical Activity:      Days of Exercise per Week:      Minutes of Exercise per Session:    Stress:      Feeling of Stress :    Social Connections:      Frequency of Communication with Friends and Family:      Frequency of Social Gatherings with Friends and Family:      Attends [employer] Services:      Active Member of Clubs or Organizations:      Attends Club or Organization Meetings:      Marital Status:    Intimate Partner Violence:      Fear of Current or Ex-Partner:      Emotionally Abused:      Physically Abused:      Sexually Abused:        PAST MEDICAL HISTORY:   Past Medical History:   Diagnosis Date     Allergic state      Anemia      Former smoker      Malignant neoplasm of larynx (H) 2021     Pulmonary emboli (H) 2021    Bilateral-s/p embolectomy and IVC filter placement     S/P percutaneous endoscopic gastrostomy (PEG) tube placement (H)      Tracheostomy in place (H)         PAST SURGICAL HISTORY:   Past Surgical History:   Procedure  "Laterality Date     DISSECTION RADICAL NECK BILATERAL Bilateral 7/8/2021    Procedure: bilateral neck dissections;  Surgeon: Marilou Sethi MD;  Location: [facility] OR     ENDOSCOPIC INSERTION TUBE GASTROSTOMY Left 7/8/2021    Procedure: INSERTION, PEG TUBE with Esophagogastroduodenoscopy;  Surgeon: Kg Montaño MD;  Location: [facility] OR     IR IVC FILTER PLACEMENT  4/30/2021     LARYNGECTOMY N/A 7/8/2021    Procedure: LARYNGECTOMY;  Surgeon: Marilou Sethi MD;  Location: [facility] OR     LARYNGOSCOPY N/A 4/12/2021    Procedure: LARYNGOSCOPY;  Surgeon: Choco Johnson MD;  Location: [facility] OR     LARYNGOSCOPY N/A 7/8/2021    Procedure: LARYNGOSCOPY;  Surgeon: Marilou Sethi MD;  Location: [facility] OR     REPAIR ANEURYSM ASCENDING AORTA N/A 4/28/2021    Procedure: PULMONARY THROMBECTOMY;  Surgeon: Yumi Singh MD;  Location: [facility] OR     TRACHEOSTOMY  4/12/2021    Procedure: CREATION, TRACHEOSTOMY;  Surgeon: Choco Johnson MD;  Location: [facility] OR       FAMILY HISTORY:    Family History   Problem Relation Age of Onset     Circulatory Mother         blood clots     Alcohol/Drug Father         alcohol drank heavily     Alcohol/Drug Brother         alcohol       REVIEW OF SYSTEMS:  12 point ROS was negative other than the symptoms noted above in the HPI.  Patient Supplied Answers to Review of Systems  [facility] ROS 8/30/2021   Ears, Nose, Throat Nasal congestion or drainage, Sore throat, Trouble swallowing, Hoarseness         PHYSICAL EXAMINATION:   Pulse 82   Temp (!) 96  F (35.6  C) (Temporal)   Ht 1.803 m (5' 11\")   Wt 87.1 kg (192 lb)   SpO2 100%   BMI 26.78 kg/m    Appearance:   normal; NAD, age-appropriate appearance, well-developed, normal habitus   Communication:   communicates with electrolarynx, better with neck placement than mouth placement   Head/Face:   inspection -  Normal; no scars or visible lesions   Salivary glands -  Normal size, no tenderness, swelling, or palpable masses   Facial strength -  " Normal and symmetric    Skin:  normal, no rash   Ears:  auricle (AD) -  normal  EAC (AD) -  normal  TM (AD) -  Normal, no effusion  auricle (AS) -  normal  EAC (AS) -  normal  TM (AS) -  Normal, no effusion  Normal clinical speech reception   Nose:  Ext. inspection -  Normal  Internal Inspection -  Normal mucosa, septum, and turbinates   Nasopharynx:  normal mucosa, no masses   Oral Cavity:  lips -  Normal mucosa, oral competence, and stoma size    healthy gingival mucosa   Hard palate, buccal, floor of mouth mucosa normal   Tongue - normal movement, no lesions, no palpable masses   Oropharynx:  mucosa -  Normal, no lesions  soft palate -  Normal, no lesions, no asymmetry, normal elevation  Base of tongue - no masses or mucosal lesions   Neopharynx:  Normal mucosa  Few secretions which complicate visualization  No masses   No signs of recurrence   Neck: Submental lymphedema  Well healed neck incision  No palpable masses  Stoma patent but more stenotic than previous   Lymphatic:  no abnormal nodes   Cardiovascular:  warm, pink, well-perfused extremities without swelling, tenderness, or edema   Respiratory:  Normal respiratory effort   Neuro/Psych.:  mood/affect -  normal  mental status -  normal           PROCEDURES:   Flexible fiberoptic laryngoscopy: Scope exam was indicated due to laryngeal cancer. Verbal consent was obtained. The nasal cavity was prepped with an aerosolized solution of topical anesthetic and vasoconstrictive agent. The scope was passed through the anterior nasal cavity and advanced. Inspection of the nasopharynx revealed no gross abnormality. The base of tongue is normal. The neopharynx is patent with some secretions. There are no masses or mucosal lesions. There are some pooled secretions. Procedure tolerated well with no immediate complications noted.  Tracheoscopy: The flexible scope was passed through the stoma down to the bronchi. There are no masses or mucosal lesions. There are no  secretions.      RESULTS REVIEWED:   I reviewed the treatment records from radiation oncology    Care discussed with SLP and Dr Kennedy      IMPRESSION AND PLAN:   Raj Warren is a 71 year old man with a T4N0 SCC of the larynx. He underwent [facility]. He was taken to the OR on 7/8/2021 for a total laryngectomy with bilateral neck dissections. He completed his postoperative radiation on 9/20/2021.    He is recovering from his treatment. He does quite well with his electrolarynx but we did discuss using neck placement rather than mouth placement.     He does have lymphedema of his submental space which does make his electrolarynx speech a little harder. He can see lymphedema therapy after his PET scan is completed and is negative. We did give him a jaw bra to use.     I discussed with him that he needs to be wearing the anny tube rather than the base plate to try to avoid stoma stenosis.    He will need his TSH checked in December 2021.    His PET scan will be due 12/13/2021.    I would like to see him back in multi D clinic on 12/17/2021 with his PET and labs.      Thank you very much for the opportunity to participate in the care of your patient.      Marilou Sethi MD, M.D.  Otolaryngology- Head & Neck Surgery      This note was dictated with voice recognition software and then edited. Please excuse any unintentional errors.     CC:  Rosa Bean MD  [facility]  [address] [facility] [address]  [facility] [address]      Serg Kennedy MD  Department of Radiation Oncology  [facility]

## 2021-10-29 NOTE — PROGRESS NOTES
Dear Dr. Bean:    I had the pleasure of seeing Raj Warren in follow-up today at the [facility].     History of Present Illness:  Raj Warren is 71 year old man with a T4aN0 SCC of the larynx. The patient presented to the ED with a week history of shortness of breath, along with symptoms of sore throat, bilateral ear pain. He had stridor vs wheezing, mild increased work of breathing, and hoarse voice at that time. He was treated with steroids and nebulizers with improvement in his symptoms and was discharged.  He had recurrent symptoms and was seen in the ED again on 4/12/2021. He had a CT scan performed on 4/12/2021 which demonstrated a 3.9 x 1.6 x 3.9 cm mass involving the right true cord, right AE fold, right false cord, posterior hypopharynx, proximal trachea, with involvement of the thyroid cartilage, involvement of the prelaryngeal tissue, no abnormal nodes. Dr Willoughby (local ENT), scope exam was performed, and patient was admitted for a trach and DL/bx. Dr Johnson and Dr Sauceda performed this procedure on 4/13/2021. A tracheal window at 2nd tracheal ring was performed with placement of an 8 cuffed Shiley. Pathology showed moderately differentiated SCC. He was seen by Dr Bean of medical oncology on 4/13/2021. He was followed by local ENT team during his hospitalization. He was recommended for chemoradiation. [place] [facility] 7 [place], and CT simulation was done. He did have a VSS done while inpatient without evidence of aspiration. The patient was referred here for discussion of possible salvage laryngectomy after chemoradiation. He had a PET scan on 4/26/2021 which showed an FDG avid mass in the larynx involving the supraglottis/glottis/subglottis, hypermetabolic left level 4 node, FDG avid nodule in the right lung thought to be infectious/inflammatory. He was then admitted to the hospital locally from 4/27/2021 to 5/8/2021 after  presenting with worsening shortness of breath, found to have bilateral PE with significant clot burden including the right pulmonary artery and the right ventricle. He was taken to the OR for pulmonary embolectomy and RV thrombectomy. He had IVC filter placed 4/30/2021. His course was complicated by hemodynamic instability requiring pressors, poor healing of his sternal incision requiring wound vac placement. He was discharged on eliquis. He was sent to rehab after his admission, discharged 5/15/2021. He saw Dr Bean in follow-up on 5/27/2021 and was recommended for repeat imaging. This showed slight decrease in size of the tumor, no distant disease.     He was seen as a new patient on 6/18/2021. After extensive preoperative counseling especially in light of his recent PE and anticoagulation, the recommendations was for surgery given the cartilaginous erosion seen on imaging. He was taken to the OR on 7/8/2021 for a DL, total laryngectomy, bilateral neck dissections, cricopharyngeal myotomy. His hospital course was uncomplicated, was restarted on his anticoagulation. His final pathology demonstrated a 6 cm SCC with invasion through the thyroid cartilage, involved the cricoid cartilage, involved the right pyriform sinus, extended to the left side of the tracheostomy site, PNI present, focal lymphatic invasion, 0/75 nodes. The main specimen had a positive margin at the pyriform sinus, margins were taken from the main specimen and were negative (named right pharyngeal wall mucosal margin).      Interval history:  He comes in today for follow-up. He was last seen in August 2021. He received postoperative radiation under the care of Dr Kennedy, from 8/4/2021 to 9/20/2021 for a total of 66 Gy. He had 3 missed treatments due to holidays/machine downtime which were made up at the end of treatment. He had his PEG removed by radiation oncology on 10/4/2021. He says things are going ok. He says that he feels like his neck is  thick. He says he is having a hard time with people understanding him on the phone which creates some difficulties with things like paying his bills. He is celena to eat everything. Nothing is sticking. He does need water to get things down from the dry mouth. His weight is stable. He has some tingling in his ears. He has no pain. He is using moisturizer on his neck. He has started using the base plate for his anny tube.       MEDICATIONS:     Current Outpatient Medications   Medication Sig Dispense Refill     acetaminophen (TYLENOL) 325 MG tablet 2 tablets (650 mg) by Per G Tube route every 8 hours as needed for mild pain or fever 90 tablet 0     apixaban ANTICOAGULANT (ELIQUIS) 5 MG tablet 1 tablet (5 mg) by Per G Tube route 2 times daily 60 tablet 11     metoprolol tartrate (LOPRESSOR) 25 MG tablet Take 1 tablet (25 mg) by mouth 2 times daily 60 tablet 3     mineral oil-hydrophilic petrolatum (AQUAPHOR) external ointment Apply topically every 8 hours 198 g 0     pantoprazole (PROTONIX) 40 MG EC tablet Take 1 tablet (40 mg) by mouth every morning (before breakfast) 90 tablet 2       ALLERGIES:    Allergies   Allergen Reactions     Pollen Extract        HABITS/SOCIAL HISTORY:    Former smoker / ppd x 8 yrs, quit in . Sober from alcohol.   Lives alone. His family does not live in the immediate [place] area.   He is a retired .       Social History     Socioeconomic History     Marital status: Single     Spouse name: Not on file     Number of children: Not on file     Years of education: Not on file     Highest education level: Not on file   Occupational History     Not on file   Tobacco Use     Smoking status: Former Smoker     Types: Cigarettes     Quit date: 1989     Years since quittin.8     Smokeless tobacco: Never Used     Tobacco comment: quit in   had smoked about 1/2 pack a day then cut back   Substance and Sexual Activity     Alcohol use: No     Comment: No alcohol  since 1989     Drug use: No     Sexual activity: Yes     Partners: Female   Other Topics Concern     Parent/sibling w/ CABG, MI or angioplasty before 65F 55M? Not Asked   Social History Narrative     Not on file     Social Determinants of Health     Financial Resource Strain: Low Risk      Difficulty of Paying Living Expenses: Not hard at all   Food Insecurity: No Food Insecurity     Worried About Running Out of Food in the Last Year: Never true     Ran Out of Food in the Last Year: Never true   Transportation Needs: No Transportation Needs     Lack of Transportation (Medical): No     Lack of Transportation (Non-Medical): No   Physical Activity:      Days of Exercise per Week:      Minutes of Exercise per Session:    Stress:      Feeling of Stress :    Social Connections:      Frequency of Communication with Friends and Family:      Frequency of Social Gatherings with Friends and Family:      Attends [employer] Services:      Active Member of Clubs or Organizations:      Attends Club or Organization Meetings:      Marital Status:    Intimate Partner Violence:      Fear of Current or Ex-Partner:      Emotionally Abused:      Physically Abused:      Sexually Abused:        PAST MEDICAL HISTORY:   Past Medical History:   Diagnosis Date     Allergic state      Anemia      Former smoker      Malignant neoplasm of larynx (H) 04/20/2021     Pulmonary emboli (H) 04/28/2021    Bilateral-s/p embolectomy and IVC filter placement     S/P percutaneous endoscopic gastrostomy (PEG) tube placement (H)      Tracheostomy in place (H)         PAST SURGICAL HISTORY:   Past Surgical History:   Procedure Laterality Date     DISSECTION RADICAL NECK BILATERAL Bilateral 7/8/2021    Procedure: bilateral neck dissections;  Surgeon: Marilou Sethi MD;  Location: [facility] OR     ENDOSCOPIC INSERTION TUBE GASTROSTOMY Left 7/8/2021    Procedure: INSERTION, PEG TUBE with Esophagogastroduodenoscopy;  Surgeon: Kg Montaño MD;  Location: [facility] OR      "IR IVC FILTER PLACEMENT  4/30/2021     LARYNGECTOMY N/A 7/8/2021    Procedure: LARYNGECTOMY;  Surgeon: Marilou Sethi MD;  Location: [facility] OR     LARYNGOSCOPY N/A 4/12/2021    Procedure: LARYNGOSCOPY;  Surgeon: Choco Johnson MD;  Location: [facility] OR     LARYNGOSCOPY N/A 7/8/2021    Procedure: LARYNGOSCOPY;  Surgeon: Marilou Sethi MD;  Location: [facility] OR     REPAIR ANEURYSM ASCENDING AORTA N/A 4/28/2021    Procedure: PULMONARY THROMBECTOMY;  Surgeon: Yumi Singh MD;  Location: [facility] OR     TRACHEOSTOMY  4/12/2021    Procedure: CREATION, TRACHEOSTOMY;  Surgeon: Choco Johnson MD;  Location:  OR       FAMILY HISTORY:    Family History   Problem Relation Age of Onset     Circulatory Mother         blood clots     Alcohol/Drug Father         alcohol drank heavily     Alcohol/Drug Brother         alcohol       REVIEW OF SYSTEMS:  12 point ROS was negative other than the symptoms noted above in the HPI.  Patient Supplied Answers to Review of Systems  [facility] ROS 8/30/2021   Ears, Nose, Throat Nasal congestion or drainage, Sore throat, Trouble swallowing, Hoarseness         PHYSICAL EXAMINATION:   Pulse 82   Temp (!) 96  F (35.6  C) (Temporal)   Ht 1.803 m (5' 11\")   Wt 87.1 kg (192 lb)   SpO2 100%   BMI 26.78 kg/m    Appearance:   normal; NAD, age-appropriate appearance, well-developed, normal habitus   Communication:   communicates with electrolarynx, better with neck placement than mouth placement   Head/Face:   inspection -  Normal; no scars or visible lesions   Salivary glands -  Normal size, no tenderness, swelling, or palpable masses   Facial strength -  Normal and symmetric    Skin:  normal, no rash   Ears:  auricle (AD) -  normal  EAC (AD) -  normal  TM (AD) -  Normal, no effusion  auricle (AS) -  normal  EAC (AS) -  normal  TM (AS) -  Normal, no effusion  Normal clinical speech reception   Nose:  Ext. inspection -  Normal  Internal Inspection -  Normal mucosa, septum, and turbinates "   Nasopharynx:  normal mucosa, no masses   Oral Cavity:  lips -  Normal mucosa, oral competence, and stoma size    healthy gingival mucosa   Hard palate, buccal, floor of mouth mucosa normal   Tongue - normal movement, no lesions, no palpable masses   Oropharynx:  mucosa -  Normal, no lesions  soft palate -  Normal, no lesions, no asymmetry, normal elevation  Base of tongue - no masses or mucosal lesions   Neopharynx:  Normal mucosa  Few secretions which complicate visualization  No masses   No signs of recurrence   Neck: Submental lymphedema  Well healed neck incision  No palpable masses  Stoma patent but more stenotic than previous   Lymphatic:  no abnormal nodes   Cardiovascular:  warm, pink, well-perfused extremities without swelling, tenderness, or edema   Respiratory:  Normal respiratory effort   Neuro/Psych.:  mood/affect -  normal  mental status -  normal           PROCEDURES:   Flexible fiberoptic laryngoscopy: Scope exam was indicated due to laryngeal cancer. Verbal consent was obtained. The nasal cavity was prepped with an aerosolized solution of topical anesthetic and vasoconstrictive agent. The scope was passed through the anterior nasal cavity and advanced. Inspection of the nasopharynx revealed no gross abnormality. The base of tongue is normal. The neopharynx is patent with some secretions. There are no masses or mucosal lesions. There are some pooled secretions. Procedure tolerated well with no immediate complications noted.  Tracheoscopy: The flexible scope was passed through the stoma down to the bronchi. There are no masses or mucosal lesions. There are no secretions.      RESULTS REVIEWED:   I reviewed the treatment records from radiation oncology    Care discussed with SLP and Dr Kennedy      IMPRESSION AND PLAN:   Raj Warren is a 71 year old man with a T4N0 SCC of the larynx. He [facility]. He was taken to the OR on 7/8/2021 for a total laryngectomy  with bilateral neck dissections. He completed his postoperative radiation on 9/20/2021.    He is recovering from his treatment. He does quite well with his electrolarynx but we did discuss using neck placement rather than mouth placement.     He does have lymphedema of his submental space which does make his electrolarynx speech a little harder. He can see lymphedema therapy after his PET scan is completed and is negative. We did give him a jaw bra to use.     I discussed with him that he needs to be wearing the anny tube rather than the base plate to try to avoid stoma stenosis.    He will need his TSH checked in December 2021.    His PET scan will be due 12/13/2021.    I would like to see him back in multi D clinic on 12/17/2021 with his PET and labs.      Thank you very much for the opportunity to participate in the care of your patient.      Marilou Sethi MD, M.D.  Otolaryngology- Head & Neck Surgery      This note was dictated with voice recognition software and then edited. Please excuse any unintentional errors.         CC:  Rosa Bean MD  [facility]  [address] [address]      Serg Kennedy MD  Department of Radiation Oncology  [facility]

## 2021-10-29 NOTE — LETTER
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 He was unfortunately lost to follow-up until he presented in 2021 with a sore throat and stridor with work-up at the [facility] demonstrating a large primary laryngeal tumor obstructing the airway. He underwent total laryngectomy and bilateral neck dissections on 2021. Pathology revealed a 6 cm moderately differentiated squamous cell carcinoma invading through the thyroid cartilage with perineural and lymphovascular invasion. The surgical margin at the right pyriform was negative albeit close. There was no metastatic adenopathy present within the dissected lymph nodes. He received adjuvant radiotherapy as described above for improved locoregional disease control.    Mr. Warren returns to ENT multiD clinic today for a routine posttreatment disease surveillance visit. On examination, he is doing well and has no pressing concerns or complaints. His biggest frustration is related to communication with his electrolarynx, particularly when trying to call on the phone.  His remaining ROS are positive for increased submental lymphedema since completion of radiotherapy as well as mild to moderate treatment-induced xerostomia for which he takes increased  fluid intake when eating dry foods.    PHYSICAL EXAM:  Weight: 87.1 kg  Pulse: 82    General: Healthy-appearing 71-year-old gentleman seated comfortably in an examination chair no acute distress  HEENT: NC/AT.  EOMI.  No rhinorrhea or epistaxis.  Mildly dry mucous membranes.  No visible cavity lesions.  No mucositis or thrush.  Neck: Patent stoma with mild crusting secretions.  Moderate submental lymphedema.  Moderate fibrosis throughout the bilateral neck.  No palpable cervical adenopathy.  Pulmonary: No wheezing, stridor or respiratory distress  Skin: Mild hyperpigmentation of the bilateral neck.  No desquamation.    Dr. Sethi performed a flexible nasopharyngoscopy and tracheoscopy in clinic. Please see her note for complete details regarding this procedure. Briefly, this demonstrates no evidence of residual/recurrent disease.     LABS AND IMAGING:  No recent labs or imaging    IMPRESSION:   Mr. 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 He is currently a month out from the completion of adjuvant radiotherapy and remains clinically ADDISON. He is making a steady recovery from his acute radiation-induced toxicities.    PLAN:   1. Follow-up in multiD clinic on 12/17/2021  2. PET/CT scheduled for 12/13/2021  3. TSH with next follow-up visit  4. Will place a lymphedema referral following completion of his PET scan if the scan is negative for residual disease    Serg Kennedy MD/PhD    Department of Radiation Oncology  [facility]

## 2021-10-29 NOTE — PROGRESS NOTES
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 He was unfortunately lost to follow-up until he presented in 2021 with a sore throat and stridor with work-up at the [facility] demonstrating a large primary laryngeal tumor obstructing the airway. He underwent total laryngectomy and bilateral neck dissections on 2021. Pathology revealed a 6 cm moderately differentiated squamous cell carcinoma invading through the thyroid cartilage with perineural and lymphovascular invasion. The surgical margin at the right pyriform was negative albeit close. There was no metastatic adenopathy present within the dissected lymph nodes. He received adjuvant radiotherapy as described above for improved locoregional disease control.    Mr. Warren returns to ENT multiD clinic today for a routine posttreatment disease surveillance visit. On examination, he is doing well and has no pressing concerns or complaints. His biggest frustration is related to communication with his electrolarynx, particularly when trying to call on the phone.  His remaining ROS are positive for increased submental lymphedema since completion of radiotherapy as well as mild to moderate treatment-induced xerostomia for which he takes increased fluid intake when eating dry foods.    PHYSICAL EXAM:  Weight: 87.1 kg  Pulse:  82    General: Healthy-appearing 71-year-old gentleman seated comfortably in an examination chair no acute distress  HEENT: NC/AT.  EOMI.  No rhinorrhea or epistaxis.  Mildly dry mucous membranes.  No visible cavity lesions.  No mucositis or thrush.  Neck: Patent stoma with mild crusting secretions.  Moderate submental lymphedema.  Moderate fibrosis throughout the bilateral neck.  No palpable cervical adenopathy.  Pulmonary: No wheezing, stridor or respiratory distress  Skin: Mild hyperpigmentation of the bilateral neck.  No desquamation.    Dr. Sethi performed a flexible nasopharyngoscopy and tracheoscopy in clinic. Please see her note for complete details regarding this procedure. Briefly, this demonstrates no evidence of residual/recurrent disease.     LABS AND IMAGING:  No recent labs or imaging    IMPRESSION:   Mr. 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 He is currently a month out from the completion of adjuvant radiotherapy and remains clinically ADDISON. He is making a steady recovery from his acute radiation-induced toxicities.    PLAN:   1. Follow-up in multiD clinic on 12/17/2021  2. PET/CT scheduled for 12/13/2021  3. TSH with next follow-up visit  4. Will place a lymphedema referral following completion of his PET scan if the scan is negative for residual disease    Serg Kennedy MD/PhD    Department of Radiation Oncology  [facility]

## 2021-10-29 NOTE — NURSING NOTE
"Chief Complaint   Patient presents with     RECHECK     follow up in multi d clinic       Pulse 82, temperature (!) 96  F (35.6  C), temperature source Temporal, height 1.803 m (5' 11\"), weight 87.1 kg (192 lb), SpO2 100 %.    Patria Costa, EMT    "

## 2021-10-29 NOTE — PATIENT INSTRUCTIONS
F/u in [facility] in conjunction with Dr Kennedy on 12/17/2021    Schedule PET scan and labs (TSH) on same day as follow-up in [facility]   [place]

## 2021-11-19 ENCOUNTER — THERAPY VISIT (OUTPATIENT)
Dept: SPEECH THERAPY | Facility: CLINIC | Age: 71
End: 2021-11-19
Payer: COMMERCIAL

## 2021-11-19 DIAGNOSIS — R13.12 OROPHARYNGEAL DYSPHAGIA: ICD-10-CM

## 2021-11-19 DIAGNOSIS — C32.9 MALIGNANT NEOPLASM OF LARYNX (H): Primary | ICD-10-CM

## 2021-11-19 DIAGNOSIS — R49.1 APHONIA: ICD-10-CM

## 2021-11-19 PROCEDURE — 92526 ORAL FUNCTION THERAPY: CPT | Mod: GN | Performed by: SPEECH-LANGUAGE PATHOLOGIST

## 2021-11-19 PROCEDURE — 92507 TX SP LANG VOICE COMM INDIV: CPT | Mod: GN | Performed by: SPEECH-LANGUAGE PATHOLOGIST

## 2021-12-10 ENCOUNTER — LAB (OUTPATIENT)
Dept: INFUSION THERAPY | Facility: CLINIC | Age: 71
End: 2021-12-10
Attending: INTERNAL MEDICINE
Payer: COMMERCIAL

## 2021-12-10 DIAGNOSIS — E03.4 HYPOTHYROIDISM DUE TO ACQUIRED ATROPHY OF THYROID: ICD-10-CM

## 2021-12-10 DIAGNOSIS — C32.9 MALIGNANT NEOPLASM OF LARYNX (H): ICD-10-CM

## 2021-12-10 DIAGNOSIS — C32.9 LARYNGEAL CANCER (H): ICD-10-CM

## 2021-12-10 LAB
ALBUMIN SERPL-MCNC: 3.7 G/DL (ref 3.4–5)
ALP SERPL-CCNC: 70 U/L (ref 40–150)
ALT SERPL W P-5'-P-CCNC: 27 U/L (ref 0–70)
ANION GAP SERPL CALCULATED.3IONS-SCNC: 4 MMOL/L (ref 3–14)
AST SERPL W P-5'-P-CCNC: 10 U/L (ref 0–45)
BILIRUB SERPL-MCNC: 0.4 MG/DL (ref 0.2–1.3)
BUN SERPL-MCNC: 14 MG/DL (ref 7–30)
CALCIUM SERPL-MCNC: 9.3 MG/DL (ref 8.5–10.1)
CHLORIDE BLD-SCNC: 110 MMOL/L (ref 94–109)
CO2 SERPL-SCNC: 26 MMOL/L (ref 20–32)
CREAT SERPL-MCNC: 1.02 MG/DL (ref 0.66–1.25)
ERYTHROCYTE [DISTWIDTH] IN BLOOD BY AUTOMATED COUNT: 13.8 % (ref 10–15)
GFR SERPL CREATININE-BSD FRML MDRD: 75 ML/MIN/1.73M2
GLUCOSE BLD-MCNC: 94 MG/DL (ref 70–99)
HCT VFR BLD AUTO: 44.2 % (ref 40–53)
HGB BLD-MCNC: 15 G/DL (ref 13.3–17.7)
MCH RBC QN AUTO: 29.8 PG (ref 26.5–33)
MCHC RBC AUTO-ENTMCNC: 33.9 G/DL (ref 31.5–36.5)
MCV RBC AUTO: 88 FL (ref 78–100)
PLATELET # BLD AUTO: 221 10E3/UL (ref 150–450)
POTASSIUM BLD-SCNC: 4.6 MMOL/L (ref 3.4–5.3)
PROT SERPL-MCNC: 7.5 G/DL (ref 6.8–8.8)
RBC # BLD AUTO: 5.04 10E6/UL (ref 4.4–5.9)
SODIUM SERPL-SCNC: 140 MMOL/L (ref 133–144)
T4 FREE SERPL-MCNC: 0.74 NG/DL (ref 0.76–1.46)
TSH SERPL DL<=0.005 MIU/L-ACNC: 4.63 MU/L (ref 0.4–4)
WBC # BLD AUTO: 3.7 10E3/UL (ref 4–11)

## 2021-12-10 PROCEDURE — 84439 ASSAY OF FREE THYROXINE: CPT | Performed by: INTERNAL MEDICINE

## 2021-12-10 PROCEDURE — 84443 ASSAY THYROID STIM HORMONE: CPT | Performed by: INTERNAL MEDICINE

## 2021-12-10 PROCEDURE — 80053 COMPREHEN METABOLIC PANEL: CPT | Performed by: INTERNAL MEDICINE

## 2021-12-10 PROCEDURE — 85027 COMPLETE CBC AUTOMATED: CPT | Performed by: INTERNAL MEDICINE

## 2021-12-10 PROCEDURE — 36415 COLL VENOUS BLD VENIPUNCTURE: CPT

## 2021-12-10 NOTE — PROGRESS NOTES
Medical Assistant Note:  Raj Warren presents today for lab draw.    Patient seen by provider today: No.   present during visit today: Not Applicable.    Concerns: No Concerns.    Procedure:  Lab draw site: LAC, Needle type: BF, Gauge: 21. Gauze and coban applied    Post Assessment:  Labs drawn without difficulty: Yes.    Discharge Plan:  Departure Mode: Ambulatory.    Face to Face Time: 5.    Suzan Bazan CMA

## 2021-12-13 ENCOUNTER — ONCOLOGY VISIT (OUTPATIENT)
Dept: ONCOLOGY | Facility: CLINIC | Age: 71
End: 2021-12-13
Attending: INTERNAL MEDICINE
Payer: COMMERCIAL

## 2021-12-13 VITALS
HEART RATE: 71 BPM | OXYGEN SATURATION: 100 % | WEIGHT: 199 LBS | BODY MASS INDEX: 27.75 KG/M2 | RESPIRATION RATE: 16 BRPM | SYSTOLIC BLOOD PRESSURE: 126 MMHG | DIASTOLIC BLOOD PRESSURE: 81 MMHG

## 2021-12-13 DIAGNOSIS — E89.0 POSTOPERATIVE HYPOTHYROIDISM: ICD-10-CM

## 2021-12-13 DIAGNOSIS — C32.9 LARYNGEAL CANCER (H): Primary | ICD-10-CM

## 2021-12-13 DIAGNOSIS — I26.02 SADDLE EMBOLUS OF PULMONARY ARTERY WITH ACUTE COR PULMONALE, UNSPECIFIED CHRONICITY (H): ICD-10-CM

## 2021-12-13 PROCEDURE — G0463 HOSPITAL OUTPT CLINIC VISIT: HCPCS

## 2021-12-13 PROCEDURE — 99214 OFFICE O/P EST MOD 30 MIN: CPT | Performed by: INTERNAL MEDICINE

## 2021-12-13 RX ORDER — LEVOTHYROXINE SODIUM 75 UG/1
75 TABLET ORAL DAILY
Qty: 30 TABLET | Refills: 4 | Status: SHIPPED | OUTPATIENT
Start: 2021-12-13 | End: 2022-03-14

## 2021-12-13 ASSESSMENT — PAIN SCALES - GENERAL: PAINLEVEL: NO PAIN (0)

## 2021-12-13 NOTE — LETTER
12/13/2021         RE: Raj Warren  [address]  [facility] [address]        Dear Colleague,    Thank you for referring your patient, Raj Warren, to the [facility]. Please see a copy of my visit note below.    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  1.  Patient was evaluated in 2019 for hoarseness of voice.  He was seen by ENT and found to have right vocal cord mass.    -He had biopsy done on 08/07/2019. Pathology revealed moderately-differentiated invasive squamous cell carcinoma.     2.  The patient was seen in the Emergency Room on 04/12/2021 for shortness of breath and admitted.   -CT neck revealed an enhancing soft tissue mass involving the right larynx measuring 3.9 cm.  -Patient had laryngoscopy with biopsy and tracheostomy done on 04/12/2021.  There was an endolaryngeal mass obscuring the laryngeal landmarks.  Mass appeared to be based on right endolarynx.  Pathology revealed invasive keratinizing moderately-differentiated squamous cell carcinoma.     3.  PET scan on 04/26/2021 revealed hypermetabolic mass involving the supraglottic, glottic and subglottic larynx.  There was a hypermetabolic left level IV lymph node.  There was also hypermetabolic nodule in right middle lobe.     4. CT chest angiogram on 04/27/2021 revealed a large bilateral pulmonary embolism in all the lobes.  There was evidence of right cardiac strain.  -Echocardiogram on 04/27/2021 revealed clot in transit in the right ventricle.  -The patient had emergency pulmonary embolectomy on 04/28/2021.  Pathology revealed organizing clot.  -Patient on Eliquis. Long term anti-coagulation recommended.     5. Total laryngectomy with bilateral neck dissection on 07/08/2021.  Pathology reveals 6 cm squamous cell carcinoma involving the right false and true vocal cords with fixation, the left false and true vocal  Viviana from [facility] stated that the patient has had increased frequency, bad odor. Viviana is requesting an order for a UA.   cords and invades through thyroid cartilage.  There is focal lymphatic invasion.  There is perineural invasion. Benign 75 lymph nodes.  pT4a pN0 disease.     6. Post-op radiation between 08/04/2021 and 09/20/2021. 6600 cGy in 33 fractions.    SUBJECTIVE:  Mr. Warren is a 71-year-old gentleman with laryngeal squamous cell carcinoma status post total laryngectomy and neck dissection. He had postoperative radiation.  He is being monitored.     Overall, his condition is stable.  Mild fatigue.  No headache or dizziness.  No chest pain or shortness of breath.  No nausea or vomiting.  Appetite is fairly good.  No urinary or bowel complaints.  No abnormal bleeding.  The patient says that sometimes he gets a lot of mucus in the throat.     All other review of systems is negative.     PHYSICAL EXAMINATION:    Alert and oriented x 3.  Not in distress.  Rest of systems not examined.     LABORATORY:  CBC, CMP and TSH reviewed.     ASSESSMENT:    1.  A 71-year-old gentleman with laryngeal squamous cell carcinoma.  No clinical evidence of recurrence.  2.  Pulmonary embolism on Eliquis.  3.  Mild leukopenia.  3.  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     PLAN:     1.  The patient clinically is doing well from laryngeal squamous cell carcinoma.  He is scheduled for a PET scan and ENT followup in the next week.  We will wait for the PET scan result.  Clinically, no suspicion of recurrence.  2.  The patient has hypothyroidism.  We will start him on levothyroxine once a day.  We will recheck his TSH in 3 months.  3.  He has mild leukopenia.  Previously, WBC is normal.  His hemoglobin and platelets are normal.  We will monitor this.  CBC will be checked in 3 months.  4.  The patient will continue on Eliquis for his pulmonary embolism.  Plan is for indefinite anticoagulation as he had saddle pulmonary embolism.  5.  He has IVC filter.  We will have it removed.  6.  He had multiple questions, which were all answered.  I will see him in 3  months' time.    This office note has been dictated.          Again, thank you for allowing me to participate in the care of your patient.        Sincerely,        Rosa Bean MD

## 2021-12-13 NOTE — PATIENT INSTRUCTIONS
1. Start levothyroxine once a day.  2. Continue eliquis.  3. Follow-up in 3 months with labs.  4. IVC filter removal by IR.

## 2021-12-15 ENCOUNTER — TELEPHONE (OUTPATIENT)
Dept: OTHER | Facility: CLINIC | Age: 71
End: 2021-12-15
Payer: COMMERCIAL

## 2021-12-16 ENCOUNTER — TELEPHONE (OUTPATIENT)
Dept: ONCOLOGY | Facility: CLINIC | Age: 71
End: 2021-12-16
Payer: COMMERCIAL

## 2021-12-16 NOTE — TELEPHONE ENCOUNTER
IVC filter removal.  Left message on VM, and emailed patient to schedule.  Will send letter as well.  Lian Collins RN  IR nurse clinician  [phone]

## 2021-12-17 ENCOUNTER — OFFICE VISIT (OUTPATIENT)
Dept: OTOLARYNGOLOGY | Facility: CLINIC | Age: 71
End: 2021-12-17
Payer: COMMERCIAL

## 2021-12-17 ENCOUNTER — HOSPITAL ENCOUNTER (OUTPATIENT)
Dept: PET IMAGING | Facility: CLINIC | Age: 71
End: 2021-12-17
Attending: OTOLARYNGOLOGY
Payer: COMMERCIAL

## 2021-12-17 ENCOUNTER — THERAPY VISIT (OUTPATIENT)
Dept: SPEECH THERAPY | Facility: CLINIC | Age: 71
End: 2021-12-17
Payer: COMMERCIAL

## 2021-12-17 VITALS
DIASTOLIC BLOOD PRESSURE: 79 MMHG | HEIGHT: 71 IN | SYSTOLIC BLOOD PRESSURE: 112 MMHG | WEIGHT: 196 LBS | BODY MASS INDEX: 27.44 KG/M2

## 2021-12-17 DIAGNOSIS — C32.8 CANCER OF OVERLAPPING SITES OF LARYNX (H): ICD-10-CM

## 2021-12-17 DIAGNOSIS — C32.9 MALIGNANT NEOPLASM OF LARYNX (H): ICD-10-CM

## 2021-12-17 DIAGNOSIS — R13.12 OROPHARYNGEAL DYSPHAGIA: ICD-10-CM

## 2021-12-17 DIAGNOSIS — C32.9 MALIGNANT NEOPLASM OF LARYNX (H): Primary | ICD-10-CM

## 2021-12-17 DIAGNOSIS — R49.1 APHONIA: ICD-10-CM

## 2021-12-17 PROCEDURE — 99214 OFFICE O/P EST MOD 30 MIN: CPT | Mod: 25 | Performed by: OTOLARYNGOLOGY

## 2021-12-17 PROCEDURE — 31615 TRCHEOBRNCHSC EST TRACHS INC: CPT | Performed by: OTOLARYNGOLOGY

## 2021-12-17 PROCEDURE — 92507 TX SP LANG VOICE COMM INDIV: CPT | Mod: GN | Performed by: SPEECH-LANGUAGE PATHOLOGIST

## 2021-12-17 PROCEDURE — 99024 POSTOP FOLLOW-UP VISIT: CPT | Performed by: RADIOLOGY

## 2021-12-17 PROCEDURE — 250N000011 HC RX IP 250 OP 636: Performed by: OTOLARYNGOLOGY

## 2021-12-17 PROCEDURE — 71260 CT THORAX DX C+: CPT | Mod: 26 | Performed by: RADIOLOGY

## 2021-12-17 PROCEDURE — 74177 CT ABD & PELVIS W/CONTRAST: CPT | Mod: 26 | Performed by: RADIOLOGY

## 2021-12-17 PROCEDURE — 343N000001 HC RX 343: Performed by: OTOLARYNGOLOGY

## 2021-12-17 PROCEDURE — A9552 F18 FDG: HCPCS | Performed by: OTOLARYNGOLOGY

## 2021-12-17 PROCEDURE — 70491 CT SOFT TISSUE NECK W/DYE: CPT | Mod: 26 | Performed by: RADIOLOGY

## 2021-12-17 PROCEDURE — 70491 CT SOFT TISSUE NECK W/DYE: CPT

## 2021-12-17 PROCEDURE — 78816 PET IMAGE W/CT FULL BODY: CPT | Mod: PS

## 2021-12-17 PROCEDURE — 31575 DIAGNOSTIC LARYNGOSCOPY: CPT | Mod: 51 | Performed by: OTOLARYNGOLOGY

## 2021-12-17 PROCEDURE — 78816 PET IMAGE W/CT FULL BODY: CPT | Mod: 26 | Performed by: RADIOLOGY

## 2021-12-17 RX ORDER — IOPAMIDOL 755 MG/ML
5-140 INJECTION, SOLUTION INTRAVASCULAR ONCE
Status: COMPLETED | OUTPATIENT
Start: 2021-12-17 | End: 2021-12-17

## 2021-12-17 RX ADMIN — FLUDEOXYGLUCOSE F-18 11.86 MCI.: 500 INJECTION, SOLUTION INTRAVENOUS at 12:28

## 2021-12-17 RX ADMIN — IOPAMIDOL 122 ML: 755 INJECTION, SOLUTION INTRAVENOUS at 13:24

## 2021-12-17 ASSESSMENT — PAIN SCALES - GENERAL: PAINLEVEL: NO PAIN (0)

## 2021-12-17 ASSESSMENT — MIFFLIN-ST. JEOR: SCORE: 1676.18

## 2021-12-17 NOTE — LETTER
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 He was unfortunately lost to follow-up until he presented in 2021 with a sore throat and stridor with work-up at the [facility] demonstrating a large primary laryngeal tumor obstructing the airway. He underwent total laryngectomy and bilateral neck dissections on 2021. Pathology revealed a 6 cm moderately differentiated squamous cell carcinoma invading through the thyroid cartilage with perineural and lymphovascular invasion. The surgical margin at the right pyriform was negative albeit close. There was no metastatic adenopathy present within the dissected lymph nodes. He received adjuvant radiotherapy as described above for improved locoregional disease control.    Mr. Warren returns to ENT multiD clinic today for a routine posttreatment disease surveillance visit. He reports that he is doing well overall and has no pressing concerns or complaints. He is eating a regularly diet without difficulty although has noted needing to take more fluids with dry foods such as crackers due to his treatment-induced xerostomia. He otherwise has no odynophagia or dysphagia and his remaining ROS are likewise negative. He was found to be slightly hypothyroid  on his most recent labs and was started on Synthroid by Dr. Bean in heme-onc.    PHYSICAL EXAM:  Weight: 88.9 kg  BP: 112/79    General: Healthy-appearing 71-year-old gentleman seated comfortably in an examination chair no acute distress  HEENT: NC/AT.  EOMI.  No rhinorrhea or epistaxis.  Mildly dry mucous membranes.  No visible cavity lesions.  No mucositis or thrush.  Neck: Stoma is midline and without any crusting.  Minimal submental lymphedema.  Moderate fibrosis of the bilateral neck.  No palpable cervical adenopathy bilaterally.  Pulmonary: No wheezing, stridor or respiratory distress  Skin: Normal color and turgor    Dr. Sethi performed a flexible nasopharyngoscopy and tracheoscopy in clinic. Please see her note for complete details regarding this procedure. Briefly, this demonstrates post-treatment changes with no evidence of recurrent disease.     LABS AND IMAGIN2021 PET/CT:  Official radiology read currently pending at the time of the follow-up visit. Review in clinic demonstrates no overt evidence of locoregional relapse or metastatic disease.    IMPRESSION:   Mr. 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 He is 3 months out from the completion of adjuvant radiotherapy and remains clinically and radiologically ADDISON.    PLAN:   1. Follow-up with Dr. Sethi in 2 months and in multiD clinic in 4 months  2. Follow-up finalized results from today's PET/CT  3. CT neck and chest in 2022  4. Repeat TSH per medical oncology  5. Lymphedema referral placed    Serg Kennedy MD/PhD    Department of Radiation Oncology  [facility]

## 2021-12-17 NOTE — PROGRESS NOTES
Dear Dr. Bean:    I had the pleasure of seeing Raj Warren in follow-up today at the [facility].     History of Present Illness:  Raj Warren is 71 year old man with a T4aN0 SCC of the larynx. The patient presented to the ED with a week history of shortness of breath, along with symptoms of sore throat, bilateral ear pain. He had stridor vs wheezing, mild increased work of breathing, and hoarse voice at that time. He was treated with steroids and nebulizers with improvement in his symptoms and was discharged.  He had recurrent symptoms and was seen in the ED again on 4/12/2021. He had a CT scan performed on 4/12/2021 which demonstrated a 3.9 x 1.6 x 3.9 cm mass involving the right true cord, right AE fold, right false cord, posterior hypopharynx, proximal trachea, with involvement of the thyroid cartilage, involvement of the prelaryngeal tissue, no abnormal nodes. Dr Willoughby (local ENT), scope exam was performed, and patient was admitted for a trach and DL/bx. Dr Johnson and Dr Sauceda performed this procedure on 4/13/2021. A tracheal window at 2nd tracheal ring was performed with placement of an 8 cuffed Shiley. Pathology showed moderately differentiated SCC. He was seen by Dr Bean of medical oncology on 4/13/2021. He was followed by local ENT team during his hospitalization. He was recommended for chemoradiation. He was seen by Dr Mclaughlin of [facility], planning 7 weeks treatment, and CT simulation was done. He did have a VSS done while inpatient without evidence of aspiration. The patient was referred here for discussion of possible salvage laryngectomy after chemoradiation. He had a PET scan on 4/26/2021 which showed an FDG avid mass in the larynx involving the supraglottis/glottis/subglottis, hypermetabolic left level 4 node, FDG avid nodule in the right lung thought to be infectious/inflammatory. He was then admitted to the hospital locally from 4/27/2021 to 5/8/2021 after  presenting with worsening shortness of breath, found to have bilateral PE with significant clot burden including the right pulmonary artery and the right ventricle. He was taken to the OR for pulmonary embolectomy and RV thrombectomy. He had IVC filter placed 4/30/2021. His course was complicated by hemodynamic instability requiring pressors, poor healing of his sternal incision requiring wound vac placement. He was discharged on eliquis. He was sent to rehab after his admission, discharged 5/15/2021. He saw Dr Bean in follow-up on 5/27/2021 and was recommended for repeat imaging. This showed slight decrease in size of the tumor, no distant disease.     He was seen as a new patient on 6/18/2021. After extensive preoperative counseling especially in light of his recent PE and anticoagulation, the recommendations was for surgery given the cartilaginous erosion seen on imaging. He was taken to the OR on 7/8/2021 for a DL, total laryngectomy, bilateral neck dissections, cricopharyngeal myotomy. His hospital course was uncomplicated, was restarted on his anticoagulation. His final pathology demonstrated a 6 cm SCC with invasion through the thyroid cartilage, involved the cricoid cartilage, involved the right pyriform sinus, extended to the left side of the tracheostomy site, PNI present, focal lymphatic invasion, 0/75 nodes. The main specimen had a positive margin at the pyriform sinus, margins were taken from the main specimen and were negative (named right pharyngeal wall mucosal margin).      He received postoperative radiation under the care of Dr Kennedy, from 8/4/2021 to 9/20/2021 for a total of 66 Gy.  He had his PEG removed by radiation oncology on 10/4/2021.       Interval history:  He comes in today for follow-up. He was last seen in October 2021. He comes in today with his posttreatment PET scan. He saw Dr Bean this week. He was started on synthroid for hypothyroidism. He is planning on removal for the IVC  filter. The patient says he is doing ok. He is eating everything. He has issues with crackers and dry foods wanting to stick, will go down with water. He has no issues with liquids. He has no pain with swallowing. He has no sore throat. He says his weight is stable. He feels his energy is ok. He is using the anny tube. He is using the jaw bra. He is seeing the dentist.      MEDICATIONS:     Current Outpatient Medications   Medication Sig Dispense Refill     acetaminophen (TYLENOL) 325 MG tablet 2 tablets (650 mg) by Per G Tube route every 8 hours as needed for mild pain or fever 90 tablet 0     apixaban ANTICOAGULANT (ELIQUIS) 5 MG tablet 1 tablet (5 mg) by Per G Tube route 2 times daily 60 tablet 11     levothyroxine (SYNTHROID/LEVOTHROID) 75 MCG tablet Take 1 tablet (75 mcg) by mouth daily 30 tablet 4     metoprolol tartrate (LOPRESSOR) 25 MG tablet Take 1 tablet (25 mg) by mouth 2 times daily 60 tablet 3       ALLERGIES:    Allergies   Allergen Reactions     Pollen Extract        HABITS/SOCIAL HISTORY:    Former smoker  ppd x 8 yrs, quit in . Sober from alcohol.   Lives alone. His family does not live in the immediate [place] area.   He is a retired .       Social History     Socioeconomic History     Marital status: Single     Spouse name: Not on file     Number of children: Not on file     Years of education: Not on file     Highest education level: Not on file   Occupational History     Not on file   Tobacco Use     Smoking status: Former Smoker     Types: Cigarettes     Quit date: 1989     Years since quittin.9     Smokeless tobacco: Never Used     Tobacco comment: quit in   had smoked about 1/2 pack a day then cut back   Substance and Sexual Activity     Alcohol use: No     Comment: No alcohol since      Drug use: No     Sexual activity: Yes     Partners: Female   Other Topics Concern     Parent/sibling w/ CABG, MI or angioplasty before 65F 55M? Not Asked   Social  History Narrative     Not on file     Social Determinants of Health     Financial Resource Strain: Low Risk      Difficulty of Paying Living Expenses: Not hard at all   Food Insecurity: No Food Insecurity     Worried About Running Out of Food in the Last Year: Never true     Ran Out of Food in the Last Year: Never true   Transportation Needs: No Transportation Needs     Lack of Transportation (Medical): No     Lack of Transportation (Non-Medical): No   Physical Activity: Not on file   Stress: Not on file   Social Connections: Not on file   Intimate Partner Violence: Not on file   Housing Stability: Not on file       PAST MEDICAL HISTORY:   Past Medical History:   Diagnosis Date     Allergic state      Anemia      Former smoker      Malignant neoplasm of larynx (H) 04/20/2021     Pulmonary emboli (H) 04/28/2021    Bilateral-s/p embolectomy and IVC filter placement     S/P percutaneous endoscopic gastrostomy (PEG) tube placement (H)      Tracheostomy in place (H)         PAST SURGICAL HISTORY:   Past Surgical History:   Procedure Laterality Date     DISSECTION RADICAL NECK BILATERAL Bilateral 7/8/2021    Procedure: bilateral neck dissections;  Surgeon: Marilou Sethi MD;  Location: [facility] OR     ENDOSCOPIC INSERTION TUBE GASTROSTOMY Left 7/8/2021    Procedure: INSERTION, PEG TUBE with Esophagogastroduodenoscopy;  Surgeon: Kg Montaño MD;  Location: [facility] OR     IR IVC FILTER PLACEMENT  4/30/2021     LARYNGECTOMY N/A 7/8/2021    Procedure: LARYNGECTOMY;  Surgeon: Marilou Sethi MD;  Location: [facility] OR     LARYNGOSCOPY N/A 4/12/2021    Procedure: LARYNGOSCOPY;  Surgeon: Choco Johnson MD;  Location: [facility] OR     LARYNGOSCOPY N/A 7/8/2021    Procedure: LARYNGOSCOPY;  Surgeon: Marilou Sethi MD;  Location: [facility] OR     REPAIR ANEURYSM ASCENDING AORTA N/A 4/28/2021    Procedure: PULMONARY THROMBECTOMY;  Surgeon: Yumi Singh MD;  Location: [facility] OR     TRACHEOSTOMY  4/12/2021    Procedure: CREATION,  "TRACHEOSTOMY;  Surgeon: Choco Johnson MD;  Location:  OR       FAMILY HISTORY:    Family History   Problem Relation Age of Onset     Circulatory Mother         blood clots     Alcohol/Drug Father         alcohol drank heavily     Alcohol/Drug Brother         alcohol       REVIEW OF SYSTEMS:  12 point ROS was negative other than the symptoms noted above in the HPI.  Patient Supplied Answers to Review of Systems  [facility]NT ROS 8/30/2021   Ears, Nose, Throat Nasal congestion or drainage, Sore throat, Trouble swallowing, Hoarseness         PHYSICAL EXAMINATION:   /79 (BP Location: Left arm, Patient Position: Sitting, Cuff Size: Adult Large)   Ht 1.803 m (5' 11\")   Wt 88.9 kg (196 lb)   BMI 27.34 kg/m    Appearance:   normal; NAD, age-appropriate appearance, well-developed, normal habitus   Communication:   communicates with electrolarynx with intraoral adapter    Head/Face:   inspection -  Normal; no scars or visible lesions   Facial strength -  Normal and symmetric    Skin:  normal, no rash   Ears:  auricle (AD) -  normal  EAC (AD) -  normal  TM (AD) -  Normal, no effusion  auricle (AS) -  normal  EAC (AS) -  normal  TM (AS) -  Normal, no effusion  Normal clinical speech reception   Nose:  Ext. inspection -  Normal  Internal Inspection -  Normal mucosa, septum, and turbinates   Nasopharynx:  normal mucosa, no masses   Oral Cavity:  lips -  Normal mucosa, oral competence, and stoma size    healthy gingival mucosa   Hard palate, buccal, floor of mouth mucosa normal   Tongue - normal movement, no lesions, no palpable masses   Oropharynx:  mucosa -  Normal, no lesions  soft palate -  Normal, no lesions, no asymmetry, normal elevation  Base of tongue - no masses or mucosal lesions   Neopharynx:  Normal mucosa  Few secretions which complicate visualization, able to pass scope into cervical esophagus  No masses   No signs of recurrence   Neck: Submental lymphedema is slightly improved  Well healed neck " incision  No palpable masses  Stoma patent, anny tube in place, stable in size   Lymphatic:  no abnormal nodes   Cardiovascular:  warm, pink, well-perfused extremities without swelling, tenderness, or edema   Respiratory:  Normal respiratory effort   Neuro/Psych.:  mood/affect -  normal  mental status -  normal           PROCEDURES:   Flexible fiberoptic laryngoscopy: Scope exam was indicated due to laryngeal cancer. Verbal consent was obtained. The nasal cavity was prepped with an aerosolized solution of topical anesthetic and vasoconstrictive agent. The scope was passed through the anterior nasal cavity and advanced. Inspection of the nasopharynx revealed no gross abnormality. The base of tongue is normal. The neopharynx is patent with some secretions. There are no masses or mucosal lesions. There are some pooled secretions. Procedure tolerated well with no immediate complications noted.  Tracheoscopy: The flexible scope was passed through the stoma down to the bronchi. There are no masses or mucosal lesions. There are no secretions.            RESULTS REVIEWED:   I reviewed the note from medical oncology    Care discussed with SLP and Dr Kennedy    TSH/T4 reviewed    PET and CT scan images independently reviewed    PET report reviewed       IMPRESSION AND PLAN:   Raj Warren is a 71 year old man with a T4N0 SCC of the larynx. He underwent [facility] by [facility]. He was taken to the OR on 7/8/2021 for a total laryngectomy with bilateral neck dissections. He completed his postoperative radiation on 9/20/2021.    He is recovering from his treatment. He has no clinical signs of recurrence.     We will place a lymphedema referral.    I would like him to continue to wear the anny tube.    He had his TSH checked yesterday. Dr Bean started synthroid. He has repeat labs ordered.    He had his PET scan performed today. We will repeat his imaging in June 2022.    I would like to see him back in 2 months.  He will return to multiD clinic in 4 months.      Thank you very much for the opportunity to participate in the care of your patient.      Marilou Sethi MD, M.D.  Otolaryngology- Head & Neck Surgery      This note was dictated with voice recognition software and then edited. Please excuse any unintentional errors.         CC:  Rosa Bean MD  [facility]  [address] [facility] [address]  [facility] [address]      Serg Kennedy MD  Department of Radiation Oncology  [facility]

## 2021-12-17 NOTE — LETTER
12/17/2021       RE: Raj Warren  [address]  [facility] [address]     Dear Colleague,    Thank you for referring your patient, Raj Warren, to the [facility] [place] at [facility]. Please see a copy of my visit note below.    Dear Dr. Bean:    I had the pleasure of seeing Raj Warren in follow-up today at the [facility].     History of Present Illness:  Raj Warren is 71 year old man with a T4aN0 SCC of the larynx. The patient presented to the ED with a week history of shortness of breath, along with symptoms of sore throat, bilateral ear pain. He had stridor vs wheezing, mild increased work of breathing, and hoarse voice at that time. He was treated with steroids and nebulizers with improvement in his symptoms and was discharged.  He had recurrent symptoms and was seen in the ED again on 4/12/2021. He had a CT scan performed on 4/12/2021 which demonstrated a 3.9 x 1.6 x 3.9 cm mass involving the right true cord, right AE fold, right false cord, posterior hypopharynx, proximal trachea, with involvement of the thyroid cartilage, involvement of the prelaryngeal tissue, no abnormal nodes. Dr Willoughby (local ENT), scope exam was performed, and patient was admitted for a trach and DL/bx. Dr Johnson and Dr Sauceda performed this procedure on 4/13/2021. A tracheal window at 2nd tracheal ring was performed with placement of an 8 cuffed Shiley. Pathology showed moderately differentiated SCC. He was seen by Dr Bean of medical oncology on 4/13/2021. He was followed by local ENT team during his hospitalization. He was recommended for chemoradiation. He was seen by Dr Mclaughlin of [facility], planning 7 weeks treatment, and CT simulation was done. He did have a VSS done while inpatient without evidence of aspiration. The patient was referred here for discussion of possible salvage  laryngectomy after chemoradiation. He had a PET scan on 4/26/2021 which showed an FDG avid mass in the larynx involving the supraglottis/glottis/subglottis, hypermetabolic left level 4 node, FDG avid nodule in the right lung thought to be infectious/inflammatory. He was then admitted to the hospital locally from 4/27/2021 to 5/8/2021 after presenting with worsening shortness of breath, found to have bilateral PE with significant clot burden including the right pulmonary artery and the right ventricle. He was taken to the OR for pulmonary embolectomy and RV thrombectomy. He had IVC filter placed 4/30/2021. His course was complicated by hemodynamic instability requiring pressors, poor healing of his sternal incision requiring wound vac placement. He was discharged on eliquis. He was sent to rehab after his admission, discharged 5/15/2021. He saw Dr Bean in follow-up on 5/27/2021 and was recommended for repeat imaging. This showed slight decrease in size of the tumor, no distant disease.     He was seen as a new patient on 6/18/2021. After extensive preoperative counseling especially in light of his recent PE and anticoagulation, the recommendations was for surgery given the cartilaginous erosion seen on imaging. He was taken to the OR on 7/8/2021 for a DL, total laryngectomy, bilateral neck dissections, cricopharyngeal myotomy. His hospital course was uncomplicated, was restarted on his anticoagulation. His final pathology demonstrated a 6 cm SCC with invasion through the thyroid cartilage, involved the cricoid cartilage, involved the right pyriform sinus, extended to the left side of the tracheostomy site, PNI present, focal lymphatic invasion, 0/75 nodes. The main specimen had a positive margin at the pyriform sinus, margins were taken from the main specimen and were negative (named right pharyngeal wall mucosal margin).      He received postoperative radiation under the care of Dr Kennedy, from 8/4/2021 to 9/20/2021  for a total of 66 Gy.  He had his PEG removed by radiation oncology on 10/4/2021.       Interval history:  He comes in today for follow-up. He was last seen in 2021. He comes in today with his posttreatment PET scan. He saw Dr Bean this week. He was started on synthroid for hypothyroidism. He is planning on removal for the IVC filter. The patient says he is doing ok. He is eating everything. He has issues with crackers and dry foods wanting to stick, will go down with water. He has no issues with liquids. He has no pain with swallowing. He has no sore throat. He says his weight is stable. He feels his energy is ok. He is using the anny tube. He is using the jaw bra. He is seeing the dentist.      MEDICATIONS:     Current Outpatient Medications   Medication Sig Dispense Refill     acetaminophen (TYLENOL) 325 MG tablet 2 tablets (650 mg) by Per G Tube route every 8 hours as needed for mild pain or fever 90 tablet 0     apixaban ANTICOAGULANT (ELIQUIS) 5 MG tablet 1 tablet (5 mg) by Per G Tube route 2 times daily 60 tablet 11     levothyroxine (SYNTHROID/LEVOTHROID) 75 MCG tablet Take 1 tablet (75 mcg) by mouth daily 30 tablet 4     metoprolol tartrate (LOPRESSOR) 25 MG tablet Take 1 tablet (25 mg) by mouth 2 times daily 60 tablet 3       ALLERGIES:    Allergies   Allergen Reactions     Pollen Extract        HABITS/SOCIAL HISTORY:    Former smoker / ppd x 8 yrs, quit in . Sober from alcohol.   Lives alone. His family does not live in the immediate [place] area.   He is a retired .       Social History     Socioeconomic History     Marital status: Single     Spouse name: Not on file     Number of children: Not on file     Years of education: Not on file     Highest education level: Not on file   Occupational History     Not on file   Tobacco Use     Smoking status: Former Smoker     Types: Cigarettes     Quit date: 1989     Years since quittin.9     Smokeless tobacco: Never  Used     Tobacco comment: quit in 1989  had smoked about 1/2 pack a day then cut back   Substance and Sexual Activity     Alcohol use: No     Comment: No alcohol since 1989     Drug use: No     Sexual activity: Yes     Partners: Female   Other Topics Concern     Parent/sibling w/ CABG, MI or angioplasty before 65F 55M? Not Asked   Social History Narrative     Not on file     Social Determinants of Health     Financial Resource Strain: Low Risk      Difficulty of Paying Living Expenses: Not hard at all   Food Insecurity: No Food Insecurity     Worried About Running Out of Food in the Last Year: Never true     Ran Out of Food in the Last Year: Never true   Transportation Needs: No Transportation Needs     Lack of Transportation (Medical): No     Lack of Transportation (Non-Medical): No   Physical Activity: Not on file   Stress: Not on file   Social Connections: Not on file   Intimate Partner Violence: Not on file   Housing Stability: Not on file       PAST MEDICAL HISTORY:   Past Medical History:   Diagnosis Date     Allergic state      Anemia      Former smoker      Malignant neoplasm of larynx (H) 04/20/2021     Pulmonary emboli (H) 04/28/2021    Bilateral-s/p embolectomy and IVC filter placement     S/P percutaneous endoscopic gastrostomy (PEG) tube placement (H)      Tracheostomy in place (H)         PAST SURGICAL HISTORY:   Past Surgical History:   Procedure Laterality Date     DISSECTION RADICAL NECK BILATERAL Bilateral 7/8/2021    Procedure: bilateral neck dissections;  Surgeon: Marilou Sethi MD;  Location: [facility] OR     ENDOSCOPIC INSERTION TUBE GASTROSTOMY Left 7/8/2021    Procedure: INSERTION, PEG TUBE with Esophagogastroduodenoscopy;  Surgeon: Kg Montaño MD;  Location: [facility] OR     IR IVC FILTER PLACEMENT  4/30/2021     LARYNGECTOMY N/A 7/8/2021    Procedure: LARYNGECTOMY;  Surgeon: Marilou Sethi MD;  Location: [facility] OR     LARYNGOSCOPY N/A 4/12/2021    Procedure: LARYNGOSCOPY;  Surgeon: Alex  "Choco FAUSTIN MD;  Location:  OR     LARYNGOSCOPY N/A 7/8/2021    Procedure: LARYNGOSCOPY;  Surgeon: Marilou Sethi MD;  Location: [facility] OR     REPAIR ANEURYSM ASCENDING AORTA N/A 4/28/2021    Procedure: PULMONARY THROMBECTOMY;  Surgeon: Yumi Singh MD;  Location:  OR     TRACHEOSTOMY  4/12/2021    Procedure: CREATION, TRACHEOSTOMY;  Surgeon: Choco Johnson MD;  Location:  OR       FAMILY HISTORY:    Family History   Problem Relation Age of Onset     Circulatory Mother         blood clots     Alcohol/Drug Father         alcohol drank heavily     Alcohol/Drug Brother         alcohol       REVIEW OF SYSTEMS:  12 point ROS was negative other than the symptoms noted above in the HPI.  Patient Supplied Answers to Review of Systems  [facility] ROS 8/30/2021   Ears, Nose, Throat Nasal congestion or drainage, Sore throat, Trouble swallowing, Hoarseness         PHYSICAL EXAMINATION:   /79 (BP Location: Left arm, Patient Position: Sitting, Cuff Size: Adult Large)   Ht 1.803 m (5' 11\")   Wt 88.9 kg (196 lb)   BMI 27.34 kg/m    Appearance:   normal; NAD, age-appropriate appearance, well-developed, normal habitus   Communication:   communicates with electrolarynx with intraoral adapter    Head/Face:   inspection -  Normal; no scars or visible lesions   Facial strength -  Normal and symmetric    Skin:  normal, no rash   Ears:  auricle (AD) -  normal  EAC (AD) -  normal  TM (AD) -  Normal, no effusion  auricle (AS) -  normal  EAC (AS) -  normal  TM (AS) -  Normal, no effusion  Normal clinical speech reception   Nose:  Ext. inspection -  Normal  Internal Inspection -  Normal mucosa, septum, and turbinates   Nasopharynx:  normal mucosa, no masses   Oral Cavity:  lips -  Normal mucosa, oral competence, and stoma size    healthy gingival mucosa   Hard palate, buccal, floor of mouth mucosa normal   Tongue - normal movement, no lesions, no palpable masses   Oropharynx:  mucosa -  Normal, no lesions  soft " palate -  Normal, no lesions, no asymmetry, normal elevation  Base of tongue - no masses or mucosal lesions   Neopharynx:  Normal mucosa  Few secretions which complicate visualization, able to pass scope into cervical esophagus  No masses   No signs of recurrence   Neck: Submental lymphedema is slightly improved  Well healed neck incision  No palpable masses  Stoma patent, anny tube in place, stable in size   Lymphatic:  no abnormal nodes   Cardiovascular:  warm, pink, well-perfused extremities without swelling, tenderness, or edema   Respiratory:  Normal respiratory effort   Neuro/Psych.:  mood/affect -  normal  mental status -  normal           PROCEDURES:   Flexible fiberoptic laryngoscopy: Scope exam was indicated due to laryngeal cancer. Verbal consent was obtained. The nasal cavity was prepped with an aerosolized solution of topical anesthetic and vasoconstrictive agent. The scope was passed through the anterior nasal cavity and advanced. Inspection of the nasopharynx revealed no gross abnormality. The base of tongue is normal. The neopharynx is patent with some secretions. There are no masses or mucosal lesions. There are some pooled secretions. Procedure tolerated well with no immediate complications noted.  Tracheoscopy: The flexible scope was passed through the stoma down to the bronchi. There are no masses or mucosal lesions. There are no secretions.            RESULTS REVIEWED:   I reviewed the note from medical oncology    Care discussed with SLP and Dr Kennedy    TSH/T4 reviewed    PET and CT scan images independently reviewed    PET report reviewed       IMPRESSION AND PLAN:   Raj Warren is a 71 year old man with a T4N0 SCC of the larynx. He underwent [facility]. He was taken to the OR on 7/8/2021 for a total laryngectomy with bilateral neck dissections. He completed his postoperative radiation on 9/20/2021.    He is recovering from his treatment. He has no clinical  signs of recurrence.     We will place a lymphedema referral.    I would like him to continue to wear the anny tube.    He had his TSH checked yesterday. Dr Bean started synthroid. He has repeat labs ordered.    He had his PET scan performed today. We will repeat his imaging in June 2022.    I would like to see him back in 2 months. He will return to multiD clinic in 4 months.      Thank you very much for the opportunity to participate in the care of your patient.      Marilou Sethi MD, M.D.  Otolaryngology- Head & Neck Surgery      This note was dictated with voice recognition software and then edited. Please excuse any unintentional errors.         CC:  Rosa Bean MD  [facility]  [address] [address]      Serg Kennedy MD  Department of Radiation Oncology  [facility]

## 2021-12-19 NOTE — PROGRESS NOTES
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 He was unfortunately lost to follow-up until he presented in 2021 with a sore throat and stridor with work-up at the [facility] demonstrating a large primary laryngeal tumor obstructing the airway. He underwent total laryngectomy and bilateral neck dissections on 2021. Pathology revealed a 6 cm moderately differentiated squamous cell carcinoma invading through the thyroid cartilage with perineural and lymphovascular invasion. The surgical margin at the right pyriform was negative albeit close. There was no metastatic adenopathy present within the dissected lymph nodes. He received adjuvant radiotherapy as described above for improved locoregional disease control.    Mr. Warren returns to ENT multiD clinic today for a routine posttreatment disease surveillance visit. He reports that he is doing well overall and has no pressing concerns or complaints. He is eating a regularly diet without difficulty although has noted needing to take more fluids with dry foods such as crackers due to his treatment-induced xerostomia. He otherwise has no odynophagia or dysphagia and his remaining ROS are likewise negative. He was found to be slightly hypothyroid on his most recent labs and was started on Synthroid by Dr. Bean in heme-onc.    PHYSICAL  EXAM:  Weight: 88.9 kg  BP: 112/79    General: Healthy-appearing 71-year-old gentleman seated comfortably in an examination chair no acute distress  HEENT: NC/AT.  EOMI.  No rhinorrhea or epistaxis.  Mildly dry mucous membranes.  No visible cavity lesions.  No mucositis or thrush.  Neck: Stoma is midline and without any crusting.  Minimal submental lymphedema.  Moderate fibrosis of the bilateral neck.  No palpable cervical adenopathy bilaterally.  Pulmonary: No wheezing, stridor or respiratory distress  Skin: Normal color and turgor    Dr. Sethi performed a flexible nasopharyngoscopy and tracheoscopy in clinic. Please see her note for complete details regarding this procedure. Briefly, this demonstrates post-treatment changes with no evidence of recurrent disease.     LABS AND IMAGIN2021 PET/CT:  Official radiology read currently pending at the time of the follow-up visit. Review in clinic demonstrates no overt evidence of locoregional relapse or metastatic disease.    IMPRESSION:   Mr. 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 He is 3 months out from the completion of adjuvant radiotherapy and remains clinically and radiologically ADDISON.    PLAN:   1. Follow-up with Dr. Sethi in 2 months and in multiD clinic in 4 months  2. Follow-up finalized results from today's PET/CT  3. CT neck and chest in 2022  4. Repeat TSH per medical oncology  5. Lymphedema referral placed    Serg Kennedy MD/PhD    Department of Radiation Oncology  [facility]

## 2021-12-19 NOTE — PROGRESS NOTES
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  1.  Patient was evaluated in 2019 for hoarseness of voice.  He was seen by ENT and found to have right vocal cord mass.    -He had biopsy done on 08/07/2019. Pathology revealed moderately-differentiated invasive squamous cell carcinoma.     2.  The patient was seen in the Emergency Room on 04/12/2021 for shortness of breath and admitted.   -CT neck revealed an enhancing soft tissue mass involving the right larynx measuring 3.9 cm.  -Patient had laryngoscopy with biopsy and tracheostomy done on 04/12/2021.  There was an endolaryngeal mass obscuring the laryngeal landmarks.  Mass appeared to be based on right endolarynx.  Pathology revealed invasive keratinizing moderately-differentiated squamous cell carcinoma.     3.  PET scan on 04/26/2021 revealed hypermetabolic mass involving the supraglottic, glottic and subglottic larynx.  There was a hypermetabolic left level IV lymph node.  There was also hypermetabolic nodule in right middle lobe.     4. CT chest angiogram on 04/27/2021 revealed a large bilateral pulmonary embolism in all the lobes.  There was evidence of right cardiac strain.  -Echocardiogram on 04/27/2021 revealed clot in transit in the right ventricle.  -The patient had emergency pulmonary embolectomy on 04/28/2021.  Pathology revealed organizing clot.  -Patient on Eliquis. Long term anti-coagulation recommended.     5. Total laryngectomy with bilateral neck dissection on 07/08/2021.  Pathology reveals 6 cm squamous cell carcinoma involving the right false and true vocal cords with fixation, the left false and true vocal cords and invades through thyroid cartilage.  There is focal lymphatic invasion.  There is perineural invasion. Benign 75 lymph nodes.  pT4a pN0 disease.     6. Post-op radiation between 08/04/2021 and 09/20/2021. 6600 cGy in 33 fractions.    SUBJECTIVE:    Briana is a 71-year-old gentleman with laryngeal squamous cell carcinoma status post total laryngectomy and neck dissection. He had postoperative radiation.  He is being monitored.     Overall, his condition is stable.  Mild fatigue.  No headache or dizziness.  No chest pain or shortness of breath.  No nausea or vomiting.  Appetite is fairly good.  No urinary or bowel complaints.  No abnormal bleeding.  The patient says that sometimes he gets a lot of mucus in the throat.     All other review of systems is negative.     PHYSICAL EXAMINATION:    Alert and oriented x 3.  Not in distress.  Rest of systems not examined.     LABORATORY:  CBC, CMP and TSH reviewed.     ASSESSMENT:    1.  A 71-year-old gentleman with laryngeal squamous cell carcinoma.  No clinical evidence of recurrence.  2.  Pulmonary embolism on Eliquis.  3.  Mild leukopenia.  3.  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     PLAN:     1.  The patient clinically is doing well from laryngeal squamous cell carcinoma.  He is scheduled for a PET scan and ENT followup in the next week.  We will wait for the PET scan result.  Clinically, no suspicion of recurrence.  2.  The patient has hypothyroidism.  We will start him on levothyroxine once a day.  We will recheck his TSH in 3 months.  3.  He has mild leukopenia.  Previously, WBC is normal.  His hemoglobin and platelets are normal.  We will monitor this.  CBC will be checked in 3 months.  4.  The patient will continue on Eliquis for his pulmonary embolism.  Plan is for indefinite anticoagulation as he had saddle pulmonary embolism.  5.  He has IVC filter.  We will have it removed.  6.  He had multiple questions, which were all answered.  I will see him in 3 months' time.

## 2022-01-25 ENCOUNTER — PATIENT OUTREACH (OUTPATIENT)
Dept: ONCOLOGY | Facility: CLINIC | Age: 72
End: 2022-01-25

## 2022-01-25 DIAGNOSIS — I10 BENIGN ESSENTIAL HYPERTENSION: ICD-10-CM

## 2022-01-25 RX ORDER — METOPROLOL TARTRATE 25 MG/1
25 TABLET, FILM COATED ORAL 2 TIMES DAILY
Qty: 60 TABLET | Refills: 3 | Status: SHIPPED | OUTPATIENT
Start: 2022-01-25 | End: 2022-05-31

## 2022-01-25 NOTE — PROGRESS NOTES
Writer received a faxed request for a new script of Metoprolol.    This has been reordered.    Serena Castillo RN

## 2022-01-31 ENCOUNTER — TELEPHONE (OUTPATIENT)
Dept: INTERVENTIONAL RADIOLOGY/VASCULAR | Facility: CLINIC | Age: 72
End: 2022-01-31
Payer: COMMERCIAL

## 2022-01-31 ENCOUNTER — TELEPHONE (OUTPATIENT)
Dept: OTHER | Facility: CLINIC | Age: 72
End: 2022-01-31
Payer: COMMERCIAL

## 2022-01-31 DIAGNOSIS — Z93.0 TRACHEOSTOMY IN PLACE (H): Primary | ICD-10-CM

## 2022-01-31 RX ORDER — LIDOCAINE 40 MG/G
CREAM TOPICAL
Status: CANCELLED | OUTPATIENT
Start: 2022-01-31

## 2022-01-31 NOTE — TELEPHONE ENCOUNTER
Call Complete. Pt will arrive 2/7 at 600 for a 730 appointment.  Pt will be NPO at 200 and will have a .  Pt has covid test ordered and can call for lab.

## 2022-01-31 NOTE — TELEPHONE ENCOUNTER
My chart message to patient.        Your COVID test needs to be within 4 days of the procedure.   So when did you have the test?  I can't get you in this week.  It is completed booked.  I need more than one day head's up.     I will schedule you for Monday 2/7 check in at 6:30 am at the [facility] [employer] desk located in the [facility].  You need to have a .   Do not eat or drink anything by mouth after 3:30 am  You can take all of your am meds with a sip of water, including blood thinner.  So that means COVID test can be anytime after 2/3: or same day.      Lian Collins RN  IR nurse clinician  [phone]

## 2022-02-03 ENCOUNTER — LAB (OUTPATIENT)
Dept: URGENT CARE | Facility: URGENT CARE | Age: 72
End: 2022-02-03
Attending: RADIOLOGY
Payer: COMMERCIAL

## 2022-02-03 DIAGNOSIS — Z93.0 TRACHEOSTOMY IN PLACE (H): ICD-10-CM

## 2022-02-03 PROCEDURE — U0005 INFEC AGEN DETEC AMPLI PROBE: HCPCS

## 2022-02-03 PROCEDURE — U0003 INFECTIOUS AGENT DETECTION BY NUCLEIC ACID (DNA OR RNA); SEVERE ACUTE RESPIRATORY SYNDROME CORONAVIRUS 2 (SARS-COV-2) (CORONAVIRUS DISEASE [COVID-19]), AMPLIFIED PROBE TECHNIQUE, MAKING USE OF HIGH THROUGHPUT TECHNOLOGIES AS DESCRIBED BY CMS-2020-01-R: HCPCS

## 2022-02-04 LAB — SARS-COV-2 RNA RESP QL NAA+PROBE: NEGATIVE

## 2022-02-04 RX ORDER — FENTANYL CITRATE 50 UG/ML
25-50 INJECTION, SOLUTION INTRAMUSCULAR; INTRAVENOUS EVERY 5 MIN PRN
Status: CANCELLED | OUTPATIENT
Start: 2022-02-04

## 2022-02-07 ENCOUNTER — APPOINTMENT (OUTPATIENT)
Dept: INTERVENTIONAL RADIOLOGY/VASCULAR | Facility: CLINIC | Age: 72
End: 2022-02-07
Attending: INTERNAL MEDICINE
Payer: COMMERCIAL

## 2022-02-07 ENCOUNTER — HOSPITAL ENCOUNTER (OUTPATIENT)
Facility: CLINIC | Age: 72
Discharge: HOME | End: 2022-02-07
Attending: RADIOLOGY | Admitting: RADIOLOGY
Payer: COMMERCIAL

## 2022-02-07 VITALS
WEIGHT: 198 LBS | TEMPERATURE: 98 F | SYSTOLIC BLOOD PRESSURE: 128 MMHG | HEIGHT: 71 IN | HEART RATE: 80 BPM | OXYGEN SATURATION: 99 % | BODY MASS INDEX: 27.72 KG/M2 | RESPIRATION RATE: 18 BRPM | DIASTOLIC BLOOD PRESSURE: 80 MMHG

## 2022-02-07 DIAGNOSIS — I26.99 BILATERAL PULMONARY EMBOLISM (H): Primary | ICD-10-CM

## 2022-02-07 DIAGNOSIS — I26.02 SADDLE EMBOLUS OF PULMONARY ARTERY WITH ACUTE COR PULMONALE, UNSPECIFIED CHRONICITY (H): ICD-10-CM

## 2022-02-07 LAB
ANION GAP SERPL CALCULATED.3IONS-SCNC: 4 MMOL/L (ref 3–14)
BUN SERPL-MCNC: 15 MG/DL (ref 7–30)
CALCIUM SERPL-MCNC: 9.2 MG/DL (ref 8.5–10.1)
CHLORIDE BLD-SCNC: 108 MMOL/L (ref 94–109)
CO2 SERPL-SCNC: 26 MMOL/L (ref 20–32)
CREAT SERPL-MCNC: 1 MG/DL (ref 0.66–1.25)
ERYTHROCYTE [DISTWIDTH] IN BLOOD BY AUTOMATED COUNT: 13 % (ref 10–15)
GFR SERPL CREATININE-BSD FRML MDRD: 81 ML/MIN/1.73M2
GLUCOSE BLD-MCNC: 95 MG/DL (ref 70–99)
HCT VFR BLD AUTO: 44.8 % (ref 40–53)
HGB BLD-MCNC: 15.1 G/DL (ref 13.3–17.7)
INR PPP: 1.16 (ref 0.85–1.15)
MCH RBC QN AUTO: 30.4 PG (ref 26.5–33)
MCHC RBC AUTO-ENTMCNC: 33.7 G/DL (ref 31.5–36.5)
MCV RBC AUTO: 90 FL (ref 78–100)
PLATELET # BLD AUTO: 206 10E3/UL (ref 150–450)
POTASSIUM BLD-SCNC: 4.3 MMOL/L (ref 3.4–5.3)
RBC # BLD AUTO: 4.97 10E6/UL (ref 4.4–5.9)
SODIUM SERPL-SCNC: 138 MMOL/L (ref 133–144)
WBC # BLD AUTO: 3.9 10E3/UL (ref 4–11)

## 2022-02-07 PROCEDURE — C1773 RET DEV, INSERTABLE: HCPCS

## 2022-02-07 PROCEDURE — 999N000163 HC STATISTIC SIMPLE TUBE INSERTION/CHARGE, PORT, CATH, FISTULOGRAM

## 2022-02-07 PROCEDURE — 250N000009 HC RX 250: Performed by: RADIOLOGY

## 2022-02-07 PROCEDURE — C1769 GUIDE WIRE: HCPCS

## 2022-02-07 PROCEDURE — 272N000196 HC ACCESSORY CR5

## 2022-02-07 PROCEDURE — 258N000003 HC RX IP 258 OP 636: Performed by: RADIOLOGY

## 2022-02-07 PROCEDURE — 36415 COLL VENOUS BLD VENIPUNCTURE: CPT | Performed by: RADIOLOGY

## 2022-02-07 PROCEDURE — 80048 BASIC METABOLIC PNL TOTAL CA: CPT | Performed by: RADIOLOGY

## 2022-02-07 PROCEDURE — 37193 REM ENDOVAS VENA CAVA FILTER: CPT

## 2022-02-07 PROCEDURE — 272N000116 HC CATH CR1

## 2022-02-07 PROCEDURE — 85610 PROTHROMBIN TIME: CPT | Performed by: RADIOLOGY

## 2022-02-07 PROCEDURE — 250N000011 HC RX IP 250 OP 636: Performed by: RADIOLOGY

## 2022-02-07 PROCEDURE — 85027 COMPLETE CBC AUTOMATED: CPT | Performed by: RADIOLOGY

## 2022-02-07 RX ORDER — ACETAMINOPHEN 325 MG/1
650 TABLET ORAL
Status: DISCONTINUED | OUTPATIENT
Start: 2022-02-07 | End: 2022-02-07 | Stop reason: HOSPADM

## 2022-02-07 RX ORDER — LIDOCAINE HYDROCHLORIDE 10 MG/ML
1-30 INJECTION, SOLUTION EPIDURAL; INFILTRATION; INTRACAUDAL; PERINEURAL
Status: COMPLETED | OUTPATIENT
Start: 2022-02-07 | End: 2022-02-07

## 2022-02-07 RX ORDER — NALOXONE HYDROCHLORIDE 0.4 MG/ML
0.2 INJECTION, SOLUTION INTRAMUSCULAR; INTRAVENOUS; SUBCUTANEOUS
Status: DISCONTINUED | OUTPATIENT
Start: 2022-02-07 | End: 2022-02-07 | Stop reason: HOSPADM

## 2022-02-07 RX ORDER — SODIUM CHLORIDE 9 MG/ML
INJECTION, SOLUTION INTRAVENOUS CONTINUOUS
Status: DISCONTINUED | OUTPATIENT
Start: 2022-02-07 | End: 2022-02-07 | Stop reason: HOSPADM

## 2022-02-07 RX ORDER — FLUMAZENIL 0.1 MG/ML
0.2 INJECTION, SOLUTION INTRAVENOUS
Status: DISCONTINUED | OUTPATIENT
Start: 2022-02-07 | End: 2022-02-07 | Stop reason: HOSPADM

## 2022-02-07 RX ORDER — HEPARIN SODIUM 200 [USP'U]/100ML
1 INJECTION, SOLUTION INTRAVENOUS CONTINUOUS PRN
Status: DISCONTINUED | OUTPATIENT
Start: 2022-02-07 | End: 2022-02-07 | Stop reason: HOSPADM

## 2022-02-07 RX ORDER — NALOXONE HYDROCHLORIDE 0.4 MG/ML
0.4 INJECTION, SOLUTION INTRAMUSCULAR; INTRAVENOUS; SUBCUTANEOUS
Status: DISCONTINUED | OUTPATIENT
Start: 2022-02-07 | End: 2022-02-07 | Stop reason: HOSPADM

## 2022-02-07 RX ADMIN — LIDOCAINE HYDROCHLORIDE 7 ML: 10 INJECTION, SOLUTION INFILTRATION; PERINEURAL at 08:27

## 2022-02-07 RX ADMIN — HEPARIN SODIUM 1 BAG: 200 INJECTION, SOLUTION INTRAVENOUS at 08:14

## 2022-02-07 RX ADMIN — SODIUM CHLORIDE: 9 INJECTION, SOLUTION INTRAVENOUS at 07:17

## 2022-02-07 ASSESSMENT — MIFFLIN-ST. JEOR: SCORE: 1685.25

## 2022-02-07 NOTE — PROCEDURES
RADIOLOGY POST PROCEDURE NOTE WITH SEDATION  Patient name: Raj Warren  MRN: [id number]  : 1950    Pre-procedure diagnosis: PE, IVCF no longer needed  Post-procedure diagnosis: Same    Procedure Date/Time: 2022  8:27 AM    Procedure: Retrievable IVC Filter removal  Estimated blood loss: None  Sedation: Moderate sedation was employed. The patient was monitored by a nurse at all times during the procedure under my direct supervision.    Specimen(s) collected with description: none    I determined this patient to be an appropriate candidate for the planned sedation and procedure and reassessed the patient IMMEDIATELY PRIOR to sedation and procedure.     The patient tolerated the procedure well with no immediate complications.    Significant findings: Successful removal of retrievable IVC Filter. The entirety of the filter was removed without issues.    See imaging dictation for procedural details.    Provider name: Patrick Mejias MD  Assistant(s):None

## 2022-02-07 NOTE — PROGRESS NOTES
Care Suites Admission Nursing Note    Patient Information  Name: Raj Warren  Age: 71 year old  Reason for admission: IVC filter removal  Care Suites arrival time: 0630    Visitor Information  Name: n/a  Informed of visitor restrictions: N/A  1 visitor allowed per patient   Visitor must screen negative for COVID symptoms   Visitor must wear a mask  Waiting rooms closed to visitors    Patient Admission/Assessment   Pre-procedure assessment complete: Yes  If abnormal assessment/labs, provider notified: N/A  NPO: Yes  Medications held per instructions/orders: N/A  Consent: obtained  If applicable, pregnancy test status: deferred  Patient oriented to room: Yes  Education/questions answered: Yes  Plan/other: ivc filter removal    Discharge Planning  Discharge name/phone number: n/a, taking cab home  Overnight post sedation caregiver: self  Discharge location: home    Viviana Martines RN

## 2022-02-07 NOTE — PROGRESS NOTES
Care Suites Discharge Summary    Discharge Criteria:   Discharge Criteria met per MD orders: Yes.   Vital signs stable.     Pt demonstrates ability to ambulate safely: Yes.  (See discharge questionnaire for additional information)    Discharge instructions & education:   Discharge instructions reviewed with patient . Patient verbalizes understanding.   Additional patient education provided:  IVC removal    Medications:   Patient will be discharging on new medications- No. Patient verbalizes reason for use, start date, and side effects NA.    Items returned to patient:   Home and hospital acquired medications returned to patient NA   Listed belongings gathered and returned to patient: Yes    Patient discharged to home.     Tristin Dorado RN

## 2022-02-07 NOTE — PROGRESS NOTES
0850:  Pt returned from IR. Gauze drsg CDI to Right internal jugular puncture site. No oozing or hematoma noted. Area soft & flat. Pt denies pain. Pt instructed on activity restrictions . Verbal understanding received from pt. Discharge teaching & instructions given to pt w/ verbal understanding received. All questions & concerns addressed.

## 2022-02-07 NOTE — IR NOTE
Interventional Radiology Intra-procedural Nursing Note    Patient Name: Raj Warren  Medical Record Number: [id number]  Today's Date: February 7, 2022    Start Time: 0812  End of procedure time: 0824  Procedure: ivc filter removal  Report given to: KOKO Crow  Time pt departs:  0835  : n/a    Other Notes:     Patient arrives to [address]. Identification confirmed and consent verified. Patient was then assisted onto procedure table, positioned safely and connected to monitoring equipment. Trach in place, on room air.    Right neck/chest region was prepped and patient draped under sterile technique.     Patient tolerated procedure well with local anesthesia only.     Right internal jugular access site c/d/i, manual pressure was applied after sheath removal, bleeding is controled. Covered with sterile dressing.    Leeanna Bullock RN on 2/7/2022 at 8:41 AM

## 2022-02-07 NOTE — PROGRESS NOTES
PATIENT/VISITOR WELLNESS SCREENING    Step 1 Patient Screening    1. In the last month, have you been in contact with someone who was confirmed or suspected to have Coronavirus/COVID-19? No    2. Do you have the following symptoms?  Fever/Chills? No   Cough? No   Shortness of breath? No   New loss of taste or smell? No  Sore throat? No  Muscle or body aches? No  Headaches? No  Fatigue? No  Vomiting or diarrhea? No    Step 2 Visitor Screening    1. Name of Visitor (1 visitor per patient): n/a          If the visitor has positive symptoms, notify supervisor/manger  Per policy, the visitor will need to leave the facility     Step 3 Refer to logic grid below for actions    NO SYMPTOM(S)    ACTIONS:  1. Standard rooming process  2. Provider to assess per normal protocol  3. Implement precautions as needed and per guidelines     POSITIVE SYMPTOM(S)  If positive for ANY of the following symptoms: fever, cough, shortness of breath, rash    ACTION:  1. Continue to have the patient wear a mask   2. Room patient as soon as possible  3. Don appropriate PPE when entering room  4. Provider evaluation

## 2022-02-07 NOTE — DISCHARGE INSTRUCTIONS
IVC Filter Removal Discharge Instructions - Neck    After you go home:      Have an adult stay with you until tomorrow.    Drink extra fluids for 2 days.    You may resume your normal diet.    No smoking       For 24 hours - due to the sedation you received:    Relax and take it easy.    Do NOT make any important or legal decisions.    Do NOT drive or operate machines at home or at work.    Do NOT drink alcohol.    Care of Neck Puncture Site:      For the first 24 hrs - check the puncture site every 1-2 hours while awake.    Remove the bandaid after 24 hours. If there is minor oozing, apply another bandaid and remove it after 12 hours.    It is normal to have a small bruise or soreness at the site.    You may shower tomorrow. Do NOT take a bath, or use a hot tub or pool for at least 3 days. Do NOT scrub the site. Do not use lotion or powder near the puncture site.    Activity:            For 2 days:    Do NOT do any heavy activity such as exercise, lifting, or straining.    No housework, yard work or any activity that make you sweat    Do NOT lift more than 10 pounds    Avoid heavy lifting or the overuse of your shoulder for three days.           Bleeding:      If you start bleeding from the site in your neck, sit down and press gently on the site for 10 minutes.     Once bleeding stops, sit still for 2 hours.     Call the [facility] as soon as you can.       Call 911 right away if you have heavy bleeding or bleeding that does not stop.      Medicines:      If you are taking an antiplatelet medication such as Plavix, do not stop taking it until you talk to your provider.       Take your medications, including blood thinners, unless your provider tells you not to.      If you take Coumadin (Warfarin), have your INR checked by your provider in  3-5 days. Call your clinic to schedule this.    If you have stopped any medicines, check with your provider about when to restart them.    Follow Up  Appointments:      Follow up with your primary care provider as needed.    Call the clinic if:      You have increased pain or a large or growing hard lump around the site.    The site is red, swollen, hot or tender.    Blood or fluid is draining from the site.    You have chills or a fever greater than 101 F (38 C).    You have hives, a rash or unusual itching.    Any questions or concerns.        If you have questions or your original symptoms do not improve, call:        [facility] @ [phone]

## 2022-02-07 NOTE — PRE-PROCEDURE
GENERAL PRE-PROCEDURE:   Procedure:  Inferior vena cava filter with local anesthesia only  Date/Time:  2/7/2022 7:22 AM    Written consent obtained?: Yes    Risks and benefits: Risks, benefits and alternatives were discussed    Consent given by:  Patient  Patient states understanding of procedure being performed: Yes    Patient's understanding of procedure matches consent: Yes    Procedure consent matches procedure scheduled: Yes    : local anesthesia only.  Appropriately NPO:  Yes  ASA Class:  2  Mallampati  :  Grade 3- soft palate visible, posterior pharyngeal wall not visible  Lungs:  Lungs clear with good breath sounds bilaterally  Heart:  Normal heart sounds and rate  History & Physical reviewed:  History and physical reviewed and no updates needed  Statement of review:  I have reviewed the lab findings, diagnostic data, medications, and the plan for sedation

## 2022-02-14 ENCOUNTER — TELEPHONE (OUTPATIENT)
Dept: PHARMACY | Facility: CLINIC | Age: 72
End: 2022-02-14
Payer: COMMERCIAL

## 2022-02-14 NOTE — TELEPHONE ENCOUNTER
Patients pharmacy sent a request for a prior auth but the Eliquis is covered by the patients plan without a prior auth:

## 2022-02-14 NOTE — PROGRESS NOTES
Dear Dr. Bean:    I had the pleasure of seeing Raj Warren in follow-up today at the [facility].     History of Present Illness:  Raj Warren is 71 year old man with a T4aN0 SCC of the larynx. The patient presented to the ED with a week history of shortness of breath, along with symptoms of sore throat, bilateral ear pain. He had stridor vs wheezing, mild increased work of breathing, and hoarse voice at that time. He was treated with steroids and nebulizers with improvement in his symptoms and was discharged.  He had recurrent symptoms and was seen in the ED again on 4/12/2021. He had a CT scan performed on 4/12/2021 which demonstrated a 3.9 x 1.6 x 3.9 cm mass involving the right true cord, right AE fold, right false cord, posterior hypopharynx, proximal trachea, with involvement of the thyroid cartilage, involvement of the prelaryngeal tissue, no abnormal nodes. Dr Willoughby (local ENT), scope exam was performed, and patient was admitted for a trach and DL/bx. Dr Johnson and Dr Sauceda performed this procedure on 4/13/2021. A tracheal window at 2nd tracheal ring was performed with placement of an 8 cuffed Shiley. Pathology showed moderately differentiated SCC. He was seen by Dr Bean of medical oncology on 4/13/2021. He was followed by local ENT team during his hospitalization. He was recommended for chemoradiation. He was seen by Dr Mclaughlin of [facility], planning 7 weeks treatment, and CT simulation was done. He did have a VSS done while inpatient without evidence of aspiration. The patient was referred here for discussion of possible salvage laryngectomy after chemoradiation. He had a PET scan on 4/26/2021 which showed an FDG avid mass in the larynx involving the supraglottis/glottis/subglottis, hypermetabolic left level 4 node, FDG avid nodule in the right lung thought to be infectious/inflammatory. He was then admitted to the hospital locally from 4/27/2021 to 5/8/2021 after  presenting with worsening shortness of breath, found to have bilateral PE with significant clot burden including the right pulmonary artery and the right ventricle. He was taken to the OR for pulmonary embolectomy and RV thrombectomy. He had IVC filter placed 4/30/2021. His course was complicated by hemodynamic instability requiring pressors, poor healing of his sternal incision requiring wound vac placement. He was discharged on eliquis. He was sent to rehab after his admission, discharged 5/15/2021. He saw Dr Bean in follow-up on 5/27/2021 and was recommended for repeat imaging. This showed slight decrease in size of the tumor, no distant disease.     He was seen as a new patient on 6/18/2021. After extensive preoperative counseling especially in light of his recent PE and anticoagulation, the recommendations was for surgery given the cartilaginous erosion seen on imaging. He was taken to the OR on 7/8/2021 for a DL, total laryngectomy, bilateral neck dissections, cricopharyngeal myotomy. His hospital course was uncomplicated, was restarted on his anticoagulation. His final pathology demonstrated a 6 cm SCC with invasion through the thyroid cartilage, involved the cricoid cartilage, involved the right pyriform sinus, extended to the left side of the tracheostomy site, PNI present, focal lymphatic invasion, 0/75 nodes. The main specimen had a positive margin at the pyriform sinus, margins were taken from the main specimen and were negative (named right pharyngeal wall mucosal margin).      He received postoperative radiation under the care of Dr Kennedy, from 8/4/2021 to 9/20/2021 for a total of 66 Gy.  He had his PEG removed by radiation oncology on 10/4/2021. His post treatment PET scan was negative in December 2021.      Interval history:  He comes in today for follow-up. He was last seen in December 2021. He had his posttreatment PET scan at that time. He had his IVC filter removed in February 2022. He says that  his sister  recently and he has been dealing with that. He is eating and drinking without issue. He has no sticking of food unless he takes too big of a bite and things are dry. They will clear with liquids. He says his weight is up. He has no neck or throat pain. He has no hemoptysis. He has no ear pain. He is having some shoulder discomfort. He has been wearing the anny tube in the afternoon and at night. He wants to switch the adhesive. He has not been to see the dentist yet.      MEDICATIONS:     Current Outpatient Medications   Medication Sig Dispense Refill     acetaminophen (TYLENOL) 325 MG tablet 2 tablets (650 mg) by Per G Tube route every 8 hours as needed for mild pain or fever 90 tablet 0     apixaban ANTICOAGULANT (ELIQUIS) 5 MG tablet 1 tablet (5 mg) by Per G Tube route 2 times daily 60 tablet 11     levothyroxine (SYNTHROID/LEVOTHROID) 75 MCG tablet Take 1 tablet (75 mcg) by mouth daily 30 tablet 4     metoprolol tartrate (LOPRESSOR) 25 MG tablet Take 1 tablet (25 mg) by mouth 2 times daily 60 tablet 3       ALLERGIES:    Allergies   Allergen Reactions     Pollen Extract        HABITS/SOCIAL HISTORY:    Former smoker  ppd x 8 yrs, quit in . Sober from alcohol.   Lives alone. His family does not live in the immediate [place] area.   He is a retired .       Social History     Socioeconomic History     Marital status: Single     Spouse name: Not on file     Number of children: Not on file     Years of education: Not on file     Highest education level: Not on file   Occupational History     Not on file   Tobacco Use     Smoking status: Former Smoker     Types: Cigarettes     Quit date: 1989     Years since quittin.1     Smokeless tobacco: Never Used     Tobacco comment: quit in   had smoked about 1/2 pack a day then cut back   Substance and Sexual Activity     Alcohol use: No     Comment: No alcohol since      Drug use: No     Sexual activity: Yes      Partners: Female   Other Topics Concern     Parent/sibling w/ CABG, MI or angioplasty before 65F 55M? Not Asked   Social History Narrative     Not on file     Social Determinants of Health     Financial Resource Strain: Low Risk      Difficulty of Paying Living Expenses: Not hard at all   Food Insecurity: No Food Insecurity     Worried About Running Out of Food in the Last Year: Never true     Ran Out of Food in the Last Year: Never true   Transportation Needs: No Transportation Needs     Lack of Transportation (Medical): No     Lack of Transportation (Non-Medical): No   Physical Activity: Not on file   Stress: Not on file   Social Connections: Not on file   Intimate Partner Violence: Not on file   Housing Stability: Not on file       PAST MEDICAL HISTORY:   Past Medical History:   Diagnosis Date     Allergic state      Anemia      Former smoker      Malignant neoplasm of larynx (H) 04/20/2021     Pulmonary emboli (H) 04/28/2021    Bilateral-s/p embolectomy and IVC filter placement     S/P percutaneous endoscopic gastrostomy (PEG) tube placement (H)      Tracheostomy in place (H)         PAST SURGICAL HISTORY:   Past Surgical History:   Procedure Laterality Date     DISSECTION RADICAL NECK BILATERAL Bilateral 7/8/2021    Procedure: bilateral neck dissections;  Surgeon: Marilou Sethi MD;  Location: [facility] OR     ENDOSCOPIC INSERTION TUBE GASTROSTOMY Left 7/8/2021    Procedure: INSERTION, PEG TUBE with Esophagogastroduodenoscopy;  Surgeon: Kg Montaño MD;  Location: [facility] OR     IR IVC FILTER PLACEMENT  4/30/2021     IR IVC FILTER REMOVAL  2/7/2022     LARYNGECTOMY N/A 7/8/2021    Procedure: LARYNGECTOMY;  Surgeon: Marilou Sethi MD;  Location: [facility] OR     LARYNGOSCOPY N/A 4/12/2021    Procedure: LARYNGOSCOPY;  Surgeon: Choco Johnson MD;  Location: [facility] OR     LARYNGOSCOPY N/A 7/8/2021    Procedure: LARYNGOSCOPY;  Surgeon: Marilou Sethi MD;  Location: [facility] OR     REPAIR ANEURYSM ASCENDING AORTA N/A  "4/28/2021    Procedure: PULMONARY THROMBECTOMY;  Surgeon: Yumi Singh MD;  Location:  OR     TRACHEOSTOMY  4/12/2021    Procedure: CREATION, TRACHEOSTOMY;  Surgeon: Choco Johnson MD;  Location:  OR       FAMILY HISTORY:    Family History   Problem Relation Age of Onset     Circulatory Mother         blood clots     Alcohol/Drug Father         alcohol drank heavily     Alcohol/Drug Brother         alcohol       REVIEW OF SYSTEMS:  12 point ROS was negative other than the symptoms noted above in the HPI.  Patient Supplied Answers to Review of Systems  [facility] ROS 8/30/2021   Ears, Nose, Throat Nasal congestion or drainage, Sore throat, Trouble swallowing, Hoarseness         PHYSICAL EXAMINATION:   /80 (BP Location: Right arm, Patient Position: Sitting, Cuff Size: Adult Regular)   Pulse 85   Temp (!) 96.5  F (35.8  C) (Temporal)   Ht 1.803 m (5' 11\")   Wt 91.2 kg (201 lb)   BMI 28.03 kg/m    Appearance:   normal; NAD, age-appropriate appearance, well-developed, normal habitus   Communication:   communicates with electrolarynx with neck adapter    Head/Face:   inspection -  Normal; no scars or visible lesions   Facial strength -  Normal and symmetric    Skin:  normal, no rash   Ears:  auricle (AD) -  normal  EAC (AD) -  normal  TM (AD) -  Normal, no effusion  auricle (AS) -  normal  EAC (AS) -  normal  TM (AS) -  Normal, no effusion  Normal clinical speech reception   Nose:  Ext. inspection -  Normal  Internal Inspection -  Normal mucosa, septum, and turbinates   Nasopharynx:  normal mucosa, no masses   Oral Cavity:  lips -  Normal mucosa, oral competence, and stoma size    healthy gingival mucosa   Hard palate, buccal, floor of mouth mucosa normal   Tongue - normal movement, no lesions, no palpable masses   Oropharynx:  mucosa -  Normal, no lesions  soft palate -  Normal, no lesions, no asymmetry, normal elevation  Base of tongue - no masses or mucosal lesions   Neopharynx:  Normal " mucosa  Few secretions which partially obscure visualization, able to pass scope into cervical esophagus  No masses   No signs of recurrence   Neck: Submental lymphedema is improved  Well healed neck incision  No palpable masses  Stoma patent, anny tube in place, stable in size   Lymphatic:  no abnormal nodes   Cardiovascular:  warm, pink, well-perfused extremities without swelling, tenderness, or edema   Respiratory:  Normal respiratory effort   Neuro/Psych.:  mood/affect -  normal  mental status -  normal           PROCEDURES:   Flexible fiberoptic laryngoscopy: Scope exam was indicated due to laryngeal cancer. Verbal consent was obtained. The nasal cavity was prepped with an aerosolized solution of topical anesthetic and vasoconstrictive agent. The scope was passed through the anterior nasal cavity and advanced. Inspection of the nasopharynx revealed no gross abnormality. The base of tongue is normal. The neopharynx is patent with some secretions. There are no masses or mucosal lesions. There are some pooled secretions. Procedure tolerated well with no immediate complications noted.  Tracheoscopy: The flexible scope was passed through the stoma down to the bronchi. There are no masses or mucosal lesions. There are no secretions.      RESULTS REVIEWED:   I reviewed the note from IR    Care discussed with SLP      IMPRESSION AND PLAN:   Raj Warren is a 71 year old man with a T4N0 SCC of the larynx. He [facility]. He was taken to the OR on 7/8/2021 for a total laryngectomy with bilateral neck dissections. He completed his postoperative radiation on 9/20/2021.    He has no clinical signs of recurrence.  He is interested in using the adhesive rather than the anny tube because he says when he uses his electrolarynx it causes pain from the Anny tube.  We discussed using the adhesive during the day and then using the Anny tube at night.  He does have the adhesive at home.    I  did ask him when things settle down a bit for him to work on getting an appointment with the dentist for a routine cleaning.    He is on synthroid. Repeat labs were ordered by Dr Bean, scheduled in March 2022.    We will repeat his imaging in June 2022.    I would like to see him back in [facility] in 2 months.      Thank you very much for the opportunity to participate in the care of your patient.      Marilou Sethi MD  Otolaryngology- Head & Neck Surgery      This note was dictated with voice recognition software and then edited. Please excuse any unintentional errors.         CC:  Rosa Bean MD  [facility] [address]  [address] [address]      Serg Kennedy MD  Department of Radiation Oncology  [facility]

## 2022-02-16 ENCOUNTER — THERAPY VISIT (OUTPATIENT)
Dept: SPEECH THERAPY | Facility: CLINIC | Age: 72
End: 2022-02-16
Payer: COMMERCIAL

## 2022-02-16 ENCOUNTER — OFFICE VISIT (OUTPATIENT)
Dept: OTOLARYNGOLOGY | Facility: CLINIC | Age: 72
End: 2022-02-16
Payer: COMMERCIAL

## 2022-02-16 VITALS
TEMPERATURE: 96.5 F | HEART RATE: 85 BPM | WEIGHT: 201 LBS | SYSTOLIC BLOOD PRESSURE: 127 MMHG | BODY MASS INDEX: 28.14 KG/M2 | DIASTOLIC BLOOD PRESSURE: 80 MMHG | HEIGHT: 71 IN

## 2022-02-16 DIAGNOSIS — R13.12 OROPHARYNGEAL DYSPHAGIA: ICD-10-CM

## 2022-02-16 DIAGNOSIS — C32.9 MALIGNANT NEOPLASM OF LARYNX (H): Primary | ICD-10-CM

## 2022-02-16 DIAGNOSIS — R49.1 APHONIA: ICD-10-CM

## 2022-02-16 PROCEDURE — 99213 OFFICE O/P EST LOW 20 MIN: CPT | Mod: 25 | Performed by: OTOLARYNGOLOGY

## 2022-02-16 PROCEDURE — 92507 TX SP LANG VOICE COMM INDIV: CPT | Mod: GN | Performed by: SPEECH-LANGUAGE PATHOLOGIST

## 2022-02-16 PROCEDURE — 31615 TRCHEOBRNCHSC EST TRACHS INC: CPT | Performed by: OTOLARYNGOLOGY

## 2022-02-16 ASSESSMENT — PAIN SCALES - GENERAL: PAINLEVEL: MILD PAIN (2)

## 2022-02-16 NOTE — LETTER
2/16/2022       RE: Raj Warren  [address]  [facility] [address]     Dear Colleague,    Thank you for referring your patient, Raj Warren, to the [facility] [place] at [facility]. Please see a copy of my visit note below.    Dear Dr. Bean:    I had the pleasure of seeing Raj Warren in follow-up today at the [facility].     History of Present Illness:  Raj Warren is 71 year old man with a T4aN0 SCC of the larynx. The patient presented to the ED with a week history of shortness of breath, along with symptoms of sore throat, bilateral ear pain. He had stridor vs wheezing, mild increased work of breathing, and hoarse voice at that time. He was treated with steroids and nebulizers with improvement in his symptoms and was discharged.  He had recurrent symptoms and was seen in the ED again on 4/12/2021. He had a CT scan performed on 4/12/2021 which demonstrated a 3.9 x 1.6 x 3.9 cm mass involving the right true cord, right AE fold, right false cord, posterior hypopharynx, proximal trachea, with involvement of the thyroid cartilage, involvement of the prelaryngeal tissue, no abnormal nodes. Dr Willoughby (local ENT), scope exam was performed, and patient was admitted for a trach and DL/bx. Dr Johnson and Dr Sauceda performed this procedure on 4/13/2021. A tracheal window at 2nd tracheal ring was performed with placement of an 8 cuffed Shiley. Pathology showed moderately differentiated SCC. He was seen by Dr Bean of medical oncology on 4/13/2021. He was followed by local ENT team during his hospitalization. He was recommended for chemoradiation. He was seen by Dr Mclaughlin of [place], planning 7 weeks treatment, and CT simulation was done. He did have a VSS done while inpatient without evidence of aspiration. The patient was referred here for discussion of possible salvage  laryngectomy after chemoradiation. He had a PET scan on 4/26/2021 which showed an FDG avid mass in the larynx involving the supraglottis/glottis/subglottis, hypermetabolic left level 4 node, FDG avid nodule in the right lung thought to be infectious/inflammatory. He was then admitted to the hospital locally from 4/27/2021 to 5/8/2021 after presenting with worsening shortness of breath, found to have bilateral PE with significant clot burden including the right pulmonary artery and the right ventricle. He was taken to the OR for pulmonary embolectomy and RV thrombectomy. He had IVC filter placed 4/30/2021. His course was complicated by hemodynamic instability requiring pressors, poor healing of his sternal incision requiring wound vac placement. He was discharged on eliquis. He was sent to rehab after his admission, discharged 5/15/2021. He saw Dr Bean in follow-up on 5/27/2021 and was recommended for repeat imaging. This showed slight decrease in size of the tumor, no distant disease.     He was seen as a new patient on 6/18/2021. After extensive preoperative counseling especially in light of his recent PE and anticoagulation, the recommendations was for surgery given the cartilaginous erosion seen on imaging. He was taken to the OR on 7/8/2021 for a DL, total laryngectomy, bilateral neck dissections, cricopharyngeal myotomy. His hospital course was uncomplicated, was restarted on his anticoagulation. His final pathology demonstrated a 6 cm SCC with invasion through the thyroid cartilage, involved the cricoid cartilage, involved the right pyriform sinus, extended to the left side of the tracheostomy site, PNI present, focal lymphatic invasion, 0/75 nodes. The main specimen had a positive margin at the pyriform sinus, margins were taken from the main specimen and were negative (named right pharyngeal wall mucosal margin).      He received postoperative radiation under the care of Dr Kennedy, from 8/4/2021 to 9/20/2021  for a total of 66 Gy.  He had his PEG removed by radiation oncology on 10/4/2021. His post treatment PET scan was negative in 2021.      Interval history:  He comes in today for follow-up. He was last seen in 2021. He had his posttreatment PET scan at that time. He had his IVC filter removed in 2022. He says that his sister  recently and he has been dealing with that. He is eating and drinking without issue. He has no sticking of food unless he takes too big of a bite and things are dry. They will clear with liquids. He says his weight is up. He has no neck or throat pain. He has no hemoptysis. He has no ear pain. He is having some shoulder discomfort. He has been wearing the anny tube in the afternoon and at night. He wants to switch the adhesive. He has not been to see the dentist yet.      MEDICATIONS:     Current Outpatient Medications   Medication Sig Dispense Refill     acetaminophen (TYLENOL) 325 MG tablet 2 tablets (650 mg) by Per G Tube route every 8 hours as needed for mild pain or fever 90 tablet 0     apixaban ANTICOAGULANT (ELIQUIS) 5 MG tablet 1 tablet (5 mg) by Per G Tube route 2 times daily 60 tablet 11     levothyroxine (SYNTHROID/LEVOTHROID) 75 MCG tablet Take 1 tablet (75 mcg) by mouth daily 30 tablet 4     metoprolol tartrate (LOPRESSOR) 25 MG tablet Take 1 tablet (25 mg) by mouth 2 times daily 60 tablet 3       ALLERGIES:    Allergies   Allergen Reactions     Pollen Extract        HABITS/SOCIAL HISTORY:    Former smoker 1/2 ppd x 8 yrs, quit in . Sober from alcohol.   Lives alone. His family does not live in the immediate [place] area.   He is a retired .       Social History     Socioeconomic History     Marital status: Single     Spouse name: Not on file     Number of children: Not on file     Years of education: Not on file     Highest education level: Not on file   Occupational History     Not on file   Tobacco Use     Smoking status:  Former Smoker     Types: Cigarettes     Quit date: 1989     Years since quittin.1     Smokeless tobacco: Never Used     Tobacco comment: quit in   had smoked about 1/2 pack a day then cut back   Substance and Sexual Activity     Alcohol use: No     Comment: No alcohol since      Drug use: No     Sexual activity: Yes     Partners: Female   Other Topics Concern     Parent/sibling w/ CABG, MI or angioplasty before 65F 55M? Not Asked   Social History Narrative     Not on file     Social Determinants of Health     Financial Resource Strain: Low Risk      Difficulty of Paying Living Expenses: Not hard at all   Food Insecurity: No Food Insecurity     Worried About Running Out of Food in the Last Year: Never true     Ran Out of Food in the Last Year: Never true   Transportation Needs: No Transportation Needs     Lack of Transportation (Medical): No     Lack of Transportation (Non-Medical): No   Physical Activity: Not on file   Stress: Not on file   Social Connections: Not on file   Intimate Partner Violence: Not on file   Housing Stability: Not on file       PAST MEDICAL HISTORY:   Past Medical History:   Diagnosis Date     Allergic state      Anemia      Former smoker      Malignant neoplasm of larynx (H) 2021     Pulmonary emboli (H) 2021    Bilateral-s/p embolectomy and IVC filter placement     S/P percutaneous endoscopic gastrostomy (PEG) tube placement (H)      Tracheostomy in place (H)         PAST SURGICAL HISTORY:   Past Surgical History:   Procedure Laterality Date     DISSECTION RADICAL NECK BILATERAL Bilateral 2021    Procedure: bilateral neck dissections;  Surgeon: Marilou Sethi MD;  Location: [facility] OR     ENDOSCOPIC INSERTION TUBE GASTROSTOMY Left 2021    Procedure: INSERTION, PEG TUBE with Esophagogastroduodenoscopy;  Surgeon: Kg Montaño MD;  Location: [facility] OR     IR IVC FILTER PLACEMENT  2021     IR IVC FILTER REMOVAL  2022     LARYNGECTOMY N/A 2021     "Procedure: LARYNGECTOMY;  Surgeon: Marilou Sethi MD;  Location: [facility] OR     LARYNGOSCOPY N/A 4/12/2021    Procedure: LARYNGOSCOPY;  Surgeon: Choco Johnson MD;  Location: [facility] OR     LARYNGOSCOPY N/A 7/8/2021    Procedure: LARYNGOSCOPY;  Surgeon: Marilou Sethi MD;  Location: [facility] OR     REPAIR ANEURYSM ASCENDING AORTA N/A 4/28/2021    Procedure: PULMONARY THROMBECTOMY;  Surgeon: Yumi Singh MD;  Location: [facility] OR     TRACHEOSTOMY  4/12/2021    Procedure: CREATION, TRACHEOSTOMY;  Surgeon: Choco Johnson MD;  Location: [facility] OR       FAMILY HISTORY:    Family History   Problem Relation Age of Onset     Circulatory Mother         blood clots     Alcohol/Drug Father         alcohol drank heavily     Alcohol/Drug Brother         alcohol       REVIEW OF SYSTEMS:  12 point ROS was negative other than the symptoms noted above in the HPI.  Patient Supplied Answers to Review of Systems  [facility] ROS 8/30/2021   Ears, Nose, Throat Nasal congestion or drainage, Sore throat, Trouble swallowing, Hoarseness         PHYSICAL EXAMINATION:   /80 (BP Location: Right arm, Patient Position: Sitting, Cuff Size: Adult Regular)   Pulse 85   Temp (!) 96.5  F (35.8  C) (Temporal)   Ht 1.803 m (5' 11\")   Wt 91.2 kg (201 lb)   BMI 28.03 kg/m    Appearance:   normal; NAD, age-appropriate appearance, well-developed, normal habitus   Communication:   communicates with electrolarynx with neck adapter    Head/Face:   inspection -  Normal; no scars or visible lesions   Facial strength -  Normal and symmetric    Skin:  normal, no rash   Ears:  auricle (AD) -  normal  EAC (AD) -  normal  TM (AD) -  Normal, no effusion  auricle (AS) -  normal  EAC (AS) -  normal  TM (AS) -  Normal, no effusion  Normal clinical speech reception   Nose:  Ext. inspection -  Normal  Internal Inspection -  Normal mucosa, septum, and turbinates   Nasopharynx:  normal mucosa, no masses   Oral Cavity:  lips -  Normal mucosa, oral competence, and " stoma size    healthy gingival mucosa   Hard palate, buccal, floor of mouth mucosa normal   Tongue - normal movement, no lesions, no palpable masses   Oropharynx:  mucosa -  Normal, no lesions  soft palate -  Normal, no lesions, no asymmetry, normal elevation  Base of tongue - no masses or mucosal lesions   Neopharynx:  Normal mucosa  Few secretions which partially obscure visualization, able to pass scope into cervical esophagus  No masses   No signs of recurrence   Neck: Submental lymphedema is improved  Well healed neck incision  No palpable masses  Stoma patent, anny tube in place, stable in size   Lymphatic:  no abnormal nodes   Cardiovascular:  warm, pink, well-perfused extremities without swelling, tenderness, or edema   Respiratory:  Normal respiratory effort   Neuro/Psych.:  mood/affect -  normal  mental status -  normal           PROCEDURES:   Flexible fiberoptic laryngoscopy: Scope exam was indicated due to laryngeal cancer. Verbal consent was obtained. The nasal cavity was prepped with an aerosolized solution of topical anesthetic and vasoconstrictive agent. The scope was passed through the anterior nasal cavity and advanced. Inspection of the nasopharynx revealed no gross abnormality. The base of tongue is normal. The neopharynx is patent with some secretions. There are no masses or mucosal lesions. There are some pooled secretions. Procedure tolerated well with no immediate complications noted.  Tracheoscopy: The flexible scope was passed through the stoma down to the bronchi. There are no masses or mucosal lesions. There are no secretions.      RESULTS REVIEWED:   I reviewed the note from IR    Care discussed with SLP      IMPRESSION AND PLAN:   Raj Warren is a 71 year old man with a T4N0 SCC of the larynx. He underwent [facility]. He was taken to the OR on 7/8/2021 for a total laryngectomy with bilateral neck dissections. He completed his postoperative radiation on  9/20/2021.    He has no clinical signs of recurrence.  He is interested in using the adhesive rather than the anny tube because he says when he uses his electrolarynx it causes pain from the Anny tube.  We discussed using the adhesive during the day and then using the Anny tube at night.  He does have the adhesive at home.    I did ask him when things settle down a bit for him to work on getting an appointment with the dentist for a routine cleaning.    He is on synthroid. Repeat labs were ordered by Dr Bean, scheduled in March 2022.    We will repeat his imaging in June 2022.    I would like to see him back in [facility] clinic in 2 months.      Thank you very much for the opportunity to participate in the care of your patient.      Marilou Sethi MD  Otolaryngology- Head & Neck Surgery      This note was dictated with voice recognition software and then edited. Please excuse any unintentional errors.     CC:  Rosa Bean MD  [facility]  [address] [address]    Serg Kennedy MD  Department of Radiation Oncology  [facility]

## 2022-02-16 NOTE — NURSING NOTE
"Chief Complaint   Patient presents with     RECHECK     2 month follow up       Blood pressure 127/80, pulse 85, temperature (!) 96.5  F (35.8  C), temperature source Temporal, height 1.803 m (5' 11\"), weight 91.2 kg (201 lb).    Patria Costa, EMT    "

## 2022-03-14 ENCOUNTER — ONCOLOGY VISIT (OUTPATIENT)
Dept: ONCOLOGY | Facility: CLINIC | Age: 72
End: 2022-03-14
Attending: INTERNAL MEDICINE
Payer: COMMERCIAL

## 2022-03-14 ENCOUNTER — LAB (OUTPATIENT)
Dept: INFUSION THERAPY | Facility: CLINIC | Age: 72
End: 2022-03-14
Attending: INTERNAL MEDICINE
Payer: COMMERCIAL

## 2022-03-14 VITALS
HEIGHT: 71 IN | HEART RATE: 67 BPM | OXYGEN SATURATION: 100 % | SYSTOLIC BLOOD PRESSURE: 107 MMHG | DIASTOLIC BLOOD PRESSURE: 58 MMHG | WEIGHT: 208.2 LBS | BODY MASS INDEX: 29.15 KG/M2 | RESPIRATION RATE: 16 BRPM

## 2022-03-14 DIAGNOSIS — E89.0 POSTOPERATIVE HYPOTHYROIDISM: ICD-10-CM

## 2022-03-14 DIAGNOSIS — C32.9 LARYNGEAL CANCER (H): ICD-10-CM

## 2022-03-14 DIAGNOSIS — C32.9 LARYNGEAL CANCER (H): Primary | ICD-10-CM

## 2022-03-14 LAB
BASOPHILS # BLD AUTO: 0 10E3/UL (ref 0–0.2)
BASOPHILS NFR BLD AUTO: 1 %
EOSINOPHIL # BLD AUTO: 0.1 10E3/UL (ref 0–0.7)
EOSINOPHIL NFR BLD AUTO: 3 %
ERYTHROCYTE [DISTWIDTH] IN BLOOD BY AUTOMATED COUNT: 12.8 % (ref 10–15)
HCT VFR BLD AUTO: 43.5 % (ref 40–53)
HGB BLD-MCNC: 15.2 G/DL (ref 13.3–17.7)
IMM GRANULOCYTES # BLD: 0 10E3/UL
IMM GRANULOCYTES NFR BLD: 0 %
LYMPHOCYTES # BLD AUTO: 1.2 10E3/UL (ref 0.8–5.3)
LYMPHOCYTES NFR BLD AUTO: 28 %
MCH RBC QN AUTO: 30.6 PG (ref 26.5–33)
MCHC RBC AUTO-ENTMCNC: 34.9 G/DL (ref 31.5–36.5)
MCV RBC AUTO: 88 FL (ref 78–100)
MONOCYTES # BLD AUTO: 0.6 10E3/UL (ref 0–1.3)
MONOCYTES NFR BLD AUTO: 14 %
NEUTROPHILS # BLD AUTO: 2.5 10E3/UL (ref 1.6–8.3)
NEUTROPHILS NFR BLD AUTO: 54 %
NRBC # BLD AUTO: 0 10E3/UL
NRBC BLD AUTO-RTO: 0 /100
PLATELET # BLD AUTO: 220 10E3/UL (ref 150–450)
RBC # BLD AUTO: 4.96 10E6/UL (ref 4.4–5.9)
TSH SERPL DL<=0.005 MIU/L-ACNC: 2.12 MU/L (ref 0.4–4)
WBC # BLD AUTO: 4.5 10E3/UL (ref 4–11)

## 2022-03-14 PROCEDURE — 99214 OFFICE O/P EST MOD 30 MIN: CPT | Performed by: INTERNAL MEDICINE

## 2022-03-14 PROCEDURE — 84443 ASSAY THYROID STIM HORMONE: CPT | Performed by: INTERNAL MEDICINE

## 2022-03-14 PROCEDURE — 36415 COLL VENOUS BLD VENIPUNCTURE: CPT

## 2022-03-14 PROCEDURE — G0463 HOSPITAL OUTPT CLINIC VISIT: HCPCS

## 2022-03-14 PROCEDURE — 85025 COMPLETE CBC W/AUTO DIFF WBC: CPT | Performed by: INTERNAL MEDICINE

## 2022-03-14 RX ORDER — LEVOTHYROXINE SODIUM 75 UG/1
75 TABLET ORAL DAILY
Qty: 90 TABLET | Refills: 3 | Status: SHIPPED | OUTPATIENT
Start: 2022-03-14 | End: 2022-09-20

## 2022-03-14 ASSESSMENT — PAIN SCALES - GENERAL: PAINLEVEL: MODERATE PAIN (5)

## 2022-03-14 NOTE — PROGRESS NOTES
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  1.  Patient was evaluated in 2019 for hoarseness of voice.  He was seen by ENT and found to have right vocal cord mass.    -He had biopsy done on 08/07/2019. Pathology revealed moderately-differentiated invasive squamous cell carcinoma.     2.  The patient was seen in the Emergency Room on 04/12/2021 for shortness of breath and admitted.   -CT neck revealed an enhancing soft tissue mass involving the right larynx measuring 3.9 cm.  -Patient had laryngoscopy with biopsy and tracheostomy done on 04/12/2021.  There was an endolaryngeal mass obscuring the laryngeal landmarks.  Mass appeared to be based on right endolarynx.  Pathology revealed invasive keratinizing moderately-differentiated squamous cell carcinoma.     3.  PET scan on 04/26/2021 revealed hypermetabolic mass involving the supraglottic, glottic and subglottic larynx.  There was a hypermetabolic left level IV lymph node.  There was also hypermetabolic nodule in right middle lobe.     4. CT chest angiogram on 04/27/2021 revealed a large bilateral pulmonary embolism in all the lobes.  There was evidence of right cardiac strain.  -Echocardiogram on 04/27/2021 revealed clot in transit in the right ventricle.  -The patient had emergency pulmonary embolectomy on 04/28/2021.  Pathology revealed organizing clot.  -Patient on Eliquis. Long term anti-coagulation recommended.     5. Total laryngectomy with bilateral neck dissection on 07/08/2021.  Pathology reveals 6 cm squamous cell carcinoma involving the right false and true vocal cords with fixation, the left false and true vocal cords and invades through thyroid cartilage.  There is focal lymphatic invasion.  There is perineural invasion. Benign 75 lymph nodes.  pT4a pN0 disease.     6. Post-op radiation between 08/04/2021 and 09/20/2021. 6600 cGy in 33 fractions.     SUBJECTIVE:    Briana is a 71-year-old gentleman with laryngeal squamous cell carcinoma status post total laryngectomy and neck dissection and postoperative radiation.  He is doing well without any evidence of recurrence.  PET scan on 12/17/2021 did not reveal any evidence of malignancy.  Patient saw ENT on 02/16/2022.  Their exam does not reveal any evidence of recurrence.    Overall he is doing good.  He has some pain in shoulders.  It is in both the shoulders.  Raising his arm causes some pain.  Tylenol helps.  No headache.  No dizziness.  No neck pain.  No chest pain.  No shortness of breath at rest.  No nausea or vomiting.  Appetite is good.  No urinary or bowel complaints.  No bleeding. All other review of systems is negative.     PHYSICAL EXAMINATION:    Alert and oriented x 3.  Not in distress.  Rest of systems not examined.     LABORATORY:  CBC and TSH reviewed.     ASSESSMENT:    1.  A 71-year-old gentleman with laryngeal squamous cell carcinoma.  No evidence of disease.    2.  Pulmonary embolism on indefinite Eliquis.  3.  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  4.  Shoulder pain.     PLAN:     1.  Patient is doing well from laryngeal squamous cell carcinoma.  Explained to him there is no evidence of malignancy.  He was happy to know that.    He will continue to follow-up with ENT.  We will do a CT neck in 6 months.  He is agreeable for it.    2.  He will continue on levothyroxine.  His TSH is normal.    3.  He has pain in both the shoulders.  I told him this is likely from rotator cuff problem or arthritis.  He will continue taking Tylenol.  If he gets worse, he will see an orthopedics.    4.  The patient will continue on Eliquis for his pulmonary embolism.  Plan is for indefinite anticoagulation as he had saddle pulmonary embolism.    5.  He had multiple questions, which were all answered.  I will see him in 6 months' time.

## 2022-03-14 NOTE — LETTER
"    3/14/2022         RE: Raj Warren  [address]  [facility] [address]        Dear Colleague,    Thank you for referring your patient, Raj Warren, to the [facility]. Please see a copy of my visit note below.    Oncology Rooming Note    March 14, 2022 10:26 AM   Raj Warren is a 71 year old male who presents for:    Chief Complaint   Patient presents with     Oncology Clinic Visit     Initial Vitals: There were no vitals taken for this visit. Estimated body mass index is 28.03 kg/m  as calculated from the following:    Height as of 2/16/22: 1.803 m (5' 11\").    Weight as of 2/16/22: 91.2 kg (201 lb). There is no height or weight on file to calculate BSA.  Data Unavailable Comment: Data Unavailable   No LMP for male patient.  Allergies reviewed: Yes  Medications reviewed: Yes    Medications: Medication refills not needed today.  Pharmacy name entered into [employer]: [id number] - [facility] [address] AT [address]    Clinical concerns:  doctor was notified.      Jennifer Farfan MA              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  1.  Patient was evaluated in 2019 for hoarseness of voice.  He was seen by ENT and found to have right vocal cord mass.    -He had biopsy done on 08/07/2019. Pathology revealed moderately-differentiated invasive squamous cell carcinoma.     2.  The patient was seen in the Emergency Room on 04/12/2021 for shortness of breath and admitted.   -CT neck revealed an enhancing soft tissue mass involving the right larynx measuring 3.9 cm.  -Patient had laryngoscopy with biopsy and tracheostomy done on 04/12/2021.  There was an endolaryngeal mass obscuring the laryngeal landmarks.  Mass appeared to be based on right endolarynx.  Pathology revealed invasive keratinizing moderately-differentiated squamous cell " carcinoma.     3.  PET scan on 04/26/2021 revealed hypermetabolic mass involving the supraglottic, glottic and subglottic larynx.  There was a hypermetabolic left level IV lymph node.  There was also hypermetabolic nodule in right middle lobe.     4. CT chest angiogram on 04/27/2021 revealed a large bilateral pulmonary embolism in all the lobes.  There was evidence of right cardiac strain.  -Echocardiogram on 04/27/2021 revealed clot in transit in the right ventricle.  -The patient had emergency pulmonary embolectomy on 04/28/2021.  Pathology revealed organizing clot.  -Patient on Eliquis. Long term anti-coagulation recommended.     5. Total laryngectomy with bilateral neck dissection on 07/08/2021.  Pathology reveals 6 cm squamous cell carcinoma involving the right false and true vocal cords with fixation, the left false and true vocal cords and invades through thyroid cartilage.  There is focal lymphatic invasion.  There is perineural invasion. Benign 75 lymph nodes.  pT4a pN0 disease.     6. Post-op radiation between 08/04/2021 and 09/20/2021. 6600 cGy in 33 fractions.     SUBJECTIVE:  Mr. Warren is a 71-year-old gentleman with laryngeal squamous cell carcinoma status post total laryngectomy and neck dissection and postoperative radiation.  He is doing well without any evidence of recurrence.  PET scan on 12/17/2021 did not reveal any evidence of malignancy.  Patient saw ENT on 02/16/2022.  Their exam does not reveal any evidence of recurrence.    Overall he is doing good.  He has some pain in shoulders.  It is in both the shoulders.  Raising his arm causes some pain.  Tylenol helps.  No headache.  No dizziness.  No neck pain.  No chest pain.  No shortness of breath at rest.  No nausea or vomiting.  Appetite is good.  No urinary or bowel complaints.  No bleeding. All other review of systems is negative.     PHYSICAL EXAMINATION:    Alert and oriented x 3.  Not in distress.  Rest of systems not  examined.     LABORATORY:  CBC and TSH reviewed.     ASSESSMENT:    1.  A 71-year-old gentleman with laryngeal squamous cell carcinoma.  No evidence of disease.    2.  Pulmonary embolism on indefinite Eliquis.  3.  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  4.  Shoulder pain.     PLAN:     1.  Patient is doing well from laryngeal squamous cell carcinoma.  Explained to him there is no evidence of malignancy.  He was happy to know that.    He will continue to follow-up with ENT.  We will do a CT neck in 6 months.  He is agreeable for it.    2.  He will continue on levothyroxine.  His TSH is normal.    3.  He has pain in both the shoulders.  I told him this is likely from rotator cuff problem or arthritis.  He will continue taking Tylenol.  If he gets worse, he will see an orthopedics.    4.  The patient will continue on Eliquis for his pulmonary embolism.  Plan is for indefinite anticoagulation as he had saddle pulmonary embolism.    5.  He had multiple questions, which were all answered.  I will see him in 6 months' time.      Again, thank you for allowing me to participate in the care of your patient.        Sincerely,        Rosa Bean MD

## 2022-03-14 NOTE — PATIENT INSTRUCTIONS
1. Continue levothyroxine once a day.  2. Continue eliquis.  3. CT neck in 6 months.  4. Follow-up in 6 months with labs.

## 2022-03-14 NOTE — PROGRESS NOTES
Medical Assistant Note:  Raj Warren presents today for lab draw.    Patient seen by provider today: Yes: Vikas.   present during visit today: Not Applicable.    Concerns: No Concerns.    Procedure:  Lab draw site: [facility], Needle type: BF, Gauge: 21. Gauze and coban applied    Post Assessment:  Labs drawn without difficulty: Yes.    Discharge Plan:  Departure Mode: Ambulatory.    Face to Face Time: 5.    Suzan Bazan CMA

## 2022-03-14 NOTE — PROGRESS NOTES
"Oncology Rooming Note    March 14, 2022 10:26 AM   Raj Warren is a 71 year old male who presents for:    Chief Complaint   Patient presents with     Oncology Clinic Visit     Initial Vitals: There were no vitals taken for this visit. Estimated body mass index is 28.03 kg/m  as calculated from the following:    Height as of 2/16/22: 1.803 m (5' 11\").    Weight as of 2/16/22: 91.2 kg (201 lb). There is no height or weight on file to calculate BSA.  Data Unavailable Comment: Data Unavailable   No LMP for male patient.  Allergies reviewed: Yes  Medications reviewed: Yes    Medications: Medication refills not needed today.  Pharmacy name entered into [employer]: [facility] #[id number] - [facility] [address] AT [facility]    Clinical concerns:  doctor was notified.      Jennifer Farfan MA            "

## 2022-05-06 ENCOUNTER — THERAPY VISIT (OUTPATIENT)
Dept: SPEECH THERAPY | Facility: CLINIC | Age: 72
End: 2022-05-06

## 2022-05-06 ENCOUNTER — OFFICE VISIT (OUTPATIENT)
Dept: OTOLARYNGOLOGY | Facility: CLINIC | Age: 72
End: 2022-05-06
Payer: COMMERCIAL

## 2022-05-06 VITALS
WEIGHT: 208 LBS | BODY MASS INDEX: 29.12 KG/M2 | HEIGHT: 71 IN | DIASTOLIC BLOOD PRESSURE: 81 MMHG | SYSTOLIC BLOOD PRESSURE: 130 MMHG | TEMPERATURE: 96.6 F

## 2022-05-06 DIAGNOSIS — C32.9 MALIGNANT NEOPLASM OF LARYNX (H): Primary | ICD-10-CM

## 2022-05-06 DIAGNOSIS — E03.4 HYPOTHYROIDISM DUE TO ACQUIRED ATROPHY OF THYROID: ICD-10-CM

## 2022-05-06 DIAGNOSIS — R49.1 APHONIA: ICD-10-CM

## 2022-05-06 DIAGNOSIS — R13.12 OROPHARYNGEAL DYSPHAGIA: ICD-10-CM

## 2022-05-06 PROCEDURE — 31575 DIAGNOSTIC LARYNGOSCOPY: CPT | Mod: 51 | Performed by: OTOLARYNGOLOGY

## 2022-05-06 PROCEDURE — 31615 TRCHEOBRNCHSC EST TRACHS INC: CPT | Performed by: OTOLARYNGOLOGY

## 2022-05-06 PROCEDURE — 99213 OFFICE O/P EST LOW 20 MIN: CPT | Performed by: RADIOLOGY

## 2022-05-06 PROCEDURE — 99213 OFFICE O/P EST LOW 20 MIN: CPT | Mod: 25 | Performed by: OTOLARYNGOLOGY

## 2022-05-06 PROCEDURE — 92507 TX SP LANG VOICE COMM INDIV: CPT | Mod: GN | Performed by: SPEECH-LANGUAGE PATHOLOGIST

## 2022-05-06 ASSESSMENT — PAIN SCALES - GENERAL: PAINLEVEL: NO PAIN (0)

## 2022-05-06 NOTE — LETTER
2022       RE: Raj Warren  [address]  [facility] [address]     Dear Colleague,    Thank you for referring your patient, Raj Warren, to the [facility] E[facility]. Please see a copy of my visit note below.       Department of Radiation Oncology  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 He was unfortunately lost to follow-up until he presented in 2021 with a sore throat and stridor with work-up at the [facility] demonstrating a large primary laryngeal tumor obstructing the airway. He underwent total laryngectomy and bilateral neck dissections on 2021. Pathology revealed a 6 cm moderately differentiated squamous cell carcinoma invading through the thyroid cartilage with perineural and lymphovascular invasion. The surgical margin at the right pyriform was negative albeit close. There was no metastatic adenopathy present within the dissected lymph nodes. He received adjuvant radiotherapy as described above for improved locoregional disease control.    Mr. Warren returns to ENT multiD clinic today for a routine posttreatment disease surveillance visit. Overall, he continues to do well with his biggest concern related to bilateral shoulder stiffness. He is otherwise eating a regular diet by mouth,  denies any odynophagia or dysphagia with his remaining ROS likewise negative.    PHYSICAL EXAM:  Weight: 94.3 kg  BP: 130/81    General: 71-year-old gentleman seated comfortably in an examination chair no acute distress  HEENT: NC/AT. EOMI. No rhinorrhea epistaxis. Moist mucous membranes. No visible oral cavity lesions. Palpation of the buccal mucosa, gingiva, floor mouth and oral tongue reveal no nodularity, induration or masses.  Neck: Stoma is midline and intact. Moderate fibrosis of the central neck. No palpable cervical adenopathy bilaterally.  Pulmonary: No wheezing, stridor or respiratory distress  Skin: Mixed hyper and hypopigmentation of the central neck    Dr. Sethi performed a flexible nasopharyngoscopy and tracheoscopy in clinic. Please see her note for complete details regarding this procedure. Reviewed, examination revealed stable posttreatment changes with no evidence of recurrent disease.     LABS AND IMAGING:  3/4/2022 labs:  TSH: 2.12    IMPRESSION:   Mr. 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 He is 9 months out from the completion of treatment and remains clinically ADDISON.    PLAN:   1. Follow-up with Dr. Sethi in ENT clinic in 3 months and in multiD clinic in 6 months  2. CT neck and chest in 6/2022  3. TSH due in 9/2022    Serg Kennedy MD/PhD    Department of Radiation Oncology  [facility]

## 2022-05-06 NOTE — PROGRESS NOTES
Dear Dr. Bean:    I had the pleasure of seeing Raj Warren in follow-up today at the [facility].     History of Present Illness:  Raj Warren is 71 year old man with a T4aN0 SCC of the larynx. The patient presented to the ED with a week history of shortness of breath, along with symptoms of sore throat, bilateral ear pain. He had stridor vs wheezing, mild increased work of breathing, and hoarse voice at that time. He was treated with steroids and nebulizers with improvement in his symptoms and was discharged.  He had recurrent symptoms and was seen in the ED again on 4/12/2021. He had a CT scan performed on 4/12/2021 which demonstrated a 3.9 x 1.6 x 3.9 cm mass involving the right true cord, right AE fold, right false cord, posterior hypopharynx, proximal trachea, with involvement of the thyroid cartilage, involvement of the prelaryngeal tissue, no abnormal nodes. Dr Willoughby (local ENT), scope exam was performed, and patient was admitted for a trach and DL/bx. Dr Johnson and Dr Sauceda performed this procedure on 4/13/2021. A tracheal window at 2nd tracheal ring was performed with placement of an 8 cuffed Shiley. Pathology showed moderately differentiated SCC. He was seen by Dr Bean of medical oncology on 4/13/2021. He was followed by local ENT team during his hospitalization. He was recommended for chemoradiation. He was seen by Dr Mclaughlin of [facility], planning 7 weeks treatment, and CT simulation was done. He did have a VSS done while inpatient without evidence of aspiration. The patient was referred here for discussion of possible salvage laryngectomy after chemoradiation. He had a PET scan on 4/26/2021 which showed an FDG avid mass in the larynx involving the supraglottis/glottis/subglottis, hypermetabolic left level 4 node, FDG avid nodule in the right lung thought to be infectious/inflammatory. He was then admitted to the hospital locally from 4/27/2021 to 5/8/2021 after  presenting with worsening shortness of breath, found to have bilateral PE with significant clot burden including the right pulmonary artery and the right ventricle. He was taken to the OR for pulmonary embolectomy and RV thrombectomy. He had IVC filter placed 4/30/2021. His course was complicated by hemodynamic instability requiring pressors, poor healing of his sternal incision requiring wound vac placement. He was discharged on eliquis. He was sent to rehab after his admission, discharged 5/15/2021. He saw Dr Bean in follow-up on 5/27/2021 and was recommended for repeat imaging. This showed slight decrease in size of the tumor, no distant disease.     He was seen as a new patient on 6/18/2021. After extensive preoperative counseling especially in light of his recent PE and anticoagulation, the recommendations was for surgery given the cartilaginous erosion seen on imaging. He was taken to the OR on 7/8/2021 for a DL, total laryngectomy, bilateral neck dissections, cricopharyngeal myotomy. His hospital course was uncomplicated, was restarted on his anticoagulation. His final pathology demonstrated a 6 cm SCC with invasion through the thyroid cartilage, involved the cricoid cartilage, involved the right pyriform sinus, extended to the left side of the tracheostomy site, PNI present, focal lymphatic invasion, 0/75 nodes. The main specimen had a positive margin at the pyriform sinus, margins were taken from the main specimen and were negative (named right pharyngeal wall mucosal margin).      He received postoperative radiation under the care of Dr eKnnedy, from 8/4/2021 to 9/20/2021 for a total of 66 Gy.  He had his PEG removed by radiation oncology on 10/4/2021. His post treatment PET scan was negative in December 2021. He had his IVC filter removed in February 2022.       Interval history:  He comes in today for follow-up. He was last seen in February 2022.  He was seen by Dr. Bena in March 2022.  He was  recommended for a CT neck in 6 months.  He has his TSH checked at that time and was normal. He says that he is doing ok. He is having issues with his shoulders being stiff. He has difficulty with the range of motion of the shoulders. He is eating without issue. He does notice increased phlegm with certain foods. He is using his anny tube. He is using the electrolarynx.      MEDICATIONS:     Current Outpatient Medications   Medication Sig Dispense Refill     apixaban ANTICOAGULANT (ELIQUIS) 5 MG tablet Take 1 tablet (5 mg) by mouth 2 times daily 180 tablet 3     levothyroxine (SYNTHROID/LEVOTHROID) 75 MCG tablet Take 1 tablet (75 mcg) by mouth daily 90 tablet 3     metoprolol tartrate (LOPRESSOR) 25 MG tablet Take 1 tablet (25 mg) by mouth 2 times daily 60 tablet 3     acetaminophen (TYLENOL) 325 MG tablet 2 tablets (650 mg) by Per G Tube route every 8 hours as needed for mild pain or fever (Patient not taking: Reported on 2022) 90 tablet 0       ALLERGIES:    Allergies   Allergen Reactions     Pollen Extract        HABITS/SOCIAL HISTORY:    Former smoker  ppd x 8 yrs, quit in . Sober from alcohol.   Lives alone. His family does not live in the immediate [place] area.   He is a retired .       Social History     Socioeconomic History     Marital status: Single     Spouse name: Not on file     Number of children: Not on file     Years of education: Not on file     Highest education level: Not on file   Occupational History     Not on file   Tobacco Use     Smoking status: Former Smoker     Types: Cigarettes     Quit date: 1989     Years since quittin.3     Smokeless tobacco: Never Used     Tobacco comment: quit in   had smoked about 1/2 pack a day then cut back   Substance and Sexual Activity     Alcohol use: No     Comment: No alcohol since      Drug use: No     Sexual activity: Yes     Partners: Female   Other Topics Concern     Parent/sibling w/ CABG, MI or  angioplasty before 65F 55M? Not Asked   Social History Narrative     Not on file     Social Determinants of Health     Financial Resource Strain: Low Risk      Difficulty of Paying Living Expenses: Not hard at all   Food Insecurity: No Food Insecurity     Worried About Running Out of Food in the Last Year: Never true     Ran Out of Food in the Last Year: Never true   Transportation Needs: No Transportation Needs     Lack of Transportation (Medical): No     Lack of Transportation (Non-Medical): No   Physical Activity: Not on file   Stress: Not on file   Social Connections: Not on file   Intimate Partner Violence: Not on file   Housing Stability: Not on file       PAST MEDICAL HISTORY:   Past Medical History:   Diagnosis Date     Allergic state      Anemia      Former smoker      Malignant neoplasm of larynx (H) 04/20/2021     Pulmonary emboli (H) 04/28/2021    Bilateral-s/p embolectomy and IVC filter placement     S/P percutaneous endoscopic gastrostomy (PEG) tube placement (H)      Tracheostomy in place (H)         PAST SURGICAL HISTORY:   Past Surgical History:   Procedure Laterality Date     DISSECTION RADICAL NECK BILATERAL Bilateral 7/8/2021    Procedure: bilateral neck dissections;  Surgeon: Marilou Sethi MD;  Location: [facility] OR     ENDOSCOPIC INSERTION TUBE GASTROSTOMY Left 7/8/2021    Procedure: INSERTION, PEG TUBE with Esophagogastroduodenoscopy;  Surgeon: Kg Montaño MD;  Location: [facility] OR     IR IVC FILTER PLACEMENT  4/30/2021     IR IVC FILTER REMOVAL  2/7/2022     LARYNGECTOMY N/A 7/8/2021    Procedure: LARYNGECTOMY;  Surgeon: Marilou Sethi MD;  Location: [facility] OR     LARYNGOSCOPY N/A 4/12/2021    Procedure: LARYNGOSCOPY;  Surgeon: Choco Johnson MD;  Location: [facility] OR     LARYNGOSCOPY N/A 7/8/2021    Procedure: LARYNGOSCOPY;  Surgeon: Marilou Sethi MD;  Location: [facility] OR     REPAIR ANEURYSM ASCENDING AORTA N/A 4/28/2021    Procedure: PULMONARY THROMBECTOMY;  Surgeon: Yumi Singh  "MD Brian;  Location: [facility] OR     TRACHEOSTOMY  4/12/2021    Procedure: CREATION, TRACHEOSTOMY;  Surgeon: Choco Johnson MD;  Location:  OR       FAMILY HISTORY:    Family History   Problem Relation Age of Onset     Circulatory Mother         blood clots     Alcohol/Drug Father         alcohol drank heavily     Alcohol/Drug Brother         alcohol       REVIEW OF SYSTEMS:  12 point ROS was negative other than the symptoms noted above in the HPI.  Patient Supplied Answers to Review of Systems  [facility]NT ROS 8/30/2021   Ears, Nose, Throat Nasal congestion or drainage, Sore throat, Trouble swallowing, Hoarseness         PHYSICAL EXAMINATION:   /81 (BP Location: Right arm, Patient Position: Sitting, Cuff Size: Adult Large)   Temp (!) 96.6  F (35.9  C) (Temporal)   Ht 1.803 m (5' 11\")   Wt 94.3 kg (208 lb)   BMI 29.01 kg/m    Appearance:   normal; NAD, age-appropriate appearance, well-developed, normal habitus   Communication:   communicates with electrolarynx with neck adapter    Head/Face:   inspection -  Normal; no scars or visible lesions   Facial strength -  Normal and symmetric    Skin:  normal, no rash   Ears:  auricle (AD) -  normal  EAC (AD) -  normal  TM (AD) -  Normal, no effusion  auricle (AS) -  normal  EAC (AS) -  normal  TM (AS) -  Normal, no effusion  Normal clinical speech reception   Nose:  Ext. inspection -  Normal  Internal Inspection -  Normal mucosa, septum, and turbinates   Nasopharynx:  normal mucosa, no masses   Oral Cavity:  lips -  Normal mucosa, oral competence, and stoma size    healthy gingival mucosa   Hard palate, buccal, floor of mouth mucosa normal   Tongue - normal movement, no lesions, no palpable masses   Oropharynx:  mucosa -  Normal, no lesions  soft palate -  Normal, no lesions, no asymmetry, normal elevation  Base of tongue - no masses or mucosal lesions   Neopharynx:  Normal mucosa  Few secretions which partially obscure visualization, able to pass scope into " cervical esophagus  No masses   No signs of recurrence   Neck: Minimal submental lymphedema  Fibrosis of neck musculature  Well healed neck incision  No palpable masses  Stoma patent, anny tube in place, stable in size   Lymphatic:  no abnormal nodes   Cardiovascular:  warm, pink, well-perfused extremities without swelling, tenderness, or edema   Respiratory:  Normal respiratory effort   Neuro/Psych.:  mood/affect -  normal  mental status -  normal           PROCEDURES:   Flexible fiberoptic laryngoscopy: Scope exam was indicated due to laryngeal cancer. Verbal consent was obtained. The nasal cavity was prepped with an aerosolized solution of topical anesthetic and vasoconstrictive agent. The scope was passed through the anterior nasal cavity and advanced. Inspection of the nasopharynx revealed no gross abnormality. The base of tongue is normal. The neopharynx is patent with some secretions. There are no masses or mucosal lesions. There are some pooled secretions. Procedure tolerated well with no immediate complications noted.  Tracheoscopy: The flexible scope was passed through the stoma down to the bronchi. There are no masses or mucosal lesions. There are no secretions.              RESULTS REVIEWED:   I reviewed the note from Dr. Vikas Mcmullen discussed with SLP and Dr. Kennedy      IMPRESSION AND PLAN:   Raj aWrren is a 71 year old man with a T4N0 SCC of the larynx. He underwent a trach by [facility]. He was taken to the OR on 7/8/2021 for a total laryngectomy with bilateral neck dissections. He completed his postoperative radiation on 9/20/2021.    He has no clinical signs of recurrence.     A PT referral was placed today to help with his shoulder/neck tightness.    He was seen by SLP today, not interested in discussing TEP/insufflation testing at this time.    He is on synthroid.  His TSH was normal in March 2022.  It should be rechecked in September 2022.    We will repeat his imaging in  June 2022.  He does not need a 6 month scan that was ordered by Dr Bean since that will be a duplicate of his upcoming scan.    I would like to see him back in 3 months with repeat TSH.  I will see him back in multidisciplinary clinic in 6 months.    Thank you very much for the opportunity to participate in the care of your patient.      Marilou Sethi MD  Otolaryngology- Head & Neck Surgery      This note was dictated with voice recognition software and then edited. Please excuse any unintentional errors.         CC:  Rosa Bean MD  [facility]  [address] [address]      Serg Kennedy MD  Department of Radiation Oncology  [facility]

## 2022-05-06 NOTE — LETTER
5/6/2022       RE: Raj Warren  [address]  [facility] [address]     Dear Colleague,    Thank you for referring your patient, Raj Warren, to the [facility] EAR NOSE AND THROAT [facility]INIC [place] at [facility]. Please see a copy of my visit note below.    Dear Dr. Bean:    I had the pleasure of seeing Raj Warren in follow-up today at the [facility].     History of Present Illness:  Raj Warren is 71 year old man with a T4aN0 SCC of the larynx. The patient presented to the ED with a week history of shortness of breath, along with symptoms of sore throat, bilateral ear pain. He had stridor vs wheezing, mild increased work of breathing, and hoarse voice at that time. He was treated with steroids and nebulizers with improvement in his symptoms and was discharged.  He had recurrent symptoms and was seen in the ED again on 4/12/2021. He had a CT scan performed on 4/12/2021 which demonstrated a 3.9 x 1.6 x 3.9 cm mass involving the right true cord, right AE fold, right false cord, posterior hypopharynx, proximal trachea, with involvement of the thyroid cartilage, involvement of the prelaryngeal tissue, no abnormal nodes. Dr Willoughby (local ENT), scope exam was performed, and patient was admitted for a trach and DL/bx. Dr Johnson and Dr Sauceda performed this procedure on 4/13/2021. A tracheal window at 2nd tracheal ring was performed with placement of an 8 cuffed Shiley. Pathology showed moderately differentiated SCC. He was seen by Dr Bean of medical oncology on 4/13/2021. He was followed by local ENT team during his hospitalization. He was recommended for chemoradiation. He was seen by Dr Mclaughlin of [facility], planning 7 weeks treatment, and CT simulation was done. He did have a VSS done while inpatient without evidence of aspiration. The patient was referred here for discussion of possible salvage laryngectomy  after chemoradiation. He had a PET scan on 4/26/2021 which showed an FDG avid mass in the larynx involving the supraglottis/glottis/subglottis, hypermetabolic left level 4 node, FDG avid nodule in the right lung thought to be infectious/inflammatory. He was then admitted to the hospital locally from 4/27/2021 to 5/8/2021 after presenting with worsening shortness of breath, found to have bilateral PE with significant clot burden including the right pulmonary artery and the right ventricle. He was taken to the OR for pulmonary embolectomy and RV thrombectomy. He had IVC filter placed 4/30/2021. His course was complicated by hemodynamic instability requiring pressors, poor healing of his sternal incision requiring wound vac placement. He was discharged on eliquis. He was sent to rehab after his admission, discharged 5/15/2021. He saw Dr Bean in follow-up on 5/27/2021 and was recommended for repeat imaging. This showed slight decrease in size of the tumor, no distant disease.     He was seen as a new patient on 6/18/2021. After extensive preoperative counseling especially in light of his recent PE and anticoagulation, the recommendations was for surgery given the cartilaginous erosion seen on imaging. He was taken to the OR on 7/8/2021 for a DL, total laryngectomy, bilateral neck dissections, cricopharyngeal myotomy. His hospital course was uncomplicated, was restarted on his anticoagulation. His final pathology demonstrated a 6 cm SCC with invasion through the thyroid cartilage, involved the cricoid cartilage, involved the right pyriform sinus, extended to the left side of the tracheostomy site, PNI present, focal lymphatic invasion, 0/75 nodes. The main specimen had a positive margin at the pyriform sinus, margins were taken from the main specimen and were negative (named right pharyngeal wall mucosal margin).      He received postoperative radiation under the care of Dr Kennedy, from 8/4/2021 to 9/20/2021 for a total  of 66 Gy.  He had his PEG removed by radiation oncology on 10/4/2021. His post treatment PET scan was negative in 2021. He had his IVC filter removed in 2022.       Interval history:  He comes in today for follow-up. He was last seen in 2022.  He was seen by Dr. Bean in 2022.  He was recommended for a CT neck in 6 months.  He has his TSH checked at that time and was normal. He says that he is doing ok. He is having issues with his shoulders being stiff. He has difficulty with the range of motion of the shoulders. He is eating without issue. He does notice increased phlegm with certain foods. He is using his anny tube. He is using the electrolarynx.      MEDICATIONS:     Current Outpatient Medications   Medication Sig Dispense Refill     apixaban ANTICOAGULANT (ELIQUIS) 5 MG tablet Take 1 tablet (5 mg) by mouth 2 times daily 180 tablet 3     levothyroxine (SYNTHROID/LEVOTHROID) 75 MCG tablet Take 1 tablet (75 mcg) by mouth daily 90 tablet 3     metoprolol tartrate (LOPRESSOR) 25 MG tablet Take 1 tablet (25 mg) by mouth 2 times daily 60 tablet 3     acetaminophen (TYLENOL) 325 MG tablet 2 tablets (650 mg) by Per G Tube route every 8 hours as needed for mild pain or fever (Patient not taking: Reported on 2022) 90 tablet 0       ALLERGIES:    Allergies   Allergen Reactions     Pollen Extract        HABITS/SOCIAL HISTORY:    Former smoker / ppd x 8 yrs, quit in . Sober from alcohol.   Lives alone. His family does not live in the immediate [place] area.   He is a retired .       Social History     Socioeconomic History     Marital status: Single     Spouse name: Not on file     Number of children: Not on file     Years of education: Not on file     Highest education level: Not on file   Occupational History     Not on file   Tobacco Use     Smoking status: Former Smoker     Types: Cigarettes     Quit date: 1989     Years since quittin.3      Smokeless tobacco: Never Used     Tobacco comment: quit in 1989  had smoked about 1/2 pack a day then cut back   Substance and Sexual Activity     Alcohol use: No     Comment: No alcohol since 1989     Drug use: No     Sexual activity: Yes     Partners: Female   Other Topics Concern     Parent/sibling w/ CABG, MI or angioplasty before 65F 55M? Not Asked   Social History Narrative     Not on file     Social Determinants of Health     Financial Resource Strain: Low Risk      Difficulty of Paying Living Expenses: Not hard at all   Food Insecurity: No Food Insecurity     Worried About Running Out of Food in the Last Year: Never true     Ran Out of Food in the Last Year: Never true   Transportation Needs: No Transportation Needs     Lack of Transportation (Medical): No     Lack of Transportation (Non-Medical): No   Physical Activity: Not on file   Stress: Not on file   Social Connections: Not on file   Intimate Partner Violence: Not on file   Housing Stability: Not on file       PAST MEDICAL HISTORY:   Past Medical History:   Diagnosis Date     Allergic state      Anemia      Former smoker      Malignant neoplasm of larynx (H) 04/20/2021     Pulmonary emboli (H) 04/28/2021    Bilateral-s/p embolectomy and IVC filter placement     S/P percutaneous endoscopic gastrostomy (PEG) tube placement (H)      Tracheostomy in place (H)         PAST SURGICAL HISTORY:   Past Surgical History:   Procedure Laterality Date     DISSECTION RADICAL NECK BILATERAL Bilateral 7/8/2021    Procedure: bilateral neck dissections;  Surgeon: Marilou Sethi MD;  Location: [facility] OR     ENDOSCOPIC INSERTION TUBE GASTROSTOMY Left 7/8/2021    Procedure: INSERTION, PEG TUBE with Esophagogastroduodenoscopy;  Surgeon: gK Montaño MD;  Location: [facility] OR     IR IVC FILTER PLACEMENT  4/30/2021     IR IVC FILTER REMOVAL  2/7/2022     LARYNGECTOMY N/A 7/8/2021    Procedure: LARYNGECTOMY;  Surgeon: Marilou Sethi MD;  Location: [facility] OR     LARYNGOSCOPY N/A  "4/12/2021    Procedure: LARYNGOSCOPY;  Surgeon: Choco Johnson MD;  Location:  OR     LARYNGOSCOPY N/A 7/8/2021    Procedure: LARYNGOSCOPY;  Surgeon: Marilou Sethi MD;  Location: [facility] OR     REPAIR ANEURYSM ASCENDING AORTA N/A 4/28/2021    Procedure: PULMONARY THROMBECTOMY;  Surgeon: Yumi Singh MD;  Location:  OR     TRACHEOSTOMY  4/12/2021    Procedure: CREATION, TRACHEOSTOMY;  Surgeon: Choco Johnson MD;  Location:  OR       FAMILY HISTORY:    Family History   Problem Relation Age of Onset     Circulatory Mother         blood clots     Alcohol/Drug Father         alcohol drank heavily     Alcohol/Drug Brother         alcohol       REVIEW OF SYSTEMS:  12 point ROS was negative other than the symptoms noted above in the HPI.  Patient Supplied Answers to Review of Systems  [facility] ROS 8/30/2021   Ears, Nose, Throat Nasal congestion or drainage, Sore throat, Trouble swallowing, Hoarseness         PHYSICAL EXAMINATION:   /81 (BP Location: Right arm, Patient Position: Sitting, Cuff Size: Adult Large)   Temp (!) 96.6  F (35.9  C) (Temporal)   Ht 1.803 m (5' 11\")   Wt 94.3 kg (208 lb)   BMI 29.01 kg/m    Appearance:   normal; NAD, age-appropriate appearance, well-developed, normal habitus   Communication:   communicates with electrolarynx with neck adapter    Head/Face:   inspection -  Normal; no scars or visible lesions   Facial strength -  Normal and symmetric    Skin:  normal, no rash   Ears:  auricle (AD) -  normal  EAC (AD) -  normal  TM (AD) -  Normal, no effusion  auricle (AS) -  normal  EAC (AS) -  normal  TM (AS) -  Normal, no effusion  Normal clinical speech reception   Nose:  Ext. inspection -  Normal  Internal Inspection -  Normal mucosa, septum, and turbinates   Nasopharynx:  normal mucosa, no masses   Oral Cavity:  lips -  Normal mucosa, oral competence, and stoma size    healthy gingival mucosa   Hard palate, buccal, floor of mouth mucosa normal   Tongue - normal " movement, no lesions, no palpable masses   Oropharynx:  mucosa -  Normal, no lesions  soft palate -  Normal, no lesions, no asymmetry, normal elevation  Base of tongue - no masses or mucosal lesions   Neopharynx:  Normal mucosa  Few secretions which partially obscure visualization, able to pass scope into cervical esophagus  No masses   No signs of recurrence   Neck: Minimal submental lymphedema  Fibrosis of neck musculature  Well healed neck incision  No palpable masses  Stoma patent, anny tube in place, stable in size   Lymphatic:  no abnormal nodes   Cardiovascular:  warm, pink, well-perfused extremities without swelling, tenderness, or edema   Respiratory:  Normal respiratory effort   Neuro/Psych.:  mood/affect -  normal  mental status -  normal           PROCEDURES:   Flexible fiberoptic laryngoscopy: Scope exam was indicated due to laryngeal cancer. Verbal consent was obtained. The nasal cavity was prepped with an aerosolized solution of topical anesthetic and vasoconstrictive agent. The scope was passed through the anterior nasal cavity and advanced. Inspection of the nasopharynx revealed no gross abnormality. The base of tongue is normal. The neopharynx is patent with some secretions. There are no masses or mucosal lesions. There are some pooled secretions. Procedure tolerated well with no immediate complications noted.  Tracheoscopy: The flexible scope was passed through the stoma down to the bronchi. There are no masses or mucosal lesions. There are no secretions.              RESULTS REVIEWED:   I reviewed the note from Dr. Vikas Mcmullen discussed with SLP and Dr. Kennedy      IMPRESSION AND PLAN:   Raj Warren is a 71 year old man with a T4N0 SCC of the larynx. He underwent [facility] by [facility]. He was taken to the OR on 7/8/2021 for a total laryngectomy with bilateral neck dissections. He completed his postoperative radiation on 9/20/2021.    He has no clinical signs of recurrence.      A PT referral was placed today to help with his shoulder/neck tightness.    He was seen by SLP today, not interested in discussing TEP/insufflation testing at this time.    He is on synthroid.  His TSH was normal in March 2022.  It should be rechecked in September 2022.    We will repeat his imaging in June 2022.  He does not need a 6 month scan that was ordered by Dr Bean since that will be a duplicate of his upcoming scan.    I would like to see him back in 3 months with repeat TSH.  I will see him back in multidisciplinary clinic in 6 months.    Thank you very much for the opportunity to participate in the care of your patient.      Marilou Sethi MD  Otolaryngology- Head & Neck Surgery      This note was dictated with voice recognition software and then edited. Please excuse any unintentional errors.       CC:  Rosa Bean MD  [facility]  [address] [facility] [address]  [facility] [address]      Serg Kennedy MD  Department of Radiation Oncology  [facility]

## 2022-05-06 NOTE — PATIENT INSTRUCTIONS
Follow-up with Dr Sethi on regular clinic day in 3 months with labs    Schedule CT scans (neck and chest - ordered by Dr Sethi for June 2022)

## 2022-05-10 DIAGNOSIS — K21.00 GASTROESOPHAGEAL REFLUX DISEASE WITH ESOPHAGITIS WITHOUT HEMORRHAGE: ICD-10-CM

## 2022-05-11 RX ORDER — PANTOPRAZOLE SODIUM 40 MG/1
TABLET, DELAYED RELEASE ORAL
Qty: 90 TABLET | Refills: 2 | Status: SHIPPED | OUTPATIENT
Start: 2022-05-11 | End: 2022-09-20

## 2022-05-11 NOTE — TELEPHONE ENCOUNTER
Routing refill request to provider for review/approval because:  Drug not active on patient's medication list        Ann Marie Mckeon RN

## 2022-05-13 NOTE — PROGRESS NOTES
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 He was unfortunately lost to follow-up until he presented in 2021 with a sore throat and stridor with work-up at the [facility] demonstrating a large primary laryngeal tumor obstructing the airway. He underwent total laryngectomy and bilateral neck dissections on 2021. Pathology revealed a 6 cm moderately differentiated squamous cell carcinoma invading through the thyroid cartilage with perineural and lymphovascular invasion. The surgical margin at the right pyriform was negative albeit close. There was no metastatic adenopathy present within the dissected lymph nodes. He received adjuvant radiotherapy as described above for improved locoregional disease control.    Mr. Warren returns to ENT multiD clinic today for a routine posttreatment disease surveillance visit. Overall, he continues to do well with his biggest concern related to bilateral shoulder stiffness. He is otherwise eating a regular diet by mouth, denies any odynophagia or dysphagia with his remaining ROS likewise negative.    PHYSICAL EXAM:  Weight: 94.3 kg  BP: 130/81    General: 71-year-old gentleman seated comfortably in an examination chair no acute distress  HEENT: NC/AT. EOMI. No rhinorrhea epistaxis. Moist mucous membranes. No visible oral cavity lesions. Palpation of  the buccal mucosa, gingiva, floor mouth and oral tongue reveal no nodularity, induration or masses.  Neck: Stoma is midline and intact. Moderate fibrosis of the central neck. No palpable cervical adenopathy bilaterally.  Pulmonary: No wheezing, stridor or respiratory distress  Skin: Mixed hyper and hypopigmentation of the central neck    Dr. Sethi performed a flexible nasopharyngoscopy and tracheoscopy in clinic. Please see her note for complete details regarding this procedure. Reviewed, examination revealed stable posttreatment changes with no evidence of recurrent disease.     LABS AND IMAGING:  3/4/2022 labs:  TSH: 2.12    IMPRESSION:   Mr. 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 He is 9 months out from the completion of treatment and remains clinically ADDISON.    PLAN:   1. Follow-up with Dr. Sethi in ENT clinic in 3 months and in multiD clinic in 6 months  2. CT neck and chest in 6/2022  3. TSH due in 9/2022    Serg Kennedy MD/PhD    Department of Radiation Oncology  [facility]

## 2022-05-31 ENCOUNTER — PATIENT OUTREACH (OUTPATIENT)
Dept: ONCOLOGY | Facility: CLINIC | Age: 72
End: 2022-05-31

## 2022-05-31 DIAGNOSIS — I10 BENIGN ESSENTIAL HYPERTENSION: ICD-10-CM

## 2022-05-31 RX ORDER — METOPROLOL TARTRATE 25 MG/1
25 TABLET, FILM COATED ORAL 2 TIMES DAILY
Qty: 90 TABLET | Refills: 3 | Status: SHIPPED | OUTPATIENT
Start: 2022-05-31 | End: 2022-11-29

## 2022-05-31 NOTE — PROGRESS NOTES
Patient walked into the lobby and requested a year script 90 day supply of his Metoprolol to be sent to his preferred [facility] pharmacy.     Serena Castillo RN

## 2022-06-29 ENCOUNTER — ANCILLARY PROCEDURE (OUTPATIENT)
Dept: CT IMAGING | Facility: CLINIC | Age: 72
End: 2022-06-29
Attending: OTOLARYNGOLOGY
Payer: COMMERCIAL

## 2022-06-29 DIAGNOSIS — C32.9 MALIGNANT NEOPLASM OF LARYNX (H): ICD-10-CM

## 2022-06-29 LAB
CREAT BLD-MCNC: 0.9 MG/DL (ref 0.7–1.3)
GFR SERPL CREATININE-BSD FRML MDRD: >60 ML/MIN/1.73M2

## 2022-06-29 PROCEDURE — 82565 ASSAY OF CREATININE: CPT | Performed by: PATHOLOGY

## 2022-06-29 PROCEDURE — 71260 CT THORAX DX C+: CPT | Mod: GC | Performed by: RADIOLOGY

## 2022-07-14 ENCOUNTER — PATIENT OUTREACH (OUTPATIENT)
Dept: OTOLARYNGOLOGY | Facility: CLINIC | Age: 72
End: 2022-07-14

## 2022-07-14 DIAGNOSIS — C32.8 CANCER OF OVERLAPPING SITES OF LARYNX (H): Primary | ICD-10-CM

## 2022-07-14 NOTE — PROGRESS NOTES
Called and left message for patient with the following PET scan results:    IMPRESSION:  1. No evidence of metastatic disease in the chest.  2. Indeterminate hyperenhancing focus in the spleen measuring up to 7  mm, which may represent a benign hemangioma, recommend attention on  follow-up.  3. Cholelithiasis.    Impression:  In this patient with history of laryngeal squamous cell carcinoma  status post total laryngectomy and radiotherapy:  1.  Primary: NI-RADS 2b. Unchanged nodular soft tissue density along  the right anterolateral upper esophagus just below the tracheostomy  tube. Recommend follow-up evaluation with PET/CT within 3 months.  2.  Neck: NI-RADS 1: No cervical adenopathy.       Advised that Dr. Sethi is not overly concerned about findings but would like to obtain the PET scan when patient returns in August to continue to monitor the spleen as well as the oft tissue density along  the right anterolateral upper esophagus.      PET scan scheduled for 8/17 prior to follow-up with Dr. Sethi. All above information and PET scan info  and prep for scan, sent to patient via e-mail. Encouraged message back or have son call with any further questions or concerns.    Sheree Mccall, RN, BSN

## 2022-08-15 NOTE — PROGRESS NOTES
Dear Dr. Bean:    I had the pleasure of seeing Raj Warren in follow-up today at the [facility].     History of Present Illness:  Raj Warren is 72 year old man with a T4aN0 SCC of the larynx. The patient presented to the ED with a week history of shortness of breath, along with symptoms of sore throat, bilateral ear pain. He had stridor vs wheezing, mild increased work of breathing, and hoarse voice at that time. He was treated with steroids and nebulizers with improvement in his symptoms and was discharged.  He had recurrent symptoms and was seen in the ED again on 4/12/2021. He had a CT scan performed on 4/12/2021 which demonstrated a 3.9 x 1.6 x 3.9 cm mass involving the right true cord, right AE fold, right false cord, posterior hypopharynx, proximal trachea, with involvement of the thyroid cartilage, involvement of the prelaryngeal tissue, no abnormal nodes. Dr Willoughby (local ENT), scope exam was performed, and patient was admitted for a trach and DL/bx. Dr Johnson and Dr Sauceda performed this procedure on 4/13/2021. A tracheal window at 2nd tracheal ring was performed with placement of an 8 cuffed Shiley. Pathology showed moderately differentiated SCC. He was seen by Dr Bean of medical oncology on 4/13/2021. He was followed by local ENT team during his hospitalization. He was recommended for chemoradiation. He was seen by Dr Mclaughlin of [facility], planning 7 weeks treatment, and CT simulation was done. He did have a VSS done while inpatient without evidence of aspiration. The patient was referred here for discussion of possible salvage laryngectomy after chemoradiation. He had a PET scan on 4/26/2021 which showed an FDG avid mass in the larynx involving the supraglottis/glottis/subglottis, hypermetabolic left level 4 node, FDG avid nodule in the right lung thought to be infectious/inflammatory. He was then admitted to the hospital locally from 4/27/2021 to 5/8/2021 after  presenting with worsening shortness of breath, found to have bilateral PE with significant clot burden including the right pulmonary artery and the right ventricle. He was taken to the OR for pulmonary embolectomy and RV thrombectomy. He had IVC filter placed 4/30/2021. His course was complicated by hemodynamic instability requiring pressors, poor healing of his sternal incision requiring wound vac placement. He was discharged on eliquis. He was sent to rehab after his admission, discharged 5/15/2021. He saw Dr Bean in follow-up on 5/27/2021 and was recommended for repeat imaging. This showed slight decrease in size of the tumor, no distant disease.     He was seen as a new patient on 6/18/2021. After extensive preoperative counseling especially in light of his recent PE and anticoagulation, the recommendations was for surgery given the cartilaginous erosion seen on imaging. He was taken to the OR on 7/8/2021 for a DL, total laryngectomy, bilateral neck dissections, cricopharyngeal myotomy. His hospital course was uncomplicated, was restarted on his anticoagulation. His final pathology demonstrated a 6 cm SCC with invasion through the thyroid cartilage, involved the cricoid cartilage, involved the right pyriform sinus, extended to the left side of the tracheostomy site, PNI present, focal lymphatic invasion, 0/75 nodes. The main specimen had a positive margin at the pyriform sinus, margins were taken from the main specimen and were negative (named right pharyngeal wall mucosal margin).      He received postoperative radiation under the care of Dr Kennedy, from 8/4/2021 to 9/20/2021 for a total of 66 Gy.  He had his PEG removed by radiation oncology on 10/4/2021. His post treatment PET scan was negative in December 2021. He had his IVC filter removed in February 2022.       Interval history:  He comes in today for follow-up. He was last seen in May 2022. He had imaging in June 2022 which showed a NIRADS 2b nodular  soft tissue density along the upper esophagus. He was scheduled for a PET scan today to follow-up this area but due to some scheduling issues did not have the scan. He had repeat TSH today. He is having some numbness in his legs and feet. He does not have a primary care doctor. He says the eating and drinking are going ok. He can eat everything. He is doing more soft foods to help with his digestion. He has not sticking of food. His taste is bland, still not normal. Some things are better. He has no pain other than when sleeping when he wakes up with stiffness in the neck. His breathing is ok. He is wearing the anny tube. He has no hemoptysis. His energy is ok, feels like he needs to exercises more. He is using the electrolarynx.      MEDICATIONS:     Current Outpatient Medications   Medication Sig Dispense Refill     acetaminophen (TYLENOL) 325 MG tablet 2 tablets (650 mg) by Per G Tube route every 8 hours as needed for mild pain or fever 90 tablet 0     apixaban ANTICOAGULANT (ELIQUIS) 5 MG tablet Take 1 tablet (5 mg) by mouth 2 times daily 180 tablet 3     levothyroxine (SYNTHROID/LEVOTHROID) 75 MCG tablet Take 1 tablet (75 mcg) by mouth daily 90 tablet 3     metoprolol tartrate (LOPRESSOR) 25 MG tablet Take 1 tablet (25 mg) by mouth 2 times daily 90 tablet 3     pantoprazole (PROTONIX) 40 MG EC tablet TAKE 1 TABLET(40 MG) BY MOUTH EVERY MORNING BEFORE BREAKFAST 90 tablet 2       ALLERGIES:    Allergies   Allergen Reactions     Pollen Extract        HABITS/SOCIAL HISTORY:    Former smoker 1/2 ppd x 8 yrs, quit in 1989. Sober from alcohol.   Lives alone. His family does not live in the immediate [place] area.   He is a retired .       Social History     Socioeconomic History     Marital status: Single     Spouse name: Not on file     Number of children: Not on file     Years of education: Not on file     Highest education level: Not on file   Occupational History     Not on file   Tobacco Use      Smoking status: Former Smoker     Types: Cigarettes     Quit date: 1989     Years since quittin.6     Smokeless tobacco: Never Used     Tobacco comment: quit in   had smoked about 1/2 pack a day then cut back   Substance and Sexual Activity     Alcohol use: No     Comment: No alcohol since      Drug use: No     Sexual activity: Yes     Partners: Female   Other Topics Concern     Parent/sibling w/ CABG, MI or angioplasty before 65F 55M? Not Asked   Social History Narrative     Not on file     Social Determinants of Health     Financial Resource Strain: Low Risk      Difficulty of Paying Living Expenses: Not hard at all   Food Insecurity: No Food Insecurity     Worried About Running Out of Food in the Last Year: Never true     Ran Out of Food in the Last Year: Never true   Transportation Needs: No Transportation Needs     Lack of Transportation (Medical): No     Lack of Transportation (Non-Medical): No   Physical Activity: Not on file   Stress: Not on file   Social Connections: Not on file   Intimate Partner Violence: Not on file   Housing Stability: Not on file       PAST MEDICAL HISTORY:   Past Medical History:   Diagnosis Date     Allergic state      Anemia      Former smoker      Malignant neoplasm of larynx (H) 2021     Pulmonary emboli (H) 2021    Bilateral-s/p embolectomy and IVC filter placement     S/P percutaneous endoscopic gastrostomy (PEG) tube placement (H)      Tracheostomy in place (H)         PAST SURGICAL HISTORY:   Past Surgical History:   Procedure Laterality Date     DISSECTION RADICAL NECK BILATERAL Bilateral 2021    Procedure: bilateral neck dissections;  Surgeon: Marilou Sethi MD;  Location: [facility] OR     ENDOSCOPIC INSERTION TUBE GASTROSTOMY Left 2021    Procedure: INSERTION, PEG TUBE with Esophagogastroduodenoscopy;  Surgeon: Kg Montaño MD;  Location: [facility] OR     IR IVC FILTER PLACEMENT  2021     IR IVC FILTER REMOVAL  2022      "LARYNGECTOMY N/A 7/8/2021    Procedure: LARYNGECTOMY;  Surgeon: Marilou Sethi MD;  Location: [facility] OR     LARYNGOSCOPY N/A 4/12/2021    Procedure: LARYNGOSCOPY;  Surgeon: Choco Johnson MD;  Location: [facility] OR     LARYNGOSCOPY N/A 7/8/2021    Procedure: LARYNGOSCOPY;  Surgeon: Marilou Sethi MD;  Location: [facility] OR     REPAIR ANEURYSM ASCENDING AORTA N/A 4/28/2021    Procedure: PULMONARY THROMBECTOMY;  Surgeon: Yumi Singh MD;  Location: [facility] OR     TRACHEOSTOMY  4/12/2021    Procedure: CREATION, TRACHEOSTOMY;  Surgeon: Choco Johnson MD;  Location:  OR       FAMILY HISTORY:    Family History   Problem Relation Age of Onset     Circulatory Mother         blood clots     Alcohol/Drug Father         alcohol drank heavily     Alcohol/Drug Brother         alcohol       REVIEW OF SYSTEMS:  12 point ROS was negative other than the symptoms noted above in the HPI.  Patient Supplied Answers to Review of Systems  [facility] ROS 8/30/2021   Ears, Nose, Throat: Nasal congestion or drainage, Sore throat, Trouble swallowing, Hoarseness         PHYSICAL EXAMINATION:   /82 (BP Location: Right arm, Patient Position: Sitting, Cuff Size: Adult Regular)   Pulse 72   Temp 97.5  F (36.4  C) (Temporal)   Ht 1.803 m (5' 11\")   Wt 96.9 kg (213 lb 9.6 oz)   SpO2 96%   BMI 29.79 kg/m    Appearance:   normal; NAD, age-appropriate appearance, well-developed, normal habitus   Communication:   communicates well with electrolarynx with neck adapter    Head/Face:   inspection -  Normal; no scars or visible lesions   Facial strength -  Normal and symmetric    Skin:  normal, no rash   Ears:  auricle (AD) -  normal  EAC (AD) -  normal  TM (AD) -  Normal, no effusion  auricle (AS) -  normal  EAC (AS) -  normal  TM (AS) -  Normal, no effusion  Normal clinical speech reception   Nose:  Ext. inspection -  Normal  Internal Inspection -  Normal mucosa, septum, and turbinates   Nasopharynx:  normal mucosa, no masses   Oral " Cavity:  lips -  Normal mucosa, oral competence, and stoma size    healthy gingival mucosa   Hard palate, buccal, floor of mouth mucosa normal   Tongue - normal movement, no lesions, no palpable masses   Oropharynx:  mucosa -  Normal, no lesions  soft palate -  Normal, no lesions, no asymmetry, normal elevation  Base of tongue - no masses or mucosal lesions   Neopharynx:  Normal mucosa  Few secretions which partially obscure visualization, able to pass scope into cervical esophagus  No masses   No signs of recurrence   Neck: Minimal submental lymphedema  Fibrosis of neck musculature  Well healed neck incision  No palpable masses  Stoma patent, anny tube in place, stable in size   Lymphatic:  no abnormal nodes   Cardiovascular:  warm, pink, well-perfused extremities without swelling, tenderness, or edema   Respiratory:  Normal respiratory effort   Neuro/Psych.:  mood/affect -  normal  mental status -  normal           PROCEDURES:   Flexible fiberoptic laryngoscopy: Scope exam was indicated due to laryngeal cancer. Verbal consent was obtained. The nasal cavity was prepped with an aerosolized solution of topical anesthetic and vasoconstrictive agent. The scope was passed through the anterior nasal cavity and advanced. Inspection of the nasopharynx revealed no gross abnormality. The base of tongue is normal. The neopharynx is patent with some secretions. There are no masses or mucosal lesions. There are some pooled secretions. Procedure tolerated well with no immediate complications noted.  Tracheoscopy: The flexible scope was passed through the stoma down to the bronchi. There are no masses or mucosal lesions. There are no secretions.        RESULTS REVIEWED:     TSH normal    Care discussed with SLP       IMPRESSION AND PLAN:   Raj Warren is a 72 year old man with a T4N0 SCC of the larynx. He [facility]. He was taken to the OR on 7/8/2021 for a total laryngectomy with  bilateral neck dissections. He completed his postoperative radiation on 9/20/2021.    He has no clinical signs of recurrence. He should continue to be diligent with use of his anny tube. He is doing quite well with his electrolarynx and is easily comprehensible.     He was seen by SLP today.    He is on synthroid.  His TSH was normal, recheck in February 2023.    He was going to have a PET scan today given findings on his CT scans in June 2022. We will get this rescheduled.    I will see him back in multidisciplinary clinic in 3 months.    Thank you very much for the opportunity to participate in the care of your patient.      Marilou Sethi MD  Otolaryngology- Head & Neck Surgery      This note was dictated with voice recognition software and then edited. Please excuse any unintentional errors.         CC:  Rosa Bean MD  [facility]  [address] [facility] [address]  [facility] [address]      Serg Kennedy MD  Department of Radiation Oncology  [facility]

## 2022-08-17 ENCOUNTER — LAB (OUTPATIENT)
Dept: LAB | Facility: CLINIC | Age: 72
End: 2022-08-17
Payer: COMMERCIAL

## 2022-08-17 ENCOUNTER — OFFICE VISIT (OUTPATIENT)
Dept: OTOLARYNGOLOGY | Facility: CLINIC | Age: 72
End: 2022-08-17
Payer: COMMERCIAL

## 2022-08-17 ENCOUNTER — THERAPY VISIT (OUTPATIENT)
Dept: SPEECH THERAPY | Facility: CLINIC | Age: 72
End: 2022-08-17

## 2022-08-17 VITALS
HEIGHT: 71 IN | SYSTOLIC BLOOD PRESSURE: 119 MMHG | OXYGEN SATURATION: 96 % | DIASTOLIC BLOOD PRESSURE: 82 MMHG | WEIGHT: 213.6 LBS | HEART RATE: 72 BPM | TEMPERATURE: 97.5 F | BODY MASS INDEX: 29.9 KG/M2

## 2022-08-17 DIAGNOSIS — C32.9 MALIGNANT NEOPLASM OF LARYNX (H): Primary | ICD-10-CM

## 2022-08-17 DIAGNOSIS — R49.1 APHONIA: ICD-10-CM

## 2022-08-17 DIAGNOSIS — C32.8 CANCER OF OVERLAPPING SITES OF LARYNX (H): ICD-10-CM

## 2022-08-17 DIAGNOSIS — E03.4 HYPOTHYROIDISM DUE TO ACQUIRED ATROPHY OF THYROID: ICD-10-CM

## 2022-08-17 DIAGNOSIS — R13.12 OROPHARYNGEAL DYSPHAGIA: ICD-10-CM

## 2022-08-17 LAB — TSH SERPL DL<=0.005 MIU/L-ACNC: 2.29 MU/L (ref 0.4–4)

## 2022-08-17 PROCEDURE — 99213 OFFICE O/P EST LOW 20 MIN: CPT | Mod: 25 | Performed by: OTOLARYNGOLOGY

## 2022-08-17 PROCEDURE — 31615 TRCHEOBRNCHSC EST TRACHS INC: CPT | Performed by: OTOLARYNGOLOGY

## 2022-08-17 PROCEDURE — 31575 DIAGNOSTIC LARYNGOSCOPY: CPT | Mod: 51 | Performed by: OTOLARYNGOLOGY

## 2022-08-17 PROCEDURE — 84443 ASSAY THYROID STIM HORMONE: CPT | Performed by: PATHOLOGY

## 2022-08-17 PROCEDURE — 36415 COLL VENOUS BLD VENIPUNCTURE: CPT | Performed by: PATHOLOGY

## 2022-08-17 PROCEDURE — 92507 TX SP LANG VOICE COMM INDIV: CPT | Mod: GN | Performed by: SPEECH-LANGUAGE PATHOLOGIST

## 2022-08-17 PROCEDURE — 92526 ORAL FUNCTION THERAPY: CPT | Mod: GN | Performed by: SPEECH-LANGUAGE PATHOLOGIST

## 2022-08-17 ASSESSMENT — PAIN SCALES - GENERAL: PAINLEVEL: NO PAIN (0)

## 2022-08-17 NOTE — LETTER
8/17/2022       RE: Raj Warren  [address]  [facility] [address]     Dear Colleague,    Thank you for referring your patient, Raj Warren, to the [facility] EAR NOSE AND [facility]ROAT [facility]INIC [place] at [facility]. Please see a copy of my visit note below.    Dear Dr. Bean:    I had the pleasure of seeing Raj Warren in follow-up today at the [facility] Ot[facility].     History of Present Illness:  Raj Warren is 72 year old man with a T4aN0 SCC of the larynx. The patient presented to the ED with a week history of shortness of breath, along with symptoms of sore throat, bilateral ear pain. He had stridor vs wheezing, mild increased work of breathing, and hoarse voice at that time. He was treated with steroids and nebulizers with improvement in his symptoms and was discharged.  He had recurrent symptoms and was seen in the ED again on 4/12/2021. He had a CT scan performed on 4/12/2021 which demonstrated a 3.9 x 1.6 x 3.9 cm mass involving the right true cord, right AE fold, right false cord, posterior hypopharynx, proximal trachea, with involvement of the thyroid cartilage, involvement of the prelaryngeal tissue, no abnormal nodes. Dr Willoughby (local ENT), scope exam was performed, and patient was admitted for a trach and DL/bx. Dr Johnson and Dr Sauceda performed this procedure on 4/13/2021. A tracheal window at 2nd tracheal ring was performed with placement of an 8 cuffed Shiley. Pathology showed moderately differentiated SCC. He was seen by Dr Bean of medical oncology on 4/13/2021. He was followed by local ENT team during his hospitalization. He was recommended for chemoradiation. He was seen by Dr Mclaughlin of [facility], planning 7 weeks treatment, and CT simulation was done. He did have a VSS done while inpatient without evidence of aspiration. The patient was referred here for discussion of possible salvage  laryngectomy after chemoradiation. He had a PET scan on 4/26/2021 which showed an FDG avid mass in the larynx involving the supraglottis/glottis/subglottis, hypermetabolic left level 4 node, FDG avid nodule in the right lung thought to be infectious/inflammatory. He was then admitted to the hospital locally from 4/27/2021 to 5/8/2021 after presenting with worsening shortness of breath, found to have bilateral PE with significant clot burden including the right pulmonary artery and the right ventricle. He was taken to the OR for pulmonary embolectomy and RV thrombectomy. He had IVC filter placed 4/30/2021. His course was complicated by hemodynamic instability requiring pressors, poor healing of his sternal incision requiring wound vac placement. He was discharged on eliquis. He was sent to rehab after his admission, discharged 5/15/2021. He saw Dr Bean in follow-up on 5/27/2021 and was recommended for repeat imaging. This showed slight decrease in size of the tumor, no distant disease.     He was seen as a new patient on 6/18/2021. After extensive preoperative counseling especially in light of his recent PE and anticoagulation, the recommendations was for surgery given the cartilaginous erosion seen on imaging. He was taken to the OR on 7/8/2021 for a DL, total laryngectomy, bilateral neck dissections, cricopharyngeal myotomy. His hospital course was uncomplicated, was restarted on his anticoagulation. His final pathology demonstrated a 6 cm SCC with invasion through the thyroid cartilage, involved the cricoid cartilage, involved the right pyriform sinus, extended to the left side of the tracheostomy site, PNI present, focal lymphatic invasion, 0/75 nodes. The main specimen had a positive margin at the pyriform sinus, margins were taken from the main specimen and were negative (named right pharyngeal wall mucosal margin).      He received postoperative radiation under the care of Dr Kennedy, from 8/4/2021 to 9/20/2021  for a total of 66 Gy.  He had his PEG removed by radiation oncology on 10/4/2021. His post treatment PET scan was negative in December 2021. He had his IVC filter removed in February 2022.       Interval history:  He comes in today for follow-up. He was last seen in May 2022. He had imaging in June 2022 which showed a NIRADS 2b nodular soft tissue density along the upper esophagus. He was scheduled for a PET scan today to follow-up this area but due to some scheduling issues did not have the scan. He had repeat TSH today. He is having some numbness in his legs and feet. He does not have a primary care doctor. He says the eating and drinking are going ok. He can eat everything. He is doing more soft foods to help with his digestion. He has not sticking of food. His taste is bland, still not normal. Some things are better. He has no pain other than when sleeping when he wakes up with stiffness in the neck. His breathing is ok. He is wearing the anny tube. He has no hemoptysis. His energy is ok, feels like he needs to exercises more. He is using the electrolarynx.      MEDICATIONS:     Current Outpatient Medications   Medication Sig Dispense Refill     acetaminophen (TYLENOL) 325 MG tablet 2 tablets (650 mg) by Per G Tube route every 8 hours as needed for mild pain or fever 90 tablet 0     apixaban ANTICOAGULANT (ELIQUIS) 5 MG tablet Take 1 tablet (5 mg) by mouth 2 times daily 180 tablet 3     levothyroxine (SYNTHROID/LEVOTHROID) 75 MCG tablet Take 1 tablet (75 mcg) by mouth daily 90 tablet 3     metoprolol tartrate (LOPRESSOR) 25 MG tablet Take 1 tablet (25 mg) by mouth 2 times daily 90 tablet 3     pantoprazole (PROTONIX) 40 MG EC tablet TAKE 1 TABLET(40 MG) BY MOUTH EVERY MORNING BEFORE BREAKFAST 90 tablet 2       ALLERGIES:    Allergies   Allergen Reactions     Pollen Extract        HABITS/SOCIAL HISTORY:    Former smoker 1/2 ppd x 8 yrs, quit in 1989. Sober from alcohol.   Lives alone. His family does not live in  the immediate [facility].   He is a retired .       Social History     Socioeconomic History     Marital status: Single     Spouse name: Not on file     Number of children: Not on file     Years of education: Not on file     Highest education level: Not on file   Occupational History     Not on file   Tobacco Use     Smoking status: Former Smoker     Types: Cigarettes     Quit date: 1989     Years since quittin.6     Smokeless tobacco: Never Used     Tobacco comment: quit in   had smoked about 1/2 pack a day then cut back   Substance and Sexual Activity     Alcohol use: No     Comment: No alcohol since      Drug use: No     Sexual activity: Yes     Partners: Female   Other Topics Concern     Parent/sibling w/ CABG, MI or angioplasty before 65F 55M? Not Asked   Social History Narrative     Not on file     Social Determinants of Health     Financial Resource Strain: Low Risk      Difficulty of Paying Living Expenses: Not hard at all   Food Insecurity: No Food Insecurity     Worried About Running Out of Food in the Last Year: Never true     Ran Out of Food in the Last Year: Never true   Transportation Needs: No Transportation Needs     Lack of Transportation (Medical): No     Lack of Transportation (Non-Medical): No   Physical Activity: Not on file   Stress: Not on file   Social Connections: Not on file   Intimate Partner Violence: Not on file   Housing Stability: Not on file       PAST MEDICAL HISTORY:   Past Medical History:   Diagnosis Date     Allergic state      Anemia      Former smoker      Malignant neoplasm of larynx (H) 2021     Pulmonary emboli (H) 2021    Bilateral-s/p embolectomy and IVC filter placement     S/P percutaneous endoscopic gastrostomy (PEG) tube placement (H)      Tracheostomy in place (H)         PAST SURGICAL HISTORY:   Past Surgical History:   Procedure Laterality Date     DISSECTION RADICAL NECK BILATERAL Bilateral 2021    Procedure:  "bilateral neck dissections;  Surgeon: Marilou Sethi MD;  Location: [facility] OR     ENDOSCOPIC INSERTION TUBE GASTROSTOMY Left 7/8/2021    Procedure: INSERTION, PEG TUBE with Esophagogastroduodenoscopy;  Surgeon: Kg Montaño MD;  Location: [facility] OR     IR IVC FILTER PLACEMENT  4/30/2021     IR IVC FILTER REMOVAL  2/7/2022     LARYNGECTOMY N/A 7/8/2021    Procedure: LARYNGECTOMY;  Surgeon: Marilou Sethi MD;  Location: [facility] OR     LARYNGOSCOPY N/A 4/12/2021    Procedure: LARYNGOSCOPY;  Surgeon: Choco Johnson MD;  Location: [facility] OR     LARYNGOSCOPY N/A 7/8/2021    Procedure: LARYNGOSCOPY;  Surgeon: Marilou Sethi MD;  Location: [facility] OR     REPAIR ANEURYSM ASCENDING AORTA N/A 4/28/2021    Procedure: PULMONARY THROMBECTOMY;  Surgeon: Yumi Singh MD;  Location:  OR     TRACHEOSTOMY  4/12/2021    Procedure: CREATION, TRACHEOSTOMY;  Surgeon: Choco Johnson MD;  Location: [facility] OR       FAMILY HISTORY:    Family History   Problem Relation Age of Onset     Circulatory Mother         blood clots     Alcohol/Drug Father         alcohol drank heavily     Alcohol/Drug Brother         alcohol       REVIEW OF SYSTEMS:  12 point ROS was negative other than the symptoms noted above in the HPI.  Patient Supplied Answers to Review of Systems  [facility] ROS 8/30/2021   Ears, Nose, Throat: Nasal congestion or drainage, Sore throat, Trouble swallowing, Hoarseness         PHYSICAL EXAMINATION:   /82 (BP Location: Right arm, Patient Position: Sitting, Cuff Size: Adult Regular)   Pulse 72   Temp 97.5  F (36.4  C) (Temporal)   Ht 1.803 m (5' 11\")   Wt 96.9 kg (213 lb 9.6 oz)   SpO2 96%   BMI 29.79 kg/m    Appearance:   normal; NAD, age-appropriate appearance, well-developed, normal habitus   Communication:   communicates well with electrolarynx with neck adapter    Head/Face:   inspection -  Normal; no scars or visible lesions   Facial strength -  Normal and symmetric    Skin:  normal, no rash   Ears:  " auricle (AD) -  normal  EAC (AD) -  normal  TM (AD) -  Normal, no effusion  auricle (AS) -  normal  EAC (AS) -  normal  TM (AS) -  Normal, no effusion  Normal clinical speech reception   Nose:  Ext. inspection -  Normal  Internal Inspection -  Normal mucosa, septum, and turbinates   Nasopharynx:  normal mucosa, no masses   Oral Cavity:  lips -  Normal mucosa, oral competence, and stoma size    healthy gingival mucosa   Hard palate, buccal, floor of mouth mucosa normal   Tongue - normal movement, no lesions, no palpable masses   Oropharynx:  mucosa -  Normal, no lesions  soft palate -  Normal, no lesions, no asymmetry, normal elevation  Base of tongue - no masses or mucosal lesions   Neopharynx:  Normal mucosa  Few secretions which partially obscure visualization, able to pass scope into cervical esophagus  No masses   No signs of recurrence   Neck: Minimal submental lymphedema  Fibrosis of neck musculature  Well healed neck incision  No palpable masses  Stoma patent, anny tube in place, stable in size   Lymphatic:  no abnormal nodes   Cardiovascular:  warm, pink, well-perfused extremities without swelling, tenderness, or edema   Respiratory:  Normal respiratory effort   Neuro/Psych.:  mood/affect -  normal  mental status -  normal           PROCEDURES:   Flexible fiberoptic laryngoscopy: Scope exam was indicated due to laryngeal cancer. Verbal consent was obtained. The nasal cavity was prepped with an aerosolized solution of topical anesthetic and vasoconstrictive agent. The scope was passed through the anterior nasal cavity and advanced. Inspection of the nasopharynx revealed no gross abnormality. The base of tongue is normal. The neopharynx is patent with some secretions. There are no masses or mucosal lesions. There are some pooled secretions. Procedure tolerated well with no immediate complications noted.  Tracheoscopy: The flexible scope was passed through the stoma down to the bronchi. There are no masses or  mucosal lesions. There are no secretions.        RESULTS REVIEWED:     TSH normal    Care discussed with SLP       IMPRESSION AND PLAN:   Raj Warren is a 72 year old man with a T4N0 SCC of the larynx. He underwent [facility]. He was taken to the OR on 7/8/2021 for a total laryngectomy with bilateral neck dissections. He completed his postoperative radiation on 9/20/2021.    He has no clinical signs of recurrence. He should continue to be diligent with use of his anny tube. He is doing quite well with his electrolarynx and is easily comprehensible.     He was seen by SLP today.    He is on synthroid.  His TSH was normal, recheck in February 2023.    He was going to have a PET scan today given findings on his CT scans in June 2022. We will get this rescheduled.    I will see him back in multidisciplinary clinic in 3 months.    Thank you very much for the opportunity to participate in the care of your patient.      Marilou Sethi MD  Otolaryngology- Head & Neck Surgery      This note was dictated with voice recognition software and then edited. Please excuse any unintentional errors.       CC:  Rosa Bean MD  [facility]  [address] [address]      Serg Kennedy MD  Department of Radiation Oncology  [facility]

## 2022-08-17 NOTE — NURSING NOTE
"Blood pressure 119/82, pulse 72, temperature 97.5  F (36.4  C), temperature source Temporal, height 1.803 m (5' 11\"), weight 96.9 kg (213 lb 9.6 oz), SpO2 96 %.    Ilene Holloway LPN    "

## 2022-08-28 ENCOUNTER — HEALTH MAINTENANCE LETTER (OUTPATIENT)
Age: 72
End: 2022-08-28

## 2022-09-20 ENCOUNTER — ONCOLOGY VISIT (OUTPATIENT)
Dept: ONCOLOGY | Facility: CLINIC | Age: 72
End: 2022-09-20
Attending: INTERNAL MEDICINE
Payer: COMMERCIAL

## 2022-09-20 VITALS
DIASTOLIC BLOOD PRESSURE: 80 MMHG | WEIGHT: 214 LBS | HEART RATE: 66 BPM | SYSTOLIC BLOOD PRESSURE: 129 MMHG | RESPIRATION RATE: 16 BRPM | OXYGEN SATURATION: 99 % | HEIGHT: 71 IN | BODY MASS INDEX: 29.96 KG/M2

## 2022-09-20 DIAGNOSIS — C32.9 LARYNGEAL CANCER (H): ICD-10-CM

## 2022-09-20 DIAGNOSIS — E89.0 POSTOPERATIVE HYPOTHYROIDISM: ICD-10-CM

## 2022-09-20 LAB
ALBUMIN SERPL-MCNC: 3.8 G/DL (ref 3.4–5)
ALP SERPL-CCNC: 68 U/L (ref 40–150)
ALT SERPL W P-5'-P-CCNC: 29 U/L (ref 0–70)
ANION GAP SERPL CALCULATED.3IONS-SCNC: 6 MMOL/L (ref 3–14)
AST SERPL W P-5'-P-CCNC: 19 U/L (ref 0–45)
BILIRUB SERPL-MCNC: 0.8 MG/DL (ref 0.2–1.3)
BUN SERPL-MCNC: 15 MG/DL (ref 7–30)
CALCIUM SERPL-MCNC: 9.1 MG/DL (ref 8.5–10.1)
CHLORIDE BLD-SCNC: 109 MMOL/L (ref 94–109)
CO2 SERPL-SCNC: 24 MMOL/L (ref 20–32)
CREAT SERPL-MCNC: 0.85 MG/DL (ref 0.66–1.25)
ERYTHROCYTE [DISTWIDTH] IN BLOOD BY AUTOMATED COUNT: 13 % (ref 10–15)
GFR SERPL CREATININE-BSD FRML MDRD: >90 ML/MIN/1.73M2
GLUCOSE BLD-MCNC: 97 MG/DL (ref 70–99)
HCT VFR BLD AUTO: 44.1 % (ref 40–53)
HGB BLD-MCNC: 15.2 G/DL (ref 13.3–17.7)
MCH RBC QN AUTO: 30.4 PG (ref 26.5–33)
MCHC RBC AUTO-ENTMCNC: 34.5 G/DL (ref 31.5–36.5)
MCV RBC AUTO: 88 FL (ref 78–100)
PLATELET # BLD AUTO: 229 10E3/UL (ref 150–450)
POTASSIUM BLD-SCNC: 3.9 MMOL/L (ref 3.4–5.3)
PROT SERPL-MCNC: 7.3 G/DL (ref 6.8–8.8)
RBC # BLD AUTO: 5 10E6/UL (ref 4.4–5.9)
SODIUM SERPL-SCNC: 139 MMOL/L (ref 133–144)
TSH SERPL DL<=0.005 MIU/L-ACNC: 3.04 MU/L (ref 0.4–4)
WBC # BLD AUTO: 4.3 10E3/UL (ref 4–11)

## 2022-09-20 PROCEDURE — G0463 HOSPITAL OUTPT CLINIC VISIT: HCPCS

## 2022-09-20 PROCEDURE — 85027 COMPLETE CBC AUTOMATED: CPT | Performed by: INTERNAL MEDICINE

## 2022-09-20 PROCEDURE — 80053 COMPREHEN METABOLIC PANEL: CPT | Performed by: INTERNAL MEDICINE

## 2022-09-20 PROCEDURE — 99215 OFFICE O/P EST HI 40 MIN: CPT | Performed by: INTERNAL MEDICINE

## 2022-09-20 PROCEDURE — 36415 COLL VENOUS BLD VENIPUNCTURE: CPT

## 2022-09-20 PROCEDURE — 84443 ASSAY THYROID STIM HORMONE: CPT | Performed by: INTERNAL MEDICINE

## 2022-09-20 RX ORDER — LEVOTHYROXINE SODIUM 75 UG/1
75 TABLET ORAL DAILY
Qty: 90 TABLET | Refills: 3 | COMMUNITY
Start: 2022-09-20 | End: 2023-04-27

## 2022-09-20 ASSESSMENT — PAIN SCALES - GENERAL: PAINLEVEL: NO PAIN (0)

## 2022-09-20 NOTE — PROGRESS NOTES
"Oncology Rooming Note    September 20, 2022 10:05 AM   Raj Warren is a 72 year old male who presents for:    Chief Complaint   Patient presents with     Oncology Clinic Visit     Initial Vitals: There were no vitals taken for this visit. Estimated body mass index is 29.79 kg/m  as calculated from the following:    Height as of 8/17/22: 1.803 m (5' 11\").    Weight as of 8/17/22: 96.9 kg (213 lb 9.6 oz). There is no height or weight on file to calculate BSA.  Data Unavailable Comment: Data Unavailable   No LMP for male patient.  Allergies reviewed: Yes  Medications reviewed: Yes    Medications: Medication refills not needed today.  Pharmacy name entered into [facility]: [id number] - [place], [place] [address] [facility]    Clinical concerns:  doctor was notified.      Jennifer Farfan MA            "

## 2022-09-20 NOTE — LETTER
"    9/20/2022         RE: Raj Warren  [address]  [facility] [address]        Dear Colleague,    Thank you for referring your patient, Raj Warren, to the [facility]. Please see a copy of my visit note below.    Oncology Rooming Note    September 20, 2022 10:05 AM   Raj Warren is a 72 year old male who presents for:    Chief Complaint   Patient presents with     Oncology Clinic Visit     Initial Vitals: There were no vitals taken for this visit. Estimated body mass index is 29.79 kg/m  as calculated from the following:    Height as of 8/17/22: 1.803 m (5' 11\").    Weight as of 8/17/22: 96.9 kg (213 lb 9.6 oz). There is no height or weight on file to calculate BSA.  Data Unavailable Comment: Data Unavailable   No LMP for male patient.  Allergies reviewed: Yes  Medications reviewed: Yes    Medications: Medication refills not needed today.  Pharmacy name entered into H?REL: [facility] #[id number] - [place], [place] - [address] AT [address]    Clinical concerns:  doctor was notified.      Jennifer Farfan MA              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  1.  Patient was evaluated in 2019 for hoarseness of voice.  He was seen by ENT and found to have right vocal cord mass.    -He had biopsy done on 08/07/2019. Pathology revealed moderately-differentiated invasive squamous cell carcinoma.     2.  The patient was seen in the Emergency Room on 04/12/2021 for shortness of breath and admitted.   -CT neck revealed an enhancing soft tissue mass involving the right larynx measuring 3.9 cm.  -Patient had laryngoscopy with biopsy and tracheostomy done on 04/12/2021.  There was an endolaryngeal mass obscuring the laryngeal landmarks.  Mass appeared to be based on right endolarynx.  Pathology revealed invasive keratinizing moderately-differentiated squamous cell " carcinoma.     3.  PET scan on 04/26/2021 revealed hypermetabolic mass involving the supraglottic, glottic and subglottic larynx.  There was a hypermetabolic left level IV lymph node.  There was also hypermetabolic nodule in right middle lobe.     4. CT chest angiogram on 04/27/2021 revealed a large bilateral pulmonary embolism in all the lobes.  There was evidence of right cardiac strain.  -Echocardiogram on 04/27/2021 revealed clot in transit in the right ventricle.  -The patient had emergency pulmonary embolectomy on 04/28/2021.  Pathology revealed organizing clot.  -Patient on Eliquis. Long term anti-coagulation recommended.     5. Total laryngectomy with bilateral neck dissection on 07/08/2021.  Pathology reveals 6 cm squamous cell carcinoma involving the right false and true vocal cords with fixation, the left false and true vocal cords and invades through thyroid cartilage.  There is focal lymphatic invasion.  There is perineural invasion. Benign 75 lymph nodes.  pT4a pN0 disease.     6. Post-op radiation between 08/04/2021 and 09/20/2021. 6600 cGy in 33 fractions.    SUBJECTIVE:  Mr. Warren is a 72-year-old gentleman with laryngeal squamous cell carcinoma status post surgery followed by postoperative radiation.     The patient follows up with ENT at [facility].  Their exam on 08/17/2022 did not reveal any evidence of malignancy.     CT neck and chest was done on 06/29/2022.  There is nodular soft tissue density along the right anterolateral upper esophagus just below the tracheostomy tube.  A followup evaluation with PET/CT has been recommended.  There is no neck lymphadenopathy.  There is no lung mass or nodule.  There is indeterminate hyperenhancing focus in the spleen measuring 7 mm.     The patient's overall condition is stable.  He is not in pain.  No headache or dizziness.  No chest pain or shortness of breath.  No abdominal pain, nausea or vomiting.  No bleeding.  Appetite is fairly  good.     All other review of systems is negative.     PHYSICAL EXAMINATION:    GENERAL:  He is alert, oriented x 3. Not in distress.  VITAL SIGNS:  Reviewed.   Rest of the systems not examined.     LABS:  CBC, CMP and TSH are normal.     ASSESSMENT:    1.  A 72-year-old gentleman with laryngeal squamous cell carcinoma status post surgery and postoperative radiation. No evidence of recurrence.  2.  Nodular soft tissue density along the right anterolateral upper esophagus, likely benign but needs followup.  3.  Pulmonary embolism on indefinite anticoagulation.  4.  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     PLAN:    1.  The patient clinically is stable.  No clinical suspicion of malignancy.    2.  CT scan done in June was reviewed.  I explained to him that there is no definite evidence of malignancy, but there are a few areas that need followup.     There is abnormality around the esophagus.  There is a splenic tiny lesion.  These all need followup. He is scheduled for CT/PET.  I agree with that plan.     The patient had multiple questions regarding abnormalities seen on the last CT scan, which were all discussed in detail.  I told him my suspicion for malignancy is low but this does need followup.     3.  Labs are all good.  He is on levothyroxine, which will be continued at the same dose.      4.  I will see him in 4-6 weeks' time.  I advised him to call us with any questions or concerns.     Total visit time of 40 minutes.  Time was spent in today's visit, review of chart/investigations today and documentation today.       This office note has been dictated.        Again, thank you for allowing me to participate in the care of your patient.        Sincerely,        Rosa Bean MD

## 2022-09-20 NOTE — RESULT ENCOUNTER NOTE
Dear Mr. Warren,    Blood tests are normal.    Please, call me with any questions.    Rosa Bean MD

## 2022-09-30 ENCOUNTER — HOSPITAL ENCOUNTER (OUTPATIENT)
Dept: PET IMAGING | Facility: CLINIC | Age: 72
Discharge: HOME | End: 2022-09-30
Attending: OTOLARYNGOLOGY
Payer: COMMERCIAL

## 2022-09-30 DIAGNOSIS — C32.9 MALIGNANT NEOPLASM OF LARYNX (H): ICD-10-CM

## 2022-09-30 DIAGNOSIS — C32.8 CANCER OF OVERLAPPING SITES OF LARYNX (H): ICD-10-CM

## 2022-09-30 PROCEDURE — 71260 CT THORAX DX C+: CPT | Mod: 26

## 2022-09-30 PROCEDURE — 70491 CT SOFT TISSUE NECK W/DYE: CPT

## 2022-09-30 PROCEDURE — A9552 F18 FDG: HCPCS | Performed by: OTOLARYNGOLOGY

## 2022-09-30 PROCEDURE — 250N000011 HC RX IP 250 OP 636: Performed by: OTOLARYNGOLOGY

## 2022-09-30 PROCEDURE — 74177 CT ABD & PELVIS W/CONTRAST: CPT | Mod: 26

## 2022-09-30 PROCEDURE — 343N000001 HC RX 343: Performed by: OTOLARYNGOLOGY

## 2022-09-30 PROCEDURE — 70491 CT SOFT TISSUE NECK W/DYE: CPT | Mod: 26 | Performed by: RADIOLOGY

## 2022-09-30 PROCEDURE — 78815 PET IMAGE W/CT SKULL-THIGH: CPT | Mod: 26

## 2022-09-30 PROCEDURE — 78815 PET IMAGE W/CT SKULL-THIGH: CPT | Mod: PS

## 2022-09-30 PROCEDURE — 74177 CT ABD & PELVIS W/CONTRAST: CPT

## 2022-09-30 RX ORDER — IOPAMIDOL 755 MG/ML
10-140 INJECTION, SOLUTION INTRAVASCULAR ONCE
Status: COMPLETED | OUTPATIENT
Start: 2022-09-30 | End: 2022-09-30

## 2022-09-30 RX ADMIN — IOPAMIDOL 118 ML: 755 INJECTION, SOLUTION INTRAVENOUS at 09:41

## 2022-09-30 RX ADMIN — FLUDEOXYGLUCOSE F-18 12.91 MCI.: 500 INJECTION, SOLUTION INTRAVENOUS at 08:54

## 2022-10-02 NOTE — PROGRESS NOTES
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  1.  Patient was evaluated in 2019 for hoarseness of voice.  He was seen by ENT and found to have right vocal cord mass.    -He had biopsy done on 08/07/2019. Pathology revealed moderately-differentiated invasive squamous cell carcinoma.     2.  The patient was seen in the Emergency Room on 04/12/2021 for shortness of breath and admitted.   -CT neck revealed an enhancing soft tissue mass involving the right larynx measuring 3.9 cm.  -Patient had laryngoscopy with biopsy and tracheostomy done on 04/12/2021.  There was an endolaryngeal mass obscuring the laryngeal landmarks.  Mass appeared to be based on right endolarynx.  Pathology revealed invasive keratinizing moderately-differentiated squamous cell carcinoma.     3.  PET scan on 04/26/2021 revealed hypermetabolic mass involving the supraglottic, glottic and subglottic larynx.  There was a hypermetabolic left level IV lymph node.  There was also hypermetabolic nodule in right middle lobe.     4. CT chest angiogram on 04/27/2021 revealed a large bilateral pulmonary embolism in all the lobes.  There was evidence of right cardiac strain.  -Echocardiogram on 04/27/2021 revealed clot in transit in the right ventricle.  -The patient had emergency pulmonary embolectomy on 04/28/2021.  Pathology revealed organizing clot.  -Patient on Eliquis. Long term anti-coagulation recommended.     5. Total laryngectomy with bilateral neck dissection on 07/08/2021.  Pathology reveals 6 cm squamous cell carcinoma involving the right false and true vocal cords with fixation, the left false and true vocal cords and invades through thyroid cartilage.  There is focal lymphatic invasion.  There is perineural invasion. Benign 75 lymph nodes.  pT4a pN0 disease.     6. Post-op radiation between 08/04/2021 and 09/20/2021. 6600 cGy in 33 fractions.    SUBJECTIVE:    Briana is a 72-year-old gentleman with laryngeal squamous cell carcinoma status post surgery followed by postoperative radiation.     The patient follows up with ENT at [facility].  Their exam on 08/17/2022 did not reveal any evidence of malignancy.     CT neck and chest was done on 06/29/2022.  There is nodular soft tissue density along the right anterolateral upper esophagus just below the tracheostomy tube.  A followup evaluation with PET/CT has been recommended.  There is no neck lymphadenopathy.  There is no lung mass or nodule.  There is indeterminate hyperenhancing focus in the spleen measuring 7 mm.     The patient's overall condition is stable.  He is not in pain.  No headache or dizziness.  No chest pain or shortness of breath.  No abdominal pain, nausea or vomiting.  No bleeding.  Appetite is fairly good.     All other review of systems is negative.     PHYSICAL EXAMINATION:    GENERAL:  He is alert, oriented x 3. Not in distress.  VITAL SIGNS:  Reviewed.   Rest of the systems not examined.     LABS:  CBC, CMP and TSH are normal.     ASSESSMENT:    1.  A 72-year-old gentleman with laryngeal squamous cell carcinoma status post surgery and postoperative radiation. No evidence of recurrence.  2.  Nodular soft tissue density along the right anterolateral upper esophagus, likely benign but needs followup.  3.  Pulmonary embolism on indefinite anticoagulation.  4.  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     PLAN:    1.  The patient clinically is stable.  No clinical suspicion of malignancy.    2.  CT scan done in June was reviewed.  I explained to him that there is no definite evidence of malignancy, but there are a few areas that need followup.     There is abnormality around the esophagus.  There is a splenic tiny lesion.  These all need followup. He is scheduled for CT/PET.  I agree with that plan.     The patient had multiple questions regarding abnormalities seen on the last CT  scan, which were all discussed in detail.  I told him my suspicion for malignancy is low but this does need followup.     3.  Labs are all good.  He is on levothyroxine, which will be continued at the same dose.      4.  I will see him in 4-6 weeks' time.  I advised him to call us with any questions or concerns.     Total visit time of 40 minutes.  Time was spent in today's visit, review of chart/investigations today and documentation today.

## 2022-10-03 ENCOUNTER — OFFICE VISIT (OUTPATIENT)
Dept: OTOLARYNGOLOGY | Facility: CLINIC | Age: 72
End: 2022-10-03
Payer: COMMERCIAL

## 2022-10-03 VITALS
DIASTOLIC BLOOD PRESSURE: 85 MMHG | HEART RATE: 73 BPM | BODY MASS INDEX: 29.82 KG/M2 | SYSTOLIC BLOOD PRESSURE: 124 MMHG | OXYGEN SATURATION: 99 % | HEIGHT: 71 IN | WEIGHT: 213 LBS | TEMPERATURE: 97.2 F

## 2022-10-03 DIAGNOSIS — C32.9 MALIGNANT NEOPLASM OF LARYNX (H): Primary | ICD-10-CM

## 2022-10-03 PROCEDURE — 88305 TISSUE EXAM BY PATHOLOGIST: CPT | Mod: TC | Performed by: OTOLARYNGOLOGY

## 2022-10-03 PROCEDURE — 88305 TISSUE EXAM BY PATHOLOGIST: CPT | Mod: 26 | Performed by: PATHOLOGY

## 2022-10-03 PROCEDURE — 88173 CYTOPATH EVAL FNA REPORT: CPT | Mod: 26 | Performed by: PATHOLOGY

## 2022-10-03 PROCEDURE — 88172 CYTP DX EVAL FNA 1ST EA SITE: CPT | Mod: 26 | Performed by: PATHOLOGY

## 2022-10-03 PROCEDURE — 10021 FNA BX W/O IMG GDN 1ST LES: CPT | Performed by: OTOLARYNGOLOGY

## 2022-10-03 PROCEDURE — 99214 OFFICE O/P EST MOD 30 MIN: CPT | Mod: 25 | Performed by: OTOLARYNGOLOGY

## 2022-10-03 ASSESSMENT — PAIN SCALES - GENERAL: PAINLEVEL: NO PAIN (0)

## 2022-10-03 NOTE — NURSING NOTE
"Chief Complaint   Patient presents with     RECHECK     Follow up      Blood pressure 124/85, pulse 73, temperature 97.2  F (36.2  C), height 1.803 m (5' 11\"), weight 96.6 kg (213 lb), SpO2 99 %.    Ruddy Mckinney LPN    "

## 2022-10-03 NOTE — PROGRESS NOTES
Dear Dr. Bean:    I had the pleasure of seeing Raj Warren in follow-up today at the [facility].     History of Present Illness:  Raj Warren is 72 year old man with a T4aN0 SCC of the larynx. The patient presented to the ED with a week history of shortness of breath, along with symptoms of sore throat, bilateral ear pain. He had stridor vs wheezing, mild increased work of breathing, and hoarse voice at that time. He was treated with steroids and nebulizers with improvement in his symptoms and was discharged.  He had recurrent symptoms and was seen in the ED again on 4/12/2021. He had a CT scan performed on 4/12/2021 which demonstrated a 3.9 x 1.6 x 3.9 cm mass involving the right true cord, right AE fold, right false cord, posterior hypopharynx, proximal trachea, with involvement of the thyroid cartilage, involvement of the prelaryngeal tissue, no abnormal nodes. Dr Willoughby (local ENT), scope exam was performed, and patient was admitted for a trach and DL/bx. Dr Johnson and Dr Sauceda performed this procedure on 4/13/2021. A tracheal window at 2nd tracheal ring was performed with placement of an 8 cuffed Shiley. Pathology showed moderately differentiated SCC. He was seen by Dr Bean of medical oncology on 4/13/2021. He was followed by local ENT team during his hospitalization. He was recommended for chemoradiation. He was seen by Dr Mclaughlin of [facility], planning 7 weeks treatment, and CT simulation was done. He did have a VSS done while inpatient without evidence of aspiration. The patient was referred here for discussion of possible salvage laryngectomy after chemoradiation. He had a PET scan on 4/26/2021 which showed an FDG avid mass in the larynx involving the supraglottis/glottis/subglottis, hypermetabolic left level 4 node, FDG avid nodule in the right lung thought to be infectious/inflammatory. He was then admitted to the hospital locally from 4/27/2021 to 5/8/2021 after  presenting with worsening shortness of breath, found to have bilateral PE with significant clot burden including the right pulmonary artery and the right ventricle. He was taken to the OR for pulmonary embolectomy and RV thrombectomy. He had IVC filter placed 4/30/2021. His course was complicated by hemodynamic instability requiring pressors, poor healing of his sternal incision requiring wound vac placement. He was discharged on eliquis. He was sent to rehab after his admission, discharged 5/15/2021. He saw Dr Bean in follow-up on 5/27/2021 and was recommended for repeat imaging. This showed slight decrease in size of the tumor, no distant disease.     He was seen as a new patient on 6/18/2021. After extensive preoperative counseling especially in light of his recent PE and anticoagulation, the recommendations was for surgery given the cartilaginous erosion seen on imaging. He was taken to the OR on 7/8/2021 for a DL, total laryngectomy, bilateral neck dissections, cricopharyngeal myotomy. His hospital course was uncomplicated, was restarted on his anticoagulation. His final pathology demonstrated a 6 cm SCC with invasion through the thyroid cartilage, involved the cricoid cartilage, involved the right pyriform sinus, extended to the left side of the tracheostomy site, PNI present, focal lymphatic invasion, 0/75 nodes. The main specimen had a positive margin at the pyriform sinus, margins were taken from the main specimen and were negative (named right pharyngeal wall mucosal margin).      He received postoperative radiation under the care of Dr Kennedy, from 8/4/2021 to 9/20/2021 for a total of 66 Gy.  He had his PEG removed by radiation oncology on 10/4/2021. His post treatment PET scan was negative in December 2021. He had his IVC filter removed in February 2022.       Interval history:  He comes in today for follow-up. He was last seen in August 2022. He had imaging in June 2022 which showed a NIRADS 2b nodular  soft tissue density along the upper esophagus. He was scheduled for a PET scan to follow-up this area but due to some scheduling issues did not have the scan, got completed on 2022. This demonstrated a hypermetabolic mass in the right anterolateral wall of the neopharynx measuring 1.8 x 1.7 x 3.7 cm with invasion of the right thyroid lobe, not involving the vessels, no distant disease. He says today that he is not having any pain. He is not having any issues with his swallowing.     He is planning on leaving [facility] next week to visit family.      MEDICATIONS:     Current Outpatient Medications   Medication Sig Dispense Refill     acetaminophen (TYLENOL) 325 MG tablet 2 tablets (650 mg) by Per G Tube route every 8 hours as needed for mild pain or fever 90 tablet 0     apixaban ANTICOAGULANT (ELIQUIS) 5 MG tablet Take 1 tablet (5 mg) by mouth 2 times daily 180 tablet 3     levothyroxine (SYNTHROID/LEVOTHROID) 75 MCG tablet Take 1 tablet (75 mcg) by mouth daily 90 tablet 3     metoprolol tartrate (LOPRESSOR) 25 MG tablet Take 1 tablet (25 mg) by mouth 2 times daily 90 tablet 3       ALLERGIES:    Allergies   Allergen Reactions     Pollen Extract        HABITS/SOCIAL HISTORY:    Former smoker 1/2 ppd x 8 yrs, quit in . Sober from alcohol.   Lives alone. His family does not live in the immediate [place] area.   He is a retired .       Social History     Socioeconomic History     Marital status: Single     Spouse name: Not on file     Number of children: Not on file     Years of education: Not on file     Highest education level: Not on file   Occupational History     Not on file   Tobacco Use     Smoking status: Former Smoker     Types: Cigarettes     Quit date: 1989     Years since quittin.7     Smokeless tobacco: Never Used     Tobacco comment: quit in   had smoked about 1/2 pack a day then cut back   Substance and Sexual Activity     Alcohol use: No     Comment: No alcohol  since 1989     Drug use: No     Sexual activity: Yes     Partners: Female   Other Topics Concern     Parent/sibling w/ CABG, MI or angioplasty before 65F 55M? Not Asked   Social History Narrative     Not on file     Social Determinants of Health     Financial Resource Strain: Low Risk      Difficulty of Paying Living Expenses: Not hard at all   Food Insecurity: No Food Insecurity     Worried About Running Out of Food in the Last Year: Never true     Ran Out of Food in the Last Year: Never true   Transportation Needs: No Transportation Needs     Lack of Transportation (Medical): No     Lack of Transportation (Non-Medical): No   Physical Activity: Not on file   Stress: Not on file   Social Connections: Not on file   Intimate Partner Violence: Not on file   Housing Stability: Not on file       PAST MEDICAL HISTORY:   Past Medical History:   Diagnosis Date     Allergic state      Anemia      Former smoker      Malignant neoplasm of larynx (H) 04/20/2021     Pulmonary emboli (H) 04/28/2021    Bilateral-s/p embolectomy and IVC filter placement     S/P percutaneous endoscopic gastrostomy (PEG) tube placement (H)      Tracheostomy in place (H)         PAST SURGICAL HISTORY:   Past Surgical History:   Procedure Laterality Date     DISSECTION RADICAL NECK BILATERAL Bilateral 7/8/2021    Procedure: bilateral neck dissections;  Surgeon: Marilou Sethi MD;  Location: [facility] OR     ENDOSCOPIC INSERTION TUBE GASTROSTOMY Left 7/8/2021    Procedure: INSERTION, PEG TUBE with Esophagogastroduodenoscopy;  Surgeon: Kg Montaño MD;  Location: [facility] OR     IR IVC FILTER PLACEMENT  4/30/2021     IR IVC FILTER REMOVAL  2/7/2022     LARYNGECTOMY N/A 7/8/2021    Procedure: LARYNGECTOMY;  Surgeon: Marilou Sethi MD;  Location: [facility] OR     LARYNGOSCOPY N/A 4/12/2021    Procedure: LARYNGOSCOPY;  Surgeon: Choco Johnson MD;  Location: [facility] OR     LARYNGOSCOPY N/A 7/8/2021    Procedure: LARYNGOSCOPY;  Surgeon: Marilou Sethi MD;   "Location: [facility] OR     REPAIR ANEURYSM ASCENDING AORTA N/A 4/28/2021    Procedure: PULMONARY THROMBECTOMY;  Surgeon: Yumi Singh MD;  Location: [facility] OR     TRACHEOSTOMY  4/12/2021    Procedure: CREATION, TRACHEOSTOMY;  Surgeon: Choco Johnson MD;  Location: [facility] OR       FAMILY HISTORY:    Family History   Problem Relation Age of Onset     Circulatory Mother         blood clots     Alcohol/Drug Father         alcohol drank heavily     Alcohol/Drug Brother         alcohol       REVIEW OF SYSTEMS:  12 point ROS was negative other than the symptoms noted above in the HPI.  Patient Supplied Answers to Review of Systems  [facility] ROS 8/30/2021   Ears, Nose, Throat: Nasal congestion or drainage, Sore throat, Trouble swallowing, Hoarseness         PHYSICAL EXAMINATION:   /85   Pulse 73   Temp 97.2  F (36.2  C)   Ht 1.803 m (5' 11\")   Wt 96.6 kg (213 lb)   SpO2 99%   BMI 29.71 kg/m    Appearance:   normal; NAD, age-appropriate appearance, well-developed, normal habitus   Communication:   communicates well with electrolarynx with neck adapter    Head/Face:   inspection -  Normal; no scars or visible lesions   Skin:  normal, no rash   Ears:  auricle (AD) -  normal  auricle (AS) -  normal  Normal clinical speech reception   Nose:  Ext. inspection -  Normal   Oral Cavity:  lips -  Normal mucosa, oral competence, and stoma size   Neck: Minimal submental lymphedema  Fibrosis of neck musculature  Well healed neck incision  Visible firmness of the right neck and extending to the midline neck, overlying skin is not mobile over it in one area, laterally skin is mobile, extends to just above the stoma  Stoma patent, anny tube in place, stable in size   Lymphatic:  no abnormal nodes   Cardiovascular:  warm, pink, well-perfused extremities without swelling, tenderness, or edema   Respiratory:  Normal respiratory effort   Neuro/Psych.:  mood/affect -  normal  mental status -  normal           PROCEDURES:   Neck FNA: " The neck mass in the right suprastomal region was topically anesthetized with EMLA. A 21 gauge needle was used to perform an FNA of the mass. Three total passes were performed. The specimen was placed in formalin. Patient tolerated the procedure well with no immediate complications.      RESULTS REVIEWED:     Care discussed with cytopathology staff    PET scan and CT neck images independently reviewed    PET and CT neck reports reviewed      IMPRESSION AND PLAN:   Raj Warren is a 72 year old man with a T4N0 SCC of the larynx. He underwent [facility]. He was taken to the OR on 7/8/2021 for a total laryngectomy with bilateral neck dissections. He completed his postoperative radiation on 9/20/2021.    We discussed the findings of the scan and reviewed the images today. I discussed with him that this is highly concerning for recurrent disease. We had pathology perform an FNA, blood and skin cells obtained. I repeated an FNA with a larger size needle to try to get a diagnosis, and avoid need for IR guided biopsy.    I am concerned about surgical resection with negative margins. He is not excited about surgery when I explained the extent of the resection. We discussed that we would likely start with systemic therapy if he wishes to proceed with therapy. Pending his response we would potentially consider SBRT to the area with Dr Kennedy.     We will review his case at tumor board on Friday. We will arrange appropriate follow-up pending the pathology results and tumor board discussion.      Thank you very much for the opportunity to participate in the care of your patient.      Marilou Sethi MD  Otolaryngology- Head & Neck Surgery      This note was dictated with voice recognition software and then edited. Please excuse any unintentional errors.         CC:  Rosa Bean MD  [facility]  [address] [facility] [address]  [facility] [address]      Serg Kennedy MD  Department of  Radiation Oncology  [facility]

## 2022-10-03 NOTE — LETTER
10/3/2022       RE: Raj Warren  [address]  [facility] [address]     Dear Colleague,    Thank you for referring your patient, Raj Warren, to the [facility] EAR NOSE AND THROAT [facility]INIC [place] at [facility]. Please see a copy of my visit note below.    Dear Dr. Bean:    I had the pleasure of seeing Raj Warren in follow-up today at the [facility] Ot[facility].     History of Present Illness:  Raj Warren is 72 year old man with a T4aN0 SCC of the larynx. The patient presented to the ED with a week history of shortness of breath, along with symptoms of sore throat, bilateral ear pain. He had stridor vs wheezing, mild increased work of breathing, and hoarse voice at that time. He was treated with steroids and nebulizers with improvement in his symptoms and was discharged.  He had recurrent symptoms and was seen in the ED again on 4/12/2021. He had a CT scan performed on 4/12/2021 which demonstrated a 3.9 x 1.6 x 3.9 cm mass involving the right true cord, right AE fold, right false cord, posterior hypopharynx, proximal trachea, with involvement of the thyroid cartilage, involvement of the prelaryngeal tissue, no abnormal nodes. Dr Willoughby (local ENT), scope exam was performed, and patient was admitted for a trach and DL/bx. Dr Johnson and Dr Sauceda performed this procedure on 4/13/2021. A tracheal window at 2nd tracheal ring was performed with placement of an 8 cuffed Shiley. Pathology showed moderately differentiated SCC. He was seen by Dr Bean of medical oncology on 4/13/2021. He was followed by local ENT team during his hospitalization. He was recommended for chemoradiation. He was seen by Dr Mclaughlin of [facility], planning 7 weeks treatment, and CT simulation was done. He did have a VSS done while inpatient without evidence of aspiration. The patient was referred here for discussion of possible salvage  laryngectomy after chemoradiation. He had a PET scan on 4/26/2021 which showed an FDG avid mass in the larynx involving the supraglottis/glottis/subglottis, hypermetabolic left level 4 node, FDG avid nodule in the right lung thought to be infectious/inflammatory. He was then admitted to the hospital locally from 4/27/2021 to 5/8/2021 after presenting with worsening shortness of breath, found to have bilateral PE with significant clot burden including the right pulmonary artery and the right ventricle. He was taken to the OR for pulmonary embolectomy and RV thrombectomy. He had IVC filter placed 4/30/2021. His course was complicated by hemodynamic instability requiring pressors, poor healing of his sternal incision requiring wound vac placement. He was discharged on eliquis. He was sent to rehab after his admission, discharged 5/15/2021. He saw Dr Bean in follow-up on 5/27/2021 and was recommended for repeat imaging. This showed slight decrease in size of the tumor, no distant disease.     He was seen as a new patient on 6/18/2021. After extensive preoperative counseling especially in light of his recent PE and anticoagulation, the recommendations was for surgery given the cartilaginous erosion seen on imaging. He was taken to the OR on 7/8/2021 for a DL, total laryngectomy, bilateral neck dissections, cricopharyngeal myotomy. His hospital course was uncomplicated, was restarted on his anticoagulation. His final pathology demonstrated a 6 cm SCC with invasion through the thyroid cartilage, involved the cricoid cartilage, involved the right pyriform sinus, extended to the left side of the tracheostomy site, PNI present, focal lymphatic invasion, 0/75 nodes. The main specimen had a positive margin at the pyriform sinus, margins were taken from the main specimen and were negative (named right pharyngeal wall mucosal margin).      He received postoperative radiation under the care of Dr Kennedy, from 8/4/2021 to 9/20/2021  for a total of 66 Gy.  He had his PEG removed by radiation oncology on 10/4/2021. His post treatment PET scan was negative in December 2021. He had his IVC filter removed in February 2022.       Interval history:  He comes in today for follow-up. He was last seen in August 2022. He had imaging in June 2022 which showed a NIRADS 2b nodular soft tissue density along the upper esophagus. He was scheduled for a PET scan to follow-up this area but due to some scheduling issues did not have the scan, got completed on 9/30/2022. This demonstrated a hypermetabolic mass in the right anterolateral wall of the neopharynx measuring 1.8 x 1.7 x 3.7 cm with invasion of the right thyroid lobe, not involving the vessels, no distant disease. He says today that he is not having any pain. He is not having any issues with his swallowing.     He is planning on leaving [facility] next week to visit family.      MEDICATIONS:     Current Outpatient Medications   Medication Sig Dispense Refill     acetaminophen (TYLENOL) 325 MG tablet 2 tablets (650 mg) by Per G Tube route every 8 hours as needed for mild pain or fever 90 tablet 0     apixaban ANTICOAGULANT (ELIQUIS) 5 MG tablet Take 1 tablet (5 mg) by mouth 2 times daily 180 tablet 3     levothyroxine (SYNTHROID/LEVOTHROID) 75 MCG tablet Take 1 tablet (75 mcg) by mouth daily 90 tablet 3     metoprolol tartrate (LOPRESSOR) 25 MG tablet Take 1 tablet (25 mg) by mouth 2 times daily 90 tablet 3       ALLERGIES:    Allergies   Allergen Reactions     Pollen Extract        HABITS/SOCIAL HISTORY:    Former smoker 1/2 ppd x 8 yrs, quit in 1989. Sober from alcohol.   Lives alone. His family does not live in the immediate [place] area.   He is a retired .       Social History     Socioeconomic History     Marital status: Single     Spouse name: Not on file     Number of children: Not on file     Years of education: Not on file     Highest education level: Not on file   Occupational  History     Not on file   Tobacco Use     Smoking status: Former Smoker     Types: Cigarettes     Quit date: 1989     Years since quittin.7     Smokeless tobacco: Never Used     Tobacco comment: quit in   had smoked about 1/2 pack a day then cut back   Substance and Sexual Activity     Alcohol use: No     Comment: No alcohol since      Drug use: No     Sexual activity: Yes     Partners: Female   Other Topics Concern     Parent/sibling w/ CABG, MI or angioplasty before 65F 55M? Not Asked   Social History Narrative     Not on file     Social Determinants of Health     Financial Resource Strain: Low Risk      Difficulty of Paying Living Expenses: Not hard at all   Food Insecurity: No Food Insecurity     Worried About Running Out of Food in the Last Year: Never true     Ran Out of Food in the Last Year: Never true   Transportation Needs: No Transportation Needs     Lack of Transportation (Medical): No     Lack of Transportation (Non-Medical): No   Physical Activity: Not on file   Stress: Not on file   Social Connections: Not on file   Intimate Partner Violence: Not on file   Housing Stability: Not on file       PAST MEDICAL HISTORY:   Past Medical History:   Diagnosis Date     Allergic state      Anemia      Former smoker      Malignant neoplasm of larynx (H) 2021     Pulmonary emboli (H) 2021    Bilateral-s/p embolectomy and IVC filter placement     S/P percutaneous endoscopic gastrostomy (PEG) tube placement (H)      Tracheostomy in place (H)         PAST SURGICAL HISTORY:   Past Surgical History:   Procedure Laterality Date     DISSECTION RADICAL NECK BILATERAL Bilateral 2021    Procedure: bilateral neck dissections;  Surgeon: Marilou Sethi MD;  Location: [facility] OR     ENDOSCOPIC INSERTION TUBE GASTROSTOMY Left 2021    Procedure: INSERTION, PEG TUBE with Esophagogastroduodenoscopy;  Surgeon: Kg Montaño MD;  Location: [facility] OR     IR IVC FILTER PLACEMENT  2021     IR IVC  "FILTER REMOVAL  2/7/2022     LARYNGECTOMY N/A 7/8/2021    Procedure: LARYNGECTOMY;  Surgeon: Marilou Sethi MD;  Location: [facility] OR     LARYNGOSCOPY N/A 4/12/2021    Procedure: LARYNGOSCOPY;  Surgeon: Choco Johnson MD;  Location: [facility] OR     LARYNGOSCOPY N/A 7/8/2021    Procedure: LARYNGOSCOPY;  Surgeon: Marilou Sethi MD;  Location: [facility] OR     REPAIR ANEURYSM ASCENDING AORTA N/A 4/28/2021    Procedure: PULMONARY THROMBECTOMY;  Surgeon: Yumi Singh MD;  Location: [facility] OR     TRACHEOSTOMY  4/12/2021    Procedure: CREATION, TRACHEOSTOMY;  Surgeon: Choco Johnson MD;  Location: [facility] OR       FAMILY HISTORY:    Family History   Problem Relation Age of Onset     Circulatory Mother         blood clots     Alcohol/Drug Father         alcohol drank heavily     Alcohol/Drug Brother         alcohol       REVIEW OF SYSTEMS:  12 point ROS was negative other than the symptoms noted above in the HPI.  Patient Supplied Answers to Review of Systems  [facility] ROS 8/30/2021   Ears, Nose, Throat: Nasal congestion or drainage, Sore throat, Trouble swallowing, Hoarseness         PHYSICAL EXAMINATION:   /85   Pulse 73   Temp 97.2  F (36.2  C)   Ht 1.803 m (5' 11\")   Wt 96.6 kg (213 lb)   SpO2 99%   BMI 29.71 kg/m    Appearance:   normal; NAD, age-appropriate appearance, well-developed, normal habitus   Communication:   communicates well with electrolarynx with neck adapter    Head/Face:   inspection -  Normal; no scars or visible lesions   Skin:  normal, no rash   Ears:  auricle (AD) -  normal  auricle (AS) -  normal  Normal clinical speech reception   Nose:  Ext. inspection -  Normal   Oral Cavity:  lips -  Normal mucosa, oral competence, and stoma size   Neck: Minimal submental lymphedema  Fibrosis of neck musculature  Well healed neck incision  Visible firmness of the right neck and extending to the midline neck, overlying skin is not mobile over it in one area, laterally skin is mobile, extends to just " above the stoma  Stoma patent, anny tube in place, stable in size   Lymphatic:  no abnormal nodes   Cardiovascular:  warm, pink, well-perfused extremities without swelling, tenderness, or edema   Respiratory:  Normal respiratory effort   Neuro/Psych.:  mood/affect -  normal  mental status -  normal           PROCEDURES:   Neck FNA: The neck mass in the right suprastomal region was topically anesthetized with EMLA. A 21 gauge needle was used to perform an FNA of the mass. Three total passes were performed. The specimen was placed in formalin. Patient tolerated the procedure well with no immediate complications.      RESULTS REVIEWED:     Care discussed with cytopathology staff    PET scan and CT neck images independently reviewed    PET and CT neck reports reviewed      IMPRESSION AND PLAN:   Raj Warren is a 72 year old man with a T4N0 SCC of the larynx. He underwent [facility]. He was taken to the OR on 7/8/2021 for a total laryngectomy with bilateral neck dissections. He completed his postoperative radiation on 9/20/2021.    We discussed the findings of the scan and reviewed the images today. I discussed with him that this is highly concerning for recurrent disease. We had pathology perform an FNA, blood and skin cells obtained. I repeated an FNA with a larger size needle to try to get a diagnosis, and avoid need for IR guided biopsy.    I am concerned about surgical resection with negative margins. He is not excited about surgery when I explained the extent of the resection. We discussed that we would likely start with systemic therapy if he wishes to proceed with therapy. Pending his response we would potentially consider SBRT to the area with Dr Kennedy.     We will review his case at tumor board on Friday. We will arrange appropriate follow-up pending the pathology results and tumor board discussion.      Thank you very much for the opportunity to participate in the care of your  patient.      Marilou Sethi MD  Otolaryngology- Head & Neck Surgery      This note was dictated with voice recognition software and then edited. Please excuse any unintentional errors.         CC:  Rosa Bean MD  [facility]  [address] [address]      Serg Kennedy MD  Department of Radiation Oncology  [facility]

## 2022-10-04 LAB
PATH REPORT.COMMENTS IMP SPEC: ABNORMAL
PATH REPORT.COMMENTS IMP SPEC: ABNORMAL
PATH REPORT.COMMENTS IMP SPEC: YES
PATH REPORT.FINAL DX SPEC: ABNORMAL
PATH REPORT.GROSS SPEC: ABNORMAL
PATH REPORT.MICROSCOPIC SPEC OTHER STN: ABNORMAL
PATH REPORT.RELEVANT HX SPEC: ABNORMAL

## 2022-10-12 ENCOUNTER — TELEPHONE (OUTPATIENT)
Dept: ONCOLOGY | Facility: CLINIC | Age: 72
End: 2022-10-12

## 2022-10-12 NOTE — TELEPHONE ENCOUNTER
----- Message from Rosa Bean MD sent at 10/12/2022  8:57 AM CDT -----  Regarding: RE: Recurrence  Thanks for letting him know. I will get him in soon.    : Please, schedule him to see me in next few days. OK to add on.    bk    ----- Message -----  From: Marilou Sethi MD  Sent: 10/12/2022   2:33 AM CDT  To: Rosa Bean MD  Subject: Recurrence                                       Hi Dr Bean,  Mr Warren has bx proven recurrence. It's theoretically resectable but probably not without significant impacts on QOL. Can we get him an ASAP appt with you to discuss systemic therapy. Tumor is in dermis so at risk for coming through skin before long. Pending response to first couple cycles can consider surgery or sbrt here at the . PD-L1 has been requested.   Thanks,  Marilou

## 2022-10-12 NOTE — TELEPHONE ENCOUNTER
Writer called and spoke with patient he was expecting our call and is able to be here for a 10/13 12n with Dr. Bean.     Patient appreciated the appointment.    Serena Castillo RN

## 2022-10-13 ENCOUNTER — ONCOLOGY VISIT (OUTPATIENT)
Dept: ONCOLOGY | Facility: CLINIC | Age: 72
End: 2022-10-13
Attending: INTERNAL MEDICINE
Payer: COMMERCIAL

## 2022-10-13 VITALS
RESPIRATION RATE: 16 BRPM | WEIGHT: 213.8 LBS | DIASTOLIC BLOOD PRESSURE: 86 MMHG | SYSTOLIC BLOOD PRESSURE: 149 MMHG | BODY MASS INDEX: 29.82 KG/M2 | TEMPERATURE: 98.4 F | OXYGEN SATURATION: 100 % | HEART RATE: 72 BPM

## 2022-10-13 DIAGNOSIS — C32.9 MALIGNANT NEOPLASM OF LARYNX (H): ICD-10-CM

## 2022-10-13 DIAGNOSIS — C32.9 LARYNGEAL CANCER (H): Primary | ICD-10-CM

## 2022-10-13 PROCEDURE — 99215 OFFICE O/P EST HI 40 MIN: CPT | Performed by: INTERNAL MEDICINE

## 2022-10-13 PROCEDURE — G0463 HOSPITAL OUTPT CLINIC VISIT: HCPCS

## 2022-10-13 ASSESSMENT — PAIN SCALES - GENERAL: PAINLEVEL: SEVERE PAIN (6)

## 2022-10-13 NOTE — PATIENT INSTRUCTIONS
Ask pathology to do PDL-1 on specimen from 07/08/22.  Side-effect of pembrolizumab.  See me or NP when he comes to start treatment.

## 2022-10-13 NOTE — PROGRESS NOTES
Writer printed of [facility] patient education on Pembrozlimumab/ Keytruda and provided a brief review.     Patient preferred to review written material at home.     Serena Castillo RN

## 2022-10-13 NOTE — LETTER
"    10/13/2022         RE: Raj Warren  [address]  [facility] [address]        Dear Colleague,    Thank you for referring your patient, Raj Warren, to the [facility]. Please see a copy of my visit note below.    Oncology Rooming Note    October 13, 2022 11:58 AM   Raj Warren is a 72 year old male who presents for:    Chief Complaint   Patient presents with     Oncology Clinic Visit     Initial Vitals: BP (!) 149/86   Pulse 72   Temp 98.4  F (36.9  C) (Oral)   Resp 16   Wt 97 kg (213 lb 12.8 oz)   SpO2 100%   BMI 29.82 kg/m   Estimated body mass index is 29.82 kg/m  as calculated from the following:    Height as of 10/3/22: 1.803 m (5' 11\").    Weight as of this encounter: 97 kg (213 lb 12.8 oz). Body surface area is 2.2 meters squared.  Severe Pain (6) Comment: Data Unavailable   No LMP for male patient.  Allergies reviewed: Yes  Medications reviewed: Yes    Medications: Medication refills not needed today.  Pharmacy name entered into [employer]: [facility][id number] - [place], [place] - [address] AT [address]    Clinical concerns: no       Shari J. Schoenberger, [facility]              Writer printed of [facility] patient education on Pembrozlimumab/ Keytruda and provided a brief review.     Patient preferred to review written material at home.     Serena Castillo RN      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  1.  Patient was evaluated in 2019 for hoarseness of voice.  He was seen by ENT and found to have right vocal cord mass.    -He had biopsy done on 08/07/2019. Pathology revealed moderately-differentiated invasive squamous cell carcinoma.     2.  The patient was seen in the Emergency Room on 04/12/2021 for shortness of breath and admitted.   -CT neck revealed an enhancing soft tissue mass involving the right larynx measuring 3.9 cm.  -Patient had " laryngoscopy with biopsy and tracheostomy done on 04/12/2021.  There was an endolaryngeal mass obscuring the laryngeal landmarks.  Mass appeared to be based on right endolarynx.  Pathology revealed invasive keratinizing moderately-differentiated squamous cell carcinoma.     3.  PET scan on 04/26/2021 revealed hypermetabolic mass involving the supraglottic, glottic and subglottic larynx.  There was a hypermetabolic left level IV lymph node.  There was also hypermetabolic nodule in right middle lobe.     4. CT chest angiogram on 04/27/2021 revealed a large bilateral pulmonary embolism in all the lobes.  There was evidence of right cardiac strain.  -Echocardiogram on 04/27/2021 revealed clot in transit in the right ventricle.  -The patient had emergency pulmonary embolectomy on 04/28/2021.  Pathology revealed organizing clot.  -Patient on Eliquis. Long term anti-coagulation recommended.     5. Total laryngectomy with bilateral neck dissection on 07/08/2021.  Pathology reveals 6 cm squamous cell carcinoma involving the right false and true vocal cords with fixation, the left false and true vocal cords and invades through thyroid cartilage.  There is focal lymphatic invasion.  There is perineural invasion. Benign 75 lymph nodes.  pT4a pN0 disease.     6. Post-op radiation between 08/04/2021 and 09/20/2021. 6600 cGy in 33 fractions.     7. PET/CT on 09/30/2022 revealed hypermetabolic mass originating from the right anterolateral wall of the neopharynx.  -Patient seen by ENT on 10/03/2022 and had FNA of neck mass. Pathology is positive for malignancy.Rare clusters morphologically consistent with squamous cell carcinoma.     SUBJECTIVE:  Mr. Warren is a 72-year-old gentleman with recurrent laryngeal squamous cell carcinoma status post surgery followed by postoperative radiation.    PET scan on 09/30/2022 revealed hypermetabolic mass originating from right anterolateral wall of neopharynx.  Patient was seen by ENT and had FNA  done.  FNA is consistent with squamous cell carcinoma.    His case was discussed at tumor board at [facility].  Surgery will be quite extensive.  Their recommendation is systemic chemotherapy/immunotherapy.     The patient's overall condition is stable. Some discomfort in neck.  No headache or dizziness.  No chest pain or shortness of breath.  No abdominal pain, nausea or vomiting.  No bleeding. All other review of systems is negative.     PHYSICAL EXAMINATION:    GENERAL:  He is alert, oriented x 3. Not in distress.  VITAL SIGNS:  Reviewed.   Rest of the systems not examined.     ASSESSMENT:    1.  A 72-year-old gentleman with locally recurrent laryngeal squamous cell carcinoma.  2.  Pulmonary embolism on indefinite anticoagulation.  3.  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     PLAN:    1.  I had a long discussion with the patient.  PET/CT was personally reviewed.  Images shown to him.  Explained to him that there is abnormality in the neck.  He was seen by ENT.  FNA was done. It is positive for squamous cell carcinoma.    Explained to the patient that his cancer has come back.  It has not spread to other parts of the body.  It is in the neck area only.    2.  Discussed regarding treatment.  He has been evaluated by ENT at [facility].  No surgery being recommended.    Discussed with him regarding systemic treatment.  Explained to him that treatment can be single agent pembrolizumab or it could be combination of pembrolizumab with chemotherapy.  We will get PD-L1 staining.  After that we will decide regarding treatment.  If CPS is more than 20%, we can treat him with pembrolizumab alone. If CPS is less than 20%, we can treat him with chemotherapy with pembrolizumab or single agent pembrolizumab.  Given the fact that his disease is localized to the neck, I may consider single as an pembrolizumab if CPS is less than 20%.  Side effects of pembrolizumab were all  discussed.    Patient had multiple questions regarding treatment which were all discussed.    3.  He will continue on Eliquis for his pulmonary embolism.    4.  He will continue on levothyroxine.  We will monitor his TSH.    5.  He will be seen when he comes to start treatment.     Total visit time of 40 minutes.  Time was spent in today's visit, review of chart/investigations today and documentation today.         Again, thank you for allowing me to participate in the care of your patient.        Sincerely,        Rosa Bean MD

## 2022-10-13 NOTE — PROGRESS NOTES
"Oncology Rooming Note    October 13, 2022 11:58 AM   Raj Warren is a 72 year old male who presents for:    Chief Complaint   Patient presents with     Oncology Clinic Visit     Initial Vitals: BP (!) 149/86   Pulse 72   Temp 98.4  F (36.9  C) (Oral)   Resp 16   Wt 97 kg (213 lb 12.8 oz)   SpO2 100%   BMI 29.82 kg/m   Estimated body mass index is 29.82 kg/m  as calculated from the following:    Height as of 10/3/22: 1.803 m (5' 11\").    Weight as of this encounter: 97 kg (213 lb 12.8 oz). Body surface area is 2.2 meters squared.  Severe Pain (6) Comment: Data Unavailable   No LMP for male patient.  Allergies reviewed: Yes  Medications reviewed: Yes    Medications: Medication refills not needed today.  Pharmacy name entered into [employer]: [id number] - [place], [place] - [address] AT [facility] [address] [facility]    Clinical concerns: no       Shari J. Schoenberger, CMA            "

## 2022-10-14 RX ORDER — DIPHENHYDRAMINE HYDROCHLORIDE 50 MG/ML
50 INJECTION INTRAMUSCULAR; INTRAVENOUS
Status: CANCELLED
Start: 2022-10-17

## 2022-10-14 RX ORDER — HEPARIN SODIUM (PORCINE) LOCK FLUSH IV SOLN 100 UNIT/ML 100 UNIT/ML
5 SOLUTION INTRAVENOUS
Status: CANCELLED | OUTPATIENT
Start: 2022-10-17

## 2022-10-14 RX ORDER — ALBUTEROL SULFATE 0.83 MG/ML
2.5 SOLUTION RESPIRATORY (INHALATION)
Status: CANCELLED | OUTPATIENT
Start: 2022-10-17

## 2022-10-14 RX ORDER — LORAZEPAM 2 MG/ML
0.5 INJECTION INTRAMUSCULAR EVERY 4 HOURS PRN
Status: CANCELLED | OUTPATIENT
Start: 2022-10-17

## 2022-10-14 RX ORDER — METHYLPREDNISOLONE SODIUM SUCCINATE 125 MG/2ML
125 INJECTION, POWDER, LYOPHILIZED, FOR SOLUTION INTRAMUSCULAR; INTRAVENOUS
Status: CANCELLED
Start: 2022-10-17

## 2022-10-14 RX ORDER — ALBUTEROL SULFATE 90 UG/1
1-2 AEROSOL, METERED RESPIRATORY (INHALATION)
Status: CANCELLED
Start: 2022-10-17

## 2022-10-14 RX ORDER — HEPARIN SODIUM,PORCINE 10 UNIT/ML
5 VIAL (ML) INTRAVENOUS
Status: CANCELLED | OUTPATIENT
Start: 2022-10-17

## 2022-10-14 RX ORDER — EPINEPHRINE 1 MG/ML
0.3 INJECTION, SOLUTION INTRAMUSCULAR; SUBCUTANEOUS EVERY 5 MIN PRN
Status: CANCELLED | OUTPATIENT
Start: 2022-10-17

## 2022-10-14 RX ORDER — MEPERIDINE HYDROCHLORIDE 25 MG/ML
25 INJECTION INTRAMUSCULAR; INTRAVENOUS; SUBCUTANEOUS EVERY 30 MIN PRN
Status: CANCELLED | OUTPATIENT
Start: 2022-10-17

## 2022-10-16 NOTE — PROGRESS NOTES
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  1.  Patient was evaluated in 2019 for hoarseness of voice.  He was seen by ENT and found to have right vocal cord mass.    -He had biopsy done on 08/07/2019. Pathology revealed moderately-differentiated invasive squamous cell carcinoma.     2.  The patient was seen in the Emergency Room on 04/12/2021 for shortness of breath and admitted.   -CT neck revealed an enhancing soft tissue mass involving the right larynx measuring 3.9 cm.  -Patient had laryngoscopy with biopsy and tracheostomy done on 04/12/2021.  There was an endolaryngeal mass obscuring the laryngeal landmarks.  Mass appeared to be based on right endolarynx.  Pathology revealed invasive keratinizing moderately-differentiated squamous cell carcinoma.     3.  PET scan on 04/26/2021 revealed hypermetabolic mass involving the supraglottic, glottic and subglottic larynx.  There was a hypermetabolic left level IV lymph node.  There was also hypermetabolic nodule in right middle lobe.     4. CT chest angiogram on 04/27/2021 revealed a large bilateral pulmonary embolism in all the lobes.  There was evidence of right cardiac strain.  -Echocardiogram on 04/27/2021 revealed clot in transit in the right ventricle.  -The patient had emergency pulmonary embolectomy on 04/28/2021.  Pathology revealed organizing clot.  -Patient on Eliquis. Long term anti-coagulation recommended.     5. Total laryngectomy with bilateral neck dissection on 07/08/2021.  Pathology reveals 6 cm squamous cell carcinoma involving the right false and true vocal cords with fixation, the left false and true vocal cords and invades through thyroid cartilage.  There is focal lymphatic invasion.  There is perineural invasion. Benign 75 lymph nodes.  pT4a pN0 disease.     6. Post-op radiation between 08/04/2021 and 09/20/2021. 6600 cGy in 33 fractions.     7. PET/CT on  09/30/2022 revealed hypermetabolic mass originating from the right anterolateral wall of the neopharynx.  -Patient seen by ENT on 10/03/2022 and had FNA of neck mass. Pathology is positive for malignancy.Rare clusters morphologically consistent with squamous cell carcinoma.     SUBJECTIVE:  Mr. Warren is a 72-year-old gentleman with recurrent laryngeal squamous cell carcinoma status post surgery followed by postoperative radiation.    PET scan on 09/30/2022 revealed hypermetabolic mass originating from right anterolateral wall of neopharynx.  Patient was seen by ENT and had FNA done.  FNA is consistent with squamous cell carcinoma.    His case was discussed at tumor board at [facility].  Surgery will be quite extensive.  Their recommendation is systemic chemotherapy/immunotherapy.     The patient's overall condition is stable. Some discomfort in neck.  No headache or dizziness.  No chest pain or shortness of breath.  No abdominal pain, nausea or vomiting.  No bleeding. All other review of systems is negative.     PHYSICAL EXAMINATION:    GENERAL:  He is alert, oriented x 3. Not in distress.  VITAL SIGNS:  Reviewed.   Rest of the systems not examined.     ASSESSMENT:    1.  A 72-year-old gentleman with locally recurrent laryngeal squamous cell carcinoma.  2.  Pulmonary embolism on indefinite anticoagulation.  3.  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     PLAN:    1.  I had a long discussion with the patient.  PET/CT was personally reviewed.  Images shown to him.  Explained to him that there is abnormality in the neck.  He was seen by ENT.  FNA was done. It is positive for squamous cell carcinoma.    Explained to the patient that his cancer has come back.  It has not spread to other parts of the body.  It is in the neck area only.    2.  Discussed regarding treatment.  He has been evaluated by ENT at [facility].  No surgery being recommended.    Discussed with him regarding  systemic treatment.  Explained to him that treatment can be single agent pembrolizumab or it could be combination of pembrolizumab with chemotherapy.  We will get PD-L1 staining.  After that we will decide regarding treatment.  If CPS is more than 20%, we can treat him with pembrolizumab alone. If CPS is less than 20%, we can treat him with chemotherapy with pembrolizumab or single agent pembrolizumab.  Given the fact that his disease is localized to the neck, I may consider single as an pembrolizumab if CPS is less than 20%.  Side effects of pembrolizumab were all discussed.    Patient had multiple questions regarding treatment which were all discussed.    3.  He will continue on Eliquis for his pulmonary embolism.    4.  He will continue on levothyroxine.  We will monitor his TSH.    5.  He will be seen when he comes to start treatment.     Total visit time of 40 minutes.  Time was spent in today's visit, review of chart/investigations today and documentation today.    ADDENDUM (October 26, 2022):  RESULT FOR IMMUNOHISTOCHEMICAL VENTANA CLONE  PD-L1 ASSAY  COMBINED POSITIVE SCORE (CPS):  70  TUMOR PROPORTION SCORE (TPS):  60%  INTERPRETATION: HIGH PD-L1 EXPRESSION (TPS >/=50%)    Based on this, will give him single agent pembrolizumab.

## 2022-10-17 ENCOUNTER — LAB REQUISITION (OUTPATIENT)
Dept: LAB | Facility: CLINIC | Age: 72
End: 2022-10-17
Payer: COMMERCIAL

## 2022-10-17 ENCOUNTER — TELEPHONE (OUTPATIENT)
Dept: ONCOLOGY | Facility: CLINIC | Age: 72
End: 2022-10-17

## 2022-10-17 PROCEDURE — 88360 TUMOR IMMUNOHISTOCHEM/MANUAL: CPT | Mod: 26 | Performed by: PATHOLOGY

## 2022-10-17 PROCEDURE — 88360 TUMOR IMMUNOHISTOCHEM/MANUAL: CPT | Mod: TC | Performed by: INTERNAL MEDICINE

## 2022-10-17 NOTE — TELEPHONE ENCOUNTER
----- Message from Nicolette Lopez sent at 10/17/2022 12:49 PM CDT -----  Regarding: RE: orders  Kayy Au Dr,     I contacted patient today regarding scheduling Keytruda.  Patient declined, he states he wants to hold off on scheduling infusions for a  while.     Thank you  Nicolette      ----- Message -----  From: Serena Castillo RN  Sent: 10/14/2022  12:06 PM CDT  To: Nicolette Lopez  Subject: FW: orders                                       Hi Dr. Vikas Will placed the orders for the infusion.    Thank you,  Kayy  ----- Message -----  From: Rosa Bean MD  Sent: 10/14/2022  11:09 AM CDT  To: Serena Castillo RN  Subject: RE: orders                                       Ask pathology to do PDL-1 on specimen from 07/08/22.    Orders placed for keytruda.    bk  ----- Message -----  From: Serena Castillo RN  Sent: 10/14/2022  10:48 AM CDT  To: Rosa Bean MD, Nicolette Lopez  Subject: orders                                           Hi Dr. Bean,     Please place treatment orders  for pembrolizumab.    Nicolette will call to assist in scheduling once orders are placed.     Thank youKayy

## 2022-10-18 ENCOUNTER — TELEPHONE (OUTPATIENT)
Dept: ONCOLOGY | Facility: CLINIC | Age: 72
End: 2022-10-18

## 2022-10-20 LAB
PATH REPORT.ADDENDUM SPEC: NORMAL
PATH REPORT.COMMENTS IMP SPEC: NORMAL
PATH REPORT.COMMENTS IMP SPEC: NORMAL
PATH REPORT.RELEVANT HX SPEC: NORMAL

## 2022-10-26 ENCOUNTER — LAB (OUTPATIENT)
Dept: INFUSION THERAPY | Facility: CLINIC | Age: 72
End: 2022-10-26
Attending: INTERNAL MEDICINE
Payer: COMMERCIAL

## 2022-10-26 ENCOUNTER — DOCUMENTATION ONLY (OUTPATIENT)
Dept: ONCOLOGY | Facility: CLINIC | Age: 72
End: 2022-10-26

## 2022-10-26 DIAGNOSIS — C32.9 MALIGNANT NEOPLASM OF LARYNX (H): Primary | ICD-10-CM

## 2022-10-26 LAB
ALBUMIN SERPL-MCNC: 3.7 G/DL (ref 3.4–5)
ALP SERPL-CCNC: 74 U/L (ref 40–150)
ALT SERPL W P-5'-P-CCNC: 31 U/L (ref 0–70)
ANION GAP SERPL CALCULATED.3IONS-SCNC: 5 MMOL/L (ref 3–14)
AST SERPL W P-5'-P-CCNC: 18 U/L (ref 0–45)
BILIRUB SERPL-MCNC: 0.5 MG/DL (ref 0.2–1.3)
BUN SERPL-MCNC: 9 MG/DL (ref 7–30)
CALCIUM SERPL-MCNC: 9.2 MG/DL (ref 8.5–10.1)
CHLORIDE BLD-SCNC: 109 MMOL/L (ref 94–109)
CO2 SERPL-SCNC: 26 MMOL/L (ref 20–32)
CREAT SERPL-MCNC: 0.9 MG/DL (ref 0.66–1.25)
GFR SERPL CREATININE-BSD FRML MDRD: >90 ML/MIN/1.73M2
GLUCOSE BLD-MCNC: 107 MG/DL (ref 70–99)
POTASSIUM BLD-SCNC: 4.4 MMOL/L (ref 3.4–5.3)
PROT SERPL-MCNC: 7.5 G/DL (ref 6.8–8.8)
SODIUM SERPL-SCNC: 140 MMOL/L (ref 133–144)
TSH SERPL DL<=0.005 MIU/L-ACNC: 3.04 MU/L (ref 0.4–4)

## 2022-10-26 PROCEDURE — 80053 COMPREHEN METABOLIC PANEL: CPT | Performed by: INTERNAL MEDICINE

## 2022-10-26 PROCEDURE — 36415 COLL VENOUS BLD VENIPUNCTURE: CPT | Performed by: INTERNAL MEDICINE

## 2022-10-26 PROCEDURE — 84443 ASSAY THYROID STIM HORMONE: CPT | Performed by: INTERNAL MEDICINE

## 2022-10-26 NOTE — PROGRESS NOTES
Medical Assistant Note:  Raj Warren presents today for blood draw.    Patient seen by provider today: No.   present during visit today: Not Applicable.    Concerns: No Concerns.    Procedure:  Lab draw site: lac, Needle type: bf, Gauge: 23.    Post Assessment:  Labs drawn without difficulty: Yes.    Discharge Plan:  Departure Mode: Ambulatory.    Face to Face Time: 5 min.    Kirti Jones, CMA

## 2022-10-27 ENCOUNTER — ONCOLOGY VISIT (OUTPATIENT)
Dept: ONCOLOGY | Facility: CLINIC | Age: 72
End: 2022-10-27
Attending: INTERNAL MEDICINE
Payer: COMMERCIAL

## 2022-10-27 ENCOUNTER — INFUSION THERAPY VISIT (OUTPATIENT)
Dept: INFUSION THERAPY | Facility: CLINIC | Age: 72
End: 2022-10-27
Attending: INTERNAL MEDICINE
Payer: COMMERCIAL

## 2022-10-27 VITALS
TEMPERATURE: 98.4 F | SYSTOLIC BLOOD PRESSURE: 128 MMHG | HEART RATE: 69 BPM | RESPIRATION RATE: 16 BRPM | DIASTOLIC BLOOD PRESSURE: 83 MMHG | BODY MASS INDEX: 29.93 KG/M2 | OXYGEN SATURATION: 99 % | WEIGHT: 214.6 LBS

## 2022-10-27 VITALS
DIASTOLIC BLOOD PRESSURE: 83 MMHG | SYSTOLIC BLOOD PRESSURE: 128 MMHG | RESPIRATION RATE: 16 BRPM | HEART RATE: 69 BPM | TEMPERATURE: 98.4 F | BODY MASS INDEX: 29.92 KG/M2 | OXYGEN SATURATION: 99 % | WEIGHT: 214.51 LBS

## 2022-10-27 DIAGNOSIS — C32.9 LARYNGEAL CANCER (H): Primary | ICD-10-CM

## 2022-10-27 DIAGNOSIS — E89.0 POSTOPERATIVE HYPOTHYROIDISM: ICD-10-CM

## 2022-10-27 DIAGNOSIS — C32.9 MALIGNANT NEOPLASM OF LARYNX (H): Primary | ICD-10-CM

## 2022-10-27 DIAGNOSIS — Z86.711 HISTORY OF PULMONARY EMBOLISM: ICD-10-CM

## 2022-10-27 PROCEDURE — 99214 OFFICE O/P EST MOD 30 MIN: CPT | Performed by: PHYSICIAN ASSISTANT

## 2022-10-27 PROCEDURE — 250N000011 HC RX IP 250 OP 636: Performed by: INTERNAL MEDICINE

## 2022-10-27 PROCEDURE — 96413 CHEMO IV INFUSION 1 HR: CPT

## 2022-10-27 PROCEDURE — 258N000003 HC RX IP 258 OP 636: Performed by: INTERNAL MEDICINE

## 2022-10-27 PROCEDURE — G0463 HOSPITAL OUTPT CLINIC VISIT: HCPCS | Mod: 25

## 2022-10-27 RX ADMIN — SODIUM CHLORIDE 200 MG: 9 INJECTION, SOLUTION INTRAVENOUS at 08:40

## 2022-10-27 RX ADMIN — SODIUM CHLORIDE 250 ML: 9 INJECTION, SOLUTION INTRAVENOUS at 08:39

## 2022-10-27 ASSESSMENT — PAIN SCALES - GENERAL
PAINLEVEL: NO PAIN (0)
PAINLEVEL: NO PAIN (0)

## 2022-10-27 NOTE — PROGRESS NOTES
Oncology/Hematology Visit Note  Oct 27, 2022    Reason for Visit: Follow up of laryngeal SCC    Primary Oncologist: Dr. Bean  Oncologic History:   Mr. 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  1.  Patient was evaluated in 2019 for hoarseness of voice.  He was seen by ENT and found to have right vocal cord mass.    -He had biopsy done on 08/07/2019. Pathology revealed moderately-differentiated invasive squamous cell carcinoma.   2.  The patient was seen in the Emergency Room on 04/12/2021 for shortness of breath and admitted.   -CT neck revealed an enhancing soft tissue mass involving the right larynx measuring 3.9 cm.  -Patient had laryngoscopy with biopsy and tracheostomy done on 04/12/2021.  There was an endolaryngeal mass obscuring the laryngeal landmarks.  Mass appeared to be based on right endolarynx.  Pathology revealed invasive keratinizing moderately-differentiated squamous cell carcinoma.  3.  PET scan on 04/26/2021 revealed hypermetabolic mass involving the supraglottic, glottic and subglottic larynx.  There was a hypermetabolic left level IV lymph node.  There was also hypermetabolic nodule in right middle lobe.   4. CT chest angiogram on 04/27/2021 revealed a large bilateral pulmonary embolism in all the lobes.  There was evidence of right cardiac strain.  -Echocardiogram on 04/27/2021 revealed clot in transit in the right ventricle.  -The patient had emergency pulmonary embolectomy on 04/28/2021.  Pathology revealed organizing clot.  -Patient on Eliquis. Long term anti-coagulation recommended.  5. Total laryngectomy with bilateral neck dissection on 07/08/2021.  Pathology reveals 6 cm squamous cell carcinoma involving the right false and true vocal cords with fixation, the left false and true vocal cords and invades through thyroid cartilage.  There is focal lymphatic invasion.  There is perineural invasion. Benign 75 lymph nodes.  pT4a pN0  disease.  6. Post-op radiation between 08/04/2021 and 09/20/2021. 6600 cGy in 33 fractions.  7. PET/CT on 09/30/2022 revealed hypermetabolic mass originating from the right anterolateral wall of the neopharynx.  -Patient seen by ENT on 10/03/2022 and had FNA of neck mass. Pathology is positive for malignancy.Rare clusters morphologically consistent with squamous cell carcinoma.    8. Not currently a surgical candidate.   9. Started Keytruda 10/27/22.     Interval History:  Do well. Mild fatigue. Completing ADLs without difficulty. No new or changing symptoms.     Review of Systems:  A complete review of systems was negative except as noted in the HPI.     Past medical, Surgical, and social history:  Reviewed    Physical Examination:  There were no vitals taken for this visit.  Wt Readings from Last 10 Encounters:   10/13/22 97 kg (213 lb 12.8 oz)   10/03/22 96.6 kg (213 lb)   09/20/22 97.1 kg (214 lb)   08/17/22 96.9 kg (213 lb 9.6 oz)   05/06/22 94.3 kg (208 lb)   03/14/22 94.4 kg (208 lb 3.2 oz)   02/16/22 91.2 kg (201 lb)   02/07/22 89.8 kg (198 lb)   12/17/21 88.9 kg (196 lb)   12/13/21 90.3 kg (199 lb)     General: Pleasant patient in no acute distress.  Skin: Warm and dry. No abnormal ecchymosis or rashes.   Eyes: Anicteric.   ENT: Laryngectomy.   Lungs:  Normal work of breathing.  Abdomen: Non-distended.  Extremities: No peripheral edema. Gross movement intact without difficulty.  Mental: Calm, cooperative, appropriate. Mood euthymic.  Neuro: Cranial nerves and coordination grossly intact. Alert and oriented x 3.    Laboratory Data:  CBC, CMP, TSH reviewed.     Assessment and Plan:  Raj Warren is a 72 year old year old male with recurrent stage DALY laryngeal SCC. He was initially diagnosed in 2019 and is status post laryngectomy and radiation. Course complicated by PE. Surveillance PET 9/30/2022 showed right anterolateral neopharynx mass. Biopsy confirmed SCC.     # Recurrent Laryngeal Squamous Cell  Carcinoma  - Initiate Keytruda today  - Not currently a surgical candidate  - PD-L1 pending    # History of PE  - Continue Eliquis    # Hypothyroidism  - Continue levothyroxine, will check TSH with each cycle of Keytruda    30 minutes spent on the date of the encounter doing chart review, review of test results, interpretation of tests, patient visit and documentation     Lorraine Malcolm PA-C  Department of Hematology and Oncology  [facility]

## 2022-10-27 NOTE — LETTER
10/27/2022         RE: Raj Warren  [address]  [facility] [place][1] [id number]        Dear Colleague,    Thank you for referring your patient, aRj Warren, to the [facility]. Please see a copy of my visit note below.    Oncology/Hematology Visit Note  Oct 27, 2022    Reason for Visit: Follow up of laryngeal SCC    Primary Oncologist: Dr. Bean  Oncologic History:   Mr. 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  1.  Patient was evaluated in 2019 for hoarseness of voice.  He was seen by ENT and found to have right vocal cord mass.    -He had biopsy done on 08/07/2019. Pathology revealed moderately-differentiated invasive squamous cell carcinoma.   2.  The patient was seen in the Emergency Room on 04/12/2021 for shortness of breath and admitted.   -CT neck revealed an enhancing soft tissue mass involving the right larynx measuring 3.9 cm.  -Patient had laryngoscopy with biopsy and tracheostomy done on 04/12/2021.  There was an endolaryngeal mass obscuring the laryngeal landmarks.  Mass appeared to be based on right endolarynx.  Pathology revealed invasive keratinizing moderately-differentiated squamous cell carcinoma.  3.  PET scan on 04/26/2021 revealed hypermetabolic mass involving the supraglottic, glottic and subglottic larynx.  There was a hypermetabolic left level IV lymph node.  There was also hypermetabolic nodule in right middle lobe.   4. CT chest angiogram on 04/27/2021 revealed a large bilateral pulmonary embolism in all the lobes.  There was evidence of right cardiac strain.  -Echocardiogram on 04/27/2021 revealed clot in transit in the right ventricle.  -The patient had emergency pulmonary embolectomy on 04/28/2021.  Pathology revealed organizing clot.  -Patient on Eliquis. Long term anti-coagulation recommended.  5. Total laryngectomy with bilateral neck dissection on 07/08/2021.  Pathology reveals 6 cm  squamous cell carcinoma involving the right false and true vocal cords with fixation, the left false and true vocal cords and invades through thyroid cartilage.  There is focal lymphatic invasion.  There is perineural invasion. Benign 75 lymph nodes.  pT4a pN0 disease.  6. Post-op radiation between 08/04/2021 and 09/20/2021. 6600 cGy in 33 fractions.  7. PET/CT on 09/30/2022 revealed hypermetabolic mass originating from the right anterolateral wall of the neopharynx.  -Patient seen by ENT on 10/03/2022 and had FNA of neck mass. Pathology is positive for malignancy.Rare clusters morphologically consistent with squamous cell carcinoma.    8. Not currently a surgical candidate.   9. Started Keytruda 10/27/22.     Interval History:  Do well. Mild fatigue. Completing ADLs without difficulty. No new or changing symptoms.     Review of Systems:  A complete review of systems was negative except as noted in the HPI.     Past medical, Surgical, and social history:  Reviewed    Physical Examination:  There were no vitals taken for this visit.  Wt Readings from Last 10 Encounters:   10/13/22 97 kg (213 lb 12.8 oz)   10/03/22 96.6 kg (213 lb)   09/20/22 97.1 kg (214 lb)   08/17/22 96.9 kg (213 lb 9.6 oz)   05/06/22 94.3 kg (208 lb)   03/14/22 94.4 kg (208 lb 3.2 oz)   02/16/22 91.2 kg (201 lb)   02/07/22 89.8 kg (198 lb)   12/17/21 88.9 kg (196 lb)   12/13/21 90.3 kg (199 lb)     General: Pleasant patient in no acute distress.  Skin: Warm and dry. No abnormal ecchymosis or rashes.   Eyes: Anicteric.   ENT: Laryngectomy.   Lungs:  Normal work of breathing.  Abdomen: Non-distended.  Extremities: No peripheral edema. Gross movement intact without difficulty.  Mental: Calm, cooperative, appropriate. Mood euthymic.  Neuro: Cranial nerves and coordination grossly intact. Alert and oriented x 3.    Laboratory Data:  CBC, CMP, TSH reviewed.     Assessment and Plan:  Raj Warren is a 72 year old year old male with recurrent stage DALY  laryngeal SCC. He was initially diagnosed in 2019 and is status post laryngectomy and radiation. Course complicated by PE. Surveillance PET 9/30/2022 showed right anterolateral neopharynx mass. Biopsy confirmed SCC.     # Recurrent Laryngeal Squamous Cell Carcinoma  - Initiate Keytruda today  - Not currently a surgical candidate  - PD-L1 pending    # History of PE  - Continue Eliquis    # Hypothyroidism  - Continue levothyroxine, will check TSH with each cycle of Keytruda    30 minutes spent on the date of the encounter doing chart review, review of test results, interpretation of tests, patient visit and documentation     Lorraine Malcolm PA-C  Department of Hematology and Oncology  [facility]         Again, thank you for allowing me to participate in the care of your patient.        Sincerely,        LORRAINE MALCOLM PA-C

## 2022-10-27 NOTE — PROGRESS NOTES
Infusion Nursing Note:  Raj Warren presents today for C1D1 Keytruda.    Patient seen by provider today: Yes: Lorraine Malcolm PA-C   present during visit today: Not Applicable.    Note: first time infusion appt for patient-he was oriented to unit and infusion visit routine. Seen by PA prior to treatment, all questions answered.   Uses Elect[employer] for speaking    Intravenous Access:  Peripheral IV placed.    Treatment Conditions:  Lab Results   Component Value Date    HGB 15.2 09/20/2022    WBC 4.3 09/20/2022    ANEU 2.6 06/23/2021    ANEUTAUTO 2.5 03/14/2022     09/20/2022      Lab Results   Component Value Date     10/26/2022    POTASSIUM 4.4 10/26/2022    MAG 2.5 (H) 07/17/2021    CR 0.90 10/26/2022    TELMA 9.2 10/26/2022    BILITOTAL 0.5 10/26/2022    ALBUMIN 3.7 10/26/2022    ALT 31 10/26/2022    AST 18 10/26/2022     Results reviewed, labs MET treatment parameters, ok to proceed with treatment.    Post Infusion Assessment:  Patient tolerated infusion without incident.  Site patent and intact, free from redness, edema or discomfort.  Access discontinued per protocol.     Discharge Plan:   Discharge instructions reviewed with: Patient.  Patient and/or family verbalized understanding of discharge instructions and all questions answered.  Copy of AVS reviewed with patient and/or family.  Patient will return as scheduled for next appointment.  Patient discharged in stable condition accompanied by: self.  Departure Mode: Ambulatory.      Claire Barnes RN

## 2022-11-07 LAB — BKR LAB AP ADDL TEST(S) ADDED: NORMAL

## 2022-11-17 ENCOUNTER — LAB (OUTPATIENT)
Dept: INFUSION THERAPY | Facility: CLINIC | Age: 72
End: 2022-11-17
Payer: COMMERCIAL

## 2022-11-17 ENCOUNTER — INFUSION THERAPY VISIT (OUTPATIENT)
Dept: INFUSION THERAPY | Facility: CLINIC | Age: 72
End: 2022-11-17
Payer: COMMERCIAL

## 2022-11-17 ENCOUNTER — ONCOLOGY VISIT (OUTPATIENT)
Dept: ONCOLOGY | Facility: CLINIC | Age: 72
End: 2022-11-17
Payer: COMMERCIAL

## 2022-11-17 VITALS
SYSTOLIC BLOOD PRESSURE: 117 MMHG | BODY MASS INDEX: 29.34 KG/M2 | HEART RATE: 70 BPM | OXYGEN SATURATION: 100 % | WEIGHT: 210.4 LBS | RESPIRATION RATE: 16 BRPM | DIASTOLIC BLOOD PRESSURE: 74 MMHG | TEMPERATURE: 98.1 F

## 2022-11-17 VITALS — WEIGHT: 210.4 LBS | BODY MASS INDEX: 29.46 KG/M2 | HEIGHT: 71 IN

## 2022-11-17 DIAGNOSIS — C32.9 MALIGNANT NEOPLASM OF LARYNX (H): Primary | ICD-10-CM

## 2022-11-17 LAB
ALBUMIN SERPL-MCNC: 3.6 G/DL (ref 3.4–5)
ALP SERPL-CCNC: 72 U/L (ref 40–150)
ALT SERPL W P-5'-P-CCNC: 23 U/L (ref 0–70)
ANION GAP SERPL CALCULATED.3IONS-SCNC: 9 MMOL/L (ref 3–14)
AST SERPL W P-5'-P-CCNC: 21 U/L (ref 0–45)
BILIRUB SERPL-MCNC: 0.9 MG/DL (ref 0.2–1.3)
BUN SERPL-MCNC: 10 MG/DL (ref 7–30)
CALCIUM SERPL-MCNC: 9 MG/DL (ref 8.5–10.1)
CHLORIDE BLD-SCNC: 111 MMOL/L (ref 94–109)
CO2 SERPL-SCNC: 21 MMOL/L (ref 20–32)
CREAT SERPL-MCNC: 0.93 MG/DL (ref 0.66–1.25)
GFR SERPL CREATININE-BSD FRML MDRD: 88 ML/MIN/1.73M2
GLUCOSE BLD-MCNC: 123 MG/DL (ref 70–99)
HOLD SPECIMEN: NORMAL
POTASSIUM BLD-SCNC: 4 MMOL/L (ref 3.4–5.3)
PROT SERPL-MCNC: 7.4 G/DL (ref 6.8–8.8)
SODIUM SERPL-SCNC: 141 MMOL/L (ref 133–144)
TSH SERPL DL<=0.005 MIU/L-ACNC: 3.66 MU/L (ref 0.4–4)

## 2022-11-17 PROCEDURE — 258N000003 HC RX IP 258 OP 636: Performed by: NURSE PRACTITIONER

## 2022-11-17 PROCEDURE — G0463 HOSPITAL OUTPT CLINIC VISIT: HCPCS | Mod: 25

## 2022-11-17 PROCEDURE — 36415 COLL VENOUS BLD VENIPUNCTURE: CPT | Performed by: INTERNAL MEDICINE

## 2022-11-17 PROCEDURE — 96413 CHEMO IV INFUSION 1 HR: CPT

## 2022-11-17 PROCEDURE — 99214 OFFICE O/P EST MOD 30 MIN: CPT | Performed by: NURSE PRACTITIONER

## 2022-11-17 PROCEDURE — 250N000011 HC RX IP 250 OP 636: Performed by: NURSE PRACTITIONER

## 2022-11-17 PROCEDURE — 84443 ASSAY THYROID STIM HORMONE: CPT | Performed by: INTERNAL MEDICINE

## 2022-11-17 PROCEDURE — 80053 COMPREHEN METABOLIC PANEL: CPT | Performed by: INTERNAL MEDICINE

## 2022-11-17 RX ORDER — EPINEPHRINE 1 MG/ML
0.3 INJECTION, SOLUTION INTRAMUSCULAR; SUBCUTANEOUS EVERY 5 MIN PRN
Status: CANCELLED | OUTPATIENT
Start: 2022-11-17

## 2022-11-17 RX ORDER — HEPARIN SODIUM (PORCINE) LOCK FLUSH IV SOLN 100 UNIT/ML 100 UNIT/ML
5 SOLUTION INTRAVENOUS
Status: CANCELLED | OUTPATIENT
Start: 2022-11-17

## 2022-11-17 RX ORDER — HEPARIN SODIUM,PORCINE 10 UNIT/ML
5 VIAL (ML) INTRAVENOUS
Status: CANCELLED | OUTPATIENT
Start: 2022-11-17

## 2022-11-17 RX ORDER — DIPHENHYDRAMINE HYDROCHLORIDE 50 MG/ML
50 INJECTION INTRAMUSCULAR; INTRAVENOUS
Status: CANCELLED
Start: 2022-11-17

## 2022-11-17 RX ORDER — ALBUTEROL SULFATE 0.83 MG/ML
2.5 SOLUTION RESPIRATORY (INHALATION)
Status: CANCELLED | OUTPATIENT
Start: 2022-11-17

## 2022-11-17 RX ORDER — MEPERIDINE HYDROCHLORIDE 25 MG/ML
25 INJECTION INTRAMUSCULAR; INTRAVENOUS; SUBCUTANEOUS EVERY 30 MIN PRN
Status: CANCELLED | OUTPATIENT
Start: 2022-11-17

## 2022-11-17 RX ORDER — ALBUTEROL SULFATE 90 UG/1
1-2 AEROSOL, METERED RESPIRATORY (INHALATION)
Status: CANCELLED
Start: 2022-11-17

## 2022-11-17 RX ORDER — METHYLPREDNISOLONE SODIUM SUCCINATE 125 MG/2ML
125 INJECTION, POWDER, LYOPHILIZED, FOR SOLUTION INTRAMUSCULAR; INTRAVENOUS
Status: CANCELLED
Start: 2022-11-17

## 2022-11-17 RX ORDER — LORAZEPAM 2 MG/ML
0.5 INJECTION INTRAMUSCULAR EVERY 4 HOURS PRN
Status: CANCELLED | OUTPATIENT
Start: 2022-11-17

## 2022-11-17 RX ADMIN — SODIUM CHLORIDE 250 ML: 9 INJECTION, SOLUTION INTRAVENOUS at 10:34

## 2022-11-17 RX ADMIN — SODIUM CHLORIDE 200 MG: 9 INJECTION, SOLUTION INTRAVENOUS at 10:34

## 2022-11-17 ASSESSMENT — PAIN SCALES - GENERAL: PAINLEVEL: NO PAIN (0)

## 2022-11-17 NOTE — PROGRESS NOTES
Infusion Nursing Note:  Raj Warren presents today for C2D1 Keytruda.    Patient seen by provider today: Yes: Kyree   present during visit today: Not Applicable.    Note: Patient uses an electrolaryx for speaking.    Intravenous Access:  Peripheral IV placed.    Treatment Conditions:  Lab Results   Component Value Date     11/17/2022    POTASSIUM 4.0 11/17/2022    MAG 2.5 (H) 07/17/2021    CR 0.93 11/17/2022    TELMA 9.0 11/17/2022    BILITOTAL 0.9 11/17/2022    ALBUMIN 3.6 11/17/2022    ALT 23 11/17/2022    AST 21 11/17/2022     Results reviewed, labs MET treatment parameters, ok to proceed with treatment.  TSH=3.66.    Post Infusion Assessment:  Patient tolerated infusion without incident.  Blood return noted pre and post infusion.  Site patent and intact, free from redness, edema or discomfort.  No evidence of extravasations.  Access discontinued per protocol.     Discharge Plan:   Discharge instructions reviewed with: Patient.  Patient and/or family verbalized understanding of discharge instructions and all questions answered.  AVS to patient via [employer].  Patient will return 12/7/22 for next appointment.   Patient discharged in stable condition accompanied by: self.  Departure Mode: Ambulatory.      Felipe Harrison RN

## 2022-11-17 NOTE — PROGRESS NOTES
"Oncology Rooming Note    November 17, 2022 9:25 AM   Raj Warren is a 72 year old male who presents for:    Chief Complaint   Patient presents with     Oncology Clinic Visit     Initial Vitals: There were no vitals taken for this visit. Estimated body mass index is 29.92 kg/m  as calculated from the following:    Height as of 10/3/22: 1.803 m (5' 11\").    Weight as of 10/27/22: 97.3 kg (214 lb 8.1 oz). There is no height or weight on file to calculate BSA.  Data Unavailable Comment: Data Unavailable   No LMP for male patient.  Allergies reviewed: Yes  Medications reviewed: Yes    Medications: Medication refills not needed today.  Pharmacy name entered into [employer]: [id number] - [facility] [address] [facility]    Clinical concerns:  NP was notified.      Kirti Jones CMA            "

## 2022-11-17 NOTE — PROGRESS NOTES
Medical Assistant Note:  Raj Warren presents today for lab draw.    Patient seen by provider today: No.   present during visit today: Not Applicable.    Concerns: No Concerns.    Procedure:  Lab draw site: LAC, Needle type: BF, Gauge: 23. Gauze and coban applied    Post Assessment:  Labs drawn without difficulty: Yes.    Discharge Plan:  Departure Mode: Ambulatory.    Face to Face Time: 5.    Suzan Bazan CMA

## 2022-11-17 NOTE — PROGRESS NOTES
Oncology/Hematology Visit Note  Nov 17, 2022    Reason for Visit: follow up of laryngeal squamous cell carcinoma    Patient Dr. Bean  Status post surgery and postoperative radiation with no evidence of recurrence  -Currently on single agent pembrolizumab started on 10/27/2022    Interval History:  Patient reports so far he has been tolerating Keytruda well.  Denies fever chills sweats cough shortness of breath chest pain nausea vomiting diarrhea abdominal pain or bleeding.  He reports sometimes he experience some mild pain in the left hip especially if he sits for prolonged period of time.  He denies any changes in gait patient is ambulatory denies swelling       Review of Systems:  14 point ROS of systems including Constitutional, Eyes, Respiratory, Cardiovascular, Gastroenterology, Genitourinary, Integumentary, Muscularskeletal, Psychiatric were all negative except for pertinent positives noted in my HPI.    Physical Examination:  General: The patient is a pleasant male in no acute distress.  /74   Pulse 70   Temp 98.1  F (36.7  C) (Oral)   Resp 16   Wt 95.4 kg (210 lb 6.4 oz)   SpO2 100%   BMI 29.34 kg/m    HEENT: EOMI, PERRL. Sclerae are anicteric. Oral mucosa is pink and moist with no lesions or thrush.   Lymph: Neck is supple with no lymphadenopathy in the cervical or supraclavicular areas.   Heart: Regular rate and rhythm.   Lungs: Clear to auscultation bilaterally.   GI: Bowel sounds present, soft, nontender with no palpable hepatosplenomegaly or masses.   Extremities: No lower extremity edema noted bilaterally.   Skin: No rashes, petechiae, or bruising noted on exposed skin.    Laboratory Data:  CMP and TSH results reviewed      Assessment and Plan:  This is a 72-year-old male with    Laryngeal squamous cell carcinoma  Patient Dr. Bean  Status post surgery and postoperative radiation at that time no evidence of recurrence  09/30/2022-PET/CT showed abnormality in the neck FNA positive for  squamous cell carcinoma showed recurrence of the disease  High PD-L1 expression  10/27/2022-started single agent Keytruda  Regimen and potential side effects of Keytruda reviewed with patient  -Labs reviewed  Okay to proceed with Keytruda cycle 2 patient is scheduled with cycle 3 follow-up appointment with Dr. Bean in December        Pulmonary embolism  Continue with Eliquis  Call 911 or go to ER in event of bleeding bruising fall injury shortness of breath chest pain cough      Hypothyroidism  Normal TSH  Continue with levothyroxine      Social  Patient reports that his co-pay for Keytruda is high.  I will reach out to our  to see if he qualifies for any financial help      Call clinic with any changes in her condition or questions    TARUN Sandy [facility]     Chart documentation with Dragon Voice recognition Software. Although reviewed after completion, some words and grammatical errors may remain.

## 2022-11-21 ENCOUNTER — PATIENT OUTREACH (OUTPATIENT)
Dept: CARE COORDINATION | Facility: CLINIC | Age: 72
End: 2022-11-21

## 2022-11-21 NOTE — PROGRESS NOTES
Social Work Progress Note      Data/Intervention:  Patient Name:  Raj Warren  /Age:  1950 (72 year old)    Reason for Follow-Up:  Raj is a 72-year-old gentleman with a diagnosis of laryngeal squamous cell carcinoma who is followed by  at [facility]. At time of visit with Kyree, Raj expressed concerns about co-payment for Keytruda.     Intervention:  This clinician attempted to reach Raj by phone, leaving detailed voicemail. Per chart review, it appears that online communication is preferred.     SW sent [employer] with updated information as to options for support.    Plan:  Previously provided patient/family with writer's contact information and availability. Will await return outreach and continue to be available as needed for ongoing support.     Please call or page if needs or concerns arise.     STACEY Sosa, LICSW  Direct Phone: [phone]  Pager: [phone]

## 2022-11-29 ENCOUNTER — PATIENT OUTREACH (OUTPATIENT)
Dept: ONCOLOGY | Facility: CLINIC | Age: 72
End: 2022-11-29

## 2022-11-29 DIAGNOSIS — I10 BENIGN ESSENTIAL HYPERTENSION: ICD-10-CM

## 2022-11-29 RX ORDER — METOPROLOL TARTRATE 25 MG/1
25 TABLET, FILM COATED ORAL 2 TIMES DAILY
Qty: 180 TABLET | Refills: 3 | Status: SHIPPED | OUTPATIENT
Start: 2022-11-29 | End: 2023-04-27

## 2022-11-29 NOTE — PROGRESS NOTES
Patient walked in with an empty bottle of Metoprolol requesting a new script.  Writer e-prescribed a new script to preferred [facility].      Serena Castillo RN

## 2022-12-07 ENCOUNTER — LAB (OUTPATIENT)
Dept: INFUSION THERAPY | Facility: CLINIC | Age: 72
End: 2022-12-07
Attending: INTERNAL MEDICINE
Payer: COMMERCIAL

## 2022-12-07 DIAGNOSIS — C32.9 MALIGNANT NEOPLASM OF LARYNX (H): Primary | ICD-10-CM

## 2022-12-07 LAB
ALBUMIN SERPL-MCNC: 3.7 G/DL (ref 3.4–5)
ALP SERPL-CCNC: 69 U/L (ref 40–150)
ALT SERPL W P-5'-P-CCNC: 25 U/L (ref 0–70)
ANION GAP SERPL CALCULATED.3IONS-SCNC: 7 MMOL/L (ref 3–14)
AST SERPL W P-5'-P-CCNC: 20 U/L (ref 0–45)
BILIRUB SERPL-MCNC: 0.5 MG/DL (ref 0.2–1.3)
BUN SERPL-MCNC: 10 MG/DL (ref 7–30)
CALCIUM SERPL-MCNC: 9 MG/DL (ref 8.5–10.1)
CHLORIDE BLD-SCNC: 110 MMOL/L (ref 94–109)
CO2 SERPL-SCNC: 23 MMOL/L (ref 20–32)
CREAT SERPL-MCNC: 0.92 MG/DL (ref 0.66–1.25)
GFR SERPL CREATININE-BSD FRML MDRD: 89 ML/MIN/1.73M2
GLUCOSE BLD-MCNC: 93 MG/DL (ref 70–99)
POTASSIUM BLD-SCNC: 4.3 MMOL/L (ref 3.4–5.3)
PROT SERPL-MCNC: 7.6 G/DL (ref 6.8–8.8)
SODIUM SERPL-SCNC: 140 MMOL/L (ref 133–144)
TSH SERPL DL<=0.005 MIU/L-ACNC: 2.16 MU/L (ref 0.4–4)

## 2022-12-07 PROCEDURE — 80053 COMPREHEN METABOLIC PANEL: CPT | Performed by: INTERNAL MEDICINE

## 2022-12-07 PROCEDURE — 82040 ASSAY OF SERUM ALBUMIN: CPT | Performed by: INTERNAL MEDICINE

## 2022-12-07 PROCEDURE — 84443 ASSAY THYROID STIM HORMONE: CPT | Performed by: INTERNAL MEDICINE

## 2022-12-07 PROCEDURE — 36415 COLL VENOUS BLD VENIPUNCTURE: CPT | Performed by: INTERNAL MEDICINE

## 2022-12-07 NOTE — PROGRESS NOTES
Medical Assistant Note:  Raj Warren presents today for blood draw.    Patient seen by provider today: No.   present during visit today: Not Applicable.    Concerns: No Concerns.    Procedure:  Lab draw site: rac, Needle type: bf, Gauge: 23.    Post Assessment:  Labs drawn without difficulty: Yes.    Discharge Plan:  Departure Mode: Ambulatory.    Face to Face Time: 5 min.    Kirti Jones, CMA

## 2022-12-08 ENCOUNTER — ONCOLOGY VISIT (OUTPATIENT)
Dept: ONCOLOGY | Facility: CLINIC | Age: 72
End: 2022-12-08
Attending: INTERNAL MEDICINE
Payer: COMMERCIAL

## 2022-12-08 ENCOUNTER — INFUSION THERAPY VISIT (OUTPATIENT)
Dept: INFUSION THERAPY | Facility: CLINIC | Age: 72
End: 2022-12-08
Attending: INTERNAL MEDICINE
Payer: COMMERCIAL

## 2022-12-08 VITALS
OXYGEN SATURATION: 100 % | TEMPERATURE: 98.6 F | HEART RATE: 82 BPM | WEIGHT: 209 LBS | SYSTOLIC BLOOD PRESSURE: 147 MMHG | BODY MASS INDEX: 29.16 KG/M2 | RESPIRATION RATE: 16 BRPM | DIASTOLIC BLOOD PRESSURE: 81 MMHG

## 2022-12-08 VITALS — WEIGHT: 209 LBS | HEIGHT: 71 IN | BODY MASS INDEX: 29.26 KG/M2

## 2022-12-08 DIAGNOSIS — C32.9 MALIGNANT NEOPLASM OF LARYNX (H): Primary | ICD-10-CM

## 2022-12-08 DIAGNOSIS — C32.0: ICD-10-CM

## 2022-12-08 PROCEDURE — 258N000003 HC RX IP 258 OP 636: Performed by: INTERNAL MEDICINE

## 2022-12-08 PROCEDURE — 250N000011 HC RX IP 250 OP 636: Performed by: INTERNAL MEDICINE

## 2022-12-08 PROCEDURE — 99215 OFFICE O/P EST HI 40 MIN: CPT | Performed by: INTERNAL MEDICINE

## 2022-12-08 PROCEDURE — G0463 HOSPITAL OUTPT CLINIC VISIT: HCPCS | Mod: 25 | Performed by: INTERNAL MEDICINE

## 2022-12-08 PROCEDURE — 96413 CHEMO IV INFUSION 1 HR: CPT

## 2022-12-08 PROCEDURE — 99212 OFFICE O/P EST SF 10 MIN: CPT | Performed by: INTERNAL MEDICINE

## 2022-12-08 RX ORDER — EPINEPHRINE 1 MG/ML
0.3 INJECTION, SOLUTION INTRAMUSCULAR; SUBCUTANEOUS EVERY 5 MIN PRN
Status: CANCELLED | OUTPATIENT
Start: 2022-12-29

## 2022-12-08 RX ORDER — LORAZEPAM 2 MG/ML
0.5 INJECTION INTRAMUSCULAR EVERY 4 HOURS PRN
Status: CANCELLED | OUTPATIENT
Start: 2022-12-29

## 2022-12-08 RX ORDER — LORAZEPAM 2 MG/ML
0.5 INJECTION INTRAMUSCULAR EVERY 4 HOURS PRN
Status: CANCELLED | OUTPATIENT
Start: 2022-12-08

## 2022-12-08 RX ORDER — METHYLPREDNISOLONE SODIUM SUCCINATE 125 MG/2ML
125 INJECTION, POWDER, LYOPHILIZED, FOR SOLUTION INTRAMUSCULAR; INTRAVENOUS
Status: CANCELLED
Start: 2022-12-29

## 2022-12-08 RX ORDER — HEPARIN SODIUM,PORCINE 10 UNIT/ML
5 VIAL (ML) INTRAVENOUS
Status: CANCELLED | OUTPATIENT
Start: 2022-12-08

## 2022-12-08 RX ORDER — ALBUTEROL SULFATE 90 UG/1
1-2 AEROSOL, METERED RESPIRATORY (INHALATION)
Status: CANCELLED
Start: 2022-12-08

## 2022-12-08 RX ORDER — EPINEPHRINE 1 MG/ML
0.3 INJECTION, SOLUTION INTRAMUSCULAR; SUBCUTANEOUS EVERY 5 MIN PRN
Status: CANCELLED | OUTPATIENT
Start: 2022-12-08

## 2022-12-08 RX ORDER — DIPHENHYDRAMINE HYDROCHLORIDE 50 MG/ML
50 INJECTION INTRAMUSCULAR; INTRAVENOUS
Status: CANCELLED
Start: 2022-12-08

## 2022-12-08 RX ORDER — ALBUTEROL SULFATE 0.83 MG/ML
2.5 SOLUTION RESPIRATORY (INHALATION)
Status: CANCELLED | OUTPATIENT
Start: 2022-12-08

## 2022-12-08 RX ORDER — HEPARIN SODIUM,PORCINE 10 UNIT/ML
5 VIAL (ML) INTRAVENOUS
Status: CANCELLED | OUTPATIENT
Start: 2022-12-29

## 2022-12-08 RX ORDER — HEPARIN SODIUM (PORCINE) LOCK FLUSH IV SOLN 100 UNIT/ML 100 UNIT/ML
5 SOLUTION INTRAVENOUS
Status: CANCELLED | OUTPATIENT
Start: 2022-12-29

## 2022-12-08 RX ORDER — METHYLPREDNISOLONE SODIUM SUCCINATE 125 MG/2ML
125 INJECTION, POWDER, LYOPHILIZED, FOR SOLUTION INTRAMUSCULAR; INTRAVENOUS
Status: CANCELLED
Start: 2022-12-08

## 2022-12-08 RX ORDER — MEPERIDINE HYDROCHLORIDE 25 MG/ML
25 INJECTION INTRAMUSCULAR; INTRAVENOUS; SUBCUTANEOUS EVERY 30 MIN PRN
Status: CANCELLED | OUTPATIENT
Start: 2022-12-08

## 2022-12-08 RX ORDER — ALBUTEROL SULFATE 0.83 MG/ML
2.5 SOLUTION RESPIRATORY (INHALATION)
Status: CANCELLED | OUTPATIENT
Start: 2022-12-29

## 2022-12-08 RX ORDER — DIPHENHYDRAMINE HYDROCHLORIDE 50 MG/ML
50 INJECTION INTRAMUSCULAR; INTRAVENOUS
Status: CANCELLED
Start: 2022-12-29

## 2022-12-08 RX ORDER — MEPERIDINE HYDROCHLORIDE 25 MG/ML
25 INJECTION INTRAMUSCULAR; INTRAVENOUS; SUBCUTANEOUS EVERY 30 MIN PRN
Status: CANCELLED | OUTPATIENT
Start: 2022-12-29

## 2022-12-08 RX ORDER — HEPARIN SODIUM (PORCINE) LOCK FLUSH IV SOLN 100 UNIT/ML 100 UNIT/ML
5 SOLUTION INTRAVENOUS
Status: CANCELLED | OUTPATIENT
Start: 2022-12-08

## 2022-12-08 RX ORDER — ALBUTEROL SULFATE 90 UG/1
1-2 AEROSOL, METERED RESPIRATORY (INHALATION)
Status: CANCELLED
Start: 2022-12-29

## 2022-12-08 RX ADMIN — SODIUM CHLORIDE 200 MG: 9 INJECTION, SOLUTION INTRAVENOUS at 10:37

## 2022-12-08 RX ADMIN — SODIUM CHLORIDE 250 ML: 9 INJECTION, SOLUTION INTRAVENOUS at 10:36

## 2022-12-08 ASSESSMENT — PAIN SCALES - GENERAL: PAINLEVEL: NO PAIN (0)

## 2022-12-08 NOTE — PROGRESS NOTES
Infusion Nursing Note:  Raj Warren presents today for keytruda.    Patient seen by provider today: Yes: Dr. Bean   present during visit today: Not Applicable.    Note: N/A.    Intravenous Access:  Peripheral IV placed.    Treatment Conditions:  Results reviewed, labs MET treatment parameters, ok to proceed with treatment.    Post Infusion Assessment:  Patient tolerated infusion without incident.  Blood return noted pre and post infusion.  Site patent and intact, free from redness, edema or discomfort.  No evidence of extravasations.  Access discontinued per protocol.     Discharge Plan:   Discharge instructions reviewed with: Patient.  Patient and/or family verbalized understanding of discharge instructions and all questions answered.  AVS to patient via PAAYT.  Patient will return 12/29/22 for next appointment.   Patient discharged in stable condition accompanied by: self.  Departure Mode: Ambulatory.      Ramses Eason RN

## 2022-12-08 NOTE — LETTER
"    12/8/2022         RE: Raj Warren  [address]  [facility] [address]        Dear Colleague,    Thank you for referring your patient, Raj Warren, to the [facility]. Please see a copy of my visit note below.    Oncology Rooming Note    December 8, 2022 9:49 AM   Raj Warren is a 72 year old male who presents for:    Chief Complaint   Patient presents with     Oncology Clinic Visit     Initial Vitals: BP (!) 147/81   Pulse 82   Temp 98.6  F (37  C) (Oral)   Resp 16   Wt 94.8 kg (209 lb)   SpO2 100%   BMI 29.16 kg/m   Estimated body mass index is 29.16 kg/m  as calculated from the following:    Height as of 11/17/22: 1.803 m (5' 10.98\").    Weight as of this encounter: 94.8 kg (209 lb). Body surface area is 2.18 meters squared.  No Pain (0) Comment: Data Unavailable   No LMP for male patient.  Allergies reviewed: Yes  Medications reviewed: Yes    Medications: Medication refills not needed today.  Pharmacy name entered into [employer]: "[facility] #[id number] - [place], [place] - [address] AT [address]    Clinical concerns:  DOCTOR was notified.      Kirti Jones [facility]              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  1.  Patient was evaluated in 2019 for hoarseness of voice.  He was seen by ENT and found to have right vocal cord mass.    -He had biopsy done on 08/07/2019. Pathology revealed moderately-differentiated invasive squamous cell carcinoma.     2.  The patient was seen in the Emergency Room on 04/12/2021 for shortness of breath and admitted.   -CT neck revealed an enhancing soft tissue mass involving the right larynx measuring 3.9 cm.  -Patient had laryngoscopy with biopsy and tracheostomy done on 04/12/2021.  There was an endolaryngeal mass obscuring the laryngeal landmarks.  Mass appeared to be based on right endolarynx.  Pathology " revealed invasive keratinizing moderately-differentiated squamous cell carcinoma.     3.  PET scan on 04/26/2021 revealed hypermetabolic mass involving the supraglottic, glottic and subglottic larynx.  There was a hypermetabolic left level IV lymph node.  There was also hypermetabolic nodule in right middle lobe.     4. CT chest angiogram on 04/27/2021 revealed a large bilateral pulmonary embolism in all the lobes.  There was evidence of right cardiac strain.  -Echocardiogram on 04/27/2021 revealed clot in transit in the right ventricle.  -The patient had emergency pulmonary embolectomy on 04/28/2021.  Pathology revealed organizing clot.  -Patient on Eliquis. Long term anti-coagulation recommended.     5. Total laryngectomy with bilateral neck dissection on 07/08/2021.  Pathology reveals 6 cm squamous cell carcinoma involving the right false and true vocal cords with fixation, the left false and true vocal cords and invades through thyroid cartilage.  There is focal lymphatic invasion.  There is perineural invasion. Benign 75 lymph nodes.  pT4a pN0 disease.     6. Post-op radiation between 08/04/2021 and 09/20/2021. 6600 cGy in 33 fractions.     7. PET/CT on 09/30/2022 revealed hypermetabolic mass originating from the right anterolateral wall of the neopharynx.  -Patient seen by ENT on 10/03/2022 and had FNA of neck mass. Pathology is positive for malignancy. Rare clusters morphologically consistent with squamous cell carcinoma.     8. Pembrolizumab started on 10/27/2022.  He is tolerating it well.    SUBJECTIVE:  Mr. Anthoyn is a 72-year-old gentleman with recurrent laryngeal squamous cell carcinoma.  He is currently on pembrolizumab, which was started on 10/27/2022.  He is tolerating it well.     No headache.  No dizziness.  No pain in the neck.  His swallowing is fairly good.  Sometimes he gets some irritation in the throat.  No chest pain.  No shortness of breath.  No abdominal pain.  No nausea or vomiting.  No  urinary or bowel complaints.  No bleeding.     PHYSICAL EXAMINATION:   GENERAL:  Alert and oriented x 3.   VITAL SIGNS:  Reviewed.     EYES:  No icterus.   THROAT:  No ulcer. No thrush.   NECK:  Supple. No lymphadenopathy. No thyromegaly.   AXILLAE:  No lymphadenopathy.   LUNGS:  Good air entry bilaterally.  No crackles or wheezing.   HEART:  Regular.  No murmur.   GI: Abdomen is soft.  Nontender. No mass.   EXTREMITIES:  No pedal edema.  No calf swelling or tenderness.   SKIN:  No rash.      LABS:  TSH and CMP were reviewed.     ASSESSMENT:  1.  A 72-year-old gentleman with locally recurrent laryngeal squamous cell carcinoma on pembrolizumab.  2.  Pulmonary embolism on Eliquis.  3.  Hypothyroidism on levothyroxine.     PLAN:  1.  The patient is doing well.  He is tolerating pembrolizumab well.  He will continue on it.  Side effects reviewed.  2.  We will get a PET scan in mid January.  He is agreeable for it.  3.  He will continue on Eliquis. He is not having any bleeding complication.  4.  His TSH is normal.  He will continue on the same dose of levothyroxine.  5.  He will see our nurse practitioner with next two treatments.  I will see him in 2 months' time.  I advised him to call us with any questions or concerns.     Total visit time of 40 minutes.  Time spent in today's visit, review of chart/investigations today, monitoring for toxicity of high risk drug and documentation today.    Visit Date: 12/08/2022    SUBJECTIVE:  Mr. Anthony is a 72-year-old gentleman with recurrent laryngeal squamous cell carcinoma.  He is currently on pembrolizumab, which was started on 10/27/2022.  He is tolerating it well.    No headache.  No dizziness.  No pain in the neck.  His swallowing is fairly good.  Sometimes he gets some irritation in the throat.  No chest pain.  No shortness of breath.  No abdominal pain.  No nausea or vomiting.  No urinary or bowel complaints.  No bleeding.    PHYSICAL EXAMINATION:  Unchanged.    LABS:   TSH and CMP were reviewed.    ASSESSMENT:  1.  A 72-year-old gentleman with locally recurrent laryngeal squamous cell carcinoma, on pembrolizumab.  2.  Pulmonary embolism, on Eliquis.  3.  Hypothyroidism, on levothyroxine.    PLAN:  1.  The patient is doing well.  He is tolerating pembrolizumab well.  He will continue on it.  Side effects reviewed.  2.  We will get a PET scan in mid January.  He is agreeable for it.  3.  He will continue on Eliquis.  Not having any bleeding complication.  4.  His TSH is normal.  He will continue on the same dose of levothyroxine.  5.  He will see our nurse practitioner with next *** *** treatment.  I will see him in 2 months' time.  I advised him to call us with any questions or concerns.    Total visit time of 40 minutes.  Time spent in today's visit, review of chart/investigations today, monitoring for toxicity of *** *** and documentation today.    Rosa Bean MD        D: 2022   T: 2022   MT: EUNICE    Name:     LUPE HALL  MRN:      [id number]        Account:    [id number]   :      1950           Visit Date: 2022     Document: [id number]      Again, thank you for allowing me to participate in the care of your patient.        Sincerely,        Rosa Bean MD

## 2022-12-08 NOTE — PATIENT INSTRUCTIONS
Continue pembrolizumab.  See NP with next 2 treatment.  PET scan in mid-January.  See me in 2 months.    Please call to schedule

## 2022-12-08 NOTE — PROGRESS NOTES
"Oncology Rooming Note    December 8, 2022 9:49 AM   Raj Warren is a 72 year old male who presents for:    Chief Complaint   Patient presents with     Oncology Clinic Visit     Initial Vitals: BP (!) 147/81   Pulse 82   Temp 98.6  F (37  C) (Oral)   Resp 16   Wt 94.8 kg (209 lb)   SpO2 100%   BMI 29.16 kg/m   Estimated body mass index is 29.16 kg/m  as calculated from the following:    Height as of 11/17/22: 1.803 m (5' 10.98\").    Weight as of this encounter: 94.8 kg (209 lb). Body surface area is 2.18 meters squared.  No Pain (0) Comment: Data Unavailable   No LMP for male patient.  Allergies reviewed: Yes  Medications reviewed: Yes    Medications: Medication refills not needed today.  Pharmacy name entered into [employer]: [facility] #[id number] - [place], [place] - [address] AT [address]    Clinical concerns:  DOCTOR was notified.      Kirti Jones CMA            "

## 2022-12-25 NOTE — PROGRESS NOTES
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  1.  Patient was evaluated in 2019 for hoarseness of voice.  He was seen by ENT and found to have right vocal cord mass.    -He had biopsy done on 08/07/2019. Pathology revealed moderately-differentiated invasive squamous cell carcinoma.     2.  The patient was seen in the Emergency Room on 04/12/2021 for shortness of breath and admitted.   -CT neck revealed an enhancing soft tissue mass involving the right larynx measuring 3.9 cm.  -Patient had laryngoscopy with biopsy and tracheostomy done on 04/12/2021.  There was an endolaryngeal mass obscuring the laryngeal landmarks.  Mass appeared to be based on right endolarynx.  Pathology revealed invasive keratinizing moderately-differentiated squamous cell carcinoma.     3.  PET scan on 04/26/2021 revealed hypermetabolic mass involving the supraglottic, glottic and subglottic larynx.  There was a hypermetabolic left level IV lymph node.  There was also hypermetabolic nodule in right middle lobe.     4. CT chest angiogram on 04/27/2021 revealed a large bilateral pulmonary embolism in all the lobes.  There was evidence of right cardiac strain.  -Echocardiogram on 04/27/2021 revealed clot in transit in the right ventricle.  -The patient had emergency pulmonary embolectomy on 04/28/2021.  Pathology revealed organizing clot.  -Patient on Eliquis. Long term anti-coagulation recommended.     5. Total laryngectomy with bilateral neck dissection on 07/08/2021.  Pathology reveals 6 cm squamous cell carcinoma involving the right false and true vocal cords with fixation, the left false and true vocal cords and invades through thyroid cartilage.  There is focal lymphatic invasion.  There is perineural invasion. Benign 75 lymph nodes.  pT4a pN0 disease.     6. Post-op radiation between 08/04/2021 and 09/20/2021. 6600 cGy in 33 fractions.     7. PET/CT on  09/30/2022 revealed hypermetabolic mass originating from the right anterolateral wall of the neopharynx.  -Patient seen by ENT on 10/03/2022 and had FNA of neck mass. Pathology is positive for malignancy. Rare clusters morphologically consistent with squamous cell carcinoma.     8. Pembrolizumab started on 10/27/2022.  He is tolerating it well.    SUBJECTIVE:  Mr. Anthony is a 72-year-old gentleman with recurrent laryngeal squamous cell carcinoma.  He is currently on pembrolizumab, which was started on 10/27/2022.  He is tolerating it well.     No headache.  No dizziness.  No pain in the neck.  His swallowing is fairly good.  Sometimes he gets some irritation in the throat.  No chest pain.  No shortness of breath.  No abdominal pain.  No nausea or vomiting.  No urinary or bowel complaints.  No bleeding.     PHYSICAL EXAMINATION:   GENERAL:  Alert and oriented x 3.   VITAL SIGNS:  Reviewed.     EYES:  No icterus.   THROAT:  No ulcer. No thrush.   NECK:  Supple. No lymphadenopathy. No thyromegaly.   AXILLAE:  No lymphadenopathy.   LUNGS:  Good air entry bilaterally.  No crackles or wheezing.   HEART:  Regular.  No murmur.   GI: Abdomen is soft.  Nontender. No mass.   EXTREMITIES:  No pedal edema.  No calf swelling or tenderness.   SKIN:  No rash.      LABS:  TSH and CMP were reviewed.     ASSESSMENT:  1.  A 72-year-old gentleman with locally recurrent laryngeal squamous cell carcinoma on pembrolizumab.  2.  Pulmonary embolism on Eliquis.  3.  Hypothyroidism on levothyroxine.     PLAN:  1.  The patient is doing well.  He is tolerating pembrolizumab well.  He will continue on it.  Side effects reviewed.  2.  We will get a PET scan in mid January.  He is agreeable for it.  3.  He will continue on Eliquis. He is not having any bleeding complication.  4.  His TSH is normal.  He will continue on the same dose of levothyroxine.  5.  He will see our nurse practitioner with next two treatments.  I will see him in 2 months'  time.  I advised him to call us with any questions or concerns.     Total visit time of 40 minutes.  Time spent in today's visit, review of chart/investigations today, monitoring for toxicity of high risk drug and documentation today.

## 2022-12-28 ENCOUNTER — LAB (OUTPATIENT)
Dept: INFUSION THERAPY | Facility: CLINIC | Age: 72
End: 2022-12-28
Attending: INTERNAL MEDICINE
Payer: COMMERCIAL

## 2022-12-28 ENCOUNTER — ONCOLOGY VISIT (OUTPATIENT)
Dept: ONCOLOGY | Facility: CLINIC | Age: 72
End: 2022-12-28
Attending: NURSE PRACTITIONER
Payer: COMMERCIAL

## 2022-12-28 VITALS
RESPIRATION RATE: 16 BRPM | TEMPERATURE: 98.6 F | WEIGHT: 209 LBS | BODY MASS INDEX: 29.92 KG/M2 | SYSTOLIC BLOOD PRESSURE: 134 MMHG | OXYGEN SATURATION: 99 % | DIASTOLIC BLOOD PRESSURE: 82 MMHG | HEART RATE: 71 BPM | HEIGHT: 70 IN

## 2022-12-28 DIAGNOSIS — C32.9 MALIGNANT NEOPLASM OF LARYNX (H): Primary | ICD-10-CM

## 2022-12-28 LAB
ALBUMIN SERPL-MCNC: 3.8 G/DL (ref 3.4–5)
ALP SERPL-CCNC: 68 U/L (ref 40–150)
ALT SERPL W P-5'-P-CCNC: 25 U/L (ref 0–70)
ANION GAP SERPL CALCULATED.3IONS-SCNC: 5 MMOL/L (ref 3–14)
AST SERPL W P-5'-P-CCNC: 24 U/L (ref 0–45)
BASOPHILS # BLD AUTO: 0 10E3/UL (ref 0–0.2)
BASOPHILS NFR BLD AUTO: 1 %
BILIRUB SERPL-MCNC: 1.5 MG/DL (ref 0.2–1.3)
BUN SERPL-MCNC: 11 MG/DL (ref 7–30)
CALCIUM SERPL-MCNC: 8.9 MG/DL (ref 8.5–10.1)
CHLORIDE BLD-SCNC: 111 MMOL/L (ref 94–109)
CO2 SERPL-SCNC: 22 MMOL/L (ref 20–32)
CREAT SERPL-MCNC: 1.01 MG/DL (ref 0.66–1.25)
EOSINOPHIL # BLD AUTO: 0.2 10E3/UL (ref 0–0.7)
EOSINOPHIL NFR BLD AUTO: 5 %
ERYTHROCYTE [DISTWIDTH] IN BLOOD BY AUTOMATED COUNT: 13.1 % (ref 10–15)
GFR SERPL CREATININE-BSD FRML MDRD: 80 ML/MIN/1.73M2
GLUCOSE BLD-MCNC: 87 MG/DL (ref 70–99)
HCT VFR BLD AUTO: 44.5 % (ref 40–53)
HGB BLD-MCNC: 15.3 G/DL (ref 13.3–17.7)
IMM GRANULOCYTES # BLD: 0 10E3/UL
IMM GRANULOCYTES NFR BLD: 0 %
LYMPHOCYTES # BLD AUTO: 1.4 10E3/UL (ref 0.8–5.3)
LYMPHOCYTES NFR BLD AUTO: 33 %
MCH RBC QN AUTO: 30.2 PG (ref 26.5–33)
MCHC RBC AUTO-ENTMCNC: 34.4 G/DL (ref 31.5–36.5)
MCV RBC AUTO: 88 FL (ref 78–100)
MONOCYTES # BLD AUTO: 0.5 10E3/UL (ref 0–1.3)
MONOCYTES NFR BLD AUTO: 13 %
NEUTROPHILS # BLD AUTO: 2 10E3/UL (ref 1.6–8.3)
NEUTROPHILS NFR BLD AUTO: 48 %
NRBC # BLD AUTO: 0 10E3/UL
NRBC BLD AUTO-RTO: 0 /100
PLATELET # BLD AUTO: 221 10E3/UL (ref 150–450)
POTASSIUM BLD-SCNC: 4.1 MMOL/L (ref 3.4–5.3)
PROT SERPL-MCNC: 7.5 G/DL (ref 6.8–8.8)
RBC # BLD AUTO: 5.06 10E6/UL (ref 4.4–5.9)
SODIUM SERPL-SCNC: 138 MMOL/L (ref 133–144)
TSH SERPL DL<=0.005 MIU/L-ACNC: 2.7 MU/L (ref 0.4–4)
WBC # BLD AUTO: 4.1 10E3/UL (ref 4–11)

## 2022-12-28 PROCEDURE — 99212 OFFICE O/P EST SF 10 MIN: CPT | Performed by: NURSE PRACTITIONER

## 2022-12-28 PROCEDURE — 36415 COLL VENOUS BLD VENIPUNCTURE: CPT | Performed by: INTERNAL MEDICINE

## 2022-12-28 PROCEDURE — 99214 OFFICE O/P EST MOD 30 MIN: CPT | Performed by: NURSE PRACTITIONER

## 2022-12-28 PROCEDURE — 85025 COMPLETE CBC W/AUTO DIFF WBC: CPT | Performed by: INTERNAL MEDICINE

## 2022-12-28 PROCEDURE — G0463 HOSPITAL OUTPT CLINIC VISIT: HCPCS | Performed by: NURSE PRACTITIONER

## 2022-12-28 PROCEDURE — 84443 ASSAY THYROID STIM HORMONE: CPT | Performed by: INTERNAL MEDICINE

## 2022-12-28 PROCEDURE — 80053 COMPREHEN METABOLIC PANEL: CPT | Performed by: INTERNAL MEDICINE

## 2022-12-28 ASSESSMENT — PAIN SCALES - GENERAL: PAINLEVEL: NO PAIN (0)

## 2022-12-28 NOTE — PROGRESS NOTES
"Oncology Rooming Note    December 28, 2022 2:49 PM   Raj Warren is a 72 year old male who presents for:    Chief Complaint   Patient presents with     Oncology Clinic Visit     Initial Vitals: There were no vitals taken for this visit. Estimated body mass index is 29.16 kg/m  as calculated from the following:    Height as of 12/8/22: 1.803 m (5' 10.98\").    Weight as of 12/8/22: 94.8 kg (209 lb). There is no height or weight on file to calculate BSA.  Data Unavailable Comment: Data Unavailable   No LMP for male patient.  Allergies reviewed: Yes  Medications reviewed: Yes    Medications: Medication refills not needed today.  Pharmacy name entered into [employer]: [facility] #[id number] - [facility] [address] [facility] [address]    Clinical concerns:  NP was notified.      Jennifer Farfan MA            "

## 2022-12-28 NOTE — PROGRESS NOTES
Medical Assistant Note:  Raj Warren presents today for blood draw.    Patient seen by provider today: Yes: janette.   present during visit today: Not Applicable.    Concerns: No Concerns.    Procedure:  Lab draw site: rac, Needle type: bf, Gauge: 23.    Post Assessment:  Labs drawn without difficulty: Yes.    Discharge Plan:  Departure Mode: Ambulatory.    Face to Face Time: 5 min.    Kirti Jones, CMA

## 2022-12-28 NOTE — LETTER
"    12/28/2022         RE: Raj Warren  [address]  [place] [facility] [address]        Dear Colleague,    Thank you for referring your patient, Raj Warren, to the [facility]. Please see a copy of my visit note below.    Oncology Rooming Note    December 28, 2022 2:49 PM   Raj Warren is a 72 year old male who presents for:    Chief Complaint   Patient presents with     Oncology Clinic Visit     Initial Vitals: There were no vitals taken for this visit. Estimated body mass index is 29.16 kg/m  as calculated from the following:    Height as of 12/8/22: 1.803 m (5' 10.98\").    Weight as of 12/8/22: 94.8 kg (209 lb). There is no height or weight on file to calculate BSA.  Data Unavailable Comment: Data Unavailable   No LMP for male patient.  Allergies reviewed: Yes  Medications reviewed: Yes    Medications: Medication refills not needed today.  Pharmacy name entered into [employer]: [facility] #[id number] - [facility] [address] AT [address]    Clinical concerns:  NP was notified.      Jennifer Farfan MA              Oncology/Hematology Visit Note  Dec 28, 2022    Reason for Visit: follow up of laryngeal squamous cell carcinoma    Patient Dr. Bean  Status post surgery and postoperative radiation with no evidence of recurrence  -Currently on single agent pembrolizumab started on 10/27/2022    Interval History:  Patient reports he has been tolerating Keytruda well.  Denies fever chills sweats cough shortness of breath chest pain nausea vomiting diarrhea abdominal pain or bleeding   Denies fatigue skin rash denies itching      Review of Systems:  14 point ROS of systems including Constitutional, Eyes, Respiratory, Cardiovascular, Gastroenterology, Genitourinary, Integumentary, Muscularskeletal, Psychiatric were all negative except for pertinent positives noted in my HPI.    Physical Examination:  General: The patient is a pleasant male in no acute " "distress.  /82   Pulse 71   Temp 98.6  F (37  C)   Resp 16   Ht 1.778 m (5' 10\")   Wt 94.8 kg (209 lb)   SpO2 99%   BMI 29.99 kg/m    HEENT: EOMI, PERRL. Sclerae are anicteric. Oral mucosa is pink and moist with no lesions or thrush.   Lymph: Neck is supple with no lymphadenopathy in the cervical or supraclavicular areas.   Heart: Regular rate and rhythm.   Lungs: Clear to auscultation bilaterally.   GI: Bowel sounds present, soft, nontender with no palpable hepatosplenomegaly or masses.   Extremities: No lower extremity edema noted bilaterally.   Skin: No rashes, petechiae, or bruising noted on exposed skin.    Laboratory Data:  CMP and TSH results reviewed      Assessment and Plan:  This is a 72-year-old male with    Laryngeal squamous cell carcinoma  Patient Dr. Bean  Status post surgery and postoperative radiation at that time no evidence of recurrence  09/30/2022-PET/CT showed abnormality in the neck FNA positive for squamous cell carcinoma showed recurrence of the disease  High PD-L1 expression  10/27/2022-started single agent Keytruda  Regimen and potential side effects of Keytruda reviewed with patient  Patient reports overall he has been tolerating Keytruda well  Labs reviewed  Okay to proceed with cycle 4 Keytruda tomorrow  Patient is scheduled for PET scan in January  Patient scheduled to see ENT in January  Schedule with me with next cycle of treatment            Pulmonary embolism  Continue with Eliquis  Call 911 or go to ER in event of bleeding bruising fall injury shortness of breath chest pain cough      Hypothyroidism  Normal TSH  Continue with levothyroxine    Hyperbilirubinemia  -Mild   patient is asymptomatic denies  GI symptoms denies abdominal pain abdominal distention fever chills sweats nausea vomiting  We will recheck bilirubin tomorrow  I advised patient in the event of fever chills sweats abdominal pain abdominal distention to go to ER    Call clinic with any changes in her " condition or questions    TARUN Sandy CNP  [facility]     Chart documentation with Dragon Voice recognition Software. Although reviewed after completion, some words and grammatical errors may remain.            Again, thank you for allowing me to participate in the care of your patient.        Sincerely,        TARUN Sandy CNP

## 2022-12-28 NOTE — PROGRESS NOTES
"Oncology/Hematology Visit Note  Dec 28, 2022    Reason for Visit: follow up of laryngeal squamous cell carcinoma    Patient Dr. Bean  Status post surgery and postoperative radiation with no evidence of recurrence  -Currently on single agent pembrolizumab started on 10/27/2022    Interval History:  Patient reports he has been tolerating Keytruda well.  Denies fever chills sweats cough shortness of breath chest pain nausea vomiting diarrhea abdominal pain or bleeding   Denies fatigue skin rash denies itching      Review of Systems:  14 point ROS of systems including Constitutional, Eyes, Respiratory, Cardiovascular, Gastroenterology, Genitourinary, Integumentary, Muscularskeletal, Psychiatric were all negative except for pertinent positives noted in my HPI.    Physical Examination:  General: The patient is a pleasant male in no acute distress.  /82   Pulse 71   Temp 98.6  F (37  C)   Resp 16   Ht 1.778 m (5' 10\")   Wt 94.8 kg (209 lb)   SpO2 99%   BMI 29.99 kg/m    HEENT: EOMI, PERRL. Sclerae are anicteric. Oral mucosa is pink and moist with no lesions or thrush.   Lymph: Neck is supple with no lymphadenopathy in the cervical or supraclavicular areas.   Heart: Regular rate and rhythm.   Lungs: Clear to auscultation bilaterally.   GI: Bowel sounds present, soft, nontender with no palpable hepatosplenomegaly or masses.   Extremities: No lower extremity edema noted bilaterally.   Skin: No rashes, petechiae, or bruising noted on exposed skin.    Laboratory Data:  CMP and TSH results reviewed      Assessment and Plan:  This is a 72-year-old male with    Laryngeal squamous cell carcinoma  Patient Dr. Bean  Status post surgery and postoperative radiation at that time no evidence of recurrence  09/30/2022-PET/CT showed abnormality in the neck FNA positive for squamous cell carcinoma showed recurrence of the disease  High PD-L1 expression  10/27/2022-started single agent Keytruda  Regimen and potential side " effects of Keytruda reviewed with patient  Patient reports overall he has been tolerating Keytruda well  Labs reviewed  Okay to proceed with cycle 4 Keytruda tomorrow  Patient is scheduled for PET scan in January  Patient scheduled to see ENT in January  Schedule with me with next cycle of treatment            Pulmonary embolism  Continue with Eliquis  Call 911 or go to ER in event of bleeding bruising fall injury shortness of breath chest pain cough      Hypothyroidism  Normal TSH  Continue with levothyroxine    Hyperbilirubinemia  -Mild   patient is asymptomatic denies  GI symptoms denies abdominal pain abdominal distention fever chills sweats nausea vomiting  We will recheck bilirubin tomorrow  I advised patient in the event of fever chills sweats abdominal pain abdominal distention to go to ER    Call clinic with any changes in her condition or questions    TARUN Sandy [facility]     Chart documentation with Dragon Voice recognition Software. Although reviewed after completion, some words and grammatical errors may remain.

## 2022-12-29 ENCOUNTER — INFUSION THERAPY VISIT (OUTPATIENT)
Dept: INFUSION THERAPY | Facility: CLINIC | Age: 72
End: 2022-12-29
Attending: INTERNAL MEDICINE
Payer: COMMERCIAL

## 2022-12-29 VITALS
DIASTOLIC BLOOD PRESSURE: 85 MMHG | WEIGHT: 208.2 LBS | SYSTOLIC BLOOD PRESSURE: 125 MMHG | OXYGEN SATURATION: 99 % | HEART RATE: 74 BPM | TEMPERATURE: 98.2 F | BODY MASS INDEX: 29.87 KG/M2 | RESPIRATION RATE: 16 BRPM

## 2022-12-29 DIAGNOSIS — C32.9 MALIGNANT NEOPLASM OF LARYNX (H): Primary | ICD-10-CM

## 2022-12-29 PROCEDURE — 258N000003 HC RX IP 258 OP 636: Performed by: INTERNAL MEDICINE

## 2022-12-29 PROCEDURE — 96413 CHEMO IV INFUSION 1 HR: CPT

## 2022-12-29 PROCEDURE — 250N000011 HC RX IP 250 OP 636: Performed by: INTERNAL MEDICINE

## 2022-12-29 RX ADMIN — SODIUM CHLORIDE 200 MG: 9 INJECTION, SOLUTION INTRAVENOUS at 10:44

## 2022-12-29 RX ADMIN — SODIUM CHLORIDE 250 ML: 9 INJECTION, SOLUTION INTRAVENOUS at 10:44

## 2022-12-29 ASSESSMENT — PAIN SCALES - GENERAL: PAINLEVEL: NO PAIN (0)

## 2022-12-29 NOTE — PROGRESS NOTES
Infusion Nursing Note:  Raj Warren presents today for C4D1 Keytruda.    Patient seen by provider today: No   present during visit today: Not Applicable.    Note: Patient states his eyes have been red, and dry (like sand in his eyes) when he wakes up. Patient states he is using Visine eye drops, which helps. Writer instructed patient to use warm compresses to help with the dryness, and to call our clinic if the symptoms become worse. Patient verbalized understanding to the above.    Intravenous Access:  Peripheral IV placed.    Treatment Conditions:  Lab Results   Component Value Date    HGB 15.3 12/28/2022    WBC 4.1 12/28/2022    ANEU 2.6 06/23/2021    ANEUTAUTO 2.0 12/28/2022     12/28/2022      Lab Results   Component Value Date     12/28/2022    POTASSIUM 4.1 12/28/2022    MAG 2.5 (H) 07/17/2021    CR 1.01 12/28/2022    TELMA 8.9 12/28/2022    BILITOTAL 1.5 (H) 12/28/2022    ALBUMIN 3.8 12/28/2022    ALT 25 12/28/2022    AST 24 12/28/2022     Results reviewed, labs MET treatment parameters, ok to proceed with treatment.    Post Infusion Assessment:  Patient tolerated infusion without incident.  Blood return noted pre and post infusion.  Site patent and intact, free from redness, edema or discomfort.  No evidence of extravasations.  Access discontinued per protocol.     Discharge Plan:   [facility]  Patient will return 1/19/23 for next appointment.   Patient discharged in stable condition accompanied by: self.  Departure Mode: Ambulatory.      Deirdre Whitley RN

## 2023-01-01 ENCOUNTER — ONCOLOGY VISIT (OUTPATIENT)
Dept: ONCOLOGY | Facility: CLINIC | Age: 73
End: 2023-01-01
Attending: NURSE PRACTITIONER
Payer: COMMERCIAL

## 2023-01-01 ENCOUNTER — DOCUMENTATION ONLY (OUTPATIENT)
Dept: ONCOLOGY | Facility: CLINIC | Age: 73
End: 2023-01-01
Payer: COMMERCIAL

## 2023-01-01 ENCOUNTER — INFUSION THERAPY VISIT (OUTPATIENT)
Dept: INFUSION THERAPY | Facility: CLINIC | Age: 73
End: 2023-01-01
Attending: INTERNAL MEDICINE
Payer: COMMERCIAL

## 2023-01-01 ENCOUNTER — MYC MEDICAL ADVICE (OUTPATIENT)
Dept: INTERVENTIONAL RADIOLOGY/VASCULAR | Facility: CLINIC | Age: 73
End: 2023-01-01

## 2023-01-01 ENCOUNTER — ONCOLOGY VISIT (OUTPATIENT)
Dept: ONCOLOGY | Facility: CLINIC | Age: 73
End: 2023-01-01
Attending: INTERNAL MEDICINE
Payer: COMMERCIAL

## 2023-01-01 ENCOUNTER — HOSPITAL ENCOUNTER (OUTPATIENT)
Dept: PET IMAGING | Facility: CLINIC | Age: 73
Discharge: HOME | End: 2023-11-20
Attending: INTERNAL MEDICINE
Payer: COMMERCIAL

## 2023-01-01 ENCOUNTER — DOCUMENTATION ONLY (OUTPATIENT)
Dept: ONCOLOGY | Facility: CLINIC | Age: 73
End: 2023-01-01

## 2023-01-01 ENCOUNTER — APPOINTMENT (OUTPATIENT)
Dept: CT IMAGING | Facility: CLINIC | Age: 73
DRG: 147 | End: 2023-01-01
Attending: PHYSICIAN ASSISTANT
Payer: COMMERCIAL

## 2023-01-01 ENCOUNTER — HOSPITAL ENCOUNTER (OUTPATIENT)
Dept: CT IMAGING | Facility: CLINIC | Age: 73
Discharge: HOME | End: 2023-08-08
Attending: INTERNAL MEDICINE | Admitting: INTERNAL MEDICINE
Payer: COMMERCIAL

## 2023-01-01 ENCOUNTER — MEDICAL CORRESPONDENCE (OUTPATIENT)
Dept: HEALTH INFORMATION MANAGEMENT | Facility: CLINIC | Age: 73
End: 2023-01-01

## 2023-01-01 ENCOUNTER — MYC MEDICAL ADVICE (OUTPATIENT)
Dept: OTOLARYNGOLOGY | Facility: CLINIC | Age: 73
End: 2023-01-01

## 2023-01-01 ENCOUNTER — PATIENT OUTREACH (OUTPATIENT)
Dept: ONCOLOGY | Facility: CLINIC | Age: 73
End: 2023-01-01

## 2023-01-01 ENCOUNTER — PATIENT OUTREACH (OUTPATIENT)
Dept: ONCOLOGY | Facility: CLINIC | Age: 73
End: 2023-01-01
Payer: COMMERCIAL

## 2023-01-01 ENCOUNTER — HOSPITAL ENCOUNTER (INPATIENT)
Facility: CLINIC | Age: 73
LOS: 5 days | Discharge: HOME HEALTH | DRG: 147 | End: 2023-11-19
Attending: PHYSICIAN ASSISTANT | Admitting: HOSPITALIST
Payer: COMMERCIAL

## 2023-01-01 ENCOUNTER — INFUSION THERAPY VISIT (OUTPATIENT)
Dept: INFUSION THERAPY | Facility: CLINIC | Age: 73
End: 2023-01-01
Attending: NURSE PRACTITIONER
Payer: COMMERCIAL

## 2023-01-01 ENCOUNTER — ONCOLOGY VISIT (OUTPATIENT)
Dept: ONCOLOGY | Facility: CLINIC | Age: 73
End: 2023-01-01
Attending: PHYSICIAN ASSISTANT
Payer: COMMERCIAL

## 2023-01-01 ENCOUNTER — VIRTUAL VISIT (OUTPATIENT)
Dept: ONCOLOGY | Facility: CLINIC | Age: 73
End: 2023-01-01
Attending: REGISTERED NURSE
Payer: COMMERCIAL

## 2023-01-01 ENCOUNTER — HOME INFUSION (PRE-WILLOW HOME INFUSION) (OUTPATIENT)
Dept: PHARMACY | Facility: CLINIC | Age: 73
End: 2023-01-01
Payer: COMMERCIAL

## 2023-01-01 ENCOUNTER — LAB REQUISITION (OUTPATIENT)
Dept: LAB | Facility: CLINIC | Age: 73
End: 2023-01-01
Payer: COMMERCIAL

## 2023-01-01 ENCOUNTER — HEALTH MAINTENANCE LETTER (OUTPATIENT)
Age: 73
End: 2023-01-01

## 2023-01-01 ENCOUNTER — APPOINTMENT (OUTPATIENT)
Dept: INTERVENTIONAL RADIOLOGY/VASCULAR | Facility: CLINIC | Age: 73
DRG: 147 | End: 2023-01-01
Attending: PHYSICIAN ASSISTANT
Payer: COMMERCIAL

## 2023-01-01 ENCOUNTER — OFFICE VISIT (OUTPATIENT)
Dept: OTOLARYNGOLOGY | Facility: CLINIC | Age: 73
End: 2023-01-01
Payer: COMMERCIAL

## 2023-01-01 VITALS
RESPIRATION RATE: 16 BRPM | DIASTOLIC BLOOD PRESSURE: 88 MMHG | HEIGHT: 71 IN | BODY MASS INDEX: 29.41 KG/M2 | HEART RATE: 88 BPM | SYSTOLIC BLOOD PRESSURE: 126 MMHG | WEIGHT: 210.1 LBS | TEMPERATURE: 98.4 F

## 2023-01-01 VITALS
SYSTOLIC BLOOD PRESSURE: 100 MMHG | DIASTOLIC BLOOD PRESSURE: 69 MMHG | OXYGEN SATURATION: 98 % | BODY MASS INDEX: 27.69 KG/M2 | HEIGHT: 71 IN | WEIGHT: 197.8 LBS | TEMPERATURE: 97.9 F | HEART RATE: 94 BPM | RESPIRATION RATE: 16 BRPM

## 2023-01-01 VITALS
RESPIRATION RATE: 16 BRPM | SYSTOLIC BLOOD PRESSURE: 141 MMHG | OXYGEN SATURATION: 100 % | TEMPERATURE: 98.5 F | DIASTOLIC BLOOD PRESSURE: 83 MMHG | HEART RATE: 88 BPM

## 2023-01-01 VITALS
DIASTOLIC BLOOD PRESSURE: 82 MMHG | BODY MASS INDEX: 29.01 KG/M2 | RESPIRATION RATE: 16 BRPM | HEART RATE: 63 BPM | WEIGHT: 208 LBS | OXYGEN SATURATION: 99 % | SYSTOLIC BLOOD PRESSURE: 130 MMHG | TEMPERATURE: 98.4 F

## 2023-01-01 VITALS
WEIGHT: 206.2 LBS | SYSTOLIC BLOOD PRESSURE: 132 MMHG | DIASTOLIC BLOOD PRESSURE: 86 MMHG | OXYGEN SATURATION: 100 % | BODY MASS INDEX: 28.76 KG/M2 | RESPIRATION RATE: 16 BRPM | TEMPERATURE: 98.2 F | HEART RATE: 75 BPM

## 2023-01-01 VITALS
SYSTOLIC BLOOD PRESSURE: 137 MMHG | TEMPERATURE: 98.3 F | RESPIRATION RATE: 18 BRPM | HEART RATE: 106 BPM | BODY MASS INDEX: 27.38 KG/M2 | HEIGHT: 71 IN | WEIGHT: 195.55 LBS | OXYGEN SATURATION: 100 % | DIASTOLIC BLOOD PRESSURE: 88 MMHG

## 2023-01-01 VITALS
HEIGHT: 71 IN | SYSTOLIC BLOOD PRESSURE: 117 MMHG | BODY MASS INDEX: 27.24 KG/M2 | RESPIRATION RATE: 16 BRPM | WEIGHT: 194.6 LBS | HEART RATE: 87 BPM | DIASTOLIC BLOOD PRESSURE: 73 MMHG | OXYGEN SATURATION: 99 %

## 2023-01-01 VITALS
SYSTOLIC BLOOD PRESSURE: 127 MMHG | RESPIRATION RATE: 16 BRPM | OXYGEN SATURATION: 100 % | DIASTOLIC BLOOD PRESSURE: 73 MMHG | WEIGHT: 204 LBS | BODY MASS INDEX: 28.45 KG/M2 | HEART RATE: 78 BPM | TEMPERATURE: 98.6 F

## 2023-01-01 VITALS
RESPIRATION RATE: 18 BRPM | TEMPERATURE: 97.8 F | HEART RATE: 101 BPM | DIASTOLIC BLOOD PRESSURE: 85 MMHG | OXYGEN SATURATION: 99 % | SYSTOLIC BLOOD PRESSURE: 128 MMHG

## 2023-01-01 VITALS
BODY MASS INDEX: 29.87 KG/M2 | OXYGEN SATURATION: 100 % | WEIGHT: 208.2 LBS | RESPIRATION RATE: 14 BRPM | SYSTOLIC BLOOD PRESSURE: 134 MMHG | HEART RATE: 69 BPM | DIASTOLIC BLOOD PRESSURE: 82 MMHG | TEMPERATURE: 98.2 F

## 2023-01-01 VITALS
TEMPERATURE: 98.3 F | DIASTOLIC BLOOD PRESSURE: 83 MMHG | SYSTOLIC BLOOD PRESSURE: 128 MMHG | RESPIRATION RATE: 16 BRPM | BODY MASS INDEX: 28.84 KG/M2 | HEART RATE: 65 BPM | WEIGHT: 206.8 LBS | OXYGEN SATURATION: 100 %

## 2023-01-01 VITALS
OXYGEN SATURATION: 100 % | SYSTOLIC BLOOD PRESSURE: 144 MMHG | WEIGHT: 206.4 LBS | BODY MASS INDEX: 28.79 KG/M2 | TEMPERATURE: 98.7 F | RESPIRATION RATE: 16 BRPM | DIASTOLIC BLOOD PRESSURE: 86 MMHG | HEART RATE: 75 BPM

## 2023-01-01 VITALS
OXYGEN SATURATION: 100 % | SYSTOLIC BLOOD PRESSURE: 141 MMHG | TEMPERATURE: 98.5 F | RESPIRATION RATE: 16 BRPM | DIASTOLIC BLOOD PRESSURE: 83 MMHG | HEART RATE: 88 BPM

## 2023-01-01 VITALS
BODY MASS INDEX: 29.18 KG/M2 | WEIGHT: 209.2 LBS | HEART RATE: 63 BPM | RESPIRATION RATE: 16 BRPM | TEMPERATURE: 98.1 F | SYSTOLIC BLOOD PRESSURE: 130 MMHG | DIASTOLIC BLOOD PRESSURE: 84 MMHG | OXYGEN SATURATION: 100 %

## 2023-01-01 VITALS
OXYGEN SATURATION: 99 % | RESPIRATION RATE: 18 BRPM | DIASTOLIC BLOOD PRESSURE: 84 MMHG | WEIGHT: 192 LBS | SYSTOLIC BLOOD PRESSURE: 121 MMHG | HEART RATE: 99 BPM | BODY MASS INDEX: 26.79 KG/M2

## 2023-01-01 VITALS
DIASTOLIC BLOOD PRESSURE: 88 MMHG | HEART RATE: 88 BPM | TEMPERATURE: 98.4 F | BODY MASS INDEX: 30.13 KG/M2 | RESPIRATION RATE: 16 BRPM | WEIGHT: 210 LBS | SYSTOLIC BLOOD PRESSURE: 126 MMHG | OXYGEN SATURATION: 100 %

## 2023-01-01 VITALS — BODY MASS INDEX: 27.14 KG/M2 | HEIGHT: 71 IN

## 2023-01-01 VITALS — OXYGEN SATURATION: 95 % | DIASTOLIC BLOOD PRESSURE: 87 MMHG | HEART RATE: 88 BPM | SYSTOLIC BLOOD PRESSURE: 133 MMHG

## 2023-01-01 VITALS
HEART RATE: 94 BPM | DIASTOLIC BLOOD PRESSURE: 75 MMHG | SYSTOLIC BLOOD PRESSURE: 115 MMHG | OXYGEN SATURATION: 100 % | RESPIRATION RATE: 16 BRPM | TEMPERATURE: 98 F

## 2023-01-01 DIAGNOSIS — C32.0: ICD-10-CM

## 2023-01-01 DIAGNOSIS — C32.9 MALIGNANT NEOPLASM OF LARYNX (H): Primary | ICD-10-CM

## 2023-01-01 DIAGNOSIS — I26.99 PULMONARY EMBOLISM, UNSPECIFIED CHRONICITY, UNSPECIFIED PULMONARY EMBOLISM TYPE, UNSPECIFIED WHETHER ACUTE COR PULMONALE PRESENT (H): ICD-10-CM

## 2023-01-01 DIAGNOSIS — C32.9 MALIGNANT NEOPLASM OF LARYNX (H): ICD-10-CM

## 2023-01-01 DIAGNOSIS — Z93.0 TRACHEOSTOMY IN PLACE (H): ICD-10-CM

## 2023-01-01 DIAGNOSIS — E03.8 OTHER SPECIFIED HYPOTHYROIDISM: ICD-10-CM

## 2023-01-01 DIAGNOSIS — R13.10 DYSPHAGIA, UNSPECIFIED TYPE: ICD-10-CM

## 2023-01-01 DIAGNOSIS — J38.7 LARYNGEAL MASS: ICD-10-CM

## 2023-01-01 DIAGNOSIS — G89.3 CANCER ASSOCIATED PAIN: ICD-10-CM

## 2023-01-01 DIAGNOSIS — C32.9 LARYNGEAL CANCER (H): ICD-10-CM

## 2023-01-01 DIAGNOSIS — I26.02 SADDLE EMBOLUS OF PULMONARY ARTERY WITH ACUTE COR PULMONALE, UNSPECIFIED CHRONICITY (H): ICD-10-CM

## 2023-01-01 DIAGNOSIS — Z86.711 HISTORY OF PULMONARY EMBOLISM: Primary | ICD-10-CM

## 2023-01-01 DIAGNOSIS — C32.9 LARYNGEAL CANCER (H): Primary | ICD-10-CM

## 2023-01-01 DIAGNOSIS — Z93.1 S/P GASTROSTOMY (H): Primary | ICD-10-CM

## 2023-01-01 LAB
ALBUMIN SERPL BCG-MCNC: 4 G/DL (ref 3.5–5.2)
ALBUMIN SERPL BCG-MCNC: 4 G/DL (ref 3.5–5.2)
ALBUMIN SERPL BCG-MCNC: 4.2 G/DL (ref 3.5–5.2)
ALBUMIN SERPL BCG-MCNC: 4.3 G/DL (ref 3.5–5.2)
ALBUMIN SERPL BCG-MCNC: 4.3 G/DL (ref 3.5–5.2)
ALBUMIN SERPL BCG-MCNC: 4.4 G/DL (ref 3.5–5.2)
ALBUMIN SERPL BCG-MCNC: 4.5 G/DL (ref 3.5–5.2)
ALP SERPL-CCNC: 112 U/L (ref 40–150)
ALP SERPL-CCNC: 67 U/L (ref 40–129)
ALP SERPL-CCNC: 70 U/L (ref 40–129)
ALP SERPL-CCNC: 71 U/L (ref 40–129)
ALP SERPL-CCNC: 74 U/L (ref 40–129)
ALP SERPL-CCNC: 75 U/L (ref 40–129)
ALP SERPL-CCNC: 78 U/L (ref 40–129)
ALP SERPL-CCNC: 79 U/L (ref 40–129)
ALP SERPL-CCNC: 88 U/L (ref 40–150)
ALP SERPL-CCNC: 96 U/L (ref 40–150)
ALT SERPL W P-5'-P-CCNC: 14 U/L (ref 0–70)
ALT SERPL W P-5'-P-CCNC: 14 U/L (ref 0–70)
ALT SERPL W P-5'-P-CCNC: 16 U/L (ref 0–70)
ALT SERPL W P-5'-P-CCNC: 16 U/L (ref 0–70)
ALT SERPL W P-5'-P-CCNC: 17 U/L (ref 0–70)
ALT SERPL W P-5'-P-CCNC: 19 U/L (ref 10–50)
ALT SERPL W P-5'-P-CCNC: 20 U/L (ref 0–70)
ALT SERPL W P-5'-P-CCNC: 24 U/L (ref 0–70)
ALT SERPL W P-5'-P-CCNC: 28 U/L (ref 0–70)
ALT SERPL W P-5'-P-CCNC: 31 U/L (ref 0–70)
ANION GAP SERPL CALCULATED.3IONS-SCNC: 11 MMOL/L (ref 7–15)
ANION GAP SERPL CALCULATED.3IONS-SCNC: 12 MMOL/L (ref 7–15)
ANION GAP SERPL CALCULATED.3IONS-SCNC: 13 MMOL/L (ref 7–15)
ANION GAP SERPL CALCULATED.3IONS-SCNC: 14 MMOL/L (ref 7–15)
ANION GAP SERPL CALCULATED.3IONS-SCNC: 15 MMOL/L (ref 7–15)
APTT PPP: 113 SECONDS (ref 22–38)
APTT PPP: 135 SECONDS (ref 22–38)
APTT PPP: 189 SECONDS (ref 22–38)
APTT PPP: 27 SECONDS (ref 22–38)
APTT PPP: 38 SECONDS (ref 22–38)
APTT PPP: 48 SECONDS (ref 22–38)
APTT PPP: 98 SECONDS (ref 22–38)
AST SERPL W P-5'-P-CCNC: 20 U/L (ref 0–45)
AST SERPL W P-5'-P-CCNC: 21 U/L (ref 0–45)
AST SERPL W P-5'-P-CCNC: 22 U/L (ref 0–45)
AST SERPL W P-5'-P-CCNC: 23 U/L (ref 0–45)
AST SERPL W P-5'-P-CCNC: 25 U/L (ref 0–45)
AST SERPL W P-5'-P-CCNC: 25 U/L (ref 0–45)
AST SERPL W P-5'-P-CCNC: 26 U/L (ref 10–50)
AST SERPL W P-5'-P-CCNC: 35 U/L (ref 0–45)
BASOPHILS # BLD AUTO: 0 10E3/UL (ref 0–0.2)
BASOPHILS # BLD AUTO: 0.1 10E3/UL (ref 0–0.2)
BASOPHILS NFR BLD AUTO: 0 %
BASOPHILS NFR BLD AUTO: 1 %
BILIRUB SERPL-MCNC: 0.2 MG/DL
BILIRUB SERPL-MCNC: 0.2 MG/DL
BILIRUB SERPL-MCNC: 0.4 MG/DL
BILIRUB SERPL-MCNC: 0.5 MG/DL
BKR LAB AP ADD'L TEST STATUS: NORMAL
BKR PATH ADDL TEST FINAL COMMENTS: NORMAL
BUN SERPL-MCNC: 10.2 MG/DL (ref 8–23)
BUN SERPL-MCNC: 11 MG/DL (ref 8–23)
BUN SERPL-MCNC: 11.8 MG/DL (ref 8–23)
BUN SERPL-MCNC: 13.4 MG/DL (ref 8–23)
BUN SERPL-MCNC: 14.5 MG/DL (ref 8–23)
BUN SERPL-MCNC: 18.2 MG/DL (ref 8–23)
BUN SERPL-MCNC: 18.4 MG/DL (ref 8–23)
BUN SERPL-MCNC: 6.8 MG/DL (ref 8–23)
BUN SERPL-MCNC: 7.7 MG/DL (ref 8–23)
BUN SERPL-MCNC: 8.6 MG/DL (ref 8–23)
BUN SERPL-MCNC: 8.8 MG/DL (ref 8–23)
BUN SERPL-MCNC: 9.7 MG/DL (ref 8–23)
BUN SERPL-MCNC: 9.9 MG/DL (ref 8–23)
CALCIUM SERPL-MCNC: 8.9 MG/DL (ref 8.8–10.2)
CALCIUM SERPL-MCNC: 9 MG/DL (ref 8.8–10.2)
CALCIUM SERPL-MCNC: 9.1 MG/DL (ref 8.8–10.2)
CALCIUM SERPL-MCNC: 9.1 MG/DL (ref 8.8–10.2)
CALCIUM SERPL-MCNC: 9.3 MG/DL (ref 8.8–10.2)
CALCIUM SERPL-MCNC: 9.3 MG/DL (ref 8.8–10.2)
CALCIUM SERPL-MCNC: 9.4 MG/DL (ref 8.8–10.2)
CALCIUM SERPL-MCNC: 9.4 MG/DL (ref 8.8–10.2)
CALCIUM SERPL-MCNC: 9.5 MG/DL (ref 8.8–10.2)
CALCIUM SERPL-MCNC: 9.6 MG/DL (ref 8.8–10.2)
CALCIUM SERPL-MCNC: 9.6 MG/DL (ref 8.8–10.2)
CHLORIDE SERPL-SCNC: 103 MMOL/L (ref 98–107)
CHLORIDE SERPL-SCNC: 104 MMOL/L (ref 98–107)
CHLORIDE SERPL-SCNC: 104 MMOL/L (ref 98–107)
CHLORIDE SERPL-SCNC: 105 MMOL/L (ref 98–107)
CHLORIDE SERPL-SCNC: 106 MMOL/L (ref 98–107)
CHLORIDE SERPL-SCNC: 106 MMOL/L (ref 98–107)
CHLORIDE SERPL-SCNC: 108 MMOL/L (ref 98–107)
CREAT SERPL-MCNC: 0.7 MG/DL (ref 0.67–1.17)
CREAT SERPL-MCNC: 0.72 MG/DL (ref 0.67–1.17)
CREAT SERPL-MCNC: 0.75 MG/DL (ref 0.67–1.17)
CREAT SERPL-MCNC: 0.81 MG/DL (ref 0.67–1.17)
CREAT SERPL-MCNC: 0.83 MG/DL (ref 0.67–1.17)
CREAT SERPL-MCNC: 0.83 MG/DL (ref 0.67–1.17)
CREAT SERPL-MCNC: 0.93 MG/DL (ref 0.67–1.17)
CREAT SERPL-MCNC: 0.96 MG/DL (ref 0.67–1.17)
CREAT SERPL-MCNC: 0.97 MG/DL (ref 0.67–1.17)
CREAT SERPL-MCNC: 0.98 MG/DL (ref 0.67–1.17)
CREAT SERPL-MCNC: 1 MG/DL (ref 0.67–1.17)
DEPRECATED HCO3 PLAS-SCNC: 18 MMOL/L (ref 22–29)
DEPRECATED HCO3 PLAS-SCNC: 20 MMOL/L (ref 22–29)
DEPRECATED HCO3 PLAS-SCNC: 21 MMOL/L (ref 22–29)
DEPRECATED HCO3 PLAS-SCNC: 21 MMOL/L (ref 22–29)
DEPRECATED HCO3 PLAS-SCNC: 22 MMOL/L (ref 22–29)
DEPRECATED HCO3 PLAS-SCNC: 22 MMOL/L (ref 22–29)
DEPRECATED HCO3 PLAS-SCNC: 23 MMOL/L (ref 22–29)
DEPRECATED HCO3 PLAS-SCNC: 24 MMOL/L (ref 22–29)
DEPRECATED HCO3 PLAS-SCNC: 25 MMOL/L (ref 22–29)
EGFRCR SERPLBLD CKD-EPI 2021: 85 ML/MIN/1.73M2
EGFRCR SERPLBLD CKD-EPI 2021: 88 ML/MIN/1.73M2
EGFRCR SERPLBLD CKD-EPI 2021: >90 ML/MIN/1.73M2
EOSINOPHIL # BLD AUTO: 0 10E3/UL (ref 0–0.7)
EOSINOPHIL # BLD AUTO: 0 10E3/UL (ref 0–0.7)
EOSINOPHIL # BLD AUTO: 0.2 10E3/UL (ref 0–0.7)
EOSINOPHIL # BLD AUTO: 0.3 10E3/UL (ref 0–0.7)
EOSINOPHIL NFR BLD AUTO: 0 %
EOSINOPHIL NFR BLD AUTO: 1 %
EOSINOPHIL NFR BLD AUTO: 3 %
EOSINOPHIL NFR BLD AUTO: 5 %
ERYTHROCYTE [DISTWIDTH] IN BLOOD BY AUTOMATED COUNT: 12.4 % (ref 10–15)
ERYTHROCYTE [DISTWIDTH] IN BLOOD BY AUTOMATED COUNT: 12.6 % (ref 10–15)
ERYTHROCYTE [DISTWIDTH] IN BLOOD BY AUTOMATED COUNT: 12.7 % (ref 10–15)
ERYTHROCYTE [DISTWIDTH] IN BLOOD BY AUTOMATED COUNT: 12.7 % (ref 10–15)
ERYTHROCYTE [DISTWIDTH] IN BLOOD BY AUTOMATED COUNT: 12.9 % (ref 10–15)
ERYTHROCYTE [DISTWIDTH] IN BLOOD BY AUTOMATED COUNT: 13 % (ref 10–15)
ERYTHROCYTE [DISTWIDTH] IN BLOOD BY AUTOMATED COUNT: 13.1 % (ref 10–15)
GFR SERPL CREATININE-BSD FRML MDRD: 80 ML/MIN/1.73M2
GFR SERPL CREATININE-BSD FRML MDRD: 82 ML/MIN/1.73M2
GFR SERPL CREATININE-BSD FRML MDRD: 83 ML/MIN/1.73M2
GLUCOSE SERPL-MCNC: 100 MG/DL (ref 70–99)
GLUCOSE SERPL-MCNC: 103 MG/DL (ref 70–99)
GLUCOSE SERPL-MCNC: 105 MG/DL (ref 70–99)
GLUCOSE SERPL-MCNC: 107 MG/DL (ref 70–99)
GLUCOSE SERPL-MCNC: 111 MG/DL (ref 70–99)
GLUCOSE SERPL-MCNC: 119 MG/DL (ref 70–99)
GLUCOSE SERPL-MCNC: 124 MG/DL (ref 70–99)
GLUCOSE SERPL-MCNC: 77 MG/DL (ref 70–99)
GLUCOSE SERPL-MCNC: 85 MG/DL (ref 70–99)
GLUCOSE SERPL-MCNC: 87 MG/DL (ref 70–99)
GLUCOSE SERPL-MCNC: 92 MG/DL (ref 70–99)
GLUCOSE SERPL-MCNC: 93 MG/DL (ref 70–99)
GLUCOSE SERPL-MCNC: 97 MG/DL (ref 70–99)
HCT VFR BLD AUTO: 39.3 % (ref 40–53)
HCT VFR BLD AUTO: 39.7 % (ref 40–53)
HCT VFR BLD AUTO: 42.5 % (ref 40–53)
HCT VFR BLD AUTO: 42.8 % (ref 40–53)
HCT VFR BLD AUTO: 43.2 % (ref 40–53)
HCT VFR BLD AUTO: 43.5 % (ref 40–53)
HCT VFR BLD AUTO: 48.1 % (ref 40–53)
HGB BLD-MCNC: 13.3 G/DL (ref 13.3–17.7)
HGB BLD-MCNC: 13.5 G/DL (ref 13.3–17.7)
HGB BLD-MCNC: 14.4 G/DL (ref 13.3–17.7)
HGB BLD-MCNC: 14.8 G/DL (ref 13.3–17.7)
HGB BLD-MCNC: 14.8 G/DL (ref 13.3–17.7)
HGB BLD-MCNC: 15.1 G/DL (ref 13.3–17.7)
HGB BLD-MCNC: 16.3 G/DL (ref 13.3–17.7)
HOLD SPECIMEN: NORMAL
HOLD SPECIMEN: NORMAL
IMM GRANULOCYTES # BLD: 0 10E3/UL
IMM GRANULOCYTES NFR BLD: 0 %
INR PPP: 1.31 (ref 0.85–1.15)
LYMPHOCYTES # BLD AUTO: 0.8 10E3/UL (ref 0.8–5.3)
LYMPHOCYTES # BLD AUTO: 1.1 10E3/UL (ref 0.8–5.3)
LYMPHOCYTES # BLD AUTO: 1.2 10E3/UL (ref 0.8–5.3)
LYMPHOCYTES # BLD AUTO: 1.3 10E3/UL (ref 0.8–5.3)
LYMPHOCYTES NFR BLD AUTO: 14 %
LYMPHOCYTES NFR BLD AUTO: 18 %
LYMPHOCYTES NFR BLD AUTO: 24 %
LYMPHOCYTES NFR BLD AUTO: 25 %
MAGNESIUM SERPL-MCNC: 2.1 MG/DL (ref 1.7–2.3)
MAGNESIUM SERPL-MCNC: 2.3 MG/DL (ref 1.7–2.3)
MCH RBC QN AUTO: 30 PG (ref 26.5–33)
MCH RBC QN AUTO: 30 PG (ref 26.5–33)
MCH RBC QN AUTO: 30.2 PG (ref 26.5–33)
MCH RBC QN AUTO: 30.3 PG (ref 26.5–33)
MCH RBC QN AUTO: 30.4 PG (ref 26.5–33)
MCH RBC QN AUTO: 30.8 PG (ref 26.5–33)
MCH RBC QN AUTO: 30.9 PG (ref 26.5–33)
MCHC RBC AUTO-ENTMCNC: 33.6 G/DL (ref 31.5–36.5)
MCHC RBC AUTO-ENTMCNC: 33.8 G/DL (ref 31.5–36.5)
MCHC RBC AUTO-ENTMCNC: 33.9 G/DL (ref 31.5–36.5)
MCHC RBC AUTO-ENTMCNC: 34 G/DL (ref 31.5–36.5)
MCHC RBC AUTO-ENTMCNC: 34 G/DL (ref 31.5–36.5)
MCHC RBC AUTO-ENTMCNC: 34.8 G/DL (ref 31.5–36.5)
MCHC RBC AUTO-ENTMCNC: 35 G/DL (ref 31.5–36.5)
MCV RBC AUTO: 88 FL (ref 78–100)
MCV RBC AUTO: 88 FL (ref 78–100)
MCV RBC AUTO: 89 FL (ref 78–100)
MCV RBC AUTO: 90 FL (ref 78–100)
MONOCYTES # BLD AUTO: 0.2 10E3/UL (ref 0–1.3)
MONOCYTES # BLD AUTO: 0.5 10E3/UL (ref 0–1.3)
MONOCYTES # BLD AUTO: 0.6 10E3/UL (ref 0–1.3)
MONOCYTES # BLD AUTO: 0.7 10E3/UL (ref 0–1.3)
MONOCYTES NFR BLD AUTO: 10 %
MONOCYTES NFR BLD AUTO: 10 %
MONOCYTES NFR BLD AUTO: 11 %
MONOCYTES NFR BLD AUTO: 4 %
NEUTROPHILS # BLD AUTO: 3.2 10E3/UL (ref 1.6–8.3)
NEUTROPHILS # BLD AUTO: 3.4 10E3/UL (ref 1.6–8.3)
NEUTROPHILS # BLD AUTO: 4.1 10E3/UL (ref 1.6–8.3)
NEUTROPHILS # BLD AUTO: 4.4 10E3/UL (ref 1.6–8.3)
NEUTROPHILS NFR BLD AUTO: 60 %
NEUTROPHILS NFR BLD AUTO: 63 %
NEUTROPHILS NFR BLD AUTO: 67 %
NEUTROPHILS NFR BLD AUTO: 82 %
NRBC # BLD AUTO: 0 10E3/UL
NRBC BLD AUTO-RTO: 0 /100
PHOSPHATE SERPL-MCNC: 3.6 MG/DL (ref 2.5–4.5)
PHOSPHATE SERPL-MCNC: 3.6 MG/DL (ref 2.5–4.5)
PLATELET # BLD AUTO: 210 10E3/UL (ref 150–450)
PLATELET # BLD AUTO: 217 10E3/UL (ref 150–450)
PLATELET # BLD AUTO: 229 10E3/UL (ref 150–450)
PLATELET # BLD AUTO: 246 10E3/UL (ref 150–450)
PLATELET # BLD AUTO: 278 10E3/UL (ref 150–450)
PLATELET # BLD AUTO: 289 10E3/UL (ref 150–450)
PLATELET # BLD AUTO: 334 10E3/UL (ref 150–450)
POTASSIUM SERPL-SCNC: 3.8 MMOL/L (ref 3.4–5.3)
POTASSIUM SERPL-SCNC: 3.8 MMOL/L (ref 3.4–5.3)
POTASSIUM SERPL-SCNC: 4 MMOL/L (ref 3.4–5.3)
POTASSIUM SERPL-SCNC: 4.3 MMOL/L (ref 3.4–5.3)
POTASSIUM SERPL-SCNC: 4.3 MMOL/L (ref 3.4–5.3)
POTASSIUM SERPL-SCNC: 4.4 MMOL/L (ref 3.4–5.3)
POTASSIUM SERPL-SCNC: 4.6 MMOL/L (ref 3.4–5.3)
POTASSIUM SERPL-SCNC: 4.6 MMOL/L (ref 3.4–5.3)
POTASSIUM SERPL-SCNC: 4.7 MMOL/L (ref 3.4–5.3)
POTASSIUM SERPL-SCNC: 4.8 MMOL/L (ref 3.4–5.3)
POTASSIUM SERPL-SCNC: 5.3 MMOL/L (ref 3.4–5.3)
PROT SERPL-MCNC: 6.9 G/DL (ref 6.4–8.3)
PROT SERPL-MCNC: 7.1 G/DL (ref 6.4–8.3)
PROT SERPL-MCNC: 7.2 G/DL (ref 6.4–8.3)
PROT SERPL-MCNC: 7.3 G/DL (ref 6.4–8.3)
PROT SERPL-MCNC: 7.5 G/DL (ref 6.4–8.3)
PROT SERPL-MCNC: 7.6 G/DL (ref 6.4–8.3)
RBC # BLD AUTO: 4.41 10E6/UL (ref 4.4–5.9)
RBC # BLD AUTO: 4.5 10E6/UL (ref 4.4–5.9)
RBC # BLD AUTO: 4.8 10E6/UL (ref 4.4–5.9)
RBC # BLD AUTO: 4.8 10E6/UL (ref 4.4–5.9)
RBC # BLD AUTO: 4.89 10E6/UL (ref 4.4–5.9)
RBC # BLD AUTO: 4.89 10E6/UL (ref 4.4–5.9)
RBC # BLD AUTO: 5.37 10E6/UL (ref 4.4–5.9)
SODIUM SERPL-SCNC: 137 MMOL/L (ref 135–145)
SODIUM SERPL-SCNC: 138 MMOL/L (ref 135–145)
SODIUM SERPL-SCNC: 138 MMOL/L (ref 136–145)
SODIUM SERPL-SCNC: 138 MMOL/L (ref 136–145)
SODIUM SERPL-SCNC: 139 MMOL/L (ref 135–145)
SODIUM SERPL-SCNC: 139 MMOL/L (ref 136–145)
SODIUM SERPL-SCNC: 139 MMOL/L (ref 136–145)
SODIUM SERPL-SCNC: 140 MMOL/L (ref 136–145)
SODIUM SERPL-SCNC: 140 MMOL/L (ref 136–145)
T4 FREE SERPL-MCNC: 1.08 NG/DL (ref 0.9–1.7)
T4 FREE SERPL-MCNC: 1.11 NG/DL (ref 0.9–1.7)
TSH SERPL DL<=0.005 MIU/L-ACNC: 1.53 UIU/ML (ref 0.3–4.2)
TSH SERPL DL<=0.005 MIU/L-ACNC: 2.12 UIU/ML (ref 0.3–4.2)
TSH SERPL DL<=0.005 MIU/L-ACNC: 2.16 UIU/ML (ref 0.3–4.2)
TSH SERPL DL<=0.005 MIU/L-ACNC: 2.16 UIU/ML (ref 0.3–4.2)
TSH SERPL DL<=0.005 MIU/L-ACNC: 2.2 UIU/ML (ref 0.3–4.2)
TSH SERPL DL<=0.005 MIU/L-ACNC: 2.73 UIU/ML (ref 0.3–4.2)
TSH SERPL DL<=0.005 MIU/L-ACNC: 3.64 UIU/ML (ref 0.3–4.2)
TSH SERPL DL<=0.005 MIU/L-ACNC: 5.03 UIU/ML (ref 0.3–4.2)
TSH SERPL DL<=0.005 MIU/L-ACNC: 6.73 UIU/ML (ref 0.3–4.2)
WBC # BLD AUTO: 4.2 10E3/UL (ref 4–11)
WBC # BLD AUTO: 5 10E3/UL (ref 4–11)
WBC # BLD AUTO: 5.1 10E3/UL (ref 4–11)
WBC # BLD AUTO: 5.5 10E3/UL (ref 4–11)
WBC # BLD AUTO: 5.6 10E3/UL (ref 4–11)
WBC # BLD AUTO: 6.1 10E3/UL (ref 4–11)
WBC # BLD AUTO: 7.3 10E3/UL (ref 4–11)

## 2023-01-01 PROCEDURE — 250N000009 HC RX 250: Performed by: HOSPITALIST

## 2023-01-01 PROCEDURE — 82310 ASSAY OF CALCIUM: CPT | Performed by: HOSPITALIST

## 2023-01-01 PROCEDURE — 84132 ASSAY OF SERUM POTASSIUM: CPT | Performed by: STUDENT IN AN ORGANIZED HEALTH CARE EDUCATION/TRAINING PROGRAM

## 2023-01-01 PROCEDURE — 84100 ASSAY OF PHOSPHORUS: CPT | Performed by: INTERNAL MEDICINE

## 2023-01-01 PROCEDURE — 258N000003 HC RX IP 258 OP 636: Performed by: INTERNAL MEDICINE

## 2023-01-01 PROCEDURE — 99233 SBSQ HOSP IP/OBS HIGH 50: CPT | Performed by: PHYSICIAN ASSISTANT

## 2023-01-01 PROCEDURE — 250N000013 HC RX MED GY IP 250 OP 250 PS 637: Performed by: STUDENT IN AN ORGANIZED HEALTH CARE EDUCATION/TRAINING PROGRAM

## 2023-01-01 PROCEDURE — 120N000001 HC R&B MED SURG/OB

## 2023-01-01 PROCEDURE — 96413 CHEMO IV INFUSION 1 HR: CPT

## 2023-01-01 PROCEDURE — 258N000003 HC RX IP 258 OP 636: Performed by: HOSPITALIST

## 2023-01-01 PROCEDURE — 85027 COMPLETE CBC AUTOMATED: CPT | Performed by: HOSPITALIST

## 2023-01-01 PROCEDURE — 80053 COMPREHEN METABOLIC PANEL: CPT | Performed by: INTERNAL MEDICINE

## 2023-01-01 PROCEDURE — 36415 COLL VENOUS BLD VENIPUNCTURE: CPT | Performed by: INTERNAL MEDICINE

## 2023-01-01 PROCEDURE — 250N000009 HC RX 250: Performed by: PHYSICIAN ASSISTANT

## 2023-01-01 PROCEDURE — 96367 TX/PROPH/DG ADDL SEQ IV INF: CPT

## 2023-01-01 PROCEDURE — 0DH63UZ INSERTION OF FEEDING DEVICE INTO STOMACH, PERCUTANEOUS APPROACH: ICD-10-PCS | Performed by: NURSE PRACTITIONER

## 2023-01-01 PROCEDURE — 96360 HYDRATION IV INFUSION INIT: CPT

## 2023-01-01 PROCEDURE — 85730 THROMBOPLASTIN TIME PARTIAL: CPT | Performed by: HOSPITALIST

## 2023-01-01 PROCEDURE — 99214 OFFICE O/P EST MOD 30 MIN: CPT | Performed by: INTERNAL MEDICINE

## 2023-01-01 PROCEDURE — 258N000003 HC RX IP 258 OP 636: Performed by: NURSE PRACTITIONER

## 2023-01-01 PROCEDURE — 99233 SBSQ HOSP IP/OBS HIGH 50: CPT | Performed by: INTERNAL MEDICINE

## 2023-01-01 PROCEDURE — 250N000011 HC RX IP 250 OP 636: Performed by: INTERNAL MEDICINE

## 2023-01-01 PROCEDURE — 258N000003 HC RX IP 258 OP 636: Performed by: PHYSICIAN ASSISTANT

## 2023-01-01 PROCEDURE — 36415 COLL VENOUS BLD VENIPUNCTURE: CPT

## 2023-01-01 PROCEDURE — G0463 HOSPITAL OUTPT CLINIC VISIT: HCPCS | Performed by: INTERNAL MEDICINE

## 2023-01-01 PROCEDURE — G0463 HOSPITAL OUTPT CLINIC VISIT: HCPCS | Mod: 25 | Performed by: INTERNAL MEDICINE

## 2023-01-01 PROCEDURE — C1769 GUIDE WIRE: HCPCS

## 2023-01-01 PROCEDURE — 82310 ASSAY OF CALCIUM: CPT | Performed by: INTERNAL MEDICINE

## 2023-01-01 PROCEDURE — 36415 COLL VENOUS BLD VENIPUNCTURE: CPT | Performed by: HOSPITALIST

## 2023-01-01 PROCEDURE — 999N000157 HC STATISTIC RCP TIME EA 10 MIN

## 2023-01-01 PROCEDURE — 250N000011 HC RX IP 250 OP 636: Mod: JZ | Performed by: HOSPITALIST

## 2023-01-01 PROCEDURE — 99215 OFFICE O/P EST HI 40 MIN: CPT | Performed by: INTERNAL MEDICINE

## 2023-01-01 PROCEDURE — 250N000011 HC RX IP 250 OP 636: Mod: JZ | Performed by: INTERNAL MEDICINE

## 2023-01-01 PROCEDURE — 85004 AUTOMATED DIFF WBC COUNT: CPT | Performed by: PHYSICIAN ASSISTANT

## 2023-01-01 PROCEDURE — 96375 TX/PRO/DX INJ NEW DRUG ADDON: CPT

## 2023-01-01 PROCEDURE — 84443 ASSAY THYROID STIM HORMONE: CPT | Performed by: INTERNAL MEDICINE

## 2023-01-01 PROCEDURE — 70491 CT SOFT TISSUE NECK W/DYE: CPT

## 2023-01-01 PROCEDURE — G0463 HOSPITAL OUTPT CLINIC VISIT: HCPCS | Mod: 25 | Performed by: PHYSICIAN ASSISTANT

## 2023-01-01 PROCEDURE — 272N000187 HC ACCESSORY CR11

## 2023-01-01 PROCEDURE — 83735 ASSAY OF MAGNESIUM: CPT | Performed by: INTERNAL MEDICINE

## 2023-01-01 PROCEDURE — 71250 CT THORAX DX C-: CPT

## 2023-01-01 PROCEDURE — 250N000013 HC RX MED GY IP 250 OP 250 PS 637: Performed by: HOSPITALIST

## 2023-01-01 PROCEDURE — 99233 SBSQ HOSP IP/OBS HIGH 50: CPT | Performed by: HOSPITALIST

## 2023-01-01 PROCEDURE — 99222 1ST HOSP IP/OBS MODERATE 55: CPT | Performed by: HOSPITALIST

## 2023-01-01 PROCEDURE — G0463 HOSPITAL OUTPT CLINIC VISIT: HCPCS | Mod: 25 | Performed by: NURSE PRACTITIONER

## 2023-01-01 PROCEDURE — 85025 COMPLETE CBC W/AUTO DIFF WBC: CPT | Performed by: INTERNAL MEDICINE

## 2023-01-01 PROCEDURE — 250N000011 HC RX IP 250 OP 636: Mod: JZ | Performed by: PHYSICIAN ASSISTANT

## 2023-01-01 PROCEDURE — A9552 F18 FDG: HCPCS | Performed by: INTERNAL MEDICINE

## 2023-01-01 PROCEDURE — 85027 COMPLETE CBC AUTOMATED: CPT | Performed by: INTERNAL MEDICINE

## 2023-01-01 PROCEDURE — 343N000001 HC RX 343: Performed by: INTERNAL MEDICINE

## 2023-01-01 PROCEDURE — 99285 EMERGENCY DEPT VISIT HI MDM: CPT | Mod: 25

## 2023-01-01 PROCEDURE — 80048 BASIC METABOLIC PNL TOTAL CA: CPT | Performed by: HOSPITALIST

## 2023-01-01 PROCEDURE — 250N000009 HC RX 250: Performed by: RADIOLOGY

## 2023-01-01 PROCEDURE — 74177 CT ABD & PELVIS W/CONTRAST: CPT

## 2023-01-01 PROCEDURE — 99215 OFFICE O/P EST HI 40 MIN: CPT | Performed by: NURSE PRACTITIONER

## 2023-01-01 PROCEDURE — 96415 CHEMO IV INFUSION ADDL HR: CPT

## 2023-01-01 PROCEDURE — 99232 SBSQ HOSP IP/OBS MODERATE 35: CPT | Performed by: HOSPITALIST

## 2023-01-01 PROCEDURE — 84439 ASSAY OF FREE THYROXINE: CPT | Performed by: INTERNAL MEDICINE

## 2023-01-01 PROCEDURE — 96361 HYDRATE IV INFUSION ADD-ON: CPT

## 2023-01-01 PROCEDURE — 80053 COMPREHEN METABOLIC PANEL: CPT | Performed by: NURSE PRACTITIONER

## 2023-01-01 PROCEDURE — 96374 THER/PROPH/DIAG INJ IV PUSH: CPT | Mod: 59

## 2023-01-01 PROCEDURE — 99214 OFFICE O/P EST MOD 30 MIN: CPT | Performed by: PHYSICIAN ASSISTANT

## 2023-01-01 PROCEDURE — 99213 OFFICE O/P EST LOW 20 MIN: CPT | Performed by: OTOLARYNGOLOGY

## 2023-01-01 PROCEDURE — 85610 PROTHROMBIN TIME: CPT | Performed by: PHYSICIAN ASSISTANT

## 2023-01-01 PROCEDURE — 250N000011 HC RX IP 250 OP 636: Mod: JZ | Performed by: NURSE PRACTITIONER

## 2023-01-01 PROCEDURE — 99152 MOD SED SAME PHYS/QHP 5/>YRS: CPT

## 2023-01-01 PROCEDURE — 78815 PET IMAGE W/CT SKULL-THIGH: CPT | Mod: PS

## 2023-01-01 PROCEDURE — 272N000116 HC CATH CR1

## 2023-01-01 PROCEDURE — 84132 ASSAY OF SERUM POTASSIUM: CPT | Performed by: HOSPITALIST

## 2023-01-01 PROCEDURE — 36415 COLL VENOUS BLD VENIPUNCTURE: CPT | Performed by: NURSE PRACTITIONER

## 2023-01-01 PROCEDURE — 99214 OFFICE O/P EST MOD 30 MIN: CPT | Performed by: NURSE PRACTITIONER

## 2023-01-01 PROCEDURE — 96417 CHEMO IV INFUS EACH ADDL SEQ: CPT

## 2023-01-01 PROCEDURE — 82374 ASSAY BLOOD CARBON DIOXIDE: CPT | Performed by: INTERNAL MEDICINE

## 2023-01-01 PROCEDURE — 99213 OFFICE O/P EST LOW 20 MIN: CPT | Performed by: REGISTERED NURSE

## 2023-01-01 PROCEDURE — 99232 SBSQ HOSP IP/OBS MODERATE 35: CPT | Performed by: STUDENT IN AN ORGANIZED HEALTH CARE EDUCATION/TRAINING PROGRAM

## 2023-01-01 PROCEDURE — G0463 HOSPITAL OUTPT CLINIC VISIT: HCPCS | Performed by: NURSE PRACTITIONER

## 2023-01-01 PROCEDURE — 250N000011 HC RX IP 250 OP 636: Performed by: RADIOLOGY

## 2023-01-01 PROCEDURE — 99239 HOSP IP/OBS DSCHRG MGMT >30: CPT | Performed by: HOSPITALIST

## 2023-01-01 PROCEDURE — 36415 COLL VENOUS BLD VENIPUNCTURE: CPT | Performed by: PHYSICIAN ASSISTANT

## 2023-01-01 PROCEDURE — 250N000011 HC RX IP 250 OP 636: Performed by: PHYSICIAN ASSISTANT

## 2023-01-01 PROCEDURE — 85025 COMPLETE CBC W/AUTO DIFF WBC: CPT | Performed by: HOSPITALIST

## 2023-01-01 PROCEDURE — 84443 ASSAY THYROID STIM HORMONE: CPT | Performed by: NURSE PRACTITIONER

## 2023-01-01 PROCEDURE — 80048 BASIC METABOLIC PNL TOTAL CA: CPT | Performed by: PHYSICIAN ASSISTANT

## 2023-01-01 RX ORDER — ALBUTEROL SULFATE 90 UG/1
1-2 AEROSOL, METERED RESPIRATORY (INHALATION)
Status: CANCELLED
Start: 2023-01-01

## 2023-01-01 RX ORDER — DIPHENHYDRAMINE HYDROCHLORIDE 50 MG/ML
50 INJECTION INTRAMUSCULAR; INTRAVENOUS
Status: CANCELLED
Start: 2023-01-01

## 2023-01-01 RX ORDER — DIPHENHYDRAMINE HCL 25 MG
50 CAPSULE ORAL ONCE
Status: CANCELLED
Start: 2023-01-01

## 2023-01-01 RX ORDER — ALBUTEROL SULFATE 0.83 MG/ML
2.5 SOLUTION RESPIRATORY (INHALATION)
Status: CANCELLED | OUTPATIENT
Start: 2023-01-01

## 2023-01-01 RX ORDER — METHYLPREDNISOLONE SODIUM SUCCINATE 125 MG/2ML
125 INJECTION, POWDER, LYOPHILIZED, FOR SOLUTION INTRAMUSCULAR; INTRAVENOUS
Status: CANCELLED
Start: 2023-01-01

## 2023-01-01 RX ORDER — MEPERIDINE HYDROCHLORIDE 25 MG/ML
25 INJECTION INTRAMUSCULAR; INTRAVENOUS; SUBCUTANEOUS EVERY 30 MIN PRN
Status: CANCELLED | OUTPATIENT
Start: 2023-01-01

## 2023-01-01 RX ORDER — HEPARIN SODIUM,PORCINE 10 UNIT/ML
5 VIAL (ML) INTRAVENOUS
Status: CANCELLED | OUTPATIENT
Start: 2023-01-01

## 2023-01-01 RX ORDER — PALONOSETRON 0.05 MG/ML
0.25 INJECTION, SOLUTION INTRAVENOUS ONCE
Status: CANCELLED | OUTPATIENT
Start: 2023-01-01

## 2023-01-01 RX ORDER — EPINEPHRINE 1 MG/ML
0.3 INJECTION, SOLUTION INTRAMUSCULAR; SUBCUTANEOUS EVERY 5 MIN PRN
Status: CANCELLED | OUTPATIENT
Start: 2023-01-01

## 2023-01-01 RX ORDER — HEPARIN SODIUM (PORCINE) LOCK FLUSH IV SOLN 100 UNIT/ML 100 UNIT/ML
5 SOLUTION INTRAVENOUS
Status: CANCELLED | OUTPATIENT
Start: 2023-01-01

## 2023-01-01 RX ORDER — LIDOCAINE HYDROCHLORIDE 20 MG/ML
JELLY TOPICAL ONCE
Status: COMPLETED | OUTPATIENT
Start: 2023-01-01 | End: 2023-01-01

## 2023-01-01 RX ORDER — PALONOSETRON 0.05 MG/ML
0.25 INJECTION, SOLUTION INTRAVENOUS ONCE
Status: COMPLETED | OUTPATIENT
Start: 2023-01-01 | End: 2023-01-01

## 2023-01-01 RX ORDER — OXYCODONE HCL 5 MG/5 ML
5 SOLUTION, ORAL ORAL EVERY 6 HOURS PRN
Qty: 120 ML | Refills: 0 | Status: SHIPPED | OUTPATIENT
Start: 2023-01-01 | End: 2023-01-01

## 2023-01-01 RX ORDER — ACETAMINOPHEN 325 MG/1
650 TABLET ORAL EVERY 4 HOURS PRN
Status: DISCONTINUED | OUTPATIENT
Start: 2023-01-01 | End: 2023-01-01 | Stop reason: HOSPADM

## 2023-01-01 RX ORDER — ONDANSETRON 8 MG/1
8 TABLET, FILM COATED ORAL EVERY 8 HOURS PRN
Qty: 30 TABLET | Refills: 2 | Status: ON HOLD | OUTPATIENT
Start: 2023-01-01 | End: 2024-01-01

## 2023-01-01 RX ORDER — FENTANYL CITRATE 0.05 MG/ML
25-50 INJECTION, SOLUTION INTRAMUSCULAR; INTRAVENOUS EVERY 5 MIN PRN
Status: DISCONTINUED | OUTPATIENT
Start: 2023-01-01 | End: 2023-01-01

## 2023-01-01 RX ORDER — HYDROMORPHONE HCL IN WATER/PF 6 MG/30 ML
0.2 PATIENT CONTROLLED ANALGESIA SYRINGE INTRAVENOUS
Status: DISCONTINUED | OUTPATIENT
Start: 2023-01-01 | End: 2023-01-01 | Stop reason: HOSPADM

## 2023-01-01 RX ORDER — NALOXONE HYDROCHLORIDE 0.4 MG/ML
0.2 INJECTION, SOLUTION INTRAMUSCULAR; INTRAVENOUS; SUBCUTANEOUS
Status: DISCONTINUED | OUTPATIENT
Start: 2023-01-01 | End: 2023-01-01 | Stop reason: HOSPADM

## 2023-01-01 RX ORDER — HEPARIN SODIUM 10000 [USP'U]/100ML
0-5000 INJECTION, SOLUTION INTRAVENOUS CONTINUOUS
Status: DISCONTINUED | OUTPATIENT
Start: 2023-01-01 | End: 2023-01-01

## 2023-01-01 RX ORDER — ENOXAPARIN SODIUM 100 MG/ML
1 INJECTION SUBCUTANEOUS 2 TIMES DAILY
Qty: 96 ML | Refills: 0 | Status: ON HOLD | OUTPATIENT
Start: 2023-01-01 | End: 2023-01-01

## 2023-01-01 RX ORDER — NALOXONE HYDROCHLORIDE 0.4 MG/ML
0.4 INJECTION, SOLUTION INTRAMUSCULAR; INTRAVENOUS; SUBCUTANEOUS
Status: DISCONTINUED | OUTPATIENT
Start: 2023-01-01 | End: 2023-01-01

## 2023-01-01 RX ORDER — NALOXONE HYDROCHLORIDE 0.4 MG/ML
0.4 INJECTION, SOLUTION INTRAMUSCULAR; INTRAVENOUS; SUBCUTANEOUS
Status: DISCONTINUED | OUTPATIENT
Start: 2023-01-01 | End: 2023-01-01 | Stop reason: HOSPADM

## 2023-01-01 RX ORDER — PROCHLORPERAZINE MALEATE 10 MG
10 TABLET ORAL EVERY 6 HOURS PRN
Qty: 30 TABLET | Refills: 2 | Status: ON HOLD | OUTPATIENT
Start: 2023-01-01 | End: 2024-01-01

## 2023-01-01 RX ORDER — LORAZEPAM 2 MG/ML
0.5 INJECTION INTRAMUSCULAR EVERY 4 HOURS PRN
Status: CANCELLED | OUTPATIENT
Start: 2023-01-01

## 2023-01-01 RX ORDER — IOPAMIDOL 755 MG/ML
90 INJECTION, SOLUTION INTRAVASCULAR ONCE
Status: COMPLETED | OUTPATIENT
Start: 2023-01-01 | End: 2023-01-01

## 2023-01-01 RX ORDER — BISACODYL 10 MG
10 SUPPOSITORY, RECTAL RECTAL DAILY PRN
Status: DISCONTINUED | OUTPATIENT
Start: 2023-01-01 | End: 2023-01-01 | Stop reason: HOSPADM

## 2023-01-01 RX ORDER — IOPAMIDOL 755 MG/ML
10-135 INJECTION, SOLUTION INTRAVASCULAR ONCE
Status: COMPLETED | OUTPATIENT
Start: 2023-01-01 | End: 2023-01-01

## 2023-01-01 RX ORDER — HEPARIN SODIUM,PORCINE 10 UNIT/ML
5-20 VIAL (ML) INTRAVENOUS DAILY PRN
Status: CANCELLED | OUTPATIENT
Start: 2023-01-01

## 2023-01-01 RX ORDER — FLUMAZENIL 0.1 MG/ML
0.2 INJECTION, SOLUTION INTRAVENOUS
Status: DISCONTINUED | OUTPATIENT
Start: 2023-01-01 | End: 2023-01-01

## 2023-01-01 RX ORDER — DEXAMETHASONE SODIUM PHOSPHATE 4 MG/ML
10 INJECTION, SOLUTION INTRA-ARTICULAR; INTRALESIONAL; INTRAMUSCULAR; INTRAVENOUS; SOFT TISSUE ONCE
Status: COMPLETED | OUTPATIENT
Start: 2023-01-01 | End: 2023-01-01

## 2023-01-01 RX ORDER — IOPAMIDOL 755 MG/ML
103 INJECTION, SOLUTION INTRAVASCULAR ONCE
Status: DISCONTINUED | OUTPATIENT
Start: 2023-01-01 | End: 2023-01-01

## 2023-01-01 RX ORDER — NALOXONE HYDROCHLORIDE 0.4 MG/ML
0.2 INJECTION, SOLUTION INTRAMUSCULAR; INTRAVENOUS; SUBCUTANEOUS
Status: DISCONTINUED | OUTPATIENT
Start: 2023-01-01 | End: 2023-01-01

## 2023-01-01 RX ORDER — ACETAMINOPHEN 325 MG/1
650 TABLET ORAL EVERY 4 HOURS PRN
Status: ON HOLD
Start: 2023-01-01 | End: 2024-01-01

## 2023-01-01 RX ORDER — LEVOTHYROXINE SODIUM 75 UG/1
75 TABLET ORAL DAILY
Status: DISCONTINUED | OUTPATIENT
Start: 2023-01-01 | End: 2023-01-01 | Stop reason: HOSPADM

## 2023-01-01 RX ORDER — DEXTROSE MONOHYDRATE 100 MG/ML
INJECTION, SOLUTION INTRAVENOUS CONTINUOUS PRN
Status: DISCONTINUED | OUTPATIENT
Start: 2023-01-01 | End: 2023-01-01 | Stop reason: HOSPADM

## 2023-01-01 RX ORDER — DEXAMETHASONE 4 MG/1
8 TABLET ORAL DAILY
Qty: 6 TABLET | Refills: 5 | Status: ON HOLD | OUTPATIENT
Start: 2023-01-01 | End: 2024-01-01

## 2023-01-01 RX ORDER — METOPROLOL TARTRATE 1 MG/ML
2.5 INJECTION, SOLUTION INTRAVENOUS EVERY 6 HOURS
Status: DISCONTINUED | OUTPATIENT
Start: 2023-01-01 | End: 2023-01-01

## 2023-01-01 RX ORDER — METOPROLOL TARTRATE 25 MG/1
25 TABLET, FILM COATED ORAL 2 TIMES DAILY
Status: DISCONTINUED | OUTPATIENT
Start: 2023-01-01 | End: 2023-01-01 | Stop reason: HOSPADM

## 2023-01-01 RX ORDER — ACETAMINOPHEN 650 MG/1
650 SUPPOSITORY RECTAL EVERY 4 HOURS PRN
Status: DISCONTINUED | OUTPATIENT
Start: 2023-01-01 | End: 2023-01-01 | Stop reason: HOSPADM

## 2023-01-01 RX ORDER — CEFAZOLIN SODIUM 2 G/100ML
2 INJECTION, SOLUTION INTRAVENOUS
Status: COMPLETED | OUTPATIENT
Start: 2023-01-01 | End: 2023-01-01

## 2023-01-01 RX ORDER — DEXTROSE MONOHYDRATE, SODIUM CHLORIDE, AND POTASSIUM CHLORIDE 50; 1.49; 4.5 G/1000ML; G/1000ML; G/1000ML
INJECTION, SOLUTION INTRAVENOUS CONTINUOUS
Status: DISCONTINUED | OUTPATIENT
Start: 2023-01-01 | End: 2023-01-01 | Stop reason: HOSPADM

## 2023-01-01 RX ADMIN — PALONOSETRON HYDROCHLORIDE 0.25 MG: 0.25 INJECTION INTRAVENOUS at 08:28

## 2023-01-01 RX ADMIN — DIPHENHYDRAMINE HYDROCHLORIDE 50 MG: 50 INJECTION, SOLUTION INTRAMUSCULAR; INTRAVENOUS at 08:55

## 2023-01-01 RX ADMIN — FAMOTIDINE 20 MG: 10 INJECTION INTRAVENOUS at 20:42

## 2023-01-01 RX ADMIN — LEVOTHYROXINE SODIUM 75 MCG: 75 TABLET ORAL at 09:17

## 2023-01-01 RX ADMIN — POTASSIUM CHLORIDE, DEXTROSE MONOHYDRATE AND SODIUM CHLORIDE: 150; 5; 450 INJECTION, SOLUTION INTRAVENOUS at 09:12

## 2023-01-01 RX ADMIN — SODIUM CHLORIDE 250 ML: 9 INJECTION, SOLUTION INTRAVENOUS at 10:52

## 2023-01-01 RX ADMIN — APIXABAN 5 MG: 5 TABLET, FILM COATED ORAL at 09:17

## 2023-01-01 RX ADMIN — SODIUM CHLORIDE 250 ML: 9 INJECTION, SOLUTION INTRAVENOUS at 09:47

## 2023-01-01 RX ADMIN — CARBOPLATIN 700 MG: 600 INJECTION, SOLUTION INTRAVENOUS at 12:50

## 2023-01-01 RX ADMIN — METOPROLOL TARTRATE 2.5 MG: 5 INJECTION INTRAVENOUS at 15:00

## 2023-01-01 RX ADMIN — FAMOTIDINE 20 MG: 10 INJECTION INTRAVENOUS at 10:09

## 2023-01-01 RX ADMIN — PALONOSETRON HYDROCHLORIDE 0.25 MG: 0.25 INJECTION INTRAVENOUS at 08:49

## 2023-01-01 RX ADMIN — HYDROMORPHONE HYDROCHLORIDE 0.2 MG: 0.2 INJECTION, SOLUTION INTRAMUSCULAR; INTRAVENOUS; SUBCUTANEOUS at 22:42

## 2023-01-01 RX ADMIN — FOSAPREPITANT: 150 INJECTION, POWDER, LYOPHILIZED, FOR SOLUTION INTRAVENOUS at 09:19

## 2023-01-01 RX ADMIN — MIDAZOLAM 1 MG: 1 INJECTION INTRAMUSCULAR; INTRAVENOUS at 11:12

## 2023-01-01 RX ADMIN — CEFAZOLIN SODIUM 2 G: 2 INJECTION, SOLUTION INTRAVENOUS at 11:16

## 2023-01-01 RX ADMIN — POTASSIUM CHLORIDE, DEXTROSE MONOHYDRATE AND SODIUM CHLORIDE: 150; 5; 450 INJECTION, SOLUTION INTRAVENOUS at 15:57

## 2023-01-01 RX ADMIN — DIPHENHYDRAMINE HYDROCHLORIDE 50 MG: 50 INJECTION, SOLUTION INTRAMUSCULAR; INTRAVENOUS at 08:50

## 2023-01-01 RX ADMIN — SODIUM CHLORIDE 368 MG: 9 INJECTION, SOLUTION INTRAVENOUS at 09:42

## 2023-01-01 RX ADMIN — HEPARIN SODIUM 1650 UNITS/HR: 10000 INJECTION, SOLUTION INTRAVENOUS at 00:22

## 2023-01-01 RX ADMIN — DEXAMETHASONE SODIUM PHOSPHATE 10 MG: 4 INJECTION, SOLUTION INTRAMUSCULAR; INTRAVENOUS at 16:26

## 2023-01-01 RX ADMIN — FAMOTIDINE 20 MG: 10 INJECTION INTRAVENOUS at 09:17

## 2023-01-01 RX ADMIN — LIDOCAINE HYDROCHLORIDE: 20 JELLY TOPICAL at 11:15

## 2023-01-01 RX ADMIN — METOPROLOL TARTRATE 2.5 MG: 5 INJECTION INTRAVENOUS at 21:46

## 2023-01-01 RX ADMIN — FAMOTIDINE 20 MG: 10 INJECTION INTRAVENOUS at 09:00

## 2023-01-01 RX ADMIN — FAMOTIDINE 20 MG: 10 INJECTION INTRAVENOUS at 20:11

## 2023-01-01 RX ADMIN — IOPAMIDOL 90 ML: 755 INJECTION, SOLUTION INTRAVENOUS at 17:13

## 2023-01-01 RX ADMIN — METOPROLOL TARTRATE 25 MG: 25 TABLET, FILM COATED ORAL at 09:17

## 2023-01-01 RX ADMIN — METOPROLOL TARTRATE 2.5 MG: 5 INJECTION INTRAVENOUS at 08:58

## 2023-01-01 RX ADMIN — METOPROLOL TARTRATE 25 MG: 25 TABLET, FILM COATED ORAL at 09:00

## 2023-01-01 RX ADMIN — METOPROLOL TARTRATE 2.5 MG: 5 INJECTION INTRAVENOUS at 14:35

## 2023-01-01 RX ADMIN — FAMOTIDINE 20 MG: 10 INJECTION INTRAVENOUS at 09:05

## 2023-01-01 RX ADMIN — SODIUM CHLORIDE 200 MG: 9 INJECTION, SOLUTION INTRAVENOUS at 09:16

## 2023-01-01 RX ADMIN — SODIUM CHLORIDE 250 ML: 9 INJECTION, SOLUTION INTRAVENOUS at 10:18

## 2023-01-01 RX ADMIN — METOPROLOL TARTRATE 2.5 MG: 5 INJECTION INTRAVENOUS at 03:35

## 2023-01-01 RX ADMIN — IOPAMIDOL 107 ML: 755 INJECTION, SOLUTION INTRAVENOUS at 09:50

## 2023-01-01 RX ADMIN — APIXABAN 5 MG: 5 TABLET, FILM COATED ORAL at 09:00

## 2023-01-01 RX ADMIN — SODIUM CHLORIDE 60 ML: 9 INJECTION, SOLUTION INTRAVENOUS at 17:13

## 2023-01-01 RX ADMIN — SODIUM CHLORIDE 250 ML: 9 INJECTION, SOLUTION INTRAVENOUS at 08:44

## 2023-01-01 RX ADMIN — SODIUM CHLORIDE 250 ML: 9 INJECTION, SOLUTION INTRAVENOUS at 14:43

## 2023-01-01 RX ADMIN — HYDROMORPHONE HYDROCHLORIDE 0.2 MG: 0.2 INJECTION, SOLUTION INTRAMUSCULAR; INTRAVENOUS; SUBCUTANEOUS at 08:21

## 2023-01-01 RX ADMIN — METOPROLOL TARTRATE 2.5 MG: 5 INJECTION INTRAVENOUS at 03:46

## 2023-01-01 RX ADMIN — FENTANYL CITRATE 50 MCG: 50 INJECTION, SOLUTION INTRAMUSCULAR; INTRAVENOUS at 11:12

## 2023-01-01 RX ADMIN — LEVOTHYROXINE SODIUM 75 MCG: 75 TABLET ORAL at 18:52

## 2023-01-01 RX ADMIN — SODIUM CHLORIDE 200 MG: 9 INJECTION, SOLUTION INTRAVENOUS at 14:44

## 2023-01-01 RX ADMIN — FAMOTIDINE 20 MG: 10 INJECTION INTRAVENOUS at 20:52

## 2023-01-01 RX ADMIN — CARBOPLATIN 700 MG: 600 INJECTION, SOLUTION INTRAVENOUS at 12:45

## 2023-01-01 RX ADMIN — METOPROLOL TARTRATE 2.5 MG: 5 INJECTION INTRAVENOUS at 08:22

## 2023-01-01 RX ADMIN — HYDROMORPHONE HYDROCHLORIDE 0.2 MG: 0.2 INJECTION, SOLUTION INTRAMUSCULAR; INTRAVENOUS; SUBCUTANEOUS at 15:54

## 2023-01-01 RX ADMIN — FENTANYL CITRATE 50 MCG: 50 INJECTION, SOLUTION INTRAMUSCULAR; INTRAVENOUS at 11:24

## 2023-01-01 RX ADMIN — METOPROLOL TARTRATE 25 MG: 25 TABLET, FILM COATED ORAL at 20:42

## 2023-01-01 RX ADMIN — POTASSIUM CHLORIDE, DEXTROSE MONOHYDRATE AND SODIUM CHLORIDE: 150; 5; 450 INJECTION, SOLUTION INTRAVENOUS at 03:45

## 2023-01-01 RX ADMIN — POTASSIUM CHLORIDE, DEXTROSE MONOHYDRATE AND SODIUM CHLORIDE: 150; 5; 450 INJECTION, SOLUTION INTRAVENOUS at 03:01

## 2023-01-01 RX ADMIN — HEPARIN SODIUM 1350 UNITS/HR: 10000 INJECTION, SOLUTION INTRAVENOUS at 17:09

## 2023-01-01 RX ADMIN — FAMOTIDINE 20 MG: 10 INJECTION, SOLUTION INTRAVENOUS at 08:45

## 2023-01-01 RX ADMIN — POTASSIUM CHLORIDE, DEXTROSE MONOHYDRATE AND SODIUM CHLORIDE: 150; 5; 450 INJECTION, SOLUTION INTRAVENOUS at 20:09

## 2023-01-01 RX ADMIN — METOPROLOL TARTRATE 2.5 MG: 5 INJECTION INTRAVENOUS at 09:05

## 2023-01-01 RX ADMIN — APIXABAN 5 MG: 5 TABLET, FILM COATED ORAL at 16:49

## 2023-01-01 RX ADMIN — FAMOTIDINE 20 MG: 10 INJECTION INTRAVENOUS at 21:46

## 2023-01-01 RX ADMIN — FAMOTIDINE 20 MG: 10 INJECTION, SOLUTION INTRAVENOUS at 09:19

## 2023-01-01 RX ADMIN — GLUCAGON 1 MG: KIT at 11:24

## 2023-01-01 RX ADMIN — HYDROMORPHONE HYDROCHLORIDE 0.2 MG: 0.2 INJECTION, SOLUTION INTRAMUSCULAR; INTRAVENOUS; SUBCUTANEOUS at 14:43

## 2023-01-01 RX ADMIN — HYDROMORPHONE HYDROCHLORIDE 0.2 MG: 0.2 INJECTION, SOLUTION INTRAMUSCULAR; INTRAVENOUS; SUBCUTANEOUS at 21:45

## 2023-01-01 RX ADMIN — FLUDEOXYGLUCOSE F-18 13 MILLICURIE: 500 INJECTION, SOLUTION INTRAVENOUS at 09:49

## 2023-01-01 RX ADMIN — MIDAZOLAM 2 MG: 1 INJECTION INTRAMUSCULAR; INTRAVENOUS at 11:29

## 2023-01-01 RX ADMIN — ACETAMINOPHEN 650 MG: 325 TABLET, FILM COATED ORAL at 17:27

## 2023-01-01 RX ADMIN — SODIUM CHLORIDE 1000 ML: 9 INJECTION, SOLUTION INTRAVENOUS at 10:36

## 2023-01-01 RX ADMIN — SODIUM CHLORIDE 200 MG: 9 INJECTION, SOLUTION INTRAVENOUS at 10:18

## 2023-01-01 RX ADMIN — METOPROLOL TARTRATE 2.5 MG: 5 INJECTION INTRAVENOUS at 15:54

## 2023-01-01 RX ADMIN — HYDROMORPHONE HYDROCHLORIDE 0.2 MG: 0.2 INJECTION, SOLUTION INTRAMUSCULAR; INTRAVENOUS; SUBCUTANEOUS at 11:43

## 2023-01-01 RX ADMIN — FAMOTIDINE 20 MG: 10 INJECTION INTRAVENOUS at 08:21

## 2023-01-01 RX ADMIN — SODIUM CHLORIDE 250 ML: 9 INJECTION, SOLUTION INTRAVENOUS at 10:55

## 2023-01-01 RX ADMIN — APIXABAN 5 MG: 5 TABLET, FILM COATED ORAL at 20:11

## 2023-01-01 RX ADMIN — METOPROLOL TARTRATE 25 MG: 25 TABLET, FILM COATED ORAL at 20:11

## 2023-01-01 RX ADMIN — SODIUM CHLORIDE 250 ML: 9 INJECTION, SOLUTION INTRAVENOUS at 15:26

## 2023-01-01 RX ADMIN — METOPROLOL TARTRATE 2.5 MG: 5 INJECTION INTRAVENOUS at 03:21

## 2023-01-01 RX ADMIN — SODIUM CHLORIDE 200 MG: 9 INJECTION, SOLUTION INTRAVENOUS at 09:44

## 2023-01-01 RX ADMIN — POTASSIUM CHLORIDE, DEXTROSE MONOHYDRATE AND SODIUM CHLORIDE: 150; 5; 450 INJECTION, SOLUTION INTRAVENOUS at 06:45

## 2023-01-01 RX ADMIN — SODIUM CHLORIDE 250 ML: 9 INJECTION, SOLUTION INTRAVENOUS at 09:16

## 2023-01-01 RX ADMIN — SODIUM CHLORIDE 250 ML: 9 INJECTION, SOLUTION INTRAVENOUS at 14:28

## 2023-01-01 RX ADMIN — FOSAPREPITANT: 150 INJECTION, POWDER, LYOPHILIZED, FOR SOLUTION INTRAVENOUS at 08:30

## 2023-01-01 RX ADMIN — SODIUM CHLORIDE 200 MG: 9 INJECTION, SOLUTION INTRAVENOUS at 15:27

## 2023-01-01 RX ADMIN — METOPROLOL TARTRATE 2.5 MG: 5 INJECTION INTRAVENOUS at 23:54

## 2023-01-01 RX ADMIN — SODIUM CHLORIDE 200 MG: 9 INJECTION, SOLUTION INTRAVENOUS at 14:24

## 2023-01-01 RX ADMIN — LEVOTHYROXINE SODIUM 75 MCG: 75 TABLET ORAL at 09:00

## 2023-01-01 RX ADMIN — SODIUM CHLORIDE 200 MG: 9 INJECTION, SOLUTION INTRAVENOUS at 10:52

## 2023-01-01 RX ADMIN — HYDROMORPHONE HYDROCHLORIDE 0.2 MG: 0.2 INJECTION, SOLUTION INTRAMUSCULAR; INTRAVENOUS; SUBCUTANEOUS at 18:51

## 2023-01-01 RX ADMIN — SODIUM CHLORIDE 200 MG: 9 INJECTION, SOLUTION INTRAVENOUS at 10:55

## 2023-01-01 RX ADMIN — FAMOTIDINE 20 MG: 10 INJECTION INTRAVENOUS at 23:54

## 2023-01-01 RX ADMIN — HEPARIN SODIUM 1050 UNITS/HR: 10000 INJECTION, SOLUTION INTRAVENOUS at 02:06

## 2023-01-01 RX ADMIN — POTASSIUM CHLORIDE, DEXTROSE MONOHYDRATE AND SODIUM CHLORIDE: 150; 5; 450 INJECTION, SOLUTION INTRAVENOUS at 20:29

## 2023-01-01 RX ADMIN — MIDAZOLAM 1 MG: 1 INJECTION INTRAMUSCULAR; INTRAVENOUS at 11:24

## 2023-01-01 RX ADMIN — SODIUM CHLORIDE 250 ML: 9 INJECTION, SOLUTION INTRAVENOUS at 08:27

## 2023-01-01 RX ADMIN — METOPROLOL TARTRATE 2.5 MG: 5 INJECTION INTRAVENOUS at 20:52

## 2023-01-01 RX ADMIN — SODIUM CHLORIDE 1000 ML: 9 INJECTION, SOLUTION INTRAVENOUS at 16:26

## 2023-01-01 ASSESSMENT — ENCOUNTER SYMPTOMS
DYSURIA: 0
SHORTNESS OF BREATH: 0
FEVER: 0
HEADACHES: 0
DIZZINESS: 0
COUGH: 0
ABDOMINAL PAIN: 0
NECK STIFFNESS: 0
CHILLS: 0
VOMITING: 1
NAUSEA: 0
HEMATURIA: 0
RHINORRHEA: 0

## 2023-01-01 ASSESSMENT — ACTIVITIES OF DAILY LIVING (ADL)
ADLS_ACUITY_SCORE: 30
ADLS_ACUITY_SCORE: 28
ADLS_ACUITY_SCORE: 30
ADLS_ACUITY_SCORE: 30
ADLS_ACUITY_SCORE: 28
ADLS_ACUITY_SCORE: 28
ADLS_ACUITY_SCORE: 30
ADLS_ACUITY_SCORE: 28
ADLS_ACUITY_SCORE: 30
ADLS_ACUITY_SCORE: 39
ADLS_ACUITY_SCORE: 28
ADLS_ACUITY_SCORE: 30
ADLS_ACUITY_SCORE: 28
ADLS_ACUITY_SCORE: 28
ADLS_ACUITY_SCORE: 30
ADLS_ACUITY_SCORE: 28
ADLS_ACUITY_SCORE: 30
ADLS_ACUITY_SCORE: 28
ADLS_ACUITY_SCORE: 30
ADLS_ACUITY_SCORE: 28
ADLS_ACUITY_SCORE: 30
ADLS_ACUITY_SCORE: 28
ADLS_ACUITY_SCORE: 30
ADLS_ACUITY_SCORE: 28
ADLS_ACUITY_SCORE: 29
ADLS_ACUITY_SCORE: 28
ADLS_ACUITY_SCORE: 30
ADLS_ACUITY_SCORE: 30
DEPENDENT_IADLS:: INDEPENDENT
ADLS_ACUITY_SCORE: 28
ADLS_ACUITY_SCORE: 39
ADLS_ACUITY_SCORE: 30
ADLS_ACUITY_SCORE: 28
ADLS_ACUITY_SCORE: 30
ADLS_ACUITY_SCORE: 39
ADLS_ACUITY_SCORE: 30
ADLS_ACUITY_SCORE: 28
ADLS_ACUITY_SCORE: 28
ADLS_ACUITY_SCORE: 29
ADLS_ACUITY_SCORE: 28
ADLS_ACUITY_SCORE: 30
ADLS_ACUITY_SCORE: 28
ADLS_ACUITY_SCORE: 39
ADLS_ACUITY_SCORE: 30
ADLS_ACUITY_SCORE: 28
ADLS_ACUITY_SCORE: 39
ADLS_ACUITY_SCORE: 30
ADLS_ACUITY_SCORE: 30
ADLS_ACUITY_SCORE: 39

## 2023-01-01 ASSESSMENT — PAIN SCALES - GENERAL
PAINLEVEL: MILD PAIN (2)
PAINLEVEL: NO PAIN (0)
PAINLEVEL: SEVERE PAIN (6)
PAINLEVEL: MODERATE PAIN (5)
PAINLEVEL: NO PAIN (0)
PAINLEVEL: NO PAIN (0)
PAINLEVEL: MILD PAIN (3)
PAINLEVEL: NO PAIN (0)
PAINLEVEL: NO PAIN (1)
PAINLEVEL: NO PAIN (0)

## 2023-01-14 ENCOUNTER — HEALTH MAINTENANCE LETTER (OUTPATIENT)
Age: 73
End: 2023-01-14

## 2023-01-16 ENCOUNTER — HOSPITAL ENCOUNTER (OUTPATIENT)
Dept: PET IMAGING | Facility: CLINIC | Age: 73
Discharge: HOME | End: 2023-01-16
Attending: INTERNAL MEDICINE
Payer: COMMERCIAL

## 2023-01-16 DIAGNOSIS — C32.0: ICD-10-CM

## 2023-01-16 DIAGNOSIS — C32.9 MALIGNANT NEOPLASM OF LARYNX (H): ICD-10-CM

## 2023-01-16 PROCEDURE — 70491 CT SOFT TISSUE NECK W/DYE: CPT

## 2023-01-16 PROCEDURE — 78815 PET IMAGE W/CT SKULL-THIGH: CPT | Mod: PS

## 2023-01-16 PROCEDURE — 250N000011 HC RX IP 250 OP 636: Performed by: INTERNAL MEDICINE

## 2023-01-16 PROCEDURE — 343N000001 HC RX 343: Performed by: INTERNAL MEDICINE

## 2023-01-16 PROCEDURE — 74177 CT ABD & PELVIS W/CONTRAST: CPT

## 2023-01-16 PROCEDURE — A9552 F18 FDG: HCPCS | Performed by: INTERNAL MEDICINE

## 2023-01-16 RX ORDER — METHYLPREDNISOLONE SODIUM SUCCINATE 125 MG/2ML
125 INJECTION, POWDER, LYOPHILIZED, FOR SOLUTION INTRAMUSCULAR; INTRAVENOUS
Status: CANCELLED
Start: 2023-01-19

## 2023-01-16 RX ORDER — LORAZEPAM 2 MG/ML
0.5 INJECTION INTRAMUSCULAR EVERY 4 HOURS PRN
Status: CANCELLED | OUTPATIENT
Start: 2023-01-19

## 2023-01-16 RX ORDER — MEPERIDINE HYDROCHLORIDE 25 MG/ML
25 INJECTION INTRAMUSCULAR; INTRAVENOUS; SUBCUTANEOUS EVERY 30 MIN PRN
Status: CANCELLED | OUTPATIENT
Start: 2023-01-19

## 2023-01-16 RX ORDER — ALBUTEROL SULFATE 0.83 MG/ML
2.5 SOLUTION RESPIRATORY (INHALATION)
Status: CANCELLED | OUTPATIENT
Start: 2023-01-19

## 2023-01-16 RX ORDER — EPINEPHRINE 1 MG/ML
0.3 INJECTION, SOLUTION INTRAMUSCULAR; SUBCUTANEOUS EVERY 5 MIN PRN
Status: CANCELLED | OUTPATIENT
Start: 2023-01-19

## 2023-01-16 RX ORDER — HEPARIN SODIUM,PORCINE 10 UNIT/ML
5 VIAL (ML) INTRAVENOUS
Status: CANCELLED | OUTPATIENT
Start: 2023-01-19

## 2023-01-16 RX ORDER — HEPARIN SODIUM (PORCINE) LOCK FLUSH IV SOLN 100 UNIT/ML 100 UNIT/ML
5 SOLUTION INTRAVENOUS
Status: CANCELLED | OUTPATIENT
Start: 2023-01-19

## 2023-01-16 RX ORDER — ALBUTEROL SULFATE 90 UG/1
1-2 AEROSOL, METERED RESPIRATORY (INHALATION)
Status: CANCELLED
Start: 2023-01-19

## 2023-01-16 RX ORDER — DIPHENHYDRAMINE HYDROCHLORIDE 50 MG/ML
50 INJECTION INTRAMUSCULAR; INTRAVENOUS
Status: CANCELLED
Start: 2023-01-19

## 2023-01-16 RX ORDER — IOPAMIDOL 755 MG/ML
10-135 INJECTION, SOLUTION INTRAVASCULAR ONCE
Status: COMPLETED | OUTPATIENT
Start: 2023-01-16 | End: 2023-01-16

## 2023-01-16 RX ADMIN — IOPAMIDOL 115 ML: 755 INJECTION, SOLUTION INTRAVENOUS at 13:43

## 2023-01-16 RX ADMIN — FLUDEOXYGLUCOSE F-18 12.81 MCI.: 500 INJECTION, SOLUTION INTRAVENOUS at 13:41

## 2023-01-17 NOTE — PROGRESS NOTES
Oncology/Hematology Visit Note    Jan 19, 2023    Reason for visit: Follow up laryngeal squamous cell carcinoma  Primary oncologist: Dr. Bean    Oncology HPI: Raj Warren is a 72 year old male with laryngeal squamous cell carcinoma.  Below is his oncology history:    Mr. 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  1.  Patient was evaluated in 2019 for hoarseness of voice.  He was seen by ENT and found to have right vocal cord mass.    -He had biopsy done on 08/07/2019. Pathology revealed moderately-differentiated invasive squamous cell carcinoma.   2.  The patient was seen in the Emergency Room on 04/12/2021 for shortness of breath and admitted.   -CT neck revealed an enhancing soft tissue mass involving the right larynx measuring 3.9 cm.  -Patient had laryngoscopy with biopsy and tracheostomy done on 04/12/2021.  There was an endolaryngeal mass obscuring the laryngeal landmarks.  Mass appeared to be based on right endolarynx.  Pathology revealed invasive keratinizing moderately-differentiated squamous cell carcinoma.  3.  PET scan on 04/26/2021 revealed hypermetabolic mass involving the supraglottic, glottic and subglottic larynx.  There was a hypermetabolic left level IV lymph node.  There was also hypermetabolic nodule in right middle lobe.   4. CT chest angiogram on 04/27/2021 revealed a large bilateral pulmonary embolism in all the lobes.  There was evidence of right cardiac strain.  -Echocardiogram on 04/27/2021 revealed clot in transit in the right ventricle.  -The patient had emergency pulmonary embolectomy on 04/28/2021.  Pathology revealed organizing clot.  -Patient on Eliquis. Long term anti-coagulation recommended.  5. Total laryngectomy with bilateral neck dissection on 07/08/2021.  Pathology reveals 6 cm squamous cell carcinoma involving the right false and true vocal cords with fixation, the left false and true vocal cords and invades  through thyroid cartilage.  There is focal lymphatic invasion.  There is perineural invasion. Benign 75 lymph nodes.  pT4a pN0 disease.  6. Post-op radiation between 08/04/2021 and 09/20/2021. 6600 cGy in 33 fractions.  7. PET/CT on 09/30/2022 revealed hypermetabolic mass originating from the right anterolateral wall of the neopharynx.  -Patient seen by ENT on 10/03/2022 and had FNA of neck mass. Pathology is positive for malignancy. Rare clusters morphologically consistent with squamous cell carcinoma.    8. Not currently a surgical candidate.   9. Started Keytruda 10/27/22.     He is here today for pembrolizumab cycle 5.    Interval History: Raj is doing okay today.  He was seen by his dentist yesterday and there is concern for dental abscess.  He was started on amoxicillin.  He has some discomfort with chewing on the right side, but otherwise feels okay.  No recent fever or chills, swelling.  He is able to drink boost with no problem and drink fluids.  He was referred to an oral surgeon, but has not called to make an appointment.  No vomiting, diarrhea, chest pain or shortness of breath, headache or vision changes, leg swelling or urinary changes.    Review of Systems: See interval hx. Denies fevers, chills, HA, dizziness, n/t, changes in vision, cough, sore throat, CP, SOB, abdominal pain, N/V, diarrhea, changes in urination, bleeding, bruising, rash.     PMHx and Social Hx reviewed per EPIC.      Medications:  Current Outpatient Medications   Medication Sig Dispense Refill     amoxicillin (AMOXIL) 500 MG tablet        apixaban ANTICOAGULANT (ELIQUIS) 5 MG tablet Take 1 tablet (5 mg) by mouth 2 times daily 180 tablet 3     levothyroxine (SYNTHROID/LEVOTHROID) 75 MCG tablet Take 75 mcg by mouth daily 90 tablet 3     metoprolol tartrate (LOPRESSOR) 25 MG tablet Take 1 tablet (25 mg) by mouth 2 times daily 180 tablet 3       Allergies   Allergen Reactions     Pollen Extract        EXAM:    /81   Pulse  101   Temp 98.3  F (36.8  C) (Oral)   Resp 14   Wt 93.4 kg (206 lb)   SpO2 99%   BMI 29.56 kg/m      GENERAL:  Male, in no acute distress.  Alert and oriented x3. Well groomed.   HEENT:  Normocephalic, atraumatic. No conjunctival injection or eye swelling. Uses device to speak.  LUNGS:  Nonlabored breathing, no cough or audible wheezing.  MSK: Full ROM UE.    SKIN: No rash on exposed skin.   NEURO: CN grossly intact, speech normal  PSYCH: Mentation appears normal, insight and judgement intact      Labs:    01/19/23 08:47   Sodium 138   Potassium 4.0   Chloride 105   Carbon Dioxide (CO2) 19 (L)   Urea Nitrogen 8.1   Creatinine 0.97   GFR Estimate 84   Calcium 9.6   Anion Gap 14   Albumin 4.3   Protein Total 7.2   Alkaline Phosphatase 83   ALT 19   AST 27   Bilirubin Direct <0.20   Bilirubin Total 0.5   Glucose 104 (H)   TSH 2.90   WBC 5.0   Hemoglobin 14.9   Hematocrit 41.0   Platelet Count 218   RBC Count 4.77   MCV 86   MCH 31.2   MCHC 36.3   RDW 13.1   % Neutrophils 56   % Lymphocytes 26   % Monocytes 14   % Eosinophils 3   % Basophils 1   Absolute Basophils 0.0   Absolute Eosinophils 0.2   Absolute Immature Granulocytes 0.0   Absolute Lymphocytes 1.3   Absolute Monocytes 0.7   % Immature Granulocytes 0   Absolute Neutrophils 2.8   Absolute NRBCs 0.0   NRBCs per 100 WBC 0   (L): Data is abnormally low  (H): Data is abnormally high    Imaging:   EXAM: PET ONCOLOGY (EYES TO THIGHS), CT SOFT TISSUE NECK W CONTRAST, CT CHEST/ABDOMEN/PELVIS W CONTRAST  LOCATION: [facility]  DATE/TIME: 1/16/2023 3:22 PM     INDICATION: Subsequent treatment strategy and restaging for malignant neoplasm of overlapping sites of larynx status post total laryngectomy, bilateral neck dissection, and cricopharyngeal myotomy and postoperative radiation. Follow-up PET/CT   demonstrated recurrence in the nasopharynx (09/30/2022) and recently initiated palliative Keytruda.  COMPARISON: FDG PET/CT  09/30/2022.  CONTRAST: 115 mL IV Isovue contrast.  TECHNIQUE: Serum glucose level 96 mg/dL. One hour post intravenous administration of 12.81 mCi F-18 FDG, PET imaging was performed from the skull vertex to mid thigh, utilizing attenuation correction with concurrent axial CT and PET/CT image fusion.   Separate diagnostic CT of the neck, chest, abdomen, and pelvis was performed. Dose reduction techniques were used.     PET/CT FINDINGS: Persistent although slightly decreased FDG avidity of the soft tissue thickening along the right neopharynx/superior margin of the laryngectomy site (SUV max 10.8, previously 12.4), likely representing a partial response to therapy.      Mildly FDG avid nonenlarged right level I and bilateral level Ib lymph nodes (SUV max 3.6, previously 2.7). While these may simply be reactive in etiology, early sites of in situ neoplasm are also possible.     Interval decreased uptake along the posterior aspect of the right first costosternal junction, likely representing degenerative inflammatory uptake. Focal uptake in the right maxilla, likely reflecting an inflammatory odontogenic process. Bilateral   shoulder synovitis. Right hip synovitis.     CT FINDINGS: Mucosal thickening of the paranasal sinuses. Postoperative changes status post total laryngectomy and bilateral neck dissection. Bibasilar atelectasis. Changes from prior sternotomy. No significant coronary artery calcification.   Cholelithiasis. Pelvic phleboliths. Small bilateral fat-containing hernias. Few scattered colonic diverticuli. Multilevel degenerative changes of the spine.                                                                      IMPRESSION:     1. Persistent although slightly decreased FDG avidity of the soft tissue thickening along the right neopharynx/superior margin of the laryngectomy site, likely representing partial response to therapy.     2. Mildly FDG avid nonenlarged right level I and bilateral level Ib lymph  nodes. While these may simply be reactive in etiology, early sites of in situ neoplasm are also possible.      Impression/Plan: Raj Warren is a 72 year old male with laryngeal squamous cell carcinoma s/p surgery and radiation, new recurrence and high PD-L1 expression, currently on pembrolizumab.    Laryngeal squamous cell carcinoma: s/p surgery and postoperative radiation.  PET in September 2022 with recurrence and high PD-L1 expression, started pembrolizumab C1 D1 on 10/27/2022.  He has been tolerating this pretty well with no immune related toxicities.  PET/CT on 1/16/2023 with stable/decreased FDG avidity of the right laryngectomy site, representing partial response to therapy. From a treatment perspective, labs look good and he is doing well, but he was recently diagnosed with dental abscess and currently on antibiotics.  For this reason, I will hold treatment today and reschedule in 7 to 10 days.    --7 to 10 days, ERNESTINE, deferred pembrolizumab    Dental abscess: Saw dentist yesterday, started amoxicillin and was referred to oral surgery, no appointment yet.  Recommended he call oral surgeon today to make an appointment.  This may affect when we can complete pembrolizumab.  WBC normal. He will continue antibiotics.    PE: Currently on Eliquis, no bleeding.  May need to hold for any oral surgery procedure.    Hypothyroidism: Currently on levothyroxine 75 mcg, normal TSH today.    Hyperbilirubinemia: Resolved      Chart documentation with Dragon Voice recognition Software. Although reviewed after completion, some words and grammatical errors may remain.    30 minutes spent on the date of the encounter doing chart review, review of test results, interpretation of tests, patient visit and documentation     Liv Almonte PA-C  Hematology/Oncology  [facility]

## 2023-01-19 ENCOUNTER — INFUSION THERAPY VISIT (OUTPATIENT)
Dept: INFUSION THERAPY | Facility: CLINIC | Age: 73
End: 2023-01-19
Attending: INTERNAL MEDICINE
Payer: COMMERCIAL

## 2023-01-19 ENCOUNTER — ONCOLOGY VISIT (OUTPATIENT)
Dept: ONCOLOGY | Facility: CLINIC | Age: 73
End: 2023-01-19
Attending: NURSE PRACTITIONER
Payer: COMMERCIAL

## 2023-01-19 VITALS
WEIGHT: 206 LBS | OXYGEN SATURATION: 99 % | BODY MASS INDEX: 29.56 KG/M2 | RESPIRATION RATE: 14 BRPM | SYSTOLIC BLOOD PRESSURE: 116 MMHG | TEMPERATURE: 98.3 F | DIASTOLIC BLOOD PRESSURE: 81 MMHG | HEART RATE: 101 BPM

## 2023-01-19 DIAGNOSIS — C32.9 MALIGNANT NEOPLASM OF LARYNX (H): Primary | ICD-10-CM

## 2023-01-19 DIAGNOSIS — K04.7 DENTAL ABSCESS: ICD-10-CM

## 2023-01-19 DIAGNOSIS — C32.9 MALIGNANT NEOPLASM OF LARYNX (H): ICD-10-CM

## 2023-01-19 DIAGNOSIS — E89.0 POSTOPERATIVE HYPOTHYROIDISM: ICD-10-CM

## 2023-01-19 DIAGNOSIS — Z86.711 HISTORY OF PULMONARY EMBOLISM: ICD-10-CM

## 2023-01-19 LAB
ALBUMIN SERPL BCG-MCNC: 4.3 G/DL (ref 3.5–5.2)
ALP SERPL-CCNC: 83 U/L (ref 40–129)
ALT SERPL W P-5'-P-CCNC: 19 U/L (ref 10–50)
ANION GAP SERPL CALCULATED.3IONS-SCNC: 14 MMOL/L (ref 7–15)
AST SERPL W P-5'-P-CCNC: 27 U/L (ref 10–50)
BASOPHILS # BLD AUTO: 0 10E3/UL (ref 0–0.2)
BASOPHILS NFR BLD AUTO: 1 %
BILIRUB DIRECT SERPL-MCNC: <0.2 MG/DL (ref 0–0.3)
BILIRUB SERPL-MCNC: 0.5 MG/DL
BUN SERPL-MCNC: 8.1 MG/DL (ref 8–23)
CALCIUM SERPL-MCNC: 9.6 MG/DL (ref 8.8–10.2)
CHLORIDE SERPL-SCNC: 105 MMOL/L (ref 98–107)
CREAT SERPL-MCNC: 0.97 MG/DL (ref 0.67–1.17)
DEPRECATED HCO3 PLAS-SCNC: 19 MMOL/L (ref 22–29)
EOSINOPHIL # BLD AUTO: 0.2 10E3/UL (ref 0–0.7)
EOSINOPHIL NFR BLD AUTO: 3 %
ERYTHROCYTE [DISTWIDTH] IN BLOOD BY AUTOMATED COUNT: 13.1 % (ref 10–15)
GFR SERPL CREATININE-BSD FRML MDRD: 84 ML/MIN/1.73M2
GLUCOSE SERPL-MCNC: 104 MG/DL (ref 70–99)
HCT VFR BLD AUTO: 41 % (ref 40–53)
HGB BLD-MCNC: 14.9 G/DL (ref 13.3–17.7)
HOLD SPECIMEN: NORMAL
IMM GRANULOCYTES # BLD: 0 10E3/UL
IMM GRANULOCYTES NFR BLD: 0 %
LYMPHOCYTES # BLD AUTO: 1.3 10E3/UL (ref 0.8–5.3)
LYMPHOCYTES NFR BLD AUTO: 26 %
MCH RBC QN AUTO: 31.2 PG (ref 26.5–33)
MCHC RBC AUTO-ENTMCNC: 36.3 G/DL (ref 31.5–36.5)
MCV RBC AUTO: 86 FL (ref 78–100)
MONOCYTES # BLD AUTO: 0.7 10E3/UL (ref 0–1.3)
MONOCYTES NFR BLD AUTO: 14 %
NEUTROPHILS # BLD AUTO: 2.8 10E3/UL (ref 1.6–8.3)
NEUTROPHILS NFR BLD AUTO: 56 %
NRBC # BLD AUTO: 0 10E3/UL
NRBC BLD AUTO-RTO: 0 /100
PLATELET # BLD AUTO: 218 10E3/UL (ref 150–450)
POTASSIUM SERPL-SCNC: 4 MMOL/L (ref 3.4–5.3)
PROT SERPL-MCNC: 7.2 G/DL (ref 6.4–8.3)
RBC # BLD AUTO: 4.77 10E6/UL (ref 4.4–5.9)
SODIUM SERPL-SCNC: 138 MMOL/L (ref 136–145)
TSH SERPL DL<=0.005 MIU/L-ACNC: 2.9 UIU/ML (ref 0.3–4.2)
WBC # BLD AUTO: 5 10E3/UL (ref 4–11)

## 2023-01-19 PROCEDURE — 80053 COMPREHEN METABOLIC PANEL: CPT | Performed by: NURSE PRACTITIONER

## 2023-01-19 PROCEDURE — 84443 ASSAY THYROID STIM HORMONE: CPT | Performed by: NURSE PRACTITIONER

## 2023-01-19 PROCEDURE — 99214 OFFICE O/P EST MOD 30 MIN: CPT | Performed by: PHYSICIAN ASSISTANT

## 2023-01-19 PROCEDURE — 82248 BILIRUBIN DIRECT: CPT | Performed by: NURSE PRACTITIONER

## 2023-01-19 PROCEDURE — 36415 COLL VENOUS BLD VENIPUNCTURE: CPT

## 2023-01-19 PROCEDURE — G0463 HOSPITAL OUTPT CLINIC VISIT: HCPCS | Performed by: PHYSICIAN ASSISTANT

## 2023-01-19 PROCEDURE — 85004 AUTOMATED DIFF WBC COUNT: CPT | Performed by: INTERNAL MEDICINE

## 2023-01-19 RX ORDER — AMOXICILLIN 500 MG/1
TABLET, FILM COATED ORAL
COMMUNITY
Start: 2023-01-18 | End: 2023-04-27

## 2023-01-19 ASSESSMENT — PAIN SCALES - GENERAL: PAINLEVEL: NO PAIN (0)

## 2023-01-19 NOTE — PROGRESS NOTES
"Oncology Rooming Note    January 19, 2023 9:04 AM   Raj Warren is a 72 year old male who presents for:    Chief Complaint   Patient presents with     Oncology Clinic Visit     Initial Vitals: /81   Pulse 101   Temp 98.3  F (36.8  C) (Oral)   Resp 14   Wt 93.4 kg (206 lb)   SpO2 99%   BMI 29.56 kg/m   Estimated body mass index is 29.56 kg/m  as calculated from the following:    Height as of 12/28/22: 1.778 m (5' 10\").    Weight as of this encounter: 93.4 kg (206 lb). Body surface area is 2.15 meters squared.  No Pain (0) Comment: Data Unavailable   No LMP for male patient.  Allergies reviewed: Yes  Medications reviewed: Yes    Medications: Medication refills not needed today.  Pharmacy name entered into [employer]: [facility] #[id number] - [place], [place] - [address] AT [address]    Clinical concerns: Pt has to have a tooth removed and is on Eliquis.  Does he need to stop the Eliquis? Liv Almonte was notified.      Shari J. Schoenberger, [facility]            "

## 2023-01-19 NOTE — LETTER
1/19/2023         RE: Raj Warren  [address]  [facility] [facility] [address]        Dear Colleague,    Thank you for referring your patient, Raj Warren, to the [facility]. Please see a copy of my visit note below.    Oncology/Hematology Visit Note    Jan 19, 2023    Reason for visit: Follow up laryngeal squamous cell carcinoma  Primary oncologist: Dr. Bean    Oncology HPI: Raj Warren is a 72 year old male with laryngeal squamous cell carcinoma.  Below is his oncology history:    Mr. 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  1.  Patient was evaluated in 2019 for hoarseness of voice.  He was seen by ENT and found to have right vocal cord mass.    -He had biopsy done on 08/07/2019. Pathology revealed moderately-differentiated invasive squamous cell carcinoma.   2.  The patient was seen in the Emergency Room on 04/12/2021 for shortness of breath and admitted.   -CT neck revealed an enhancing soft tissue mass involving the right larynx measuring 3.9 cm.  -Patient had laryngoscopy with biopsy and tracheostomy done on 04/12/2021.  There was an endolaryngeal mass obscuring the laryngeal landmarks.  Mass appeared to be based on right endolarynx.  Pathology revealed invasive keratinizing moderately-differentiated squamous cell carcinoma.  3.  PET scan on 04/26/2021 revealed hypermetabolic mass involving the supraglottic, glottic and subglottic larynx.  There was a hypermetabolic left level IV lymph node.  There was also hypermetabolic nodule in right middle lobe.   4. CT chest angiogram on 04/27/2021 revealed a large bilateral pulmonary embolism in all the lobes.  There was evidence of right cardiac strain.  -Echocardiogram on 04/27/2021 revealed clot in transit in the right ventricle.  -The patient had emergency pulmonary embolectomy on 04/28/2021.  Pathology revealed organizing clot.  -Patient on Eliquis. Long term  anti-coagulation recommended.  5. Total laryngectomy with bilateral neck dissection on 07/08/2021.  Pathology reveals 6 cm squamous cell carcinoma involving the right false and true vocal cords with fixation, the left false and true vocal cords and invades through thyroid cartilage.  There is focal lymphatic invasion.  There is perineural invasion. Benign 75 lymph nodes.  pT4a pN0 disease.  6. Post-op radiation between 08/04/2021 and 09/20/2021. 6600 cGy in 33 fractions.  7. PET/CT on 09/30/2022 revealed hypermetabolic mass originating from the right anterolateral wall of the neopharynx.  -Patient seen by ENT on 10/03/2022 and had FNA of neck mass. Pathology is positive for malignancy. Rare clusters morphologically consistent with squamous cell carcinoma.    8. Not currently a surgical candidate.   9. Started Keytruda 10/27/22.     He is here today for pembrolizumab cycle 5.    Interval History: Raj is doing okay today.  He was seen by his dentist yesterday and there is concern for dental abscess.  He was started on amoxicillin.  He has some discomfort with chewing on the right side, but otherwise feels okay.  No recent fever or chills, swelling.  He is able to drink boost with no problem and drink fluids.  He was referred to an oral surgeon, but has not called to make an appointment.  No vomiting, diarrhea, chest pain or shortness of breath, headache or vision changes, leg swelling or urinary changes.    Review of Systems: See interval hx. Denies fevers, chills, HA, dizziness, n/t, changes in vision, cough, sore throat, CP, SOB, abdominal pain, N/V, diarrhea, changes in urination, bleeding, bruising, rash.     PMHx and Social Hx reviewed per EPIC.      Medications:  Current Outpatient Medications   Medication Sig Dispense Refill     amoxicillin (AMOXIL) 500 MG tablet        apixaban ANTICOAGULANT (ELIQUIS) 5 MG tablet Take 1 tablet (5 mg) by mouth 2 times daily 180 tablet 3     levothyroxine  (SYNTHROID/LEVOTHROID) 75 MCG tablet Take 75 mcg by mouth daily 90 tablet 3     metoprolol tartrate (LOPRESSOR) 25 MG tablet Take 1 tablet (25 mg) by mouth 2 times daily 180 tablet 3       Allergies   Allergen Reactions     Pollen Extract        EXAM:    /81   Pulse 101   Temp 98.3  F (36.8  C) (Oral)   Resp 14   Wt 93.4 kg (206 lb)   SpO2 99%   BMI 29.56 kg/m      GENERAL:  Male, in no acute distress.  Alert and oriented x3. Well groomed.   HEENT:  Normocephalic, atraumatic. No conjunctival injection or eye swelling. Uses device to speak.  LUNGS:  Nonlabored breathing, no cough or audible wheezing.  MSK: Full ROM UE.    SKIN: No rash on exposed skin.   NEURO: CN grossly intact, speech normal  PSYCH: Mentation appears normal, insight and judgement intact      Labs:    01/19/23 08:47   Sodium 138   Potassium 4.0   Chloride 105   Carbon Dioxide (CO2) 19 (L)   Urea Nitrogen 8.1   Creatinine 0.97   GFR Estimate 84   Calcium 9.6   Anion Gap 14   Albumin 4.3   Protein Total 7.2   Alkaline Phosphatase 83   ALT 19   AST 27   Bilirubin Direct <0.20   Bilirubin Total 0.5   Glucose 104 (H)   TSH 2.90   WBC 5.0   Hemoglobin 14.9   Hematocrit 41.0   Platelet Count 218   RBC Count 4.77   MCV 86   MCH 31.2   MCHC 36.3   RDW 13.1   % Neutrophils 56   % Lymphocytes 26   % Monocytes 14   % Eosinophils 3   % Basophils 1   Absolute Basophils 0.0   Absolute Eosinophils 0.2   Absolute Immature Granulocytes 0.0   Absolute Lymphocytes 1.3   Absolute Monocytes 0.7   % Immature Granulocytes 0   Absolute Neutrophils 2.8   Absolute NRBCs 0.0   NRBCs per 100 WBC 0   (L): Data is abnormally low  (H): Data is abnormally high    Imaging:   EXAM: PET ONCOLOGY (EYES TO THIGHS), CT SOFT TISSUE NECK W CONTRAST, CT CHEST/ABDOMEN/PELVIS W CONTRAST  LOCATION: [facility]  DATE/TIME: 1/16/2023 3:22 PM     INDICATION: Subsequent treatment strategy and restaging for malignant neoplasm of overlapping sites of larynx  status post total laryngectomy, bilateral neck dissection, and cricopharyngeal myotomy and postoperative radiation. Follow-up PET/CT   demonstrated recurrence in the nasopharynx (09/30/2022) and recently initiated palliative Keytruda.  COMPARISON: FDG PET/CT 09/30/2022.  CONTRAST: 115 mL IV Isovue contrast.  TECHNIQUE: Serum glucose level 96 mg/dL. One hour post intravenous administration of 12.81 mCi F-18 FDG, PET imaging was performed from the skull vertex to mid thigh, utilizing attenuation correction with concurrent axial CT and PET/CT image fusion.   Separate diagnostic CT of the neck, chest, abdomen, and pelvis was performed. Dose reduction techniques were used.     PET/CT FINDINGS: Persistent although slightly decreased FDG avidity of the soft tissue thickening along the right neopharynx/superior margin of the laryngectomy site (SUV max 10.8, previously 12.4), likely representing a partial response to therapy.      Mildly FDG avid nonenlarged right level I and bilateral level Ib lymph nodes (SUV max 3.6, previously 2.7). While these may simply be reactive in etiology, early sites of in situ neoplasm are also possible.     Interval decreased uptake along the posterior aspect of the right first costosternal junction, likely representing degenerative inflammatory uptake. Focal uptake in the right maxilla, likely reflecting an inflammatory odontogenic process. Bilateral   shoulder synovitis. Right hip synovitis.     CT FINDINGS: Mucosal thickening of the paranasal sinuses. Postoperative changes status post total laryngectomy and bilateral neck dissection. Bibasilar atelectasis. Changes from prior sternotomy. No significant coronary artery calcification.   Cholelithiasis. Pelvic phleboliths. Small bilateral fat-containing hernias. Few scattered colonic diverticuli. Multilevel degenerative changes of the spine.                                                                      IMPRESSION:     1. Persistent  although slightly decreased FDG avidity of the soft tissue thickening along the right neopharynx/superior margin of the laryngectomy site, likely representing partial response to therapy.     2. Mildly FDG avid nonenlarged right level I and bilateral level Ib lymph nodes. While these may simply be reactive in etiology, early sites of in situ neoplasm are also possible.      Impression/Plan: Raj Warren is a 72 year old male with laryngeal squamous cell carcinoma s/p surgery and radiation, new recurrence and high PD-L1 expression, currently on pembrolizumab.    Laryngeal squamous cell carcinoma: s/p surgery and postoperative radiation.  PET in September 2022 with recurrence and high PD-L1 expression, started pembrolizumab C1 D1 on 10/27/2022.  He has been tolerating this pretty well with no immune related toxicities.  PET/CT on 1/16/2023 with stable/decreased FDG avidity of the right laryngectomy site, representing partial response to therapy. From a treatment perspective, labs look good and he is doing well, but he was recently diagnosed with dental abscess and currently on antibiotics.  For this reason, I will hold treatment today and reschedule in 7 to 10 days.    --7 to 10 days, ERNESTINE, deferred pembrolizumab    Dental abscess: Saw dentist yesterday, started amoxicillin and was referred to oral surgery, no appointment yet.  Recommended he call oral surgeon today to make an appointment.  This may affect when we can complete pembrolizumab.  WBC normal. He will continue antibiotics.    PE: Currently on Eliquis, no bleeding.  May need to hold for any oral surgery procedure.    Hypothyroidism: Currently on levothyroxine 75 mcg, normal TSH today.    Hyperbilirubinemia: Resolved      Chart documentation with Dragon Voice recognition Software. Although reviewed after completion, some words and grammatical errors may remain.    30 minutes spent on the date of the encounter doing chart review, review of test results,  "interpretation of tests, patient visit and documentation     Liv Almonet PA-C  Hematology/Oncology  [facility]                  Oncology Rooming Note    January 19, 2023 9:04 AM   Raj Warren is a 72 year old male who presents for:    Chief Complaint   Patient presents with     Oncology Clinic Visit     Initial Vitals: /81   Pulse 101   Temp 98.3  F (36.8  C) (Oral)   Resp 14   Wt 93.4 kg (206 lb)   SpO2 99%   BMI 29.56 kg/m   Estimated body mass index is 29.56 kg/m  as calculated from the following:    Height as of 12/28/22: 1.778 m (5' 10\").    Weight as of this encounter: 93.4 kg (206 lb). Body surface area is 2.15 meters squared.  No Pain (0) Comment: Data Unavailable   No LMP for male patient.  Allergies reviewed: Yes  Medications reviewed: Yes    Medications: Medication refills not needed today.  Pharmacy name entered into [employer]: [facility] #[id number] [address] AT [address]    Clinical concerns: Pt has to have a tooth removed and is on Eliquis.  Does he need to stop the Eliquis? Liv Almonte was notified.      Shari J. Schoenberger, [facility]                Again, thank you for allowing me to participate in the care of your patient.        Sincerely,        Liv Almonte PA-C    "

## 2023-01-19 NOTE — PROGRESS NOTES
Medical Assistant Note:  Raj Briana presents today for lab draw.    Patient seen by provider today: No.   present during visit today: Not Applicable.    Concerns: No Concerns.    Procedure:  Lab draw site: D, Needle type: BF, Gauge: 23. Gauze and coban applied    Post Assessment:  Labs drawn without difficulty: Yes.    Discharge Plan:  Departure Mode: Ambulatory.    Face to Face Time: 5.    Suzan Bazan CMA

## 2023-01-31 ENCOUNTER — ONCOLOGY VISIT (OUTPATIENT)
Dept: ONCOLOGY | Facility: CLINIC | Age: 73
End: 2023-01-31
Attending: NURSE PRACTITIONER
Payer: COMMERCIAL

## 2023-01-31 ENCOUNTER — LAB (OUTPATIENT)
Dept: INFUSION THERAPY | Facility: CLINIC | Age: 73
End: 2023-01-31
Attending: NURSE PRACTITIONER
Payer: COMMERCIAL

## 2023-01-31 VITALS
BODY MASS INDEX: 29.98 KG/M2 | WEIGHT: 209.4 LBS | RESPIRATION RATE: 16 BRPM | HEART RATE: 75 BPM | SYSTOLIC BLOOD PRESSURE: 121 MMHG | HEIGHT: 70 IN | OXYGEN SATURATION: 99 % | DIASTOLIC BLOOD PRESSURE: 71 MMHG

## 2023-01-31 DIAGNOSIS — C32.9 MALIGNANT NEOPLASM OF LARYNX (H): Primary | ICD-10-CM

## 2023-01-31 LAB
ALBUMIN SERPL BCG-MCNC: 4.2 G/DL (ref 3.5–5.2)
ALP SERPL-CCNC: 73 U/L (ref 40–129)
ALT SERPL W P-5'-P-CCNC: 20 U/L (ref 10–50)
ANION GAP SERPL CALCULATED.3IONS-SCNC: 11 MMOL/L (ref 7–15)
AST SERPL W P-5'-P-CCNC: 21 U/L (ref 10–50)
BILIRUB SERPL-MCNC: 0.2 MG/DL
BUN SERPL-MCNC: 13 MG/DL (ref 8–23)
CALCIUM SERPL-MCNC: 9.1 MG/DL (ref 8.8–10.2)
CHLORIDE SERPL-SCNC: 105 MMOL/L (ref 98–107)
CREAT SERPL-MCNC: 0.95 MG/DL (ref 0.67–1.17)
DEPRECATED HCO3 PLAS-SCNC: 24 MMOL/L (ref 22–29)
GFR SERPL CREATININE-BSD FRML MDRD: 86 ML/MIN/1.73M2
GLUCOSE SERPL-MCNC: 114 MG/DL (ref 70–99)
POTASSIUM SERPL-SCNC: 4.2 MMOL/L (ref 3.4–5.3)
PROT SERPL-MCNC: 7 G/DL (ref 6.4–8.3)
SODIUM SERPL-SCNC: 140 MMOL/L (ref 136–145)
TSH SERPL DL<=0.005 MIU/L-ACNC: 2.48 UIU/ML (ref 0.3–4.2)

## 2023-01-31 PROCEDURE — 36415 COLL VENOUS BLD VENIPUNCTURE: CPT | Performed by: NURSE PRACTITIONER

## 2023-01-31 PROCEDURE — G0463 HOSPITAL OUTPT CLINIC VISIT: HCPCS | Performed by: NURSE PRACTITIONER

## 2023-01-31 PROCEDURE — 80053 COMPREHEN METABOLIC PANEL: CPT | Performed by: NURSE PRACTITIONER

## 2023-01-31 PROCEDURE — 99212 OFFICE O/P EST SF 10 MIN: CPT | Performed by: NURSE PRACTITIONER

## 2023-01-31 PROCEDURE — 99214 OFFICE O/P EST MOD 30 MIN: CPT | Performed by: NURSE PRACTITIONER

## 2023-01-31 PROCEDURE — 84443 ASSAY THYROID STIM HORMONE: CPT | Performed by: NURSE PRACTITIONER

## 2023-01-31 RX ORDER — ALBUTEROL SULFATE 90 UG/1
1-2 AEROSOL, METERED RESPIRATORY (INHALATION)
Status: CANCELLED
Start: 2023-02-03

## 2023-01-31 RX ORDER — EPINEPHRINE 1 MG/ML
0.3 INJECTION, SOLUTION INTRAMUSCULAR; SUBCUTANEOUS EVERY 5 MIN PRN
Status: CANCELLED | OUTPATIENT
Start: 2023-02-03

## 2023-01-31 RX ORDER — MEPERIDINE HYDROCHLORIDE 25 MG/ML
25 INJECTION INTRAMUSCULAR; INTRAVENOUS; SUBCUTANEOUS EVERY 30 MIN PRN
Status: CANCELLED | OUTPATIENT
Start: 2023-02-03

## 2023-01-31 RX ORDER — HEPARIN SODIUM,PORCINE 10 UNIT/ML
5 VIAL (ML) INTRAVENOUS
Status: CANCELLED | OUTPATIENT
Start: 2023-02-03

## 2023-01-31 RX ORDER — HEPARIN SODIUM (PORCINE) LOCK FLUSH IV SOLN 100 UNIT/ML 100 UNIT/ML
5 SOLUTION INTRAVENOUS
Status: CANCELLED | OUTPATIENT
Start: 2023-02-03

## 2023-01-31 RX ORDER — DIPHENHYDRAMINE HYDROCHLORIDE 50 MG/ML
50 INJECTION INTRAMUSCULAR; INTRAVENOUS
Status: CANCELLED
Start: 2023-02-03

## 2023-01-31 RX ORDER — METHYLPREDNISOLONE SODIUM SUCCINATE 125 MG/2ML
125 INJECTION, POWDER, LYOPHILIZED, FOR SOLUTION INTRAMUSCULAR; INTRAVENOUS
Status: CANCELLED
Start: 2023-02-03

## 2023-01-31 RX ORDER — LORAZEPAM 2 MG/ML
0.5 INJECTION INTRAMUSCULAR EVERY 4 HOURS PRN
Status: CANCELLED | OUTPATIENT
Start: 2023-02-03

## 2023-01-31 RX ORDER — ALBUTEROL SULFATE 0.83 MG/ML
2.5 SOLUTION RESPIRATORY (INHALATION)
Status: CANCELLED | OUTPATIENT
Start: 2023-02-03

## 2023-01-31 ASSESSMENT — PAIN SCALES - GENERAL: PAINLEVEL: MILD PAIN (2)

## 2023-01-31 NOTE — LETTER
"    1/31/2023         RE: Raj Warren  [address]  [facility] [address]        Dear Colleague,    Thank you for referring your patient, Raj Warren, to the [facility]. Please see a copy of my visit note below.    Oncology Rooming Note    January 31, 2023 12:31 PM   Raj Warren is a 72 year old male who presents for:    Chief Complaint   Patient presents with     Oncology Clinic Visit     Malignant neoplasm of larynx (H)     Initial Vitals: /71   Pulse 75   Resp 16   Ht 1.778 m (5' 10\")   Wt 95 kg (209 lb 6.4 oz)   SpO2 99%   BMI 30.05 kg/m   Estimated body mass index is 30.05 kg/m  as calculated from the following:    Height as of this encounter: 1.778 m (5' 10\").    Weight as of this encounter: 95 kg (209 lb 6.4 oz). Body surface area is 2.17 meters squared.  Mild Pain (2) Comment: Data Unavailable   No LMP for male patient.  Allergies reviewed: Yes  Medications reviewed: Yes    Medications: Yes, Levothyroxine  Pharmacy name entered into Encore Alert: [id number] - [facility] [address] AT [address]    Clinical concerns: Refill needed for Levothyroxine.        Wendy Fonseca MA                Oncology/Hematology Visit Note  Jan 31, 2023    Reason for Visit: follow up of laryngeal squamous cell carcinoma    Patient Dr. Bean  Status post surgery and postoperative radiation with no evidence of recurrence  -Currently on single agent pembrolizumab started on 10/27/2022    Interval History:  \Reports feeling well.  Denies fever chills sweats cough shortness of breath chest pain nausea vomiting diarrhea abdominal pain or bleeding      Review of Systems:  14 point ROS of systems including Constitutional, Eyes, Respiratory, Cardiovascular, Gastroenterology, Genitourinary, Integumentary, Muscularskeletal, Psychiatric were all negative except for pertinent positives noted in my HPI.    Physical Examination:  General: The " "patient is a pleasant male in no acute distress.  /71   Pulse 75   Resp 16   Ht 1.778 m (5' 10\")   Wt 95 kg (209 lb 6.4 oz)   SpO2 99%   BMI 30.05 kg/m    HEENT: EOMI, PERRL. Sclerae are anicteric. Oral mucosa is pink and moist with no lesions or thrush.   Lymph: Neck is supple with no lymphadenopathy in the cervical or supraclavicular areas.   Heart: Regular rate and rhythm.   Lungs: Clear to auscultation bilaterally.   GI: Bowel sounds present, soft, nontender with no palpable hepatosplenomegaly or masses.   Extremities: No lower extremity edema noted bilaterally.   Skin: No rashes, petechiae, or bruising noted on exposed skin.  Laboratory Data:  CMP and TSH results reviewed      Assessment and Plan:  This is a 72-year-old male with    Laryngeal squamous cell carcinoma  Patient Dr. Bean  Status post surgery and postoperative radiation at that time no evidence of recurrence  09/30/2022-PET/CT showed abnormality in the neck FNA positive for squamous cell carcinoma showed recurrence of the disease  High PD-L1 expression  10/27/2022-started single agent Keytruda  Regimen and potential side effects of Keytruda reviewed with patient  Patient reports overall he has been tolerating Keytruda well  01/16/2023-PET scan results reviewed with Dr. Bean overall stable disease-  -continue with Keytruda  Schedule for next Keytruda and follow-up with Dr. Bean  Patient reports he will make follow-up appointment with  Dr Sethi from ENT as well          Pulmonary embolism  Continue with Eliquis  Call 911 or go to ER in event of bleeding bruising fall injury shortness of breath chest pain cough      Hypothyroidism  Normal TSH  Continue with levothyroxine      Call clinic with any changes in her condition or questions    TARUN Sandy [facility] [place] [facility]     Chart documentation with Dragon Voice recognition Software. Although reviewed after completion, some words and grammatical errors may " remain.            Again, thank you for allowing me to participate in the care of your patient.        Sincerely,        TARUN Sandy CNP

## 2023-01-31 NOTE — PROGRESS NOTES
"Oncology/Hematology Visit Note  Jan 31, 2023    Reason for Visit: follow up of laryngeal squamous cell carcinoma    Patient Dr. Bean  Status post surgery and postoperative radiation with no evidence of recurrence  -Currently on single agent pembrolizumab started on 10/27/2022    Interval History:  \Reports feeling well.  Denies fever chills sweats cough shortness of breath chest pain nausea vomiting diarrhea abdominal pain or bleeding      Review of Systems:  14 point ROS of systems including Constitutional, Eyes, Respiratory, Cardiovascular, Gastroenterology, Genitourinary, Integumentary, Muscularskeletal, Psychiatric were all negative except for pertinent positives noted in my HPI.    Physical Examination:  General: The patient is a pleasant male in no acute distress.  /71   Pulse 75   Resp 16   Ht 1.778 m (5' 10\")   Wt 95 kg (209 lb 6.4 oz)   SpO2 99%   BMI 30.05 kg/m    HEENT: EOMI, PERRL. Sclerae are anicteric. Oral mucosa is pink and moist with no lesions or thrush.   Lymph: Neck is supple with no lymphadenopathy in the cervical or supraclavicular areas.   Heart: Regular rate and rhythm.   Lungs: Clear to auscultation bilaterally.   GI: Bowel sounds present, soft, nontender with no palpable hepatosplenomegaly or masses.   Extremities: No lower extremity edema noted bilaterally.   Skin: No rashes, petechiae, or bruising noted on exposed skin.  Laboratory Data:  CMP and TSH results reviewed      Assessment and Plan:  This is a 72-year-old male with    Laryngeal squamous cell carcinoma  Patient Dr. Bean  Status post surgery and postoperative radiation at that time no evidence of recurrence  09/30/2022-PET/CT showed abnormality in the neck FNA positive for squamous cell carcinoma showed recurrence of the disease  High PD-L1 expression  10/27/2022-started single agent Keytruda  Regimen and potential side effects of Keytruda reviewed with patient  Patient reports overall he has been tolerating Keytruda " well  01/16/2023-PET scan results reviewed with Dr. Bean overall stable disease-  -Keytruda held last week due t tooth abscess.  He saw dentist he finished antibiotic.  Today was scheduled for Keytruda but in no that he was going to have infusion.  Due to transportation issues he does not want Keytruda today we will reschedule it for Friday this week  -continue with Keytruda  Schedule for next Keytruda and follow-up with Dr. Bean  Patient reports he will make follow-up appointment with  Dr Sethi from ENT as well          Pulmonary embolism  Continue with EliBrightBytesis  Call 911 or go to ER in event of bleeding bruising fall injury shortness of breath chest pain cough      Hypothyroidism  Normal TSH  Continue with levothyroxine      Call clinic with any changes in her condition or questions    TARUN Sandy [facility]- Issue     Chart documentation with Dragon Voice recognition Software. Although reviewed after completion, some words and grammatical errors may remain.

## 2023-01-31 NOTE — PROGRESS NOTES
"Oncology Rooming Note    January 31, 2023 12:31 PM   Raj Warren is a 72 year old male who presents for:    Chief Complaint   Patient presents with     Oncology Clinic Visit     Malignant neoplasm of larynx (H)     Initial Vitals: /71   Pulse 75   Resp 16   Ht 1.778 m (5' 10\")   Wt 95 kg (209 lb 6.4 oz)   SpO2 99%   BMI 30.05 kg/m   Estimated body mass index is 30.05 kg/m  as calculated from the following:    Height as of this encounter: 1.778 m (5' 10\").    Weight as of this encounter: 95 kg (209 lb 6.4 oz). Body surface area is 2.17 meters squared.  Mild Pain (2) Comment: Data Unavailable   No LMP for male patient.  Allergies reviewed: Yes  Medications reviewed: Yes    Medications: Yes, Levothyroxine  Pharmacy name entered into VideoGenie: [facility] #[id number] - [place], [place] - [address] AT [address]    Clinical concerns: Refill needed for Levothyroxine.        Wendy Fonseca MA              "

## 2023-01-31 NOTE — ADDENDUM NOTE
Addended by: VIJAY KHAN on: 1/31/2023 01:00 PM     Modules accepted: Orders     [Negative] : Heme/Lymph

## 2023-02-03 ENCOUNTER — INFUSION THERAPY VISIT (OUTPATIENT)
Dept: INFUSION THERAPY | Facility: CLINIC | Age: 73
End: 2023-02-03
Attending: INTERNAL MEDICINE
Payer: COMMERCIAL

## 2023-02-03 VITALS
HEART RATE: 74 BPM | DIASTOLIC BLOOD PRESSURE: 78 MMHG | TEMPERATURE: 98.7 F | BODY MASS INDEX: 30.13 KG/M2 | SYSTOLIC BLOOD PRESSURE: 136 MMHG | RESPIRATION RATE: 16 BRPM | WEIGHT: 210 LBS

## 2023-02-03 DIAGNOSIS — C32.9 MALIGNANT NEOPLASM OF LARYNX (H): Primary | ICD-10-CM

## 2023-02-03 PROCEDURE — 250N000011 HC RX IP 250 OP 636: Performed by: NURSE PRACTITIONER

## 2023-02-03 PROCEDURE — 96413 CHEMO IV INFUSION 1 HR: CPT

## 2023-02-03 PROCEDURE — 258N000003 HC RX IP 258 OP 636: Performed by: NURSE PRACTITIONER

## 2023-02-03 RX ORDER — HEPARIN SODIUM (PORCINE) LOCK FLUSH IV SOLN 100 UNIT/ML 100 UNIT/ML
5 SOLUTION INTRAVENOUS
Status: DISCONTINUED | OUTPATIENT
Start: 2023-02-03 | End: 2023-02-03 | Stop reason: HOSPADM

## 2023-02-03 RX ORDER — HEPARIN SODIUM,PORCINE 10 UNIT/ML
5 VIAL (ML) INTRAVENOUS
Status: DISCONTINUED | OUTPATIENT
Start: 2023-02-03 | End: 2023-02-03 | Stop reason: HOSPADM

## 2023-02-03 RX ADMIN — SODIUM CHLORIDE 200 MG: 9 INJECTION, SOLUTION INTRAVENOUS at 13:43

## 2023-02-03 RX ADMIN — SODIUM CHLORIDE 250 ML: 9 INJECTION, SOLUTION INTRAVENOUS at 13:43

## 2023-02-03 ASSESSMENT — PAIN SCALES - GENERAL: PAINLEVEL: NO PAIN (0)

## 2023-02-03 NOTE — PROGRESS NOTES
Infusion Nursing Note:  Raj Warren presents today for [facility].    Patient seen by provider today: No   present during visit today: Not Applicable.    Note: pt denies any changes in health or new concerns.    Intravenous Access:  Peripheral IV placed.    Treatment Conditions:  Lab Results   Component Value Date     01/31/2023    POTASSIUM 4.2 01/31/2023    MAG 2.5 (H) 07/17/2021    CR 0.95 01/31/2023    TELMA 9.1 01/31/2023    BILITOTAL 0.2 01/31/2023    ALBUMIN 4.2 01/31/2023    ALT 20 01/31/2023    AST 21 01/31/2023     Results reviewed, labs MET treatment parameters, ok to proceed with treatment.    Post Infusion Assessment:  Patient tolerated infusion without incident.  Site patent and intact, free from redness, edema or discomfort.  No evidence of extravasations.  Access discontinued per protocol.     Discharge Plan:   Patient and/or family verbalized understanding of discharge instructions and all questions answered.  [facility].  Patient will return 2/22 for next appointment.   Patient discharged in stable condition accompanied by: self.  Departure Mode: Ambulatory.      Karishma Ervin RN

## 2023-02-06 NOTE — PROGRESS NOTES
Dear Dr. Bean:    I had the pleasure of seeing Raj Warren in follow-up today at the [facility].     History of Present Illness:  Raj Warren is 72 year old man with a T4aN0 SCC of the larynx. The patient presented to the ED with a week history of shortness of breath, along with symptoms of sore throat, bilateral ear pain. He had stridor vs wheezing, mild increased work of breathing, and hoarse voice at that time. He was treated with steroids and nebulizers with improvement in his symptoms and was discharged.  He had recurrent symptoms and was seen in the ED again on 4/12/2021. He had a CT scan performed on 4/12/2021 which demonstrated a 3.9 x 1.6 x 3.9 cm mass involving the right true cord, right AE fold, right false cord, posterior hypopharynx, proximal trachea, with involvement of the thyroid cartilage, involvement of the prelaryngeal tissue, no abnormal nodes. Dr Willoughby (local ENT), scope exam was performed, and patient was admitted for a trach and DL/bx. Dr Johnson and Dr Sauceda performed this procedure on 4/13/2021. A tracheal window at 2nd tracheal ring was performed with placement of an 8 cuffed Shiley. Pathology showed moderately differentiated SCC. He was seen by Dr Bean of medical oncology on 4/13/2021. He was followed by local ENT team during his hospitalization. He was recommended for chemoradiation. He was seen by Dr Mclaughlin of [facility], planning 7 weeks treatment, and CT simulation was done. He did have a VSS done while inpatient without evidence of aspiration. The patient was referred here for discussion of possible salvage laryngectomy after chemoradiation. He had a PET scan on 4/26/2021 which showed an FDG avid mass in the larynx involving the supraglottis/glottis/subglottis, hypermetabolic left level 4 node, FDG avid nodule in the right lung thought to be infectious/inflammatory. He was then admitted to the hospital locally from 4/27/2021 to 5/8/2021 after  presenting with worsening shortness of breath, found to have bilateral PE with significant clot burden including the right pulmonary artery and the right ventricle. He was taken to the OR for pulmonary embolectomy and RV thrombectomy. He had IVC filter placed 4/30/2021. His course was complicated by hemodynamic instability requiring pressors, poor healing of his sternal incision requiring wound vac placement. He was discharged on eliquis. He was sent to rehab after his admission, discharged 5/15/2021. He saw Dr Bean in follow-up on 5/27/2021 and was recommended for repeat imaging. This showed slight decrease in size of the tumor, no distant disease.     He was seen as a new patient on 6/18/2021. After extensive preoperative counseling especially in light of his recent PE and anticoagulation, the recommendations was for surgery given the cartilaginous erosion seen on imaging. He was taken to the OR on 7/8/2021 for a DL, total laryngectomy, bilateral neck dissections, cricopharyngeal myotomy. His hospital course was uncomplicated, was restarted on his anticoagulation. His final pathology demonstrated a 6 cm SCC with invasion through the thyroid cartilage, involved the cricoid cartilage, involved the right pyriform sinus, extended to the left side of the tracheostomy site, PNI present, focal lymphatic invasion, 0/75 nodes. The main specimen had a positive margin at the pyriform sinus, margins were taken from the main specimen and were negative (named right pharyngeal wall mucosal margin).      He received postoperative radiation under the care of Dr Kennedy, from 8/4/2021 to 9/20/2021 for a total of 66 Gy.  He had his PEG removed by radiation oncology on 10/4/2021. His post treatment PET scan was negative in December 2021. He had his IVC filter removed in February 2022.     He had imaging in June 2022 which showed a NIRADS 2b nodular soft tissue density along the upper esophagus. He was scheduled for a PET scan to  "follow-up this area but due to some scheduling issues did not have the scan, got completed on 9/30/2022. This demonstrated a hypermetabolic mass in the right anterolateral wall of the neopharynx measuring 1.8 x 1.7 x 3.7 cm with invasion of the right thyroid lobe, not involving the vessels, no distant disease. He was not interested in surgical resection and elected to proceed with palliative keytruda.    Interval history:  He comes in today for follow-up. He was last seen in October 2022. He was started on keytruda on 10/27/2022 under the care of Dr Bean. He has received 5 cycles so far. TSH was normal in January 2023. He had restaging imaging in January 2023 which showed partial decrease in tumor size. He missed his last visit due to dental extraction. He says he feels \"blah\" both physically and mentally. He says feels down today. He thinks he has another tooth acting up. He says eating and drinking are ok. He is not able to swallow certain things. He is not regurgitating foods but some foods cause a lot more phlegm that he has to bring back up. He is not having heartburn or reflux. His pills are going down ok with the exception of the big pills. His weight is stable. He sometimes has some pain on the right superior neck. This is mostly when he wakes. He has no sore throat. He has no neck masses. He has no ear pain. He has no hemoptysis. He has been paying out of pocket for the keytruda $1500.         MEDICATIONS:     Current Outpatient Medications   Medication Sig Dispense Refill     apixaban ANTICOAGULANT (ELIQUIS) 5 MG tablet Take 1 tablet (5 mg) by mouth 2 times daily 180 tablet 3     levothyroxine (SYNTHROID/LEVOTHROID) 75 MCG tablet Take 75 mcg by mouth daily 90 tablet 3     metoprolol tartrate (LOPRESSOR) 25 MG tablet Take 1 tablet (25 mg) by mouth 2 times daily 180 tablet 3     amoxicillin (AMOXIL) 500 MG tablet  (Patient not taking: Reported on 1/31/2023)         ALLERGIES:    Allergies   Allergen " Reactions     Pollen Extract        HABITS/SOCIAL HISTORY:    Former smoker /2 ppd x 8 yrs, quit in . Sober from alcohol.   Lives alone. His family does not live in the immediate [place] area.   He is a retired .       Social History     Socioeconomic History     Marital status: Single     Spouse name: Not on file     Number of children: Not on file     Years of education: Not on file     Highest education level: Not on file   Occupational History     Not on file   Tobacco Use     Smoking status: Former     Types: Cigarettes     Quit date: 1989     Years since quittin.1     Smokeless tobacco: Never     Tobacco comments:     quit in   had smoked about 1/2 pack a day then cut back   Substance and Sexual Activity     Alcohol use: No     Comment: No alcohol since      Drug use: No     Sexual activity: Yes     Partners: Female   Other Topics Concern     Parent/sibling w/ CABG, MI or angioplasty before 65F 55M? Not Asked   Social History Narrative     Not on file     Social Determinants of Health     Financial Resource Strain: Low Risk      Difficulty of Paying Living Expenses: Not hard at all   Food Insecurity: No Food Insecurity     Worried About Running Out of Food in the Last Year: Never true     Ran Out of Food in the Last Year: Never true   Transportation Needs: No Transportation Needs     Lack of Transportation (Medical): No     Lack of Transportation (Non-Medical): No   Physical Activity: Not on file   Stress: Not on file   Social Connections: Not on file   Intimate Partner Violence: Not on file   Housing Stability: Not on file       PAST MEDICAL HISTORY:   Past Medical History:   Diagnosis Date     Allergic state      Anemia      Former smoker      Malignant neoplasm of larynx (H) 2021     Pulmonary emboli (H) 2021    Bilateral-s/p embolectomy and IVC filter placement     S/P percutaneous endoscopic gastrostomy (PEG) tube placement (H)      Tracheostomy in  "place (H)         PAST SURGICAL HISTORY:   Past Surgical History:   Procedure Laterality Date     DISSECTION RADICAL NECK BILATERAL Bilateral 7/8/2021    Procedure: bilateral neck dissections;  Surgeon: Marilou Sethi MD;  Location: [facility] OR     ENDOSCOPIC INSERTION TUBE GASTROSTOMY Left 7/8/2021    Procedure: INSERTION, PEG TUBE with Esophagogastroduodenoscopy;  Surgeon: Kg Montaño MD;  Location: [facility] OR     IR IVC FILTER PLACEMENT  4/30/2021     IR IVC FILTER REMOVAL  2/7/2022     LARYNGECTOMY N/A 7/8/2021    Procedure: LARYNGECTOMY;  Surgeon: Marilou Sethi MD;  Location: [facility] OR     LARYNGOSCOPY N/A 4/12/2021    Procedure: LARYNGOSCOPY;  Surgeon: Choco Johnson MD;  Location: [facility] OR     LARYNGOSCOPY N/A 7/8/2021    Procedure: LARYNGOSCOPY;  Surgeon: Marilou Sethi MD;  Location: [facility] OR     REPAIR ANEURYSM ASCENDING AORTA N/A 4/28/2021    Procedure: PULMONARY THROMBECTOMY;  Surgeon: Yumi Singh MD;  Location: [facility] OR     TRACHEOSTOMY  4/12/2021    Procedure: CREATION, TRACHEOSTOMY;  Surgeon: Choco Johnson MD;  Location: [facility] OR       FAMILY HISTORY:    Family History   Problem Relation Age of Onset     Circulatory Mother         blood clots     Alcohol/Drug Father         alcohol drank heavily     Alcohol/Drug Brother         alcohol       REVIEW OF SYSTEMS:  12 point ROS was negative other than the symptoms noted above in the HPI.  Patient Supplied Answers to Review of Systems  [facility] ROS 8/30/2021   Ears, Nose, Throat: Nasal congestion or drainage, Sore throat, Trouble swallowing, Hoarseness         PHYSICAL EXAMINATION:   /82 (BP Location: Right arm, Patient Position: Sitting, Cuff Size: Adult Large)   Pulse 84   Ht 1.803 m (5' 11\")   Wt 94.3 kg (208 lb)   SpO2 98%   BMI 29.01 kg/m    Appearance:   normal; NAD, age-appropriate appearance, well-developed, normal habitus   Communication:   communicates well with electrolarynx   Head/Face:   inspection -  Normal; no scars " or visible lesions   Salivary glands -  Normal size, no tenderness, swelling, or palpable masses   Facial strength -  Normal and symmetric    Skin:  normal, no rash   Ears:  auricle (AD) -  normal  EAC (AD) -  normal  TM (AD) -  Normal, no effusion  auricle (AS) -  normal  EAC (AS) -  normal  TM (AS) -  Normal, no effusion  Normal clinical speech reception   Nose:  Ext. inspection -  Normal   Oral Cavity:  lips -  Normal mucosa, oral competence, and stoma size   Age-appropriate dentition, some missing teeth, no signs of active infection, healthy gingival mucosa   Hard palate, buccal, floor of mouth mucosa normal   Tongue - normal movement, no lesions   Oropharynx:  mucosa -  Normal, no lesions  soft palate -  Normal, no lesions, no asymmetry, normal elevation   Neck: Well healed neck incision  Stoma patent  Mildly tender along left neck  Firm mass that is fixed along right neck, minimal tenderness, hard to discern borders  Some fibrosis present   Lymphatic:  no abnormal nodes   Cardiovascular:  warm, pink, well-perfused extremities without swelling, tenderness, or edema   Respiratory:  Normal respiratory effort   Neuro/Psych.:  mood/affect -  flat  mental status -  normal         PROCEDURES:   Flexible fiberoptic laryngoscopy:  declined today    RESULTS REVIEWED:     Notes from oncology reviewed    TSH reviewed     PET report reviewed    I independently reviewed PET scan images, compared to images from 9/2022      IMPRESSION AND PLAN:   Raj Warren is a 72 year old man with a T4N0 SCC of the larynx. He [facility]. He was taken to the OR on 7/8/2021 for a total laryngectomy with bilateral neck dissections. He completed his postoperative radiation on 9/20/2021. He developed a local/regional recurrence in October 2022, receiving palliative keytruda starting 10/27/2022.    We discussed that we could consider surgery, may be hard to obtain negative margins, would certainly plan  on pectoralis flap. On imaging it is not currently involving the carotid, though there is a larger appearing vessel near the tumor. He is not interested in resection at this time. We discussed that the tumor is smaller, but I do not know that Dr Kennedy would be eager to re-irradiate him. We did discuss that Dr Kennedy is leaving the [facility]. We discussed that the field would be smaller, does place the carotid at risk with re-irradiation. He is not eager for more radiation. We discussed that if the keytruda does not work, there are other drugs that can be tried. He is having some financial issues with the keytruda and says that he would not be able to pay for the full 2 yrs of treatment planned. He is working on getting some potential financial assistance for this.     His TSH is being checked by medical oncology.    He is having imaging per medical oncology, last PET 1/2023. We discussed that it will likely be repeated after probably 2-3 more cycles of immunotherapy.    I will see him back in 3 months.     Thank you very much for the opportunity to participate in the care of your patient.      Marilou Sethi MD  Otolaryngology- Head & Neck Surgery      This note was dictated with voice recognition software and then edited. Please excuse any unintentional errors.       I spent 40 minutes on patient visit and charting activities on date of service.       CC:  Rosa Bean MD  [facility]  [address] [address]      Serg Kennedy MD  Department of Radiation Oncology  [facility]

## 2023-02-08 ENCOUNTER — OFFICE VISIT (OUTPATIENT)
Dept: OTOLARYNGOLOGY | Facility: CLINIC | Age: 73
End: 2023-02-08
Payer: COMMERCIAL

## 2023-02-08 VITALS
HEART RATE: 84 BPM | BODY MASS INDEX: 29.12 KG/M2 | SYSTOLIC BLOOD PRESSURE: 121 MMHG | DIASTOLIC BLOOD PRESSURE: 82 MMHG | WEIGHT: 208 LBS | HEIGHT: 71 IN | OXYGEN SATURATION: 98 %

## 2023-02-08 DIAGNOSIS — C32.9 MALIGNANT NEOPLASM OF LARYNX (H): Primary | ICD-10-CM

## 2023-02-08 PROCEDURE — 99215 OFFICE O/P EST HI 40 MIN: CPT | Performed by: OTOLARYNGOLOGY

## 2023-02-08 ASSESSMENT — PAIN SCALES - GENERAL: PAINLEVEL: NO PAIN (0)

## 2023-02-08 NOTE — LETTER
2/8/2023       RE: Raj Warren  [address]  [facility] [address]     Dear Colleague,    Thank you for referring your patient, Raj Warren, to the [facility] [place] at [facility]. Please see a copy of my visit note below.    Dear Dr. Bean:    I had the pleasure of seeing Raj Warren in follow-up today at the [facility].     History of Present Illness:  Raj Warren is 72 year old man with a T4aN0 SCC of the larynx. The patient presented to the ED with a week history of shortness of breath, along with symptoms of sore throat, bilateral ear pain. He had stridor vs wheezing, mild increased work of breathing, and hoarse voice at that time. He was treated with steroids and nebulizers with improvement in his symptoms and was discharged.  He had recurrent symptoms and was seen in the ED again on 4/12/2021. He had a CT scan performed on 4/12/2021 which demonstrated a 3.9 x 1.6 x 3.9 cm mass involving the right true cord, right AE fold, right false cord, posterior hypopharynx, proximal trachea, with involvement of the thyroid cartilage, involvement of the prelaryngeal tissue, no abnormal nodes. Dr Willoughby (local ENT), scope exam was performed, and patient was admitted for a trach and DL/bx. Dr Johnson and Dr Sauceda performed this procedure on 4/13/2021. A tracheal window at 2nd tracheal ring was performed with placement of an 8 cuffed Shiley. Pathology showed moderately differentiated SCC. He was seen by Dr Bean of medical oncology on 4/13/2021. He was followed by local ENT team during his hospitalization. He was recommended for chemoradiation. He was seen by Dr Mclaughlin of [facility], planning 7 weeks treatment, and CT simulation was done. He did have a VSS done while inpatient without evidence of aspiration. The patient was referred here for discussion of possible salvage laryngectomy  after chemoradiation. He had a PET scan on 4/26/2021 which showed an FDG avid mass in the larynx involving the supraglottis/glottis/subglottis, hypermetabolic left level 4 node, FDG avid nodule in the right lung thought to be infectious/inflammatory. He was then admitted to the hospital locally from 4/27/2021 to 5/8/2021 after presenting with worsening shortness of breath, found to have bilateral PE with significant clot burden including the right pulmonary artery and the right ventricle. He was taken to the OR for pulmonary embolectomy and RV thrombectomy. He had IVC filter placed 4/30/2021. His course was complicated by hemodynamic instability requiring pressors, poor healing of his sternal incision requiring wound vac placement. He was discharged on eliquis. He was sent to rehab after his admission, discharged 5/15/2021. He saw Dr Bean in follow-up on 5/27/2021 and was recommended for repeat imaging. This showed slight decrease in size of the tumor, no distant disease.     He was seen as a new patient on 6/18/2021. After extensive preoperative counseling especially in light of his recent PE and anticoagulation, the recommendations was for surgery given the cartilaginous erosion seen on imaging. He was taken to the OR on 7/8/2021 for a DL, total laryngectomy, bilateral neck dissections, cricopharyngeal myotomy. His hospital course was uncomplicated, was restarted on his anticoagulation. His final pathology demonstrated a 6 cm SCC with invasion through the thyroid cartilage, involved the cricoid cartilage, involved the right pyriform sinus, extended to the left side of the tracheostomy site, PNI present, focal lymphatic invasion, 0/75 nodes. The main specimen had a positive margin at the pyriform sinus, margins were taken from the main specimen and were negative (named right pharyngeal wall mucosal margin).      He received postoperative radiation under the care of Dr Kennedy, from 8/4/2021 to 9/20/2021 for a total  "of 66 Gy.  He had his PEG removed by radiation oncology on 10/4/2021. His post treatment PET scan was negative in December 2021. He had his IVC filter removed in February 2022.     He had imaging in June 2022 which showed a NIRADS 2b nodular soft tissue density along the upper esophagus. He was scheduled for a PET scan to follow-up this area but due to some scheduling issues did not have the scan, got completed on 9/30/2022. This demonstrated a hypermetabolic mass in the right anterolateral wall of the neopharynx measuring 1.8 x 1.7 x 3.7 cm with invasion of the right thyroid lobe, not involving the vessels, no distant disease. He was not interested in surgical resection and elected to proceed with palliative keytruda.    Interval history:  He comes in today for follow-up. He was last seen in October 2022. He was started on keytruda on 10/27/2022 under the care of Dr Bean. He has received 5 cycles so far. TSH was normal in January 2023. He had restaging imaging in January 2023 which showed partial decrease in tumor size. He missed his last visit due to dental extraction. He says he feels \"blah\" both physically and mentally. He says feels down today. He thinks he has another tooth acting up. He says eating and drinking are ok. He is not able to swallow certain things. He is not regurgitating foods but some foods cause a lot more phlegm that he has to bring back up. He is not having heartburn or reflux. His pills are going down ok with the exception of the big pills. His weight is stable. He sometimes has some pain on the right superior neck. This is mostly when he wakes. He has no sore throat. He has no neck masses. He has no ear pain. He has no hemoptysis. He has been paying out of pocket for the keytruda $1500.         MEDICATIONS:     Current Outpatient Medications   Medication Sig Dispense Refill     apixaban ANTICOAGULANT (ELIQUIS) 5 MG tablet Take 1 tablet (5 mg) by mouth 2 times daily 180 tablet 3     " levothyroxine (SYNTHROID/LEVOTHROID) 75 MCG tablet Take 75 mcg by mouth daily 90 tablet 3     metoprolol tartrate (LOPRESSOR) 25 MG tablet Take 1 tablet (25 mg) by mouth 2 times daily 180 tablet 3     amoxicillin (AMOXIL) 500 MG tablet  (Patient not taking: Reported on 2023)         ALLERGIES:    Allergies   Allergen Reactions     Pollen Extract        HABITS/SOCIAL HISTORY:    Former smoker 1/2 ppd x 8 yrs, quit in . Sober from alcohol.   Lives alone. His family does not live in the immediate [place] area.   He is a retired .       Social History     Socioeconomic History     Marital status: Single     Spouse name: Not on file     Number of children: Not on file     Years of education: Not on file     Highest education level: Not on file   Occupational History     Not on file   Tobacco Use     Smoking status: Former     Types: Cigarettes     Quit date: 1989     Years since quittin.1     Smokeless tobacco: Never     Tobacco comments:     quit in   had smoked about 1/2 pack a day then cut back   Substance and Sexual Activity     Alcohol use: No     Comment: No alcohol since      Drug use: No     Sexual activity: Yes     Partners: Female   Other Topics Concern     Parent/sibling w/ CABG, MI or angioplasty before 65F 55M? Not Asked   Social History Narrative     Not on file     Social Determinants of Health     Financial Resource Strain: Low Risk      Difficulty of Paying Living Expenses: Not hard at all   Food Insecurity: No Food Insecurity     Worried About Running Out of Food in the Last Year: Never true     Ran Out of Food in the Last Year: Never true   Transportation Needs: No Transportation Needs     Lack of Transportation (Medical): No     Lack of Transportation (Non-Medical): No   Physical Activity: Not on file   Stress: Not on file   Social Connections: Not on file   Intimate Partner Violence: Not on file   Housing Stability: Not on file       PAST MEDICAL  HISTORY:   Past Medical History:   Diagnosis Date     Allergic state      Anemia      Former smoker      Malignant neoplasm of larynx (H) 04/20/2021     Pulmonary emboli (H) 04/28/2021    Bilateral-s/p embolectomy and IVC filter placement     S/P percutaneous endoscopic gastrostomy (PEG) tube placement (H)      Tracheostomy in place (H)         PAST SURGICAL HISTORY:   Past Surgical History:   Procedure Laterality Date     DISSECTION RADICAL NECK BILATERAL Bilateral 7/8/2021    Procedure: bilateral neck dissections;  Surgeon: Marilou Sethi MD;  Location: [facility] OR     ENDOSCOPIC INSERTION TUBE GASTROSTOMY Left 7/8/2021    Procedure: INSERTION, PEG TUBE with Esophagogastroduodenoscopy;  Surgeon: Kg Montaño MD;  Location: [facility] OR     IR IVC FILTER PLACEMENT  4/30/2021     IR IVC FILTER REMOVAL  2/7/2022     LARYNGECTOMY N/A 7/8/2021    Procedure: LARYNGECTOMY;  Surgeon: Marilou Sethi MD;  Location: [facility] OR     LARYNGOSCOPY N/A 4/12/2021    Procedure: LARYNGOSCOPY;  Surgeon: Choco Johnson MD;  Location: [facility] OR     LARYNGOSCOPY N/A 7/8/2021    Procedure: LARYNGOSCOPY;  Surgeon: Marilou Stehi MD;  Location: [facility] OR     REPAIR ANEURYSM ASCENDING AORTA N/A 4/28/2021    Procedure: PULMONARY THROMBECTOMY;  Surgeon: Yumi Singh MD;  Location: [facility] OR     TRACHEOSTOMY  4/12/2021    Procedure: CREATION, TRACHEOSTOMY;  Surgeon: Choco Johnson MD;  Location: [facility] OR       FAMILY HISTORY:    Family History   Problem Relation Age of Onset     Circulatory Mother         blood clots     Alcohol/Drug Father         alcohol drank heavily     Alcohol/Drug Brother         alcohol       REVIEW OF SYSTEMS:  12 point ROS was negative other than the symptoms noted above in the HPI.  Patient Supplied Answers to Review of Systems  [facility] ROS 8/30/2021   Ears, Nose, Throat: Nasal congestion or drainage, Sore throat, Trouble swallowing, Hoarseness         PHYSICAL EXAMINATION:   /82 (BP Location: Right arm,  "Patient Position: Sitting, Cuff Size: Adult Large)   Pulse 84   Ht 1.803 m (5' 11\")   Wt 94.3 kg (208 lb)   SpO2 98%   BMI 29.01 kg/m    Appearance:   normal; NAD, age-appropriate appearance, well-developed, normal habitus   Communication:   communicates well with electrolarynx   Head/Face:   inspection -  Normal; no scars or visible lesions   Salivary glands -  Normal size, no tenderness, swelling, or palpable masses   Facial strength -  Normal and symmetric    Skin:  normal, no rash   Ears:  auricle (AD) -  normal  EAC (AD) -  normal  TM (AD) -  Normal, no effusion  auricle (AS) -  normal  EAC (AS) -  normal  TM (AS) -  Normal, no effusion  Normal clinical speech reception   Nose:  Ext. inspection -  Normal   Oral Cavity:  lips -  Normal mucosa, oral competence, and stoma size   Age-appropriate dentition, some missing teeth, no signs of active infection, healthy gingival mucosa   Hard palate, buccal, floor of mouth mucosa normal   Tongue - normal movement, no lesions   Oropharynx:  mucosa -  Normal, no lesions  soft palate -  Normal, no lesions, no asymmetry, normal elevation   Neck: Well healed neck incision  Stoma patent  Mildly tender along left neck  Firm mass that is fixed along right neck, minimal tenderness, hard to discern borders  Some fibrosis present   Lymphatic:  no abnormal nodes   Cardiovascular:  warm, pink, well-perfused extremities without swelling, tenderness, or edema   Respiratory:  Normal respiratory effort   Neuro/Psych.:  mood/affect -  flat  mental status -  normal         PROCEDURES:   Flexible fiberoptic laryngoscopy:  declined today    RESULTS REVIEWED:     Notes from oncology reviewed    TSH reviewed     PET report reviewed    I independently reviewed PET scan images, compared to images from 9/2022      IMPRESSION AND PLAN:   Raj Warren is a 72 year old man with a T4N0 SCC of the larynx. He underwent [facility]. He was taken to the OR on 7/8/2021 " for a total laryngectomy with bilateral neck dissections. He completed his postoperative radiation on 9/20/2021. He developed a local/regional recurrence in October 2022, receiving palliative keytruda starting 10/27/2022.    We discussed that we could consider surgery, may be hard to obtain negative margins, would certainly plan on pectoralis flap. On imaging it is not currently involving the carotid, though there is a larger appearing vessel near the tumor. He is not interested in resection at this time. We discussed that the tumor is smaller, but I do not know that Dr Kennedy would be eager to re-irradiate him. We did discuss that Dr Kennedy is leaving the [facility]. We discussed that the field would be smaller, does place the carotid at risk with re-irradiation. He is not eager for more radiation. We discussed that if the keytruda does not work, there are other drugs that can be tried. He is having some financial issues with the keytruda and says that he would not be able to pay for the full 2 yrs of treatment planned. He is working on getting some potential financial assistance for this.     His TSH is being checked by medical oncology.    He is having imaging per medical oncology, last PET 1/2023. We discussed that it will likely be repeated after probably 2-3 more cycles of immunotherapy.    I will see him back in 3 months.     Thank you very much for the opportunity to participate in the care of your patient.      Marilou Sethi MD  Otolaryngology- Head & Neck Surgery      This note was dictated with voice recognition software and then edited. Please excuse any unintentional errors.       I spent 40 minutes on patient visit and charting activities on date of service.       CC:  Rosa Bean MD  [facility]  [address] [facility] [address]  [facility] [address]      Serg Kennedy MD  Department of Radiation Oncology  [facility]

## 2023-02-22 ENCOUNTER — ONCOLOGY VISIT (OUTPATIENT)
Dept: ONCOLOGY | Facility: CLINIC | Age: 73
End: 2023-02-22
Attending: INTERNAL MEDICINE
Payer: COMMERCIAL

## 2023-02-22 ENCOUNTER — LAB (OUTPATIENT)
Dept: INFUSION THERAPY | Facility: CLINIC | Age: 73
End: 2023-02-22
Attending: INTERNAL MEDICINE
Payer: COMMERCIAL

## 2023-02-22 VITALS
OXYGEN SATURATION: 99 % | SYSTOLIC BLOOD PRESSURE: 116 MMHG | WEIGHT: 208 LBS | RESPIRATION RATE: 16 BRPM | BODY MASS INDEX: 29.01 KG/M2 | HEART RATE: 89 BPM | DIASTOLIC BLOOD PRESSURE: 79 MMHG

## 2023-02-22 DIAGNOSIS — C32.9 MALIGNANT NEOPLASM OF LARYNX (H): Primary | ICD-10-CM

## 2023-02-22 LAB
ALBUMIN SERPL BCG-MCNC: 4.1 G/DL (ref 3.5–5.2)
ALP SERPL-CCNC: 89 U/L (ref 40–129)
ALT SERPL W P-5'-P-CCNC: 17 U/L (ref 10–50)
ANION GAP SERPL CALCULATED.3IONS-SCNC: 9 MMOL/L (ref 7–15)
AST SERPL W P-5'-P-CCNC: 21 U/L (ref 10–50)
BILIRUB SERPL-MCNC: 0.4 MG/DL
BUN SERPL-MCNC: 11 MG/DL (ref 8–23)
CALCIUM SERPL-MCNC: 9.4 MG/DL (ref 8.8–10.2)
CHLORIDE SERPL-SCNC: 107 MMOL/L (ref 98–107)
CREAT SERPL-MCNC: 0.92 MG/DL (ref 0.67–1.17)
DEPRECATED HCO3 PLAS-SCNC: 25 MMOL/L (ref 22–29)
GFR SERPL CREATININE-BSD FRML MDRD: 89 ML/MIN/1.73M2
GLUCOSE SERPL-MCNC: 94 MG/DL (ref 70–99)
POTASSIUM SERPL-SCNC: 4.5 MMOL/L (ref 3.4–5.3)
PROT SERPL-MCNC: 7.4 G/DL (ref 6.4–8.3)
SODIUM SERPL-SCNC: 141 MMOL/L (ref 136–145)
TSH SERPL DL<=0.005 MIU/L-ACNC: 1.96 UIU/ML (ref 0.3–4.2)

## 2023-02-22 PROCEDURE — 84443 ASSAY THYROID STIM HORMONE: CPT | Performed by: INTERNAL MEDICINE

## 2023-02-22 PROCEDURE — 80053 COMPREHEN METABOLIC PANEL: CPT | Performed by: INTERNAL MEDICINE

## 2023-02-22 PROCEDURE — 99214 OFFICE O/P EST MOD 30 MIN: CPT | Performed by: NURSE PRACTITIONER

## 2023-02-22 PROCEDURE — 36415 COLL VENOUS BLD VENIPUNCTURE: CPT | Performed by: INTERNAL MEDICINE

## 2023-02-22 PROCEDURE — G0463 HOSPITAL OUTPT CLINIC VISIT: HCPCS | Performed by: NURSE PRACTITIONER

## 2023-02-22 RX ORDER — ALBUTEROL SULFATE 90 UG/1
1-2 AEROSOL, METERED RESPIRATORY (INHALATION)
Status: CANCELLED
Start: 2023-02-24

## 2023-02-22 RX ORDER — METHYLPREDNISOLONE SODIUM SUCCINATE 125 MG/2ML
125 INJECTION, POWDER, LYOPHILIZED, FOR SOLUTION INTRAMUSCULAR; INTRAVENOUS
Status: CANCELLED
Start: 2023-02-24

## 2023-02-22 RX ORDER — DIPHENHYDRAMINE HYDROCHLORIDE 50 MG/ML
50 INJECTION INTRAMUSCULAR; INTRAVENOUS
Status: CANCELLED
Start: 2023-02-24

## 2023-02-22 RX ORDER — HEPARIN SODIUM (PORCINE) LOCK FLUSH IV SOLN 100 UNIT/ML 100 UNIT/ML
5 SOLUTION INTRAVENOUS
Status: CANCELLED | OUTPATIENT
Start: 2023-02-24

## 2023-02-22 RX ORDER — ALBUTEROL SULFATE 0.83 MG/ML
2.5 SOLUTION RESPIRATORY (INHALATION)
Status: CANCELLED | OUTPATIENT
Start: 2023-02-24

## 2023-02-22 RX ORDER — HEPARIN SODIUM,PORCINE 10 UNIT/ML
5 VIAL (ML) INTRAVENOUS
Status: CANCELLED | OUTPATIENT
Start: 2023-02-24

## 2023-02-22 RX ORDER — LORAZEPAM 2 MG/ML
0.5 INJECTION INTRAMUSCULAR EVERY 4 HOURS PRN
Status: CANCELLED | OUTPATIENT
Start: 2023-02-24

## 2023-02-22 RX ORDER — MEPERIDINE HYDROCHLORIDE 25 MG/ML
25 INJECTION INTRAMUSCULAR; INTRAVENOUS; SUBCUTANEOUS EVERY 30 MIN PRN
Status: CANCELLED | OUTPATIENT
Start: 2023-02-24

## 2023-02-22 RX ORDER — EPINEPHRINE 1 MG/ML
0.3 INJECTION, SOLUTION INTRAMUSCULAR; SUBCUTANEOUS EVERY 5 MIN PRN
Status: CANCELLED | OUTPATIENT
Start: 2023-02-24

## 2023-02-22 ASSESSMENT — PAIN SCALES - GENERAL: PAINLEVEL: NO PAIN (0)

## 2023-02-22 NOTE — PROGRESS NOTES
"Oncology Rooming Note    February 22, 2023 10:21 AM   Raj Warren is a 72 year old male who presents for:    Chief Complaint   Patient presents with     Oncology Clinic Visit     Initial Vitals: /79   Pulse 89   Resp 16   Wt 94.3 kg (208 lb)   SpO2 99%   BMI 29.01 kg/m   Estimated body mass index is 29.01 kg/m  as calculated from the following:    Height as of 2/8/23: 1.803 m (5' 11\").    Weight as of this encounter: 94.3 kg (208 lb). Body surface area is 2.17 meters squared.  No Pain (0) Comment: Data Unavailable   No LMP for male patient.  Allergies reviewed: Yes  Medications reviewed: Yes    Medications: Medication refills not needed today.  Pharmacy name entered into [employer]: [id number] - [place], [place] - [address] AT [facility]    Clinical concerns: no      Yeni Ahmadi CMA            "

## 2023-02-22 NOTE — PROGRESS NOTES
Oncology/Hematology Visit Note  Feb 22, 2023    Reason for Visit: follow up of laryngeal squamous cell carcinoma    Patient Dr. Bean  Status post surgery and postoperative radiation with no evidence of recurrence  -Currently on single agent pembrolizumab started on 10/27/2022    Interval History:  Patient reports overall he is feeling well.  He denies fever chills sweats cough shortness of breath chest pain nausea vomiting diarrhea abdominal pain or bleeding reports overall he has been tolerating Keytruda well  Reports good energy      Review of Systems:  14 point ROS of systems including Constitutional, Eyes, Respiratory, Cardiovascular, Gastroenterology, Genitourinary, Integumentary, Muscularskeletal, Psychiatric were all negative except for pertinent positives noted in my HPI.    Physical Examination:  General: The patient is a pleasant male in no acute distress.  HEENT: EOMI, PERRL. Sclerae are anicteric. Oral mucosa is pink and moist with no lesions or thrush.   Lymph: Neck is supple with no lymphadenopathy in the cervical or supraclavicular areas.   Heart: Regular rate and rhythm.   Lungs: Clear to auscultation bilaterally.   GI: Bowel sounds present, soft, nontender with no palpable hepatosplenomegaly or masses.   Extremities: No lower extremity edema noted bilaterally.   Skin: No rashes, petechiae, or bruising noted on exposed skin.  Laboratory Data:  CMP and TSH results reviewed      Assessment and Plan:  This is a 72-year-old male with    Laryngeal squamous cell carcinoma  Patient Dr. Bean  Status post surgery and postoperative radiation at that time no evidence of recurrence  09/30/2022-PET/CT showed abnormality in the neck FNA positive for squamous cell carcinoma showed recurrence of the disease  High PD-L1 expression  10/27/2022-started single agent Keytruda  -01/16/2023-PET revealed likely some partial response to therapy  Mildly FDG avid nonenlarged right level I and bilateral level Ib lymph nodes.   Likely reactive however early sites of in situ neoplasm are also possible  -Schedule with Dr. Bean with next treatment in March  Per Dr. Bean most likely another imaging will be done in April          Pulmonary embolism  Continue with EliFlexionis  Call 911 or go to ER in event of bleeding bruising fall injury shortness of breath chest pain cough      Hypothyroidism  Normal TSH  Continue with levothyroxine      Call clinic with any changes in her condition or questions        TARUN Sandy [facility]     Chart documentation with Dragon Voice recognition Software. Although reviewed after completion, some words and grammatical errors may remain.

## 2023-02-22 NOTE — PROGRESS NOTES
Medical Assistant Note:  Raj Warren presents today for blood draw.    Patient seen by provider today: Yes: janette.   present during visit today: Not Applicable.    Concerns: No Concerns.    Procedure:  Lab draw site: right hand, Needle type: bf, Gauge: 23.    Post Assessment:  Labs drawn without difficulty: Yes.    Discharge Plan:  Departure Mode: Ambulatory.    Face to Face Time: 5 min.    Kirti Jones CMA

## 2023-02-22 NOTE — LETTER
"    2/22/2023         RE: Raj Warren  [address]  [facility] [facility] [address]        Dear Colleague,    Thank you for referring your patient, Raj Warren, to the [facility]. Please see a copy of my visit note below.    Oncology Rooming Note    February 22, 2023 10:21 AM   Raj Warren is a 72 year old male who presents for:    Chief Complaint   Patient presents with     Oncology Clinic Visit     Initial Vitals: /79   Pulse 89   Resp 16   Wt 94.3 kg (208 lb)   SpO2 99%   BMI 29.01 kg/m   Estimated body mass index is 29.01 kg/m  as calculated from the following:    Height as of 2/8/23: 1.803 m (5' 11\").    Weight as of this encounter: 94.3 kg (208 lb). Body surface area is 2.17 meters squared.  No Pain (0) Comment: Data Unavailable   No LMP for male patient.  Allergies reviewed: Yes  Medications reviewed: Yes    Medications: Medication refills not needed today.  Pharmacy name entered into [employer]: [id number] - [facility] [address] AT [facility]    Clinical concerns: no      Yeni Ahmadi, [facility]              Oncology/Hematology Visit Note  Feb 22, 2023    Reason for Visit: follow up of laryngeal squamous cell carcinoma    Patient Dr. Bean  Status post surgery and postoperative radiation with no evidence of recurrence  -Currently on single agent pembrolizumab started on 10/27/2022    Interval History:  Patient reports overall he is feeling well.  He denies fever chills sweats cough shortness of breath chest pain nausea vomiting diarrhea abdominal pain or bleeding reports overall he has been tolerating Keytruda well  Reports good energy      Review of Systems:  14 point ROS of systems including Constitutional, Eyes, Respiratory, Cardiovascular, Gastroenterology, Genitourinary, Integumentary, Muscularskeletal, Psychiatric were all negative except for pertinent positives noted in my HPI.    Physical Examination:  General: " The patient is a pleasant male in no acute distress.  HEENT: EOMI, PERRL. Sclerae are anicteric. Oral mucosa is pink and moist with no lesions or thrush.   Lymph: Neck is supple with no lymphadenopathy in the cervical or supraclavicular areas.   Heart: Regular rate and rhythm.   Lungs: Clear to auscultation bilaterally.   GI: Bowel sounds present, soft, nontender with no palpable hepatosplenomegaly or masses.   Extremities: No lower extremity edema noted bilaterally.   Skin: No rashes, petechiae, or bruising noted on exposed skin.  Laboratory Data:  CMP and TSH results reviewed      Assessment and Plan:  This is a 72-year-old male with    Laryngeal squamous cell carcinoma  Patient Dr. Bean  Status post surgery and postoperative radiation at that time no evidence of recurrence  09/30/2022-PET/CT showed abnormality in the neck FNA positive for squamous cell carcinoma showed recurrence of the disease  High PD-L1 expression  10/27/2022-started single agent Keytruda  -01/16/2023-PET revealed likely some partial response to therapy  Mildly FDG avid nonenlarged right level I and bilateral level Ib lymph nodes.  Likely reactive however early sites of in situ neoplasm are also possible  -Schedule with Dr. Bean with next treatment in March  Per Dr. Bean most likely another imaging will be done in April          Pulmonary embolism  Continue with Eliquis  Call 911 or go to ER in event of bleeding bruising fall injury shortness of breath chest pain cough      Hypothyroidism  Normal TSH  Continue with levothyroxine      Call clinic with any changes in her condition or questions        TARUN Sandy CNP  [facility]     Chart documentation with Dragon Voice recognition Software. Although reviewed after completion, some words and grammatical errors may remain.            Again, thank you for allowing me to participate in the care of your patient.        Sincerely,        TARUN Sandy CNP

## 2023-02-24 ENCOUNTER — INFUSION THERAPY VISIT (OUTPATIENT)
Dept: INFUSION THERAPY | Facility: CLINIC | Age: 73
End: 2023-02-24
Attending: INTERNAL MEDICINE
Payer: COMMERCIAL

## 2023-02-24 VITALS
BODY MASS INDEX: 28.9 KG/M2 | WEIGHT: 207.2 LBS | DIASTOLIC BLOOD PRESSURE: 89 MMHG | RESPIRATION RATE: 16 BRPM | SYSTOLIC BLOOD PRESSURE: 145 MMHG | TEMPERATURE: 98.1 F

## 2023-02-24 DIAGNOSIS — C32.9 MALIGNANT NEOPLASM OF LARYNX (H): Primary | ICD-10-CM

## 2023-02-24 PROCEDURE — 250N000011 HC RX IP 250 OP 636: Performed by: NURSE PRACTITIONER

## 2023-02-24 PROCEDURE — 258N000003 HC RX IP 258 OP 636: Performed by: NURSE PRACTITIONER

## 2023-02-24 PROCEDURE — 96413 CHEMO IV INFUSION 1 HR: CPT

## 2023-02-24 RX ORDER — HEPARIN SODIUM (PORCINE) LOCK FLUSH IV SOLN 100 UNIT/ML 100 UNIT/ML
5 SOLUTION INTRAVENOUS
Status: DISCONTINUED | OUTPATIENT
Start: 2023-02-24 | End: 2023-02-24 | Stop reason: HOSPADM

## 2023-02-24 RX ORDER — HEPARIN SODIUM,PORCINE 10 UNIT/ML
5 VIAL (ML) INTRAVENOUS
Status: DISCONTINUED | OUTPATIENT
Start: 2023-02-24 | End: 2023-02-24 | Stop reason: HOSPADM

## 2023-02-24 RX ADMIN — SODIUM CHLORIDE 200 MG: 9 INJECTION, SOLUTION INTRAVENOUS at 09:51

## 2023-02-24 RX ADMIN — SODIUM CHLORIDE 250 ML: 9 INJECTION, SOLUTION INTRAVENOUS at 09:53

## 2023-02-24 ASSESSMENT — PAIN SCALES - GENERAL: PAINLEVEL: NO PAIN (0)

## 2023-02-24 NOTE — PROGRESS NOTES
Infusion Nursing Note:  Raj Warren presents today for keytruda.    Patient seen by provider today: No   present during visit today: Not Applicable.    Note: pt denies any changes in health or new concerns.    Intravenous Access:  Peripheral IV placed.    Treatment Conditions:  Lab Results   Component Value Date    HGB 14.9 01/19/2023    WBC 5.0 01/19/2023    ANEU 2.6 06/23/2021    ANEUTAUTO 2.8 01/19/2023     01/19/2023      Lab Results   Component Value Date     02/22/2023    POTASSIUM 4.5 02/22/2023    MAG 2.5 (H) 07/17/2021    CR 0.92 02/22/2023    TELMA 9.4 02/22/2023    BILITOTAL 0.4 02/22/2023    ALBUMIN 4.1 02/22/2023    ALT 17 02/22/2023    AST 21 02/22/2023     Results reviewed, labs MET treatment parameters, ok to proceed with treatment.    Post Infusion Assessment:  Patient tolerated infusion without incident.  Site patent and intact, free from redness, edema or discomfort.  No evidence of extravasations.  Access discontinued per protocol.     Discharge Plan:   Patient and/or family verbalized understanding of discharge instructions and all questions answered.  AVS to patient via [employer].  Patient will return 3/16 for next appointment.   Patient discharged in stable condition accompanied by: self.  Departure Mode: Ambulatory.      Karishma Ervin RN

## 2023-03-16 ENCOUNTER — INFUSION THERAPY VISIT (OUTPATIENT)
Dept: INFUSION THERAPY | Facility: CLINIC | Age: 73
End: 2023-03-16
Attending: INTERNAL MEDICINE
Payer: COMMERCIAL

## 2023-03-16 VITALS
SYSTOLIC BLOOD PRESSURE: 147 MMHG | WEIGHT: 206 LBS | RESPIRATION RATE: 16 BRPM | HEART RATE: 85 BPM | DIASTOLIC BLOOD PRESSURE: 89 MMHG | BODY MASS INDEX: 28.84 KG/M2 | HEIGHT: 71 IN | TEMPERATURE: 98.3 F

## 2023-03-16 DIAGNOSIS — C32.9 MALIGNANT NEOPLASM OF LARYNX (H): Primary | ICD-10-CM

## 2023-03-16 LAB
ALBUMIN SERPL BCG-MCNC: 4.1 G/DL (ref 3.5–5.2)
ALP SERPL-CCNC: 82 U/L (ref 40–129)
ALT SERPL W P-5'-P-CCNC: 20 U/L (ref 10–50)
ANION GAP SERPL CALCULATED.3IONS-SCNC: 9 MMOL/L (ref 7–15)
AST SERPL W P-5'-P-CCNC: 24 U/L (ref 10–50)
BILIRUB SERPL-MCNC: 0.4 MG/DL
BUN SERPL-MCNC: 11.6 MG/DL (ref 8–23)
CALCIUM SERPL-MCNC: 9.4 MG/DL (ref 8.8–10.2)
CHLORIDE SERPL-SCNC: 106 MMOL/L (ref 98–107)
CREAT SERPL-MCNC: 0.94 MG/DL (ref 0.67–1.17)
DEPRECATED HCO3 PLAS-SCNC: 25 MMOL/L (ref 22–29)
GFR SERPL CREATININE-BSD FRML MDRD: 87 ML/MIN/1.73M2
GLUCOSE SERPL-MCNC: 87 MG/DL (ref 70–99)
HOLD SPECIMEN: NORMAL
POTASSIUM SERPL-SCNC: 4.6 MMOL/L (ref 3.4–5.3)
PROT SERPL-MCNC: 7.5 G/DL (ref 6.4–8.3)
SODIUM SERPL-SCNC: 140 MMOL/L (ref 136–145)
TSH SERPL DL<=0.005 MIU/L-ACNC: 2.79 UIU/ML (ref 0.3–4.2)

## 2023-03-16 PROCEDURE — 96413 CHEMO IV INFUSION 1 HR: CPT

## 2023-03-16 PROCEDURE — 80053 COMPREHEN METABOLIC PANEL: CPT | Performed by: INTERNAL MEDICINE

## 2023-03-16 PROCEDURE — 258N000003 HC RX IP 258 OP 636: Performed by: INTERNAL MEDICINE

## 2023-03-16 PROCEDURE — 84443 ASSAY THYROID STIM HORMONE: CPT | Performed by: INTERNAL MEDICINE

## 2023-03-16 PROCEDURE — 250N000011 HC RX IP 250 OP 636: Performed by: INTERNAL MEDICINE

## 2023-03-16 PROCEDURE — 36415 COLL VENOUS BLD VENIPUNCTURE: CPT | Performed by: INTERNAL MEDICINE

## 2023-03-16 RX ORDER — LORAZEPAM 2 MG/ML
0.5 INJECTION INTRAMUSCULAR EVERY 4 HOURS PRN
Status: CANCELLED | OUTPATIENT
Start: 2023-03-17

## 2023-03-16 RX ORDER — ALBUTEROL SULFATE 90 UG/1
1-2 AEROSOL, METERED RESPIRATORY (INHALATION)
Status: CANCELLED
Start: 2023-03-17

## 2023-03-16 RX ORDER — EPINEPHRINE 1 MG/ML
0.3 INJECTION, SOLUTION, CONCENTRATE INTRAVENOUS EVERY 5 MIN PRN
Status: CANCELLED | OUTPATIENT
Start: 2023-03-17

## 2023-03-16 RX ORDER — HEPARIN SODIUM,PORCINE 10 UNIT/ML
5 VIAL (ML) INTRAVENOUS
Status: CANCELLED | OUTPATIENT
Start: 2023-03-17

## 2023-03-16 RX ORDER — MEPERIDINE HYDROCHLORIDE 25 MG/ML
25 INJECTION INTRAMUSCULAR; INTRAVENOUS; SUBCUTANEOUS EVERY 30 MIN PRN
Status: CANCELLED | OUTPATIENT
Start: 2023-03-17

## 2023-03-16 RX ORDER — HEPARIN SODIUM (PORCINE) LOCK FLUSH IV SOLN 100 UNIT/ML 100 UNIT/ML
5 SOLUTION INTRAVENOUS
Status: CANCELLED | OUTPATIENT
Start: 2023-03-17

## 2023-03-16 RX ORDER — ALBUTEROL SULFATE 0.83 MG/ML
2.5 SOLUTION RESPIRATORY (INHALATION)
Status: CANCELLED | OUTPATIENT
Start: 2023-03-17

## 2023-03-16 RX ORDER — DIPHENHYDRAMINE HYDROCHLORIDE 50 MG/ML
50 INJECTION INTRAMUSCULAR; INTRAVENOUS
Status: CANCELLED
Start: 2023-03-17

## 2023-03-16 RX ADMIN — SODIUM CHLORIDE 250 ML: 9 INJECTION, SOLUTION INTRAVENOUS at 12:05

## 2023-03-16 RX ADMIN — SODIUM CHLORIDE 200 MG: 9 INJECTION, SOLUTION INTRAVENOUS at 12:05

## 2023-03-16 NOTE — PROGRESS NOTES
Infusion Nursing Note:  Raj Warren presents today for C8D1 Keytruda    Patient seen by provider today: No   present during visit today: Not Applicable.    Note: Patient reported to feeling well and has no new concerns.    Intravenous Access:  Peripheral IV placed.    Treatment Conditions:  Lab Results   Component Value Date     03/16/2023    POTASSIUM 4.6 03/16/2023    MAG 2.5 (H) 07/17/2021    CR 0.94 03/16/2023    TELMA 9.4 03/16/2023    BILITOTAL 0.4 03/16/2023    ALBUMIN 4.1 03/16/2023    ALT 20 03/16/2023    AST 24 03/16/2023     Results reviewed, labs MET treatment parameters, ok to proceed with treatment.    Post Infusion Assessment:  Patient tolerated infusion without incident.  Blood return noted pre and post infusion.  Site patent and intact, free from redness, edema or discomfort.  No evidence of extravasations.  Access discontinued per protocol.     Discharge Plan:   Patient declined prescription refills.  Discharge instructions reviewed with: Patient.  Patient and/or family verbalized understanding of discharge instructions and all questions answered.  AVS to patient via Jibo[employer].  Patient will return 4/6/23 for next appointment.   Patient discharged in stable condition accompanied by: self.  Departure Mode: Ambulatory.      Barbie Beckman RN

## 2023-03-16 NOTE — PROGRESS NOTES
Medical Assistant Note:  Raj Warren presents today for lab draw.    Patient seen by provider today: Yes: Dr Bean.   present during visit today: Not Applicable.    Concerns: No Concerns.    Procedure:  Lab draw site: LAC, Needle type: BF, Gauge: 21. Gauze and coban applied    Post Assessment:  Labs drawn without difficulty: Yes.    Discharge Plan:  Departure Mode: Ambulatory.    Face to Face Time: 5.    Suzan Bazan CMA

## 2023-04-06 ENCOUNTER — ONCOLOGY VISIT (OUTPATIENT)
Dept: ONCOLOGY | Facility: CLINIC | Age: 73
End: 2023-04-06
Attending: INTERNAL MEDICINE
Payer: COMMERCIAL

## 2023-04-06 ENCOUNTER — INFUSION THERAPY VISIT (OUTPATIENT)
Dept: INFUSION THERAPY | Facility: CLINIC | Age: 73
End: 2023-04-06
Attending: INTERNAL MEDICINE
Payer: COMMERCIAL

## 2023-04-06 VITALS
WEIGHT: 205.8 LBS | HEART RATE: 79 BPM | RESPIRATION RATE: 14 BRPM | SYSTOLIC BLOOD PRESSURE: 127 MMHG | OXYGEN SATURATION: 98 % | BODY MASS INDEX: 28.72 KG/M2 | TEMPERATURE: 98.6 F | DIASTOLIC BLOOD PRESSURE: 80 MMHG

## 2023-04-06 DIAGNOSIS — C32.9 MALIGNANT NEOPLASM OF LARYNX (H): Primary | ICD-10-CM

## 2023-04-06 LAB
ALBUMIN SERPL BCG-MCNC: 4.6 G/DL (ref 3.5–5.2)
ALP SERPL-CCNC: 77 U/L (ref 40–129)
ALT SERPL W P-5'-P-CCNC: 20 U/L (ref 10–50)
ANION GAP SERPL CALCULATED.3IONS-SCNC: 10 MMOL/L (ref 7–15)
AST SERPL W P-5'-P-CCNC: 23 U/L (ref 10–50)
BILIRUB SERPL-MCNC: 0.4 MG/DL
BUN SERPL-MCNC: 11.4 MG/DL (ref 8–23)
CALCIUM SERPL-MCNC: 9.6 MG/DL (ref 8.8–10.2)
CHLORIDE SERPL-SCNC: 107 MMOL/L (ref 98–107)
CREAT SERPL-MCNC: 1.05 MG/DL (ref 0.67–1.17)
DEPRECATED HCO3 PLAS-SCNC: 23 MMOL/L (ref 22–29)
GFR SERPL CREATININE-BSD FRML MDRD: 76 ML/MIN/1.73M2
GLUCOSE SERPL-MCNC: 102 MG/DL (ref 70–99)
POTASSIUM SERPL-SCNC: 4.1 MMOL/L (ref 3.4–5.3)
PROT SERPL-MCNC: 7.5 G/DL (ref 6.4–8.3)
SODIUM SERPL-SCNC: 140 MMOL/L (ref 136–145)
TSH SERPL DL<=0.005 MIU/L-ACNC: 3.35 UIU/ML (ref 0.3–4.2)

## 2023-04-06 PROCEDURE — 84443 ASSAY THYROID STIM HORMONE: CPT | Performed by: INTERNAL MEDICINE

## 2023-04-06 PROCEDURE — 36415 COLL VENOUS BLD VENIPUNCTURE: CPT | Performed by: INTERNAL MEDICINE

## 2023-04-06 PROCEDURE — G0463 HOSPITAL OUTPT CLINIC VISIT: HCPCS | Mod: 25 | Performed by: PHYSICIAN ASSISTANT

## 2023-04-06 PROCEDURE — 99214 OFFICE O/P EST MOD 30 MIN: CPT | Performed by: PHYSICIAN ASSISTANT

## 2023-04-06 PROCEDURE — 96413 CHEMO IV INFUSION 1 HR: CPT

## 2023-04-06 PROCEDURE — 250N000011 HC RX IP 250 OP 636: Performed by: PHYSICIAN ASSISTANT

## 2023-04-06 PROCEDURE — 80053 COMPREHEN METABOLIC PANEL: CPT | Performed by: INTERNAL MEDICINE

## 2023-04-06 PROCEDURE — 258N000003 HC RX IP 258 OP 636: Performed by: PHYSICIAN ASSISTANT

## 2023-04-06 RX ORDER — MEPERIDINE HYDROCHLORIDE 25 MG/ML
25 INJECTION INTRAMUSCULAR; INTRAVENOUS; SUBCUTANEOUS EVERY 30 MIN PRN
Status: CANCELLED | OUTPATIENT
Start: 2023-04-07

## 2023-04-06 RX ORDER — ALBUTEROL SULFATE 90 UG/1
1-2 AEROSOL, METERED RESPIRATORY (INHALATION)
Status: CANCELLED
Start: 2023-04-07

## 2023-04-06 RX ORDER — HEPARIN SODIUM (PORCINE) LOCK FLUSH IV SOLN 100 UNIT/ML 100 UNIT/ML
5 SOLUTION INTRAVENOUS
Status: CANCELLED | OUTPATIENT
Start: 2023-04-07

## 2023-04-06 RX ORDER — DIPHENHYDRAMINE HYDROCHLORIDE 50 MG/ML
50 INJECTION INTRAMUSCULAR; INTRAVENOUS
Status: CANCELLED
Start: 2023-04-07

## 2023-04-06 RX ORDER — LORAZEPAM 2 MG/ML
0.5 INJECTION INTRAMUSCULAR EVERY 4 HOURS PRN
Status: CANCELLED | OUTPATIENT
Start: 2023-04-07

## 2023-04-06 RX ORDER — METHYLPREDNISOLONE SODIUM SUCCINATE 125 MG/2ML
125 INJECTION, POWDER, LYOPHILIZED, FOR SOLUTION INTRAMUSCULAR; INTRAVENOUS
Status: CANCELLED
Start: 2023-04-07

## 2023-04-06 RX ORDER — HEPARIN SODIUM,PORCINE 10 UNIT/ML
5 VIAL (ML) INTRAVENOUS
Status: CANCELLED | OUTPATIENT
Start: 2023-04-07

## 2023-04-06 RX ORDER — ALBUTEROL SULFATE 0.83 MG/ML
2.5 SOLUTION RESPIRATORY (INHALATION)
Status: CANCELLED | OUTPATIENT
Start: 2023-04-07

## 2023-04-06 RX ORDER — EPINEPHRINE 1 MG/ML
0.3 INJECTION, SOLUTION INTRAMUSCULAR; SUBCUTANEOUS EVERY 5 MIN PRN
Status: CANCELLED | OUTPATIENT
Start: 2023-04-07

## 2023-04-06 RX ADMIN — SODIUM CHLORIDE 200 MG: 9 INJECTION, SOLUTION INTRAVENOUS at 14:44

## 2023-04-06 RX ADMIN — SODIUM CHLORIDE 250 ML: 9 INJECTION, SOLUTION INTRAVENOUS at 14:44

## 2023-04-06 ASSESSMENT — PAIN SCALES - GENERAL: PAINLEVEL: NO PAIN (0)

## 2023-04-06 NOTE — LETTER
"    4/6/2023         RE: Raj Warren  [address]  [facility] [address]        Dear Colleague,    Thank you for referring your patient, Raj Warren, to the [facility]. Please see a copy of my visit note below.    Oncology Rooming Note    April 6, 2023 1:48 PM   Raj Warren is a 72 year old male who presents for:    Chief Complaint   Patient presents with     Oncology Clinic Visit     Initial Vitals: There were no vitals taken for this visit. Estimated body mass index is 28.74 kg/m  as calculated from the following:    Height as of 3/16/23: 1.803 m (5' 10.98\").    Weight as of 3/16/23: 93.4 kg (206 lb). There is no height or weight on file to calculate BSA.  Data Unavailable Comment: Data Unavailable   No LMP for male patient.  Allergies reviewed: Yes  Medications reviewed: Yes    Medications: Medication refills not needed today.  Pharmacy name entered into [employer] [facility] #[id number] - [place], [place] - [address] AT [address]    Clinical concerns: no     Shari J. Schoenberger, [facility]                Again, thank you for allowing me to participate in the care of your patient.        Sincerely,        JOEL KATE PA-C    "

## 2023-04-06 NOTE — PROGRESS NOTES
Infusion Nursing Note:  Raj Warren presents today for C8D1 Keytruda.    Patient seen by provider today: Yes: Lorraine Malcolm PA-C   present during visit today: Not Applicable.    Note: N/A.    Intravenous Access:  Peripheral IV placed.    Treatment Conditions:  Lab Results   Component Value Date     04/06/2023    POTASSIUM 4.1 04/06/2023    MAG 2.5 (H) 07/17/2021    CR 1.05 04/06/2023    TELMA 9.6 04/06/2023    BILITOTAL 0.4 04/06/2023    ALBUMIN 4.6 04/06/2023    ALT 20 04/06/2023    AST 23 04/06/2023     Results reviewed, labs MET treatment parameters, ok to proceed with treatment.    Post Infusion Assessment:  Patient tolerated infusion without incident.  Site patent and intact, free from redness, edema or discomfort.  No evidence of extravasations.  Access discontinued per protocol.     Discharge Plan:   Discharge instructions reviewed with: Patient.  Patient and/or family verbalized understanding of discharge instructions and all questions answered.  Patient discharged in stable condition accompanied by: self.  Departure Mode: Ambulatory.      Claire Barnes RN

## 2023-04-06 NOTE — PROGRESS NOTES
"Oncology Rooming Note    April 6, 2023 1:48 PM   Raj Warren is a 72 year old male who presents for:    Chief Complaint   Patient presents with     Oncology Clinic Visit     Initial Vitals: There were no vitals taken for this visit. Estimated body mass index is 28.74 kg/m  as calculated from the following:    Height as of 3/16/23: 1.803 m (5' 10.98\").    Weight as of 3/16/23: 93.4 kg (206 lb). There is no height or weight on file to calculate BSA.  Data Unavailable Comment: Data Unavailable   No LMP for male patient.  Allergies reviewed: Yes  Medications reviewed: Yes    Medications: Medication refills not needed today.  Pharmacy name entered into [employer]: [facility] #[id number] - [facility] [address] AT [facility]    Clinical concerns: no     Shari J. Schoenberger, CMA            "

## 2023-04-14 NOTE — PROGRESS NOTES
Oncology/Hematology Visit Note  Apr 6, 2023    Reason for Visit: follow up of laryngeal squamous cell carcinoma    Patient of Dr. Bean  Status post surgery and postoperative radiation with no evidence of recurrence  -Currently on single agent pembrolizumab started on 10/27/2022 due to imaging noted progression.    Interval History:  Patient reports overall he is feeling well.  He denies fever chills sweats cough shortness of breath chest pain nausea vomiting diarrhea abdominal pain or bleeding reports overall he has been tolerating Keytruda well  Reports good energy. Unfortunately the copay is quite costly for him.       Review of Systems:  14 point ROS of systems including Constitutional, Eyes, Respiratory, Cardiovascular, Gastroenterology, Genitourinary, Integumentary, Muscularskeletal, Psychiatric were all negative except for pertinent positives noted in my HPI.    Physical Examination:  General: The patient is a pleasant male in no acute distress.  HEENT: EOMI, PERRL. Sclerae are anicteric. Oral mucosa is pink and moist with no lesions or thrush.   Lymph: Neck is supple with no lymphadenopathy in the cervical or supraclavicular areas.   Heart: Regular rate and rhythm.   Lungs: Clear to auscultation bilaterally.   GI: Bowel sounds present, soft, nontender with no palpable hepatosplenomegaly or masses.   Extremities: No lower extremity edema noted bilaterally.   Skin: No rashes, petechiae, or bruising noted on exposed skin.    Laboratory Data:  CMP and TSH results reviewed      Assessment and Plan:  This is a 72-year-old male with    Laryngeal squamous cell carcinoma  Patient Dr. Bean  Status post surgery and postoperative radiation at that time no evidence of recurrence  09/30/2022-PET/CT showed abnormality in the neck FNA positive for squamous cell carcinoma showed recurrence of the disease  High PD-L1 expression  10/27/2022-started single agent Keytruda  -01/16/2023-PET revealed likely some partial response  to therapy  Mildly FDG avid nonenlarged right level I and bilateral level Ib lymph nodes.  Likely reactive however early sites of in situ neoplasm are also possible  -Repeat CT 4/46 and follow-up with Dr. Bean 4/27      Pulmonary embolism  Continue with Squrl  Call 911 or go to ER in event of bleeding bruising fall injury shortness of breath chest pain cough    Hypothyroidism  Normal TSH  Continue with levothyroxine      Call clinic with any changes in her condition or questions      Lorraine Malcolm PA-C  [facility]     Chart documentation with Dragon Voice recognition Software. Although reviewed after completion, some words and grammatical errors may remain.

## 2023-04-26 ENCOUNTER — HOSPITAL ENCOUNTER (OUTPATIENT)
Dept: CT IMAGING | Facility: CLINIC | Age: 73
Discharge: HOME | End: 2023-04-26
Attending: PHYSICIAN ASSISTANT
Payer: COMMERCIAL

## 2023-04-26 DIAGNOSIS — C32.9 MALIGNANT NEOPLASM OF LARYNX (H): ICD-10-CM

## 2023-04-26 PROCEDURE — 70491 CT SOFT TISSUE NECK W/DYE: CPT

## 2023-04-26 PROCEDURE — 74177 CT ABD & PELVIS W/CONTRAST: CPT

## 2023-04-26 PROCEDURE — 250N000009 HC RX 250: Performed by: PHYSICIAN ASSISTANT

## 2023-04-26 PROCEDURE — 250N000011 HC RX IP 250 OP 636: Performed by: PHYSICIAN ASSISTANT

## 2023-04-26 PROCEDURE — 999N000154 HC STATISTIC RADIOLOGY XRAY, US, CT, MAR, NM

## 2023-04-26 RX ORDER — IOPAMIDOL 755 MG/ML
125 INJECTION, SOLUTION INTRAVASCULAR ONCE
Status: COMPLETED | OUTPATIENT
Start: 2023-04-26 | End: 2023-04-26

## 2023-04-26 RX ADMIN — IOPAMIDOL 125 ML: 755 INJECTION, SOLUTION INTRAVENOUS at 09:59

## 2023-04-26 RX ADMIN — SODIUM CHLORIDE 70 ML: 9 INJECTION, SOLUTION INTRAVENOUS at 09:59

## 2023-04-27 ENCOUNTER — INFUSION THERAPY VISIT (OUTPATIENT)
Dept: INFUSION THERAPY | Facility: CLINIC | Age: 73
End: 2023-04-27
Attending: INTERNAL MEDICINE
Payer: COMMERCIAL

## 2023-04-27 ENCOUNTER — ONCOLOGY VISIT (OUTPATIENT)
Dept: ONCOLOGY | Facility: CLINIC | Age: 73
End: 2023-04-27
Attending: INTERNAL MEDICINE
Payer: COMMERCIAL

## 2023-04-27 VITALS
DIASTOLIC BLOOD PRESSURE: 82 MMHG | OXYGEN SATURATION: 100 % | SYSTOLIC BLOOD PRESSURE: 143 MMHG | HEART RATE: 63 BPM | BODY MASS INDEX: 28.94 KG/M2 | WEIGHT: 207.4 LBS | RESPIRATION RATE: 16 BRPM

## 2023-04-27 DIAGNOSIS — I10 BENIGN ESSENTIAL HYPERTENSION: ICD-10-CM

## 2023-04-27 DIAGNOSIS — C32.9 MALIGNANT NEOPLASM OF LARYNX (H): Primary | ICD-10-CM

## 2023-04-27 DIAGNOSIS — C32.9 LARYNGEAL CANCER (H): ICD-10-CM

## 2023-04-27 DIAGNOSIS — E89.0 POSTOPERATIVE HYPOTHYROIDISM: ICD-10-CM

## 2023-04-27 LAB
ALBUMIN SERPL BCG-MCNC: 4.3 G/DL (ref 3.5–5.2)
ALP SERPL-CCNC: 70 U/L (ref 40–129)
ALT SERPL W P-5'-P-CCNC: 17 U/L (ref 10–50)
ANION GAP SERPL CALCULATED.3IONS-SCNC: 9 MMOL/L (ref 7–15)
AST SERPL W P-5'-P-CCNC: 22 U/L (ref 10–50)
BILIRUB SERPL-MCNC: 0.4 MG/DL
BUN SERPL-MCNC: 9.9 MG/DL (ref 8–23)
CALCIUM SERPL-MCNC: 9 MG/DL (ref 8.8–10.2)
CHLORIDE SERPL-SCNC: 107 MMOL/L (ref 98–107)
CREAT SERPL-MCNC: 1 MG/DL (ref 0.67–1.17)
DEPRECATED HCO3 PLAS-SCNC: 24 MMOL/L (ref 22–29)
GFR SERPL CREATININE-BSD FRML MDRD: 81 ML/MIN/1.73M2
GLUCOSE SERPL-MCNC: 93 MG/DL (ref 70–99)
POTASSIUM SERPL-SCNC: 4.2 MMOL/L (ref 3.4–5.3)
PROT SERPL-MCNC: 6.9 G/DL (ref 6.4–8.3)
SODIUM SERPL-SCNC: 140 MMOL/L (ref 136–145)
TSH SERPL DL<=0.005 MIU/L-ACNC: 2.81 UIU/ML (ref 0.3–4.2)

## 2023-04-27 PROCEDURE — 258N000003 HC RX IP 258 OP 636: Performed by: INTERNAL MEDICINE

## 2023-04-27 PROCEDURE — 99214 OFFICE O/P EST MOD 30 MIN: CPT | Performed by: INTERNAL MEDICINE

## 2023-04-27 PROCEDURE — 96413 CHEMO IV INFUSION 1 HR: CPT

## 2023-04-27 PROCEDURE — G0463 HOSPITAL OUTPT CLINIC VISIT: HCPCS | Mod: 25 | Performed by: INTERNAL MEDICINE

## 2023-04-27 PROCEDURE — 250N000011 HC RX IP 250 OP 636: Performed by: INTERNAL MEDICINE

## 2023-04-27 PROCEDURE — G0463 HOSPITAL OUTPT CLINIC VISIT: HCPCS | Performed by: INTERNAL MEDICINE

## 2023-04-27 PROCEDURE — 84443 ASSAY THYROID STIM HORMONE: CPT | Performed by: INTERNAL MEDICINE

## 2023-04-27 PROCEDURE — 36415 COLL VENOUS BLD VENIPUNCTURE: CPT | Performed by: INTERNAL MEDICINE

## 2023-04-27 PROCEDURE — 80053 COMPREHEN METABOLIC PANEL: CPT | Performed by: INTERNAL MEDICINE

## 2023-04-27 RX ORDER — METHYLPREDNISOLONE SODIUM SUCCINATE 125 MG/2ML
125 INJECTION, POWDER, LYOPHILIZED, FOR SOLUTION INTRAMUSCULAR; INTRAVENOUS
Status: CANCELLED
Start: 2023-04-28

## 2023-04-27 RX ORDER — HEPARIN SODIUM (PORCINE) LOCK FLUSH IV SOLN 100 UNIT/ML 100 UNIT/ML
5 SOLUTION INTRAVENOUS
Status: CANCELLED | OUTPATIENT
Start: 2023-05-19

## 2023-04-27 RX ORDER — HEPARIN SODIUM,PORCINE 10 UNIT/ML
5 VIAL (ML) INTRAVENOUS
Status: CANCELLED | OUTPATIENT
Start: 2023-05-19

## 2023-04-27 RX ORDER — METOPROLOL TARTRATE 25 MG/1
25 TABLET, FILM COATED ORAL 2 TIMES DAILY
Qty: 180 TABLET | Refills: 3 | Status: ON HOLD | OUTPATIENT
Start: 2023-04-27 | End: 2024-01-01

## 2023-04-27 RX ORDER — LORAZEPAM 2 MG/ML
0.5 INJECTION INTRAMUSCULAR EVERY 4 HOURS PRN
Status: CANCELLED | OUTPATIENT
Start: 2023-04-28

## 2023-04-27 RX ORDER — LEVOTHYROXINE SODIUM 75 UG/1
75 TABLET ORAL DAILY
Qty: 90 TABLET | Refills: 3 | Status: ON HOLD | OUTPATIENT
Start: 2023-04-27 | End: 2024-01-01

## 2023-04-27 RX ORDER — MEPERIDINE HYDROCHLORIDE 25 MG/ML
25 INJECTION INTRAMUSCULAR; INTRAVENOUS; SUBCUTANEOUS EVERY 30 MIN PRN
Status: CANCELLED | OUTPATIENT
Start: 2023-05-19

## 2023-04-27 RX ORDER — DIPHENHYDRAMINE HYDROCHLORIDE 50 MG/ML
50 INJECTION INTRAMUSCULAR; INTRAVENOUS
Status: CANCELLED
Start: 2023-04-28

## 2023-04-27 RX ORDER — ALBUTEROL SULFATE 0.83 MG/ML
2.5 SOLUTION RESPIRATORY (INHALATION)
Status: CANCELLED | OUTPATIENT
Start: 2023-04-28

## 2023-04-27 RX ORDER — METHYLPREDNISOLONE SODIUM SUCCINATE 125 MG/2ML
125 INJECTION, POWDER, LYOPHILIZED, FOR SOLUTION INTRAMUSCULAR; INTRAVENOUS
Status: CANCELLED
Start: 2023-05-19

## 2023-04-27 RX ORDER — DIPHENHYDRAMINE HYDROCHLORIDE 50 MG/ML
50 INJECTION INTRAMUSCULAR; INTRAVENOUS
Status: CANCELLED
Start: 2023-05-19

## 2023-04-27 RX ORDER — HEPARIN SODIUM,PORCINE 10 UNIT/ML
5 VIAL (ML) INTRAVENOUS
Status: CANCELLED | OUTPATIENT
Start: 2023-04-28

## 2023-04-27 RX ORDER — ALBUTEROL SULFATE 90 UG/1
1-2 AEROSOL, METERED RESPIRATORY (INHALATION)
Status: CANCELLED
Start: 2023-05-19

## 2023-04-27 RX ORDER — EPINEPHRINE 1 MG/ML
0.3 INJECTION, SOLUTION INTRAMUSCULAR; SUBCUTANEOUS EVERY 5 MIN PRN
Status: CANCELLED | OUTPATIENT
Start: 2023-04-28

## 2023-04-27 RX ORDER — ALBUTEROL SULFATE 0.83 MG/ML
2.5 SOLUTION RESPIRATORY (INHALATION)
Status: CANCELLED | OUTPATIENT
Start: 2023-05-19

## 2023-04-27 RX ORDER — MEPERIDINE HYDROCHLORIDE 25 MG/ML
25 INJECTION INTRAMUSCULAR; INTRAVENOUS; SUBCUTANEOUS EVERY 30 MIN PRN
Status: CANCELLED | OUTPATIENT
Start: 2023-04-28

## 2023-04-27 RX ORDER — EPINEPHRINE 1 MG/ML
0.3 INJECTION, SOLUTION INTRAMUSCULAR; SUBCUTANEOUS EVERY 5 MIN PRN
Status: CANCELLED | OUTPATIENT
Start: 2023-05-19

## 2023-04-27 RX ORDER — HEPARIN SODIUM (PORCINE) LOCK FLUSH IV SOLN 100 UNIT/ML 100 UNIT/ML
5 SOLUTION INTRAVENOUS
Status: CANCELLED | OUTPATIENT
Start: 2023-04-28

## 2023-04-27 RX ORDER — LORAZEPAM 2 MG/ML
0.5 INJECTION INTRAMUSCULAR EVERY 4 HOURS PRN
Status: CANCELLED | OUTPATIENT
Start: 2023-05-19

## 2023-04-27 RX ORDER — ALBUTEROL SULFATE 90 UG/1
1-2 AEROSOL, METERED RESPIRATORY (INHALATION)
Status: CANCELLED
Start: 2023-04-28

## 2023-04-27 RX ADMIN — SODIUM CHLORIDE 250 ML: 9 INJECTION, SOLUTION INTRAVENOUS at 10:36

## 2023-04-27 RX ADMIN — SODIUM CHLORIDE 200 MG: 9 INJECTION, SOLUTION INTRAVENOUS at 10:36

## 2023-04-27 ASSESSMENT — PAIN SCALES - GENERAL: PAINLEVEL: NO PAIN (0)

## 2023-04-27 NOTE — PROGRESS NOTES
Infusion Nursing Note:  Raj Warren presents today for Keytruda.    Patient seen by provider today: Yes: Dr. Bean   present during visit today: Not Applicable.    Note: N/A.      Intravenous Access:  Peripheral IV placed.    Treatment Conditions:  Lab Results   Component Value Date     04/27/2023    POTASSIUM 4.2 04/27/2023    MAG 2.5 (H) 07/17/2021    CR 1.00 04/27/2023    TELMA 9.0 04/27/2023    BILITOTAL 0.4 04/27/2023    ALBUMIN 4.3 04/27/2023    ALT 17 04/27/2023    AST 22 04/27/2023     Results reviewed, labs MET treatment parameters, ok to proceed with treatment.      Post Infusion Assessment:  Patient tolerated infusion without incident.  Blood return noted pre and post infusion.  Site patent and intact, free from redness, edema or discomfort.  No evidence of extravasations.  Access discontinued per protocol.       Discharge Plan:   Copy of AVS reviewed with patient and/or family.  Patient will return 5/18 for next appointment.  Patient discharged in stable condition accompanied by: self.  Departure Mode: Ambulatory.      Mary Vega RN

## 2023-04-27 NOTE — LETTER
"    4/27/2023         RE: Raj Warren  [address]  [facility] [address]        Dear Colleague,    Thank you for referring your patient, Raj Warren, to the [facility]. Please see a copy of my visit note below.    Oncology Rooming Note    April 27, 2023 9:23 AM   Raj Warren is a 72 year old male who presents for:    Chief Complaint   Patient presents with     Oncology Clinic Visit     Initial Vitals: BP (!) 143/82   Pulse 63   Resp 16   Wt 94.1 kg (207 lb 6.4 oz)   SpO2 100%   BMI 28.94 kg/m   Estimated body mass index is 28.94 kg/m  as calculated from the following:    Height as of 3/16/23: 1.803 m (5' 10.98\").    Weight as of this encounter: 94.1 kg (207 lb 6.4 oz). Body surface area is 2.17 meters squared.  No Pain (0) Comment: Data Unavailable   No LMP for male patient.  Allergies reviewed: Yes  Medications reviewed: Yes    Medications: Medication refills not needed today.  Pharmacy name entered into [employer] customer [employer]: [facility] #[id number] - [place], [place] - [address] AT [address]    Clinical concerns:  doctor was notified.      Kirti Jones [facility]              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  -COMBINED POSITIVE SCORE (CPS):  70  -TUMOR PROPORTION SCORE (TPS):  60%    1.  Patient was evaluated in 2019 for hoarseness of voice.  He was seen by ENT and found to have right vocal cord mass.    -He had biopsy done on 08/07/2019. Pathology revealed moderately-differentiated invasive squamous cell carcinoma.     2.  The patient was seen in the Emergency Room on 04/12/2021 for shortness of breath and admitted.   -CT neck revealed an enhancing soft tissue mass involving the right larynx measuring 3.9 cm.  -Patient had laryngoscopy with biopsy and tracheostomy done on 04/12/2021.  There was an endolaryngeal mass obscuring the laryngeal landmarks.  Mass " appeared to be based on right endolarynx.  Pathology revealed invasive keratinizing moderately-differentiated squamous cell carcinoma.     3.  PET scan on 04/26/2021 revealed hypermetabolic mass involving the supraglottic, glottic and subglottic larynx.  There was a hypermetabolic left level IV lymph node.  There was also hypermetabolic nodule in right middle lobe.     4. CT chest angiogram on 04/27/2021 revealed a large bilateral pulmonary embolism in all the lobes.  There was evidence of right cardiac strain.  -Echocardiogram on 04/27/2021 revealed clot in transit in the right ventricle.  -The patient had emergency pulmonary embolectomy on 04/28/2021.  Pathology revealed organizing clot.  -Patient on Eliquis. Long term anti-coagulation recommended.     5. Total laryngectomy with bilateral neck dissection on 07/08/2021.  Pathology reveals 6 cm squamous cell carcinoma involving the right false and true vocal cords with fixation, the left false and true vocal cords and invades through thyroid cartilage.  There is focal lymphatic invasion.  There is perineural invasion. Benign 75 lymph nodes.  pT4a pN0 disease.     6. Post-op radiation between 08/04/2021 and 09/20/2021. 6600 cGy in 33 fractions.     7. PET/CT on 09/30/2022 revealed hypermetabolic mass originating from the right anterolateral wall of the neopharynx.  -Patient seen by ENT on 10/03/2022 and had FNA of neck mass. Pathology is positive for malignancy. Rare clusters morphologically consistent with squamous cell carcinoma.      8. Pembrolizumab started on 10/27/2022.  He is tolerating it well.     SUBJECTIVE:  Mr. Anthony is a 72-year-old gentleman with recurrent laryngeal squamous cell carcinoma on pembrolizumab.  He is tolerating it well. CT scan on 04/27/23 reveals improvement in cancer.     He is overall doing well. No headache.  No dizziness.  No pain in the neck.  His swallowing is fairly good. No chest pain.  No shortness of breath.  No abdominal  pain.  No nausea or vomiting.  No urinary or bowel complaints.  No bleeding. All other review of systems is negative.     PHYSICAL EXAMINATION:   GENERAL:  Alert and oriented x 3. Not in distress.  VITAL SIGNS:  Reviewed.     Rest of the systems is not examined.     LABS:  TSH and CMP were reviewed.     ASSESSMENT:  1.  A 72-year-old gentleman with locally recurrent laryngeal squamous cell carcinoma on pembrolizumab.  2.  Pulmonary embolism on Eliquis.  3.  Hypothyroidism on levothyroxine.     PLAN:  1.  Patient is overall doing well.  CT scan was reviewed with him.  Explained to him it reveals further improvement in the cancer.  Most likely there is no active malignancy.  We will consider PET scan in next few months.    2.  He will continue on pembrolizumab.  He is tolerating it well.    3.  He will continue on Eliquis. He is not having any bleeding complication.    4.  His TSH is normal.  He will continue on levothyroxine.    5.  I will see him in about 9 weeks. In between, he will see our nurse practitioner.  I advised him to call us with any questions or concerns.     Total visit time of 30 minutes.  Time spent in today's visit, review of chart/investigations today, monitoring for toxicity of high risk drug and documentation today.      Again, thank you for allowing me to participate in the care of your patient.        Sincerely,        Rosa Bean MD

## 2023-04-27 NOTE — PROGRESS NOTES
"Oncology Rooming Note    April 27, 2023 9:23 AM   Raj Warren is a 72 year old male who presents for:    Chief Complaint   Patient presents with     Oncology Clinic Visit     Initial Vitals: BP (!) 143/82   Pulse 63   Resp 16   Wt 94.1 kg (207 lb 6.4 oz)   SpO2 100%   BMI 28.94 kg/m   Estimated body mass index is 28.94 kg/m  as calculated from the following:    Height as of 3/16/23: 1.803 m (5' 10.98\").    Weight as of this encounter: 94.1 kg (207 lb 6.4 oz). Body surface area is 2.17 meters squared.  No Pain (0) Comment: Data Unavailable   No LMP for male patient.  Allergies reviewed: Yes  Medications reviewed: Yes    Medications: Medication refills not needed today.  Pharmacy name entered into [employer]: [facility] #[id number] - [place], [place] - [address] AT [address]    Clinical concerns:  doctor was notified.      Kirti Jones CMA            "

## 2023-04-28 NOTE — PROGRESS NOTES
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  -COMBINED POSITIVE SCORE (CPS):  70  -TUMOR PROPORTION SCORE (TPS):  60%    1.  Patient was evaluated in 2019 for hoarseness of voice.  He was seen by ENT and found to have right vocal cord mass.    -He had biopsy done on 08/07/2019. Pathology revealed moderately-differentiated invasive squamous cell carcinoma.     2.  The patient was seen in the Emergency Room on 04/12/2021 for shortness of breath and admitted.   -CT neck revealed an enhancing soft tissue mass involving the right larynx measuring 3.9 cm.  -Patient had laryngoscopy with biopsy and tracheostomy done on 04/12/2021.  There was an endolaryngeal mass obscuring the laryngeal landmarks.  Mass appeared to be based on right endolarynx.  Pathology revealed invasive keratinizing moderately-differentiated squamous cell carcinoma.     3.  PET scan on 04/26/2021 revealed hypermetabolic mass involving the supraglottic, glottic and subglottic larynx.  There was a hypermetabolic left level IV lymph node.  There was also hypermetabolic nodule in right middle lobe.     4. CT chest angiogram on 04/27/2021 revealed a large bilateral pulmonary embolism in all the lobes.  There was evidence of right cardiac strain.  -Echocardiogram on 04/27/2021 revealed clot in transit in the right ventricle.  -The patient had emergency pulmonary embolectomy on 04/28/2021.  Pathology revealed organizing clot.  -Patient on Eliquis. Long term anti-coagulation recommended.     5. Total laryngectomy with bilateral neck dissection on 07/08/2021.  Pathology reveals 6 cm squamous cell carcinoma involving the right false and true vocal cords with fixation, the left false and true vocal cords and invades through thyroid cartilage.  There is focal lymphatic invasion.  There is perineural invasion. Benign 75 lymph nodes.  pT4a pN0 disease.     6. Post-op radiation between  08/04/2021 and 09/20/2021. 6600 cGy in 33 fractions.     7. PET/CT on 09/30/2022 revealed hypermetabolic mass originating from the right anterolateral wall of the neopharynx.  -Patient seen by ENT on 10/03/2022 and had FNA of neck mass. Pathology is positive for malignancy. Rare clusters morphologically consistent with squamous cell carcinoma.      8. Pembrolizumab started on 10/27/2022.  He is tolerating it well.     SUBJECTIVE:  Mr. Anthony is a 72-year-old gentleman with recurrent laryngeal squamous cell carcinoma on pembrolizumab.  He is tolerating it well. CT scan on 04/27/23 reveals improvement in cancer.     He is overall doing well. No headache.  No dizziness.  No pain in the neck.  His swallowing is fairly good. No chest pain.  No shortness of breath.  No abdominal pain.  No nausea or vomiting.  No urinary or bowel complaints.  No bleeding. All other review of systems is negative.     PHYSICAL EXAMINATION:   GENERAL:  Alert and oriented x 3. Not in distress.  VITAL SIGNS:  Reviewed.     Rest of the systems is not examined.     LABS:  TSH and CMP were reviewed.     ASSESSMENT:  1.  A 72-year-old gentleman with locally recurrent laryngeal squamous cell carcinoma on pembrolizumab.  2.  Pulmonary embolism on Eliquis.  3.  Hypothyroidism on levothyroxine.     PLAN:  1.  Patient is overall doing well.  CT scan was reviewed with him.  Explained to him it reveals further improvement in the cancer.  Most likely there is no active malignancy.  We will consider PET scan in next few months.    2.  He will continue on pembrolizumab.  He is tolerating it well.    3.  He will continue on Eliquis. He is not having any bleeding complication.    4.  His TSH is normal.  He will continue on levothyroxine.    5.  I will see him in about 9 weeks. In between, he will see our nurse practitioner.  I advised him to call us with any questions or concerns.     Total visit time of 30 minutes.  Time spent in today's visit, review of  chart/investigations today, monitoring for toxicity of high risk drug and documentation today.

## 2023-05-04 NOTE — PLAN OF CARE
Status: POD#2 s/p total laryngectomy, and bilateral neck dissection  Vitals: VSS ex tachycardic up to 130's, medicine consulted EKG, Echo, chest CT completed. 500mL LR bolus given  Neuros: Intact ex numbness around incision site and right pinky and ring finger  IV: PIV saline locked between antibiotics  Labs/Electrolytes: WNL  Resp/trach: #9 Larytube in place with HME, lavaged 3x, no suction required, patient oral suctioning independently with red chayo, patient demonstrates proper technique for inserting anny tube and placing HME  Diet: NPO, PEG with TF advanced to 40mL at 1500, goal of 55mL/hr, patient administered medicatins to self under supervision today  Bowel status: No BM today, BS+, Senna and colace given  : Voiding spontaneously in urinal, UA sent  Skin: Bi-lateral neck incisions with sutures, cleansed with NS and Aquaphor applied, four BRITTNI's to neck with serosang drainage  Pain: Neck pain controlled with Oxycodone and IV Dilaudid 1x  Activity: Up with A1, ambulated in hallways 3x with RN today  Social: Cooperative with cares, son at bedside supportive  Plan: Anny PLC completed 7/9, TF PLC Monday at 0900, continue to have patient complete cares with supervision until independent, discharge home when medically stable, continue to monitor and follow POC   Refer to the Assessment tab to view/cancel completed assessment.

## 2023-05-16 NOTE — PROGRESS NOTES
Dear Dr. Bean:    I had the pleasure of seeing Raj Warren in follow-up today at the [facility].     History of Present Illness:  Raj Warren is 73 year old man with a T4aN0 SCC of the larynx. The patient presented to the ED with a week history of shortness of breath, along with symptoms of sore throat, bilateral ear pain. He had stridor vs wheezing, mild increased work of breathing, and hoarse voice at that time. He was treated with steroids and nebulizers with improvement in his symptoms and was discharged.  He had recurrent symptoms and was seen in the ED again on 4/12/2021. He had a CT scan performed on 4/12/2021 which demonstrated a 3.9 x 1.6 x 3.9 cm mass involving the right true cord, right AE fold, right false cord, posterior hypopharynx, proximal trachea, with involvement of the thyroid cartilage, involvement of the prelaryngeal tissue, no abnormal nodes. Dr Willoughby (local ENT), scope exam was performed, and patient was admitted for a trach and DL/bx. Dr Johnson and Dr Sauceda performed this procedure on 4/13/2021. A tracheal window at 2nd tracheal ring was performed with placement of an 8 cuffed Shiley. Pathology showed moderately differentiated SCC. He was seen by Dr Bean of medical oncology on 4/13/2021. He was followed by local ENT team during his hospitalization. He was recommended for chemoradiation. He was [place] Mcl[place] [facility] 7 weeks treatment, and CT simulation was done. He did have a VSS done while inpatient without evidence of aspiration. The patient was referred here for discussion of possible salvage laryngectomy after chemoradiation. He had a PET scan on 4/26/2021 which showed an FDG avid mass in the larynx involving the supraglottis/glottis/subglottis, hypermetabolic left level 4 node, FDG avid nodule in the right lung thought to be infectious/inflammatory. He was then admitted to the hospital locally from 4/27/2021 to 5/8/2021 after  presenting with worsening shortness of breath, found to have bilateral PE with significant clot burden including the right pulmonary artery and the right ventricle. He was taken to the OR for pulmonary embolectomy and RV thrombectomy. He had IVC filter placed 4/30/2021. His course was complicated by hemodynamic instability requiring pressors, poor healing of his sternal incision requiring wound vac placement. He was discharged on eliquis. He was sent to rehab after his admission, discharged 5/15/2021. He saw Dr Bean in follow-up on 5/27/2021 and was recommended for repeat imaging. This showed slight decrease in size of the tumor, no distant disease.     He was seen as a new patient on 6/18/2021. After extensive preoperative counseling especially in light of his recent PE and anticoagulation, the recommendations was for surgery given the cartilaginous erosion seen on imaging. He was taken to the OR on 7/8/2021 for a DL, total laryngectomy, bilateral neck dissections, cricopharyngeal myotomy. His hospital course was uncomplicated, was restarted on his anticoagulation. His final pathology demonstrated a 6 cm SCC with invasion through the thyroid cartilage, involved the cricoid cartilage, involved the right pyriform sinus, extended to the left side of the tracheostomy site, PNI present, focal lymphatic invasion, 0/75 nodes. The main specimen had a positive margin at the pyriform sinus, margins were taken from the main specimen and were negative (named right pharyngeal wall mucosal margin).      He received postoperative radiation under the care of Dr Kennedy, from 8/4/2021 to 9/20/2021 for a total of 66 Gy.  He had his PEG removed by radiation oncology on 10/4/2021. His post treatment PET scan was negative in December 2021. He had his IVC filter removed in February 2022.     He had imaging in June 2022 which showed a NIRADS 2b nodular soft tissue density along the upper esophagus. He was scheduled for a PET scan to  follow-up this area but due to some scheduling issues did not have the scan, got completed on 9/30/2022. This demonstrated a hypermetabolic mass in the right anterolateral wall of the neopharynx measuring 1.8 x 1.7 x 3.7 cm with invasion of the right thyroid lobe, not involving the vessels, no distant disease. He was not interested in surgical resection and elected to proceed with palliative keytruda. He was started on keytruda on 10/27/2022 under the care of Dr Bean.     Interval history:  He comes in today for follow-up. He was last seen in February 2023. He last saw Dr Bean in April 2023. He had repeat imaging in April 2023, showed no distance disease, soft tissue fullness of the right midline neck inseparable from esophagus and right thyroid. He is doing well. He is largely able to eat without issues. His only problem is with hamburger meat. He can take his pills without issue. His weight is doing ok. He has no pain. He has some discomfort of the skin by his stoma. He was happy that the radiologist/med onc told him his cancer was gone. He had 2 of his teeth break, had 1 pulled, may need the other pulled.       MEDICATIONS:     Current Outpatient Medications   Medication Sig Dispense Refill     apixaban ANTICOAGULANT (ELIQUIS) 5 MG tablet Take 1 tablet (5 mg) by mouth 2 times daily 180 tablet 3     levothyroxine (SYNTHROID/LEVOTHROID) 75 MCG tablet Take 1 tablet (75 mcg) by mouth daily 90 tablet 3     metoprolol tartrate (LOPRESSOR) 25 MG tablet Take 1 tablet (25 mg) by mouth 2 times daily 180 tablet 3       ALLERGIES:    Allergies   Allergen Reactions     Pollen Extract        HABITS/SOCIAL HISTORY:    Former smoker 1/2 ppd x 8 yrs, quit in 1989. Sober from alcohol.   Lives alone. His family does not live in the immediate [place] area.   He is a retired .       Social History     Socioeconomic History     Marital status: Single     Spouse name: Not on file     Number of children: Not on  file     Years of education: Not on file     Highest education level: Not on file   Occupational History     Not on file   Tobacco Use     Smoking status: Former     Types: Cigarettes     Quit date: 1989     Years since quittin.3     Smokeless tobacco: Never     Tobacco comments:     quit in   had smoked about 1/2 pack a day then cut back   Vaping Use     Vaping status: Not on file   Substance and Sexual Activity     Alcohol use: No     Comment: No alcohol since      Drug use: No     Sexual activity: Yes     Partners: Female   Other Topics Concern     Parent/sibling w/ CABG, MI or angioplasty before 65F 55M? Not Asked   Social History Narrative     Not on file     Social Determinants of Health     Financial Resource Strain: Low Risk  (2021)    Overall Financial Resource Strain (CARDIA)      Difficulty of Paying Living Expenses: Not hard at all   Food Insecurity: No Food Insecurity (2021)    Hunger Vital Sign      Worried About Running Out of Food in the Last Year: Never true      Ran Out of Food in the Last Year: Never true   Transportation Needs: No Transportation Needs (2021)    PRAPARE - Transportation      Lack of Transportation (Medical): No      Lack of Transportation (Non-Medical): No   Physical Activity: Not on file   Stress: Not on file   Social Connections: Not on file   Intimate Partner Violence: Not At Risk (2023)    Humiliation, Afraid, Rape, and Kick questionnaire      Fear of Current or Ex-Partner: No      Emotionally Abused: No      Physically Abused: No      Sexually Abused: No   Housing Stability: Not on file       PAST MEDICAL HISTORY:   Past Medical History:   Diagnosis Date     Allergic state      Anemia      Former smoker      Malignant neoplasm of larynx (H) 2021     Pulmonary emboli (H) 2021    Bilateral-s/p embolectomy and IVC filter placement     S/P percutaneous endoscopic gastrostomy (PEG) tube placement (H)      Tracheostomy in place (H)   "       PAST SURGICAL HISTORY:   Past Surgical History:   Procedure Laterality Date     DISSECTION RADICAL NECK BILATERAL Bilateral 7/8/2021    Procedure: bilateral neck dissections;  Surgeon: Marilou Sethi MD;  Location: [facility] OR     ENDOSCOPIC INSERTION TUBE GASTROSTOMY Left 7/8/2021    Procedure: INSERTION, PEG TUBE with Esophagogastroduodenoscopy;  Surgeon: Kg Montaño MD;  Location: [facility] OR     IR IVC FILTER PLACEMENT  4/30/2021     IR IVC FILTER REMOVAL  2/7/2022     LARYNGECTOMY N/A 7/8/2021    Procedure: LARYNGECTOMY;  Surgeon: Marilou Sethi MD;  Location: [facility] OR     LARYNGOSCOPY N/A 4/12/2021    Procedure: LARYNGOSCOPY;  Surgeon: Choco Johnson MD;  Location: [facility] OR     LARYNGOSCOPY N/A 7/8/2021    Procedure: LARYNGOSCOPY;  Surgeon: Marilou Sethi MD;  Location: [facility] OR     REPAIR ANEURYSM ASCENDING AORTA N/A 4/28/2021    Procedure: PULMONARY THROMBECTOMY;  Surgeon: Yumi Singh MD;  Location: [facility] OR     TRACHEOSTOMY  4/12/2021    Procedure: CREATION, TRACHEOSTOMY;  Surgeon: Choco Johnson MD;  Location: [facility] OR       FAMILY HISTORY:    Family History   Problem Relation Age of Onset     Circulatory Mother         blood clots     Alcohol/Drug Father         alcohol drank heavily     Alcohol/Drug Brother         alcohol       REVIEW OF SYSTEMS:  12 point ROS was negative other than the symptoms noted above in the HPI.  Patient Supplied Answers to Review of Systems      5/17/2023    10:51 AM   [facility] ROS   Ears, Nose, Throat Hearing loss    Nasal congestion or drainage   Musculoskeletal Sore or stiff joints    Back pain    Neck pain         PHYSICAL EXAMINATION:   /81   Pulse 65   Temp 97  F (36.1  C)   Ht 1.778 m (5' 10\")   Wt 93.4 kg (206 lb)   SpO2 99%   BMI 29.56 kg/m    Appearance:   normal; NAD, age-appropriate appearance, well-developed, normal habitus   Communication:   communicates well with electrolarynx   Head/Face:   inspection -  Normal; no scars or visible " lesions   Skin:  normal, no rash   Ears:  auricle (AD) -  normal  auricle (AS) -  normal  Normal clinical speech reception   Nose:  Ext. inspection -  Normal   Oral Cavity:  lips -  Normal mucosa, oral competence, and stoma size   Age-appropriate dentition, some missing teeth   Neck: Well healed neck incision  Stoma patent  Some fibrosis present in neck  Questionable firm mass of right neck lateral to stoma   Cardiovascular:  warm, pink, well-perfused extremities without swelling, tenderness, or edema   Respiratory:  Normal respiratory effort   Neuro/Psych.:  mood/affect -  normal  mental status -  normal         PROCEDURES:   Flexible fiberoptic laryngoscopy: patient declined    RESULTS REVIEWED:     Notes from oncology reviewed    TSH reviewed - normal 4/2023    CT neck and chest  report reviewed    I independently reviewed CT neck and chest images - residual tumor present to the right of stoma      IMPRESSION AND PLAN:   Raj Warren is a 73 year old man with a T4N0 SCC of the larynx. He [facility]. He was taken to the OR on 7/8/2021 for a total laryngectomy with bilateral neck dissections. He completed his postoperative radiation on 9/20/2021. He developed a local/regional recurrence in October 2022, receiving palliative keytruda starting 10/27/2022.    I discussed with him that his recent imaging from 4/2023 does raise concern for me for residual tumor present. There is definitely still imaging findings in the area of the tumor. A PET scan could potentially show if this is active tumor. We could do a biopsy as well but since this will not  it likely not provide significant benefit at this time.    We discussed that it is very standard to do 2 yrs of treatment with Keytruda.     He had questions about flying on a plane. Reassurance was provided that it would be safe. If he had breathing issues, he would want to do oxygen supplementation over his stoma.      We discussed that he could try do the HME adhesive during the day and use the anny tube at night to give his skin a break given the irritation.     His TSH is being checked by medical oncology.    He is having imaging per medical oncology, last PET 1/2023, last CT 4/2023.    I will see him back in 3 months.     Thank you very much for the opportunity to participate in the care of your patient.      Marilou Sethi MD  Otolaryngology- Head & Neck Surgery      This note was dictated with voice recognition software and then edited. Please excuse any unintentional errors.       A total of 30 minutes was spent on patient visit and charting activities.      CC:  Rosa Bean MD  [facility]  [address] [address]      Serg Kennedy MD  Department of Radiation Oncology  [facility]

## 2023-05-17 ENCOUNTER — OFFICE VISIT (OUTPATIENT)
Dept: OTOLARYNGOLOGY | Facility: CLINIC | Age: 73
End: 2023-05-17
Payer: COMMERCIAL

## 2023-05-17 VITALS
BODY MASS INDEX: 29.49 KG/M2 | HEART RATE: 65 BPM | DIASTOLIC BLOOD PRESSURE: 81 MMHG | HEIGHT: 70 IN | WEIGHT: 206 LBS | OXYGEN SATURATION: 99 % | TEMPERATURE: 97 F | SYSTOLIC BLOOD PRESSURE: 122 MMHG

## 2023-05-17 DIAGNOSIS — C32.9 MALIGNANT NEOPLASM OF LARYNX (H): Primary | ICD-10-CM

## 2023-05-17 PROCEDURE — 99214 OFFICE O/P EST MOD 30 MIN: CPT | Performed by: OTOLARYNGOLOGY

## 2023-05-17 ASSESSMENT — PAIN SCALES - GENERAL: PAINLEVEL: NO PAIN (0)

## 2023-05-17 NOTE — LETTER
5/17/2023       RE: Raj Warren  [address]  [facility] [address]     Dear Colleague,    Thank you for referring your patient, Raj Warren, to the [facility] [place] [facility]. Please see a copy of my visit note below.    Dear Dr. Bean:    I had the pleasure of seeing Raj Warren in follow-up today at the [facility].     History of Present Illness:  Raj Warren is 73 year old man with a T4aN0 SCC of the larynx. The patient presented to the ED with a week history of shortness of breath, along with symptoms of sore throat, bilateral ear pain. He had stridor vs wheezing, mild increased work of breathing, and hoarse voice at that time. He was treated with steroids and nebulizers with improvement in his symptoms and was discharged.  He had recurrent symptoms and was seen in the ED again on 4/12/2021. He had a CT scan performed on 4/12/2021 which demonstrated a 3.9 x 1.6 x 3.9 cm mass involving the right true cord, right AE fold, right false cord, posterior hypopharynx, proximal trachea, with involvement of the thyroid cartilage, involvement of the prelaryngeal tissue, no abnormal nodes. Dr Willoughby (local ENT), scope exam was performed, and patient was admitted for a trach and DL/bx. Dr Johnson and Dr Sauceda performed this procedure on 4/13/2021. A tracheal window at 2nd tracheal ring was performed with placement of an 8 cuffed Shiley. Pathology showed moderately differentiated SCC. He was seen by Dr Bean of medical oncology on 4/13/2021. He was followed by local ENT team during his hospitalization. He was recommended for chemoradiation. He was seen by Dr Mclaughlin of [facility], planning 7 weeks treatment, and CT simulation was done. He did have a VSS done while inpatient without evidence of aspiration. The patient was referred here for discussion of possible salvage  laryngectomy after chemoradiation. He had a PET scan on 4/26/2021 which showed an FDG avid mass in the larynx involving the supraglottis/glottis/subglottis, hypermetabolic left level 4 node, FDG avid nodule in the right lung thought to be infectious/inflammatory. He was then admitted to the hospital locally from 4/27/2021 to 5/8/2021 after presenting with worsening shortness of breath, found to have bilateral PE with significant clot burden including the right pulmonary artery and the right ventricle. He was taken to the OR for pulmonary embolectomy and RV thrombectomy. He had IVC filter placed 4/30/2021. His course was complicated by hemodynamic instability requiring pressors, poor healing of his sternal incision requiring wound vac placement. He was discharged on eliquis. He was sent to rehab after his admission, discharged 5/15/2021. He saw Dr Bean in follow-up on 5/27/2021 and was recommended for repeat imaging. This showed slight decrease in size of the tumor, no distant disease.     He was seen as a new patient on 6/18/2021. After extensive preoperative counseling especially in light of his recent PE and anticoagulation, the recommendations was for surgery given the cartilaginous erosion seen on imaging. He was taken to the OR on 7/8/2021 for a DL, total laryngectomy, bilateral neck dissections, cricopharyngeal myotomy. His hospital course was uncomplicated, was restarted on his anticoagulation. His final pathology demonstrated a 6 cm SCC with invasion through the thyroid cartilage, involved the cricoid cartilage, involved the right pyriform sinus, extended to the left side of the tracheostomy site, PNI present, focal lymphatic invasion, 0/75 nodes. The main specimen had a positive margin at the pyriform sinus, margins were taken from the main specimen and were negative (named right pharyngeal wall mucosal margin).      He received postoperative radiation under the care of Dr Kenendy, from 8/4/2021 to 9/20/2021  for a total of 66 Gy.  He had his PEG removed by radiation oncology on 10/4/2021. His post treatment PET scan was negative in December 2021. He had his IVC filter removed in February 2022.     He had imaging in June 2022 which showed a NIRADS 2b nodular soft tissue density along the upper esophagus. He was scheduled for a PET scan to follow-up this area but due to some scheduling issues did not have the scan, got completed on 9/30/2022. This demonstrated a hypermetabolic mass in the right anterolateral wall of the neopharynx measuring 1.8 x 1.7 x 3.7 cm with invasion of the right thyroid lobe, not involving the vessels, no distant disease. He was not interested in surgical resection and elected to proceed with palliative keytruda. He was started on keytruda on 10/27/2022 under the care of Dr Bean.     Interval history:  He comes in today for follow-up. He was last seen in February 2023. He last saw Dr Bean in April 2023. He had repeat imaging in April 2023, showed no distance disease, soft tissue fullness of the right midline neck inseparable from esophagus and right thyroid. He is doing well. He is largely able to eat without issues. His only problem is with hamburger meat. He can take his pills without issue. His weight is doing ok. He has no pain. He has some discomfort of the skin by his stoma. He was happy that the radiologist/med onc told him his cancer was gone. He had 2 of his teeth break, had 1 pulled, may need the other pulled.       MEDICATIONS:     Current Outpatient Medications   Medication Sig Dispense Refill    apixaban ANTICOAGULANT (ELIQUIS) 5 MG tablet Take 1 tablet (5 mg) by mouth 2 times daily 180 tablet 3    levothyroxine (SYNTHROID/LEVOTHROID) 75 MCG tablet Take 1 tablet (75 mcg) by mouth daily 90 tablet 3    metoprolol tartrate (LOPRESSOR) 25 MG tablet Take 1 tablet (25 mg) by mouth 2 times daily 180 tablet 3       ALLERGIES:    Allergies   Allergen Reactions    Pollen Extract         HABITS/SOCIAL HISTORY:    Former smoker 1/2 ppd x 8 yrs, quit in . Sober from alcohol.   Lives alone. His family does not live in the immediate [place] area.   He is a retired .       Social History     Socioeconomic History    Marital status: Single     Spouse name: Not on file    Number of children: Not on file    Years of education: Not on file    Highest education level: Not on file   Occupational History    Not on file   Tobacco Use    Smoking status: Former     Types: Cigarettes     Quit date: 1989     Years since quittin.3    Smokeless tobacco: Never    Tobacco comments:     quit in   had smoked about 1/2 pack a day then cut back   Vaping Use    Vaping status: Not on file   Substance and Sexual Activity    Alcohol use: No     Comment: No alcohol since     Drug use: No    Sexual activity: Yes     Partners: Female   Other Topics Concern    Parent/sibling w/ CABG, MI or angioplasty before 65F 55M? Not Asked   Social History Narrative    Not on file     Social Determinants of Health     Financial Resource Strain: Low Risk  (2021)    Overall Financial Resource Strain (CARDIA)     Difficulty of Paying Living Expenses: Not hard at all   Food Insecurity: No Food Insecurity (2021)    Hunger Vital Sign     Worried About Running Out of Food in the Last Year: Never true     Ran Out of Food in the Last Year: Never true   Transportation Needs: No Transportation Needs (2021)    PRAPARE - Transportation     Lack of Transportation (Medical): No     Lack of Transportation (Non-Medical): No   Physical Activity: Not on file   Stress: Not on file   Social Connections: Not on file   Intimate Partner Violence: Not At Risk (2023)    Humiliation, Afraid, Rape, and Kick questionnaire     Fear of Current or Ex-Partner: No     Emotionally Abused: No     Physically Abused: No     Sexually Abused: No   Housing Stability: Not on file       PAST MEDICAL HISTORY:   Past  Medical History:   Diagnosis Date    Allergic state     Anemia     Former smoker     Malignant neoplasm of larynx (H) 04/20/2021    Pulmonary emboli (H) 04/28/2021    Bilateral-s/p embolectomy and IVC filter placement    S/P percutaneous endoscopic gastrostomy (PEG) tube placement (H)     Tracheostomy in place (H)         PAST SURGICAL HISTORY:   Past Surgical History:   Procedure Laterality Date    DISSECTION RADICAL NECK BILATERAL Bilateral 7/8/2021    Procedure: bilateral neck dissections;  Surgeon: Marilou Sethi MD;  Location: [facility] OR    ENDOSCOPIC INSERTION TUBE GASTROSTOMY Left 7/8/2021    Procedure: INSERTION, PEG TUBE with Esophagogastroduodenoscopy;  Surgeon: Kg Montaño MD;  Location: [facility] OR    IR IVC FILTER PLACEMENT  4/30/2021    IR IVC FILTER REMOVAL  2/7/2022    LARYNGECTOMY N/A 7/8/2021    Procedure: LARYNGECTOMY;  Surgeon: Marilou Sethi MD;  Location: [facility] OR    LARYNGOSCOPY N/A 4/12/2021    Procedure: LARYNGOSCOPY;  Surgeon: Choco Johnson MD;  Location: [facility] OR    LARYNGOSCOPY N/A 7/8/2021    Procedure: LARYNGOSCOPY;  Surgeon: Marilou Sethi MD;  Location: [facility] OR    REPAIR ANEURYSM ASCENDING AORTA N/A 4/28/2021    Procedure: PULMONARY THROMBECTOMY;  Surgeon: Yumi Singh MD;  Location: [facility] OR    TRACHEOSTOMY  4/12/2021    Procedure: CREATION, TRACHEOSTOMY;  Surgeon: Choco Johnson MD;  Location: [facility] OR       FAMILY HISTORY:    Family History   Problem Relation Age of Onset    Circulatory Mother         blood clots    Alcohol/Drug Father         alcohol drank heavily    Alcohol/Drug Brother         alcohol       REVIEW OF SYSTEMS:  12 point ROS was negative other than the symptoms noted above in the HPI.  Patient Supplied Answers to Review of Systems      5/17/2023    10:51 AM   [facility] ROS   Ears, Nose, Throat Hearing loss    Nasal congestion or drainage   Musculoskeletal Sore or stiff joints    Back pain    Neck pain         PHYSICAL EXAMINATION:   /81   Pulse  "65   Temp 97  F (36.1  C)   Ht 1.778 m (5' 10\")   Wt 93.4 kg (206 lb)   SpO2 99%   BMI 29.56 kg/m    Appearance:   normal; NAD, age-appropriate appearance, well-developed, normal habitus   Communication:   communicates well with electrolarynx   Head/Face:   inspection -  Normal; no scars or visible lesions   Skin:  normal, no rash   Ears:  auricle (AD) -  normal  auricle (AS) -  normal  Normal clinical speech reception   Nose:  Ext. inspection -  Normal   Oral Cavity:  lips -  Normal mucosa, oral competence, and stoma size   Age-appropriate dentition, some missing teeth   Neck: Well healed neck incision  Stoma patent  Some fibrosis present in neck  Questionable firm mass of right neck lateral to stoma   Cardiovascular:  warm, pink, well-perfused extremities without swelling, tenderness, or edema   Respiratory:  Normal respiratory effort   Neuro/Psych.:  mood/affect -  normal  mental status -  normal         PROCEDURES:   Flexible fiberoptic laryngoscopy: patient declined    RESULTS REVIEWED:     Notes from oncology reviewed    TSH reviewed - normal 4/2023    CT neck and chest  report reviewed    I independently reviewed CT neck and chest images - residual tumor present to the right of stoma      IMPRESSION AND PLAN:   Raj Warren is a 73 year old man with a T4N0 SCC of the larynx. He underwent [facility]. He was taken to the OR on 7/8/2021 for a total laryngectomy with bilateral neck dissections. He completed his postoperative radiation on 9/20/2021. He developed a local/regional recurrence in October 2022, receiving palliative keytruda starting 10/27/2022.    I discussed with him that his recent imaging from 4/2023 does raise concern for me for residual tumor present. There is definitely still imaging findings in the area of the tumor. A PET scan could potentially show if this is active tumor. We could do a biopsy as well but since this will not  it likely not " provide significant benefit at this time.    We discussed that it is very standard to do 2 yrs of treatment with Keytruda.     He had questions about flying on a plane. Reassurance was provided that it would be safe. If he had breathing issues, he would want to do oxygen supplementation over his stoma.     We discussed that he could try do the HME adhesive during the day and use the anny tube at night to give his skin a break given the irritation.     His TSH is being checked by medical oncology.    He is having imaging per medical oncology, last PET 1/2023, last CT 4/2023.    I will see him back in 3 months.     Thank you very much for the opportunity to participate in the care of your patient.      Marilou Sethi MD  Otolaryngology- Head & Neck Surgery      This note was dictated with voice recognition software and then edited. Please excuse any unintentional errors.     A total of 30 minutes was spent on patient visit and charting activities.      CC:  Rosa Bean MD  [facility]  [address] [address]    Serg Kennedy MD  Department of Radiation Oncology  [facility]

## 2023-05-17 NOTE — NURSING NOTE
"Chief Complaint   Patient presents with     RECHECK     3 month follow up      Blood pressure 122/81, pulse 65, temperature 97  F (36.1  C), height 1.778 m (5' 10\"), weight 93.4 kg (206 lb), SpO2 99 %.    Ruddy Mckinney LPN    "

## 2023-05-18 ENCOUNTER — LAB (OUTPATIENT)
Dept: INFUSION THERAPY | Facility: CLINIC | Age: 73
End: 2023-05-18
Attending: INTERNAL MEDICINE
Payer: COMMERCIAL

## 2023-05-18 ENCOUNTER — ONCOLOGY VISIT (OUTPATIENT)
Dept: ONCOLOGY | Facility: CLINIC | Age: 73
End: 2023-05-18
Attending: INTERNAL MEDICINE
Payer: COMMERCIAL

## 2023-05-18 VITALS
BODY MASS INDEX: 29.99 KG/M2 | DIASTOLIC BLOOD PRESSURE: 88 MMHG | WEIGHT: 209 LBS | TEMPERATURE: 98.5 F | OXYGEN SATURATION: 99 % | RESPIRATION RATE: 16 BRPM | HEART RATE: 64 BPM | SYSTOLIC BLOOD PRESSURE: 134 MMHG

## 2023-05-18 DIAGNOSIS — C32.9 MALIGNANT NEOPLASM OF LARYNX (H): Primary | ICD-10-CM

## 2023-05-18 DIAGNOSIS — C32.9 LARYNGEAL CANCER (H): Primary | ICD-10-CM

## 2023-05-18 LAB
ALBUMIN SERPL BCG-MCNC: 4.3 G/DL (ref 3.5–5.2)
ALP SERPL-CCNC: 73 U/L (ref 40–129)
ALT SERPL W P-5'-P-CCNC: 21 U/L (ref 10–50)
ANION GAP SERPL CALCULATED.3IONS-SCNC: 14 MMOL/L (ref 7–15)
AST SERPL W P-5'-P-CCNC: 25 U/L (ref 10–50)
BILIRUB SERPL-MCNC: 0.4 MG/DL
BUN SERPL-MCNC: 10.4 MG/DL (ref 8–23)
CALCIUM SERPL-MCNC: 9.3 MG/DL (ref 8.8–10.2)
CHLORIDE SERPL-SCNC: 105 MMOL/L (ref 98–107)
CREAT SERPL-MCNC: 0.93 MG/DL (ref 0.67–1.17)
DEPRECATED HCO3 PLAS-SCNC: 21 MMOL/L (ref 22–29)
GFR SERPL CREATININE-BSD FRML MDRD: 88 ML/MIN/1.73M2
GLUCOSE SERPL-MCNC: 104 MG/DL (ref 70–99)
POTASSIUM SERPL-SCNC: 4.3 MMOL/L (ref 3.4–5.3)
PROT SERPL-MCNC: 7.3 G/DL (ref 6.4–8.3)
SODIUM SERPL-SCNC: 140 MMOL/L (ref 136–145)
TSH SERPL DL<=0.005 MIU/L-ACNC: 2.63 UIU/ML (ref 0.3–4.2)

## 2023-05-18 PROCEDURE — 84443 ASSAY THYROID STIM HORMONE: CPT | Performed by: INTERNAL MEDICINE

## 2023-05-18 PROCEDURE — 258N000003 HC RX IP 258 OP 636: Performed by: INTERNAL MEDICINE

## 2023-05-18 PROCEDURE — G0463 HOSPITAL OUTPT CLINIC VISIT: HCPCS | Mod: 25 | Performed by: NURSE PRACTITIONER

## 2023-05-18 PROCEDURE — 36415 COLL VENOUS BLD VENIPUNCTURE: CPT | Performed by: INTERNAL MEDICINE

## 2023-05-18 PROCEDURE — 80053 COMPREHEN METABOLIC PANEL: CPT | Performed by: INTERNAL MEDICINE

## 2023-05-18 PROCEDURE — 96413 CHEMO IV INFUSION 1 HR: CPT

## 2023-05-18 PROCEDURE — 250N000011 HC RX IP 250 OP 636: Performed by: INTERNAL MEDICINE

## 2023-05-18 PROCEDURE — 99214 OFFICE O/P EST MOD 30 MIN: CPT | Performed by: NURSE PRACTITIONER

## 2023-05-18 RX ADMIN — SODIUM CHLORIDE 250 ML: 9 INJECTION, SOLUTION INTRAVENOUS at 10:52

## 2023-05-18 RX ADMIN — SODIUM CHLORIDE 200 MG: 9 INJECTION, SOLUTION INTRAVENOUS at 10:50

## 2023-05-18 ASSESSMENT — PAIN SCALES - GENERAL: PAINLEVEL: NO PAIN (0)

## 2023-05-18 NOTE — PROGRESS NOTES
Oncology/Hematology Visit Note  May 18, 2023    Reason for Visit: follow up of laryngeal squamous cell carcinoma    Patient Dr. Bean  Status post surgery and postoperative radiation with no evidence of recurrence  -Currently on single agent pembrolizumab started on 10/27/2022  04/2025-CT scan of the neck reveals response to treatment      Interval History:  Patient reports he has been tolerating Keytruda well.  Patient denies fever chills sweats cough shortness of breath chest pain nausea vomiting diarrhea abdominal pain or bleeding.  Denies pain      Review of Systems:  14 point ROS of systems including Constitutional, Eyes, Respiratory, Cardiovascular, Gastroenterology, Genitourinary, Integumentary, Muscularskeletal, Psychiatric were all negative except for pertinent positives noted in my HPI.    Physical Examination:  General: The patient is a pleasant male in no acute distress.  HEENT: EOMI, PERRL. Sclerae are anicteric. Oral mucosa is pink and moist with no lesions or thrush.   Lymph: Neck is supple with no lymphadenopathy in the cervical or supraclavicular areas.   Heart: Regular rate and rhythm.   Lungs: Clear to auscultation bilaterally.   GI: Bowel sounds present, soft, nontender with no palpable hepatosplenomegaly or masses.   Extremities: No lower extremity edema noted bilaterally.   Skin: No rashes, petechiae, or bruising noted on exposed skin.  Laboratory Data:  CMP and TSH results reviewed      Assessment and Plan:  This is a 72-year-old male with    Laryngeal squamous cell carcinoma  Patient Dr. Bean  Status post surgery and postoperative radiation at that time no evidence of recurrence  09/30/2022-PET/CT showed abnormality in the neck FNA positive for squamous cell carcinoma showed recurrence of the disease  High PD-L1 expression  10/27/2022-started single agent Keytruda  -01/16/2023-PET revealed likely some partial response to therapy  Mildly FDG avid nonenlarged right level I and bilateral level  Ib lymph nodes.  Likely reactive however early sites of in situ neoplasm are also possible  04/2025-CT scan of the neck reveals response to treatment  Plan for PET scan in the next few months  Continue with pembrolizumab      Pulmonary embolism  Continue with Eliquis  Call 911 or go to ER in event of bleeding bruising fall injury shortness of breath chest pain cough      Hypothyroidism  Normal TSH  Continue with levothyroxine      Call clinic with any changes in her condition or questions        TARUN Sandy [facility]     Chart documentation with Dragon Voice recognition Software. Although reviewed after completion, some words and grammatical errors may remain.

## 2023-05-18 NOTE — LETTER
"    5/18/2023         RE: Raj Warren  [address]  [facility] [address]        Dear Colleague,    Thank you for referring your patient, Raj Warren, to the [facility]. Please see a copy of my visit note below.    Oncology Rooming Note    May 18, 2023 9:15 AM   Raj Warren is a 73 year old male who presents for:    Chief Complaint   Patient presents with     Oncology Clinic Visit     Initial Vitals: /88   Pulse 64   Temp 98.5  F (36.9  C) (Oral)   Resp 16   Wt 94.8 kg (209 lb)   SpO2 99%   BMI 29.99 kg/m   Estimated body mass index is 29.99 kg/m  as calculated from the following:    Height as of 5/17/23: 1.778 m (5' 10\").    Weight as of this encounter: 94.8 kg (209 lb). Body surface area is 2.16 meters squared.  No Pain (0) Comment: Data Unavailable   No LMP for male patient.  Allergies reviewed: Yes  Medications reviewed: Yes    Medications: Medication refills not needed today.  Pharmacy name entered into [employer]: [facility] #[id number] - [place], [place] - [address] AT [address]    Clinical concerns:  NP was notified.      Kirti Jones [facility]              Oncology/Hematology Visit Note  May 18, 2023    Reason for Visit: follow up of laryngeal squamous cell carcinoma    Patient Dr. Bean  Status post surgery and postoperative radiation with no evidence of recurrence  -Currently on single agent pembrolizumab started on 10/27/2022  04/2025-CT scan of the neck reveals response to treatment      Interval History:  Patient reports he has been tolerating Keytruda well.  Patient denies fever chills sweats cough shortness of breath chest pain nausea vomiting diarrhea abdominal pain or bleeding.  Denies pain      Review of Systems:  14 point ROS of systems including Constitutional, Eyes, Respiratory, Cardiovascular, Gastroenterology, Genitourinary, Integumentary, Muscularskeletal, Psychiatric were all negative except for pertinent " positives noted in my HPI.    Physical Examination:  General: The patient is a pleasant male in no acute distress.  HEENT: EOMI, PERRL. Sclerae are anicteric. Oral mucosa is pink and moist with no lesions or thrush.   Lymph: Neck is supple with no lymphadenopathy in the cervical or supraclavicular areas.   Heart: Regular rate and rhythm.   Lungs: Clear to auscultation bilaterally.   GI: Bowel sounds present, soft, nontender with no palpable hepatosplenomegaly or masses.   Extremities: No lower extremity edema noted bilaterally.   Skin: No rashes, petechiae, or bruising noted on exposed skin.  Laboratory Data:  CMP and TSH results reviewed      Assessment and Plan:  This is a 72-year-old male with    Laryngeal squamous cell carcinoma  Patient Dr. Bean  Status post surgery and postoperative radiation at that time no evidence of recurrence  09/30/2022-PET/CT showed abnormality in the neck FNA positive for squamous cell carcinoma showed recurrence of the disease  High PD-L1 expression  10/27/2022-started single agent Keytruda  -01/16/2023-PET revealed likely some partial response to therapy  Mildly FDG avid nonenlarged right level I and bilateral level Ib lymph nodes.  Likely reactive however early sites of in situ neoplasm are also possible  04/2025-CT scan of the neck reveals response to treatment  Plan for PET scan in the next few months  Continue with pembrolizumab      Pulmonary embolism  Continue with Eliquis  Call 911 or go to ER in event of bleeding bruising fall injury shortness of breath chest pain cough      Hypothyroidism  Normal TSH  Continue with levothyroxine      Call clinic with any changes in her condition or questions        TARUN Sandy [facility]     Chart documentation with Dragon Voice recognition Software. Although reviewed after completion, some words and grammatical errors may remain.            Again, thank you for allowing me to participate in the care  of your patient.        Sincerely,        TARUN Sandy CNP

## 2023-05-18 NOTE — PROGRESS NOTES
Infusion Nursing Note:  Raj Warren presents today for C10D1 Keytruda  Patient seen by provider today: Yes: Kyree Jurado NP   present during visit today: Not Applicable.    Note: Pt seen and assessed by Kyree Jurado NP today. Okay to proceed with treatment. Pt reports no new anna changes or concerns during infusion.       Intravenous Access:  Peripheral IV placed.    Treatment Conditions:  Lab Results   Component Value Date     05/18/2023    POTASSIUM 4.3 05/18/2023    MAG 2.5 (H) 07/17/2021    CR 0.93 05/18/2023    TELMA 9.3 05/18/2023    BILITOTAL 0.4 05/18/2023    ALBUMIN 4.3 05/18/2023    ALT 21 05/18/2023    AST 25 05/18/2023         Post Infusion Assessment:  Patient tolerated infusion without incident.  Blood return noted pre and post infusion.  Site patent and intact, free from redness, edema or discomfort.  No evidence of extravasations.  Access discontinued per protocol.       Discharge Plan:   Patient declined prescription refills.  Discharge instructions reviewed with: Patient.  Patient and/or family verbalized understanding of discharge instructions and all questions answered.  AVS to patient via [employer].  Patient will return 6/8/23 for next appointment.   Patient discharged in stable condition accompanied by: self.  Departure Mode: Ambulatory.      Barbie Beckman RN

## 2023-05-18 NOTE — PROGRESS NOTES
"Oncology Rooming Note    May 18, 2023 9:15 AM   Raj Warren is a 73 year old male who presents for:    Chief Complaint   Patient presents with     Oncology Clinic Visit     Initial Vitals: /88   Pulse 64   Temp 98.5  F (36.9  C) (Oral)   Resp 16   Wt 94.8 kg (209 lb)   SpO2 99%   BMI 29.99 kg/m   Estimated body mass index is 29.99 kg/m  as calculated from the following:    Height as of 5/17/23: 1.778 m (5' 10\").    Weight as of this encounter: 94.8 kg (209 lb). Body surface area is 2.16 meters squared.  No Pain (0) Comment: Data Unavailable   No LMP for male patient.  Allergies reviewed: Yes  Medications reviewed: Yes    Medications: Medication refills not needed today.  Pharmacy name entered into [employer]: [id number] - [place], [place] [facility] [address] [facility] [address]    Clinical concerns:  NP was notified.      Kirti Jones CMA            "

## 2023-06-06 RX ORDER — MEPERIDINE HYDROCHLORIDE 25 MG/ML
25 INJECTION INTRAMUSCULAR; INTRAVENOUS; SUBCUTANEOUS EVERY 30 MIN PRN
Status: CANCELLED | OUTPATIENT
Start: 2023-01-01

## 2023-06-06 RX ORDER — ALBUTEROL SULFATE 90 UG/1
1-2 AEROSOL, METERED RESPIRATORY (INHALATION)
Status: CANCELLED
Start: 2023-01-01

## 2023-06-06 RX ORDER — HEPARIN SODIUM,PORCINE 10 UNIT/ML
5 VIAL (ML) INTRAVENOUS
Status: CANCELLED | OUTPATIENT
Start: 2023-01-01

## 2023-06-06 RX ORDER — ALBUTEROL SULFATE 0.83 MG/ML
2.5 SOLUTION RESPIRATORY (INHALATION)
Status: CANCELLED | OUTPATIENT
Start: 2023-01-01

## 2023-06-06 RX ORDER — LORAZEPAM 2 MG/ML
0.5 INJECTION INTRAMUSCULAR EVERY 4 HOURS PRN
Status: CANCELLED | OUTPATIENT
Start: 2023-01-01

## 2023-06-06 RX ORDER — EPINEPHRINE 1 MG/ML
0.3 INJECTION, SOLUTION, CONCENTRATE INTRAVENOUS EVERY 5 MIN PRN
Status: CANCELLED | OUTPATIENT
Start: 2023-01-01

## 2023-06-06 RX ORDER — HEPARIN SODIUM (PORCINE) LOCK FLUSH IV SOLN 100 UNIT/ML 100 UNIT/ML
5 SOLUTION INTRAVENOUS
Status: CANCELLED | OUTPATIENT
Start: 2023-01-01

## 2023-06-06 RX ORDER — DIPHENHYDRAMINE HYDROCHLORIDE 50 MG/ML
50 INJECTION INTRAMUSCULAR; INTRAVENOUS
Status: CANCELLED
Start: 2023-01-01

## 2023-06-08 NOTE — PROGRESS NOTES
"Oncology Rooming Note    June 8, 2023 9:39 AM   Raj Warren is a 73 year old male who presents for:    Chief Complaint   Patient presents with     Oncology Clinic Visit     Initial Vitals: /82   Pulse 69   Temp 98.2  F (36.8  C) (Oral)   Resp 14   Wt 94.4 kg (208 lb 3.2 oz)   SpO2 100%   BMI 29.87 kg/m   Estimated body mass index is 29.87 kg/m  as calculated from the following:    Height as of 5/17/23: 1.778 m (5' 10\").    Weight as of this encounter: 94.4 kg (208 lb 3.2 oz). Body surface area is 2.16 meters squared.  No Pain (0) Comment: Data Unavailable   No LMP for male patient.  Allergies reviewed: Yes  Medications reviewed: Yes    Medications: Medication refills not needed today.  Pharmacy name entered into [employer]: [facility] #[id number] - [place], [place] - [address] AT [address]    Clinical concerns: no       Shari J. Schoenberger, CMA            "

## 2023-06-08 NOTE — LETTER
"    6/8/2023         RE: Raj Warren  [address]  [facility] [address]        Dear Colleague,    Thank you for referring your patient, Raj Warren, to the [facility]. Please see a copy of my visit note below.    Oncology Rooming Note    June 8, 2023 9:39 AM   Raj Warren is a 73 year old male who presents for:    Chief Complaint   Patient presents with     Oncology Clinic Visit     Initial Vitals: /82   Pulse 69   Temp 98.2  F (36.8  C) (Oral)   Resp 14   Wt 94.4 kg (208 lb 3.2 oz)   SpO2 100%   BMI 29.87 kg/m   Estimated body mass index is 29.87 kg/m  as calculated from the following:    Height as of 5/17/23: 1.778 m (5' 10\").    Weight as of this encounter: 94.4 kg (208 lb 3.2 oz). Body surface area is 2.16 meters squared.  No Pain (0) Comment: Data Unavailable   No LMP for male patient.  Allergies reviewed: Yes  Medications reviewed: Yes    Medications: Medication refills not needed today.  Pharmacy name entered into [employer]: [email or web address] #[id number] - [place], [place] - [address] AT [address]    Clinical concerns: no       Shari J. Schoenberger, [facility]              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  -COMBINED POSITIVE SCORE (CPS):  70  -TUMOR PROPORTION SCORE (TPS):  60%     1.  Patient was evaluated in 2019 for hoarseness of voice.  He was seen by ENT and found to have right vocal cord mass.    -He had biopsy done on 08/07/2019. Pathology revealed moderately-differentiated invasive squamous cell carcinoma.     2.  The patient was seen in the Emergency Room on 04/12/2021 for shortness of breath and admitted.   -CT neck revealed an enhancing soft tissue mass involving the right larynx measuring 3.9 cm.  -Patient had laryngoscopy with biopsy and tracheostomy done on 04/12/2021.  There was an endolaryngeal mass obscuring the laryngeal " landmarks.  Mass appeared to be based on right endolarynx.  Pathology revealed invasive keratinizing moderately-differentiated squamous cell carcinoma.     3.  PET scan on 04/26/2021 revealed hypermetabolic mass involving the supraglottic, glottic and subglottic larynx.  There was a hypermetabolic left level IV lymph node.  There was also hypermetabolic nodule in right middle lobe.     4. CT chest angiogram on 04/27/2021 revealed a large bilateral pulmonary embolism in all the lobes.  There was evidence of right cardiac strain.  -Echocardiogram on 04/27/2021 revealed clot in transit in the right ventricle.  -The patient had emergency pulmonary embolectomy on 04/28/2021.  Pathology revealed organizing clot.  -Patient on Eliquis. Long term anti-coagulation recommended.     5. Total laryngectomy with bilateral neck dissection on 07/08/2021.  Pathology reveals 6 cm squamous cell carcinoma involving the right false and true vocal cords with fixation, the left false and true vocal cords and invades through thyroid cartilage.  There is focal lymphatic invasion.  There is perineural invasion. Benign 75 lymph nodes.  pT4a pN0 disease.     6. Post-op radiation between 08/04/2021 and 09/20/2021. 6600 cGy in 33 fractions.     7. PET/CT on 09/30/2022 revealed hypermetabolic mass originating from the right anterolateral wall of the neopharynx.  -Patient seen by ENT on 10/03/2022 and had FNA of neck mass. Pathology is positive for malignancy. Rare clusters morphologically consistent with squamous cell carcinoma.      8. Pembrolizumab started on 10/27/2022.  He is tolerating it well.     SUBJECTIVE:  Mr. 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  He is tolerating it well. CT scan on 04/27/23 revealed improvement in disease in neck. No disease outside of neck.    Overall his condition is a stable.  He has mild generalized weakness.  He is able to do all his activities.  He  gets tired easily.  He also gets some aches and pain in the joints.  He does have some osteoarthritis.  With pembrolizumab, joint pains have gotten mildly worse.    No headache.  No dizziness.  No problems swallowing. No chest pain.  No shortness of breath.  No abdominal pain.  No nausea or vomiting.  No urinary or bowel complaints.  No bleeding. All other review of systems is negative.     PHYSICAL EXAMINATION:   GENERAL:  Alert and oriented x 3. Not in distress.  VITAL SIGNS:  Reviewed.     Rest of the systems is not examined.     LABS:  TSH and CMP were reviewed.     ASSESSMENT:  1.  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  2.  Pulmonary embolism on Eliquis.  3.  Hypothyroidism on levothyroxine.  4.  Generalized weakness.  5.  Joint pain from osteoarthritis and pembrolizumab.     PLAN:  1.  Discussed regarding head and neck cancer.  Overall he is doing well.  No suspicion of progression of disease.    He will continue on pembrolizumab.  He is tolerating it well.    Discussed regarding scans.  We will get CT scan in August.  Based on the CT scan, we will decide if we need to do a PET scan.    Patient had multiple questions regarding his cancer which were answered.    2.  He will continue on Eliquis. He is not having any bleeding complication.  Plan is for indefinite anticoagulation given the life-threatening pulmonary embolism he had.    3.  Discussed regarding his symptoms.  He has generalized weakness. He is able to do all his activities.  He was hoping he could go back to work.  He is not able to do that.    He has some joint aches and pain.  They are mild.  He will continue on over-the-counter pain medication as needed.     4.  His TSH is normal.  He will continue on levothyroxine.     5.  He will be seen with each treatment by NP or me.  I advised him to call us with any questions or concerns.     Total visit time of 40 minutes.  Time spent in today's  visit, review of chart/investigations today, monitoring for toxicity of high risk drug and documentation today.      Again, thank you for allowing me to participate in the care of your patient.        Sincerely,        Rosa Bean MD

## 2023-06-08 NOTE — PROGRESS NOTES
Infusion Nursing Note:  Raj Warren presents today for C11D1 Keytruda.    Patient seen by provider today: Yes: Dr Bean   present during visit today: Not Applicable.    Note: N/A.      Intravenous Access:  Peripheral IV placed.    Treatment Conditions:  Lab Results   Component Value Date     06/08/2023    POTASSIUM 4.4 06/08/2023    MAG 2.5 (H) 07/17/2021    CR 0.97 06/08/2023    TELMA 9.3 06/08/2023    BILITOTAL 0.4 06/08/2023    ALBUMIN 4.2 06/08/2023    ALT 19 06/08/2023    AST 26 06/08/2023     Results reviewed, labs MET treatment parameters, ok to proceed with treatment.      Post Infusion Assessment:  Patient tolerated infusion without incident.  Site patent and intact, free from redness, edema or discomfort.  Access discontinued per protocol.       Discharge Plan:   Discharge instructions reviewed with: Patient.  Patient and/or family verbalized understanding of discharge instructions and all questions answered.  Patient discharged in stable condition accompanied by: self.  Departure Mode: Ambulatory.      Claire Barnes RN

## 2023-06-08 NOTE — PROGRESS NOTES
Medical Assistant Note:  Raj Warren presents today for blood draw.    Patient seen by provider today: No.   present during visit today: Not Applicable.    Concerns: No Concerns.    Procedure:  Lab draw site: left hand, Needle type: bf, Gauge: 23.    Post Assessment:  Labs drawn without difficulty: Yes.    Discharge Plan:  Departure Mode: Ambulatory.    Face to Face Time: 5 min.    Kirti Jones, CMA

## 2023-06-09 NOTE — PROGRESS NOTES
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  -COMBINED POSITIVE SCORE (CPS):  70  -TUMOR PROPORTION SCORE (TPS):  60%     1.  Patient was evaluated in 2019 for hoarseness of voice.  He was seen by ENT and found to have right vocal cord mass.    -He had biopsy done on 08/07/2019. Pathology revealed moderately-differentiated invasive squamous cell carcinoma.     2.  The patient was seen in the Emergency Room on 04/12/2021 for shortness of breath and admitted.   -CT neck revealed an enhancing soft tissue mass involving the right larynx measuring 3.9 cm.  -Patient had laryngoscopy with biopsy and tracheostomy done on 04/12/2021.  There was an endolaryngeal mass obscuring the laryngeal landmarks.  Mass appeared to be based on right endolarynx.  Pathology revealed invasive keratinizing moderately-differentiated squamous cell carcinoma.     3.  PET scan on 04/26/2021 revealed hypermetabolic mass involving the supraglottic, glottic and subglottic larynx.  There was a hypermetabolic left level IV lymph node.  There was also hypermetabolic nodule in right middle lobe.     4. CT chest angiogram on 04/27/2021 revealed a large bilateral pulmonary embolism in all the lobes.  There was evidence of right cardiac strain.  -Echocardiogram on 04/27/2021 revealed clot in transit in the right ventricle.  -The patient had emergency pulmonary embolectomy on 04/28/2021.  Pathology revealed organizing clot.  -Patient on Eliquis. Long term anti-coagulation recommended.     5. Total laryngectomy with bilateral neck dissection on 07/08/2021.  Pathology reveals 6 cm squamous cell carcinoma involving the right false and true vocal cords with fixation, the left false and true vocal cords and invades through thyroid cartilage.  There is focal lymphatic invasion.  There is perineural invasion. Benign 75 lymph nodes.  pT4a pN0 disease.     6. Post-op radiation between  08/04/2021 and 09/20/2021. 6600 cGy in 33 fractions.     7. PET/CT on 09/30/2022 revealed hypermetabolic mass originating from the right anterolateral wall of the neopharynx.  -Patient seen by ENT on 10/03/2022 and had FNA of neck mass. Pathology is positive for malignancy. Rare clusters morphologically consistent with squamous cell carcinoma.      8. Pembrolizumab started on 10/27/2022.  He is tolerating it well.     SUBJECTIVE:  Mr. 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  He is tolerating it well. CT scan on 04/27/23 revealed improvement in disease in neck. No disease outside of neck.    Overall his condition is a stable.  He has mild generalized weakness.  He is able to do all his activities.  He gets tired easily.  He also gets some aches and pain in the joints.  He does have some osteoarthritis.  With pembrolizumab, joint pains have gotten mildly worse.    No headache.  No dizziness.  No problems swallowing. No chest pain.  No shortness of breath.  No abdominal pain.  No nausea or vomiting.  No urinary or bowel complaints.  No bleeding. All other review of systems is negative.     PHYSICAL EXAMINATION:   GENERAL:  Alert and oriented x 3. Not in distress.  VITAL SIGNS:  Reviewed.     Rest of the systems is not examined.     LABS:  TSH and CMP were reviewed.     ASSESSMENT:  1.  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  2.  Pulmonary embolism on Eliquis.  3.  Hypothyroidism on levothyroxine.  4.  Generalized weakness.  5.  Joint pain from osteoarthritis and pembrolizumab.     PLAN:  1.  Discussed regarding head and neck cancer.  Overall he is doing well.  No suspicion of progression of disease.    He will continue on pembrolizumab.  He is tolerating it well.    Discussed regarding scans.  We will get CT scan in August.  Based on the CT scan, we will decide if we need to do a PET  scan.    Patient had multiple questions regarding his cancer which were answered.    2.  He will continue on Eliquis. He is not having any bleeding complication.  Plan is for indefinite anticoagulation given the life-threatening pulmonary embolism he had.    3.  Discussed regarding his symptoms.  He has generalized weakness. He is able to do all his activities.  He was hoping he could go back to work.  He is not able to do that.    He has some joint aches and pain.  They are mild.  He will continue on over-the-counter pain medication as needed.     4.  His TSH is normal.  He will continue on levothyroxine.     5.  He will be seen with each treatment by NP or me.  I advised him to call us with any questions or concerns.     Total visit time of 40 minutes.  Time spent in today's visit, review of chart/investigations today, monitoring for toxicity of high risk drug and documentation today.

## 2023-06-26 NOTE — PROGRESS NOTES
Problem: At Risk for Falls  Goal: # Patient does not fall  Outcome: Outcome Not Met, Continue to Monitor  Goal: # Takes action to control fall-related risks  Outcome: Outcome Not Met, Continue to Monitor  Goal: # Verbalizes understanding of fall risk/precautions  Description: Document education using the patient education activity  Outcome: Outcome Not Met, Continue to Monitor     Problem: At Risk for Injury Due to Fall  Goal: # Patient does not fall  Outcome: Outcome Not Met, Continue to Monitor  Goal: # Takes action to control condition specific risks  Outcome: Outcome Not Met, Continue to Monitor  Goal: # Verbalizes understanding of fall-related injury personal risks  Description: Document education using the patient education activity  Outcome: Outcome Not Met, Continue to Monitor     Problem: Activity Intolerance  Goal: # Functional status is maintained or returned to baseline  Outcome: Outcome Not Met, Continue to Monitor  Goal: # Tolerates activity for d/c setting with no clinical problems  Outcome: Outcome Not Met, Continue to Monitor     Problem: Diabetes  Goal: Achieves glycemic balance  Description: Goal is to maintain blood sugar within range with no episodes of hypoglycemia  Outcome: Outcome Not Met, Continue to Monitor  Goal: Verbalizes/demonstrates understanding of NEW diagnosis of diabetes and management  Description: Document on Patient Education Activity  Outcome: Outcome Not Met, Continue to Monitor  Goal: Verbalizes understanding of diabetes management including how to use HbA1C to evaluate status of blood sugar over time (Diabetes is NOT a new diagnosis)  Description: Diabetes Education  Outcome: Outcome Not Met, Continue to Monitor  Goal: Demonstrates ability to self-administer insulin  Description: Document on Patient Education Activity  Outcome: Outcome Not Met, Continue to Monitor     Problem: Impaired Physical Mobility  Goal: # Bed mobility, ambulation, and ADLs are maintained or returned  Pt's treatment got delayed one week d/t dental abscess. Pt on antibiotics and will see oral surgeon.     to baseline during hospitalization  Outcome: Outcome Not Met, Continue to Monitor    All administered medications tolerated without complication. Patient in stable condition at end of shift for safe handoff.

## 2023-06-29 NOTE — PROGRESS NOTES
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  -COMBINED POSITIVE SCORE (CPS):  70  -TUMOR PROPORTION SCORE (TPS):  60%     1.  Patient was evaluated in 2019 for hoarseness of voice.  He was seen by ENT and found to have right vocal cord mass.    -He had biopsy done on 08/07/2019. Pathology revealed moderately-differentiated invasive squamous cell carcinoma.     2.  The patient was seen in the Emergency Room on 04/12/2021 for shortness of breath and admitted.   -CT neck revealed an enhancing soft tissue mass involving the right larynx measuring 3.9 cm.  -Patient had laryngoscopy with biopsy and tracheostomy done on 04/12/2021.  There was an endolaryngeal mass obscuring the laryngeal landmarks.  Mass appeared to be based on right endolarynx.  Pathology revealed invasive keratinizing moderately-differentiated squamous cell carcinoma.     3.  PET scan on 04/26/2021 revealed hypermetabolic mass involving the supraglottic, glottic and subglottic larynx.  There was a hypermetabolic left level IV lymph node.  There was also hypermetabolic nodule in right middle lobe.     4. CT chest angiogram on 04/27/2021 revealed a large bilateral pulmonary embolism in all the lobes.  There was evidence of right cardiac strain.  -Echocardiogram on 04/27/2021 revealed clot in transit in the right ventricle.  -The patient had emergency pulmonary embolectomy on 04/28/2021.  Pathology revealed organizing clot.  -Patient on Eliquis. Long term anti-coagulation recommended.     5. Total laryngectomy with bilateral neck dissection on 07/08/2021.  Pathology reveals 6 cm squamous cell carcinoma involving the right false and true vocal cords with fixation, the left false and true vocal cords and invades through thyroid cartilage.  There is focal lymphatic invasion.  There is perineural invasion. Benign 75 lymph nodes.  pT4a pN0 disease.     6. Post-op radiation between  08/04/2021 and 09/20/2021. 6600 cGy in 33 fractions.     7. PET/CT on 09/30/2022 revealed hypermetabolic mass originating from the right anterolateral wall of the neopharynx.  -Patient seen by ENT on 10/03/2022 and had FNA of neck mass. Pathology is positive for malignancy. Rare clusters morphologically consistent with squamous cell carcinoma.      8. Pembrolizumab started on 10/27/2022.  He is tolerating it well.     SUBJECTIVE:  Mr. 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  He is tolerating it well.     Overall his condition is stable.  He has mild generalized weakness.  He is able to do all his activities. He has some aches and pain in the joints. It is from osteoarthritis and pembrolizumab. No headache.  No dizziness.  No problems swallowing. No chest pain.  No shortness of breath.  No abdominal pain.  No nausea or vomiting.  No urinary or bowel complaints.  All other review of systems is negative.     PHYSICAL EXAMINATION:   GENERAL:  Alert and oriented x 3. Not in distress.  VITAL SIGNS:  Reviewed.     Rest of the systems is not examined.     LABS:  TSH and CMP were reviewed.     ASSESSMENT:  1.  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 Disease is stable.  2.  Pulmonary embolism on Eliquis.  3.  Hypothyroidism on levothyroxine.  4.  Mild generalized weakness.  5.  Joint pain from osteoarthritis and pembrolizumab.     PLAN:  1.  Patient's overall condition is stable. Discussed regarding head and neck cancer. No clinical suspicion of progression of disease.     He will continue on pembrolizumab.  He is tolerating it well.    For follow-up, he will be getting CT neck and chest in August.  He will also continue to follow-up with ENT.     2.  For pulmonary embolism, he will continue on Eliquis. He is not having any bleeding complication.  Plan is for indefinite anticoagulation as he had  life-threatening pulmonary embolism.     3.  He will continue on levothyroxine.  We will continue to monitor TSH.     4.  I will see him in 6 weeks.  In between he will see our nurse practitioner. I advised him to call us with any questions or concerns.     Total visit time of 30 minutes.  Time spent in today's visit, review of chart/investigations today, monitoring for toxicity of high risk drug and documentation today.

## 2023-06-29 NOTE — PROGRESS NOTES
"Oncology Rooming Note    June 29, 2023 8:31 AM   Raj Warren is a 73 year old male who presents for:    Chief Complaint   Patient presents with     Oncology Clinic Visit     Initial Vitals: Wt 95.3 kg (210 lb)   BMI 30.13 kg/m   Estimated body mass index is 30.13 kg/m  as calculated from the following:    Height as of 5/17/23: 1.778 m (5' 10\").    Weight as of this encounter: 95.3 kg (210 lb). Body surface area is 2.17 meters squared.  Data Unavailable Comment: Data Unavailable   No LMP for male patient.  Allergies reviewed: Yes  Medications reviewed: Yes    Medications: Medication refills not needed today.  Pharmacy name entered into [employer]: [facility] #[id number] - [facility] [address] AT [facility]    Clinical concerns:  doctor was notified.      Kirti Jones CMA            "

## 2023-06-29 NOTE — PROGRESS NOTES
Infusion Nursing Note:  Raj Warren presents today for keytruda.    Patient seen by provider today: Yes: Dr. Bean   present during visit today: Not Applicable.    Note: N/A.    Intravenous Access:  Peripheral IV placed.    Treatment Conditions:  Lab Results   Component Value Date     06/29/2023    POTASSIUM 4.6 06/29/2023    MAG 2.5 (H) 07/17/2021    CR 1.00 06/29/2023    TELMA 9.4 06/29/2023    BILITOTAL 0.5 06/29/2023    ALBUMIN 4.2 06/29/2023    ALT 20 06/29/2023    AST 20 06/29/2023     Results reviewed, labs MET treatment parameters, ok to proceed with treatment.      Post Infusion Assessment:  Patient tolerated infusion without incident.  Blood return noted pre and post infusion.  Site patent and intact, free from redness, edema or discomfort.  No evidence of extravasations.  Access discontinued per protocol.       Discharge Plan:   Patient declined prescription refills.  Discharge instructions reviewed with: Patient.  Patient and/or family verbalized understanding of discharge instructions and all questions answered.  Copy of AVS reviewed with patient and/or family.  Patient will return 7/20/2023 for next appointment.  Patient discharged in stable condition accompanied by: self.  Departure Mode: Ambulatory.      Rhonda Washington RN

## 2023-06-29 NOTE — PROGRESS NOTES
Medical Assistant Note:  Raj Warren presents today for lab draw.    Patient seen by provider today: No.   present during visit today: Not Applicable.    Concerns: No Concerns.    Procedure:  Lab draw site: LAC, Needle type: BF, Gauge: 21. Guaze and coban applied    Post Assessment:  Labs drawn without difficulty: Yes.    Discharge Plan:  Departure Mode: Ambulatory.    Face to Face Time: 5.    Suzan Bazan CMA

## 2023-07-20 NOTE — PROGRESS NOTES
"Oncology Rooming Note    July 20, 2023 9:14 AM   Raj Warren is a 73 year old male who presents for:    Chief Complaint   Patient presents with     Oncology Clinic Visit     Initial Vitals: /84   Pulse 63   Temp 98.1  F (36.7  C) (Oral)   Resp 16   Wt 94.9 kg (209 lb 3.2 oz)   SpO2 100%   BMI 29.18 kg/m   Estimated body mass index is 29.18 kg/m  as calculated from the following:    Height as of 6/29/23: 1.803 m (5' 11\").    Weight as of this encounter: 94.9 kg (209 lb 3.2 oz). Body surface area is 2.18 meters squared.  No Pain (0) Comment: Data Unavailable   No LMP for male patient.  Allergies reviewed: Yes  Medications reviewed: Yes    Medications: Medication refills not needed today.  Pharmacy name entered into [employer]: [email or web address] #[id number] [place], [place] - [address] AT [address]    Clinical concerns:  NP was notified.      Kirti Jones CMA            "

## 2023-07-20 NOTE — PROGRESS NOTES
Oncology/Hematology Visit Note  Jul 20, 2023    Reason for Visit: follow up of laryngeal squamous cell carcinoma    Patient Dr. Bean  Status post surgery and postoperative radiation with no evidence of recurrence  -Currently on single agent pembrolizumab started on 10/27/2022  04/2025-CT scan of the neck reveals response to treatment      Interval History:  Patient reports feeling well.  Reports has been tolerating pembrolizumab well denies any side effects from it  Denies fever chills sweats cough shortness of breath chest pain nausea vomiting diarrhea abdominal pain bleeding reports good energy        Review of Systems:  14 point ROS of systems including Constitutional, Eyes, Respiratory, Cardiovascular, Gastroenterology, Genitourinary, Integumentary, Muscularskeletal, Psychiatric were all negative except for pertinent positives noted in my HPI.    Physical Examination:  General: The patient is a pleasant male in no acute distress.  HEENT: EOMI, PERRL. Sclerae are anicteric. Oral mucosa is pink and moist with no lesions or thrush.   Lymph: Neck is supple with no lymphadenopathy in the cervical or supraclavicular areas.   Heart: Regular rate and rhythm.   Lungs: Clear to auscultation bilaterally.   GI: Bowel sounds present, soft, nontender with no palpable hepatosplenomegaly or masses.   Extremities: No lower extremity edema noted bilaterally.   Skin: No rashes, petechiae, or bruising noted on exposed skin.  Laboratory Data:  CBC CMP and TSH results reviewed      Assessment and Plan:    Laryngeal squamous cell carcinoma  Patient Dr. Bean  Status post surgery and postoperative radiation at that time no evidence of recurrence  09/30/2022-PET/CT showed abnormality in the neck FNA positive for squamous cell carcinoma showed recurrence of the disease  High PD-L1 expression  10/27/2022-started single agent Keytruda  -01/16/2023-PET revealed likely some partial response to therapy  Mildly FDG avid nonenlarged right  level I and bilateral level Ib lymph nodes.  Likely reactive however early sites of in situ neoplasm are also possible  04/2025-CT scan of the neck reveals response to treatment  Continue with pembrolizumab  Plan for CT scan of the neck in August      Pulmonary embolism  Continue with Eliquis  Call 911 or go to ER in event of bleeding bruising fall injury shortness of breath chest pain cough      Hypothyroidism  Normal TSH  Continue with levothyroxine      Call clinic with any changes in her condition or questions        TARUN Sandy [facility]     Chart documentation with Dragon Voice recognition Software. Although reviewed after completion, some words and grammatical errors may remain.

## 2023-07-20 NOTE — PROGRESS NOTES
Infusion Nursing Note:  Raj Warren presents today for C13D1 Keytruda.    Patient seen by provider today: Yes: Kyree Jurado NP   present during visit today: Not Applicable.    Note: N/A.      Intravenous Access:  Peripheral IV placed.    Treatment Conditions:  Lab Results   Component Value Date    HGB 14.8 07/20/2023    WBC 4.2 07/20/2023    ANEU 2.6 06/23/2021    ANEUTAUTO 2.8 01/19/2023     07/20/2023      Lab Results   Component Value Date     07/20/2023    POTASSIUM 4.3 07/20/2023    MAG 2.5 (H) 07/17/2021    CR 0.97 07/20/2023    TELMA 9.0 07/20/2023    BILITOTAL 0.4 07/20/2023    ALBUMIN 4.3 07/20/2023    ALT 14 07/20/2023    AST 23 07/20/2023     Results reviewed, labs MET treatment parameters, ok to proceed with treatment.      Post Infusion Assessment:  Patient tolerated infusion without incident.  Blood return noted pre and post infusion.  Site patent and intact, free from redness, edema or discomfort.  No evidence of extravasations.  Access discontinued per protocol.       Discharge Plan:   Patient declined prescription refills.  Discharge instructions reviewed with: Patient.  Patient and/or family verbalized understanding of discharge instructions and all questions answered.  AVS to patient via [employer].  Patient will return 8/10 for next appointment.   Patient discharged in stable condition accompanied by: self.  Departure Mode: Ambulatory.      Mark Martinez RN

## 2023-07-20 NOTE — PROGRESS NOTES
Medical Assistant Note:  Raj Briana presents today for lab draw.    Patient seen by provider today: No.   present during visit today: Not Applicable.    Concerns: No Concerns.    Procedure:  Lab draw site: [facility], Needle type: BF, Gauge: 21. Gauze and coban applied    Post Assessment:  Labs drawn without difficulty: Yes.    Discharge Plan:  Departure Mode: Ambulatory.    Face to Face Time: 5.    Suzan Bazan CMA

## 2023-07-20 NOTE — LETTER
"    7/20/2023         RE: Raj Warren  [address]  [facility] [address]        Dear Colleague,    Thank you for referring your patient, Raj Warren, to the [facility]. Please see a copy of my visit note below.    Oncology Rooming Note    July 20, 2023 9:14 AM   Raj Warren is a 73 year old male who presents for:    Chief Complaint   Patient presents with     Oncology Clinic Visit     Initial Vitals: /84   Pulse 63   Temp 98.1  F (36.7  C) (Oral)   Resp 16   Wt 94.9 kg (209 lb 3.2 oz)   SpO2 100%   BMI 29.18 kg/m   Estimated body mass index is 29.18 kg/m  as calculated from the following:    Height as of 6/29/23: 1.803 m (5' 11\").    Weight as of this encounter: 94.9 kg (209 lb 3.2 oz). Body surface area is 2.18 meters squared.  No Pain (0) Comment: Data Unavailable   No LMP for male patient.  Allergies reviewed: Yes  Medications reviewed: Yes    Medications: Medication refills not needed today.  Pharmacy name entered into [employer]: [facility] #[id number] - [facility] [address] AT [address]    Clinical concerns:  NP was notified.      Kirti Jones CMA              Oncology/Hematology Visit Note  Jul 20, 2023    Reason for Visit: follow up of laryngeal squamous cell carcinoma    Patient Dr. Bean  Status post surgery and postoperative radiation with no evidence of recurrence  -Currently on single agent pembrolizumab started on 10/27/2022  04/2025-CT scan of the neck reveals response to treatment      Interval History:  Patient reports feeling well.  Reports has been tolerating pembrolizumab well denies any side effects from it  Denies fever chills sweats cough shortness of breath chest pain nausea vomiting diarrhea abdominal pain bleeding reports good energy        Review of Systems:  14 point ROS of systems including Constitutional, Eyes, Respiratory, Cardiovascular, Gastroenterology, Genitourinary, Integumentary, " Muscularskeletal, Psychiatric were all negative except for pertinent positives noted in my HPI.    Physical Examination:  General: The patient is a pleasant male in no acute distress.  HEENT: EOMI, PERRL. Sclerae are anicteric. Oral mucosa is pink and moist with no lesions or thrush.   Lymph: Neck is supple with no lymphadenopathy in the cervical or supraclavicular areas.   Heart: Regular rate and rhythm.   Lungs: Clear to auscultation bilaterally.   GI: Bowel sounds present, soft, nontender with no palpable hepatosplenomegaly or masses.   Extremities: No lower extremity edema noted bilaterally.   Skin: No rashes, petechiae, or bruising noted on exposed skin.  Laboratory Data:  CBC CMP and TSH results reviewed      Assessment and Plan:    Laryngeal squamous cell carcinoma  Patient Dr. Bean  Status post surgery and postoperative radiation at that time no evidence of recurrence  09/30/2022-PET/CT showed abnormality in the neck FNA positive for squamous cell carcinoma showed recurrence of the disease  High PD-L1 expression  10/27/2022-started single agent Keytruda  -01/16/2023-PET revealed likely some partial response to therapy  Mildly FDG avid nonenlarged right level I and bilateral level Ib lymph nodes.  Likely reactive however early sites of in situ neoplasm are also possible  04/2025-CT scan of the neck reveals response to treatment  Continue with pembrolizumab  Plan for CT scan of the neck in August      Pulmonary embolism  Continue with Eliquis  Call 911 or go to ER in event of bleeding bruising fall injury shortness of breath chest pain cough      Hypothyroidism  Normal TSH  Continue with levothyroxine      Call clinic with any changes in her condition or questions        TARUN Sandy [facility]     Chart documentation with Dragon Voice recognition Software. Although reviewed after completion, some words and grammatical errors may remain.            Again, thank you for  allowing me to participate in the care of your patient.        Sincerely,        TARUN Sandy CNP

## 2023-08-09 NOTE — RESULT ENCOUNTER NOTE
Dear Mr. Felipeks,    CT scan of chest is good. No cancer seen.    Please, call me with any questions.    Rosa Bean MD

## 2023-08-10 NOTE — PROGRESS NOTES
Medical Assistant Note:  Raj Warren presents today for lab draw.    Patient seen by provider today: Yes: Lorraine Malcolm.   present during visit today: Not Applicable.    Concerns: No Concerns.    Procedure:  Lab draw site: Right Hand, Needle type: Butterfly, Gauge: 23.    Post Assessment:  Labs drawn without difficulty: Yes.    Discharge Plan:  Departure Mode: Ambulatory.    Face to Face Time: 4 min.    Shari Schoenberger, CMA

## 2023-08-10 NOTE — PROGRESS NOTES
"Oncology/Hematology Visit Note  Aug 10, 2023    Reason for Visit: follow up of laryngeal squamous cell carcinoma    Patient of Dr. Bean  Status post surgery and postoperative radiation  Currently on single agent pembrolizumab started on 10/27/2022 due to imaging noted progression    Interval History:  Patient reports overall he is feeling well.  No acute issues. Feels evidence of \"old age\" including dry eyes, using lubricating drops. No specific side effects from Keytruda noted.        Review of Systems:  A complete review of systems was negative except as noted in the HPI.     Physical Examination:  General: The patient is a pleasant male in no acute distress.  HEENT: EOMI, PERRL. Sclerae are anicteric. Oral mucosa is pink and moist with no lesions or thrush.   Lymph: Neck is supple with no lymphadenopathy in the cervical or supraclavicular areas.   Heart: Regular rate and rhythm.   Lungs: Clear to auscultation bilaterally.   GI: Bowel sounds present, soft, nontender with no palpable hepatosplenomegaly or masses.   Extremities: No lower extremity edema noted bilaterally.   Skin: No rashes, petechiae, or bruising noted on exposed skin.    Laboratory Data:  CMP and TSH results reviewed      Assessment and Plan:  This is a 72-year-old male with    Laryngeal squamous cell carcinoma  Patient of Dr. Bean  Status post surgery and postoperative radiation at that time no evidence of recurrence  09/30/2022-PET/CT showed abnormality in the neck FNA positive for squamous cell carcinoma showed recurrence of the disease  High PD-L1 expression  10/27/2022-started single agent Keytruda    Repeat CT chest 8/2023 with no evidence of disease; however CT neck soft tissue was not completed; he is not willing to have this redone at this time despite risk of missing evidence of disease progression    Repeat CT NECK and chest in 3 months    Follow-up with Vikas next infusion      Pulmonary embolism  Continue with Eliquis lifelong  Call " 911 or go to ER in event of bleeding bruising fall injury shortness of breath chest pain cough    Hypothyroidism  Normal TSH  Continue with levothyroxine      Call clinic with any changes in her condition or questions    Lorraine HOFF [facility]

## 2023-08-10 NOTE — PROGRESS NOTES
Infusion Nursing Note:  Raj Warren presents today for Cycle 14 Day 1 Keytruda.    Patient seen by provider today: Yes: LEI Massey   present during visit today: Not Applicable.    Note: N/A.      Intravenous Access:  Peripheral IV placed.    Treatment Conditions:  Lab Results   Component Value Date     08/10/2023    POTASSIUM 4.3 08/10/2023    MAG 2.5 (H) 07/17/2021    CR 0.97 08/10/2023    TELMA 9.0 08/10/2023    BILITOTAL 0.4 08/10/2023    ALBUMIN 4.4 08/10/2023    ALT 17 08/10/2023    AST 25 08/10/2023       Results reviewed, labs MET treatment parameters, ok to proceed with treatment.      Post Infusion Assessment:  Patient tolerated infusion without incident.  Blood return noted pre and post infusion.  Site patent and intact, free from redness, edema or discomfort.  No evidence of extravasations.  Access discontinued per protocol.       Discharge Plan:   Patient declined prescription refills.  Discharge instructions reviewed with: Patient.  Patient verbalized understanding of discharge instructions and all questions answered.  AVS to patient via [employer].  Patient will return 8/31/23 for next appointment.   Patient discharged in stable condition accompanied by: self.  Departure Mode: Ambulatory.      Felipa Pacheco RN

## 2023-08-10 NOTE — PROGRESS NOTES
"Oncology Rooming Note    August 10, 2023 9:15 AM   Raj Warren is a 73 year old male who presents for:    Chief Complaint   Patient presents with    Oncology Clinic Visit     Initial Vitals: /83   Pulse 65   Temp 98.3  F (36.8  C) (Oral)   Resp 16   Wt 93.8 kg (206 lb 12.8 oz)   SpO2 100%   BMI 28.84 kg/m   Estimated body mass index is 28.84 kg/m  as calculated from the following:    Height as of 6/29/23: 1.803 m (5' 11\").    Weight as of this encounter: 93.8 kg (206 lb 12.8 oz). Body surface area is 2.17 meters squared.  No Pain (0) Comment: Data Unavailable   No LMP for male patient.  Allergies reviewed: Yes  Medications reviewed: Yes    Medications: Medication refills not needed today.  Pharmacy name entered into [employer]: [facility] #[id number] - [place], [place] - [address] AT [address]    Clinical concerns:   PA  was notified.      Kirti Jones CMA            "

## 2023-08-10 NOTE — LETTER
"    8/10/2023         RE: Raj Warren  [address]  [place] [address]        Dear Colleague,    Thank you for referring your patient, Raj Warren, to the [facility]. Please see a copy of my visit note below.    Oncology Rooming Note    August 10, 2023 9:15 AM   Raj Warren is a 73 year old male who presents for:    Chief Complaint   Patient presents with     Oncology Clinic Visit     Initial Vitals: /83   Pulse 65   Temp 98.3  F (36.8  C) (Oral)   Resp 16   Wt 93.8 kg (206 lb 12.8 oz)   SpO2 100%   BMI 28.84 kg/m   Estimated body mass index is 28.84 kg/m  as calculated from the following:    Height as of 6/29/23: 1.803 m (5' 11\").    Weight as of this encounter: 93.8 kg (206 lb 12.8 oz). Body surface area is 2.17 meters squared.  No Pain (0) Comment: Data Unavailable   No LMP for male patient.  Allergies reviewed: Yes  Medications reviewed: Yes    Medications: Medication refills not needed today.  Pharmacy name entered into [employer] [facility] #[id number] - [place], [place] - [address] AT [address]    Clinical concerns:   PA  was notified.      Kirti Jones, [facility]              Oncology/Hematology Visit Note  Aug 10, 2023    Reason for Visit: follow up of laryngeal squamous cell carcinoma    Patient of Dr. Bean  Status post surgery and postoperative radiation  Currently on single agent pembrolizumab started on 10/27/2022 due to imaging noted progression    Interval History:  Patient reports overall he is feeling well.  No acute issues. Feels evidence of \"old age\" including dry eyes, using lubricating drops. No specific side effects from Keytruda noted.        Review of Systems:  A complete review of systems was negative except as noted in the HPI.     Physical Examination:  General: The patient is a pleasant male in no acute distress.  HEENT: EOMI, PERRL. Sclerae are anicteric. Oral mucosa is pink and moist with no lesions or " thrush.   Lymph: Neck is supple with no lymphadenopathy in the cervical or supraclavicular areas.   Heart: Regular rate and rhythm.   Lungs: Clear to auscultation bilaterally.   GI: Bowel sounds present, soft, nontender with no palpable hepatosplenomegaly or masses.   Extremities: No lower extremity edema noted bilaterally.   Skin: No rashes, petechiae, or bruising noted on exposed skin.    Laboratory Data:  CMP and TSH results reviewed      Assessment and Plan:  This is a 72-year-old male with    Laryngeal squamous cell carcinoma  Patient of Dr. Bean  Status post surgery and postoperative radiation at that time no evidence of recurrence  09/30/2022-PET/CT showed abnormality in the neck FNA positive for squamous cell carcinoma showed recurrence of the disease  High PD-L1 expression  10/27/2022-started single agent Keytruda    Repeat CT chest 8/2023 with no evidence of disease; however CT neck soft tissue was not completed; he is not willing to have this redone at this time despite risk of missing evidence of disease progression    Repeat CT NECK and chest in 3 months    Follow-up with Vikas next infusion      Pulmonary embolism  Continue with Eliquis lifelong  Call 911 or go to ER in event of bleeding bruising fall injury shortness of breath chest pain cough    Hypothyroidism  Normal TSH  Continue with levothyroxine      Call clinic with any changes in her condition or questions    Lorraine Malcolm PA-C  [facility]       Again, thank you for allowing me to participate in the care of your patient.        Sincerely,        LORRAINE MALCOLM PA-C

## 2023-08-28 NOTE — PROGRESS NOTES
Dear Dr. Bean:    I had the pleasure of seeing Raj Warren in follow-up today at the [facility].     History of Present Illness:  Raj Warren is 73 year old man with a T4aN0 SCC of the larynx. The patient presented to the ED with a week history of shortness of breath, along with symptoms of sore throat, bilateral ear pain. He had stridor vs wheezing, mild increased work of breathing, and hoarse voice at that time. He was treated with steroids and nebulizers with improvement in his symptoms and was discharged.  He had recurrent symptoms and was seen in the ED again on 4/12/2021. He had a CT scan performed on 4/12/2021 which demonstrated a 3.9 x 1.6 x 3.9 cm mass involving the right true cord, right AE fold, right false cord, posterior hypopharynx, proximal trachea, with involvement of the thyroid cartilage, involvement of the prelaryngeal tissue, no abnormal nodes. Dr Willoughby (local ENT), scope exam was performed, and patient was admitted for a trach and DL/bx. Dr Johnson and Dr Sauceda performed this procedure on 4/13/2021. A tracheal window at 2nd tracheal ring was performed with placement of an 8 cuffed Shiley. Pathology showed moderately differentiated SCC. He was seen by Dr Bean of medical oncology on 4/13/2021. He was followed by local ENT team during his hospitalization. He was recommended for chemoradiation. He was seen by Dr Mclaughlin of [facility], planning 7 weeks treatment, and CT simulation was done. He did have a VSS done while inpatient without evidence of aspiration. The patient was referred here for discussion of possible salvage laryngectomy after chemoradiation. He had a PET scan on 4/26/2021 which showed an FDG avid mass in the larynx involving the supraglottis/glottis/subglottis, hypermetabolic left level 4 node, FDG avid nodule in the right lung thought to be infectious/inflammatory. He was then admitted to the hospital locally from 4/27/2021 to 5/8/2021 after  presenting with worsening shortness of breath, found to have bilateral PE with significant clot burden including the right pulmonary artery and the right ventricle. He was taken to the OR for pulmonary embolectomy and RV thrombectomy. He had IVC filter placed 4/30/2021. His course was complicated by hemodynamic instability requiring pressors, poor healing of his sternal incision requiring wound vac placement. He was discharged on eliquis. He was sent to rehab after his admission, discharged 5/15/2021. He saw Dr Bean in follow-up on 5/27/2021 and was recommended for repeat imaging. This showed slight decrease in size of the tumor, no distant disease.     He was seen as a new patient on 6/18/2021. After extensive preoperative counseling especially in light of his recent PE and anticoagulation, the recommendations was for surgery given the cartilaginous erosion seen on imaging. He was taken to the OR on 7/8/2021 for a DL, total laryngectomy, bilateral neck dissections, cricopharyngeal myotomy. His hospital course was uncomplicated, was restarted on his anticoagulation. His final pathology demonstrated a 6 cm SCC with invasion through the thyroid cartilage, involved the cricoid cartilage, involved the right pyriform sinus, extended to the left side of the tracheostomy site, PNI present, focal lymphatic invasion, 0/75 nodes. The main specimen had a positive margin at the pyriform sinus, margins were taken from the main specimen and were negative (named right pharyngeal wall mucosal margin).      He received postoperative radiation under the care of Dr Kennedy, from 8/4/2021 to 9/20/2021 for a total of 66 Gy.  He had his PEG removed by radiation oncology on 10/4/2021. His post treatment PET scan was negative in December 2021. He had his IVC filter removed in February 2022.     He had imaging in June 2022 which showed a NIRADS 2b nodular soft tissue density along the upper esophagus. He was scheduled for a PET scan to  follow-up this area but due to some scheduling issues did not have the scan, got completed on 9/30/2022. This demonstrated a hypermetabolic mass in the right anterolateral wall of the neopharynx measuring 1.8 x 1.7 x 3.7 cm with invasion of the right thyroid lobe, not involving the vessels, no distant disease. He was not interested in surgical resection and elected to proceed with palliative keytruda. He was started on keytruda on 10/27/2022 under the care of Dr Bean.     Interval history:  He comes in today for follow-up. He was last seen in May 2023. He continues to see oncology, last visit in August 2023. He had CT chest in August 2023, no neck CT, no evidence of disease. Oncology is planning repeat imaging in 3 months. He says things are going well. He says he has to eat soft foods. He says he can't eat things like hot dogs or hamburgers because he does not chew it up enough. He has some sticking of food depending on how fast he eats. Soft goods will go down well.  He has to push it down with liquids. His weight is stable. He has no pain with swallowing. He has issues with the smoke from the [employer]. His breathing is ok. He continues to have issues with mucus. He has no pain anywhere in the H&N area. His energy is ok. He says the keytruda is going ok. He says he is having issues with the blood draws and they want to put a port in but he is not interested in it and having more things attached to him. He is inquiring about getting a anny button. He says he is able to get his anny tube in, last put in about a week ago.        MEDICATIONS:     Current Outpatient Medications   Medication Sig Dispense Refill    apixaban ANTICOAGULANT (ELIQUIS) 5 MG tablet Take 1 tablet (5 mg) by mouth 2 times daily 180 tablet 3    levothyroxine (SYNTHROID/LEVOTHROID) 75 MCG tablet Take 1 tablet (75 mcg) by mouth daily 90 tablet 3    metoprolol tartrate (LOPRESSOR) 25 MG tablet Take 1 tablet (25 mg) by mouth 2 times daily 180  tablet 3       ALLERGIES:    Allergies   Allergen Reactions    Pollen Extract        HABITS/SOCIAL HISTORY:    Former smoker 1/2 ppd x 8 yrs, quit in . Sober from alcohol.   Lives alone. His family does not live in the immediate [place] area.   He is a retired .       Social History     Socioeconomic History    Marital status: Single     Spouse name: Not on file    Number of children: Not on file    Years of education: Not on file    Highest education level: Not on file   Occupational History    Not on file   Tobacco Use    Smoking status: Former     Types: Cigarettes     Quit date: 1989     Years since quittin.6    Smokeless tobacco: Never    Tobacco comments:     quit in   had smoked about 1/2 pack a day then cut back   Substance and Sexual Activity    Alcohol use: No     Comment: No alcohol since     Drug use: No    Sexual activity: Yes     Partners: Female   Other Topics Concern    Parent/sibling w/ CABG, MI or angioplasty before 65F 55M? Not Asked   Social History Narrative    Not on file     Social Determinants of Health     Financial Resource Strain: Low Risk  (2021)    Overall Financial Resource Strain (CARDIA)     Difficulty of Paying Living Expenses: Not hard at all   Food Insecurity: No Food Insecurity (2021)    Hunger Vital Sign     Worried About Running Out of Food in the Last Year: Never true     Ran Out of Food in the Last Year: Never true   Transportation Needs: No Transportation Needs (2021)    PRAPARE - Transportation     Lack of Transportation (Medical): No     Lack of Transportation (Non-Medical): No   Physical Activity: Not on file   Stress: Not on file   Social Connections: Not on file   Intimate Partner Violence: Not At Risk (2023)    Humiliation, Afraid, Rape, and Kick questionnaire     Fear of Current or Ex-Partner: No     Emotionally Abused: No     Physically Abused: No     Sexually Abused: No   Housing Stability: Not on file        PAST MEDICAL HISTORY:   Past Medical History:   Diagnosis Date    Allergic state     Anemia     Former smoker     Malignant neoplasm of larynx (H) 04/20/2021    Pulmonary emboli (H) 04/28/2021    Bilateral-s/p embolectomy and IVC filter placement    S/P percutaneous endoscopic gastrostomy (PEG) tube placement (H)     Tracheostomy in place (H)         PAST SURGICAL HISTORY:   Past Surgical History:   Procedure Laterality Date    DISSECTION RADICAL NECK BILATERAL Bilateral 7/8/2021    Procedure: bilateral neck dissections;  Surgeon: Marilou Sethi MD;  Location: [facility] OR    ENDOSCOPIC INSERTION TUBE GASTROSTOMY Left 7/8/2021    Procedure: INSERTION, PEG TUBE with Esophagogastroduodenoscopy;  Surgeon: Kg Montaño MD;  Location: [facility] OR    IR IVC FILTER PLACEMENT  4/30/2021    IR IVC FILTER REMOVAL  2/7/2022    LARYNGECTOMY N/A 7/8/2021    Procedure: LARYNGECTOMY;  Surgeon: Marilou Sethi MD;  Location: [facility] OR    LARYNGOSCOPY N/A 4/12/2021    Procedure: LARYNGOSCOPY;  Surgeon: Choco Johnson MD;  Location: [facility] OR    LARYNGOSCOPY N/A 7/8/2021    Procedure: LARYNGOSCOPY;  Surgeon: Marilou Sethi MD;  Location: [facility] OR    REPAIR ANEURYSM ASCENDING AORTA N/A 4/28/2021    Procedure: PULMONARY THROMBECTOMY;  Surgeon: Yumi Singh MD;  Location: [facility] OR    TRACHEOSTOMY  4/12/2021    Procedure: CREATION, TRACHEOSTOMY;  Surgeon: Choco Johnson MD;  Location: [facility] OR       FAMILY HISTORY:    Family History   Problem Relation Age of Onset    Circulatory Mother         blood clots    Alcohol/Drug Father         alcohol drank heavily    Alcohol/Drug Brother         alcohol       REVIEW OF SYSTEMS:  12 point ROS was negative other than the symptoms noted above in the HPI.  Patient Supplied Answers to Review of Systems      5/17/2023    10:51 AM    ENT ROS   Ears, Nose, Throat Hearing loss    Nasal congestion or drainage   Musculoskeletal Sore or stiff joints    Back pain    Neck pain         PHYSICAL  EXAMINATION:   /87 (BP Location: Left arm, Patient Position: Sitting, Cuff Size: Adult Regular)   Pulse 88   SpO2 95%   Appearance:   normal; NAD, age-appropriate appearance, well-developed, normal habitus   Communication:   communicates well with electrolarynx   Head/Face:   inspection -  Normal; no scars or visible lesions   Skin:  normal, no rash   Ears:  auricle (AD) -  normal  EAC (AD) - normal  TM (AD) - normal  auricle (AS) -  normal  EAC (AS) - normal  TM (AS) - normal  Normal clinical speech reception   Nose:  Ext. inspection -  Normal   Oral Cavity:  lips -  Normal mucosa, oral competence, and stoma size   Mildly thickened secretions   Tongue - midline, normal movement   Age-appropriate dentition, some missing teeth   Neck: Well healed neck incision  Stoma patent - displaced into right neck  Fibrosis present in neck  Firmness to palpation of right neck, prominent compared to left   Cardiovascular:  warm, pink, well-perfused extremities without swelling, tenderness, or edema   Respiratory:  Normal respiratory effort   Neuro/Psych.:  mood/affect -  normal  mental status -  normal         PROCEDURES:   Flexible fiberoptic laryngoscopy: patient declined    RESULTS REVIEWED:     Multiple notes from oncology reviewed    TSH reviewed - normal 8/2023    CT chest report reviewed    CT chest imaging independently reviewed      IMPRESSION AND PLAN:   Raj Warren is a 73 year old man with a T4N0 SCC of the larynx. He [facility]. He was taken to the OR on 7/8/2021 for a total laryngectomy with bilateral neck dissections. He completed his postoperative radiation on 9/20/2021. He developed a local/regional recurrence in October 2022, receiving palliative keytruda starting 10/27/2022.    He is overall doing well.    We discussed that his noncontrast chest CT provides limited information and I would be cautious in saying there is not tumor in that right neck still. We also  discussed that if he would want to consider a TEP we would really need to better evaluate that area prior to proceeding since we cannot have tumor present in the vicinity. He would also have to be amenable to a scope exam.    We discussed that he can certainly have a prescription for a anny button but after we discussed the fit it might be more reasonable to just continue with the anny tube as needed, could always get a shorter one.    His TSH is being checked by medical oncology.    He is having imaging per medical oncology.     I will see him back in 3 months.     Thank you very much for the opportunity to participate in the care of your patient.      Marilou Sethi MD  Otolaryngology- Head & Neck Surgery      This note was dictated with voice recognition software and then edited. Please excuse any unintentional errors.         CC:  Rosa Bean MD  [facility]  [address] [address]      Serg Kennedy MD  Department of Radiation Oncology  [facility]

## 2023-08-30 NOTE — LETTER
8/30/2023       RE: Raj Warren  [address]  [place] [address]     Dear Colleague,    Thank you for referring your patient, Raj Warren, to the [facility] [place] at [facility]. Please see a copy of my visit note below.    Dear Dr. Bean:    I had the pleasure of seeing Raj Warren in follow-up today at the [facility].     History of Present Illness:  Raj Warren is 73 year old man with a T4aN0 SCC of the larynx. The patient presented to the ED with a week history of shortness of breath, along with symptoms of sore throat, bilateral ear pain. He had stridor vs wheezing, mild increased work of breathing, and hoarse voice at that time. He was treated with steroids and nebulizers with improvement in his symptoms and was discharged.  He had recurrent symptoms and was seen in the ED again on 4/12/2021. He had a CT scan performed on 4/12/2021 which demonstrated a 3.9 x 1.6 x 3.9 cm mass involving the right true cord, right AE fold, right false cord, posterior hypopharynx, proximal trachea, with involvement of the thyroid cartilage, involvement of the prelaryngeal tissue, no abnormal nodes. Dr Willoughby (local ENT), scope exam was performed, and patient was admitted for a trach and DL/bx. Dr Johnson and Dr Sauceda performed this procedure on 4/13/2021. A tracheal window at 2nd tracheal ring was performed with placement of an 8 cuffed Shiley. Pathology showed moderately differentiated SCC. He was seen by Dr Bean of medical oncology on 4/13/2021. He was followed by local ENT team during his hospitalization. He was recommended for chemoradiation. He was seen by [place] Mclaughlin [place] [facility] 7 weeks treatment, and CT simulation was done. He did have a VSS done while inpatient without evidence of aspiration. The patient was referred here for discussion of possible salvage  laryngectomy after chemoradiation. He had a PET scan on 4/26/2021 which showed an FDG avid mass in the larynx involving the supraglottis/glottis/subglottis, hypermetabolic left level 4 node, FDG avid nodule in the right lung thought to be infectious/inflammatory. He was then admitted to the hospital locally from 4/27/2021 to 5/8/2021 after presenting with worsening shortness of breath, found to have bilateral PE with significant clot burden including the right pulmonary artery and the right ventricle. He was taken to the OR for pulmonary embolectomy and RV thrombectomy. He had IVC filter placed 4/30/2021. His course was complicated by hemodynamic instability requiring pressors, poor healing of his sternal incision requiring wound vac placement. He was discharged on eliquis. He was sent to rehab after his admission, discharged 5/15/2021. He saw Dr Bean in follow-up on 5/27/2021 and was recommended for repeat imaging. This showed slight decrease in size of the tumor, no distant disease.     He was seen as a new patient on 6/18/2021. After extensive preoperative counseling especially in light of his recent PE and anticoagulation, the recommendations was for surgery given the cartilaginous erosion seen on imaging. He was taken to the OR on 7/8/2021 for a DL, total laryngectomy, bilateral neck dissections, cricopharyngeal myotomy. His hospital course was uncomplicated, was restarted on his anticoagulation. His final pathology demonstrated a 6 cm SCC with invasion through the thyroid cartilage, involved the cricoid cartilage, involved the right pyriform sinus, extended to the left side of the tracheostomy site, PNI present, focal lymphatic invasion, 0/75 nodes. The main specimen had a positive margin at the pyriform sinus, margins were taken from the main specimen and were negative (named right pharyngeal wall mucosal margin).      He received postoperative radiation under the care of Dr Kennedy, from 8/4/2021 to 9/20/2021  for a total of 66 Gy.  He had his PEG removed by radiation oncology on 10/4/2021. His post treatment PET scan was negative in December 2021. He had his IVC filter removed in February 2022.     He had imaging in June 2022 which showed a NIRADS 2b nodular soft tissue density along the upper esophagus. He was scheduled for a PET scan to follow-up this area but due to some scheduling issues did not have the scan, got completed on 9/30/2022. This demonstrated a hypermetabolic mass in the right anterolateral wall of the neopharynx measuring 1.8 x 1.7 x 3.7 cm with invasion of the right thyroid lobe, not involving the vessels, no distant disease. He was not interested in surgical resection and elected to proceed with palliative keytruda. He was started on keytruda on 10/27/2022 under the care of Dr Bean.     Interval history:  He comes in today for follow-up. He was last seen in May 2023. He continues to see oncology, last visit in August 2023. He had CT chest in August 2023, no neck CT, no evidence of disease. Oncology is planning repeat imaging in 3 months. He says things are going well. He says he has to eat soft foods. He says he can't eat things like hot dogs or hamburgers because he does not chew it up enough. He has some sticking of food depending on how fast he eats. Soft goods will go down well.  He has to push it down with liquids. His weight is stable. He has no pain with swallowing. He has issues with the smoke from the [employer]. His breathing is ok. He continues to have issues with mucus. He has no pain anywhere in the H&N area. His energy is ok. He says the keytruda is going ok. He says he is having issues with the blood draws and they want to put a port in but he is not interested in it and having more things attached to him. He is inquiring about getting a anny button. He says he is able to get his anny tube in, last put in about a week ago.        MEDICATIONS:     Current Outpatient Medications    Medication Sig Dispense Refill    apixaban ANTICOAGULANT (ELIQUIS) 5 MG tablet Take 1 tablet (5 mg) by mouth 2 times daily 180 tablet 3    levothyroxine (SYNTHROID/LEVOTHROID) 75 MCG tablet Take 1 tablet (75 mcg) by mouth daily 90 tablet 3    metoprolol tartrate (LOPRESSOR) 25 MG tablet Take 1 tablet (25 mg) by mouth 2 times daily 180 tablet 3       ALLERGIES:    Allergies   Allergen Reactions    Pollen Extract        HABITS/SOCIAL HISTORY:    Former smoker 1/2 ppd x 8 yrs, quit in . Sober from alcohol.   Lives alone. His family does not live in the immediate [place] area.   He is a retired .       Social History     Socioeconomic History    Marital status: Single     Spouse name: Not on file    Number of children: Not on file    Years of education: Not on file    Highest education level: Not on file   Occupational History    Not on file   Tobacco Use    Smoking status: Former     Types: Cigarettes     Quit date: 1989     Years since quittin.6    Smokeless tobacco: Never    Tobacco comments:     quit in   had smoked about 1/2 pack a day then cut back   Substance and Sexual Activity    Alcohol use: No     Comment: No alcohol since     Drug use: No    Sexual activity: Yes     Partners: Female   Other Topics Concern    Parent/sibling w/ CABG, MI or angioplasty before 65F 55M? Not Asked   Social History Narrative    Not on file     Social Determinants of Health     Financial Resource Strain: Low Risk  (2021)    Overall Financial Resource Strain (CARDIA)     Difficulty of Paying Living Expenses: Not hard at all   Food Insecurity: No Food Insecurity (2021)    Hunger Vital Sign     Worried About Running Out of Food in the Last Year: Never true     Ran Out of Food in the Last Year: Never true   Transportation Needs: No Transportation Needs (2021)    PRAPARE - Transportation     Lack of Transportation (Medical): No     Lack of Transportation (Non-Medical): No    Physical Activity: Not on file   Stress: Not on file   Social Connections: Not on file   Intimate Partner Violence: Not At Risk (4/27/2023)    Humiliation, Afraid, Rape, and Kick questionnaire     Fear of Current or Ex-Partner: No     Emotionally Abused: No     Physically Abused: No     Sexually Abused: No   Housing Stability: Not on file       PAST MEDICAL HISTORY:   Past Medical History:   Diagnosis Date    Allergic state     Anemia     Former smoker     Malignant neoplasm of larynx (H) 04/20/2021    Pulmonary emboli (H) 04/28/2021    Bilateral-s/p embolectomy and IVC filter placement    S/P percutaneous endoscopic gastrostomy (PEG) tube placement (H)     Tracheostomy in place (H)         PAST SURGICAL HISTORY:   Past Surgical History:   Procedure Laterality Date    DISSECTION RADICAL NECK BILATERAL Bilateral 7/8/2021    Procedure: bilateral neck dissections;  Surgeon: Marilou Sethi MD;  Location: [facility] OR    ENDOSCOPIC INSERTION TUBE GASTROSTOMY Left 7/8/2021    Procedure: INSERTION, PEG TUBE with Esophagogastroduodenoscopy;  Surgeon: Kg Montaño MD;  Location: [facility] OR    IR IVC FILTER PLACEMENT  4/30/2021    IR IVC FILTER REMOVAL  2/7/2022    LARYNGECTOMY N/A 7/8/2021    Procedure: LARYNGECTOMY;  Surgeon: Marilou Sethi MD;  Location: [facility] OR    LARYNGOSCOPY N/A 4/12/2021    Procedure: LARYNGOSCOPY;  Surgeon: Choco Johnson MD;  Location: [facility] OR    LARYNGOSCOPY N/A 7/8/2021    Procedure: LARYNGOSCOPY;  Surgeon: Marilou Sethi MD;  Location: [facility] OR    REPAIR ANEURYSM ASCENDING AORTA N/A 4/28/2021    Procedure: PULMONARY THROMBECTOMY;  Surgeon: Yumi iSngh MD;  Location: [facility] OR    TRACHEOSTOMY  4/12/2021    Procedure: CREATION, TRACHEOSTOMY;  Surgeon: Choco Johnson MD;  Location: [facility] OR       FAMILY HISTORY:    Family History   Problem Relation Age of Onset    Circulatory Mother         blood clots    Alcohol/Drug Father         alcohol drank heavily    Alcohol/Drug Brother          alcohol       REVIEW OF SYSTEMS:  12 point ROS was negative other than the symptoms noted above in the HPI.  Patient Supplied Answers to Review of Systems      5/17/2023    10:51 AM    ENT ROS   Ears, Nose, Throat Hearing loss    Nasal congestion or drainage   Musculoskeletal Sore or stiff joints    Back pain    Neck pain         PHYSICAL EXAMINATION:   /87 (BP Location: Left arm, Patient Position: Sitting, Cuff Size: Adult Regular)   Pulse 88   SpO2 95%   Appearance:   normal; NAD, age-appropriate appearance, well-developed, normal habitus   Communication:   communicates well with electrolarynx   Head/Face:   inspection -  Normal; no scars or visible lesions   Skin:  normal, no rash   Ears:  auricle (AD) -  normal  EAC (AD) - normal  TM (AD) - normal  auricle (AS) -  normal  EAC (AS) - normal  TM (AS) - normal  Normal clinical speech reception   Nose:  Ext. inspection -  Normal   Oral Cavity:  lips -  Normal mucosa, oral competence, and stoma size   Mildly thickened secretions   Tongue - midline, normal movement   Age-appropriate dentition, some missing teeth   Neck: Well healed neck incision  Stoma patent - displaced into right neck  Fibrosis present in neck  Firmness to palpation of right neck, prominent compared to left   Cardiovascular:  warm, pink, well-perfused extremities without swelling, tenderness, or edema   Respiratory:  Normal respiratory effort   Neuro/Psych.:  mood/affect -  normal  mental status -  normal         PROCEDURES:   Flexible fiberoptic laryngoscopy: patient declined    RESULTS REVIEWED:     Multiple notes from oncology reviewed    TSH reviewed - normal 8/2023    CT chest report reviewed    CT chest imaging independently reviewed      IMPRESSION AND PLAN:   Raj Warren is a 73 year old man with a T4N0 SCC of the larynx. He underwent [facility]. He was taken to the OR on 7/8/2021 for a total laryngectomy with bilateral neck dissections. He completed  his postoperative radiation on 9/20/2021. He developed a local/regional recurrence in October 2022, receiving palliative keytruda starting 10/27/2022.    He is overall doing well.    We discussed that his noncontrast chest CT provides limited information and I would be cautious in saying there is not tumor in that right neck still. We also discussed that if he would want to consider a TEP we would really need to better evaluate that area prior to proceeding since we cannot have tumor present in the vicinity. He would also have to be amenable to a scope exam.    We discussed that he can certainly have a prescription for a anny button but after we discussed the fit it might be more reasonable to just continue with the anny tube as needed, could always get a shorter one.    His TSH is being checked by medical oncology.    He is having imaging per medical oncology.     I will see him back in 3 months.     Thank you very much for the opportunity to participate in the care of your patient.      Marilou Sethi MD  Otolaryngology- Head & Neck Surgery      This note was dictated with voice recognition software and then edited. Please excuse any unintentional errors.         CC:  Rosa Bean MD  [facility]  [address]  [facility] [address]      Serg Kennedy MD  Department of Radiation Oncology  [facility]

## 2023-08-31 NOTE — PROGRESS NOTES
"Oncology Rooming Note    August 31, 2023 8:57 AM   Raj Warren is a 73 year old male who presents for:    Chief Complaint   Patient presents with    Oncology Clinic Visit     Initial Vitals: /82   Pulse 63   Temp 98.4  F (36.9  C) (Oral)   Resp 16   Wt 94.3 kg (208 lb)   SpO2 99%   BMI 29.01 kg/m   Estimated body mass index is 29.01 kg/m  as calculated from the following:    Height as of 6/29/23: 1.803 m (5' 11\").    Weight as of this encounter: 94.3 kg (208 lb). Body surface area is 2.17 meters squared.  No Pain (0) Comment: Data Unavailable   No LMP for male patient.  Allergies reviewed: Yes  Medications reviewed: Yes    Medications: Medication refills not needed today.  Pharmacy name entered into [employer]: [id number] - [place], [place] - [address] AT [address]    Clinical concerns:   doctor was notified.      Kirti Jones CMA              "

## 2023-08-31 NOTE — PATIENT INSTRUCTIONS
Continue pembrolizumab.  Continue eliquis.  See ERNESTINE with next treatment.  PET scan in mid-November.  See me after PET scan.

## 2023-08-31 NOTE — LETTER
8/31/2023         RE: Raj Warren  [address]  [place] [address]        Dear Colleague,    Thank you for referring your patient, Raj Warren, to the [facility]. Please see a copy of my visit note below.    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  -COMBINED POSITIVE SCORE (CPS):  70  -TUMOR PROPORTION SCORE (TPS):  60%     1.  Patient was evaluated in 2019 for hoarseness of voice.  He was seen by ENT and found to have right vocal cord mass.    -He had biopsy done on 08/07/2019. Pathology revealed moderately-differentiated invasive squamous cell carcinoma.     2.  The patient was seen in the Emergency Room on 04/12/2021 for shortness of breath and admitted.   -CT neck revealed an enhancing soft tissue mass involving the right larynx measuring 3.9 cm.  -Patient had laryngoscopy with biopsy and tracheostomy done on 04/12/2021.  There was an endolaryngeal mass obscuring the laryngeal landmarks.  Mass appeared to be based on right endolarynx.  Pathology revealed invasive keratinizing moderately-differentiated squamous cell carcinoma.     3.  PET scan on 04/26/2021 revealed hypermetabolic mass involving the supraglottic, glottic and subglottic larynx.  There was a hypermetabolic left level IV lymph node.  There was also hypermetabolic nodule in right middle lobe.     4. CT chest angiogram on 04/27/2021 revealed a large bilateral pulmonary embolism in all the lobes.  There was evidence of right cardiac strain.  -Echocardiogram on 04/27/2021 revealed clot in transit in the right ventricle.  -The patient had emergency pulmonary embolectomy on 04/28/2021.  Pathology revealed organizing clot.  -Patient on Eliquis. Long term anti-coagulation recommended.     5. Total laryngectomy with bilateral neck dissection on 07/08/2021.  Pathology reveals 6 cm squamous cell carcinoma involving the  right false and true vocal cords with fixation, the left false and true vocal cords and invades through thyroid cartilage.  There is focal lymphatic invasion.  There is perineural invasion. Benign 75 lymph nodes.  pT4a pN0 disease.     6. Post-op radiation between 08/04/2021 and 09/20/2021. 6600 cGy in 33 fractions.     7. PET/CT on 09/30/2022 revealed hypermetabolic mass originating from the right anterolateral wall of the neopharynx.  -Patient seen by ENT on 10/03/2022 and had FNA of neck mass. Pathology is positive for malignancy. Rare clusters morphologically consistent with squamous cell carcinoma.      8. Pembrolizumab started on 10/27/2022.  He is tolerating it well.     SUBJECTIVE:  Mr. 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  He is tolerating it well. Disease has been stable.     Overall he is doing well. He has mild generalized weakness. No worsening of weakness. He is able to do all his activities. He gets mild aches and pain in the joints from osteoarthritis and pembrolizumab. No headache.  No dizziness.  No problems swallowing. No chest pain.  No shortness of breath.  No abdominal pain.  No nausea or vomiting.  No urinary or bowel complaints. No bleeding.  All other review of systems is negative.     PHYSICAL EXAMINATION:   GENERAL:  Alert and oriented x 3.   VITAL SIGNS:  Reviewed.     EYES:  No icterus.   THROAT:  No ulcer. No thrush.   NECK:  Supple. No lymphadenopathy. No thyromegaly.   AXILLAE:  No lymphadenopathy.   LUNGS:  Good air entry bilaterally.  No crackles or wheezing.   HEART:  Regular.  No murmur.   GI: Abdomen is soft.  Nontender. No mass.   EXTREMITIES:  No pedal edema.  No calf swelling or tenderness.   SKIN:  No rash.      LABS:  TSH and CMP were reviewed.     ASSESSMENT:  1.  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 No evidence of progression.  2.  History of  "pulmonary embolism. Patient on Eliquis.  3.  Hypothyroidism on levothyroxine.  4.  Mild generalized weakness.  This is multifactorial from his age, malignancy, pembrolizumab and other medical problems.  5.  Mild joint pain from osteoarthritis and pembrolizumab.     PLAN:  1.  Patient doing well from head and neck cancer.  No clinical suspicion of progression.    He is on pembrolizumab.  Overall he is tolerating it well.  He will continue on it.  Side effects reviewed.    2.  We will get PET/CT scan in mid November.  He is agreeable for it.    3.  Patient is on Eliquis.  He is tolerating it well. He will continue on it.  Plan is for lifelong anticoagulation.    4.  Patient has mild generalized weakness.  He will let us know if it gets worse.  He has mild pain in the joints.  He will take over-the-counter pain medication as needed.  Advised him to let us know if it gets worse.    5.  He will continue on levothyroxine.  TSH mildly elevated but free T4 is normal.  No need to change the dose.  We will monitor his TSH and free T4.    6.  He had few questions which were all answered.  I will see him in mid November after PET scan.  In between he will see our nurse practitioner.  Advised him to call us with any questions or concerns.     Total visit time of 40 minutes.  Time spent in today's visit, review of chart/investigations today, monitoring for toxicity of high risk drug and documentation today.    Oncology Rooming Note    August 31, 2023 8:57 AM   Raj Warren is a 73 year old male who presents for:    Chief Complaint   Patient presents with     Oncology Clinic Visit     Initial Vitals: /82   Pulse 63   Temp 98.4  F (36.9  C) (Oral)   Resp 16   Wt 94.3 kg (208 lb)   SpO2 99%   BMI 29.01 kg/m   Estimated body mass index is 29.01 kg/m  as calculated from the following:    Height as of 6/29/23: 1.803 m (5' 11\").    Weight as of this encounter: 94.3 kg (208 lb). Body surface area is 2.17 meters squared.  No " Pain (0) Comment: Data Unavailable   No LMP for male patient.  Allergies reviewed: Yes  Medications reviewed: Yes    Medications: Medication refills not needed today.  Pharmacy name entered into [employer]: [id number] - [place], [place] - [address] AT [facility]    Clinical concerns:   doctor was notified.      Kirti Jones [facility]                Again, thank you for allowing me to participate in the care of your patient.        Sincerely,        Rosa Bean MD

## 2023-08-31 NOTE — PROGRESS NOTES
Infusion Nursing Note:  Raj Warren presents today for C15D1 Keytruda.    Patient seen by provider today: Yes: Dr. Bean   present during visit today: Not Applicable.    Note: THS sightly elevated at 5.03, T4 normal at 1.11. pt is taking Levothyroxine 75mcg daily.       Intravenous Access:  Peripheral IV placed.    Treatment Conditions:  Lab Results   Component Value Date     08/31/2023    POTASSIUM 4.0 08/31/2023    MAG 2.5 (H) 07/17/2021    CR 0.98 08/31/2023    TELMA 9.1 08/31/2023    BILITOTAL 0.4 08/31/2023    ALBUMIN 4.5 08/31/2023    ALT 16 08/31/2023    AST 21 08/31/2023       Results reviewed, labs MET treatment parameters, ok to proceed with treatment.      Post Infusion Assessment:  Patient tolerated infusion without incident.  Blood return noted pre and post infusion.  Site patent and intact, free from redness, edema or discomfort.  No evidence of extravasations.  Access discontinued per protocol.       Discharge Plan:   Discharge instructions reviewed with: Patient.  Patient and/or family verbalized understanding of discharge instructions and all questions answered.  AVS to patient via [employer].  Patient will return 9/21/23 for next appointment.   Patient discharged in stable condition accompanied by: self.  Departure Mode: Ambulatory.      Franklyn Kahn RN

## 2023-08-31 NOTE — PROGRESS NOTES
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  -COMBINED POSITIVE SCORE (CPS):  70  -TUMOR PROPORTION SCORE (TPS):  60%     1.  Patient was evaluated in 2019 for hoarseness of voice.  He was seen by ENT and found to have right vocal cord mass.    -He had biopsy done on 08/07/2019. Pathology revealed moderately-differentiated invasive squamous cell carcinoma.     2.  The patient was seen in the Emergency Room on 04/12/2021 for shortness of breath and admitted.   -CT neck revealed an enhancing soft tissue mass involving the right larynx measuring 3.9 cm.  -Patient had laryngoscopy with biopsy and tracheostomy done on 04/12/2021.  There was an endolaryngeal mass obscuring the laryngeal landmarks.  Mass appeared to be based on right endolarynx.  Pathology revealed invasive keratinizing moderately-differentiated squamous cell carcinoma.     3.  PET scan on 04/26/2021 revealed hypermetabolic mass involving the supraglottic, glottic and subglottic larynx.  There was a hypermetabolic left level IV lymph node.  There was also hypermetabolic nodule in right middle lobe.     4. CT chest angiogram on 04/27/2021 revealed a large bilateral pulmonary embolism in all the lobes.  There was evidence of right cardiac strain.  -Echocardiogram on 04/27/2021 revealed clot in transit in the right ventricle.  -The patient had emergency pulmonary embolectomy on 04/28/2021.  Pathology revealed organizing clot.  -Patient on Eliquis. Long term anti-coagulation recommended.     5. Total laryngectomy with bilateral neck dissection on 07/08/2021.  Pathology reveals 6 cm squamous cell carcinoma involving the right false and true vocal cords with fixation, the left false and true vocal cords and invades through thyroid cartilage.  There is focal lymphatic invasion.  There is perineural invasion. Benign 75 lymph nodes.  pT4a pN0 disease.     6. Post-op radiation between  08/04/2021 and 09/20/2021. 6600 cGy in 33 fractions.     7. PET/CT on 09/30/2022 revealed hypermetabolic mass originating from the right anterolateral wall of the neopharynx.  -Patient seen by ENT on 10/03/2022 and had FNA of neck mass. Pathology is positive for malignancy. Rare clusters morphologically consistent with squamous cell carcinoma.      8. Pembrolizumab started on 10/27/2022.  He is tolerating it well.     SUBJECTIVE:  Mr. 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  He is tolerating it well. Disease has been stable.     Overall he is doing well. He has mild generalized weakness. No worsening of weakness. He is able to do all his activities. He gets mild aches and pain in the joints from osteoarthritis and pembrolizumab. No headache.  No dizziness.  No problems swallowing. No chest pain.  No shortness of breath.  No abdominal pain.  No nausea or vomiting.  No urinary or bowel complaints. No bleeding.  All other review of systems is negative.     PHYSICAL EXAMINATION:   GENERAL:  Alert and oriented x 3.   VITAL SIGNS:  Reviewed.     EYES:  No icterus.   THROAT:  No ulcer. No thrush.   NECK:  Supple. No lymphadenopathy. No thyromegaly.   AXILLAE:  No lymphadenopathy.   LUNGS:  Good air entry bilaterally.  No crackles or wheezing.   HEART:  Regular.  No murmur.   GI: Abdomen is soft.  Nontender. No mass.   EXTREMITIES:  No pedal edema.  No calf swelling or tenderness.   SKIN:  No rash.      LABS:  TSH and CMP were reviewed.     ASSESSMENT:  1.  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 No evidence of progression.  2.  History of pulmonary embolism. Patient on Eliquis.  3.  Hypothyroidism on levothyroxine.  4.  Mild generalized weakness.  This is multifactorial from his age, malignancy, pembrolizumab and other medical problems.  5.  Mild joint pain from osteoarthritis and pembrolizumab.      PLAN:  1.  Patient doing well from head and neck cancer.  No clinical suspicion of progression.    He is on pembrolizumab.  Overall he is tolerating it well.  He will continue on it.  Side effects reviewed.    2.  We will get PET/CT scan in mid November.  He is agreeable for it.    3.  Patient is on Eliquis.  He is tolerating it well. He will continue on it.  Plan is for lifelong anticoagulation.    4.  Patient has mild generalized weakness.  He will let us know if it gets worse.  He has mild pain in the joints.  He will take over-the-counter pain medication as needed.  Advised him to let us know if it gets worse.    5.  He will continue on levothyroxine.  TSH mildly elevated but free T4 is normal.  No need to change the dose.  We will monitor his TSH and free T4.    6.  He had few questions which were all answered.  I will see him in mid November after PET scan.  In between he will see our nurse practitioner.  Advised him to call us with any questions or concerns.     Total visit time of 40 minutes.  Time spent in today's visit, review of chart/investigations today, monitoring for toxicity of high risk drug and documentation today.

## 2023-09-21 NOTE — PROGRESS NOTES
Oncology Brief Note:  - Patient briefly seen prior to infusion, he has no questions or concerns and is doing well  - Labs pending, will follow-up on repeat TSH  - Okay to continue with Keytruda  - Follow-up with ERNESTINE alternating with MD as scheduled  Lorraine Malcolm PA-C

## 2023-09-21 NOTE — PROGRESS NOTES
Infusion Nursing Note:  Raj Warren presents today for C16D1 Keytruda.    Patient seen by provider today: Yes: Lorraine Malcolm PA-C   present during visit today: Not Applicable.    Note: N/A.      Intravenous Access:  Peripheral IV placed.    Treatment Conditions:  Lab Results   Component Value Date    HGB 15.1 09/21/2023    WBC 5.1 09/21/2023    ANEU 2.6 06/23/2021    ANEUTAUTO 2.8 01/19/2023     09/21/2023        Lab Results   Component Value Date     09/21/2023    POTASSIUM 4.4 09/21/2023    MAG 2.5 (H) 07/17/2021    CR 0.93 09/21/2023    TELMA 9.4 09/21/2023    BILITOTAL 0.4 09/21/2023    ALBUMIN 4.3 09/21/2023    ALT 16 09/21/2023    AST 21 09/21/2023       Results reviewed, labs MET treatment parameters, ok to proceed with treatment.      Post Infusion Assessment:  Patient tolerated infusion without incident.  Site patent and intact, free from redness, edema or discomfort.  No evidence of extravasations.  Access discontinued per protocol.       Discharge Plan:   Discharge instructions reviewed with: Patient.  Patient and/or family verbalized understanding of discharge instructions and all questions answered.  Patient discharged in stable condition accompanied by: self.  Departure Mode: Ambulatory.      Claire Barnes RN

## 2023-09-21 NOTE — PROGRESS NOTES
"Oncology Rooming Note    September 21, 2023 2:01 PM   Raj Warren is a 73 year old male who presents for:    Chief Complaint   Patient presents with    Oncology Clinic Visit     Initial Vitals: BP (!) 144/86   Pulse 75   Temp 98.7  F (37.1  C) (Oral)   Resp 16   Wt 93.6 kg (206 lb 6.4 oz)   SpO2 100%   BMI 28.79 kg/m   Estimated body mass index is 28.79 kg/m  as calculated from the following:    Height as of 6/29/23: 1.803 m (5' 11\").    Weight as of this encounter: 93.6 kg (206 lb 6.4 oz). Body surface area is 2.17 meters squared.  No Pain (1) Comment: Data Unavailable   No LMP for male patient.  Allergies reviewed: Yes  Medications reviewed: Yes    Medications: Medication refills not needed today.  Pharmacy name entered into [employer]: [facility] #[id number] - [place], [place] - [address] AT [address]    Clinical concerns:   PA was notified.      Kirti Jones CMA            "

## 2023-09-21 NOTE — LETTER
"    9/21/2023         RE: Raj Warren  [address]  [facility] [address]        Dear Colleague,    Thank you for referring your patient, Raj Warren, to the [facility]. Please see a copy of my visit note below.    Oncology Rooming Note    September 21, 2023 2:01 PM   Raj Warren is a 73 year old male who presents for:    Chief Complaint   Patient presents with     Oncology Clinic Visit     Initial Vitals: BP (!) 144/86   Pulse 75   Temp 98.7  F (37.1  C) (Oral)   Resp 16   Wt 93.6 kg (206 lb 6.4 oz)   SpO2 100%   BMI 28.79 kg/m   Estimated body mass index is 28.79 kg/m  as calculated from the following:    Height as of 6/29/23: 1.803 m (5' 11\").    Weight as of this encounter: 93.6 kg (206 lb 6.4 oz). Body surface area is 2.17 meters squared.  No Pain (1) Comment: Data Unavailable   No LMP for male patient.  Allergies reviewed: Yes  Medications reviewed: Yes    Medications: Medication refills not needed today.  Pharmacy name entered into [employer]: [facility] #[id number] - [facility] [address] AT [address]    Clinical concerns:   PA was notified.      Kirti Jones, [facility]              Oncology Brief Note:  - Patient briefly seen prior to infusion, he has no questions or concerns and is doing well  - Labs pending, will follow-up on repeat TSH  - Okay to continue with Keytruda  - Follow-up with ERNESTINE alternating with MD as scheduled  Lorraine Malcolm PA-C      Again, thank you for allowing me to participate in the care of your patient.        Sincerely,        LORRAINE MALCOLM PA-C  "

## 2023-10-12 NOTE — PROGRESS NOTES
Medical Assistant Note:  Raj Warren presents today for blood draw.    Patient seen by provider today: No.   present during visit today: Not Applicable.    Concerns: No Concerns.    Procedure:  Lab draw site: right hand, Needle type: bf, Gauge: 23.    Post Assessment:  Labs drawn without difficulty: Yes.    Discharge Plan:  Departure Mode: Ambulatory.    Face to Face Time: 5 min.    Kirti Jones, CMA

## 2023-10-12 NOTE — PROGRESS NOTES
Infusion Nursing Note:  Raj Warren presents today for Keytruda.    Patient seen by provider today: No   present during visit today: Not Applicable.    Note: N/A.      Intravenous Access:  Peripheral IV placed.    Treatment Conditions:  Lab Results   Component Value Date     10/12/2023    POTASSIUM 4.0 10/12/2023    MAG 2.5 (H) 07/17/2021    CR 0.96 10/12/2023    TELMA 8.9 10/12/2023    BILITOTAL 0.5 10/12/2023    ALBUMIN 4.2 10/12/2023    ALT 14 10/12/2023    AST 22 10/12/2023       Results reviewed, labs MET treatment parameters, ok to proceed with treatment.      Post Infusion Assessment:  Patient tolerated infusion without incident.  Site patent and intact, free from redness, edema or discomfort.  No evidence of extravasations.  Access discontinued per protocol.       Discharge Plan:   Patient and/or family verbalized understanding of discharge instructions and all questions answered.  AVS to patient via [employer].  Patient will return 11/2/23 for next appointment.   Patient discharged in stable condition accompanied by: self.  Departure Mode: Ambulatory.      Nica Mclaughlin RN

## 2023-11-02 NOTE — PROGRESS NOTES
Infusion Nursing Note:  Raj Warren presents today for C18D1 Keytruda.    Patient seen by provider today: Yes: Kyree Jurado NP   present during visit today: Not Applicable.    Note: N/A.      Intravenous Access:  Peripheral IV placed.    Treatment Conditions:  Lab Results   Component Value Date     11/02/2023    POTASSIUM 4.2 11/02/2023    MAG 2.5 (H) 07/17/2021    CR 0.94 11/02/2023    TELMA 9.4 11/02/2023    BILITOTAL 0.5 11/02/2023    ALBUMIN 4.4 11/02/2023    ALT 13 11/02/2023    AST 23 11/02/2023       Results reviewed, labs MET treatment parameters, ok to proceed with treatment.      Post Infusion Assessment:  Patient tolerated infusion without incident.  Site patent and intact, free from redness, edema or discomfort.  No evidence of extravasations.  Access discontinued per protocol.       Discharge Plan:   Discharge instructions reviewed with: Patient.  Patient and/or family verbalized understanding of discharge instructions and all questions answered.  Copy of AVS reviewed with patient and/or family.  Patient will return as scheduled for next appointment.  Patient discharged in stable condition accompanied by: self.  Departure Mode: Ambulatory.      Claire Barnes RN

## 2023-11-02 NOTE — LETTER
"    11/2/2023         RE: Raj Warren  [address]  [facility] [address]        Dear Colleague,    Thank you for referring your patient, Raj Warren, to the [facility]. Please see a copy of my visit note below.    Oncology Rooming Note    November 2, 2023 2:51 PM   Raj Warren is a 73 year old male who presents for:    Chief Complaint   Patient presents with     Oncology Clinic Visit     Malignant neoplasm of larynx (H)     Initial Vitals: BP (!) 141/83   Pulse 88   Temp 98.5  F (36.9  C) (Oral)   Resp 16   SpO2 100%  Estimated body mass index is 28.45 kg/m  as calculated from the following:    Height as of 6/29/23: 1.803 m (5' 11\").    Weight as of 10/12/23: 92.5 kg (204 lb). There is no height or weight on file to calculate BSA.  No Pain (0) Comment: Data Unavailable   No LMP for male patient.  Allergies reviewed: Yes  Medications reviewed: Yes    Medications: Medication refills not needed today.  Pharmacy name entered into [employer]: [facility] #[id number] - [place], [place] - [address] AT [facility]    Clinical concerns: Patient roomed in infusion.       Wendy Fonseca MA              Oncology/Hematology Visit Note  Nov 2, 2023    Reason for Visit: follow up of laryngeal squamous cell carcinoma    Patient Dr. Bean  Status post surgery and postoperative radiation with no evidence of recurrence  -Currently on single agent pembrolizumab started on 10/27/2022      Interval History:  Patient reports he has been tolerating Keytruda well.  Denies fever chills sweats cough shortness of breath chest pain nausea vomiting diarrhea abdominal pain or bleeding denies edema      Review of Systems:  14 point ROS of systems including Constitutional, Eyes, Respiratory, Cardiovascular, Gastroenterology, Genitourinary, Integumentary, Muscularskeletal, Psychiatric were all negative except for pertinent positives noted in my HPI.    Physical " Examination:  General: The patient is a pleasant male in no acute distress.  HEENT: EOMI, PERRL. Sclerae are anicteric. Oral mucosa is pink and moist with no lesions or thrush.   Lymph: Neck is supple with no lymphadenopathy in the cervical or supraclavicular areas.   Heart: Regular rate and rhythm.   Lungs: Clear to auscultation bilaterally.   GI: Bowel sounds present, soft, nontender with no palpable hepatosplenomegaly or masses.   Extremities: No lower extremity edema noted bilaterally.   Skin: No rashes, petechiae, or bruising noted on exposed skin.  Laboratory Data:  CBC CMP and TSH results reviewed      Assessment and Plan:    Laryngeal squamous cell carcinoma  Patient Dr. Bean  Status post surgery and postoperative radiation at that time no evidence of recurrence  09/30/2022-PET/CT showed abnormality in the neck FNA positive for squamous cell carcinoma showed recurrence of the disease  High PD-L1 expression  10/27/2022-started single agent Keytruda  Labs reviewed  Continue with Keytruda  11/20-PET scan  11/22/-follow-up appointment with Dr. Bean to review the PET scan results prior to next cycle of Keytruda      Pulmonary embolism  Continue with Eliquis  Call 911 or go to ER in event of bleeding bruising fall injury shortness of breath chest pain cough      Hypothyroidism  Normal TSH  Continue with levothyroxine      Call clinic with any changes in her condition or questions        TARUN Sandy CNP  [facility]     Chart documentation with Dragon Voice recognition Software. Although reviewed after completion, some words and grammatical errors may remain.          Again, thank you for allowing me to participate in the care of your patient.        Sincerely,        TARUN Sandy CNP

## 2023-11-02 NOTE — PROGRESS NOTES
"Oncology Rooming Note    November 2, 2023 2:51 PM   Raj Warren is a 73 year old male who presents for:    Chief Complaint   Patient presents with    Oncology Clinic Visit     Malignant neoplasm of larynx (H)     Initial Vitals: BP (!) 141/83   Pulse 88   Temp 98.5  F (36.9  C) (Oral)   Resp 16   SpO2 100%  Estimated body mass index is 28.45 kg/m  as calculated from the following:    Height as of 6/29/23: 1.803 m (5' 11\").    Weight as of 10/12/23: 92.5 kg (204 lb). There is no height or weight on file to calculate BSA.  No Pain (0) Comment: Data Unavailable   No LMP for male patient.  Allergies reviewed: Yes  Medications reviewed: Yes    Medications: Medication refills not needed today.  Pharmacy name entered into [employer]: [facility] #[id number] - [place], [place] - [address] AT [address]    Clinical concerns: Patient roomed in infusion.       Wendy Fonseca MA            "

## 2023-11-02 NOTE — PROGRESS NOTES
Oncology/Hematology Visit Note  Nov 2, 2023    Reason for Visit: follow up of laryngeal squamous cell carcinoma    Patient Dr. Bean  Status post surgery and postoperative radiation with no evidence of recurrence  -Currently on single agent pembrolizumab started on 10/27/2022      Interval History:  Patient reports he has been tolerating Keytruda well.  Denies fever chills sweats cough shortness of breath chest pain nausea vomiting diarrhea abdominal pain or bleeding denies edema      Review of Systems:  14 point ROS of systems including Constitutional, Eyes, Respiratory, Cardiovascular, Gastroenterology, Genitourinary, Integumentary, Muscularskeletal, Psychiatric were all negative except for pertinent positives noted in my HPI.    Physical Examination:  General: The patient is a pleasant male in no acute distress.  HEENT: EOMI, PERRL. Sclerae are anicteric. Oral mucosa is pink and moist with no lesions or thrush.   Lymph: Neck is supple with no lymphadenopathy in the cervical or supraclavicular areas.   Heart: Regular rate and rhythm.   Lungs: Clear to auscultation bilaterally.   GI: Bowel sounds present, soft, nontender with no palpable hepatosplenomegaly or masses.   Extremities: No lower extremity edema noted bilaterally.   Skin: No rashes, petechiae, or bruising noted on exposed skin.  Laboratory Data:  CBC CMP and TSH results reviewed      Assessment and Plan:    Laryngeal squamous cell carcinoma  Patient Dr. Bean  Status post surgery and postoperative radiation at that time no evidence of recurrence  09/30/2022-PET/CT showed abnormality in the neck FNA positive for squamous cell carcinoma showed recurrence of the disease  High PD-L1 expression  10/27/2022-started single agent Keytruda  Labs reviewed  Continue with Keytruda  11/20-PET scan  11/22/-follow-up appointment with Dr. Bean to review the PET scan results prior to next cycle of Keytruda      Pulmonary embolism  Continue with [address] [facility]  Call 911 or go  to ER in event of bleeding bruising fall injury shortness of breath chest pain cough      Hypothyroidism  Normal TSH  Continue with levothyroxine      Call clinic with any changes in her condition or questions        TARUN Sandy CNP  [facility]     Chart documentation with Dragon Voice recognition Software. Although reviewed after completion, some words and grammatical errors may remain.

## 2023-11-14 PROBLEM — I26.99 PULMONARY EMBOLISM, UNSPECIFIED CHRONICITY, UNSPECIFIED PULMONARY EMBOLISM TYPE, UNSPECIFIED WHETHER ACUTE COR PULMONALE PRESENT (H): Status: ACTIVE | Noted: 2023-01-01

## 2023-11-14 PROBLEM — J38.7 LARYNGEAL MASS: Status: ACTIVE | Noted: 2023-01-01

## 2023-11-14 PROBLEM — R13.10 DYSPHAGIA, UNSPECIFIED TYPE: Status: ACTIVE | Noted: 2023-01-01

## 2023-11-14 NOTE — ED PROVIDER NOTES
"  History     Chief Complaint:  Dysphagia       The history is provided by the patient.      Raj Warren is a 73 year old male with a history of laryngeal cancer S/P total laryngectomy and tracheostomy and bilateral PE on Eliquis who presents to the ED for evaluation of dysphagia. The patient reports that he has had progressive dysphagia for one week and last evening he began experiencing difficulty swallowing. He also reports right-sided neck pain. He rates his pain at a 4/10 in severity. He reports that at his baseline he is able to drink water, eat food, and swallow pills without difficulty. He denies experiencing chest pain, shortness of breath, vomiting, or fever. He reports that he receives infusion treatments every 3 weeks. He is followed by Dr. Sethi with the [facility] ENT clinic and the [facility] in [place].  Note, patient took last dose of Eliquis yesterday and was planning to bridge to [facility] with oncology team.    Independent Historian:   None - Patient Only    Review of External Notes:   I reviewed the patient's oncology visit note from today with Kyree Sainz CNP.    Medications:   Apixaban  Enoxaparin injections  Levothyroxine  Metoprolol tartrate    Past Medical History:   Anemia  Aphthous ulcer  Chronic allergic conjunctivitis  Former smoker  Hyperlipidemia  Malignant neoplasm of larynx, S/P total laryngectomy  PE, bilateral  S/P PEG tube placement  Tracheostomy in place    Past Surgical History:   Dissection, radical, neck, bilateral  IVC filter placement  IVC filter removal  Laryngectomy, total  Laryngoscopy x2  PEG tube insertion with EGD  Repair ascending aorta aneurysm  Tracheostomy    Physical Exam   Patient Vitals for the past 24 hrs:   BP Temp Temp src Pulse Resp SpO2 Height Weight   11/14/23 1147 (!) 154/88 98  F (36.7  C) Oral 91 16 99 % 1.803 m (5' 11\") 93 kg (205 lb)        Physical Exam  Constitutional: Alert, attentive, GCS 15  HENT:    Nose: Nose normal.    " Mouth/Throat: Posterior oropharynx is clear without peritonsillar or soft tissue swelling.  Uvula is midline.  No oral lesions or exudate.  Eyes: EOM are normal.   Neck: tracheostomy in place. Right-sided fullness to neck. Surgical laryngectomy scars present.   CV: regular rate and rhythm; no murmurs, rubs or gallups  Chest: Effort normal and breath sounds normal.   GI:  There is no tenderness. No distension. Normal bowel sounds  MSK: Normal range of motion.   Neurological: Alert, attentive  Skin: Skin is warm and dry.    Emergency Department Course     Imaging:  Soft tissue neck CT w contrast   Final Result   IMPRESSION:    1.  Enlarging soft tissue mass along the right superior margins of the laryngectomy site measures 4 cm in maximum dimension, previously 1.9 cm, suggestive of recurrence, with mass effect on the upper esophagus.   2.  No enlarged neck lymph nodes.         Report per radiology    Laboratory:  Labs Ordered and Resulted from Time of ED Arrival to Time of ED Departure   BASIC METABOLIC PANEL - Abnormal       Result Value    Sodium 138      Potassium 4.4      Chloride 105      Carbon Dioxide (CO2) 20 (*)     Anion Gap 13      Urea Nitrogen 8.6      Creatinine 0.83      GFR Estimate >90      Calcium 9.0      Glucose 77     CBC WITH PLATELETS AND DIFFERENTIAL    WBC Count 5.6      RBC Count 4.80      Hemoglobin 14.4      Hematocrit 42.8      MCV 89      MCH 30.0      MCHC 33.6      RDW 12.7      Platelet Count 246      % Neutrophils 60      % Lymphocytes 24      % Monocytes 10      % Eosinophils 5      % Basophils 1      % Immature Granulocytes 0      NRBCs per 100 WBC 0      Absolute Neutrophils 3.4      Absolute Lymphocytes 1.3      Absolute Monocytes 0.6      Absolute Eosinophils 0.3      Absolute Basophils 0.1      Absolute Immature Granulocytes 0.0      Absolute NRBCs 0.0        Emergency Department Course & Assessments:    Interventions:  Medications   sodium chloride 0.9% BOLUS 1,000 mL (1,000  mLs Intravenous $New Bag 11/14/23 1626)   dexAMETHasone (DECADRON) injection 10 mg (10 mg Intravenous $Given 11/14/23 1626)   iopamidol (ISOVUE-370) solution 90 mL (90 mLs Intravenous $Given 11/14/23 1713)   Saline Flush (60 mLs Intravenous $Given 11/14/23 1713)      Independent Interpretation (X-rays, CTs, rhythm strip):  None    Assessments, Consultations, & Discussion of Management & Tests:  ED Course as of 11/14/23 1904 Tue Nov 14, 2023   1535 I obtained history and examined the patient.   1559 I reassessed the patient.   1615 I spoke on the phone with Claudine at the [facility] in [place] regarding the patient's presentation, findings, and plan of care.   1822 I spoke again with the patient, explaining findings and discussing the plan for care.   1838 I spoke in person with Dr. Hooks regarding the patient's presentation, findings, and plan of care.   1844 I spoke again with Dr. Hooks.   1851 I spoke on the phone with Dr. Orozco of the hospitalist service regarding the patient's presentation, findings, and plan of care.     Social Determinants of Health affecting care:   None    Disposition:  The patient was discharged to home.     Impression & Plan     Medical Decision Making:  Patient is a overall well-appearing 72-year-old male with history of laryngeal cancer s/p laryngectomy and current tracheostomy who presents with gradual dysphagia over the past 7 days. Symptoms were worse last night.  He is afebrile, hypertensive at 154/88, nonhypoxic.  Physical examination reveals no overt oropharyngeal or oral mucosal swelling.  Tongue is normal size.  There appears to be a right-sided fullness to the neck however no tenderness with palpation or fluctuance.  History and findings are concerning for possible recurrent mass.  Labs obtained and are reassuring.  Neck CT confirms the presence of an enlarging mass at the right superior margins of laryngectomy site.  There is mass effect on the upper esophagus.   Results reviewed and discussed with patient. No concern for airway involvement as patient has tracheostomy. IV steroids and fluids provided. I staffed patient with Dr. Hooks who evaluated patient at bedside and agrees patient will likely need admission due to progressive dysphagia and ENT consultation.  Patient was graciously accepted by Dr. Orozco for hospitalist team. Patient aware of and agreeable to plan.    Diagnosis:    ICD-10-CM    1. Dysphagia, unspecified type  R13.10       2. Laryngeal mass  J38.7       3. Tracheostomy in place (H)  Z93.0       4. Pulmonary embolism, unspecified chronicity, unspecified pulmonary embolism type, unspecified whether acute cor pulmonale present (H)  I26.99          Discharge Medications:  New Prescriptions    No medications on file      Scribe Disclosure:  I, Trevin Briones, am serving as a scribe at 4:20 PM on 11/14/2023 to document services personally performed by Bárbara Dior PA-C based on my observations and the provider's statements to me.      Bárbara Dior PA-C  11/14/23 190

## 2023-11-14 NOTE — ED TRIAGE NOTES
Pt has neck swelling and edema   Pt states it has bothered him since last night   Pt is a cancer patient

## 2023-11-14 NOTE — LETTER
11/14/2023         RE: Raj Warren  [address]  [facility] [address]        Dear Colleague,    Thank you for referring your patient, Raj Warren, to the [facility]. Please see a copy of my visit note below.    Oncology/Hematology Visit Note  Nov 14, 2023    Reason for Visit: follow up of recurrent laryngeal squamous cell carcinoma  Follows with Dr. Bean from oncology and  from ENT  Status post total laryngectomy with bilateral neck dissection on 07/08/2021 followed by postoperative radiation at that time no evidence of recurrence  09/30/2022-PET/CT showed abnormality in the neck FNA positive for squamous cell carcinoma showed recurrence of the disease  High PD-L1 expression  10/27/2022-started single agent Keytruda  -Currently undergoing treatment with  single agent pembrolizumab       Interval History:  Patient walked in clinic today.  He reports severe dysphagia started last night.  Patient reports prior to last he did not have any problems or difficulty swallowing food or medication.  He is unable to take his meds he is not even able to take small sips of water now.  Denies mouth sores, denies recent infection  pt denies  swelling of the throat.  Denies difficulty breathing.  Shortness of breath cough chest pain.  Denies fever chills sweats      Review of Systems:  14 point ROS of systems including Constitutional, Eyes, Respiratory, Cardiovascular, Gastroenterology, Genitourinary, Integumentary, Muscularskeletal, Psychiatric were all negative except for pertinent positives noted in my HPI.    Physical Examination:  General: The patient is a pleasant male in no acute distress.  HEENT: EOMI, PERRL. Sclerae are anicteric. Oral mucosa is pink and moist with no lesions or thrush.   Lymph: Neck is supple trach intact.  Mouth-no  mouth sores  Heart: Regular rate and rhythm.   Lungs: Clear to auscultation bilaterally.   GI: Bowel sounds present, soft, nontender  with no palpable hepatosplenomegaly or masses.   Extremities: No lower extremity edema noted bilaterally.   Skin: No rashes, petechiae, or bruising noted on exposed skin.    Laboratory Data:  CBC CMP and TSH results reviewed      Assessment and Plan:    Severe dysphagia (patient with recurrent laryngeal squamous cell carcinoma with tracheostomy)  Dysphagia started last night  Patient reports he was able to take p.o. without any problems but since last night he is unable to take anything he cannot taking pills he cannot swallow water or eat food .  It is very painful to swallow  I reviewed with Dr. Bean and  from ENT  Per Dr. Bean order urgent GI referral for possible scope and patient to see his ENT doctor Dr Sethi  I will place a urgent referral for GI now     Recurrent laryngeal squamous cell carcinoma  Patient follows with Dr. Bean and  Status post total laryngectomy with bilateral neck dissection on 07/08/2021 followed by postoperative radiation at that time no evidence of recurrence  09/30/2022-PET/CT showed abnormality in the neck FNA positive for squamous cell carcinoma showed recurrence of the disease  High PD-L1 expression  10/27/2022-started single agent Keytruda  11/20/23 patient is scheduled for-PET CT scan  11/22/23 -follow-up appointment with Dr. Bean to review the PET scan results prior to next cycle of Keytruda      Pulmonary embolism  Currently on Eliquis. Due to dysphagia and inability to take p.o. per Dr. Bean we will start him on Lovenox 1 mg/kg twice daily.  Patient reports in the past that he had to take  Lovenox and is aware how to take Lovenox  -Do not take Eliquis with Lovenox-patient verbalized understanding   I sent the prescription for Lovenox to our [place] outpatient pharmacy  Call 911 or go to ER in event of bleeding bruising fall injury shortness of breath chest pain cough      Addendum  -Patient reports that he wants to see GI and ENT doctor now in the clinic.  I  informed patient that I requested an urgent referral however it may take 1 to 2 days for the appointment  -Patient is unhappy and reports he wants to see GI and  ENT immediately    Patient reports he will go to ER for evaluation of severe dysphagia      TARUN Sandy CNP  [facility] [place]     Chart documentation with Dragon Voice recognition Software. Although reviewed after completion, some words and grammatical errors may remain.          Again, thank you for allowing me to participate in the care of your patient.        Sincerely,        TARUN Sandy CNP

## 2023-11-14 NOTE — PROGRESS NOTES
Oncology/Hematology Visit Note  Nov 14, 2023    Reason for Visit: follow up of recurrent laryngeal squamous cell carcinoma  Follows with Dr. Bean from oncology and  from ENT  Status post total laryngectomy with bilateral neck dissection on 07/08/2021 followed by postoperative radiation at that time no evidence of recurrence  09/30/2022-PET/CT showed abnormality in the neck FNA positive for squamous cell carcinoma showed recurrence of the disease  High PD-L1 expression  10/27/2022-started single agent Keytruda  -Currently undergoing treatment with  single agent pembrolizumab       Interval History:  Patient walked in clinic today.  He reports severe dysphagia started last night.  Patient reports prior to last he did not have any problems or difficulty swallowing food or medication.  He is unable to take his meds he is not even able to take small sips of water now.  Denies mouth sores, denies recent infection  pt denies  swelling of the throat.  Denies difficulty breathing.  Shortness of breath cough chest pain.  Denies fever chills sweats      Review of Systems:  14 point ROS of systems including Constitutional, Eyes, Respiratory, Cardiovascular, Gastroenterology, Genitourinary, Integumentary, Muscularskeletal, Psychiatric were all negative except for pertinent positives noted in my HPI.    Physical Examination:  General: The patient is a pleasant male in no acute distress.  HEENT: EOMI, PERRL. Sclerae are anicteric. Oral mucosa is pink and moist with no lesions or thrush.   Lymph: Neck is supple trach intact.  Mouth-no  mouth sores  Heart: Regular rate and rhythm.   Lungs: Clear to auscultation bilaterally.   GI: Bowel sounds present, soft, nontender with no palpable hepatosplenomegaly or masses.   Extremities: No lower extremity edema noted bilaterally.   Skin: No rashes, petechiae, or bruising noted on exposed skin.    Laboratory Data:  CBC CMP and TSH results reviewed      Assessment and Plan:    Severe  dysphagia (patient with recurrent laryngeal squamous cell carcinoma with tracheostomy)  Dysphagia started last night  Patient reports he was able to take p.o. without any problems but since last night he is unable to take anything he cannot taking pills he cannot swallow water or eat food .  It is very painful to swallow  I reviewed with Dr. Bean and  from ENT  Per Dr. Bean order urgent GI referral for possible scope and patient to see his ENT doctor Dr Sethi  I will place a urgent referral for GI now     Recurrent laryngeal squamous cell carcinoma  Patient follows with Dr. Bean and  Status post total laryngectomy with bilateral neck dissection on 07/08/2021 followed by postoperative radiation at that time no evidence of recurrence  09/30/2022-PET/CT showed abnormality in the neck FNA positive for squamous cell carcinoma showed recurrence of the disease  High PD-L1 expression  10/27/2022-started single agent Keytruda  11/20/23 patient is scheduled for-PET CT scan  11/22/23 -follow-up appointment with Dr. Bean to review the PET scan results prior to next cycle of Keytruda      Pulmonary embolism  Currently on Eliquis. Due to dysphagia and inability to take p.o. per Dr. Bean we will start him on Lovenox 1 mg/kg twice daily.  Patient reports in the past that he had to take  Lovenox and is aware how to take Lovenox  -Do not take Eliquis with Lovenox-patient verbalized understanding   I sent the prescription for Lovenox to our [place] outpatient pharmacy  Call 911 or go to ER in event of bleeding bruising fall injury shortness of breath chest pain cough      Addendum  -Patient reports that he wants to see GI and ENT doctor now in the clinic.  I informed patient that I requested an urgent referral however it may take 1 to 2 days for the appointment  -Patient is unhappy and reports he wants to see GI and  ENT immediately    Patient reports he will go to ER for evaluation of severe dysphagia      Kyree Jurado,  TARUN HOFF [facility]     Chart documentation with Dragon Voice recognition Software. Although reviewed after completion, some words and grammatical errors may remain.

## 2023-11-14 NOTE — PROGRESS NOTES
Infusion Nursing Note:  Raj Warren presents today for 1 liter IVF.    Patient seen by provider today: Yes: Kyree Jurado NP   present during visit today: Not Applicable.    Note: Patient states he is unable to swallow food, and is having a difficult time drinking fluids. Writer instructed patient to go to the ER after his IVF due to this new concern. Patient saw Kyree Jurado NP in clinic today, and the same recommendation was made for patient to go to the ER after the IVF. Patient verbalized understanding to the above.      Intravenous Access:  Peripheral IV placed.    Treatment Conditions:  Not Applicable.      Post Infusion Assessment:  Patient tolerated infusion without incident.  PIV SL per protocol for ER visit.       Discharge Plan:   [facility]  Patient will return 11/22/23 for next appointment.   Patient discharged in stable condition accompanied by: self.  Departure Mode: Ambulatory.      Deirdre Whitley RN

## 2023-11-15 NOTE — ED PROVIDER NOTES
ED ATTENDING PHYSICIAN NOTE:   I evaluated this patient in conjunction with Bárbaar Dior PA-C  I have participated in the care of the patient and personally performed key elements of the history, exam, and medical decision making.      HPI:   Raj Warren is a 73 year old male with history of malignant neoplasm of the larynx s/p total laryngectomy presents to the emergency department for progressive worsening dysphagia.  Patient states that for the past 1 week, he has been having intermittent difficulty with swallowing food and water.  Last night, he felt that food was getting stuck in his throat, so he would try to drink water to attempt to flush down the food.  However, every time he drinks anything, fluid would come right back up.  Patient has some soreness in his neck since symptom onset.  No shortness of breath.  He has not been able to take his meds including Eliquis as a result.    Review of Systems   Constitutional:  Negative for chills and fever.   HENT:  Negative for congestion and rhinorrhea.    Respiratory:  Negative for cough and shortness of breath.    Cardiovascular:  Negative for chest pain.   Gastrointestinal:  Positive for vomiting. Negative for abdominal pain and nausea.   Genitourinary:  Negative for dysuria and hematuria.   Musculoskeletal:  Negative for neck stiffness.   Neurological:  Negative for dizziness and headaches.       Independent Historian:   None - Patient Only    Review of External Notes:   Oncology visit note from today.     EXAM:   Constitutional:       Appearance: Normal appearance.      General: Not in acute distress.  HENT:      Head: Normocephalic and atraumatic.      Neck: Laryngeal stoma present.  Eyes:      Extraocular Movements: Extraocular movements intact.      Conjunctiva/sclera: Conjunctivae normal.   Cardiovascular:      Rate and Rhythm: Normal rate and regular rhythm.   Pulmonary:      Effort: Pulmonary effort is normal. No respiratory distress.   Abdominal:       General: Abdomen is flat. There is no distension.   Musculoskeletal:         General: No swelling or deformity.      Cervical back: Normal range of motion. No rigidity.   Skin:     Coloration: Skin is not jaundiced or pale.   Neurological:      General: No focal deficit present.      Mental Status: Alert and oriented to person, place, and time.   Psychiatric:         Mood and Affect: Mood normal.         Behavior: Behavior normal.    Independent Interpretation (X-rays, CTs, rhythm strip):  None    Consultations/Discussion of Management or Tests:  None     Social Determinants of Health affecting care:   None     MEDICAL DECISION MAKING/ASSESSMENT AND PLAN:   73-year-old male as described above presents to the emergency department for progressive worsening dysphagia.  Patient hemodynamically stable at time evaluation.  ABCs intact.  Patient underwent CT soft tissue neck ordered by APC which found an enlarging soft tissue mass along the right superior margins of the laryngectomy site measuring roughly 4 cm in maximal dimension which has enlarged from the previous 1.9.  Concerning for recurrence.  This is causing a mass effect of the upper esophagus which is likely the cause of patient's esophageal obstruction.  No airway concerns at this time as patient has a laryngeal stoma.  Nonetheless, given patient is unable to pass any food or fluid by mouth, he will need admission for further treatment and addition to likely GI and ENT consultation and intervention. Steroids ordered by PA-C for addressing mass effect.  IV fluids.  Discussed care plan with patient who voiced understanding and agreement with plan.  Answered all questions.    Please refer to the PA-C full H&P for details.     DIAGNOSIS:     ICD-10-CM    1. Dysphagia, unspecified type  R13.10       2. Laryngeal mass  J38.7       3. Tracheostomy in place (H)  Z93.0       4. Pulmonary embolism, unspecified chronicity, unspecified pulmonary embolism type,  unspecified whether acute cor pulmonale present (H)  I26.99          DISPOSITION:   Admit     VIRIDIANA VAUGHAN DO  11/14/2023  [facility] EMERGENCY DEPT     Viridiana Vaughan DO  11/15/23 0201

## 2023-11-15 NOTE — CONSULTS
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 enlarging with dysphagia    -radiation consult for results reirradiation consideration  -G tube placement irand nutrition consult   -PD ON KEYTRUDA,   -once tube in will be ok to discharge razia have him see Dr Bean in op oncology in 1-week for op discussion for chemotherapy, pt adamant about wanting to go home          Will follow thank you for the consult    Total time spent on day of visit, including review of tests, obtaining/reviewing separately obtained history, ordering medications/tests/procedures, communicating with PCP/consultants, and documenting in electronic medical record: 85 minutes     HISTORY OF PRESENT ILLNESS: Raj Warren is a 73 year old pt of Dr Bean's .   He has laryngeal cancer and Is currently undergoing chemotherapy on Keytruda since 10/27/2022.  He came in with severe dysphagia to clinic yesterday x 1 week .  CT soft tissue of the neck, showing enlarging soft tissue mass along the right superior margins of the laryngectomy it  measures  4 cm in maximum     Previous oncologic history    Follows with Dr. Bean from oncology and  from ENT  Status post total laryngectomy with bilateral neck dissection on 07/08/2021 followed by postoperative radiation at that time no evidence of recurrence  09/30/2022-PET/CT showed abnormality in the neck FNA positive for squamous cell carcinoma showed recurrence of the disease  High PD-L1 expression  10/27/2022-started single agent Keytruda  -Currently undergoing treatment with  single agent pembrolizumab       REVIEW OF SYSTEMS:   14 point ROS was reviewed and is negative other than as noted above in HPI.       MEDICATIONS:  Current  Facility-Administered Medications   Medication    bisacodyl (DULCOLAX) suppository 10 mg    dextrose 5% and 0.45% NaCl + KCl 20 mEq/L infusion    famotidine (PEPCID) injection 20 mg    heparin infusion 25,000 units in D5W 250 mL ANTICOAGULANT    HYDROmorphone (DILAUDID) injection 0.2 mg    metoprolol (LOPRESSOR) injection 2.5 mg    naloxone (NARCAN) injection 0.2 mg    Or    naloxone (NARCAN) injection 0.4 mg    Or    naloxone (NARCAN) injection 0.2 mg    Or    naloxone (NARCAN) injection 0.4 mg    Patient is already receiving anticoagulation with heparin, enoxaparin (LOVENOX), warfarin (COUMADIN)  or other anticoagulant medication         ALLERGIES:  Allergies   Allergen Reactions    Pollen Extract          PAST MEDICAL HISTORY:  Past Medical History:   Diagnosis Date    Allergic state     Anemia     Former smoker     Malignant neoplasm of larynx (H) 04/20/2021    Pulmonary emboli (H) 04/28/2021    Bilateral-s/p embolectomy and IVC filter placement    S/P percutaneous endoscopic gastrostomy (PEG) tube placement (H)     Tracheostomy in place (H)          PAST SURGICAL HISTORY:  Past Surgical History:   Procedure Laterality Date    DISSECTION RADICAL NECK BILATERAL Bilateral 7/8/2021    Procedure: bilateral neck dissections;  Surgeon: Marilou Sethi MD;  Location: [facility] OR    ENDOSCOPIC INSERTION TUBE GASTROSTOMY Left 7/8/2021    Procedure: INSERTION, PEG TUBE with Esophagogastroduodenoscopy;  Surgeon: Kg Montaño MD;  Location: [facility] OR    IR IVC FILTER PLACEMENT  4/30/2021    IR IVC FILTER REMOVAL  2/7/2022    LARYNGECTOMY N/A 7/8/2021    Procedure: LARYNGECTOMY;  Surgeon: Marilou Sethi MD;  Location: [facility] OR    LARYNGOSCOPY N/A 4/12/2021    Procedure: LARYNGOSCOPY;  Surgeon: Choco Johnson MD;  Location: [facility] OR    LARYNGOSCOPY N/A 7/8/2021    Procedure: LARYNGOSCOPY;  Surgeon: Marilou Sethi MD;  Location: [facility] OR    REPAIR ANEURYSM ASCENDING AORTA N/A 4/28/2021    Procedure: PULMONARY THROMBECTOMY;   Surgeon: Yumi Singh MD;  Location:  OR    TRACHEOSTOMY  2021    Procedure: CREATION, TRACHEOSTOMY;  Surgeon: Choco Johnson MD;  Location:  OR         SOCIAL HISTORY:  Social History     Socioeconomic History    Marital status: Single     Spouse name: Not on file    Number of children: Not on file    Years of education: Not on file    Highest education level: Not on file   Occupational History    Not on file   Tobacco Use    Smoking status: Former     Types: Cigarettes     Quit date: 1989     Years since quittin.8    Smokeless tobacco: Never    Tobacco comments:     quit in   had smoked about 1/2 pack a day then cut back   Substance and Sexual Activity    Alcohol use: No     Comment: No alcohol since     Drug use: No    Sexual activity: Yes     Partners: Female   Other Topics Concern    Parent/sibling w/ CABG, MI or angioplasty before 65F 55M? Not Asked   Social History Narrative    Not on file     Social Determinants of Health     Financial Resource Strain: Low Risk  (2021)    Overall Financial Resource Strain (CARDIA)     Difficulty of Paying Living Expenses: Not hard at all   Food Insecurity: No Food Insecurity (2021)    Hunger Vital Sign     Worried About Running Out of Food in the Last Year: Never true     Ran Out of Food in the Last Year: Never true   Transportation Needs: No Transportation Needs (2021)    PRAPARE - Transportation     Lack of Transportation (Medical): No     Lack of Transportation (Non-Medical): No   Physical Activity: Not on file   Stress: Not on file   Social Connections: Not on file   Interpersonal Safety: Not At Risk (2023)    Humiliation, Afraid, Rape, and Kick questionnaire     Fear of Current or Ex-Partner: No     Emotionally Abused: No     Physically Abused: No     Sexually Abused: No   Housing Stability: Not on file         FAMILY HISTORY:  Family History   Problem Relation Age of Onset    Circulatory Mother         blood  "clots    Alcohol/Drug Father         alcohol drank heavily    Alcohol/Drug Brother         alcohol         PHYSICAL EXAM:  Vital signs:  Temp: 97.5  F (36.4  C) Temp src: Oral BP: (!) 152/82 Pulse: 66   Resp: 16 SpO2: 99 % O2 Device: None (Room air)   Height: 180.3 cm (5' 11\") Weight: 93 kg (205 lb)  Estimated body mass index is 28.59 kg/m  as calculated from the following:    Height as of this encounter: 1.803 m (5' 11\").    Weight as of this encounter: 93 kg (205 lb).    ECO  GENERAL/CONSTITUTIONAL: No acute distress.  EYES: Pupils are equal, round, and react to light and accommodation. Extraocular movements intact.  No scleral icterus.  ENT/MOUTH: s/p radiation and scar tissue. On the neck   LYMPH: No anterior cervical, posterior cervical, supraclavicular, axillary or inguinal adenopathy.   RESPIRATORY: Clear to auscultation bilaterally. No crackles or wheezing.   CARDIOVASCULAR: Regular rate and rhythm without murmurs, gallops, or rubs.  GASTROINTESTINAL: scar tissue from previous tubes .  MUSCULOSKELETAL: Warm and well-perfused, no cyanosis, clubbing, or edema.  NEUROLOGIC: Cranial nerves II-XII are intact. Alert, oriented, answers questions appropriately.         LABS:  CBC RESULTS:   Recent Labs   Lab Test 11/15/23  0719   WBC 5.5   RBC 4.89   HGB 14.8   HCT 43.5   MCV 89   MCH 30.3   MCHC 34.0   RDW 12.4          Recent Labs   Lab Test 11/15/23  0719 23  1556    138   POTASSIUM 4.0 4.4   CHLORIDE 105 105   CO2 20* 20*   ANIONGAP 12 13   * 77   BUN 9.9 8.6   CR 0.72 0.83   TELMA 9.1 9.0         PATHOLOGY:  Noted     IMAGING:    Noted ct        Thank you for the opportunity to participate in this patient's care.  Please call with any questions.    Vinay Holliday MD  Hematology/Oncology  [facility]   "

## 2023-11-15 NOTE — PLAN OF CARE
Goal Outcome Evaluation:       [id number]  Orientation: AOX4  Activity: IND  Diet/BS Checks: NPO with ice chips  Tele:  NA  IV Access/Drains: R PIV Infusing D5 0.45% NS + KCL at 75ml/hr. L PIV Infusing Heparin at 1350u/hr. Next PTT recheck is at 6:30pm  Pain Management: Denies   Abnormal VS/Results: VSS on RA ex HTN   Bowel/Bladder: Continent B/B   Skin/Wounds: Scars across Chest.   Consults: Hem/Onc, ENT   D/C Disposition: Pending   Other Info: Has a speaking valve to communicate. K protocol WNL. Recheck in the AM. IR consult for G tube placement. Radiation Onc consult placed.

## 2023-11-15 NOTE — PROGRESS NOTES
RECEIVING UNIT ED HANDOFF REVIEW    ED Nurse Handoff Report was reviewed by: Juan Antonio Saucedo RN on November 14, 2023 at 9:30 PM

## 2023-11-15 NOTE — CONSULTS
Interventional Radiology - Pre-Procedure Note:  11/15/2023    Procedure Requested: gastrostomy tube placement  Requested by: Vinay Holliady MD      Brief HPI: Raj Warren is a 73 year old male with a past medical history significant for  laryngeal cancer, s/p total laryngectomy and bilateral neck dissection followed by radiation treatment with recurrence of the disease on Keytruda, Hx PE, HTN, and hypothyroidism. Pt was sent to the ER from oncology clinic 11/14 for evaluation of dysphagia. Patient was admitted to the hospitalist service on 11/14/2023 for further management. Pt has had 4 days of dysphagia, CT soft tissue neck in ER shows Enlarging soft tissue mass along right superior margin of the laryngectomy site with mass effect on the upper esophagus.      IMAGING:  CT CHEST WITHOUT CONTRAST August 8, 2023 9:14 AM      HISTORY: Laryngeal cancer (H).     TECHNIQUE: CT scan of the chest was performed without IV contrast.  Multiplanar reformats were obtained. Dose reduction techniques were  used.  CONTRAST: None.     COMPARISON: 4/26/2023.     FINDINGS:      LUNGS AND PLEURA: The lungs are clear. No pulmonary nodules.     MEDIASTINUM/AXILLAE: No lymphadenopathy. Partially visualized  postoperative changes base of the neck. No visible coronary artery  calcification.     UPPER ABDOMEN: Cholelithiasis.                                                                      IMPRESSION: No evidence of metastatic disease in the chest. No  significant change from previous.     LEIDY WALTER MD     NPO: At midnight  ANTICOAGULANTS: Eliquis with last dose 11/13, Heparin drip- hold for 6 hours prior to procedure  ANTIBIOTICS: Ancef for procedure    ALLERGIES  Allergies   Allergen Reactions    Pollen Extract      LABS:  INR   Date Value Ref Range Status   02/07/2022 1.16 (H) 0.85 - 1.15 Final   04/28/2021 1.50 (H) 0.86 - 1.14 Final      Hemoglobin   Date Value Ref Range Status   11/15/2023 14.8 13.3 - 17.7 g/dL  "Final   07/11/2021 11.3 (L) 13.3 - 17.7 g/dL Final   ]  Platelet Count   Date Value Ref Range Status   11/15/2023 278 150 - 450 10e3/uL Final   07/11/2021 229 150 - 450 10e9/L Final     Creatinine   Date Value Ref Range Status   11/15/2023 0.72 0.67 - 1.17 mg/dL Final   07/11/2021 0.78 0.66 - 1.25 mg/dL Final     Potassium   Date Value Ref Range Status   11/15/2023 4.0 3.4 - 5.3 mmol/L Final   12/28/2022 4.1 3.4 - 5.3 mmol/L Final   07/11/2021 3.6 3.4 - 5.3 mmol/L Final       EXAM:  BP (!) 152/82 (BP Location: Right arm)   Pulse 66   Temp 97.5  F (36.4  C) (Oral)   Resp 16   Ht 1.803 m (5' 11\")   Wt 93 kg (205 lb)   SpO2 99%   BMI 28.59 kg/m    General:  Stable.  In no acute distress.    Neuro:  A&O x 3. Moves all extremities equally.  Resp:  Lungs clear to auscultation bilaterally, breathing unlabored on room air.  Cardio:  S1S2 and reg, without murmur, clicks or rubs  Abdomen:  Soft, non-distended, non-tender, has scars from past G tubes  Vascular:  +2/4 bilateral femoral pulses, +2/4 bilateral dorsalis pedis pulses   Skin:  Without excoriations, ecchymosis, erythema, lesions or open sores on abdomen.  MSK:  No gross motor weakness.  Sensation intact.  Proprioception intact.      ASSESSMENT/PLAN:   73 year old male with a past medical history significant for  laryngeal cancer, s/p total laryngectomy with recurrence  -Percutaneous gastrostomy tube placement with moderate sedation tomorrow afternoon  -NPO at midnight  -Hold Heparin 6 hours prior to procedure (planning for procedure at 11am 11/16)    Procedure, risks/benefits, details, alternatives, and sedation education reviewed with patient, who verbalized understanding. All questions answered.  Total time: 50 minutes    Jessie Quintana PA-C  Interventional Radiology  [facility] desk phone *[phone]    "

## 2023-11-15 NOTE — CONSULTS
72 y/o male with diagnosis of recurrent laryngeal squamous cell carcinoma admitted with dysphagia.  Pt s/p laryngectomy & bilateral neck dissection surgery 7/8/2021 followed by postop XRT treatment to a dose of 6600 cGy in 33 fractions completed at [facility]. With Dr MONICA Garcia on 9/20/21.    Recurrent tumor Dx. 10/2022 treated with Keytruda medication by Dr. DMITRI Bean.    Admitted with severe dysphagia & CT neck 11/14/23 shows enlarging 3.1 cm x 4.1 cm soft tissue mass along right superior margin of laryngectomy site. Pt being considered for G tube placement.  Also evaluated by Dr. Tavarez ENT.  Pt will have F/U with Dr. Sethi at [facility].   Pt seen for consideration of additional XRT treatment. As noted pt denies hemoptysis.  He notes minimal pain along laryngectomy site.  In view of this, additional XRT treatment may pose significant risk of side effects including but not limited to fistula formation & soft tissue necrosis, with limited benefit.  On discussing this with pt he is hesitant to consider additional XRT rx.  I discussed with Dr. DIANE Holliday in Oncology & will discuss with Dr. Tavarez ENT.  Will follow & see pt depending on final recommendations from ENT & Oncology. MAXIMILIANO Mclaughlin MD.

## 2023-11-15 NOTE — CONSULTS
[facility] Consultation [facility]    Raj Warren MRN# [id number]   Age: 73 year old YOB: 1950     Date of Admission:  11/14/2023  Date of Consult:    11/15/23    Reason for consult: Recurrent laryngeal cancer, esophageal dysphagia       Requesting physician: Autumn Orozco MD                           Chief Complaint:   Dysphagia              HPI:      Raj Warren is a 73 year old male with medical history significant for laryngeal cancer, s/p total laryngectomy and bilateral neck dissection followed by radiation treatment with recurrence of the disease on Keytruda, was sent to the ER from oncology clinic for evaluation of dysphagia.  Patient is admitted on 11/14/2023 for further management.       Dysphagia due to recurrent laryngeal cancer  *Follows with Dr. Bean from oncology and  from ENT. S/p total laryngectomy with bilateral neck dissection on 07/08/2021 followed by postoperative radiation at that time no evidence of recurrence. He has tracheostomy and speaking valve.  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 10/27/2022-started single agent Keytruda.  * 4 days of dysphagia, CT soft tissue neck in ER shows Enlarging soft tissue mass along right superior margin of the laryngectomy site, 4 cm in maximum dimension, previously 1.9 cm.  Mass effect on the upper esophagus.     -Admit to inpatient  -Received dexamethasone 10 Mg in ER, defer further steroid to oncology from a.m.  -[facility] and [facility] consult.  -N.p.o., IVF.  Discussed with patient regarding alternative methods of nutrition including Davon feeding or PEG tube, further discussion per oncology    Patient had noted progressive dysphagia over the course of 4 days leading to admission.  He was unable to eat, drink or swallow pills.  He had a gastrostomy tube years ago following laryngectomy in 2021  "but has otherwise been doing well until recently.  He was identified to have a recurrent squamous cell carcinoma in September 2022 on CT pet imaging and subsequently underwent fine-needle aspiration biopsy confirming recurrent squamous cell carcinoma.  He has been subsequently treated with Keytruda.  He is not experiencing significant neck pain.  He is breathing comfortably from his stoma.  He has not experienced hemoptysis.  He uses a humidifier over the stoma.  He speaks with an electrolarynx.    Previous tests and diagnostic procedures: see \"Tests and Procedures\" and \"PFSH\".               Past Medical History:     Past Medical History:   Diagnosis Date    Allergic state     Anemia     Former smoker     Malignant neoplasm of larynx (H) 04/20/2021    Pulmonary emboli (H) 04/28/2021    Bilateral-s/p embolectomy and IVC filter placement    S/P percutaneous endoscopic gastrostomy (PEG) tube placement (H)     Tracheostomy in place (H)                Past Surgical History:     Past Surgical History:   Procedure Laterality Date    DISSECTION RADICAL NECK BILATERAL Bilateral 7/8/2021    Procedure: bilateral neck dissections;  Surgeon: Marilou Sethi MD;  Location: [facility] OR    ENDOSCOPIC INSERTION TUBE GASTROSTOMY Left 7/8/2021    Procedure: INSERTION, PEG TUBE with Esophagogastroduodenoscopy;  Surgeon: Kg Montaño MD;  Location: [facility] OR    IR IVC FILTER PLACEMENT  4/30/2021    IR IVC FILTER REMOVAL  2/7/2022    LARYNGECTOMY N/A 7/8/2021    Procedure: LARYNGECTOMY;  Surgeon: Marilou Sethi MD;  Location: [facility] OR    LARYNGOSCOPY N/A 4/12/2021    Procedure: LARYNGOSCOPY;  Surgeon: Choco Johnson MD;  Location: [facility] OR    LARYNGOSCOPY N/A 7/8/2021    Procedure: LARYNGOSCOPY;  Surgeon: Marilou Sethi MD;  Location: [facility] OR    REPAIR ANEURYSM ASCENDING AORTA N/A 4/28/2021    Procedure: PULMONARY THROMBECTOMY;  Surgeon: Yumi Singh MD;  Location: [facility] OR    TRACHEOSTOMY  4/12/2021    Procedure: CREATION, " TRACHEOSTOMY;  Surgeon: Choco Johnson MD;  Location:  OR               Social History:     Social History     Socioeconomic History    Marital status: Single     Spouse name: Not on file    Number of children: Not on file    Years of education: Not on file    Highest education level: Not on file   Occupational History    Not on file   Tobacco Use    Smoking status: Former     Types: Cigarettes     Quit date: 1989     Years since quittin.8    Smokeless tobacco: Never    Tobacco comments:     quit in   had smoked about 1/2 pack a day then cut back   Substance and Sexual Activity    Alcohol use: No     Comment: No alcohol since     Drug use: No    Sexual activity: Yes     Partners: Female   Other Topics Concern    Parent/sibling w/ CABG, MI or angioplasty before 65F 55M? Not Asked   Social History Narrative    Not on file     Social Determinants of Health     Financial Resource Strain: Low Risk  (2021)    Overall Financial Resource Strain (CARDIA)     Difficulty of Paying Living Expenses: Not hard at all   Food Insecurity: No Food Insecurity (2021)    Hunger Vital Sign     Worried About Running Out of Food in the Last Year: Never true     Ran Out of Food in the Last Year: Never true   Transportation Needs: No Transportation Needs (2021)    PRAPARE - Transportation     Lack of Transportation (Medical): No     Lack of Transportation (Non-Medical): No   Physical Activity: Not on file   Stress: Not on file   Social Connections: Not on file   Interpersonal Safety: Not At Risk (2023)    Humiliation, Afraid, Rape, and Kick questionnaire     Fear of Current or Ex-Partner: No     Emotionally Abused: No     Physically Abused: No     Sexually Abused: No   Housing Stability: Not on file               Family History:     Family History   Problem Relation Age of Onset    Circulatory Mother         blood clots    Alcohol/Drug Father         alcohol drank heavily    Alcohol/Drug Brother          alcohol               Immunizations:     Immunization History   Administered Date(s) Administered    COVID-19 MONOVALENT 12+ (Pfizer) 04/01/2021, 04/22/2021, 01/07/2022    TDAP Vaccine (Adacel) 12/12/2017    Td (Adult), Adsorbed 04/14/2006               Allergies:   Pollen extract          Medications:     Current Facility-Administered Medications:     bisacodyl (DULCOLAX) suppository 10 mg, 10 mg, Rectal, Daily PRN, Autumn Orozco MD    dextrose 5% and 0.45% NaCl + KCl 20 mEq/L infusion, , Intravenous, Continuous, Autumn Orozco MD, Last Rate: 75 mL/hr at 11/15/23 0301, New Bag at 11/15/23 0301    famotidine (PEPCID) injection 20 mg, 20 mg, Intravenous, Q12H, Autumn Orozco MD, 20 mg at 11/15/23 1009    heparin infusion 25,000 units in D5W 250 mL ANTICOAGULANT, 0-5,000 Units/hr, Intravenous, Continuous, Autumn Orozco MD, Last Rate: 13.5 mL/hr at 11/15/23 1005, 1,350 Units/hr at 11/15/23 1005    HYDROmorphone (DILAUDID) injection 0.2 mg, 0.2 mg, Intravenous, Q2H PRN, Autumn Orozco MD    metoprolol (LOPRESSOR) injection 2.5 mg, 2.5 mg, Intravenous, Q6H, Autumn Orozco MD, 2.5 mg at 11/15/23 0858    naloxone (NARCAN) injection 0.2 mg, 0.2 mg, Intravenous, Q2 Min PRN **OR** naloxone (NARCAN) injection 0.4 mg, 0.4 mg, Intravenous, Q2 Min PRN **OR** naloxone (NARCAN) injection 0.2 mg, 0.2 mg, Intramuscular, Q2 Min PRN **OR** naloxone (NARCAN) injection 0.4 mg, 0.4 mg, Intramuscular, Q2 Min PRN, Samantha Borrego [facility]    Patient is already receiving anticoagulation with heparin, enoxaparin (LOVENOX), warfarin (COUMADIN)  or other anticoagulant medication, , Does not apply, Continuous PRN, Autumn Orozco MD          Review of Systems:   Review Of Systems  Skin: negative  Eyes: negative  Ears/Nose/Throat: negative, as above  Respiratory: No shortness of breath, dyspnea on exertion, cough, or hemoptysis  Cardiovascular: negative  Gastrointestinal: as above and dysphagia  Genitourinary:  "negative  Musculoskeletal: negative  Neurologic: negative  Psychiatric: negative  Hematologic/Lymphatic/Immunologic: negative  Endocrine: negative          Physical exam:   CONSTITUTION:    BP (!) 152/82 (BP Location: Right arm)   Pulse 66   Temp 97.5  F (36.4  C) (Oral)   Resp 16   Ht 1.803 m (5' 11\")   Wt 93 kg (205 lb)   SpO2 99%   BMI 28.59 kg/m       General appearance:Well developed, well nourished and groomed. No apparent acute or chronic distress.    Ability to communicate: normal.       HEAD, FACE, SALIVARY GLANDS AND TMJ:    Inspection of Head and Face: Normal contour and symmetry. No masses, lesions, or significant scars observed.    Palpation/Percussion of the Face: No tenderness, deformity or instability.    Palpation of Parotid and Submaxillary glands: Normal.    Facial Mobility: Normal.       EYES: Ocular Motility: gaze appears conjugate in all positions; no evident nystagmus.       EAR, NOSE, MOUTH AND THROAT:    Pinnas and External Nose: Normal.     Hearing: Conversational speech rarely or never misunderstands words.    Nasal Interior:  Mucosa -congested mucosa.    Lips, Teeth and Gums: Normal.    Oral Cavity and Oropharynx: Normal.    Base of tongue, Pharyngeal walls, Vallecula and Pyriform Sinuses: Unable to complete mirror examination because of hyperactive gag.    Larynx: Surgically absent.    Nasopharynx examination: Could not visualize with the mirror due to gag.       NECK AND THYROID:    Neck: Postsurgical changes consistent with bilateral neck dissection and laryngectomy.  There is general fullness in the right cheryl-stomal neck.  There is a stoma protector in place which I removed the humidifier to reveal a well-healed very healthy appearing stoma.     RESPIRATORY: Chest Wall expands normally and no deformities noted. Respiratory Effort normal. Auscultation: normal breath sounds.       LYMPH NODES: Neck Nodes: Surgically absent with notable radiation fibrosis bilaterally.  No " palpable lymphadenopathy..       NEUROLOGIC:    Level of orientation: normal to time, place, person and situation.    Cranial nerves: Cranial nerves II-XII grossly intact and symmetrical.       PSYCHIATRIC:    Level of consciousness: awake and alert.    Judgment and insight: normal.    Mood and affect: normal and appropriate to the situation.     Procedure: Tracheobronchoscopy through established laryngectomy    Indications: This is a 73-year-old gentleman with history of advanced stage laryngeal squamous cell carcinoma status post laryngectomy in 2021.  He had been doing well though developed severe dysphagia leading to hospitalization and was identified to have a paraesophageal mass causing external compression on the esophagus.  We discussed the rationale to evaluate the airway including risks, benefits and limitations of the procedure and his consent was obtained.  Operative procedure: Patient was seen at the bedside where he was positioned for evaluation and via the established stoma the trachea and upper mainstem bronchi were examined.  Healthy appearing mucosa is noted with no masses visualized within the trachea or at the upper mainstem bronchi.  There are no abnormal secretions or tracheitis.  Tolerance: Good          Data:     Results for orders placed or performed during the hospital encounter of 11/14/23   Soft tissue neck CT w contrast     Status: None    Narrative    EXAM: CT SOFT TISSUE NECK W CONTRAST  LOCATION: [facility]  DATE: 11/14/2023    INDICATION: History of laryngeal cancer and laryngectomy. New onset of dysphagia with food and liquids.  COMPARISON: CT 04/26/2023.  CONTRAST: 90 mL Isovue 370  TECHNIQUE: Routine CT Soft Tissue Neck with IV contrast. Multiplanar reformats. Dose reduction techniques were used.    FINDINGS:   SOFT TISSUES: Status post total laryngectomy. Soft tissue mass along the right superior margin of the laryngectomy site measures 3.1 x 2.8 x 4.1  cm (AP, TR, CC), previously measuring 1.9 x 1.6 x 1.7 cm. The lesion demonstrates central hypodensity, causes   mass effect on the upper esophagus. Bilateral radical neck dissection.    MUCOSAL SPACES: Normal parapharyngeal space and subcutaneous soft tissues. Normal oral cavity,  spaces, and floor of mouth structures.    LYMPH NODES: No pathologic lymph nodes by size or morphology criteria.     SALIVARY GLANDS: Normal parotid and submandibular glands.    THYROID: Right thyroidectomy.     VESSELS: Vascular structures of the neck are patent. Soft tissue thickening along the common carotid arteries bilaterally, likely posttreatment effect.    VISUALIZED INTRACRANIAL/ORBITS/SINUSES: No abnormality of the visualized intracranial compartment or orbits. Visualized paranasal sinuses and mastoid air cells are clear.    OTHER: No destructive osseous lesion. The included lung apices are clear. Advanced cervical spondylosis.      Impression    IMPRESSION:   1.  Enlarging soft tissue mass along the right superior margins of the laryngectomy site measures 4 cm in maximum dimension, previously 1.9 cm, suggestive of recurrence, with mass effect on the upper esophagus.  2.  No enlarged neck lymph nodes.   Basic metabolic panel     Status: Abnormal   Result Value Ref Range    Sodium 138 135 - 145 mmol/L    Potassium 4.4 3.4 - 5.3 mmol/L    Chloride 105 98 - 107 mmol/L    Carbon Dioxide (CO2) 20 (L) 22 - 29 mmol/L    Anion Gap 13 7 - 15 mmol/L    Urea Nitrogen 8.6 8.0 - 23.0 mg/dL    Creatinine 0.83 0.67 - 1.17 mg/dL    GFR Estimate >90 >60 mL/min/1.73m2    Calcium 9.0 8.8 - 10.2 mg/dL    Glucose 77 70 - 99 mg/dL   CBC with platelets and differential     Status: None   Result Value Ref Range    WBC Count 5.6 4.0 - 11.0 10e3/uL    RBC Count 4.80 4.40 - 5.90 10e6/uL    Hemoglobin 14.4 13.3 - 17.7 g/dL    Hematocrit 42.8 40.0 - 53.0 %    MCV 89 78 - 100 fL    MCH 30.0 26.5 - 33.0 pg    MCHC 33.6 31.5 - 36.5 g/dL    RDW 12.7  10.0 - 15.0 %    Platelet Count 246 150 - 450 10e3/uL    % Neutrophils 60 %    % Lymphocytes 24 %    % Monocytes 10 %    % Eosinophils 5 %    % Basophils 1 %    % Immature Granulocytes 0 %    NRBCs per 100 WBC 0 <1 /100    Absolute Neutrophils 3.4 1.6 - 8.3 10e3/uL    Absolute Lymphocytes 1.3 0.8 - 5.3 10e3/uL    Absolute Monocytes 0.6 0.0 - 1.3 10e3/uL    Absolute Eosinophils 0.3 0.0 - 0.7 10e3/uL    Absolute Basophils 0.1 0.0 - 0.2 10e3/uL    Absolute Immature Granulocytes 0.0 <=0.4 10e3/uL    Absolute NRBCs 0.0 10e3/uL   Partial thromboplastin time     Status: Abnormal   Result Value Ref Range    aPTT 48 (H) 22 - 38 Seconds   Basic metabolic panel     Status: Abnormal   Result Value Ref Range    Sodium 137 135 - 145 mmol/L    Potassium 4.0 3.4 - 5.3 mmol/L    Chloride 105 98 - 107 mmol/L    Carbon Dioxide (CO2) 20 (L) 22 - 29 mmol/L    Anion Gap 12 7 - 15 mmol/L    Urea Nitrogen 9.9 8.0 - 23.0 mg/dL    Creatinine 0.72 0.67 - 1.17 mg/dL    GFR Estimate >90 >60 mL/min/1.73m2    Calcium 9.1 8.8 - 10.2 mg/dL    Glucose 124 (H) 70 - 99 mg/dL   Partial thromboplastin time     Status: Abnormal   Result Value Ref Range    aPTT 189 (HH) 22 - 38 Seconds   CBC with platelets and differential     Status: None   Result Value Ref Range    WBC Count 5.5 4.0 - 11.0 10e3/uL    RBC Count 4.89 4.40 - 5.90 10e6/uL    Hemoglobin 14.8 13.3 - 17.7 g/dL    Hematocrit 43.5 40.0 - 53.0 %    MCV 89 78 - 100 fL    MCH 30.3 26.5 - 33.0 pg    MCHC 34.0 31.5 - 36.5 g/dL    RDW 12.4 10.0 - 15.0 %    Platelet Count 278 150 - 450 10e3/uL    % Neutrophils 82 %    % Lymphocytes 14 %    % Monocytes 4 %    % Eosinophils 0 %    % Basophils 0 %    % Immature Granulocytes 0 %    NRBCs per 100 WBC 0 <1 /100    Absolute Neutrophils 4.4 1.6 - 8.3 10e3/uL    Absolute Lymphocytes 0.8 0.8 - 5.3 10e3/uL    Absolute Monocytes 0.2 0.0 - 1.3 10e3/uL    Absolute Eosinophils 0.0 0.0 - 0.7 10e3/uL    Absolute Basophils 0.0 0.0 - 0.2 10e3/uL    Absolute Immature  Granulocytes 0.0 <=0.4 10e3/uL    Absolute NRBCs 0.0 10e3/uL   CBC with platelets + differential     Status: None    Narrative    The following orders were created for panel order CBC with platelets + differential.  Procedure                               Abnormality         Status                     ---------                               -----------         ------                     CBC with platelets and d...[[id number]]                      Final result                 Please view results for these tests on the individual orders.   CBC with Platelets & Differential     Status: None    Narrative    The following orders were created for panel order CBC with Platelets & Differential.  Procedure                               Abnormality         Status                     ---------                               -----------         ------                     CBC with platelets and d...[[id number]]                      Final result                 Please view results for these tests on the individual orders.        Assessment and Plan:   73 year-old with history of advanced metastatic laryngeal cancer status post bilateral neck dissection, laryngectomy in July 2021. Received postsurgical radiation. He demonstrates evidence of recurrent disease adjacent to the esophagus with new onset of dysphagia.  CT soft tissue neck in ER shows enlarging soft tissue mass along right superior margin of the laryngectomy site, 4 cm in maximum dimension, previously 1.9 cm.  Mass effect on the upper esophagus.  We discussed these recent findings which explain dysphagia due to external compression of the esophagus due to tumor.  We discussed his immediate needs with respect to nutrition and hydration and the rationale to place a PEG tube.  I discussed this with Patrick Palafox MD who will help coordinate this for Mr. Warren.  I recommend follow-up with oncology to consider any available chemotherapeutic options.  I recommend follow-up with  Dr. Sethi at the [facility] within the next week as well to reevaluate and consider any surgical management available.  Patient has previously been treated with radiation, though agree that radiation oncology consultation for evaluation is reasonable as well.  Please call with any questions or concerns.    Attestation:  I have reviewed today's vital signs, notes, medications, medication allergies, and PFSH/ROSlabs and imaging.    Roger Tavarez MD

## 2023-11-15 NOTE — PHARMACY-ADMISSION MEDICATION HISTORY
Pharmacist Admission Medication History    Admission medication history is complete. The information provided in this note is only as accurate as the sources available at the time of the update.    Information Source(s): Patient and CareEverywhere/SureScripts via in-person    Pertinent Information: Patient did not receive any doses of Lovenox prior to admission.    Changes made to PTA medication list:  Added: None  Deleted: None  Changed: None    Allergies reviewed with patient and updates made in EHR: no    Medication History Completed By: Yani Mclaughlin [facility] 11/14/2023 9:26 PM    Prior to Admission medications    Medication Sig Last Dose Taking? Auth Provider Long Term End Date   apixaban ANTICOAGULANT (ELIQUIS) 5 MG tablet Take 1 tablet (5 mg) by mouth 2 times daily 11/13/2023 at am Yes Rosa Bean MD     levothyroxine (SYNTHROID/LEVOTHROID) 75 MCG tablet Take 1 tablet (75 mcg) by mouth daily 11/13/2023 at am Yes Rosa Bean MD Yes    metoprolol tartrate (LOPRESSOR) 25 MG tablet Take 1 tablet (25 mg) by mouth 2 times daily 11/13/2023 at am Yes Rosa Bean MD Yes    enoxaparin ANTICOAGULANT (LOVENOX) 80 MG/0.8ML syringe Inject 0.9 mLs (90 mg) Subcutaneous 2 times daily for 30 days  Patient not taking: Reported on 11/14/2023 Has not started  Kyree Jurado APRN CNP No 12/14/23

## 2023-11-15 NOTE — H&P
[facility]    History and Physical - Hospitalist Service       Date of Admission:  11/14/2023    Assessment & Plan      Raj Warren is a 73 year old male with medical history significant for laryngeal cancer, s/p total laryngectomy and bilateral neck dissection followed by radiation treatment with recurrence of the disease on Keytruda, was sent to the ER from oncology clinic for evaluation of dysphagia.  Patient is admitted on 11/14/2023 for further management.      Dysphagia due to recurrent laryngeal cancer  *Follows with Dr. Bean from oncology and  from ENT. S/p total laryngectomy with bilateral neck dissection on 07/08/2021 followed by postoperative radiation at that time no evidence of recurrence. He has tracheostomy and speaking valve.  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 10/27/2022-started single agent Keytruda.  * 4 days of dysphagia, CT soft tissue neck in ER shows Enlarging soft tissue mass along right superior margin of the laryngectomy site, 4 cm in maximum dimension, previously 1.9 cm.  Mass effect on the upper esophagus.    -Admit to inpatient  -Received dexamethasone 10 Mg in ER, defer further steroid to oncology from a.m.  -[place] oncology and ENT consult.  -N.p.o., IVF.  Discussed with patient regarding alternative methods of nutrition including Davon feeding or PEG tube, further discussion per oncology    History of PE  Bilateral PE May 2021, s/p embolectomy and IVC filter was placed.  Patient has been maintained on apixaban but unable to swallow due to dysphagia.  Last dose 11/13 morning.  -I will place him on high intensity heparin drip pending ENT consult and further discussion on alternative nutrition.    Hypertension  PTA is on metoprolol  -IV metoprolol ordered with hold parameters    Hypothyroidism  -Hold PTA Synthroid, resume as IV in the next  "day or 2        Diet:  NPO  DVT Prophylaxis: Heparin drip  Mera Catheter: Not present  Lines: None     Cardiac Monitoring: None  Code Status:  Full code    Clinically Significant Risk Factors Present on Admission                      # Overweight: Estimated body mass index is 28.59 kg/m  as calculated from the following:    Height as of this encounter: 1.803 m (5' 11\").    Weight as of this encounter: 93 kg (205 lb).              Disposition Plan            Autumn Orozco MD  Hospitalist Service  [facility]  Securely message with [employer] (more info)  Text page via [facility] Paging/Directory     ______________________________________________________________________    Chief Complaint   Dysphagia    History is obtained from the patient, chart review, discussion with ER provider    History of Present Illness   Raj Warren is a 73 year old male with medical history significant for laryngeal cancer, s/p total laryngectomy and bilateral neck dissection followed by radiation treatment with recurrence of the disease on Keytruda, was sent to the ER from oncology clinic for evaluation of dysphagia.  Patient reports dysphagia for 4 days which has progressed quickly and he is unable to take anything p.o. including his medications for over 24 hours.  Patient denies fever, cough, shortness of breath.  He went to the oncology clinic today from where he was sent to ER for further evaluation.    In ER, patient was evaluated by Bárbara Diro PA-C.  Vitals WNL.  Labs including CBC BMP unremarkable. CT soft tissue neck in ER shows Enlarging soft tissue mass along right superior margin of the laryngectomy site, 4 cm in maximum dimension, previously 1.9 cm.  Mass effect on the upper esophagus.  IV normal saline given, 10 mg dexamethasone IV given and hospitalist service contacted for admission.      Past Medical History    Past Medical History:   Diagnosis Date    Allergic state     Anemia     Former " smoker     Malignant neoplasm of larynx (H) 04/20/2021    Pulmonary emboli (H) 04/28/2021    Bilateral-s/p embolectomy and IVC filter placement    S/P percutaneous endoscopic gastrostomy (PEG) tube placement (H)     Tracheostomy in place (H)        Past Surgical History   Past Surgical History:   Procedure Laterality Date    DISSECTION RADICAL NECK BILATERAL Bilateral 7/8/2021    Procedure: bilateral neck dissections;  Surgeon: Marilou Sethi MD;  Location: [facility] OR    ENDOSCOPIC INSERTION TUBE GASTROSTOMY Left 7/8/2021    Procedure: INSERTION, PEG TUBE with Esophagogastroduodenoscopy;  Surgeon: Kg Montaño MD;  Location: [facility] OR    IR IVC FILTER PLACEMENT  4/30/2021    IR IVC FILTER REMOVAL  2/7/2022    LARYNGECTOMY N/A 7/8/2021    Procedure: LARYNGECTOMY;  Surgeon: Marilou Sethi MD;  Location: [facility] OR    LARYNGOSCOPY N/A 4/12/2021    Procedure: LARYNGOSCOPY;  Surgeon: Chooc Johnson MD;  Location: [facility] OR    LARYNGOSCOPY N/A 7/8/2021    Procedure: LARYNGOSCOPY;  Surgeon: Marilou Sethi MD;  Location: [facility] OR    REPAIR ANEURYSM ASCENDING AORTA N/A 4/28/2021    Procedure: PULMONARY THROMBECTOMY;  Surgeon: Yumi Singh MD;  Location: [facility] OR    TRACHEOSTOMY  4/12/2021    Procedure: CREATION, TRACHEOSTOMY;  Surgeon: Choco Johnson MD;  Location: [facility] OR       Prior to Admission Medications   Prior to Admission Medications   Prescriptions Last Dose Informant Patient Reported? Taking?   apixaban ANTICOAGULANT (ELIQUIS) 5 MG tablet   No No   Sig: Take 1 tablet (5 mg) by mouth 2 times daily   Patient not taking: Reported on 11/14/2023   enoxaparin ANTICOAGULANT (LOVENOX) 80 MG/0.8ML syringe   No No   Sig: Inject 0.9 mLs (90 mg) Subcutaneous 2 times daily for 30 days   Patient not taking: Reported on 11/14/2023   levothyroxine (SYNTHROID/LEVOTHROID) 75 MCG tablet   No No   Sig: Take 1 tablet (75 mcg) by mouth daily   Patient not taking: Reported on 11/14/2023   metoprolol tartrate (LOPRESSOR) 25 MG  tablet   No No   Sig: Take 1 tablet (25 mg) by mouth 2 times daily   Patient not taking: Reported on 11/14/2023      Facility-Administered Medications: None        Review of Systems    The 10 point Review of Systems is negative other than noted in the HPI or here.       Physical Exam   Vital Signs: Temp: 98  F (36.7  C) Temp src: Oral BP: (!) 154/88 Pulse: 91   Resp: 16 SpO2: 99 % O2 Device: None (Room air)    Weight: 205 lbs 0 oz    General: AAOx3, appears comfortable.  HEENT: PERRLA EOMI. Mucosa moist.  He has tracheostomy that is capped and he speaks with the speaking valve.  Lungs: Bilateral equal air entry. Clear to auscultation, normal work of breathing.   CVS: S1S2 regular, no tachycardia or murmur.   Abdomen: Soft, NT, ND. BS heard.  MSK: No edema or deformities.  Neuro: AAOX3. CN 2-12 normal. Strength symmetrical.  Skin: No rash.       Medical Decision Making       65 MINUTES SPENT BY ME on the date of service doing chart review, history, exam, documentation & further activities per the note.      Data     I have personally reviewed the following data over the past 24 hrs:    5.6  \   14.4   / 246     138 105 8.6 /  77   4.4 20 (L) 0.83 \       Imaging results reviewed over the past 24 hrs:   Recent Results (from the past 24 hour(s))   Soft tissue neck CT w contrast    Narrative    EXAM: CT SOFT TISSUE NECK W CONTRAST  LOCATION: [facility]  DATE: 11/14/2023    INDICATION: History of laryngeal cancer and laryngectomy. New onset of dysphagia with food and liquids.  COMPARISON: CT 04/26/2023.  CONTRAST: 90 mL Isovue 370  TECHNIQUE: Routine CT Soft Tissue Neck with IV contrast. Multiplanar reformats. Dose reduction techniques were used.    FINDINGS:   SOFT TISSUES: Status post total laryngectomy. Soft tissue mass along the right superior margin of the laryngectomy site measures 3.1 x 2.8 x 4.1 cm (AP, TR, CC), previously measuring 1.9 x 1.6 x 1.7 cm. The lesion demonstrates central  hypodensity, causes   mass effect on the upper esophagus. Bilateral radical neck dissection.    MUCOSAL SPACES: Normal parapharyngeal space and subcutaneous soft tissues. Normal oral cavity,  spaces, and floor of mouth structures.    LYMPH NODES: No pathologic lymph nodes by size or morphology criteria.     SALIVARY GLANDS: Normal parotid and submandibular glands.    THYROID: Right thyroidectomy.     VESSELS: Vascular structures of the neck are patent. Soft tissue thickening along the common carotid arteries bilaterally, likely posttreatment effect.    VISUALIZED INTRACRANIAL/ORBITS/SINUSES: No abnormality of the visualized intracranial compartment or orbits. Visualized paranasal sinuses and mastoid air cells are clear.    OTHER: No destructive osseous lesion. The included lung apices are clear. Advanced cervical spondylosis.      Impression    IMPRESSION:   1.  Enlarging soft tissue mass along the right superior margins of the laryngectomy site measures 4 cm in maximum dimension, previously 1.9 cm, suggestive of recurrence, with mass effect on the upper esophagus.  2.  No enlarged neck lymph nodes.

## 2023-11-15 NOTE — PROVIDER NOTIFICATION
MD Notification    Notified Person: MD    Notified Person Name: Dr. Gaston     Notification Date/Time: 11/15/23 @ 0420    Notification Interaction: Radha    Purpose of Notification: Updated Regarding Pts Vital signs.     Orders Received:  No orders received.     Comments:

## 2023-11-15 NOTE — PROGRESS NOTES
Total laryngectomy patient  Patient is awake, alert, on room air breathing comfortable. Stoma site dry, clean. Emergency supplies including Easy mask, Ambu bag and Shiley cuff size 8 is available at bedside.

## 2023-11-15 NOTE — PROGRESS NOTES
"Pt refused to have Easy mask, Ambu bag and Shiley cuff size 8 in the room. Says \"I do not want to be charged for them\". Pt education but still reused. Charge okayed to leave them outside of pt room for ease of access when needed.   "

## 2023-11-15 NOTE — ED NOTES
[facility]  ED Nurse Handoff Report    ED Chief complaint: Dysphagia      ED Diagnosis:   Final diagnoses:   Dysphagia, unspecified type   Laryngeal mass   Tracheostomy in place (H)   Pulmonary embolism, unspecified chronicity, unspecified pulmonary embolism type, unspecified whether acute cor pulmonale present (H)       Code Status: Full Code    Allergies:   Allergies   Allergen Reactions    Pollen Extract        Patient Story: pt with hx of laryngeal ca and trach reports difficulty swallowing since yesterday. Pt normally drinks and eats, but having difficulty since yesterday.   Focused Assessment:  a&o x4, difficulty swallowing, but denies difficulty breathing. Lungs clear.     Treatments and/or interventions provided: labs, CT    Results for orders placed or performed during the hospital encounter of 11/14/23   Soft tissue neck CT w contrast     Status: None    Narrative    EXAM: CT SOFT TISSUE NECK W CONTRAST  LOCATION: [facility]  DATE: 11/14/2023    INDICATION: History of laryngeal cancer and laryngectomy. New onset of dysphagia with food and liquids.  COMPARISON: CT 04/26/2023.  CONTRAST: 90 mL Isovue 370  TECHNIQUE: Routine CT Soft Tissue Neck with IV contrast. Multiplanar reformats. Dose reduction techniques were used.    FINDINGS:   SOFT TISSUES: Status post total laryngectomy. Soft tissue mass along the right superior margin of the laryngectomy site measures 3.1 x 2.8 x 4.1 cm (AP, TR, CC), previously measuring 1.9 x 1.6 x 1.7 cm. The lesion demonstrates central hypodensity, causes   mass effect on the upper esophagus. Bilateral radical neck dissection.    MUCOSAL SPACES: Normal parapharyngeal space and subcutaneous soft tissues. Normal oral cavity,  spaces, and floor of mouth structures.    LYMPH NODES: No pathologic lymph nodes by size or morphology criteria.     SALIVARY GLANDS: Normal parotid and submandibular glands.    THYROID: Right  thyroidectomy.     VESSELS: Vascular structures of the neck are patent. Soft tissue thickening along the common carotid arteries bilaterally, likely posttreatment effect.    VISUALIZED INTRACRANIAL/ORBITS/SINUSES: No abnormality of the visualized intracranial compartment or orbits. Visualized paranasal sinuses and mastoid air cells are clear.    OTHER: No destructive osseous lesion. The included lung apices are clear. Advanced cervical spondylosis.      Impression    IMPRESSION:   1.  Enlarging soft tissue mass along the right superior margins of the laryngectomy site measures 4 cm in maximum dimension, previously 1.9 cm, suggestive of recurrence, with mass effect on the upper esophagus.  2.  No enlarged neck lymph nodes.   Basic metabolic panel     Status: Abnormal   Result Value Ref Range    Sodium 138 135 - 145 mmol/L    Potassium 4.4 3.4 - 5.3 mmol/L    Chloride 105 98 - 107 mmol/L    Carbon Dioxide (CO2) 20 (L) 22 - 29 mmol/L    Anion Gap 13 7 - 15 mmol/L    Urea Nitrogen 8.6 8.0 - 23.0 mg/dL    Creatinine 0.83 0.67 - 1.17 mg/dL    GFR Estimate >90 >60 mL/min/1.73m2    Calcium 9.0 8.8 - 10.2 mg/dL    Glucose 77 70 - 99 mg/dL   CBC with platelets and differential     Status: None   Result Value Ref Range    WBC Count 5.6 4.0 - 11.0 10e3/uL    RBC Count 4.80 4.40 - 5.90 10e6/uL    Hemoglobin 14.4 13.3 - 17.7 g/dL    Hematocrit 42.8 40.0 - 53.0 %    MCV 89 78 - 100 fL    MCH 30.0 26.5 - 33.0 pg    MCHC 33.6 31.5 - 36.5 g/dL    RDW 12.7 10.0 - 15.0 %    Platelet Count 246 150 - 450 10e3/uL    % Neutrophils 60 %    % Lymphocytes 24 %    % Monocytes 10 %    % Eosinophils 5 %    % Basophils 1 %    % Immature Granulocytes 0 %    NRBCs per 100 WBC 0 <1 /100    Absolute Neutrophils 3.4 1.6 - 8.3 10e3/uL    Absolute Lymphocytes 1.3 0.8 - 5.3 10e3/uL    Absolute Monocytes 0.6 0.0 - 1.3 10e3/uL    Absolute Eosinophils 0.3 0.0 - 0.7 10e3/uL    Absolute Basophils 0.1 0.0 - 0.2 10e3/uL    Absolute Immature Granulocytes 0.0  "<=0.4 10e3/uL    Absolute NRBCs 0.0 10e3/uL   CBC with platelets + differential     Status: None    Narrative    The following orders were created for panel order CBC with platelets + differential.  Procedure                               Abnormality         Status                     ---------                               -----------         ------                     CBC with platelets and d...[[id number]]                      Final result                 Please view results for these tests on the individual orders.       Patient's response to treatments and/or interventions: fair    To be done/followed up on inpatient unit:  n/a    Does this patient have any cognitive concerns?: none    Activity level - Baseline/Home:  Independent  Activity Level - Current:   Independent    Patient's Preferred language: English   Needed?: No    Isolation: None  Infection: Not Applicable  Patient tested for COVID 19 prior to admission: NO  Bariatric?: No    Vital Signs:   Vitals:    11/14/23 1147   BP: (!) 154/88   Pulse: 91   Resp: 16   Temp: 98  F (36.7  C)   TempSrc: Oral   SpO2: 99%   Weight: 93 kg (205 lb)   Height: 1.803 m (5' 11\")       Cardiac Rhythm:     Was the PSS-3 completed:   Yes  What interventions are required if any?               Family Comments: n/a  OBS brochure/video discussed/provided to patient/family: N/A              Name of person given brochure if not patient: n/a              Relationship to patient: n/a    For the majority of the shift this patient's behavior was Green.   Behavioral interventions performed were reassurance and information.    ED NURSE PHONE NUMBER: *[phone]         "

## 2023-11-15 NOTE — PROGRESS NOTES
[facility]    Medicine Progress Note - Hospitalist Service    Date of Admission:  11/14/2023    Assessment & Plan   Raj Warren is a 73 year old male with medical history significant for laryngeal cancer, s/p total laryngectomy and bilateral neck dissection followed by radiation treatment with recurrence of the disease on Keytruda, was sent to the ER from oncology clinic for evaluation of dysphagia.  Patient is admitted on 11/14/2023 for further management.       Dysphagia due to recurrent laryngeal cancer  *Follows with Dr. Bean from oncology and  from ENT. S/p total laryngectomy with bilateral neck dissection on 07/08/2021 followed by postoperative radiation at that time no evidence of recurrence. He has tracheostomy and speaking valve.  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 10/27/2022-started single agent Keytruda.  * 4 days of dysphagia, CT soft tissue neck in ER shows Enlarging soft tissue mass along right superior margin of the laryngectomy site, 4 cm in maximum dimension, previously 1.9 cm.  Mass effect on the upper esophagus.     - Received dexamethasone 10 Mg in ER, defer further steroid to oncology from a.m.  - [place] oncology and ENT consult.  - discussed with ENT who relayed with patient - he is ok with PEG placement, Onc placed consult request     History of PE  Bilateral PE May 2021, s/p embolectomy and IVC filter was placed.  Patient has been maintained on apixaban but unable to swallow due to dysphagia.  Last dose 11/13 morning.  -I continue bridging with heparin gtt     Hypertension  PTA is on metoprolol  - IV metoprolol ordered with hold parameters     Hypothyroidism  - Hold PTA Synthroid, resume as IV in the next day or 2        Diet: NPO for Medical/Clinical Reasons Except for: No Exceptions    DVT Prophylaxis: Heparin gtt  Mera Catheter: Not  "present  Lines: None     Cardiac Monitoring: None  Code Status: Full Code      Clinically Significant Risk Factors Present on Admission               # Drug Induced Coagulation Defect: home medication list includes an anticoagulant medication         # Overweight: Estimated body mass index is 28.59 kg/m  as calculated from the following:    Height as of this encounter: 1.803 m (5' 11\").    Weight as of this encounter: 93 kg (205 lb).              Disposition Plan      Expected Discharge Date: 11/16/2023                    Patrick Palafox MD  Hospitalist Service  [facility]  Securely message with [employer] (more info)  Text page via [facility] Paging/Directory   ______________________________________________________________________    Interval History   Care assumed today. Pt seen and examined. Very pleasant and appreciative of cares - assisted him in getting chargers for both his pocket talker device and his phone. Denies new pain, sob, fevers or chills.     Physical Exam   Vital Signs: Temp: 98.6  F (37  C) Temp src: Oral BP: 137/87 Pulse: 69   Resp: 16 SpO2: 99 % O2 Device: None (Room air)    Weight: 205 lbs 0 oz    Gen: NAD, pleasant  HEENT: EOMI, MMM, stoma in place with protector, no surrounding erythema or edema  Resp: no focal crackles,  no wheezes, no increased work of resp  CV: S1S2 heard, reg rhythm, reg rate  Abdo: soft, nontender, nondistended, bowel sounds present  Ext: calves nontender, well perfused  Neuro: aa, conversant, moving ext, CN grossly intact, no facial asymmetry      Medical Decision Making       51 MINUTES SPENT BY ME on the date of service doing chart review, history, exam, documentation & further activities per the note.      Data     I have personally reviewed the following data over the past 24 hrs:    5.5  \   14.8   / 278     137 105 9.9 /  124 (H)   4.0 20 (L) 0.72 \     INR:  1.31 (H) PTT:  189 (HH)   D-dimer:  N/A Fibrinogen:  N/A       "

## 2023-11-16 NOTE — CONSULTS
Care Management Initial Consult    General Information  Assessment completed with: PatientRaj  Type of CM/SW Visit: Initial Assessment    Primary Care Provider verified and updated as needed: Yes   Readmission within the last 30 days: no previous admission in last 30 days      Reason for Consult: discharge planning  Advance Care Planning:            Communication Assessment  Patient's communication style: spoken language (English or Bilingual)    Hearing Difficulty or Deaf: no   Wear Glasses or Blind: other (see comments) ([place])    Cognitive  Cognitive/Neuro/Behavioral: .WDL except, speech  Level of Consciousness: alert  Arousal Level: opens eyes spontaneously  Orientation: oriented x 4  Mood/Behavior: calm, cooperative  Best Language: 0 - No aphasia  Speech: trached, speaking valve    Living Environment:   People in home: alone     Current living Arrangements: apartment      Able to return to prior arrangements: yes       Family/Social Support:  Care provided by: self  Provides care for: no one  Marital Status:   Children, Sibling(s), Significant Other       [place]  Description of Support System: Supportive, Involved         Current Resources:   Patient receiving home care services: No     Community Resources: None  Equipment currently used at home: none  Supplies currently used at home: None    Employment/Financial:  Employment Status: retired        Financial Concerns: none   Referral to Financial Worker: No       Does the patient's insurance plan have a 3 day qualifying hospital stay waiver?  No    Lifestyle & Psychosocial Needs:  Social Determinants of Health     Food Insecurity: No Food Insecurity (5/18/2021)    Hunger Vital Sign     Worried About Running Out of Food in the Last Year: Never true     Ran Out of Food in the Last Year: Never true   Depression: Not at risk (5/17/2023)    PHQ-2     PHQ-2 Score: 0   Housing Stability: Not on file   Tobacco Use: Medium Risk (11/16/2023)    Patient  History     Smoking Tobacco Use: Former     Smokeless Tobacco Use: Never     Passive Exposure: Not on file   Financial Resource Strain: Low Risk  (5/18/2021)    Overall Financial Resource Strain (CARDIA)     Difficulty of Paying Living Expenses: Not hard at all   Alcohol Use: Not on file   Transportation Needs: No Transportation Needs (5/18/2021)    PRAPARE - Transportation     Lack of Transportation (Medical): No     Lack of Transportation (Non-Medical): No   Physical Activity: Not on file   Interpersonal Safety: Not At Risk (8/31/2023)    Humiliation, Afraid, Rape, and Kick questionnaire     Fear of Current or Ex-Partner: No     Emotionally Abused: No     Physically Abused: No     Sexually Abused: No   Stress: Not on file   Social Connections: Not on file       Functional Status:  Prior to admission patient needed assistance:   Dependent ADLs:: Independent  Dependent IADLs:: Independent       Mental Health Status:          Chemical Dependency Status:                Values/Beliefs:  Spiritual, Cultural Beliefs, [employer]al Practices, Values that affect care: no               Additional Information:  Per consult for discharge planning, met with patient to discuss discharge plan of home with HI for new TFs via his G-Tube.  Per pt, he lives alone and is independent with his ADLs & IADLs.  Discussed TFs at discharge and pt stated that his insurance has been checked by [facility] and per [facility]-pt will Medicare criteria for Enteral for coverage. Patient will be covered at 80%.  Anticipated ZRUI (length of need) of 90 days or more must be documented in patient s medical chart and Enteral Must be Sole source and through Tube for Medicare to cover.  Patient was informed that he would be responsible for 20% of his tube feed formula and supplies.  Patient stated understanding of this plan of TFs at discharge and he has done TFs at home on his own in the past and is able to pay this.  Discussed having [facility] Liaison speak with his  regarding TFs at discharge plan.        Inpatient Care Coordinator will continue to follow for discharge planning.  KOKO Bradford RN, BSN, OCN   Inpatient Care Coordination [address]  Office: [phone]

## 2023-11-16 NOTE — PLAN OF CARE
Goal Outcome Evaluation:  ONLY POST BELOW FOR END OF SHIFT NOTE   [id number]  Orientation: A&&OX4  Activity: independent  Diet/BS Checks: NPO  Tele:  NA  IV Access/Drains: R PIV Infusing D5 1/2 NS with 20 mEq KCL at 75ml/hr. L PIV SL; heparin drip stopped for now. Orders to start at 7pm per IR at previous rate of 750units/hr.   Pain Management: Denies   Abnormal VS/Results: VSS. RA l  Bowel/Bladder: Continent; no BM this shift  Skin/Wounds: scars on chest and abdomen; no open areas. New G tube placed. OK to resume the feed at 7:40pm.  Consults: Hem/Onc, ENT   D/C Disposition: pending progress  Other Info: Patient has a trach with speaking valve/amplifier. K protocol WNL; Radiation Onc also following.

## 2023-11-16 NOTE — IR NOTE
Patient Name: Raj Warren  Medical Record Number: [id number]  Today's Date: 11/16/2023    Procedure: G-tube Placement  Proceduralist: Dr. Renner  Pathology present: no    Procedure Start: 1110  Procedure end: 1137  Sedation medications administered: Last Dose: 1129, Fentanyl 100 mcg, Versed 4 mg, Lidocaine with epi 10 ml's, lido gel applied for NG placement.    Report given to: Katelynn Snow RN  : no    Other Notes: Pt will require 1 bedrest post procedure. Cannot use g-tube for 6 hrs post procedure. Pt arrived to IR room 2 from [facility]. Consent reviewed. Pt denies any questions or concerns regarding procedure. Pt positioned supine and monitored per protocol. Pt tolerated procedure without any noted complications.

## 2023-11-16 NOTE — PLAN OF CARE
Goal Outcome Evaluation:  ONLY POST BELOW FOR END OF SHIFT NOTE   [id number]  Orientation: AOX4  Activity: IND  Diet/BS Checks: NPO with ice chips  Tele:  NA  IV Access/Drains: R PIV Infusing D5 0.45% NS + KCL at 75ml/hr. L PIV Infusing Heparin at 1050 unit/hr. Next PTT 1:47am  Pain Management: Denies   Abnormal VS/Results: VSS on RA ex HTN   Bowel/Bladder: Continent B/B   Skin/Wounds: Scars across Chest and abd.  Consults: Hem/Onc, ENT   D/C Disposition: Pending   Other Info: Has a speaking valve to communicate. K protocol WNL. Recheck in the AM. CT chest. IR consult for G tube placement. Radiation Onc consult placed.

## 2023-11-16 NOTE — ED PROVIDER NOTES
Provider notified.  TARUN Sewell CNP regarding when can we resume the Heparin drips.   Verbal orders to start at 7pm at previous rate of 750units/hr.     IR also informed to resume using the G-tube at 7pm.

## 2023-11-16 NOTE — PROGRESS NOTES
[facility]    Medicine Progress Note - Hospitalist Service    Date of Admission:  11/14/2023    Assessment & Plan   Raj Warren is a 73 year old male with medical history significant for laryngeal cancer, s/p total laryngectomy and bilateral neck dissection followed by radiation treatment with recurrence of the disease on Keytruda, was sent to the ER from oncology clinic for evaluation of dysphagia.  Patient is admitted on 11/14/2023 for further management.       Dysphagia due to recurrent laryngeal cancer  *Follows with Dr. Bean from oncology and  from ENT. S/p total laryngectomy with bilateral neck dissection on 07/08/2021 followed by postoperative radiation at that time no evidence of recurrence. He has tracheostomy and speaking valve.  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 10/27/2022-started single agent Keytruda.  * 4 days of dysphagia, CT soft tissue neck in ER shows Enlarging soft tissue mass along right superior margin of the laryngectomy site, 4 cm in maximum dimension, previously 1.9 cm.  Mass effect on the upper esophagus.     - Received dexamethasone 10 Mg in ER, defer further steroid to oncology - none further  - [facility], ENT, IR, Nutrition, and Rad-Onc consulted  - PEG placed 11/16, TF planning in the works - if tolerating could likely discharge home 11/17 - patient was leaning towards not pursuing any further radiation if offered   - Oncology follow up 1 week post discharge with Dr Bean     History of PE  Bilateral PE May 2021, s/p embolectomy and IVC filter was placed.  Patient has been maintained on apixaban but unable to swallow due to dysphagia.  Last dose 11/13 morning.  - heparin gtt continued if/when ok per IR, resume DOAC when ok with them     Hypertension  PTA is on metoprolol  - IV metoprolol ordered with hold parameters, transition to  "PO/G tube as able - likely 11/17     Hypothyroidism  - Hold PTA Synthroid, resume as IV in the next day or 2 if necessary          Diet: NPO for Medical/Clinical Reasons Except for: No Exceptions    DVT Prophylaxis: heparin gtt  Mera Catheter: Not present  Lines: None     Cardiac Monitoring: None  Code Status: Full Code      Clinically Significant Risk Factors               # Coagulation Defect: INR = 1.31 (Ref range: 0.85 - 1.15) and/or PTT = 98 Seconds (Ref range: 22 - 38 Seconds), will monitor for bleeding           # Overweight: Estimated body mass index is 28.59 kg/m  as calculated from the following:    Height as of this encounter: 1.803 m (5' 11\").    Weight as of this encounter: 93 kg (205 lb)., PRESENT ON ADMISSION            Disposition Plan      Expected Discharge Date: 11/18/2023                    Patrick Palafox MD  Hospitalist Service  [facility]  Securely message with [employer] (more info)  Text page via [employer] Paging/Directory   ______________________________________________________________________    Interval History   Patient seen and examined after PEG placed. Doing well and resting comfortably. Denies new complaints.    Physical Exam   Vital Signs: Temp: 98  F (36.7  C) Temp src: Oral BP: 112/73 Pulse: 80   Resp: 18 SpO2: 100 % O2 Device: None (Room air)    Weight: 205 lbs 0 oz    Gen: NAD, pleasant, resting but awakened to voice  HEENT: EOMI, MMM  Resp: no focal crackles,  no wheezes, no increased work of resp  CV: S1S2 heard, reg rhythm, reg rate  Abdo: soft, nontender, nondistended, bowel sounds present, dressing in place, CDI  Ext: calves nontender, well perfused  Neuro: aa, conversant with electrolarynx device, CN grossly intact, no facial asymmetry      Medical Decision Making       41 MINUTES SPENT BY ME on the date of service doing chart review, history, exam, documentation & further activities per the note.      Data     I have personally reviewed the following " data over the past 24 hrs:    INR:  N/A PTT:  98 (H)   D-dimer:  N/A Fibrinogen:  N/A

## 2023-11-16 NOTE — PRE-PROCEDURE
GENERAL PRE-PROCEDURE:   Procedure:  Gastrostomy tube placement with moderate sedation  Date/Time:  11/16/2023 9:16 AM    Written consent obtained?: Yes    Risks and benefits: Risks, benefits and alternatives were discussed    Consent given by:  Patient  Patient states understanding of procedure being performed: Yes    Patient's understanding of procedure matches consent: Yes    Procedure consent matches procedure scheduled: Yes    Expected level of sedation:  Moderate  Appropriately NPO:  Yes  ASA Class:  3  Mallampati  :  Grade 2- soft palate, base of uvula, tonsillar pillars, and portion of posterior pharyngeal wall visible  Lungs:  Lungs clear with good breath sounds bilaterally  Heart:  Normal heart sounds and rate  History & Physical reviewed:  History and physical reviewed and no updates needed  Statement of review:  I have reviewed the lab findings, diagnostic data, medications, and the plan for sedation    Total time: 20 minutes    Thanks [facility] Interventional Radiology CNP ([phone]) (phone [phone])

## 2023-11-16 NOTE — PROGRESS NOTES
Therapy: Enteral TF  Insurance: [facility] Medicare Advantage   Co-Insurance: 80/20  Max Out of Pocket: $2500.00  Met: $2500.00     Patient will meet Medicare criteria for Enteral for coverage. Patient will be covered at 80%. Anticipated ZURI (length of need) of 90 days or more must be documented in patient s medical chart and Enteral Must be Sole source and through Tube for Medicare to cover.   Nursing is only covered if patient is homebound. Self-Pay cost for [facility] nurse visit is $90.    In reference to [facility] for admission date 11/14/2023 to check Enteral TFcoverage.   Please contact Intake with any questions, [phone] or In Basket pool, [facility] ([phone]).

## 2023-11-16 NOTE — PROGRESS NOTES
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    PLAN:   - Hematology will sign-off  - PET 11/20 and follow-up with Dr. Bean 11/22  - Please reach out with any questions or concerns    Lorraine Malcolm PA-C (cell: [phone])      # Laryngeal Cancer  - Presented to clinic 11/16 with severe dysphagia, no new dyspnea (has trach), therefore presented to ED where CT neck showed enlarging soft tissue mass along the right superior margins of the laryngectomy it  measures  4 cm in maximum   - Underwent G-tube placement today  - Okay to discharge from oncology standpoint, he will follow-up with Dr. Bean outpatient as above    INTERIM HISTORY:  Tolerated G-tube placement well. Reports pain is moderately controlled. No new throat issues.     HISTORY OF PRESENT ILLNESS:  Follows with Dr. Bean from oncology and  from ENT  Status post total laryngectomy with bilateral neck dissection on 07/08/2021 followed by postoperative radiation at that time no evidence of recurrence  09/30/2022-PET/CT showed abnormality in the neck FNA positive for squamous cell carcinoma showed recurrence of the disease  High PD-L1 expression  10/27/2022-started single agent Keytruda  -Currently undergoing treatment with  single agent pembrolizumab      MEDICATIONS:  Reviewed     PHYSICAL EXAM:  Vital signs:  Temp: 97.9  F (36.6  C) Temp src: Oral BP: 135/81 Pulse: 91   Resp: 12 SpO2: 98 % O2 Device: None (Room air) Oxygen Delivery: 10 LPM Height: 180.3 cm (5' 11\") Weight: 93 kg (205 lb)  Estimated body mass index is 28.59 kg/m  as calculated from the following:    Height as of this encounter: 1.803 m (5' 11\").    Weight as of this " encounter: 93 kg (205 lb).    General: Pleasant patient in no acute distress.  Skin: Warm and dry. No abnormal ecchymosis or rashes.   Eyes: Anicteric.   ENT: Aphonic, trach. Right neck enlarged.   Lungs: Trach.  Abdomen: Non-distended, G-tube covered so not visualized.  Extremities: No peripheral edema. Gross movement intact without difficulty.  Mental: Calm, cooperative, appropriate. Mood euthymic.  Neuro: Alert and oriented x 3.    LABS:  Recent Labs   Lab Test 11/15/23  0719      POTASSIUM 4.0   CHLORIDE 105   CO2 20*   ANIONGAP 12   BUN 9.9   CR 0.72   *   TELMA 9.1     Recent Labs   Lab Test 11/15/23  0719 11/14/23  1556   WBC 5.5 5.6   HGB 14.8 14.4    246   MCV 89 89   NEUTROPHIL 82 60     Recent Labs   Lab Test 11/02/23  1409 10/12/23  1312 04/28/21  1703 04/28/21  0840   BILITOTAL 0.5 0.5   < > 0.4   ALKPHOS 85 79   < > 62   ALT 13 14   < >  --    AST 23 22   < > 23   ALBUMIN 4.4 4.2   < > 2.9*   LDH  --   --   --  376*    < > = values in this interval not displayed.       Lorraine Malcolm PA-C  [facility]   [phone]

## 2023-11-16 NOTE — TELEPHONE ENCOUNTER
Contacted by Dr Bean about seeing patient for surgical debulking given tumor progression on scan. CT scan reviewed, tumor progression with involvement of neopharynx, skin, encasement of carotid. Message update sent to Dr Bean that no role for debulking in these situations but especially for him based on his imaging. Recommend systemic therapy or palliative radiation. I sent a [employer] msg to patient with update as well - certainly can be seen for in person or virtual visit but will not be offering surgery to him based on imaging. I did make him aware that I am out of the office next week to make him aware that he will not be seen in the next week so do not want to introduce delays in his care while waiting for this appointment. Would recommend PEG placement sooner rather than later not only for nutrition but will get to point where can't be able to pass the instrumentation in the narrowing. If re-irradiation going to be considered to that area likely need neuro IR consult first (Dr Valladares or Dr Cox) to discuss potential carotid stenting.    Marilou Sethi MD    Department of Otolaryngology

## 2023-11-16 NOTE — PLAN OF CARE
[id number]  Orientation: A&&OX4  Activity: independent  Diet/BS Checks: NPO  Tele:  NA  IV Access/Drains: R PIV Infusing D5 1/2 NS with 20 mEq KCL at 75ml/hr. L PIV SL; heparin drip stopped per orders @ 0500 this AM; the rate was 7.5 mL/hr when stopped; it was paused for 60min and decreased for a high PTT level  Pain Management: Denies   Abnormal VS/Results: VSS. RA except slight HTN, scheduled metoprolol  Bowel/Bladder: Continent; no BM this shift  Skin/Wounds: scars on chest and abdomen; no open areas  Consults: Hem/Onc, ENT   D/C Disposition: pending progress  Other Info: Patient has a trach with speaking valve/amplifier. K protocol WNL; recheck this AM.  IR consulted for G-tube placement probably today. Radiation Onc also consulted

## 2023-11-16 NOTE — CONSULTS
CLINICAL NUTRITION SERVICES  -  ASSESSMENT NOTE      Recommendations Ordered by Registered Dietitian (RD):     Nutrition Support Enteral:  Type of Feeding Tube: to have PEG placed 11/16  Enteral Frequency:  Continuous  Enteral Regimen: 2CalHN @ 55 mL/hr  Total Enteral Provisions: 2640 cals (28 cals/kg), 110 gm pro (1.2 gm/kg), 7 gm fiber, 924 mL free water  Free Water Flush: 60 mL every 4 hrs    Begin TF @ 15 mL/hr.  Increase by 20 mL every 24 hrs to goal rate of 55 mL/hr       Malnutrition:   % Weight Loss:  None noted  % Intake:  </= 50% for >/= 5 days (severe malnutrition)  Subcutaneous Fat Loss:  None observed  Muscle Loss:  None observed  Fluid Retention:  None noted    Malnutrition Diagnosis: Patient does not meet two of the above criteria necessary for diagnosing malnutrition        REASON FOR ASSESSMENT  Raj Warren is a 73 year old male seen by Registered Dietitian for Provider Order - Registered Dietitian to Assess and Order TF per Medical Nutrition Therapy Protocol      NUTRITION HISTORY  Chart reviewed  Pt has hx of PEG in 7/2021-10/4/21    11/14: Patient states that for the past 1 week, he has been having intermittent difficulty with swallowing food and water.  Last night, he felt that food was getting stuck in his throat, so he would try to drink water to attempt to flush down the food.  However, every time he drinks anything, fluid would come right back up     At baseline he is able to drink water, eat food, and swallow pills without difficulty   Follows a regular diet - no food allergies    Pt states that he doesn't remember how to use the FT  Previously pt was on 2CalHN - 6 cans/day  He is ok with using the same formula      CURRENT NUTRITION ORDERS  Diet Order:     NPO    Plan PEG today for nutrition  Pt states he is not sure when he is going to IR, but asks if the tube feeding will start today      NUTRITION FOCUSED PHYSICAL ASSESSMENT FOR DIAGNOSING MALNUTRITION)  Yes             Observed:   "  No nutrition-related physical findings observed    Obtained from Chart/Interdisciplinary Team:  Recurrent laryngeal squamous cell carcinoma  s/p laryngectomy & bilateral neck dissection surgery 7/8/2021   Has a speaking valve to communicate      ANTHROPOMETRICS  Height: 5' 11\"  Weight:(11/14) 93 kg / 205 lbs 0 oz  Body mass index is 28.59 kg/m .  Weight Status:  Overweight BMI 25-29.9  IBW: 78.2 kg  % IBW: 119%  Weight History: wt has been stable past 5 months  Wt Readings from Last 10 Encounters:   11/14/23 93 kg (205 lb)   10/12/23 92.5 kg (204 lb)   09/21/23 93.5 kg (206 lb 3.2 oz)   09/21/23 93.6 kg (206 lb 6.4 oz)   08/31/23 94.3 kg (208 lb)   08/10/23 93.8 kg (206 lb 12.8 oz)   07/20/23 94.9 kg (209 lb 3.2 oz)   06/29/23 95.3 kg (210 lb 1.6 oz)   06/29/23 95.3 kg (210 lb)   06/08/23 94.4 kg (208 lb 3.2 oz)         LABS  Labs reviewed    MEDICATIONS  IVF (D5 containing) @ 75 mL/hr  (90 gm CHO, 306 cals)      ASSESSED NUTRITION NEEDS PER APPROVED PRACTICE GUIDELINES:    Dosing Weight 93 kg (11/14)  Estimated Energy Needs: 1903-2891 kcals (25-30 Kcal/Kg)  Justification: maintenance  Estimated Protein Needs: 110-140 grams protein (1.2-1.5 g pro/Kg)  Justification: preservation of lean body mass  Estimated Fluid Needs: 4742-5329  mL (1 mL/Kcal)  Justification: maintenance    MALNUTRITION:  % Weight Loss:  None noted  % Intake:  </= 50% for >/= 5 days (severe malnutrition)  Subcutaneous Fat Loss:  None observed  Muscle Loss:  None observed  Fluid Retention:  None noted    Malnutrition Diagnosis: Patient does not meet two of the above criteria necessary for diagnosing malnutrition      NUTRITION DIAGNOSIS:  Inadequate protein-energy intake related to NPO status as evidenced by pt has been without nutrition for the past 3 days      NUTRITION INTERVENTIONS  Recommendations / Nutrition Prescription  NPO      Nutrition Support Enteral:  Type of Feeding Tube: to have PEG placed 11/16  Enteral Frequency:  " Continuous  Enteral Regimen: 2CalHN @ 55 mL/hr  Total Enteral Provisions: 2640 cals (28 cals/kg), 110 gm pro (1.2 gm/kg), 7 gm fiber, 924 mL free water  Free Water Flush: 60 mL every 4 hrs    Begin TF @ 15 mL/hr.  Increase by 20 mL every 24 hrs to goal rate of 55 mL/hr  .      Implementation  Nutrition education: Discussed EN plans with pt  EN Composition and EN Schedule: orders completed in EPIC  .      Nutrition Goals  EN to meet % estimated needs while pt is NPO and not able to tolerate po intake  .      MONITORING AND EVALUATION:  Progress towards goals will be monitored and evaluated per protocol and Practice Guidelines

## 2023-11-17 NOTE — PLAN OF CARE
Orientation: A/Ox4, trach in place, uses amplifier and speaking valve  Activity: A/Ox4  Diet/BS Checks: NPO, ex TF running into G-tube, rate increased from 15ml/hr to 35ml/hr at 1900(increase 20ml/hr q24h); Goal rate 55ml/hr with programmed water flushes  Tele:    IV Access/Drains: PIV infusing IVF at 75ml/hr  Pain Management: IV dilaudid given for abd pain x2  Abnormal VS/Results:   Bowel/Bladder: Continent  Skin/Wounds: NA  Consults: Care management, onc  D/C Disposition: Discharge possibly Sunday to home pending tube feed toleration  Other Info: Hep gtt discontinued this evening and pt started on Eliquis.

## 2023-11-17 NOTE — PLAN OF CARE
Goal Outcome Evaluation:  DATE & TIME: 11/17/2023, [id number]  Cognitive Concerns/ Orientation: A/O x4. Calm and cooperative.  BEHAVIOR & AGGRESSION TOOL COLOR: green  ABNL VS/O2:VSS on RA  MOBILITY: Independent   PAIN MANAGMENT:Denies  DIET: NPO  BOWEL/BLADDER: Continent   DRAIN/DEVICES: G tube running @ 15 ml/hr rate which will be increased in 24hr, The rate goal is 55. PIV infusing heparin at 1050 units/hr. Recheck 0750  TELEMETRY RHYTHM:N/A  SKIN:  scars on chest and abdomen. Both L&R foot dry-peeling.  TESTS/PROCEDURES: N/a  D/C DATE: Pending  OTHER IMPORTANT INFO:Patient has a trach with speaking valve/amplifier

## 2023-11-17 NOTE — DISCHARGE INSTRUCTIONS
Tube Feeding Regimen:   Enteral Frequency:  Continuous (24 hours)  Enteral Regimen: 2Cal HN or equivalent @ 55 mL/hr  Free Water Flush: 60 mL every 4 hrs    ** If pt wishes to do gravity bolus - his goal is 6 cartons/day of TwoCalHN (he may choose the schedule of when to administer them).     Flush G-tube with 100 mL water before and after bolus feeding

## 2023-11-17 NOTE — PROGRESS NOTES
[facility]    Received request for benefit check should pt require home TF. Pt meets criteria for enteral nutrition in the home under his Medicare plan. Medicare will cover 80% and pt will be responsible for 20%. With pt's current orders (2CalHN @ 55 mL/hr continuous) his out of pocket cost is $8.34/day.     I met with Raj at bedside to introduce home infusion services and review benefits. He would like to proceed with [facility] for home Tf and is in agreement to out of pocket cost. Pt has done home TF before. He reports he did bolus feedings via gravity drip and that's what he would like to do again. He was adamant that he does not want to learn to use a mechanical TF pump. He would like a home nurse for a refresher teach on day of discharge since it's been a few years since he's done home TF.     Thank you for the referral.    Elke Combs RN  [facility] Liaison  [phone] (Mon thru Fri 8am - 5pm)  [phone] Office

## 2023-11-17 NOTE — PROGRESS NOTES
[facility]    Medicine Progress Note - Hospitalist Service    Date of Admission:  11/14/2023    Assessment & Plan     Raj Warren is a 73 year old male with medical history significant for laryngeal cancer, s/p total laryngectomy and bilateral neck dissection followed by radiation treatment with recurrence of the disease on Keytruda, was sent to the ER from oncology clinic for evaluation of dysphagia.  Patient is admitted on 11/14/2023 for further management.       Dysphagia due to recurrent laryngeal cancer  *Follows with Dr. Bean from oncology and  from ENT. S/p total laryngectomy with bilateral neck dissection on 07/08/2021 followed by postoperative radiation at that time no evidence of recurrence. He has tracheostomy and speaking valve.  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 10/27/2022-started single agent Keytruda.  * 4 days of dysphagia, CT soft tissue neck in ER shows Enlarging soft tissue mass along right superior margin of the laryngectomy site, 4 cm in maximum dimension, previously 1.9 cm.  Mass effect on the upper esophagus.e demonstrates evidence of recurrent disease adjacent to the esophagus with new onset of dysphagia      - Received dexamethasone 10 Mg in ER, defer further steroid to oncology - none further  - - PEG placed 11/16, TF planning in the works - if tolerating could likely discharge home 11/17 - patient was leaning towards not pursuing any further radiation if offered   -PET 11/20 and follow-up with Dr. Bean 11/22  Oncology follow up 1 week post discharge with Dr Bean  -Patient's tube feeds being advanced and discharge likely over the weekend.  Stop IV fluids once the tube feed goal rate is achieved  -Discussed with  for  appointment with ENT specialist at [facility] Dr. Sethi(  and referral placed  -        History of  "PE  Bilateral PE May 2021, s/p embolectomy and IVC filter was placed.  Patient has been maintained on apixaban but unable to swallow due to dysphagia.  Last dose 11/13 morning.  - heparin gtt discontinued on 11/17/23    -Eliquis restarted.  Hypertension  PTA is on metoprolol  - IV metoprolol Changed to oral metoprolol  -     Hypothyroidism  -Restart levothyroxine through PEG tube  Diet: NPO for Medical/Clinical Reasons Except for: No Exceptions    DVT Prophylaxis: DOAC  Mera Catheter: Not present  Lines: None     Cardiac Monitoring: None  Code Status: Full Code             Diet: NPO for Medical/Clinical Reasons Except for: Other; Specify: NPO For oral intake and gastric feedings for 6 hours post insertion Gastrostomy G/GJ tube. May feed through Jejunal or Distal PORT immediately for GJ tubes ONLY.  Adult Formula Drip Feeding: Continuous TwoCal HN; Gastrostomy; Goal Rate: 55; mL/hr; Once able to use FT, start at 15 mL/hr.  Increase by 20 mL every 24 hrs to goal rate of 55 mL/hr.; Do not advance tube feeding rate unless K+ is = or > 3.0, Mg++ ...    DVT Prophylaxis: DOAC  Mera Catheter: Not present  Lines: None     Cardiac Monitoring: None  Code Status: Full Code      Clinically Significant Risk Factors               # Coagulation Defect: INR = 1.31 (Ref range: 0.85 - 1.15) and/or PTT = 113 Seconds (Ref range: 22 - 38 Seconds), will monitor for bleeding           # Overweight: Estimated body mass index is 28.59 kg/m  as calculated from the following:    Height as of this encounter: 1.803 m (5' 11\").    Weight as of this encounter: 93 kg (205 lb)., PRESENT ON ADMISSION     # Financial/Environmental Concerns: none         Disposition Plan      Expected Discharge Date: 11/18/2023                    Kendra Hester MD  Hospitalist Service  [facility]  Securely message with [employer] Mobile (more info)  Text page via [email or web address] Paging/Directory "   ______________________________________________________________________    Interval History   Patient seen and examined at bedside.   Conversant with electrolarynx device     He feels well.  No pain at the PEG tube site.    Patient wants to talk with heme oncology for outpatient appointments and also had questions about repeating imaging      Patient requested ENT appointment        Denies any chest pain or shortness of breath    Physical Exam   Vital Signs: Temp: 98.7  F (37.1  C) Temp src: Oral BP: (!) 140/82 Pulse: 104   Resp: 16 SpO2: 99 % O2 Device: None (Room air) Oxygen Delivery: 10 LPM  Weight: 205 lbs 0 oz    Physical Exam  Cardiovascular:      Rate and Rhythm: Normal rate and regular rhythm.      Heart sounds: Normal heart sounds.   Pulmonary:      Effort: Pulmonary effort is normal. No respiratory distress.      Breath sounds: Normal breath sounds. No wheezing.   Abdominal:      General: There is no distension.      Palpations: Abdomen is soft.      Tenderness: There is no abdominal tenderness.      Comments: PEG site no purulence   Musculoskeletal:      Right lower leg: No edema.      Left lower leg: No edema.          Medical Decision Making             Data     I have personally reviewed the following data over the past 24 hrs:    7.3  \   16.3   / 289     139 104 7.7 (L) /  119 (H)   3.8 20 (L) 0.83 \     INR:  N/A PTT:  113 (H)   D-dimer:  N/A Fibrinogen:  N/A       Imaging results reviewed over the past 24 hrs:   No results found for this or any previous visit (from the past 24 hour(s)).

## 2023-11-17 NOTE — PROGRESS NOTES
Care Management Follow Up    Length of Stay (days): 3    Expected Discharge Date: 11/19/2023     Concerns to be Addressed:       Patient plan of care discussed at interdisciplinary rounds: Yes    Anticipated Discharge Disposition: Home, Home Infusion     Anticipated Discharge Services: None  Anticipated Discharge DME: None    Patient/family educated on Medicare website which has current facility and service quality ratings: no  Education Provided on the Discharge Plan:    Patient/Family in Agreement with the Plan: yes    Referrals Placed by CM/SW: Internal Clinic Care Coordination, Home Infusion  Private pay costs discussed: Not applicable    Additional Information:  Per Dr. Hester, anticipate pt will be able to discharge on Sunday and will need follow-up with Dr. Sethi, pt's ENT Provider.  Called Scheduling center for [facility] [phone] and was informed that despite having referral order in for ENT, they are unable to schedule and a message with high priority be sent to the ENT Clinic and pt will receive a call with his scheduled appointment.   Informed Elke SUTHERLAND Liaison for [facility] that pt will discharge on Sunday.   Per Elke, pt requesting a refresher teach on day of discharge and that [facility] will send all formula and supplies to pt's home.  Elke requested that RNCC at discharge call [facility] when orders are in for HI with TF orders.  Called Dietician and informed her of pt's discharge on Sunday and requested   Tube Feed orders be placed prior to discharge.  Met with patient and informed him of plan and to await call from ENT clinic with scheduled appointment with Dr. Sethi.    Inpatient Care Coordinator will continue to follow for discharge planning.  KOKO Bradford RN, BSN, OCN   Inpatient Care Coordination [address]  Office: [phone]

## 2023-11-17 NOTE — PROGRESS NOTES
Requested to place additional PIV, patient appears to have had many previous, recent PIVs.  Patient states port placement has been discussed, and patient is open to it.  In discussion with primary nursing, patient with one patent PIV currently, and sufficient for meds ordered.  Charge nurse will pass information to patient's physician to further discuss port placement with patient.

## 2023-11-17 NOTE — PLAN OF CARE
Goal Outcome Evaluation:      Summary:  a 73 year old male with a history of laryngeal cancer S/P total laryngectomy and tracheostomy and bilateral PE on Eliquis who presents to the ED for evaluation of dysphagia on 11/14      Date & Time: 11/16/23 [id number]   Orientation: A&O x4   Activity Level: Ind   Fall Risk: Yes   Behavior & Aggression: Green   Pain Management: PRN Dilaudid given x3   ABNL VS/O2: VS on RA   Tele: N/A   ABNL Lab/BG:    Diet:NPO  Bowel/Bladder: Cont    Drains/Devices: G tube started round 7 pm at 15 ml/hr rate which will be increased in 24hr, The rate goal is 55. PIV infusing heparin at 750 units/hr started at 7 pm. The second line for D51/2NS+KCL 20meq was leaking and vascular team was unable to put one in. The on call provider notified.   Tests/Procedures: GT placed today   Anticipated DC Date: TBD   Other Important Info:

## 2023-11-18 NOTE — PLAN OF CARE
Goal Outcome Evaluation:  11/17/23  1900-0730H    Orientation: A/Ox4, with trache and has a speaking valve  Activity: Ind in the room  Diet/BS Checks: NPO, with cont feeding at 35ml/hr via G-tube, to inc the feeding around 7pm tonight to 55ml/hr  Tele:  none  IV Access/Drains: PIV infusing to 75ml/hr  Pain Management: Dilaudid IV PRN given 1x  Abnormal VS/Results: VSS,RA exc soft BP  Bowel/Bladder: cont, no BM this shift, uses urinal at bedside  Skin/Wounds: dry BLE  Consults: SW/Hema/Onc  D/C Disposition: poss discharge to home on Sunday with Home infusion  Other Info: On K prtocol, awaiting blood draws this morning. Denies N/V, tolerating the feeding well all night.

## 2023-11-18 NOTE — PROGRESS NOTES
[facility]    Medicine Progress Note - Hospitalist Service    Date of Admission:  11/14/2023    Assessment & Plan   Raj Warren is a 73 year old male with medical history significant for laryngeal cancer, s/p total laryngectomy and bilateral neck dissection followed by radiation treatment with recurrence of the disease on Keytruda, was sent to the ER from oncology clinic for evaluation of dysphagia.  Patient is admitted on 11/14/2023 for further management.       Dysphagia due to recurrent laryngeal cancer  *Follows with Dr. Bean from oncology and  from ENT. S/p total laryngectomy with bilateral neck dissection on 07/08/2021 followed by postoperative radiation at that time no evidence of recurrence. He has tracheostomy and speaking valve.  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 10/27/2022-started single agent Keytruda.  * 4 days of dysphagia, CT soft tissue neck in ER shows Enlarging soft tissue mass along right superior margin of the laryngectomy site, 4 cm in maximum dimension, previously 1.9 cm.  Mass effect on the upper esophagus.e demonstrates evidence of recurrent disease adjacent to the esophagus with new onset of dysphagia      - Received dexamethasone 10 Mg in ER, defer further steroid to oncology - none further  - PEG placed 11/16, TF planning in the works - if tolerating could likely discharge home 11/17 - patient was leaning towards not pursuing any further radiation if offered   - PET 11/20 and follow-up with Dr. Bean 11/22  Oncology follow up 1 week post discharge with Dr Bean  - Patient's tube feeds being advanced and discharge likely over the weekend.  Stop IV fluids once the tube feed goal rate is achieved  - Discussed with  for appointment with ENT specialist at [facility] Dr. Sethi and referral placed  - planning towards  "discharge 11/19    History of PE  Bilateral PE May 2021, s/p embolectomy and IVC filter was placed.  Patient has been maintained on apixaban but unable to swallow due to dysphagia.  Last dose 11/13 morning.  - heparin gtt discontinued on 11/17/23    - Eliquis restarted.    Hypertension  PTA is on metoprolol  - IV metoprolol Changed to oral metoprolol     Hypothyroidism  -Restart levothyroxine through PEG tube  Diet: NPO for Medical/Clinical Reasons Except for: No Exceptions    DVT Prophylaxis: DOAC  Mera Catheter: Not present  Lines: None     Cardiac Monitoring: None  Code Status: Full Code          Diet: NPO for Medical/Clinical Reasons Except for: Other; Specify: NPO For oral intake and gastric feedings for 6 hours post insertion Gastrostomy G/GJ tube. May feed through Jejunal or Distal PORT immediately for GJ tubes ONLY.  Adult Formula Drip Feeding: Continuous TwoCal HN; Gastrostomy; Goal Rate: 55; mL/hr; Once able to use FT, start at 15 mL/hr.  Increase by 20 mL every 24 hrs to goal rate of 55 mL/hr.; Do not advance tube feeding rate unless K+ is = or > 3.0, Mg++ ...    DVT Prophylaxis: DOAC  Mera Catheter: Not present  Lines: None     Cardiac Monitoring: None  Code Status: Full Code      Clinically Significant Risk Factors                         # Overweight: Estimated body mass index is 27.27 kg/m  as calculated from the following:    Height as of this encounter: 1.803 m (5' 11\").    Weight as of this encounter: 88.7 kg (195 lb 8.8 oz)., PRESENT ON ADMISSION     # Financial/Environmental Concerns: none         Disposition Plan     Expected Discharge Date: 11/19/2023                    Patrick Palafox MD  Hospitalist Service  [facility]  Securely message with [employer] (more info)  Text page via [email or web address] Paging/Directory   ______________________________________________________________________    Interval History   Care re-assumed today. Patient had peg placed 2 days ago, doing ok today " - no new complaints. Planning towards home discharge tomorrow. Tolerating TF thus far. Denies new pain, fevers, chills, or sob.    Physical Exam   Vital Signs: Temp: 98.2  F (36.8  C) Temp src: Oral BP: 122/79 Pulse: 104   Resp: 18 SpO2: 100 % O2 Device: None (Room air)    Weight: 195 lbs 8.77 oz    Gen: NAD, pleasant  HEENT: EOMI, MMM  Resp: no focal crackles,  no wheezes, no increased work of resp  CV: S1S2 heard, reg rhythm, reg rate  Abdo: soft, nontender, nondistended, bowel sounds present, G tube in place, no unusual surrounding erythema, drainage, or edema seen  Ext: calves nontender, well perfused  Neuro: aa, conversant with electrolarynx, moving ext, CN grossly intact, no facial asymmetry      Medical Decision Making       41 MINUTES SPENT BY ME on the date of service doing chart review, history, exam, documentation & further activities per the note.      Data     I have personally reviewed the following data over the past 24 hrs:    N/A  \   N/A   / N/A     N/A N/A N/A /  N/A   4.6 N/A N/A \

## 2023-11-18 NOTE — PLAN OF CARE
[id number]  Orientation: A/Ox4  Activity: ind  Diet/BS Checks: NPO. G-tube with TF infusing at 35ml/hr with 60ml FWF q4h; rate increase to 55ml/hr (goal rate) at 1900  Tele:    IV Access/Drains: PIV infusing IVF at 75ml/hr  Pain Management: PRN tylenol given for 5/10 pain to neck  Abnormal VS/Results:   Bowel/Bladder: Continent  Skin/Wounds:   Consults: NA  D/C Disposition: Discharge to home 11/19 with home infusion for TF management  Other Info:

## 2023-11-19 PROBLEM — Z93.1 S/P GASTROSTOMY (H): Status: ACTIVE | Noted: 2023-01-01

## 2023-11-19 NOTE — PROGRESS NOTES
Care Management Discharge Note    Discharge Date: 11/19/2023       Discharge Disposition: Home, Home Infusion    Discharge Services: None    Discharge DME: None    Discharge Transportation:      Private pay costs discussed: Not applicable    Does the patient's insurance plan have a 3 day qualifying hospital stay waiver?  No    PAS Confirmation Code:    Patient/family educated on Medicare website which has current facility and service quality ratings: no    Education Provided on the Discharge Plan:    Persons Notified of Discharge Plans:  B  Patient/Family in Agreement with the Plan: yes    Handoff Referral Completed: Yes    Additional Information:  Writer contacted [facility] and talked to RN on-call Babita Brown ([phone]) regarding patient discharging to home with tube feedings. Pt was requesting someone demonstrate how to do gravity tube feedings again since it's been a while since he's done this at home himself. Babita stated that pt is ok to discharge to home today and someone from [facility]  will reach out to pt at home later today. ARLET RN updated and orders confirmed with Babita. CM will continue to follow for further discharge needs.       Catrachito Atkinson RN  [facility]  Inpatient Care Management - FLOAT  DIANDRA SUTHERLAND Mobile: [phone] daily 7:30-4:00

## 2023-11-19 NOTE — PLAN OF CARE
Goal Outcome Evaluation:  11/18/23  1900-0730H    Orientation: A/Ox4, with trache and has a speaking valve  Activity: up ind in the room  Diet/BS Checks: NPO, with cont feeding at 55ml/hr via G-tube, FWF 60ml Q4H  Tele:  none  IV Access/Drains: PIV infusing to 75ml/hr  Pain Management:Denies pain  Abnormal VS/Results: VSS,RA   Bowel/Bladder: cont, voiding freely, urinal at bedside, no bm this shift  Skin/Wounds:   Consults: SW/Hme/Onc/  D/C Disposition: poss discharge to home today with home infusion  Other Info: On K protocol, awaiting blood draws. Tolerated the feeding all night, denies N/V.

## 2023-11-19 NOTE — DISCHARGE SUMMARY
"[facility]  Hospitalist Discharge Summary      Date of Admission:  11/14/2023  Date of Discharge:  11/19/2023 11:50 AM  Discharging Provider: Patrick Palafox MD  Discharge Service: Hospitalist Service    Discharge Diagnoses   Dysphagia due to recurrent laryngeal cancer s/p PEG tube placement  History of PE  Hypertension  Hypothyroidism        Clinically Significant Risk Factors     # Overweight: Estimated body mass index is 27.27 kg/m  as calculated from the following:    Height as of this encounter: 1.803 m (5' 11\").    Weight as of this encounter: 88.7 kg (195 lb 8.8 oz).       Follow-ups Needed After Discharge   Follow-up Appointments     Follow-up and recommended labs and tests       1. Oncology as scheduled - PET 11/20, Dr Bean subsequently  2. PCP as needed for hospital follow up - consider CMP and CBC in next   week or so  3. ENT as planned previously            Unresulted Labs Ordered in the Past 30 Days of this Admission       No orders found from 10/15/2023 to 11/15/2023.        These results will be followed up by NA    Discharge Disposition   Discharged to home  Condition at discharge: Stable    Hospital Course   Raj Warren is a 73 year old male with medical history significant for laryngeal cancer, s/p total laryngectomy and bilateral neck dissection followed by radiation treatment with recurrence of the disease on Keytruda, was sent to the ER from oncology clinic for evaluation of dysphagia.  Patient is admitted on 11/14/2023 for further management.       Dysphagia due to recurrent laryngeal cancer  *Follows with Dr. Bean from oncology and  from ENT. S/p total laryngectomy with bilateral neck dissection on 07/08/2021 followed by postoperative radiation at that time no evidence of recurrence. He has tracheostomy and speaking valve.  *09/30/2022-PET/CT showed abnormality in the neck FNA positive for squamous cell carcinoma showed recurrence of the disease, High " PD-L1 expression. 10/27/2022-started single agent Keytruda.  * 4 days of dysphagia, CT soft tissue neck in ER shows Enlarging soft tissue mass along right superior margin of the laryngectomy site, 4 cm in maximum dimension, previously 1.9 cm.  Mass effect on the upper esophagus.e demonstrates evidence of recurrent disease adjacent to the esophagus with new onset of dysphagia      - Received dexamethasone 10 Mg in ER, defer further steroid to oncology - none further  - PEG placed 11/16, TF planning in the works - if tolerating could likely discharge home 11/17 - patient was leaning towards not pursuing any further radiation if offered   - PET 11/20 and follow-up with Dr. Bean 11/22  Oncology follow up 1 week post discharge with Dr Bean  - Patient's tube feeds being advanced and discharge likely over the weekend.  Stop IV fluids once the tube feed goal rate is achieved  - Discussed with  for appointment with ENT specialist at [facility] Dr. Sethi and referral placed  - planning discharge 11/19 - tolerating TF, Home infusion arranged for TF help.   - discharge plan as above     History of PE  Bilateral PE May 2021, s/p embolectomy and IVC filter was placed.  Patient has been maintained on apixaban but unable to swallow due to dysphagia.  Last dose 11/13 morning.  - heparin gtt discontinued on 11/17/23    - Eliquis restarted.     Hypertension  PTA is on metoprolol  - IV metoprolol Changed to oral metoprolol     Hypothyroidism  -Restart levothyroxine through PEG tube    Consultations This Hospital Stay   PHARMACY IP CONSULT  HEMATOLOGY & ONCOLOGY IP CONSULT  ENT IP CONSULT  PHARMACY IP CONSULT  RADIATION ONCOLOGY IP CONSULT  INTERVENTIONAL RADIOLOGY ADULT/PEDS IP CONSULT  NUTRITION SERVICES ADULT IP CONSULT  CARE MANAGEMENT / SOCIAL WORK IP CONSULT  PHARMACY IP CONSULT  VASCULAR ACCESS ADULT IP CONSULT    Code Status   Full Code    Time Spent on this Encounter   I, Patrick Palafox MD,  personally saw the patient today and spent greater than 30 minutes discharging this patient.       Patrick Palafox MD  [place] [address] [facility]  [address]  [place] [address]  Phone: [phone]  ______________________________________________________________________    Physical Exam   Vital Signs: Temp: 98.3  F (36.8  C) Temp src: Oral BP: 137/88 Pulse: 106   Resp: 18 SpO2: 100 % O2 Device: None (Room air)    Weight: 195 lbs 8.77 oz    Gen: NAD, extremely pleasant  HEENT: EOMI, MMM, trach in place and no surrounding edema, erythema  Resp: no focal crackles,  no wheezes, no increased work of resp  CV: S1S2 heard, reg rhythm, reg rate  Abdo: soft, nontender, nondistended, bowel sounds present  Ext: calves nontender, well perfused  Neuro: aa, conversant with electrolarynx, moving ext, CN grossly intact, no facial asymmetry         Primary Care Physician   Nuvia Brown    Discharge Orders      Adult ENT  Referral      Home Infusion Referral      Discharge Instructions    If questions or problems arise regarding tube function (e.g. leaking, dislodges, etc.) Contact Interventional Radiology department 24 hours a day.    For procedures that were done at [facility]:    8 AM - 4 PM Monday through Friday Contact the Nurse Line [phone]  For afterhours and weekends [phone] ([facility])  Ask for the Interventional Radiologist-on call.     For procedures that were done at [facility]:  8 AM - 4 PM Monday through Friday Contact   [phone] ([facility])  For afterhours and weekends: [phone]   Ask for the Interventional Radiologist on call.       IF DIRECTED by the RADIOLOGIST, related to specific problems with the tube functioning,  go to the Emergency Department.     Reason for your hospital stay    Inability to swallow     Follow-up and recommended labs and tests     1. Oncology as scheduled - PET 11/20, Dr Bean subsequently  2. PCP as needed for hospital follow up -  consider CMP and CBC in next week or so  3. ENT as planned previously     Activity    Your activity upon discharge: activity as tolerated     Discharge Instructions    Continue your medications as below via your G tube. Follow up with your planned PET scan 11/20 and visit with Dr Bean 11/22.     Diet    Follow this diet upon discharge: Orders Placed This Encounter      Adult Formula Drip Feeding: Continuous TwoCal HN; Gastrostomy; Goal Rate: 55; mL/hr; Once able to use FT, start at 15 mL/hr.  Increase by 20 mL every 24 hrs to goal rate of 55 mL/hr.; Do not advance tube feeding rate unless K+ is = or > 3.0, Mg++ ...       Significant Results and Procedures   Most Recent 3 CBC's:  Recent Labs   Lab Test 11/17/23  0750 11/15/23  0719 11/14/23  1556   WBC 7.3 5.5 5.6   HGB 16.3 14.8 14.4   MCV 90 89 89    278 246     Most Recent 3 BMP's:  Recent Labs   Lab Test 11/19/23  1033 11/18/23  1425 11/17/23  0750 11/16/23  1319 11/15/23  0719 11/14/23  1556   NA  --   --  139  --  137 138   POTASSIUM 4.8 4.6 3.8   < > 4.0 4.4   CHLORIDE  --   --  104  --  105 105   CO2  --   --  20*  --  20* 20*   BUN  --   --  7.7*  --  9.9 8.6   CR  --   --  0.83  --  0.72 0.83   ANIONGAP  --   --  15  --  12 13   TELMA  --   --  9.6  --  9.1 9.0   GLC  --   --  119*  --  124* 77    < > = values in this interval not displayed.   ,   Results for orders placed or performed during the hospital encounter of 11/14/23   Soft tissue neck CT w contrast    Narrative    EXAM: CT SOFT TISSUE NECK W CONTRAST  LOCATION: [facility]  DATE: 11/14/2023    INDICATION: History of laryngeal cancer and laryngectomy. New onset of dysphagia with food and liquids.  COMPARISON: CT 04/26/2023.  CONTRAST: 90 mL Isovue 370  TECHNIQUE: Routine CT Soft Tissue Neck with IV contrast. Multiplanar reformats. Dose reduction techniques were used.    FINDINGS:   SOFT TISSUES: Status post total laryngectomy. Soft tissue mass along the right  superior margin of the laryngectomy site measures 3.1 x 2.8 x 4.1 cm (AP, TR, CC), previously measuring 1.9 x 1.6 x 1.7 cm. The lesion demonstrates central hypodensity, causes   mass effect on the upper esophagus. Bilateral radical neck dissection.    MUCOSAL SPACES: Normal parapharyngeal space and subcutaneous soft tissues. Normal oral cavity,  spaces, and floor of mouth structures.    LYMPH NODES: No pathologic lymph nodes by size or morphology criteria.     SALIVARY GLANDS: Normal parotid and submandibular glands.    THYROID: Right thyroidectomy.     VESSELS: Vascular structures of the neck are patent. Soft tissue thickening along the common carotid arteries bilaterally, likely posttreatment effect.    VISUALIZED INTRACRANIAL/ORBITS/SINUSES: No abnormality of the visualized intracranial compartment or orbits. Visualized paranasal sinuses and mastoid air cells are clear.    OTHER: No destructive osseous lesion. The included lung apices are clear. Advanced cervical spondylosis.      Impression    IMPRESSION:   1.  Enlarging soft tissue mass along the right superior margins of the laryngectomy site measures 4 cm in maximum dimension, previously 1.9 cm, suggestive of recurrence, with mass effect on the upper esophagus.  2.  No enlarged neck lymph nodes.       Discharge Medications   Discharge Medication List as of 11/19/2023 10:27 AM        START taking these medications    Details   acetaminophen (TYLENOL) 325 MG tablet 2 tablets (650 mg) by Oral or Feeding Tube route every 4 hours as needed for mild pain, No Print Out           CONTINUE these medications which have NOT CHANGED    Details   apixaban ANTICOAGULANT (ELIQUIS) 5 MG tablet Take 1 tablet (5 mg) by mouth 2 times daily, Disp-180 tablet, R-3, E-Prescribe      levothyroxine (SYNTHROID/LEVOTHROID) 75 MCG tablet Take 1 tablet (75 mcg) by mouth daily, Disp-90 tablet, R-3, E-Prescribe      metoprolol tartrate (LOPRESSOR) 25 MG tablet Take 1 tablet (25  mg) by mouth 2 times daily, Disp-180 tablet, R-3, E-Prescribe           STOP taking these medications       enoxaparin ANTICOAGULANT (LOVENOX) 80 MG/0.8ML syringe Comments:   Reason for Stopping:             Allergies   Allergies   Allergen Reactions    Pollen Extract         stable

## 2023-11-19 NOTE — DISCHARGE SUMMARY
Pt A/O, VSS on RA, denies pain. Patient able to demonstrate med administration via g-tube.       Discharge Note    Patient discharged to home via private vehicle  accompanied by no family/friend .  IV: Discontinued  Prescriptions N/A.   Belongings reviewed and sent with patient.   Home medications returned to patient: NA  Equipment sent with: patient.   patient verbalizes understanding of discharge instructions. AVS given to patient.

## 2023-11-22 NOTE — LETTER
"    11/22/2023         RE: Raj Warren  [address]  [place] [address]        Dear Colleague,    Thank you for referring your patient, Raj Warren, to the [facility]. Please see a copy of my visit note below.    Oncology Rooming Note    November 22, 2023 10:49 AM   Raj Warren is a 73 year old male who presents for:    Chief Complaint   Patient presents with     Oncology Clinic Visit     Initial Vitals: /73   Pulse 87   Resp 16   Ht 1.803 m (5' 11\")   Wt 88.3 kg (194 lb 9.6 oz)   SpO2 99%   BMI 27.14 kg/m   Estimated body mass index is 27.14 kg/m  as calculated from the following:    Height as of this encounter: 1.803 m (5' 11\").    Weight as of this encounter: 88.3 kg (194 lb 9.6 oz). Body surface area is 2.1 meters squared.  Mild Pain (2) Comment: Data Unavailable   No LMP for male patient.  Allergies reviewed: Yes  Medications reviewed: Yes    Medications: Medication refills not needed today.  Pharmacy name entered into [employer]: [id number] - [place], [place] - [address] AT [facility]        Jennifer Farfan MA              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  -COMBINED POSITIVE SCORE (CPS):  70  -TUMOR PROPORTION SCORE (TPS):  60%     1.  Patient was evaluated in 2019 for hoarseness of voice.  He was seen by ENT and found to have right vocal cord mass.    -He had biopsy done on 08/07/2019. Pathology revealed moderately-differentiated invasive squamous cell carcinoma.     2.  The patient was seen in the Emergency Room on 04/12/2021 for shortness of breath and admitted.   -CT neck revealed an enhancing soft tissue mass involving the right larynx measuring 3.9 cm.  -Patient had laryngoscopy with biopsy and tracheostomy done on 04/12/2021.  There was an endolaryngeal mass obscuring the laryngeal landmarks.  Mass appeared to be " based on right endolarynx.  Pathology revealed invasive keratinizing moderately-differentiated squamous cell carcinoma.     3.  PET scan on 04/26/2021 revealed hypermetabolic mass involving the supraglottic, glottic and subglottic larynx.  There was a hypermetabolic left level IV lymph node.  There was also hypermetabolic nodule in right middle lobe.     4. CT chest angiogram on 04/27/2021 revealed a large bilateral pulmonary embolism in all the lobes.  There was evidence of right cardiac strain.  -Echocardiogram on 04/27/2021 revealed clot in transit in the right ventricle.  -The patient had emergency pulmonary embolectomy on 04/28/2021.  Pathology revealed organizing clot.  -Patient on Eliquis. Long term anti-coagulation recommended.     5. Total laryngectomy with bilateral neck dissection on 07/08/2021.  Pathology reveals 6 cm squamous cell carcinoma involving the right false and true vocal cords with fixation, the left false and true vocal cords and invades through thyroid cartilage.  There is focal lymphatic invasion.  There is perineural invasion. Benign 75 lymph nodes.  pT4a pN0 disease.     6. Post-op radiation between 08/04/2021 and 09/20/2021. 6600 cGy in 33 fractions.     7. PET/CT on 09/30/2022 revealed hypermetabolic mass originating from the right anterolateral wall of the neopharynx.  -Patient seen by ENT on 10/03/2022 and had FNA of neck mass. Pathology is positive for malignancy. Rare clusters morphologically consistent with squamous cell carcinoma.      8. Pembrolizumab between 10/27/2022 and 11/02/2023. Stopped due to progression.    9. PET scan on 11/20/203 reveals increasing metabolic activity of soft tissue thickening extending from the right neopharynx/superior margin of the laryngectomy site extending inferiorly to the right supraclavicular region.  -PEG placed on 11/16/2023.     SUBJECTIVE:  Mr. Anthony is a 73-year-old gentleman with recurrent laryngeal squamous cell carcinoma on  pembrolizumab.  Disease is progressing.    PET scan on 11/20/203:  Increasing metabolic activity of soft tissue thickening extending from the right neopharynx/superior margin of the laryngectomy site extending inferiorly to the right supraclavicular region suspicious for progression of disease.    Patient has dysphagia.  PEG tube has been placed.    He has dysphagia.  He has mild generalized weakness.  He can do activities of daily living. No headache.  No dizziness. No chest pain.  No shortness of breath.  No abdominal pain.  No nausea or vomiting.  No urinary or bowel complaints. No bleeding. Some arthritic joint pains. All other review of systems is negative.     PHYSICAL EXAMINATION:   GENERAL:  Alert and oriented x 3.   VITAL SIGNS:  Reviewed.     Rest of the systems not examined.     LABS:  TSH and CMP were reviewed.     ASSESSMENT:  1.  A 73-year-old gentleman with laryngeal squamous cell carcinoma which is progressing.  2.  History of pulmonary embolism. Patient on Eliquis.  3.  Hypothyroidism on levothyroxine.  4.  Dysphagia s/p PEG.     PLAN:  1.  I had a long discussion with the patient.  CT scan and PET scan was reviewed with him.  Unfortunately it reveals progression of disease.    2.  Discussed regarding further treatment.  He has been on pembrolizumab which will be discontinued.    Explained to the patient that treatment will help to control disease and prolong life.  Hopefully it can palliate his dysphagia.    We discussed regarding different options including chemotherapy and cetuximab.  Patient does not want cetuximab as he does not want skin does.    After discussion, plan is to start him on combination of carboplatin and Taxol.  Regimen discussed.  Side effects reviewed.  Hopefully he tolerates it well and disease response.    3.  Patient will continue on Eliquis. He is tolerating it well.  No bleeding complications. Plan is for lifelong anticoagulation.     4.  He will continue on levothyroxine.   TSH mildly elevated but free T4 is normal.  No need to change the dose.  We will monitor his TSH and free T4.    5.  He will continue on PEG tube feeding.  He will follow-up with nutrition.     6.  He had a few questions which were all answered.  I will see him with cycle 2. In between he will see our nurse practitioner.  Advised him to call us with any questions or concerns.     Total visit time of 40 minutes.  Time spent in today's visit, review of chart/investigations today, discussing regarding chemotherapy and documentation today.      Again, thank you for allowing me to participate in the care of your patient.        Sincerely,        Rosa Bean MD

## 2023-11-22 NOTE — PROGRESS NOTES
Medical Assistant Note:  Raj Warren presents today for blood draw.    Patient seen by provider today: Yes: wilman.   present during visit today: Not Applicable.    Concerns: No Concerns.    Procedure:  Lab draw site: lac, Needle type: bf, Gauge: 23.    Post Assessment:  Labs drawn without difficulty: Yes.    Discharge Plan:  Departure Mode: Ambulatory.    Face to Face Time: 5 min.    Kirti Jones, CMA

## 2023-11-22 NOTE — PROGRESS NOTES
11/22/23 9518   OTHER   Assessment completed with: Patient   Plan of Care Education    Yearly learning assessment completed? Yes (see Education tab)   Diagnosis: Esophageal Cancer   Does patient understand diagnosis? Yes   Tx plan/regimen: CArboplatin and Taxol every 21 days   Does patient understand treatment plan/regimen? Yes   Preparing for treatment: Reviewed treatment preparation information with patient (vascular access, day of chemo, visitor policy, what to bring, etc.)   Vascular access education provided for: Port  (Port reviewed and will let me know if he would michael to proceed in the next week)   Side effect education: Diarrhea/Constipation;Fatigue;Hair loss;Infection;Immune-mediated effects;Lab value monitoring (anemia, neutropenia, thrombocytopenia);Mylosuppression;Mouth sores;Nausea/Vomiting;Neuropathy   Safety/self care at home reviewed with patient: Yes   Coping - concerns/fears reviewed with patient: Yes   Plan of Care: Treatment schedule;ERNESTINE follow-up appointment;Lab appointment   When to call provider: Bleeding;Uncontrolled nausea/vomiting;Increased shortness of breath;New/worsening pain;Shaking chills;Temperature >100.4F;Uncontrolled diarrhea/constipation   Reasons for deferring treatment reviewed with patient: Yes   Procedure education provided for:  Port/PICC placement   Evaluation of Learning   Patient Education Provided Yes   Readiness: Acceptance   Method: Literature;Explanation   Response: Verbalizes understanding

## 2023-11-22 NOTE — PROGRESS NOTES
11/22/23 1403   OTHER   Assessment completed with: Patient   Plan of Care Education    Yearly learning assessment completed? Yes (see Education tab)   Diagnosis: Larynx cancer   Does patient understand diagnosis? Yes   Tx plan/regimen: Carboplatin/Taxol every 21 days   Does patient understand treatment plan/regimen? Yes   Preparing for treatment: Reviewed treatment preparation information with patient (vascular access, day of chemo, visitor policy, what to bring, etc.)   Vascular access education provided for: Port   Side effect education: Diarrhea/Constipation;Mouth sores;Mylosuppression;Nausea/Vomiting;Neuropathy;Hair loss;Fatigue;Infection;Lab value monitoring (anemia, neutropenia, thrombocytopenia)   Safety/self care at home reviewed with patient: Yes   Coping - concerns/fears reviewed with patient: Yes   Plan of Care: ERNESTINE follow-up appointment;Lab appointment;Treatment schedule   When to call provider: Bleeding;Increased shortness of breath;New/worsening pain;Shaking chills;Temperature >100.4F;Uncontrolled diarrhea/constipation;Uncontrolled nausea/vomiting   Reasons for deferring treatment reviewed with patient: Yes   Procedure education provided for:  Port/PICC placement  (Is thinking on port placement)   Evaluation of Learning   Patient Education Provided Yes   Readiness: Acceptance   Method: Literature;Explanation   Response: Verbalizes understanding

## 2023-11-22 NOTE — PROGRESS NOTES
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  -COMBINED POSITIVE SCORE (CPS):  70  -TUMOR PROPORTION SCORE (TPS):  60%     1.  Patient was evaluated in 2019 for hoarseness of voice.  He was seen by ENT and found to have right vocal cord mass.    -He had biopsy done on 08/07/2019. Pathology revealed moderately-differentiated invasive squamous cell carcinoma.     2.  The patient was seen in the Emergency Room on 04/12/2021 for shortness of breath and admitted.   -CT neck revealed an enhancing soft tissue mass involving the right larynx measuring 3.9 cm.  -Patient had laryngoscopy with biopsy and tracheostomy done on 04/12/2021.  There was an endolaryngeal mass obscuring the laryngeal landmarks.  Mass appeared to be based on right endolarynx.  Pathology revealed invasive keratinizing moderately-differentiated squamous cell carcinoma.     3.  PET scan on 04/26/2021 revealed hypermetabolic mass involving the supraglottic, glottic and subglottic larynx.  There was a hypermetabolic left level IV lymph node.  There was also hypermetabolic nodule in right middle lobe.     4. CT chest angiogram on 04/27/2021 revealed a large bilateral pulmonary embolism in all the lobes.  There was evidence of right cardiac strain.  -Echocardiogram on 04/27/2021 revealed clot in transit in the right ventricle.  -The patient had emergency pulmonary embolectomy on 04/28/2021.  Pathology revealed organizing clot.  -Patient on Eliquis. Long term anti-coagulation recommended.     5. Total laryngectomy with bilateral neck dissection on 07/08/2021.  Pathology reveals 6 cm squamous cell carcinoma involving the right false and true vocal cords with fixation, the left false and true vocal cords and invades through thyroid cartilage.  There is focal lymphatic invasion.  There is perineural invasion. Benign 75 lymph nodes.  pT4a pN0 disease.     6. Post-op radiation between  08/04/2021 and 09/20/2021. 6600 cGy in 33 fractions.     7. PET/CT on 09/30/2022 revealed hypermetabolic mass originating from the right anterolateral wall of the neopharynx.  -Patient seen by ENT on 10/03/2022 and had FNA of neck mass. Pathology is positive for malignancy. Rare clusters morphologically consistent with squamous cell carcinoma.      8. Pembrolizumab between 10/27/2022 and 11/02/2023. Stopped due to progression.    9. PET scan on 11/20/203 reveals increasing metabolic activity of soft tissue thickening extending from the right neopharynx/superior margin of the laryngectomy site extending inferiorly to the right supraclavicular region.  -PEG placed on 11/16/2023.     SUBJECTIVE:  Mr. 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  Disease is progressing.    PET scan on 11/20/203:  Increasing metabolic activity of soft tissue thickening extending from the right neopharynx/superior margin of the laryngectomy site extending inferiorly to the right supraclavicular region suspicious for progression of disease.    Patient has dysphagia.  PEG tube has been placed.    He has dysphagia.  He has mild generalized weakness.  He can do activities of daily living. No headache.  No dizziness. No chest pain.  No shortness of breath.  No abdominal pain.  No nausea or vomiting.  No urinary or bowel complaints. No bleeding. Some arthritic joint pains. All other review of systems is negative.     PHYSICAL EXAMINATION:   GENERAL:  Alert and oriented x 3.   VITAL SIGNS:  Reviewed.     Rest of the systems not examined.     LABS:  TSH and CMP were reviewed.     ASSESSMENT:  1.  A 73-year-old gentleman with laryngeal squamous cell carcinoma which is progressing.  2.  History of pulmonary embolism. Patient on Eliquis.  3.  Hypothyroidism on levothyroxine.  4.  Dysphagia s/p PEG.     PLAN:  1.  I had a long discussion with the patient.  CT scan and PET scan was reviewed  with him.  Unfortunately it reveals progression of disease.    2.  Discussed regarding further treatment.  He has been on pembrolizumab which will be discontinued.    Explained to the patient that treatment will help to control disease and prolong life.  Hopefully it can palliate his dysphagia.    We discussed regarding different options including chemotherapy and cetuximab.  Patient does not want cetuximab as he does not want skin does.    After discussion, plan is to start him on combination of carboplatin and Taxol.  Regimen discussed.  Side effects reviewed.  Hopefully he tolerates it well and disease response.    3.  Patient will continue on Eliquis. He is tolerating it well.  No bleeding complications. Plan is for lifelong anticoagulation.     4.  He will continue on levothyroxine.  TSH mildly elevated but free T4 is normal.  No need to change the dose.  We will monitor his TSH and free T4.    5.  He will continue on PEG tube feeding.  He will follow-up with nutrition.     6.  He had a few questions which were all answered.  I will see him with cycle 2. In between he will see our nurse practitioner.  Advised him to call us with any questions or concerns.     Total visit time of 40 minutes.  Time spent in today's visit, review of chart/investigations today, discussing regarding chemotherapy and documentation today.

## 2023-11-22 NOTE — PATIENT INSTRUCTIONS
Start carboplatin and taxol.  See ERNESTINE when he comes to start chemotherapy.  See me with cycle 2.

## 2023-11-22 NOTE — PROGRESS NOTES
"Oncology Rooming Note    November 22, 2023 10:49 AM   Raj Warren is a 73 year old male who presents for:    Chief Complaint   Patient presents with    Oncology Clinic Visit     Initial Vitals: /73   Pulse 87   Resp 16   Ht 1.803 m (5' 11\")   Wt 88.3 kg (194 lb 9.6 oz)   SpO2 99%   BMI 27.14 kg/m   Estimated body mass index is 27.14 kg/m  as calculated from the following:    Height as of this encounter: 1.803 m (5' 11\").    Weight as of this encounter: 88.3 kg (194 lb 9.6 oz). Body surface area is 2.1 meters squared.  Mild Pain (2) Comment: Data Unavailable   No LMP for male patient.  Allergies reviewed: Yes  Medications reviewed: Yes    Medications: Medication refills not needed today.  Pharmacy name entered into [employer]: [id number] - [place], [place] - [address] AT [facility] [address]        Jennifer Farfan MA            "

## 2023-12-04 NOTE — PROGRESS NOTES
Virtual Visit Details    Type of service:  Telephone Visit   Phone call duration: 17 minutes     Oncology Progress Note  Dec 5, 2023    Cancer diagnosis:  laryngeal squamous cell carcinoma. Prognostic stage group DALY (pT4a pN0 M0).  -COMBINED POSITIVE SCORE (CPS):  70  -TUMOR PROPORTION SCORE (TPS):  60%    Oncology History:  1.  Patient was evaluated in 2019 for hoarseness of voice.  He was seen by ENT and found to have right vocal cord mass.    -He had biopsy done on 08/07/2019. Pathology revealed moderately-differentiated invasive squamous cell carcinoma.     2.  The patient was seen in the Emergency Room on 04/12/2021 for shortness of breath and admitted.   -CT neck revealed an enhancing soft tissue mass involving the right larynx measuring 3.9 cm.  -Patient had laryngoscopy with biopsy and tracheostomy done on 04/12/2021.  There was an endolaryngeal mass obscuring the laryngeal landmarks.  Mass appeared to be based on right endolarynx.  Pathology revealed invasive keratinizing moderately-differentiated squamous cell carcinoma.     3.  PET scan on 04/26/2021 revealed hypermetabolic mass involving the supraglottic, glottic and subglottic larynx.  There was a hypermetabolic left level IV lymph node.  There was also hypermetabolic nodule in right middle lobe.     4. CT chest angiogram on 04/27/2021 revealed a large bilateral pulmonary embolism in all the lobes.  There was evidence of right cardiac strain.  -Echocardiogram on 04/27/2021 revealed clot in transit in the right ventricle.  -The patient had emergency pulmonary embolectomy on 04/28/2021.  Pathology revealed organizing clot.  -Patient on Eliquis. Long term anti-coagulation recommended.     5. Total laryngectomy with bilateral neck dissection on 07/08/2021.  Pathology reveals 6 cm squamous cell carcinoma involving the right false and true vocal cords with fixation, the left false and true vocal cords and invades through thyroid cartilage.  There is focal  lymphatic invasion.  There is perineural invasion. Benign 75 lymph nodes.  pT4a pN0 disease.     6. Post-op radiation between 08/04/2021 and 09/20/2021. 6600 cGy in 33 fractions.     7. PET/CT on 09/30/2022 revealed hypermetabolic mass originating from the right anterolateral wall of the neopharynx.  -Patient seen by ENT on 10/03/2022 and had FNA of neck mass. Pathology is positive for malignancy. Rare clusters morphologically consistent with squamous cell carcinoma.      8. Pembrolizumab between 10/27/2022 and 11/02/2023. Stopped due to progression.    9. PET scan on 11/20/203 reveals increasing metabolic activity of soft tissue thickening extending from the right neopharynx/superior margin of the laryngectomy site extending inferiorly to the right supraclavicular region.  -PEG placed on 11/16/2023.     Interval History:   Raj is called for a telephone visit prior to cycle 1 carbo/taxol to begin 12/6. Inquires about chemotherapy side effects. Wonders if he will feel nauseated. Also had concerns chemo may be hard on veins. Currently taking in 5 cartons of formula via PEG daily.     ROS:  10 point ROS neg other than the symptoms noted above in the HPI.    Current Outpatient Medications   Medication    acetaminophen (TYLENOL) 325 MG tablet    apixaban ANTICOAGULANT (ELIQUIS) 5 MG tablet    levothyroxine (SYNTHROID/LEVOTHROID) 75 MCG tablet    metoprolol tartrate (LOPRESSOR) 25 MG tablet     No current facility-administered medications for this visit.     Unable to do physical exam 2/2 telephone visit. Well sounding in no distress. Electrolarynx used for communication. Appropriate thought process. Good voice quality. Occasional cough.    Labs:   Most Recent 3 CBC's:  Recent Labs   Lab Test 11/17/23  0750 11/15/23  0719 11/14/23  1556 07/20/23  0843 01/19/23  0847   WBC 7.3 5.5 5.6   < > 5.0   HGB 16.3 14.8 14.4   < > 14.9   MCV 90 89 89   < > 86    278 246   < > 218   ANEUTAUTO  --  4.4 3.4  --  2.8    < >  = values in this interval not displayed.    Most Recent 3 BMP's:  Recent Labs   Lab Test 11/22/23  1038 11/19/23  1033 11/18/23  1425 11/17/23  0750 11/16/23  1319 11/15/23  0719 11/14/23  1556 11/02/23  1409 10/12/23  1312     --   --  139  --  137   < > 138 138   POTASSIUM 4.7 4.8 4.6 3.8   < > 4.0   < > 4.2 4.0   CHLORIDE 104  --   --  104  --  105   < > 102 106   CO2 24  --   --  20*  --  20*   < > 22 18*   BUN 14.5  --   --  7.7*  --  9.9   < > 8.9 8.8   CR 0.81  --   --  0.83  --  0.72   < > 0.94 0.96   ANIONGAP 11  --   --  15  --  12   < > 14 14   TELMA 9.5  --   --  9.6  --  9.1   < > 9.4 8.9   *  --   --  119*  --  124*   < > 79 87   PROTTOTAL 7.5  --   --   --   --   --   --  7.6 7.3   ALBUMIN 4.2  --   --   --   --   --   --  4.4 4.2    < > = values in this interval not displayed.    Most Recent 2 LFT's:  Recent Labs   Lab Test 11/22/23  1038 11/02/23  1409   AST 25 23   ALT 31 13   ALKPHOS 88 85   BILITOTAL 0.5 0.5    Most Recent TSH and T4:  Recent Labs   Lab Test 11/22/23  1038   TSH 6.73*   T4 1.08     Phos/Mag:  Lab Results   Component Value Date    PHOS 3.6 11/18/2023    PHOS 3.6 11/17/2023    PHOS 4.5 07/17/2021    MAG 2.1 11/18/2023    MAG 2.3 11/17/2023    MAG 2.5 (H) 07/17/2021      I reviewed the above labs today.    ASSESSMENT:  1.  A 73-year-old gentleman with laryngeal squamous cell carcinoma which is progressing.  2.  History of pulmonary embolism. Patient on Eliquis.  3.  Hypothyroidism on levothyroxine.  4.  Dysphagia s/p PEG     PLAN:  Laryngeal squamous cell carcinoma  PET/CT 11/20/23 demonstrating PD following most recent treatment with pembrolizumab. Plan is to change to carbo/taxol every 21 days per recent discussion with Dr. Bean. Baseline labs today are stable and appropriate for treatment tomorrow. Potential side effects of treatment reviewed including but not limited to nausea, fatigue, cytopenias, neuropathy and hypersensitivity reactions. Reviewed use of scheduled  dexamethasone and PRN antiemetics. May consider port if there is ongoing difficulty with IV access but will try peripheral first. All questions answered to patient's apparent satisfaction.  -Proceed with C1D1  carbo/taxol 12/6  -RTC in 3 weeks with Dr. Bean for C2    Pulmonary embolism  Continue Eliquis. Monitior platelets closely with transition to combination chemotherapy.    Hypothyroidism  Remains on levothyroxine. Monitor at least every 3 months.    Dysphagia  PEG feeding. Nutrition follow-up    20 minutes spent on the date of the encounter doing chart review, review of test results, interpretation of tests, and documentation, in addition to 17 minutes spent on the phone with the patient.       Karishma Huynh, CNP

## 2023-12-05 NOTE — NURSING NOTE
Is the patient currently in the state of [place]? YES    Visit mode:VIDEO    If the visit is dropped, the patient can be reconnected by: TELEPHONE VISIT: Phone number:   Telephone Information:   Mobile [phone]       Will anyone else be joining the visit? NO  (If patient encounters technical issues they should call [phone]    How would you like to obtain your AVS? MyChart    Are changes needed to the allergy or medication list? Pt stated no changes to allergies and Pt stated no med changes    Reason for visit: RECHECK    Coby INIGUEZ

## 2023-12-05 NOTE — Clinical Note
12/5/2023         RE: Raj Warren  [address]  [facility] [address]        Dear Colleague,    Thank you for referring your patient, Raj Warren, to the [facility]. Please see a copy of my visit note below.    Virtual Visit Details    Type of service:  Telephone Visit   Phone call duration: 17 minutes     Oncology Progress Note  Dec 5, 2023    Cancer diagnosis:  laryngeal squamous cell carcinoma. Prognostic stage group DALY (pT4a pN0 M0).  -COMBINED POSITIVE SCORE (CPS):  70  -TUMOR PROPORTION SCORE (TPS):  60%    Oncology History:  1.  Patient was evaluated in 2019 for hoarseness of voice.  He was seen by ENT and found to have right vocal cord mass.    -He had biopsy done on 08/07/2019. Pathology revealed moderately-differentiated invasive squamous cell carcinoma.     2.  The patient was seen in the Emergency Room on 04/12/2021 for shortness of breath and admitted.   -CT neck revealed an enhancing soft tissue mass involving the right larynx measuring 3.9 cm.  -Patient had laryngoscopy with biopsy and tracheostomy done on 04/12/2021.  There was an endolaryngeal mass obscuring the laryngeal landmarks.  Mass appeared to be based on right endolarynx.  Pathology revealed invasive keratinizing moderately-differentiated squamous cell carcinoma.     3.  PET scan on 04/26/2021 revealed hypermetabolic mass involving the supraglottic, glottic and subglottic larynx.  There was a hypermetabolic left level IV lymph node.  There was also hypermetabolic nodule in right middle lobe.     4. CT chest angiogram on 04/27/2021 revealed a large bilateral pulmonary embolism in all the lobes.  There was evidence of right cardiac strain.  -Echocardiogram on 04/27/2021 revealed clot in transit in the right ventricle.  -The patient had emergency pulmonary embolectomy on 04/28/2021.  Pathology revealed organizing clot.  -Patient on Eliquis. Long term anti-coagulation recommended.     5. Total  laryngectomy with bilateral neck dissection on 07/08/2021.  Pathology reveals 6 cm squamous cell carcinoma involving the right false and true vocal cords with fixation, the left false and true vocal cords and invades through thyroid cartilage.  There is focal lymphatic invasion.  There is perineural invasion. Benign 75 lymph nodes.  pT4a pN0 disease.     6. Post-op radiation between 08/04/2021 and 09/20/2021. 6600 cGy in 33 fractions.     7. PET/CT on 09/30/2022 revealed hypermetabolic mass originating from the right anterolateral wall of the neopharynx.  -Patient seen by ENT on 10/03/2022 and had FNA of neck mass. Pathology is positive for malignancy. Rare clusters morphologically consistent with squamous cell carcinoma.      8. Pembrolizumab between 10/27/2022 and 11/02/2023. Stopped due to progression.    9. PET scan on 11/20/203 reveals increasing metabolic activity of soft tissue thickening extending from the right neopharynx/superior margin of the laryngectomy site extending inferiorly to the right supraclavicular region.  -PEG placed on 11/16/2023.     Interval History:   Raj is called for a telephone visit prior to cycle 1 carbo/taxol to begin 12/6. Inquires about chemotherapy side effects. Wonders if he will feel nauseated. Also had concerns chemo may be hard on veins. Currently taking in 5 cartons of formula via PEG daily.     ROS:  10 point ROS neg other than the symptoms noted above in the HPI.    Current Outpatient Medications   Medication    acetaminophen (TYLENOL) 325 MG tablet    apixaban ANTICOAGULANT (ELIQUIS) 5 MG tablet    levothyroxine (SYNTHROID/LEVOTHROID) 75 MCG tablet    metoprolol tartrate (LOPRESSOR) 25 MG tablet     No current facility-administered medications for this visit.     Unable to do physical exam 2/2 telephone visit. Well sounding in no distress. Electrolarynx used for communication. Appropriate thought process. Good voice quality. Occasional cough.    Labs:   Most Recent 3  CBC's:  Recent Labs   Lab Test 11/17/23  0750 11/15/23  0719 11/14/23  1556 07/20/23  0843 01/19/23  0847   WBC 7.3 5.5 5.6   < > 5.0   HGB 16.3 14.8 14.4   < > 14.9   MCV 90 89 89   < > 86    278 246   < > 218   ANEUTAUTO  --  4.4 3.4  --  2.8    < > = values in this interval not displayed.    Most Recent 3 BMP's:  Recent Labs   Lab Test 11/22/23  1038 11/19/23  1033 11/18/23  1425 11/17/23  0750 11/16/23  1319 11/15/23  0719 11/14/23  1556 11/02/23  1409 10/12/23  1312     --   --  139  --  137   < > 138 138   POTASSIUM 4.7 4.8 4.6 3.8   < > 4.0   < > 4.2 4.0   CHLORIDE 104  --   --  104  --  105   < > 102 106   CO2 24  --   --  20*  --  20*   < > 22 18*   BUN 14.5  --   --  7.7*  --  9.9   < > 8.9 8.8   CR 0.81  --   --  0.83  --  0.72   < > 0.94 0.96   ANIONGAP 11  --   --  15  --  12   < > 14 14   TELMA 9.5  --   --  9.6  --  9.1   < > 9.4 8.9   *  --   --  119*  --  124*   < > 79 87   PROTTOTAL 7.5  --   --   --   --   --   --  7.6 7.3   ALBUMIN 4.2  --   --   --   --   --   --  4.4 4.2    < > = values in this interval not displayed.    Most Recent 2 LFT's:  Recent Labs   Lab Test 11/22/23  1038 11/02/23  1409   AST 25 23   ALT 31 13   ALKPHOS 88 85   BILITOTAL 0.5 0.5    Most Recent TSH and T4:  Recent Labs   Lab Test 11/22/23  1038   TSH 6.73*   T4 1.08     Phos/Mag:  Lab Results   Component Value Date    PHOS 3.6 11/18/2023    PHOS 3.6 11/17/2023    PHOS 4.5 07/17/2021    MAG 2.1 11/18/2023    MAG 2.3 11/17/2023    MAG 2.5 (H) 07/17/2021      I reviewed the above labs today.    ASSESSMENT:  1.  A 73-year-old gentleman with laryngeal squamous cell carcinoma which is progressing.  2.  History of pulmonary embolism. Patient on Eliquis.  3.  Hypothyroidism on levothyroxine.  4.  Dysphagia s/p PEG     PLAN:  Laryngeal squamous cell carcinoma  PET/CT 11/20/23 demonstrating PD following most recent treatment with pembrolizumab. Plan is to change to carbo/taxol every 21 days per recent discussion  with Dr. Bean. Baseline labs today are stable and appropriate for treatment tomorrow. Potential side effects of treatment reviewed including but not limited to nausea, fatigue, cytopenias, neuropathy and hypersensitivity reactions. Reviewed use of scheduled dexamethasone and PRN antiemetics. May consider port if there is ongoing difficulty with IV access but will try peripheral first. All questions answered to patient's apparent satisfaction.  -Proceed with C1D1  carbo/taxol 12/6  -RTC in 3 weeks with Dr. Bean for C2    Pulmonary embolism  Continue Eliquis. Monitior platelets closely with transition to combination chemotherapy.    Hypothyroidism  Remains on levothyroxine. Monitor at least every 3 months.    Dysphagia  PEG feeding. Nutrition follow-up    20 minutes spent on the date of the encounter doing chart review, review of test results, interpretation of tests, and documentation, in addition to 17 minutes spent on the phone with the patient.       Karishma Huynh CNP          Again, thank you for allowing me to participate in the care of your patient.        Sincerely,        Karishma Huynh CNP

## 2023-12-06 NOTE — PROGRESS NOTES
Infusion Nursing Note:  Raj Warren presents today for C1D1 Taxol, Carboplatin.    Patient seen by provider today: No   present during visit today: Not Applicable.    Note: Patient had virtual visit with Karishma Huynh NP yesterday, denies any new concerns since visit. Reviewed take home meds with patient, he is currently unable to swallow any food, liquids or medications. He is doing everything though his g-tube. Confirmed with pharmacy that dexamethasone, zofran and compazine are all ok to be crushed and given through g-tube and patient is agreeable to this plan (liquid forms may come with higher copay). Patient is deferring zofran at this time due to high copay, will keep on file if nausea not relieved by compazine. Patient verbalizes understanding of post-chemo oral dexamethasone schedule. Also encouraged patient to proceed with port placement when he is ready, this was discussed at provider visit yesterday per note from Karishma Huynh NP.    New chemotherapy regimen today. Chemotherapy teaching, side effects, and schedule reviewed with patient. General and individual chemotherapy side effects reviewed with patient including fatigue, immunosuppression, alopecia, nausea/vomiting, constipation/diarrhea, peripheral neuropathy and hypersensitivity reaction. Denies need for printed info on new medications today. Pt instructed to call care coordinator, triage (or MD on call if after hours/weekends) with chills/temp >=100.5, questions/concerns. Pt stated understanding of plan.       Intravenous Access:  Peripheral IV placed.    Treatment Conditions:  Lab Results   Component Value Date    HGB 13.3 12/05/2023    WBC 6.1 12/05/2023    ANEU 2.6 06/23/2021    ANEUTAUTO 4.1 12/05/2023     12/05/2023        Lab Results   Component Value Date     12/05/2023    POTASSIUM 4.4 12/05/2023    MAG 2.1 11/18/2023    CR 0.75 12/05/2023    TELMA 9.3 12/05/2023    BILITOTAL 0.2 12/05/2023    ALBUMIN 4.0 12/05/2023     ALT 24 12/05/2023    AST 21 12/05/2023     Results reviewed, labs MET treatment parameters, ok to proceed with treatment.      Post Infusion Assessment:  Patient tolerated infusion without incident.  Blood return noted pre and post infusion.  Site patent and intact, free from redness, edema or discomfort.  No evidence of extravasations.  Access discontinued per protocol.       Discharge Plan:   Prescription refills given for dexamethasone, compazine.  Discharge instructions reviewed with: Patient.  Patient and/or family verbalized understanding of discharge instructions and all questions answered.  Copy of AVS reviewed with patient and/or family.  Patient will return 12/26 & 12/27 for next appointment.  Patient discharged in stable condition accompanied by: self.  Departure Mode: Ambulatory.  Face to Face time: 20 minutes.      Idania Noel RN

## 2023-12-11 NOTE — NURSING NOTE
Writer received a call from [facility] [place] and was informed that specimen was QNS and that pathologist recommends specimen from [id number]  pathology . Writer provided the approval to go forward with recommended specimen.     Serena Castillo RN

## 2023-12-11 NOTE — NURSING NOTE
Writer spoke with patient in clinic and  he states he has what he describes as joint pain in his legs bilaterally. He states it started last night and has been taking Tylenol. Communication is a barrier with having a TEP ( voice box) to communicate which is difficult. Writer assessed both legs no swelling or bruising. Writer advise Tylenol alternating with prescribed Oxycodone, moist heat, soaking, elevating and OTC creams such as Aspercreme.     Kyree KIM is aware and will send script to [facility]'s pharmacy .     Patient informed to call if no improvement or worsening of symptoms.    Serena Castillo RN

## 2023-12-14 NOTE — PROGRESS NOTES
Medical Assistant Note:  Raj Warren presents today for lab draw.    Patient seen by provider today: Yes: DMITRI.   present during visit today: Not Applicable.    Concerns: No Concerns.    Procedure:  Lab draw site: Right Hand, Needle type: Butterfly, Gauge: 23.    Post Assessment:  Labs drawn without difficulty: Yes.    Discharge Plan:  Departure Mode: Ambulatory.    Face to Face Time: 4 min.    Shari Schoenberger, CMA   1-2 cups/cans per day

## 2023-12-26 NOTE — PROGRESS NOTES
Nursing Note:  Raj Warren presents today for Labs.    Patient seen by provider today: Yes: Dr. Bean   present during visit today: Not Applicable.    Note: N/A.    Intravenous Access:  Lab draw site L AC, Needle type Butterfly, Gauge 23.  Peripheral IV placed.    Discharge Plan:   Patient was sent to [facility] for 15:00 appointment.    Barbie Beckman RN

## 2023-12-26 NOTE — PROGRESS NOTES
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  -COMBINED POSITIVE SCORE (CPS):  70  -TUMOR PROPORTION SCORE (TPS):  60%  -FoundationOne: CDKN2A/B [id number] R80* and p14ARF P94L, INPP4B rearrangement intron 11, TP53 H179L.    1.  Patient was evaluated in 2019 for hoarseness of voice.  He was seen by ENT and found to have right vocal cord mass.    -He had biopsy done on 08/07/2019. Pathology revealed moderately-differentiated invasive squamous cell carcinoma.     2.  The patient was seen in the Emergency Room on 04/12/2021 for shortness of breath and admitted.   -CT neck revealed an enhancing soft tissue mass involving the right larynx measuring 3.9 cm.  -Patient had laryngoscopy with biopsy and tracheostomy done on 04/12/2021.  There was an endolaryngeal mass obscuring the laryngeal landmarks.  Mass appeared to be based on right endolarynx.  Pathology revealed invasive keratinizing moderately-differentiated squamous cell carcinoma.     3.  PET scan on 04/26/2021 revealed hypermetabolic mass involving the supraglottic, glottic and subglottic larynx.  There was a hypermetabolic left level IV lymph node.  There was also hypermetabolic nodule in right middle lobe.     4. CT chest angiogram on 04/27/2021 revealed a large bilateral pulmonary embolism in all the lobes.  There was evidence of right cardiac strain.  -Echocardiogram on 04/27/2021 revealed clot in transit in the right ventricle.  -The patient had emergency pulmonary embolectomy on 04/28/2021.  Pathology revealed organizing clot.  -Patient on Eliquis. Long term anti-coagulation recommended.     5. Total laryngectomy with bilateral neck dissection on 07/08/2021.  Pathology reveals 6 cm squamous cell carcinoma involving the right false and true vocal cords with fixation, the left false and true vocal cords and invades through thyroid cartilage.  There is focal lymphatic invasion.  There  is perineural invasion. Benign 75 lymph nodes.  pT4a pN0 disease.     6. Post-op radiation between 08/04/2021 and 09/20/2021. 6600 cGy in 33 fractions.     7. PET/CT on 09/30/2022 revealed hypermetabolic mass originating from the right anterolateral wall of the neopharynx.  -Patient seen by ENT on 10/03/2022 and had FNA of neck mass. Pathology is positive for malignancy. Rare clusters morphologically consistent with squamous cell carcinoma.      8. Pembrolizumab between 10/27/2022 and 11/02/2023. Stopped due to progression.     9. PET scan on 11/20/203 reveals increasing metabolic activity of soft tissue thickening extending from the right neopharynx/superior margin of the laryngectomy site extending inferiorly to the right supraclavicular region.  -PEG placed on 11/16/2023.    10. Carboplatin and taxol started on 12/06/2023.     SUBJECTIVE:  Mr. Anthony is a 73-year-old gentleman with recurrent laryngeal squamous cell carcinoma which progressed on pembrolizumab. Carboplatin and taxol started on 12/06/2023.    Patient has dysphagia. He does PEG tube feeding.    He has few side-effects from chemotherapy. He was more fatigued. He can do activities of daily living. Has hair loss. He has increased arthritic joint pain.     No headache.  No dizziness. No chest pain.  No shortness of breath.  No abdominal pain.  No nausea or vomiting.  No urinary or bowel complaints. No bleeding. All other review of systems is negative.     PHYSICAL EXAMINATION:   GENERAL:  Alert and oriented x 3.   VITAL SIGNS:  Reviewed.     Rest of the systems not examined.     LABS:  TSH and CMP were reviewed.     ASSESSMENT:  1.  A 73-year-old gentleman with recurrent laryngeal squamous cell carcinoma on chemotherapy.  2.  History of pulmonary embolism. Patient on Eliquis.  3.  Hypothyroidism on levothyroxine.  4.  Dysphagia s/p PEG.     PLAN:  1. Continue chemotherapy.  2. Port-placement by IR.  3. See me or ERNESTINE with next treatment.  4. CT scan  after cycle 4.    DISCUSSION:  1.  Patient has few side effects from chemotherapy including fatigue and some aches and pain.  Explained to him that these side effects can get worse.  Explained to the patient that if side effects start interfering with his activities, we will consider dose reduction.    2.  Labs were reviewed with him.  Overall labs are good for treatment.  He will continue on current treatment with carboplatin and Taxol.  Side effect of chemotherapy again reviewed.  Advised him to let us know if he has neuropathy.    Patient had multiple questions about his malignancy.  Explained to him that he has recurrent laryngeal cancer.  It is incurable.  Aim of treatment is to control disease and prolong life.  He will need to be on systemic treatment all the time.  Foundation 1 was done.  No actionable mutation.    3.  Will get a port placed.  Nurses are finding it hard to find an IV line.    4.  Patient will continue on Eliquis. He is tolerating it well.  No bleeding complications. Plan is for lifelong anticoagulation.     5.  He will continue on levothyroxine. TSH is normal.     6.  He will continue on PEG tube feeding.  He will follow-up with nutrition.    7.  We will get CT scan after cycle 4.  I will see him after that.  In between he will see our nurse practitioner.  Advised him to call us with any questions or concerns.     Total visit time of 40 minutes.  Time spent in today's visit, review of chart/investigations today, discussing regarding chemotherapy and documentation today.

## 2023-12-26 NOTE — PROGRESS NOTES
"Oncology Rooming Note    December 26, 2023 2:56 PM   Raj Warren is a 73 year old male who presents for:    Chief Complaint   Patient presents with    Oncology Clinic Visit     Initial Vitals: /84   Pulse 99   Resp 18   Wt 87.1 kg (192 lb)   SpO2 99%   BMI 26.79 kg/m   Estimated body mass index is 26.79 kg/m  as calculated from the following:    Height as of 12/6/23: 1.803 m (5' 10.98\").    Weight as of this encounter: 87.1 kg (192 lb). Body surface area is 2.09 meters squared.  No Pain (0) Comment: Data Unavailable   No LMP for male patient.  Allergies reviewed: Yes  Medications reviewed: Yes    Medications: Medication refills not needed today.  Pharmacy name entered into [employer]: [facility] #[id number] - [place], [place] - [address] AT [facility] & [facility]    Frailty Screening:   Is the patient here for a new oncology consult visit in cancer care? 2. No      Clinical concerns:   doctor was notified.      Kirti Jones CMA            "

## 2023-12-26 NOTE — LETTER
"    12/26/2023         RE: Raj Warren  [address]  [place] [address]        Dear Colleague,    Thank you for referring your patient, Raj Warren, to the [facility]. Please see a copy of my visit note below.    Oncology Rooming Note    December 26, 2023 2:56 PM   Raj Warren is a 73 year old male who presents for:    Chief Complaint   Patient presents with     Oncology Clinic Visit     Initial Vitals: /84   Pulse 99   Resp 18   Wt 87.1 kg (192 lb)   SpO2 99%   BMI 26.79 kg/m   Estimated body mass index is 26.79 kg/m  as calculated from the following:    Height as of 12/6/23: 1.803 m (5' 10.98\").    Weight as of this encounter: 87.1 kg (192 lb). Body surface area is 2.09 meters squared.  No Pain (0) Comment: Data Unavailable   No LMP for male patient.  Allergies reviewed: Yes  Medications reviewed: Yes    Medications: Medication refills not needed today.  Pharmacy name entered into [employer]: [facility] #[id number] - [facility] [address] AT [facility] & [facility]    Frailty Screening:   Is the patient here for a new oncology consult visit in cancer care? 2. No      Clinical concerns:   doctor was notified.      Kirti Jones, [facility]              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  -COMBINED POSITIVE SCORE (CPS):  70  -TUMOR PROPORTION SCORE (TPS):  60%  -FoundationOne: CDKN2A/B [id number] R80* and p14ARF P94L, INPP4B rearrangement intron 11, TP53 H179L.    1.  Patient was evaluated in 2019 for hoarseness of voice.  He was seen by ENT and found to have right vocal cord mass.    -He had biopsy done on 08/07/2019. Pathology revealed moderately-differentiated invasive squamous cell carcinoma.     2.  The patient was seen in the Emergency Room on 04/12/2021 for shortness of breath and admitted.   -CT neck revealed an enhancing soft tissue mass involving " the right larynx measuring 3.9 cm.  -Patient had laryngoscopy with biopsy and tracheostomy done on 04/12/2021.  There was an endolaryngeal mass obscuring the laryngeal landmarks.  Mass appeared to be based on right endolarynx.  Pathology revealed invasive keratinizing moderately-differentiated squamous cell carcinoma.     3.  PET scan on 04/26/2021 revealed hypermetabolic mass involving the supraglottic, glottic and subglottic larynx.  There was a hypermetabolic left level IV lymph node.  There was also hypermetabolic nodule in right middle lobe.     4. CT chest angiogram on 04/27/2021 revealed a large bilateral pulmonary embolism in all the lobes.  There was evidence of right cardiac strain.  -Echocardiogram on 04/27/2021 revealed clot in transit in the right ventricle.  -The patient had emergency pulmonary embolectomy on 04/28/2021.  Pathology revealed organizing clot.  -Patient on Eliquis. Long term anti-coagulation recommended.     5. Total laryngectomy with bilateral neck dissection on 07/08/2021.  Pathology reveals 6 cm squamous cell carcinoma involving the right false and true vocal cords with fixation, the left false and true vocal cords and invades through thyroid cartilage.  There is focal lymphatic invasion.  There is perineural invasion. Benign 75 lymph nodes.  pT4a pN0 disease.     6. Post-op radiation between 08/04/2021 and 09/20/2021. 6600 cGy in 33 fractions.     7. PET/CT on 09/30/2022 revealed hypermetabolic mass originating from the right anterolateral wall of the neopharynx.  -Patient seen by ENT on 10/03/2022 and had FNA of neck mass. Pathology is positive for malignancy. Rare clusters morphologically consistent with squamous cell carcinoma.      8. Pembrolizumab between 10/27/2022 and 11/02/2023. Stopped due to progression.     9. PET scan on 11/20/203 reveals increasing metabolic activity of soft tissue thickening extending from the right neopharynx/superior margin of the laryngectomy site  extending inferiorly to the right supraclavicular region.  -PEG placed on 11/16/2023.    10. Carboplatin and taxol started on 12/06/2023.     SUBJECTIVE:  Mr. Anthony is a 73-year-old gentleman with recurrent laryngeal squamous cell carcinoma which progressed on pembrolizumab. Carboplatin and taxol started on 12/06/2023.    Patient has dysphagia. He does PEG tube feeding.    He has few side-effects from chemotherapy. He was more fatigued. He can do activities of daily living. Has hair loss. He has increased arthritic joint pain.     No headache.  No dizziness. No chest pain.  No shortness of breath.  No abdominal pain.  No nausea or vomiting.  No urinary or bowel complaints. No bleeding. All other review of systems is negative.     PHYSICAL EXAMINATION:   GENERAL:  Alert and oriented x 3.   VITAL SIGNS:  Reviewed.     Rest of the systems not examined.     LABS:  TSH and CMP were reviewed.     ASSESSMENT:  1.  A 73-year-old gentleman with recurrent laryngeal squamous cell carcinoma on chemotherapy.  2.  History of pulmonary embolism. Patient on Eliquis.  3.  Hypothyroidism on levothyroxine.  4.  Dysphagia s/p PEG.     PLAN:  1. Continue chemotherapy.  2. Port-placement by IR.  3. See me or ERNESTINE with next treatment.  4. CT scan after cycle 4.    DISCUSSION:  1.  Patient has few side effects from chemotherapy including fatigue and some aches and pain.  Explained to him that these side effects can get worse.  Explained to the patient that if side effects start interfering with his activities, we will consider dose reduction.    2.  Labs were reviewed with him.  Overall labs are good for treatment.  He will continue on current treatment with carboplatin and Taxol.  Side effect of chemotherapy again reviewed.  Advised him to let us know if he has neuropathy.    Patient had multiple questions about his malignancy.  Explained to him that he has recurrent laryngeal cancer.  It is incurable.  Aim of treatment is to control  disease and prolong life.  He will need to be on systemic treatment all the time.  Foundation 1 was done.  No actionable mutation.    3.  Will get a port placed.  Nurses are finding it hard to find an IV line.    4.  Patient will continue on Eliquis. He is tolerating it well.  No bleeding complications. Plan is for lifelong anticoagulation.     5.  He will continue on levothyroxine. TSH is normal.     6.  He will continue on PEG tube feeding.  He will follow-up with nutrition.    7.  We will get CT scan after cycle 4.  I will see him after that.  In between he will see our nurse practitioner.  Advised him to call us with any questions or concerns.     Total visit time of 40 minutes.  Time spent in today's visit, review of chart/investigations today, discussing regarding chemotherapy and documentation today.      Again, thank you for allowing me to participate in the care of your patient.        Sincerely,        Rosa Bean MD

## 2023-12-27 NOTE — NURSING NOTE
Writer reviewed port placement with patient today while back in infusion. Writer demonstrated on model port and reviewed what to expect during and after port procedure and when to call the provider.     Patient verbalized understanding.     Serena Castillo RN

## 2023-12-27 NOTE — PROGRESS NOTES
Infusion Nursing Note:  Raj Warren presents today for C2D1 Carbo/Taxol.    Patient seen by provider yesterday: Yes: Dr. Bean   present during visit today: Not Applicable.    Note: he is hard stick.. port placement scheduled on 1/2/24. PIV placed on his hand- no good blood return noted between and after chemo but site intact, no signs of infiltration noted.       Intravenous Access:  Peripheral IV placed.    Treatment Conditions:  Lab Results   Component Value Date    HGB 13.5 12/26/2023    WBC 5.0 12/26/2023    ANEU 2.6 06/23/2021    ANEUTAUTO 3.2 12/26/2023     12/26/2023        Lab Results   Component Value Date     12/26/2023    POTASSIUM 5.3 12/26/2023    MAG 2.1 11/18/2023    CR 0.70 12/26/2023    TELMA 9.6 12/26/2023    BILITOTAL 0.2 12/26/2023    ALBUMIN 4.0 12/26/2023    ALT 28 12/26/2023    AST 35 12/26/2023       Results reviewed, labs MET treatment parameters, ok to proceed with treatment.      Post Infusion Assessment:  Patient tolerated infusion without incident.  Blood return noted pre and post infusion.  Site patent and intact, free from redness, edema or discomfort.  No evidence of extravasations.  Access discontinued per protocol.       Discharge Plan:   Discharge instructions reviewed with: Patient.  Patient and/or family verbalized understanding of discharge instructions and all questions answered.  Copy of AVS reviewed with patient and/or family.  Patient will return 1/17/24 for next appointment.  Patient discharged in stable condition accompanied by: self.  Departure Mode: Ambulatory.      Franklyn Kahn RN

## 2024-01-01 ENCOUNTER — DOCUMENTATION ONLY (OUTPATIENT)
Dept: ONCOLOGY | Facility: CLINIC | Age: 74
End: 2024-01-01
Payer: COMMERCIAL

## 2024-01-01 ENCOUNTER — LAB (OUTPATIENT)
Dept: INFUSION THERAPY | Facility: CLINIC | Age: 74
End: 2024-01-01
Attending: INTERNAL MEDICINE
Payer: COMMERCIAL

## 2024-01-01 ENCOUNTER — HOSPITAL ENCOUNTER (OUTPATIENT)
Facility: CLINIC | Age: 74
Discharge: HOME | End: 2024-01-02
Admitting: RADIOLOGY
Payer: COMMERCIAL

## 2024-01-01 ENCOUNTER — TELEPHONE (OUTPATIENT)
Dept: MEDSURG UNIT | Facility: CLINIC | Age: 74
End: 2024-01-01
Payer: COMMERCIAL

## 2024-01-01 ENCOUNTER — TELEPHONE (OUTPATIENT)
Dept: OTOLARYNGOLOGY | Facility: CLINIC | Age: 74
End: 2024-01-01
Payer: COMMERCIAL

## 2024-01-01 ENCOUNTER — APPOINTMENT (OUTPATIENT)
Dept: INTERVENTIONAL RADIOLOGY/VASCULAR | Facility: CLINIC | Age: 74
End: 2024-01-01
Attending: INTERNAL MEDICINE
Payer: COMMERCIAL

## 2024-01-01 ENCOUNTER — ONCOLOGY VISIT (OUTPATIENT)
Dept: ONCOLOGY | Facility: CLINIC | Age: 74
End: 2024-01-01
Attending: INTERNAL MEDICINE
Payer: COMMERCIAL

## 2024-01-01 ENCOUNTER — HOSPITAL ENCOUNTER (OUTPATIENT)
Dept: CT IMAGING | Facility: CLINIC | Age: 74
Discharge: HOME | End: 2024-02-14
Attending: NURSE PRACTITIONER
Payer: COMMERCIAL

## 2024-01-01 ENCOUNTER — HOSPITAL ENCOUNTER (OUTPATIENT)
Facility: CLINIC | Age: 74
Discharge: HOME | End: 2024-05-16
Admitting: RADIOLOGY
Payer: COMMERCIAL

## 2024-01-01 ENCOUNTER — HOSPITAL ENCOUNTER (OUTPATIENT)
Dept: CT IMAGING | Facility: CLINIC | Age: 74
Discharge: HOME | End: 2024-05-16
Attending: NURSE PRACTITIONER
Payer: COMMERCIAL

## 2024-01-01 ENCOUNTER — OFFICE VISIT (OUTPATIENT)
Dept: OTOLARYNGOLOGY | Facility: CLINIC | Age: 74
End: 2024-01-01
Payer: COMMERCIAL

## 2024-01-01 ENCOUNTER — PATIENT OUTREACH (OUTPATIENT)
Dept: CARE COORDINATION | Facility: CLINIC | Age: 74
End: 2024-01-01
Payer: COMMERCIAL

## 2024-01-01 ENCOUNTER — MYC MEDICAL ADVICE (OUTPATIENT)
Dept: OTOLARYNGOLOGY | Facility: CLINIC | Age: 74
End: 2024-01-01

## 2024-01-01 ENCOUNTER — THERAPY VISIT (OUTPATIENT)
Dept: SPEECH THERAPY | Facility: CLINIC | Age: 74
End: 2024-01-01
Payer: COMMERCIAL

## 2024-01-01 ENCOUNTER — TELEPHONE (OUTPATIENT)
Dept: OTHER | Facility: CLINIC | Age: 74
End: 2024-01-01

## 2024-01-01 ENCOUNTER — HOSPITAL ENCOUNTER (OUTPATIENT)
Facility: CLINIC | Age: 74
Discharge: HOME | End: 2024-02-14
Admitting: NURSE PRACTITIONER
Payer: COMMERCIAL

## 2024-01-01 ENCOUNTER — MEDICAL CORRESPONDENCE (OUTPATIENT)
Dept: HEALTH INFORMATION MANAGEMENT | Facility: CLINIC | Age: 74
End: 2024-01-01

## 2024-01-01 VITALS
TEMPERATURE: 98.2 F | RESPIRATION RATE: 16 BRPM | SYSTOLIC BLOOD PRESSURE: 108 MMHG | WEIGHT: 186 LBS | RESPIRATION RATE: 16 BRPM | WEIGHT: 176 LBS | HEART RATE: 99 BPM | TEMPERATURE: 98.3 F | DIASTOLIC BLOOD PRESSURE: 67 MMHG | BODY MASS INDEX: 25.94 KG/M2 | HEIGHT: 71 IN | OXYGEN SATURATION: 100 % | HEART RATE: 84 BPM | OXYGEN SATURATION: 100 % | BODY MASS INDEX: 24.64 KG/M2 | SYSTOLIC BLOOD PRESSURE: 103 MMHG | DIASTOLIC BLOOD PRESSURE: 64 MMHG

## 2024-01-01 VITALS
OXYGEN SATURATION: 100 % | DIASTOLIC BLOOD PRESSURE: 77 MMHG | WEIGHT: 186 LBS | HEIGHT: 71 IN | BODY MASS INDEX: 26.04 KG/M2 | TEMPERATURE: 98.1 F | RESPIRATION RATE: 16 BRPM | SYSTOLIC BLOOD PRESSURE: 114 MMHG | HEART RATE: 97 BPM

## 2024-01-01 VITALS
DIASTOLIC BLOOD PRESSURE: 67 MMHG | BODY MASS INDEX: 24.65 KG/M2 | SYSTOLIC BLOOD PRESSURE: 101 MMHG | HEART RATE: 96 BPM | OXYGEN SATURATION: 100 % | WEIGHT: 176.1 LBS | HEIGHT: 71 IN

## 2024-01-01 VITALS
SYSTOLIC BLOOD PRESSURE: 101 MMHG | WEIGHT: 176 LBS | DIASTOLIC BLOOD PRESSURE: 69 MMHG | OXYGEN SATURATION: 100 % | HEART RATE: 83 BPM | TEMPERATURE: 98.4 F | BODY MASS INDEX: 24.64 KG/M2 | HEIGHT: 71 IN | RESPIRATION RATE: 16 BRPM

## 2024-01-01 VITALS
RESPIRATION RATE: 18 BRPM | WEIGHT: 155 LBS | TEMPERATURE: 98.1 F | BODY MASS INDEX: 21.63 KG/M2 | OXYGEN SATURATION: 100 % | DIASTOLIC BLOOD PRESSURE: 77 MMHG | SYSTOLIC BLOOD PRESSURE: 113 MMHG | HEART RATE: 101 BPM

## 2024-01-01 VITALS
OXYGEN SATURATION: 97 % | TEMPERATURE: 97.8 F | RESPIRATION RATE: 16 BRPM | HEART RATE: 99 BPM | DIASTOLIC BLOOD PRESSURE: 67 MMHG | WEIGHT: 192 LBS | BODY MASS INDEX: 26.88 KG/M2 | HEIGHT: 71 IN | SYSTOLIC BLOOD PRESSURE: 118 MMHG

## 2024-01-01 VITALS
HEIGHT: 70 IN | BODY MASS INDEX: 21.19 KG/M2 | SYSTOLIC BLOOD PRESSURE: 96 MMHG | DIASTOLIC BLOOD PRESSURE: 75 MMHG | HEART RATE: 123 BPM | OXYGEN SATURATION: 98 % | WEIGHT: 148 LBS | RESPIRATION RATE: 16 BRPM

## 2024-01-01 VITALS — HEIGHT: 71 IN | BODY MASS INDEX: 24.64 KG/M2 | WEIGHT: 176 LBS

## 2024-01-01 VITALS
OXYGEN SATURATION: 100 % | RESPIRATION RATE: 16 BRPM | WEIGHT: 189 LBS | DIASTOLIC BLOOD PRESSURE: 85 MMHG | BODY MASS INDEX: 26.36 KG/M2 | TEMPERATURE: 98.4 F | SYSTOLIC BLOOD PRESSURE: 129 MMHG | HEART RATE: 85 BPM

## 2024-01-01 VITALS
RESPIRATION RATE: 16 BRPM | WEIGHT: 159.2 LBS | BODY MASS INDEX: 22.21 KG/M2 | OXYGEN SATURATION: 100 % | TEMPERATURE: 99 F | HEART RATE: 88 BPM | DIASTOLIC BLOOD PRESSURE: 68 MMHG | SYSTOLIC BLOOD PRESSURE: 102 MMHG

## 2024-01-01 DIAGNOSIS — C32.9 MALIGNANT NEOPLASM OF LARYNX (H): Primary | ICD-10-CM

## 2024-01-01 DIAGNOSIS — C32.9 MALIGNANT NEOPLASM OF LARYNX (H): ICD-10-CM

## 2024-01-01 DIAGNOSIS — E46 MALNUTRITION, UNSPECIFIED TYPE (H): ICD-10-CM

## 2024-01-01 DIAGNOSIS — G62.2 POLYNEUROPATHY DUE TO OTHER TOXIC AGENTS (H): ICD-10-CM

## 2024-01-01 DIAGNOSIS — G62.9 NEUROPATHY: Primary | ICD-10-CM

## 2024-01-01 DIAGNOSIS — G62.2 POLYNEUROPATHY DUE TO OTHER TOXIC AGENTS (H): Primary | ICD-10-CM

## 2024-01-01 DIAGNOSIS — I65.21 STENOSIS OF RIGHT CAROTID ARTERY: Primary | ICD-10-CM

## 2024-01-01 DIAGNOSIS — C73: ICD-10-CM

## 2024-01-01 DIAGNOSIS — Z95.828 PORT-A-CATH IN PLACE: Primary | ICD-10-CM

## 2024-01-01 DIAGNOSIS — G62.9 NEUROPATHY: ICD-10-CM

## 2024-01-01 DIAGNOSIS — R49.1 APHONIA: Primary | ICD-10-CM

## 2024-01-01 DIAGNOSIS — R73.9 HYPERGLYCEMIA: ICD-10-CM

## 2024-01-01 DIAGNOSIS — N18.1 CHRONIC KIDNEY DISEASE, STAGE 1: ICD-10-CM

## 2024-01-01 DIAGNOSIS — Z95.828 PORT-A-CATH IN PLACE: ICD-10-CM

## 2024-01-01 DIAGNOSIS — I10 BENIGN ESSENTIAL HYPERTENSION: Primary | ICD-10-CM

## 2024-01-01 DIAGNOSIS — Z71.89 COORDINATION OF COMPLEX CARE: Primary | ICD-10-CM

## 2024-01-01 DIAGNOSIS — C13.9: ICD-10-CM

## 2024-01-01 DIAGNOSIS — G89.3 CANCER ASSOCIATED PAIN: ICD-10-CM

## 2024-01-01 LAB
ALBUMIN SERPL BCG-MCNC: 3.9 G/DL (ref 3.5–5.2)
ALBUMIN SERPL BCG-MCNC: 3.9 G/DL (ref 3.5–5.2)
ALBUMIN SERPL BCG-MCNC: 4 G/DL (ref 3.5–5.2)
ALBUMIN SERPL BCG-MCNC: 4 G/DL (ref 3.5–5.2)
ALBUMIN SERPL BCG-MCNC: 4.1 G/DL (ref 3.5–5.2)
ALBUMIN SERPL BCG-MCNC: 4.1 G/DL (ref 3.5–5.2)
ALBUMIN SERPL BCG-MCNC: 4.3 G/DL (ref 3.5–5.2)
ALP SERPL-CCNC: 104 U/L (ref 40–150)
ALP SERPL-CCNC: 106 U/L (ref 40–150)
ALP SERPL-CCNC: 82 U/L (ref 40–150)
ALP SERPL-CCNC: 90 U/L (ref 40–150)
ALP SERPL-CCNC: 91 U/L (ref 40–150)
ALP SERPL-CCNC: 91 U/L (ref 40–150)
ALP SERPL-CCNC: 96 U/L (ref 40–150)
ALT SERPL W P-5'-P-CCNC: 10 U/L (ref 0–70)
ALT SERPL W P-5'-P-CCNC: 10 U/L (ref 0–70)
ALT SERPL W P-5'-P-CCNC: 12 U/L (ref 0–70)
ALT SERPL W P-5'-P-CCNC: 12 U/L (ref 0–70)
ALT SERPL W P-5'-P-CCNC: 13 U/L (ref 0–70)
ALT SERPL W P-5'-P-CCNC: 14 U/L (ref 0–70)
ALT SERPL W P-5'-P-CCNC: 20 U/L (ref 0–70)
ANION GAP SERPL CALCULATED.3IONS-SCNC: 10 MMOL/L (ref 7–15)
ANION GAP SERPL CALCULATED.3IONS-SCNC: 10 MMOL/L (ref 7–15)
ANION GAP SERPL CALCULATED.3IONS-SCNC: 12 MMOL/L (ref 7–15)
ANION GAP SERPL CALCULATED.3IONS-SCNC: 13 MMOL/L (ref 7–15)
ANION GAP SERPL CALCULATED.3IONS-SCNC: 16 MMOL/L (ref 7–15)
ANION GAP SERPL CALCULATED.3IONS-SCNC: 16 MMOL/L (ref 7–15)
ANION GAP SERPL CALCULATED.3IONS-SCNC: 21 MMOL/L (ref 7–15)
AST SERPL W P-5'-P-CCNC: 13 U/L (ref 0–45)
AST SERPL W P-5'-P-CCNC: 14 U/L (ref 0–45)
AST SERPL W P-5'-P-CCNC: 16 U/L (ref 0–45)
AST SERPL W P-5'-P-CCNC: 17 U/L (ref 0–45)
AST SERPL W P-5'-P-CCNC: 19 U/L (ref 0–45)
BASOPHILS # BLD AUTO: 0 10E3/UL (ref 0–0.2)
BASOPHILS # BLD AUTO: ABNORMAL 10*3/UL
BASOPHILS # BLD AUTO: ABNORMAL 10*3/UL
BASOPHILS # BLD MANUAL: 0 10E3/UL (ref 0–0.2)
BASOPHILS # BLD MANUAL: 0 10E3/UL (ref 0–0.2)
BASOPHILS NFR BLD AUTO: 0 %
BASOPHILS NFR BLD AUTO: 1 %
BASOPHILS NFR BLD AUTO: ABNORMAL %
BASOPHILS NFR BLD AUTO: ABNORMAL %
BASOPHILS NFR BLD MANUAL: 0 %
BASOPHILS NFR BLD MANUAL: 1 %
BILIRUB SERPL-MCNC: 0.3 MG/DL
BILIRUB SERPL-MCNC: 0.4 MG/DL
BILIRUB SERPL-MCNC: 0.5 MG/DL
BUN SERPL-MCNC: 10.1 MG/DL (ref 8–23)
BUN SERPL-MCNC: 12.9 MG/DL (ref 8–23)
BUN SERPL-MCNC: 13.5 MG/DL (ref 8–23)
BUN SERPL-MCNC: 14 MG/DL (ref 8–23)
BUN SERPL-MCNC: 14.1 MG/DL (ref 8–23)
BUN SERPL-MCNC: 17.9 MG/DL (ref 8–23)
BUN SERPL-MCNC: 7.2 MG/DL (ref 8–23)
CALCIUM SERPL-MCNC: 8.8 MG/DL (ref 8.8–10.2)
CALCIUM SERPL-MCNC: 9.1 MG/DL (ref 8.8–10.2)
CALCIUM SERPL-MCNC: 9.3 MG/DL (ref 8.8–10.2)
CALCIUM SERPL-MCNC: 9.4 MG/DL (ref 8.8–10.2)
CALCIUM SERPL-MCNC: 9.5 MG/DL (ref 8.8–10.2)
CALCIUM SERPL-MCNC: 9.5 MG/DL (ref 8.8–10.2)
CALCIUM SERPL-MCNC: 9.6 MG/DL (ref 8.8–10.2)
CHLORIDE SERPL-SCNC: 102 MMOL/L (ref 98–107)
CHLORIDE SERPL-SCNC: 104 MMOL/L (ref 98–107)
CHLORIDE SERPL-SCNC: 106 MMOL/L (ref 98–107)
CHLORIDE SERPL-SCNC: 106 MMOL/L (ref 98–107)
CHLORIDE SERPL-SCNC: 108 MMOL/L (ref 98–107)
CREAT SERPL-MCNC: 0.61 MG/DL (ref 0.67–1.17)
CREAT SERPL-MCNC: 0.61 MG/DL (ref 0.67–1.17)
CREAT SERPL-MCNC: 0.64 MG/DL (ref 0.67–1.17)
CREAT SERPL-MCNC: 0.66 MG/DL (ref 0.67–1.17)
CREAT SERPL-MCNC: 0.69 MG/DL (ref 0.67–1.17)
CREAT SERPL-MCNC: 0.7 MG/DL (ref 0.67–1.17)
CREAT SERPL-MCNC: 0.72 MG/DL (ref 0.67–1.17)
DACRYOCYTES BLD QL SMEAR: SLIGHT
DEPRECATED HCO3 PLAS-SCNC: 17 MMOL/L (ref 22–29)
DEPRECATED HCO3 PLAS-SCNC: 21 MMOL/L (ref 22–29)
DEPRECATED HCO3 PLAS-SCNC: 21 MMOL/L (ref 22–29)
DEPRECATED HCO3 PLAS-SCNC: 22 MMOL/L (ref 22–29)
DEPRECATED HCO3 PLAS-SCNC: 23 MMOL/L (ref 22–29)
DEPRECATED HCO3 PLAS-SCNC: 23 MMOL/L (ref 22–29)
DEPRECATED HCO3 PLAS-SCNC: 24 MMOL/L (ref 22–29)
EGFRCR SERPLBLD CKD-EPI 2021: >90 ML/MIN/1.73M2
ELLIPTOCYTES BLD QL SMEAR: SLIGHT
ELLIPTOCYTES BLD QL SMEAR: SLIGHT
EOSINOPHIL # BLD AUTO: 0 10E3/UL (ref 0–0.7)
EOSINOPHIL # BLD AUTO: ABNORMAL 10*3/UL
EOSINOPHIL # BLD AUTO: ABNORMAL 10*3/UL
EOSINOPHIL # BLD MANUAL: 0 10E3/UL (ref 0–0.7)
EOSINOPHIL # BLD MANUAL: 0 10E3/UL (ref 0–0.7)
EOSINOPHIL NFR BLD AUTO: 0 %
EOSINOPHIL NFR BLD AUTO: 1 %
EOSINOPHIL NFR BLD AUTO: 2 %
EOSINOPHIL NFR BLD AUTO: ABNORMAL %
EOSINOPHIL NFR BLD AUTO: ABNORMAL %
EOSINOPHIL NFR BLD MANUAL: 0 %
EOSINOPHIL NFR BLD MANUAL: 1 %
ERYTHROCYTE [DISTWIDTH] IN BLOOD BY AUTOMATED COUNT: 13.6 % (ref 10–15)
ERYTHROCYTE [DISTWIDTH] IN BLOOD BY AUTOMATED COUNT: 15.7 % (ref 10–15)
ERYTHROCYTE [DISTWIDTH] IN BLOOD BY AUTOMATED COUNT: 17.7 % (ref 10–15)
ERYTHROCYTE [DISTWIDTH] IN BLOOD BY AUTOMATED COUNT: 17.7 % (ref 10–15)
ERYTHROCYTE [DISTWIDTH] IN BLOOD BY AUTOMATED COUNT: 17.8 % (ref 10–15)
ERYTHROCYTE [DISTWIDTH] IN BLOOD BY AUTOMATED COUNT: 17.9 % (ref 10–15)
ERYTHROCYTE [DISTWIDTH] IN BLOOD BY AUTOMATED COUNT: 18 % (ref 10–15)
GLUCOSE SERPL-MCNC: 104 MG/DL (ref 70–99)
GLUCOSE SERPL-MCNC: 109 MG/DL (ref 70–99)
GLUCOSE SERPL-MCNC: 129 MG/DL (ref 70–99)
GLUCOSE SERPL-MCNC: 130 MG/DL (ref 70–99)
GLUCOSE SERPL-MCNC: 93 MG/DL (ref 70–99)
GLUCOSE SERPL-MCNC: 98 MG/DL (ref 70–99)
GLUCOSE SERPL-MCNC: 99 MG/DL (ref 70–99)
HBA1C MFR BLD: 5.4 %
HCT VFR BLD AUTO: 24.4 % (ref 40–53)
HCT VFR BLD AUTO: 25 % (ref 40–53)
HCT VFR BLD AUTO: 25.2 % (ref 40–53)
HCT VFR BLD AUTO: 26.1 % (ref 40–53)
HCT VFR BLD AUTO: 29.4 % (ref 40–53)
HCT VFR BLD AUTO: 33.3 % (ref 40–53)
HCT VFR BLD AUTO: 36 % (ref 40–53)
HGB BLD-MCNC: 10.1 G/DL (ref 13.3–17.7)
HGB BLD-MCNC: 11.5 G/DL (ref 13.3–17.7)
HGB BLD-MCNC: 12.6 G/DL (ref 13.3–17.7)
HGB BLD-MCNC: 8.3 G/DL (ref 13.3–17.7)
HGB BLD-MCNC: 8.4 G/DL (ref 13.3–17.7)
HGB BLD-MCNC: 8.6 G/DL (ref 13.3–17.7)
HGB BLD-MCNC: 8.9 G/DL (ref 13.3–17.7)
IMM GRANULOCYTES # BLD: 0 10E3/UL
IMM GRANULOCYTES # BLD: ABNORMAL 10*3/UL
IMM GRANULOCYTES # BLD: ABNORMAL 10*3/UL
IMM GRANULOCYTES NFR BLD: 0 %
IMM GRANULOCYTES NFR BLD: ABNORMAL %
IMM GRANULOCYTES NFR BLD: ABNORMAL %
LYMPHOCYTES # BLD AUTO: 0.8 10E3/UL (ref 0.8–5.3)
LYMPHOCYTES # BLD AUTO: 0.8 10E3/UL (ref 0.8–5.3)
LYMPHOCYTES # BLD AUTO: 0.9 10E3/UL (ref 0.8–5.3)
LYMPHOCYTES # BLD AUTO: 1.2 10E3/UL (ref 0.8–5.3)
LYMPHOCYTES # BLD AUTO: 1.4 10E3/UL (ref 0.8–5.3)
LYMPHOCYTES # BLD AUTO: ABNORMAL 10*3/UL
LYMPHOCYTES # BLD AUTO: ABNORMAL 10*3/UL
LYMPHOCYTES # BLD MANUAL: 1 10E3/UL (ref 0.8–5.3)
LYMPHOCYTES # BLD MANUAL: 1.2 10E3/UL (ref 0.8–5.3)
LYMPHOCYTES NFR BLD AUTO: 24 %
LYMPHOCYTES NFR BLD AUTO: 24 %
LYMPHOCYTES NFR BLD AUTO: 31 %
LYMPHOCYTES NFR BLD AUTO: 33 %
LYMPHOCYTES NFR BLD AUTO: 34 %
LYMPHOCYTES NFR BLD AUTO: ABNORMAL %
LYMPHOCYTES NFR BLD AUTO: ABNORMAL %
LYMPHOCYTES NFR BLD MANUAL: 28 %
LYMPHOCYTES NFR BLD MANUAL: 40 %
MCH RBC QN AUTO: 30.3 PG (ref 26.5–33)
MCH RBC QN AUTO: 30.6 PG (ref 26.5–33)
MCH RBC QN AUTO: 31.7 PG (ref 26.5–33)
MCH RBC QN AUTO: 32.5 PG (ref 26.5–33)
MCH RBC QN AUTO: 33.6 PG (ref 26.5–33)
MCH RBC QN AUTO: 34.6 PG (ref 26.5–33)
MCH RBC QN AUTO: 34.7 PG (ref 26.5–33)
MCHC RBC AUTO-ENTMCNC: 33.6 G/DL (ref 31.5–36.5)
MCHC RBC AUTO-ENTMCNC: 34 G/DL (ref 31.5–36.5)
MCHC RBC AUTO-ENTMCNC: 34.1 G/DL (ref 31.5–36.5)
MCHC RBC AUTO-ENTMCNC: 34.1 G/DL (ref 31.5–36.5)
MCHC RBC AUTO-ENTMCNC: 34.4 G/DL (ref 31.5–36.5)
MCHC RBC AUTO-ENTMCNC: 34.5 G/DL (ref 31.5–36.5)
MCHC RBC AUTO-ENTMCNC: 35 G/DL (ref 31.5–36.5)
MCV RBC AUTO: 102 FL (ref 78–100)
MCV RBC AUTO: 103 FL (ref 78–100)
MCV RBC AUTO: 87 FL (ref 78–100)
MCV RBC AUTO: 89 FL (ref 78–100)
MCV RBC AUTO: 92 FL (ref 78–100)
MCV RBC AUTO: 95 FL (ref 78–100)
MCV RBC AUTO: 99 FL (ref 78–100)
MONOCYTES # BLD AUTO: 0.3 10E3/UL (ref 0–1.3)
MONOCYTES # BLD AUTO: 0.4 10E3/UL (ref 0–1.3)
MONOCYTES # BLD AUTO: ABNORMAL 10*3/UL
MONOCYTES # BLD AUTO: ABNORMAL 10*3/UL
MONOCYTES # BLD MANUAL: 0.3 10E3/UL (ref 0–1.3)
MONOCYTES # BLD MANUAL: 0.3 10E3/UL (ref 0–1.3)
MONOCYTES NFR BLD AUTO: 10 %
MONOCYTES NFR BLD AUTO: 11 %
MONOCYTES NFR BLD AUTO: 12 %
MONOCYTES NFR BLD AUTO: ABNORMAL %
MONOCYTES NFR BLD AUTO: ABNORMAL %
MONOCYTES NFR BLD MANUAL: 10 %
MONOCYTES NFR BLD MANUAL: 11 %
NEUTROPHILS # BLD AUTO: 1.5 10E3/UL (ref 1.6–8.3)
NEUTROPHILS # BLD AUTO: 2.1 10E3/UL (ref 1.6–8.3)
NEUTROPHILS # BLD AUTO: 2.1 10E3/UL (ref 1.6–8.3)
NEUTROPHILS # BLD AUTO: 2.2 10E3/UL (ref 1.6–8.3)
NEUTROPHILS # BLD AUTO: 2.3 10E3/UL (ref 1.6–8.3)
NEUTROPHILS # BLD AUTO: ABNORMAL 10*3/UL
NEUTROPHILS # BLD AUTO: ABNORMAL 10*3/UL
NEUTROPHILS # BLD MANUAL: 1.4 10E3/UL (ref 1.6–8.3)
NEUTROPHILS # BLD MANUAL: 2.1 10E3/UL (ref 1.6–8.3)
NEUTROPHILS NFR BLD AUTO: 54 %
NEUTROPHILS NFR BLD AUTO: 57 %
NEUTROPHILS NFR BLD AUTO: 57 %
NEUTROPHILS NFR BLD AUTO: 64 %
NEUTROPHILS NFR BLD AUTO: 65 %
NEUTROPHILS NFR BLD AUTO: ABNORMAL %
NEUTROPHILS NFR BLD AUTO: ABNORMAL %
NEUTROPHILS NFR BLD MANUAL: 47 %
NEUTROPHILS NFR BLD MANUAL: 62 %
NRBC # BLD AUTO: 0 10E3/UL
NRBC BLD AUTO-RTO: 0 /100
PLAT MORPH BLD: ABNORMAL
PLAT MORPH BLD: ABNORMAL
PLATELET # BLD AUTO: 115 10E3/UL (ref 150–450)
PLATELET # BLD AUTO: 167 10E3/UL (ref 150–450)
PLATELET # BLD AUTO: 169 10E3/UL (ref 150–450)
PLATELET # BLD AUTO: 59 10E3/UL (ref 150–450)
PLATELET # BLD AUTO: 63 10E3/UL (ref 150–450)
PLATELET # BLD AUTO: 75 10E3/UL (ref 150–450)
PLATELET # BLD AUTO: 81 10E3/UL (ref 150–450)
POTASSIUM SERPL-SCNC: 3.4 MMOL/L (ref 3.4–5.3)
POTASSIUM SERPL-SCNC: 3.4 MMOL/L (ref 3.4–5.3)
POTASSIUM SERPL-SCNC: 3.5 MMOL/L (ref 3.4–5.3)
POTASSIUM SERPL-SCNC: 3.9 MMOL/L (ref 3.4–5.3)
POTASSIUM SERPL-SCNC: 4 MMOL/L (ref 3.4–5.3)
POTASSIUM SERPL-SCNC: 4 MMOL/L (ref 3.4–5.3)
POTASSIUM SERPL-SCNC: 4.1 MMOL/L (ref 3.4–5.3)
PROT SERPL-MCNC: 6.6 G/DL (ref 6.4–8.3)
PROT SERPL-MCNC: 6.6 G/DL (ref 6.4–8.3)
PROT SERPL-MCNC: 6.7 G/DL (ref 6.4–8.3)
PROT SERPL-MCNC: 7.1 G/DL (ref 6.4–8.3)
PROT SERPL-MCNC: 7.2 G/DL (ref 6.4–8.3)
RBC # BLD AUTO: 2.43 10E6/UL (ref 4.4–5.9)
RBC # BLD AUTO: 2.47 10E6/UL (ref 4.4–5.9)
RBC # BLD AUTO: 2.48 10E6/UL (ref 4.4–5.9)
RBC # BLD AUTO: 2.74 10E6/UL (ref 4.4–5.9)
RBC # BLD AUTO: 3.19 10E6/UL (ref 4.4–5.9)
RBC # BLD AUTO: 3.76 10E6/UL (ref 4.4–5.9)
RBC # BLD AUTO: 4.16 10E6/UL (ref 4.4–5.9)
RBC MORPH BLD: ABNORMAL
RBC MORPH BLD: ABNORMAL
SMUDGE CELLS BLD QL SMEAR: PRESENT
SODIUM SERPL-SCNC: 136 MMOL/L (ref 135–145)
SODIUM SERPL-SCNC: 139 MMOL/L (ref 135–145)
SODIUM SERPL-SCNC: 141 MMOL/L (ref 135–145)
SODIUM SERPL-SCNC: 142 MMOL/L (ref 135–145)
SODIUM SERPL-SCNC: 143 MMOL/L (ref 135–145)
TSH SERPL DL<=0.005 MIU/L-ACNC: 1.43 UIU/ML (ref 0.3–4.2)
TSH SERPL DL<=0.005 MIU/L-ACNC: 1.56 UIU/ML (ref 0.3–4.2)
TSH SERPL DL<=0.005 MIU/L-ACNC: 2.02 UIU/ML (ref 0.3–4.2)
TSH SERPL DL<=0.005 MIU/L-ACNC: 2.13 UIU/ML (ref 0.3–4.2)
TSH SERPL DL<=0.005 MIU/L-ACNC: 2.22 UIU/ML (ref 0.3–4.2)
VARIANT LYMPHS BLD QL SMEAR: PRESENT
WBC # BLD AUTO: 2.6 10E3/UL (ref 4–11)
WBC # BLD AUTO: 2.9 10E3/UL (ref 4–11)
WBC # BLD AUTO: 3.3 10E3/UL (ref 4–11)
WBC # BLD AUTO: 3.4 10E3/UL (ref 4–11)
WBC # BLD AUTO: 3.6 10E3/UL (ref 4–11)
WBC # BLD AUTO: 3.6 10E3/UL (ref 4–11)
WBC # BLD AUTO: 4.2 10E3/UL (ref 4–11)

## 2024-01-01 PROCEDURE — 96375 TX/PRO/DX INJ NEW DRUG ADDON: CPT

## 2024-01-01 PROCEDURE — 250N000011 HC RX IP 250 OP 636: Performed by: NURSE PRACTITIONER

## 2024-01-01 PROCEDURE — 250N000009 HC RX 250: Performed by: NURSE PRACTITIONER

## 2024-01-01 PROCEDURE — 99215 OFFICE O/P EST HI 40 MIN: CPT | Performed by: INTERNAL MEDICINE

## 2024-01-01 PROCEDURE — 85025 COMPLETE CBC W/AUTO DIFF WBC: CPT | Performed by: INTERNAL MEDICINE

## 2024-01-01 PROCEDURE — G0463 HOSPITAL OUTPT CLINIC VISIT: HCPCS | Mod: 25 | Performed by: NURSE PRACTITIONER

## 2024-01-01 PROCEDURE — 96415 CHEMO IV INFUSION ADDL HR: CPT

## 2024-01-01 PROCEDURE — 250N000011 HC RX IP 250 OP 636: Performed by: INTERNAL MEDICINE

## 2024-01-01 PROCEDURE — 96413 CHEMO IV INFUSION 1 HR: CPT

## 2024-01-01 PROCEDURE — 92522 EVALUATE SPEECH PRODUCTION: CPT | Mod: GN | Performed by: SPEECH-LANGUAGE PATHOLOGIST

## 2024-01-01 PROCEDURE — 80053 COMPREHEN METABOLIC PANEL: CPT | Performed by: INTERNAL MEDICINE

## 2024-01-01 PROCEDURE — 99214 OFFICE O/P EST MOD 30 MIN: CPT | Mod: 25 | Performed by: OTOLARYNGOLOGY

## 2024-01-01 PROCEDURE — 258N000003 HC RX IP 258 OP 636: Performed by: NURSE PRACTITIONER

## 2024-01-01 PROCEDURE — 36591 DRAW BLOOD OFF VENOUS DEVICE: CPT | Performed by: INTERNAL MEDICINE

## 2024-01-01 PROCEDURE — 31575 DIAGNOSTIC LARYNGOSCOPY: CPT | Mod: 51 | Performed by: OTOLARYNGOLOGY

## 2024-01-01 PROCEDURE — C1788 PORT, INDWELLING, IMP: HCPCS

## 2024-01-01 PROCEDURE — 99213 OFFICE O/P EST LOW 20 MIN: CPT | Performed by: NURSE PRACTITIONER

## 2024-01-01 PROCEDURE — 250N000011 HC RX IP 250 OP 636: Performed by: RADIOLOGY

## 2024-01-01 PROCEDURE — 84443 ASSAY THYROID STIM HORMONE: CPT | Performed by: INTERNAL MEDICINE

## 2024-01-01 PROCEDURE — G0463 HOSPITAL OUTPT CLINIC VISIT: HCPCS | Performed by: NURSE PRACTITIONER

## 2024-01-01 PROCEDURE — 250N000009 HC RX 250: Performed by: PHYSICIAN ASSISTANT

## 2024-01-01 PROCEDURE — 84460 ALANINE AMINO (ALT) (SGPT): CPT | Performed by: INTERNAL MEDICINE

## 2024-01-01 PROCEDURE — G0463 HOSPITAL OUTPT CLINIC VISIT: HCPCS | Mod: 25 | Performed by: INTERNAL MEDICINE

## 2024-01-01 PROCEDURE — 258N000003 HC RX IP 258 OP 636: Performed by: INTERNAL MEDICINE

## 2024-01-01 PROCEDURE — 250N000013 HC RX MED GY IP 250 OP 250 PS 637: Performed by: INTERNAL MEDICINE

## 2024-01-01 PROCEDURE — 272N000196 HC ACCESSORY CR5

## 2024-01-01 PROCEDURE — 96417 CHEMO IV INFUS EACH ADDL SEQ: CPT

## 2024-01-01 PROCEDURE — 70491 CT SOFT TISSUE NECK W/DYE: CPT

## 2024-01-01 PROCEDURE — 96367 TX/PROPH/DG ADDL SEQ IV INF: CPT

## 2024-01-01 PROCEDURE — 71260 CT THORAX DX C+: CPT

## 2024-01-01 PROCEDURE — 999N000163 HC STATISTIC SIMPLE TUBE INSERTION/CHARGE, PORT, CATH, FISTULOGRAM

## 2024-01-01 PROCEDURE — 999N000154 HC STATISTIC RADIOLOGY XRAY, US, CT, MAR, NM

## 2024-01-01 PROCEDURE — 99214 OFFICE O/P EST MOD 30 MIN: CPT | Performed by: NURSE PRACTITIONER

## 2024-01-01 PROCEDURE — 31615 TRCHEOBRNCHSC EST TRACHS INC: CPT | Performed by: OTOLARYNGOLOGY

## 2024-01-01 PROCEDURE — 250N000009 HC RX 250: Performed by: RADIOLOGY

## 2024-01-01 PROCEDURE — 82040 ASSAY OF SERUM ALBUMIN: CPT | Performed by: INTERNAL MEDICINE

## 2024-01-01 PROCEDURE — 83036 HEMOGLOBIN GLYCOSYLATED A1C: CPT | Performed by: INTERNAL MEDICINE

## 2024-01-01 PROCEDURE — G0463 HOSPITAL OUTPT CLINIC VISIT: HCPCS | Performed by: INTERNAL MEDICINE

## 2024-01-01 PROCEDURE — 85041 AUTOMATED RBC COUNT: CPT | Performed by: INTERNAL MEDICINE

## 2024-01-01 PROCEDURE — 85007 BL SMEAR W/DIFF WBC COUNT: CPT | Performed by: INTERNAL MEDICINE

## 2024-01-01 PROCEDURE — 99152 MOD SED SAME PHYS/QHP 5/>YRS: CPT

## 2024-01-01 PROCEDURE — 250N000011 HC RX IP 250 OP 636: Performed by: PHYSICIAN ASSISTANT

## 2024-01-01 PROCEDURE — 85018 HEMOGLOBIN: CPT | Performed by: INTERNAL MEDICINE

## 2024-01-01 RX ORDER — HEPARIN SODIUM (PORCINE) LOCK FLUSH IV SOLN 100 UNIT/ML 100 UNIT/ML
5 SOLUTION INTRAVENOUS
Status: DISCONTINUED | OUTPATIENT
Start: 2024-01-01 | End: 2024-01-01 | Stop reason: HOSPADM

## 2024-01-01 RX ORDER — IOPAMIDOL 755 MG/ML
125 INJECTION, SOLUTION INTRAVASCULAR ONCE
Status: COMPLETED | OUTPATIENT
Start: 2024-01-01 | End: 2024-01-01

## 2024-01-01 RX ORDER — ALBUTEROL SULFATE 0.83 MG/ML
2.5 SOLUTION RESPIRATORY (INHALATION)
Status: CANCELLED | OUTPATIENT
Start: 2024-06-20

## 2024-01-01 RX ORDER — GABAPENTIN 250 MG/5ML
SOLUTION ORAL 3 TIMES DAILY
COMMUNITY
End: 2024-01-01

## 2024-01-01 RX ORDER — ALBUTEROL SULFATE 0.83 MG/ML
2.5 SOLUTION RESPIRATORY (INHALATION)
Status: CANCELLED | OUTPATIENT
Start: 2024-01-01

## 2024-01-01 RX ORDER — DIPHENHYDRAMINE HYDROCHLORIDE 50 MG/ML
50 INJECTION INTRAMUSCULAR; INTRAVENOUS
Status: CANCELLED
Start: 2024-01-01

## 2024-01-01 RX ORDER — METHYLPREDNISOLONE SODIUM SUCCINATE 125 MG/2ML
125 INJECTION, POWDER, LYOPHILIZED, FOR SOLUTION INTRAMUSCULAR; INTRAVENOUS
Status: CANCELLED
Start: 2024-01-01

## 2024-01-01 RX ORDER — LORAZEPAM 2 MG/ML
0.5 INJECTION INTRAMUSCULAR EVERY 4 HOURS PRN
Status: CANCELLED | OUTPATIENT
Start: 2024-01-01

## 2024-01-01 RX ORDER — HEPARIN SODIUM,PORCINE 10 UNIT/ML
5-20 VIAL (ML) INTRAVENOUS DAILY PRN
Status: CANCELLED | OUTPATIENT
Start: 2024-06-20

## 2024-01-01 RX ORDER — ALBUTEROL SULFATE 90 UG/1
1-2 AEROSOL, METERED RESPIRATORY (INHALATION)
Status: CANCELLED
Start: 2024-01-01

## 2024-01-01 RX ORDER — PALONOSETRON 0.05 MG/ML
0.25 INJECTION, SOLUTION INTRAVENOUS ONCE
Status: CANCELLED | OUTPATIENT
Start: 2024-01-01

## 2024-01-01 RX ORDER — LIDOCAINE 40 MG/G
CREAM TOPICAL
Status: DISCONTINUED | OUTPATIENT
Start: 2024-01-01 | End: 2024-01-01 | Stop reason: HOSPADM

## 2024-01-01 RX ORDER — PROCHLORPERAZINE MALEATE 10 MG
10 TABLET ORAL EVERY 6 HOURS PRN
COMMUNITY

## 2024-01-01 RX ORDER — FLUMAZENIL 0.1 MG/ML
0.2 INJECTION, SOLUTION INTRAVENOUS
Status: DISCONTINUED | OUTPATIENT
Start: 2024-01-01 | End: 2024-01-01 | Stop reason: HOSPADM

## 2024-01-01 RX ORDER — PALONOSETRON 0.05 MG/ML
0.25 INJECTION, SOLUTION INTRAVENOUS ONCE
Status: COMPLETED | OUTPATIENT
Start: 2024-01-01 | End: 2024-01-01

## 2024-01-01 RX ORDER — HEPARIN SODIUM,PORCINE 10 UNIT/ML
5-20 VIAL (ML) INTRAVENOUS DAILY PRN
Status: CANCELLED | OUTPATIENT
Start: 2024-01-01

## 2024-01-01 RX ORDER — METOPROLOL TARTRATE 25 MG/1
25 TABLET, FILM COATED ORAL 2 TIMES DAILY
Qty: 180 TABLET | Refills: 3 | Status: SHIPPED | OUTPATIENT
Start: 2024-01-01

## 2024-01-01 RX ORDER — GABAPENTIN 250 MG/5ML
300 SOLUTION ORAL 3 TIMES DAILY
Qty: 470 ML | Refills: 3 | Status: SHIPPED | OUTPATIENT
Start: 2024-01-01 | End: 2024-01-01

## 2024-01-01 RX ORDER — MEPERIDINE HYDROCHLORIDE 25 MG/ML
25 INJECTION INTRAMUSCULAR; INTRAVENOUS; SUBCUTANEOUS EVERY 30 MIN PRN
Status: CANCELLED | OUTPATIENT
Start: 2024-06-13

## 2024-01-01 RX ORDER — MEPERIDINE HYDROCHLORIDE 25 MG/ML
25 INJECTION INTRAMUSCULAR; INTRAVENOUS; SUBCUTANEOUS EVERY 30 MIN PRN
Status: CANCELLED | OUTPATIENT
Start: 2024-06-20

## 2024-01-01 RX ORDER — MEPERIDINE HYDROCHLORIDE 25 MG/ML
25 INJECTION INTRAMUSCULAR; INTRAVENOUS; SUBCUTANEOUS EVERY 30 MIN PRN
Status: CANCELLED | OUTPATIENT
Start: 2024-01-01

## 2024-01-01 RX ORDER — HEPARIN SODIUM,PORCINE 10 UNIT/ML
5-20 VIAL (ML) INTRAVENOUS DAILY PRN
Status: CANCELLED | OUTPATIENT
Start: 2024-06-13

## 2024-01-01 RX ORDER — NALOXONE HYDROCHLORIDE 0.4 MG/ML
0.4 INJECTION, SOLUTION INTRAMUSCULAR; INTRAVENOUS; SUBCUTANEOUS
Status: DISCONTINUED | OUTPATIENT
Start: 2024-01-01 | End: 2024-01-01 | Stop reason: HOSPADM

## 2024-01-01 RX ORDER — DIPHENHYDRAMINE HCL 25 MG
50 CAPSULE ORAL ONCE
Status: DISCONTINUED | OUTPATIENT
Start: 2024-01-01 | End: 2024-01-01 | Stop reason: ALTCHOICE

## 2024-01-01 RX ORDER — ALBUTEROL SULFATE 90 UG/1
1-2 AEROSOL, METERED RESPIRATORY (INHALATION)
Status: CANCELLED
Start: 2024-06-27

## 2024-01-01 RX ORDER — DIPHENHYDRAMINE HCL 25 MG
50 CAPSULE ORAL ONCE
Status: CANCELLED
Start: 2024-01-01

## 2024-01-01 RX ORDER — HEPARIN SODIUM (PORCINE) LOCK FLUSH IV SOLN 100 UNIT/ML 100 UNIT/ML
5-10 SOLUTION INTRAVENOUS
Status: DISCONTINUED | OUTPATIENT
Start: 2024-01-01 | End: 2024-01-01 | Stop reason: HOSPADM

## 2024-01-01 RX ORDER — DIPHENHYDRAMINE HCL 50 MG
50 CAPSULE ORAL ONCE
Status: CANCELLED
Start: 2024-01-01

## 2024-01-01 RX ORDER — EPINEPHRINE 1 MG/ML
0.3 INJECTION, SOLUTION INTRAMUSCULAR; SUBCUTANEOUS EVERY 5 MIN PRN
Status: CANCELLED | OUTPATIENT
Start: 2024-01-01

## 2024-01-01 RX ORDER — METOPROLOL TARTRATE 25 MG/1
25 TABLET, FILM COATED ORAL 2 TIMES DAILY
COMMUNITY
End: 2024-01-01

## 2024-01-01 RX ORDER — LIDOCAINE HYDROCHLORIDE 20 MG/ML
1 SOLUTION OROPHARYNGEAL EVERY 6 HOURS PRN
Qty: 100 ML | Refills: 3 | Status: SHIPPED | OUTPATIENT
Start: 2024-01-01

## 2024-01-01 RX ORDER — LORAZEPAM 2 MG/ML
0.5 INJECTION INTRAMUSCULAR EVERY 4 HOURS PRN
Status: CANCELLED | OUTPATIENT
Start: 2024-06-20

## 2024-01-01 RX ORDER — DEXAMETHASONE 4 MG/1
8 TABLET ORAL DAILY
Qty: 6 TABLET | Refills: 3 | Status: SHIPPED | OUTPATIENT
Start: 2024-01-01 | End: 2024-01-01

## 2024-01-01 RX ORDER — EPINEPHRINE 1 MG/ML
0.3 INJECTION, SOLUTION INTRAMUSCULAR; SUBCUTANEOUS EVERY 5 MIN PRN
Status: CANCELLED | OUTPATIENT
Start: 2024-06-27

## 2024-01-01 RX ORDER — DIPHENHYDRAMINE HYDROCHLORIDE 50 MG/ML
50 INJECTION INTRAMUSCULAR; INTRAVENOUS
Status: CANCELLED
Start: 2024-06-20

## 2024-01-01 RX ORDER — ALBUTEROL SULFATE 0.83 MG/ML
2.5 SOLUTION RESPIRATORY (INHALATION)
Status: CANCELLED | OUTPATIENT
Start: 2024-06-13

## 2024-01-01 RX ORDER — HEPARIN SODIUM,PORCINE 10 UNIT/ML
5-20 VIAL (ML) INTRAVENOUS DAILY PRN
Status: DISCONTINUED | OUTPATIENT
Start: 2024-01-01 | End: 2024-01-01 | Stop reason: HOSPADM

## 2024-01-01 RX ORDER — CEFAZOLIN SODIUM 2 G/100ML
2 INJECTION, SOLUTION INTRAVENOUS
Status: COMPLETED | OUTPATIENT
Start: 2024-01-01 | End: 2024-01-01

## 2024-01-01 RX ORDER — ACETAMINOPHEN 325 MG/1
650 TABLET ORAL
Status: DISCONTINUED | OUTPATIENT
Start: 2024-01-01 | End: 2024-01-01 | Stop reason: HOSPADM

## 2024-01-01 RX ORDER — ALBUTEROL SULFATE 0.83 MG/ML
2.5 SOLUTION RESPIRATORY (INHALATION)
Status: CANCELLED | OUTPATIENT
Start: 2024-06-27

## 2024-01-01 RX ORDER — HEPARIN SODIUM,PORCINE 10 UNIT/ML
5-10 VIAL (ML) INTRAVENOUS
Status: DISCONTINUED | OUTPATIENT
Start: 2024-01-01 | End: 2024-01-01 | Stop reason: HOSPADM

## 2024-01-01 RX ORDER — HEPARIN SODIUM (PORCINE) LOCK FLUSH IV SOLN 100 UNIT/ML 100 UNIT/ML
500 SOLUTION INTRAVENOUS ONCE
Status: COMPLETED | OUTPATIENT
Start: 2024-01-01 | End: 2024-01-01

## 2024-01-01 RX ORDER — METHYLPREDNISOLONE SODIUM SUCCINATE 125 MG/2ML
125 INJECTION, POWDER, LYOPHILIZED, FOR SOLUTION INTRAMUSCULAR; INTRAVENOUS
Status: CANCELLED
Start: 2024-06-20

## 2024-01-01 RX ORDER — LORAZEPAM 2 MG/ML
0.5 INJECTION INTRAMUSCULAR EVERY 4 HOURS PRN
Status: CANCELLED | OUTPATIENT
Start: 2024-06-13

## 2024-01-01 RX ORDER — EPINEPHRINE 1 MG/ML
0.3 INJECTION, SOLUTION INTRAMUSCULAR; SUBCUTANEOUS EVERY 5 MIN PRN
Status: CANCELLED | OUTPATIENT
Start: 2024-06-13

## 2024-01-01 RX ORDER — HEPARIN SODIUM (PORCINE) LOCK FLUSH IV SOLN 100 UNIT/ML 100 UNIT/ML
5 SOLUTION INTRAVENOUS
Status: CANCELLED | OUTPATIENT
Start: 2024-01-01

## 2024-01-01 RX ORDER — DIPHENHYDRAMINE HYDROCHLORIDE 50 MG/ML
50 INJECTION INTRAMUSCULAR; INTRAVENOUS
Status: CANCELLED
Start: 2024-06-13

## 2024-01-01 RX ORDER — SODIUM CHLORIDE 9 MG/ML
INJECTION, SOLUTION INTRAVENOUS CONTINUOUS
Status: DISCONTINUED | OUTPATIENT
Start: 2024-01-01 | End: 2024-01-01 | Stop reason: HOSPADM

## 2024-01-01 RX ORDER — LEVOTHYROXINE SODIUM 75 UG/1
75 TABLET ORAL DAILY
COMMUNITY
End: 2024-01-01

## 2024-01-01 RX ORDER — GABAPENTIN 300 MG/1
300 CAPSULE ORAL AT BEDTIME
Qty: 30 CAPSULE | Refills: 0 | Status: SHIPPED | OUTPATIENT
Start: 2024-01-01 | End: 2024-01-01

## 2024-01-01 RX ORDER — EPINEPHRINE 1 MG/ML
0.3 INJECTION, SOLUTION INTRAMUSCULAR; SUBCUTANEOUS EVERY 5 MIN PRN
Status: CANCELLED | OUTPATIENT
Start: 2024-06-20

## 2024-01-01 RX ORDER — DIPHENHYDRAMINE HYDROCHLORIDE 50 MG/ML
50 INJECTION INTRAMUSCULAR; INTRAVENOUS
Status: CANCELLED
Start: 2024-06-27

## 2024-01-01 RX ORDER — DIPHENHYDRAMINE HCL 25 MG
50 CAPSULE ORAL ONCE
Status: COMPLETED | OUTPATIENT
Start: 2024-01-01 | End: 2024-01-01

## 2024-01-01 RX ORDER — ACETAMINOPHEN 500 MG
500 TABLET ORAL EVERY 6 HOURS PRN
COMMUNITY

## 2024-01-01 RX ORDER — PREGABALIN 20 MG/ML
50 SOLUTION ORAL 2 TIMES DAILY
Qty: 150 ML | Refills: 0 | Status: SHIPPED | OUTPATIENT
Start: 2024-01-01 | End: 2024-01-01

## 2024-01-01 RX ORDER — DEXAMETHASONE 4 MG/1
8 TABLET ORAL DAILY
COMMUNITY
End: 2024-01-01

## 2024-01-01 RX ORDER — HEPARIN SODIUM,PORCINE 10 UNIT/ML
5-10 VIAL (ML) INTRAVENOUS EVERY 24 HOURS
Status: DISCONTINUED | OUTPATIENT
Start: 2024-01-01 | End: 2024-01-01 | Stop reason: HOSPADM

## 2024-01-01 RX ORDER — GABAPENTIN 250 MG/5ML
300 SOLUTION ORAL 3 TIMES DAILY
Qty: 470 ML | Refills: 3 | Status: SHIPPED | OUTPATIENT
Start: 2024-01-01

## 2024-01-01 RX ORDER — HEPARIN SODIUM,PORCINE 10 UNIT/ML
5-20 VIAL (ML) INTRAVENOUS DAILY PRN
Status: CANCELLED | OUTPATIENT
Start: 2024-06-27

## 2024-01-01 RX ORDER — HEPARIN SODIUM (PORCINE) LOCK FLUSH IV SOLN 100 UNIT/ML 100 UNIT/ML
5 SOLUTION INTRAVENOUS
Status: CANCELLED | OUTPATIENT
Start: 2024-06-27

## 2024-01-01 RX ORDER — ALBUTEROL SULFATE 90 UG/1
1-2 AEROSOL, METERED RESPIRATORY (INHALATION)
Status: CANCELLED
Start: 2024-06-13

## 2024-01-01 RX ORDER — ONDANSETRON 8 MG/1
8 TABLET, FILM COATED ORAL EVERY 8 HOURS PRN
COMMUNITY

## 2024-01-01 RX ORDER — NALOXONE HYDROCHLORIDE 0.4 MG/ML
0.2 INJECTION, SOLUTION INTRAMUSCULAR; INTRAVENOUS; SUBCUTANEOUS
Status: DISCONTINUED | OUTPATIENT
Start: 2024-01-01 | End: 2024-01-01 | Stop reason: HOSPADM

## 2024-01-01 RX ORDER — GABAPENTIN 250 MG/5ML
250 SOLUTION ORAL 3 TIMES DAILY
Qty: 470 ML | Refills: 3 | Status: SHIPPED | OUTPATIENT
Start: 2024-01-01 | End: 2024-01-01

## 2024-01-01 RX ORDER — LEVOTHYROXINE SODIUM 75 UG/1
75 TABLET ORAL DAILY
Qty: 90 TABLET | Refills: 0 | Status: SHIPPED | OUTPATIENT
Start: 2024-01-01 | End: 2024-07-28

## 2024-01-01 RX ORDER — HEPARIN SODIUM,PORCINE 10 UNIT/ML
5-10 VIAL (ML) INTRAVENOUS EVERY 24 HOURS
Status: CANCELLED | OUTPATIENT
Start: 2024-01-01

## 2024-01-01 RX ORDER — HEPARIN SODIUM (PORCINE) LOCK FLUSH IV SOLN 100 UNIT/ML 100 UNIT/ML
5 SOLUTION INTRAVENOUS
Status: CANCELLED | OUTPATIENT
Start: 2024-06-13

## 2024-01-01 RX ORDER — METHYLPREDNISOLONE SODIUM SUCCINATE 125 MG/2ML
125 INJECTION, POWDER, LYOPHILIZED, FOR SOLUTION INTRAMUSCULAR; INTRAVENOUS
Status: CANCELLED
Start: 2024-06-27

## 2024-01-01 RX ORDER — METHYLPREDNISOLONE SODIUM SUCCINATE 125 MG/2ML
125 INJECTION, POWDER, LYOPHILIZED, FOR SOLUTION INTRAMUSCULAR; INTRAVENOUS
Status: CANCELLED
Start: 2024-06-13

## 2024-01-01 RX ORDER — LIDOCAINE HYDROCHLORIDE 20 MG/ML
1 SOLUTION OROPHARYNGEAL EVERY 6 HOURS PRN
Qty: 100 ML | Refills: 3 | Status: SHIPPED | OUTPATIENT
Start: 2024-01-01 | End: 2024-01-01

## 2024-01-01 RX ORDER — OXYCODONE HCL 5 MG/5 ML
5 SOLUTION, ORAL ORAL EVERY 6 HOURS PRN
Qty: 200 ML | Refills: 0 | Status: SHIPPED | OUTPATIENT
Start: 2024-01-01 | End: 2024-01-01

## 2024-01-01 RX ORDER — HEPARIN SODIUM (PORCINE) LOCK FLUSH IV SOLN 100 UNIT/ML 100 UNIT/ML
5 SOLUTION INTRAVENOUS
Status: CANCELLED | OUTPATIENT
Start: 2024-06-20

## 2024-01-01 RX ORDER — ALBUTEROL SULFATE 90 UG/1
1-2 AEROSOL, METERED RESPIRATORY (INHALATION)
Status: CANCELLED
Start: 2024-06-20

## 2024-01-01 RX ORDER — HEPARIN SODIUM,PORCINE 10 UNIT/ML
5-10 VIAL (ML) INTRAVENOUS
Status: CANCELLED | OUTPATIENT
Start: 2024-01-01

## 2024-01-01 RX ORDER — FENTANYL CITRATE 50 UG/ML
25-50 INJECTION, SOLUTION INTRAMUSCULAR; INTRAVENOUS EVERY 5 MIN PRN
Status: DISCONTINUED | OUTPATIENT
Start: 2024-01-01 | End: 2024-01-01 | Stop reason: HOSPADM

## 2024-01-01 RX ORDER — OMEPRAZOLE 20 MG/1
20 TABLET, DELAYED RELEASE ORAL DAILY
Qty: 30 TABLET | Refills: 0 | Status: SHIPPED | OUTPATIENT
Start: 2024-01-01

## 2024-01-01 RX ORDER — LORAZEPAM 2 MG/ML
0.5 INJECTION INTRAMUSCULAR EVERY 4 HOURS PRN
Status: CANCELLED | OUTPATIENT
Start: 2024-06-27

## 2024-01-01 RX ORDER — MEPERIDINE HYDROCHLORIDE 25 MG/ML
25 INJECTION INTRAMUSCULAR; INTRAVENOUS; SUBCUTANEOUS EVERY 30 MIN PRN
Status: CANCELLED | OUTPATIENT
Start: 2024-06-27

## 2024-01-01 RX ORDER — HEPARIN SODIUM (PORCINE) LOCK FLUSH IV SOLN 100 UNIT/ML 100 UNIT/ML
5-10 SOLUTION INTRAVENOUS
Status: CANCELLED | OUTPATIENT
Start: 2024-01-01

## 2024-01-01 RX ORDER — EPINEPHRINE 1 MG/ML
0.3 INJECTION, SOLUTION, CONCENTRATE INTRAVENOUS EVERY 5 MIN PRN
Status: CANCELLED | OUTPATIENT
Start: 2024-01-01

## 2024-01-01 RX ADMIN — Medication 5 ML: at 14:30

## 2024-01-01 RX ADMIN — MIDAZOLAM 1 MG: 1 INJECTION INTRAMUSCULAR; INTRAVENOUS at 12:34

## 2024-01-01 RX ADMIN — SODIUM CHLORIDE 250 ML: 9 INJECTION, SOLUTION INTRAVENOUS at 10:26

## 2024-01-01 RX ADMIN — HEPARIN SODIUM (PORCINE) LOCK FLUSH IV SOLN 100 UNIT/ML 500 UNITS: 100 SOLUTION at 12:49

## 2024-01-01 RX ADMIN — Medication 5 ML: at 11:00

## 2024-01-01 RX ADMIN — PALONOSETRON HYDROCHLORIDE 0.25 MG: 0.25 INJECTION INTRAVENOUS at 10:13

## 2024-01-01 RX ADMIN — SODIUM CHLORIDE 250 ML: 9 INJECTION, SOLUTION INTRAVENOUS at 11:38

## 2024-01-01 RX ADMIN — DIPHENHYDRAMINE HYDROCHLORIDE 50 MG: 50 INJECTION, SOLUTION INTRAMUSCULAR; INTRAVENOUS at 10:40

## 2024-01-01 RX ADMIN — FOSAPREPITANT: 150 INJECTION, POWDER, LYOPHILIZED, FOR SOLUTION INTRAVENOUS at 10:30

## 2024-01-01 RX ADMIN — PACLITAXEL 294 MG: 6 INJECTION, SOLUTION INTRAVENOUS at 10:53

## 2024-01-01 RX ADMIN — CARBOPLATIN 700 MG: 10 INJECTION INTRAVENOUS at 14:14

## 2024-01-01 RX ADMIN — Medication 5 ML: at 15:22

## 2024-01-01 RX ADMIN — PALONOSETRON HYDROCHLORIDE 0.25 MG: 0.25 INJECTION INTRAVENOUS at 09:55

## 2024-01-01 RX ADMIN — PALONOSETRON HYDROCHLORIDE 0.25 MG: 0.25 INJECTION INTRAVENOUS at 10:26

## 2024-01-01 RX ADMIN — SODIUM CHLORIDE 250 ML: 9 INJECTION, SOLUTION INTRAVENOUS at 10:12

## 2024-01-01 RX ADMIN — Medication 5 ML: at 14:55

## 2024-01-01 RX ADMIN — SODIUM CHLORIDE 1 BAG: 9 INJECTION, SOLUTION INTRAVENOUS at 12:36

## 2024-01-01 RX ADMIN — FAMOTIDINE 20 MG: 10 INJECTION, SOLUTION INTRAVENOUS at 10:15

## 2024-01-01 RX ADMIN — PALONOSETRON HYDROCHLORIDE 0.25 MG: 0.25 INJECTION INTRAVENOUS at 11:38

## 2024-01-01 RX ADMIN — DIPHENHYDRAMINE HYDROCHLORIDE 50 MG: 50 INJECTION INTRAMUSCULAR; INTRAVENOUS at 10:39

## 2024-01-01 RX ADMIN — Medication 5 ML: at 14:22

## 2024-01-01 RX ADMIN — LIDOCAINE HYDROCHLORIDE 10 ML: 10; .005 INJECTION, SOLUTION EPIDURAL; INFILTRATION; INTRACAUDAL; PERINEURAL at 12:56

## 2024-01-01 RX ADMIN — DIPHENHYDRAMINE HYDROCHLORIDE 50 MG: 50 INJECTION INTRAMUSCULAR; INTRAVENOUS at 10:48

## 2024-01-01 RX ADMIN — FOSAPREPITANT: 150 INJECTION, POWDER, LYOPHILIZED, FOR SOLUTION INTRAVENOUS at 09:58

## 2024-01-01 RX ADMIN — FENTANYL CITRATE 50 MCG: 50 INJECTION, SOLUTION INTRAMUSCULAR; INTRAVENOUS at 12:41

## 2024-01-01 RX ADMIN — PALONOSETRON HYDROCHLORIDE 0.25 MG: 0.25 INJECTION INTRAVENOUS at 10:35

## 2024-01-01 RX ADMIN — SODIUM CHLORIDE 75 ML: 9 INJECTION, SOLUTION INTRAVENOUS at 16:54

## 2024-01-01 RX ADMIN — IOPAMIDOL 125 ML: 755 INJECTION, SOLUTION INTRAVENOUS at 07:36

## 2024-01-01 RX ADMIN — FAMOTIDINE 20 MG: 10 INJECTION, SOLUTION INTRAVENOUS at 10:55

## 2024-01-01 RX ADMIN — FOSAPREPITANT: 150 INJECTION, POWDER, LYOPHILIZED, FOR SOLUTION INTRAVENOUS at 10:16

## 2024-01-01 RX ADMIN — SODIUM CHLORIDE 70 ML: 9 INJECTION, SOLUTION INTRAVENOUS at 07:36

## 2024-01-01 RX ADMIN — FOSAPREPITANT: 150 INJECTION, POWDER, LYOPHILIZED, FOR SOLUTION INTRAVENOUS at 11:43

## 2024-01-01 RX ADMIN — FAMOTIDINE 20 MG: 10 INJECTION, SOLUTION INTRAVENOUS at 09:55

## 2024-01-01 RX ADMIN — PACLITAXEL 368 MG: 6 INJECTION, SOLUTION INTRAVENOUS at 11:11

## 2024-01-01 RX ADMIN — FAMOTIDINE 20 MG: 10 INJECTION, SOLUTION INTRAVENOUS at 11:40

## 2024-01-01 RX ADMIN — PACLITAXEL 368 MG: 6 INJECTION, SOLUTION INTRAVENOUS at 11:21

## 2024-01-01 RX ADMIN — FENTANYL CITRATE 50 MCG: 50 INJECTION, SOLUTION INTRAMUSCULAR; INTRAVENOUS at 12:49

## 2024-01-01 RX ADMIN — SODIUM CHLORIDE 368 MG: 9 INJECTION, SOLUTION INTRAVENOUS at 10:36

## 2024-01-01 RX ADMIN — MIDAZOLAM 1 MG: 1 INJECTION INTRAMUSCULAR; INTRAVENOUS at 12:41

## 2024-01-01 RX ADMIN — SODIUM CHLORIDE 250 ML: 9 INJECTION, SOLUTION INTRAVENOUS at 09:54

## 2024-01-01 RX ADMIN — DIPHENHYDRAMINE HYDROCHLORIDE 50 MG: 50 INJECTION, SOLUTION INTRAMUSCULAR; INTRAVENOUS at 12:04

## 2024-01-01 RX ADMIN — SODIUM CHLORIDE 250 ML: 9 INJECTION, SOLUTION INTRAVENOUS at 10:06

## 2024-01-01 RX ADMIN — LIDOCAINE HYDROCHLORIDE 5 ML: 10 INJECTION, SOLUTION INFILTRATION; PERINEURAL at 12:48

## 2024-01-01 RX ADMIN — SODIUM CHLORIDE 245 MG: 9 INJECTION, SOLUTION INTRAVENOUS at 12:20

## 2024-01-01 RX ADMIN — FAMOTIDINE 20 MG: 10 INJECTION, SOLUTION INTRAVENOUS at 10:48

## 2024-01-01 RX ADMIN — CARBOPLATIN 600 MG: 10 INJECTION, SOLUTION INTRAVENOUS at 13:54

## 2024-01-01 RX ADMIN — CEFAZOLIN SODIUM 2 G: 2 INJECTION, SOLUTION INTRAVENOUS at 12:20

## 2024-01-01 RX ADMIN — CARBOPLATIN 700 MG: 10 INJECTION, SOLUTION INTRAVENOUS at 13:39

## 2024-01-01 RX ADMIN — HEPARIN SODIUM (PORCINE) LOCK FLUSH IV SOLN 100 UNIT/ML 5 ML: 100 SOLUTION at 17:10

## 2024-01-01 RX ADMIN — Medication 5 ML: at 15:45

## 2024-01-01 RX ADMIN — HEPARIN 5 ML: 100 SYRINGE at 10:50

## 2024-01-01 RX ADMIN — HEPARIN SODIUM (PORCINE) LOCK FLUSH IV SOLN 100 UNIT/ML 5 ML: 100 SOLUTION at 07:39

## 2024-01-01 RX ADMIN — CARBOPLATIN 600 MG: 10 INJECTION, SOLUTION INTRAVENOUS at 15:10

## 2024-01-01 RX ADMIN — IOPAMIDOL 125 ML: 755 INJECTION, SOLUTION INTRAVENOUS at 16:54

## 2024-01-01 RX ADMIN — FOSAPREPITANT: 150 INJECTION, POWDER, LYOPHILIZED, FOR SOLUTION INTRAVENOUS at 10:12

## 2024-01-01 RX ADMIN — CARBOPLATIN 700 MG: 10 INJECTION, SOLUTION INTRAVENOUS at 14:42

## 2024-01-01 RX ADMIN — DIPHENHYDRAMINE HYDROCHLORIDE 50 MG: 50 INJECTION, SOLUTION INTRAMUSCULAR; INTRAVENOUS at 10:20

## 2024-01-01 ASSESSMENT — ACTIVITIES OF DAILY LIVING (ADL)
ADLS_ACUITY_SCORE: 41
ADLS_ACUITY_SCORE: 41
ADLS_ACUITY_SCORE: 47
ADLS_ACUITY_SCORE: 39
ADLS_ACUITY_SCORE: 41

## 2024-01-01 ASSESSMENT — PAIN SCALES - GENERAL
PAINLEVEL: NO PAIN (0)
PAINLEVEL: NO PAIN (0)
PAINLEVEL: MODERATE PAIN (5)
PAINLEVEL: EXTREME PAIN (8)
PAINLEVEL: MILD PAIN (2)
PAINLEVEL: NO PAIN (0)
PAINLEVEL: SEVERE PAIN (7)
PAINLEVEL: MILD PAIN (3)

## 2024-01-02 NOTE — DISCHARGE INSTRUCTIONS
Port Insertion Discharge Instructions     After you go home:    Have an adult stay with you for the first 6 hours  You may resume your normal diet       For 24 hours - due to the sedation you received:  Relax and take it easy  Do NOT make any important or legal decisions  Do NOT drive or operate machines at home or at work  Do NOT drink alcohol    Care of Puncture Sites:    Keep the dressings on your sites clean & dry for 24-48 hours. Change it only if it gets wet or dirty.  You may take a shower 24-48 hours after your procedure. Do not scrub site.  No submerging site for 2 weeks or until the incision is completely healed.  Do not remove the small white strips of tape, if present. Allow them to fall off on their own.   You may cover the wound with a bandaid if needed for comfort.      Activity     Avoid heavy lifting (greater than 10 pounds) or the overuse of your shoulder for 48-72 hours.    Bleeding:    If you start bleeding from the incision sites in your chest or neck - or have swelling in your neck, sit down and press on the site for 5-10 minutes.   If bleeding has not stopped after 10 minutes, call your provider.        Call 911 right away if you have heavy bleeding or bleeding that does not stop.      Medicines:    You may resume all medications  Resume your Warfarin/Coumadin at your regular dose today. Follow up with your provider to have your INR rechecked  Resume your Platelet Inhibitors and Aspirin tomorrow at your regular dose  For minor pain, you may take Acetaminophen (Tylenol) or Ibuprofen (Advil)    Call the provider who ordered this procedure if:    You have swelling in your neck or over your port site  The incision area is red, swollen, hot or tender  You have chills or a fever greater than 101 F (38 C)  Any questions or concerns    Call  911 or go to the Emergency Room if you have:    Severe chest pain or trouble breathing  Bleeding that you cannot control    Additional Information:    Your port  may be accessed right away.   You will need to have your port flushed every 30 days or after each use.      If you have questions call:          [facility] Radiology Dept @ [phone]      The provider who performed your procedure was _________________.

## 2024-01-02 NOTE — PRE-PROCEDURE
GENERAL PRE-PROCEDURE:   Procedure:  Right chest port placement  Date/Time:  1/2/2024 11:49 AM    Written consent obtained?: Yes    Risks and benefits: Risks, benefits and alternatives were discussed    Consent given by:  Patient  Patient states understanding of procedure being performed: Yes    Patient's understanding of procedure matches consent: Yes    Procedure consent matches procedure scheduled: Yes    Expected level of sedation:  Moderate  Appropriately NPO:  Yes  ASA Class:  2  Mallampati  :  Grade 2- soft palate, base of uvula, tonsillar pillars, and portion of posterior pharyngeal wall visible  Lungs:  Lungs clear with good breath sounds bilaterally  Heart:  Normal heart sounds and rate  History & Physical reviewed:  History and physical reviewed and no updates needed  Statement of review:  I have reviewed the lab findings, diagnostic data, medications, and the plan for sedation    Ranjit Tristan PA-C  Interventional Radiology  [phone] (IR)  *[phone] ([facility] Office)

## 2024-01-02 NOTE — IR NOTE
Interventional Radiology Intra-procedural Nursing Note    Patient Name: Raj Warren  Medical Record Number: [id number]  Today's Date: January 2, 2024    Procedure: Right chest port placement     Start time: 1240  End time: 1308  Report provided to: RAULITO Ly RN      Note: Patient entered Interventional Radiology Suite number 2 via cart. Patient awake, alert and oriented. Assisted onto procedural table in Supine position. Prepped and draped.  Dr. Renner in room. Time out and procedure started. Vital signs stable. Telemetry reading ST-SR.    Procedure well tolerated by patient without complications. Procedure end with debrief by Dr. Renner.  Pressure held until hemostasis achieved. Suture, SteriStrips, Dermabond, Tagaderm  dressing applied to Rt Port Site.    Administered medication totals:    2 GAncef Anbx IV...   Lidocaine 1% 5 mL Intradermal  Lidocaine with Epinephrine 10 mL Intradermal  Heparin 500 Units IntraCath  Versed 2 mg IVP  Fentanyl 100 mcg IVP    Last dose of sedation administered at 1241.

## 2024-01-02 NOTE — PROGRESS NOTES
Care Suites Discharge Nursing Note    Patient Information  Name: Raj Warren  Age: 73 year old    Discharge Education:  Discharge instructions reviewed: Yes  Additional education/resources provided: no  Patient/patient representative verbalizes understanding: Yes  Patient discharging on new medications: No  Medication education completed: N/A    Discharge Plans:   Discharge location: home  Discharge ride contacted: Yes  Approximate discharge time: 1350    Discharge Criteria:  Discharge criteria met and vital signs stable: Yes    Patient Belongs:  Patient belongings returned to patient: Yes    Malachi Singh RN

## 2024-01-17 NOTE — PROGRESS NOTES
Infusion Nursing Note:  Raj Warren presents today for port labs.    Patient seen by provider today: No   present during visit today: Not Applicable.    Note: N/A.    Intravenous Access:  Labs drawn without difficulty.  Implanted Port.    Treatment Conditions:  Not Applicable. Labs pending at time of discharge.      Post Infusion Assessment:  Site patent and intact, free from redness, edema or discomfort.  No evidence of extravasations.  Port access left in place for infusion today.      Discharge Plan:   Patient discharged in stable condition accompanied by: self.  Departure Mode: Ambulatory.    Idania Noel RN

## 2024-01-17 NOTE — PROGRESS NOTES
Infusion Nursing Note:  Raj Warren presents today for C3D1 carbo/taxol.    Patient seen by provider today: No   present during visit today: Not Applicable.    Note: Has been constipated and it has been about a week since he's had a BM. Trying to increase his fluids orally and through his feeling tube. Fingers and toes starting to get tingling feeling. These symptoms were reported to Dr. Bean. Discussed there are liquid softeners to take at home.      Intravenous Access:  Implanted Port.    Treatment Conditions:  Lab Results   Component Value Date    HGB 12.6 (L) 01/17/2024    WBC 4.2 01/17/2024    ANEU 2.6 06/23/2021    ANEUTAUTO 2.3 01/17/2024     01/17/2024        Lab Results   Component Value Date     01/17/2024    POTASSIUM 4.0 01/17/2024    MAG 2.1 11/18/2023    CR 0.72 01/17/2024    TELMA 9.5 01/17/2024    BILITOTAL 0.4 01/17/2024    ALBUMIN 4.1 01/17/2024    ALT 20 01/17/2024    AST 16 01/17/2024       Results reviewed, labs MET treatment parameters, ok to proceed with treatment.      Post Infusion Assessment:  Patient tolerated infusion without incident.  Blood return noted pre and post infusion.  Site patent and intact, free from redness, edema or discomfort.  No evidence of extravasations.  Access discontinued per protocol.       Discharge Plan:   Copy of AVS reviewed with patient and/or family.  Patient will return as prev rosalba for next appointment.  Patient discharged in stable condition accompanied by: self.  Departure Mode: Ambulatory.      Cali Pan RN

## 2024-02-07 NOTE — LETTER
"    2/7/2024         RE: Raj Warren  [address]  [facility] [address]        Dear Colleague,    Thank you for referring your patient, Raj Warren, to the [facility]. Please see a copy of my visit note below.    Oncology Rooming Note    February 7, 2024 9:26 AM   Raj Warren is a 73 year old male who presents for:    Chief Complaint   Patient presents with     Oncology Clinic Visit     Initial Vitals: Resp 16   Ht 1.803 m (5' 11\")   Wt 84.4 kg (186 lb)   BMI 25.94 kg/m   Estimated body mass index is 25.94 kg/m  as calculated from the following:    Height as of this encounter: 1.803 m (5' 11\").    Weight as of this encounter: 84.4 kg (186 lb). Body surface area is 2.06 meters squared.  Mild Pain (2) Comment: Data Unavailable   No LMP for male patient.  Allergies reviewed: Yes  Medications reviewed: Yes    Medications: Medication refills not needed today.  Pharmacy name entered into [employer]: [facility] #[id number] - [place], [place] - [address] AT [facility] & [facility]    Frailty Screening:   Is the patient here for a new oncology consult visit in cancer care? 2. No          Jennifer Farfan MA              Oncology/Hematology Visit Note  Feb 7, 2024    Reason for Visit: follow up of recurrent laryngeal squamous cell carcinoma  Recently progressed on pembrolizumab  Currently undergoing treatment with carboplatin and taxol      Interval History:  Overall patient reports feeling well he is noticing some more neuropathy in his fingers and toes.  He denies any swelling of the extremity he is able to walk.  He denies fever chills sweats cough shortness of breath chest pain nausea vomiting diarrhea abdominal pain bleeding  He denies any neck pain  Reports no issues with PEG tube feeding    Review of Systems:  14 point ROS of systems including Constitutional, Eyes, Respiratory, Cardiovascular, Gastroenterology, Genitourinary, Integumentary, Muscularskeletal, " "Psychiatric were all negative except for pertinent positives noted in my HPI.    Physical Examination:  General: The patient is a pleasant male in no acute distress.  /77   Pulse 97   Temp 98.1  F (36.7  C)   Resp 16   Ht 1.803 m (5' 11\")   Wt 84.4 kg (186 lb)   SpO2 100%   BMI 25.94 kg/m    HEENT: EOMI, PERRL. Sclerae are anicteric. Oral mucosa is pink and moist with no lesions or thrush.   Lymph: Neck is supple with no lymphadenopathy in the cervical or supraclavicular areas.   Heart: Regular rate and rhythm.   Lungs: Clear to auscultation bilaterally.   GI: Bowel sounds present, soft, nontender with no palpable hepatosplenomegaly or masses.   Extremities: No lower extremity edema noted bilaterally.   Skin: No rashes, petechiae, or bruising noted on exposed skin.    Laboratory Data:  CBC CMP and TSH results reviewed    Assessment and Plan:    ecurrent laryngeal squamous cell carcinoma  Recently progressed on pembrolizumab  Currently undergoing treatment with palliative carboplatin and taxol  Patient is completing cycle 4 today  Schedule for CT scan neck and chest abdomen pelvis prior to cycle 5 and visit with Dr. 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 Although reviewed after completion, some words and grammatical errors may remain.          Again, thank you for allowing me to participate in the care of your patient.        Sincerely,        TARUN Sandy CNP

## 2024-02-07 NOTE — PROGRESS NOTES
Nursing Note:  Raj Warren presents today for port labs for tx today.    Patient seen by provider today: Yes: Kyree   present during visit today: Not Applicable.    Note: N/A.    Intravenous Access:  Labs drawn without difficulty.  Implanted Port.    Discharge Plan:   Patient was sent to [facility] for provider appointment.    Felipe Harrison RN

## 2024-02-07 NOTE — PROGRESS NOTES
Infusion Nursing Note:  Raj Warren presents today for C4D1 Carbo/Taxol.    Patient seen by provider today: Yes: Kyree Jurado NP   present during visit today: Not Applicable.    Note: N/A.      Intravenous Access:  Implanted Port.    Treatment Conditions:  Lab Results   Component Value Date    HGB 11.5 (L) 02/07/2024    WBC 3.6 (L) 02/07/2024    ANEU 2.6 06/23/2021    ANEUTAUTO 2.1 02/07/2024     02/07/2024        Lab Results   Component Value Date     02/07/2024    POTASSIUM 4.1 02/07/2024    MAG 2.1 11/18/2023    CR 0.70 02/07/2024    TELMA 9.6 02/07/2024    BILITOTAL 0.4 02/07/2024    ALBUMIN 4.0 02/07/2024    ALT 14 02/07/2024    AST 19 02/07/2024       Results reviewed, labs MET treatment parameters, ok to proceed with treatment.      Post Infusion Assessment:  Patient tolerated infusion without incident.  Blood return noted pre and post infusion.  Site patent and intact, free from redness, edema or discomfort.  No evidence of extravasations.  Access discontinued per protocol.       Discharge Plan:   Patient declined prescription refills.  Discharge instructions reviewed with: Patient.  Patient and/or family verbalized understanding of discharge instructions and all questions answered.  AVS to patient via [employer].  Patient will return 2/28 for next appointment.   Patient discharged in stable condition accompanied by: self.  Departure Mode: Ambulatory.      Mark Martinez RN

## 2024-02-07 NOTE — PROGRESS NOTES
"Oncology/Hematology Visit Note  Feb 7, 2024    Reason for Visit: follow up of recurrent laryngeal squamous cell carcinoma  Recently progressed on pembrolizumab  Currently undergoing treatment with carboplatin and taxol      Interval History:  Overall patient reports feeling well he is noticing some more neuropathy in his fingers and toes.  He denies any swelling of the extremity he is able to walk.  He denies fever chills sweats cough shortness of breath chest pain nausea vomiting diarrhea abdominal pain bleeding  He denies any neck pain  Reports no issues with PEG tube feeding    Review of Systems:  14 point ROS of systems including Constitutional, Eyes, Respiratory, Cardiovascular, Gastroenterology, Genitourinary, Integumentary, Muscularskeletal, Psychiatric were all negative except for pertinent positives noted in my HPI.    Physical Examination:  General: The patient is a pleasant male in no acute distress.  /77   Pulse 97   Temp 98.1  F (36.7  C)   Resp 16   Ht 1.803 m (5' 11\")   Wt 84.4 kg (186 lb)   SpO2 100%   BMI 25.94 kg/m    HEENT: EOMI, PERRL. Sclerae are anicteric. Oral mucosa is pink and moist with no lesions or thrush.   Lymph: Neck is supple with no lymphadenopathy in the cervical or supraclavicular areas.   Heart: Regular rate and rhythm.   Lungs: Clear to auscultation bilaterally.   GI: Bowel sounds present, soft, nontender with no palpable hepatosplenomegaly or masses.   Extremities: No lower extremity edema noted bilaterally.   Skin: No rashes, petechiae, or bruising noted on exposed skin.    Laboratory Data:  CBC CMP and TSH results reviewed    Assessment and Plan:    ecurrent laryngeal squamous cell carcinoma  Recently progressed on pembrolizumab  Currently undergoing treatment with palliative carboplatin and taxol  Patient is completing cycle 4 today  Schedule for CT scan neck and chest abdomen pelvis prior to cycle 5 and visit with Dr. 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 Although reviewed after completion, some words and grammatical errors may remain.

## 2024-02-07 NOTE — PROGRESS NOTES
"Oncology Rooming Note    February 7, 2024 9:26 AM   Raj Warren is a 73 year old male who presents for:    Chief Complaint   Patient presents with    Oncology Clinic Visit     Initial Vitals: Resp 16   Ht 1.803 m (5' 11\")   Wt 84.4 kg (186 lb)   BMI 25.94 kg/m   Estimated body mass index is 25.94 kg/m  as calculated from the following:    Height as of this encounter: 1.803 m (5' 11\").    Weight as of this encounter: 84.4 kg (186 lb). Body surface area is 2.06 meters squared.  Mild Pain (2) Comment: Data Unavailable   No LMP for male patient.  Allergies reviewed: Yes  Medications reviewed: Yes    Medications: Medication refills not needed today.  Pharmacy name entered into A[employer]: [facility] #[id number] - [place], [place] - [address] AT [facility] [address]    Frailty Screening:   Is the patient here for a new oncology consult visit in cancer care? 2. No          Jennifer Farfan MA            "

## 2024-02-17 NOTE — RESULT ENCOUNTER NOTE
Dear Mr. Felipeks,    CT scan reveals improvement in cancer. This is good.    Please, call me with any questions.    Rosa Bean MD

## 2024-02-28 NOTE — PROGRESS NOTES
"Oncology Rooming Note    February 28, 2024 9:26 AM   Raj Warren is a 73 year old male who presents for:    Chief Complaint   Patient presents with    Oncology Clinic Visit     Initial Vitals: Pulse 84   Temp 98.2  F (36.8  C) (Oral)   Resp 16   Wt 84.4 kg (186 lb)   SpO2 100%   BMI 25.94 kg/m   Estimated body mass index is 25.94 kg/m  as calculated from the following:    Height as of 2/7/24: 1.803 m (5' 11\").    Weight as of this encounter: 84.4 kg (186 lb). Body surface area is 2.06 meters squared.  No Pain (0) Comment: Data Unavailable   No LMP for male patient.  Allergies reviewed: Yes  Medications reviewed: Yes    Medications: Medication refills not needed today.  Pharmacy name entered into [employer]: [facility] #[id number] - [place], [place] - [address] AT [facility] & [facility]    Frailty Screening:   Is the patient here for a new oncology consult visit in cancer care? 2. No      Clinical concerns:   doctor was notified.      Kirti Jones CMA            "

## 2024-02-28 NOTE — PROGRESS NOTES
Infusion Nursing Note:  Raj Warren presents today for C5D1 Carb+ Taxol.    Patient seen by provider today: Yes: Dr. Bean    present during visit today: Not Applicable.    Note: Pt seen and assessed by Dr. Bean prior to infusion: okay to proceed with treatment ??//?.?/?      Intravenous Access:  Implanted Port.    Treatment Conditions:  Lab Results   Component Value Date    HGB 10.1 (L) 02/28/2024    WBC 3.3 (L) 02/28/2024    ANEU 2.6 06/23/2021    ANEUTAUTO 2.1 02/28/2024     (L) 02/28/2024        Lab Results   Component Value Date     02/28/2024    POTASSIUM 4.0 02/28/2024    MAG 2.1 11/18/2023    CR 0.69 02/28/2024    TELMA 8.8 02/28/2024    BILITOTAL 0.3 02/28/2024    ALBUMIN 3.9 02/28/2024    ALT 12 02/28/2024    AST 16 02/28/2024   TSH 1.43    Results reviewed, labs MET treatment parameters, ok to proceed with treatment.      Post Infusion Assessment:  Patient tolerated infusion without incident.  Blood return noted pre and post infusion.  Site patent and intact, free from redness, edema or discomfort.  No evidence of extravasations.  Access discontinued per protocol.       Discharge Plan:   Patient declined prescription refills.  Discharge instructions reviewed with: Patient.  Patient and/or family verbalized understanding of discharge instructions and all questions answered.  AVS to patient via [employer].  Patient will return 3/20/24 for next appointment.   Patient discharged in stable condition accompanied by: self.  Departure Mode: Ambulatory.      Barbie Beckman RN

## 2024-02-28 NOTE — LETTER
"    2/28/2024         RE: Raj Warren  [address]  [facility] [address]      Oncology Rooming Note    February 28, 2024 9:26 AM   Raj Warren is a 73 year old male who presents for:    Chief Complaint   Patient presents with     Oncology Clinic Visit     Initial Vitals: Pulse 84   Temp 98.2  F (36.8  C) (Oral)   Resp 16   Wt 84.4 kg (186 lb)   SpO2 100%   BMI 25.94 kg/m   Estimated body mass index is 25.94 kg/m  as calculated from the following:    Height as of 2/7/24: 1.803 m (5' 11\").    Weight as of this encounter: 84.4 kg (186 lb). Body surface area is 2.06 meters squared.  No Pain (0) Comment: Data Unavailable   No LMP for male patient.  Allergies reviewed: Yes  Medications reviewed: Yes    Medications: Medication refills not needed today.  Pharmacy name entered into [email or web address]: [facility] #[id number] - [place], [place] - [address] AT [facility]    Frailty Screening:   Is the patient here for a new oncology consult visit in cancer care? 2. No      Clinical concerns:   doctor was notified.      Kirti Jones, [facility]              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  -COMBINED POSITIVE SCORE (CPS):  70  -TUMOR PROPORTION SCORE (TPS):  60%  -[employer]: CDKN2A/B [id number] R80* and p14ARF P94L, INPP4B rearrangement intron 11, TP53 H179L.     1.  Patient was evaluated in 2019 for hoarseness of voice.  He was seen by ENT and found to have right vocal cord mass.    -He had biopsy done on 08/07/2019. Pathology revealed moderately-differentiated invasive squamous cell carcinoma.     2.  The patient was seen in the Emergency Room on 04/12/2021 for shortness of breath and admitted.   -CT neck revealed an enhancing soft tissue mass involving the right larynx measuring 3.9 cm.  -Patient had laryngoscopy with biopsy and tracheostomy done on 04/12/2021.  There was an endolaryngeal mass " obscuring the laryngeal landmarks.  Mass appeared to be based on right endolarynx.  Pathology revealed invasive keratinizing moderately-differentiated squamous cell carcinoma.     3.  PET scan on 04/26/2021 revealed hypermetabolic mass involving the supraglottic, glottic and subglottic larynx.  There was a hypermetabolic left level IV lymph node.  There was also hypermetabolic nodule in right middle lobe.     4. CT chest angiogram on 04/27/2021 revealed a large bilateral pulmonary embolism in all the lobes.  There was evidence of right cardiac strain.  -Echocardiogram on 04/27/2021 revealed clot in transit in the right ventricle.  -The patient had emergency pulmonary embolectomy on 04/28/2021.  Pathology revealed organizing clot.  -Patient on Eliquis. Long term anti-coagulation recommended.     5. Total laryngectomy with bilateral neck dissection on 07/08/2021.  Pathology reveals 6 cm squamous cell carcinoma involving the right false and true vocal cords with fixation, the left false and true vocal cords and invades through thyroid cartilage.  There is focal lymphatic invasion.  There is perineural invasion. Benign 75 lymph nodes.  pT4a pN0 disease.     6. Post-op radiation between 08/04/2021 and 09/20/2021. 6600 cGy in 33 fractions.     7. PET/CT on 09/30/2022 revealed hypermetabolic mass originating from the right anterolateral wall of the neopharynx.  -Patient seen by ENT on 10/03/2022 and had FNA of neck mass. Pathology is positive for malignancy. Rare clusters morphologically consistent with squamous cell carcinoma.      8. Pembrolizumab between 10/27/2022 and 11/02/2023. Stopped due to progression.     9. PET scan on 11/20/203 reveals increasing metabolic activity of soft tissue thickening extending from the right neopharynx/superior margin of the laryngectomy site extending inferiorly to the right supraclavicular region.  -PEG placed on 11/16/2023.     10. Carboplatin and taxol started on  12/06/2023.     SUBJECTIVE:    Mr. Warren is a 73-year-old gentleman with recurrent laryngeal squamous cell carcinoma on carboplatin and taxol.  Overall he is tolerating it well.    CT neck on 02/14/2024:  1. Slight decrease in size of locally advanced irregular centrally necrotic mass along the right aspect of the laryngectomy bed since 11/20/2023, suggestive of positive treatment response of recurrent squamous cell carcinoma.  2. Stable tumor encasement and associated mild to moderate narrowing of the right common carotid artery stress.  3. No pathologic lymphadenopathy or evidence of distant metastatic disease in the neck.    CT chest, abdomen and pelvis on 02/14/2024:  1.  No evidence for metastatic disease in the chest, abdomen, or pelvis.  2.  Partial visualization of post laryngectomy changes with soft tissue thickening/mass along the right aspect of the laryngectomy. Please see CT soft tissue neck report.  3.  Cholelithiasis.     He has mild generalized weakness.  Fatigue is slowly getting worse.  He can do activities of daily living.  He has been using some hide with the chemotherapy.  He also gets intermittent numbness and tingling in the fingers and toes.  More in the feet than the fingers.  Things are not falling out of his hand.  Walking is pretty good.  He is not on a stable.  He has increased arthritic joint pain.    Patient has dysphagia. He does PEG tube feeding.     No headache.  No dizziness. No chest pain.  No shortness of breath.  No abdominal pain.  No nausea or vomiting.  No urinary or bowel complaints. No bleeding.  No recurrent infection.  All other review of systems is negative.     PHYSICAL EXAMINATION:   GENERAL:  Alert and oriented x 3.   VITAL SIGNS:  Reviewed.     Rest of the systems not examined.     LABS: CBC, TSH and CMP were reviewed.     ASSESSMENT:  1.  A 73-year-old gentleman with recurrent laryngeal squamous cell carcinoma on chemotherapy.  Disease is responding to  treatment.  2.  History of pulmonary embolism on Eliquis.  3.  Hypothyroidism on levothyroxine.  4.  Dysphagia s/p PEG.  5.  Pancytopenia.  6.  Grade 1 peripheral neuropathy.     PLAN:  1. Continue chemotherapy.  2. See ERNESTINE with next treatment.  3. See me in 6 weeks.  4. Take gabapentin three times a day.     DISCUSSION:  1.  CT scan was reviewed with the patient.  Explained to him it reveals improvement in the cancer.  He was happy to know that.    2.  Discussed regarding treatment.  He is on carboplatin and Taxol.  Overall he is tolerating it well.  He has grade 1 peripheral neuropathy.  Explained to him that neuropathy will get worse with further treatment.  At this time it is not interfering with any work.  He will let us know if it starts interfering with activities.  Will consider dose reduction of Taxol.    He has been taking gabapentin at night.  It is hard for him to crush the capsule.  Prescription of liquid gabapentin given.  I told the patient to increase it to 3 times a day.  It will help with neuropathy.    3.  Labs were reviewed with him.  He has pancytopenia.  No neutropenia.  Overall labs are good for treatment.    4.  TSH is normal. He will continue on levothyroxine at the current dose.    5.  He will continue on Eliquis.  He is not having any bleeding complication.    6.  He had multiple questions which were all answered.  I reassured the patient that his cancer is responding to treatment.  I will see him in 6 weeks.  In between he will see our ERNESTINE. Advised him to call us with any questions or concerns.     Total visit time of 40 minutes.  Time spent in today's visit, review of chart/investigations today, monitoring for toxicity of high risk drugs and documentation today.        Rosa Bean MD

## 2024-02-28 NOTE — LETTER
"    2/28/2024         RE: Raj Warren  [address]  [facility] [address]        Dear Colleague,    Thank you for referring your patient, Raj Warren, to the [facility]. Please see a copy of my visit note below.    Oncology Rooming Note    February 28, 2024 9:26 AM   Raj Warren is a 73 year old male who presents for:    Chief Complaint   Patient presents with     Oncology Clinic Visit     Initial Vitals: Pulse 84   Temp 98.2  F (36.8  C) (Oral)   Resp 16   Wt 84.4 kg (186 lb)   SpO2 100%   BMI 25.94 kg/m   Estimated body mass index is 25.94 kg/m  as calculated from the following:    Height as of 2/7/24: 1.803 m (5' 11\").    Weight as of this encounter: 84.4 kg (186 lb). Body surface area is 2.06 meters squared.  No Pain (0) Comment: Data Unavailable   No LMP for male patient.  Allergies reviewed: Yes  Medications reviewed: Yes    Medications: Medication refills not needed today.  Pharmacy name entered into Compliance Assurance: [id number] - [place], [place] - [address] AT [facility]    Frailty Screening:   Is the patient here for a new oncology consult visit in cancer care? 2. No      Clinical concerns:   doctor was notified.      Kirti Jnoes, [facility]              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  -COMBINED POSITIVE SCORE (CPS):  70  -TUMOR PROPORTION SCORE (TPS):  60%  -FoundationOne: CDKN2A/B [id number] R80* and p14ARF P94L, INPP4B rearrangement intron 11, TP53 H179L.     1.  Patient was evaluated in 2019 for hoarseness of voice.  He was seen by ENT and found to have right vocal cord mass.    -He had biopsy done on 08/07/2019. Pathology revealed moderately-differentiated invasive squamous cell carcinoma.     2.  The patient was seen in the Emergency Room on 04/12/2021 for shortness of breath and admitted.   -CT neck revealed an enhancing soft " tissue mass involving the right larynx measuring 3.9 cm.  -Patient had laryngoscopy with biopsy and tracheostomy done on 04/12/2021.  There was an endolaryngeal mass obscuring the laryngeal landmarks.  Mass appeared to be based on right endolarynx.  Pathology revealed invasive keratinizing moderately-differentiated squamous cell carcinoma.     3.  PET scan on 04/26/2021 revealed hypermetabolic mass involving the supraglottic, glottic and subglottic larynx.  There was a hypermetabolic left level IV lymph node.  There was also hypermetabolic nodule in right middle lobe.     4. CT chest angiogram on 04/27/2021 revealed a large bilateral pulmonary embolism in all the lobes.  There was evidence of right cardiac strain.  -Echocardiogram on 04/27/2021 revealed clot in transit in the right ventricle.  -The patient had emergency pulmonary embolectomy on 04/28/2021.  Pathology revealed organizing clot.  -Patient on Eliquis. Long term anti-coagulation recommended.     5. Total laryngectomy with bilateral neck dissection on 07/08/2021.  Pathology reveals 6 cm squamous cell carcinoma involving the right false and true vocal cords with fixation, the left false and true vocal cords and invades through thyroid cartilage.  There is focal lymphatic invasion.  There is perineural invasion. Benign 75 lymph nodes.  pT4a pN0 disease.     6. Post-op radiation between 08/04/2021 and 09/20/2021. 6600 cGy in 33 fractions.     7. PET/CT on 09/30/2022 revealed hypermetabolic mass originating from the right anterolateral wall of the neopharynx.  -Patient seen by ENT on 10/03/2022 and had FNA of neck mass. Pathology is positive for malignancy. Rare clusters morphologically consistent with squamous cell carcinoma.      8. Pembrolizumab between 10/27/2022 and 11/02/2023. Stopped due to progression.     9. PET scan on 11/20/203 reveals increasing metabolic activity of soft tissue thickening extending from the right neopharynx/superior margin of the  laryngectomy site extending inferiorly to the right supraclavicular region.  -PEG placed on 11/16/2023.     10. Carboplatin and taxol started on 12/06/2023.     SUBJECTIVE:    Mr. Warren is a 73-year-old gentleman with recurrent laryngeal squamous cell carcinoma on carboplatin and taxol.  Overall he is tolerating it well.    CT neck on 02/14/2024:  1. Slight decrease in size of locally advanced irregular centrally necrotic mass along the right aspect of the laryngectomy bed since 11/20/2023, suggestive of positive treatment response of recurrent squamous cell carcinoma.  2. Stable tumor encasement and associated mild to moderate narrowing of the right common carotid artery stress.  3. No pathologic lymphadenopathy or evidence of distant metastatic disease in the neck.    CT chest, abdomen and pelvis on 02/14/2024:  1.  No evidence for metastatic disease in the chest, abdomen, or pelvis.  2.  Partial visualization of post laryngectomy changes with soft tissue thickening/mass along the right aspect of the laryngectomy. Please see CT soft tissue neck report.  3.  Cholelithiasis.     He has mild generalized weakness.  Fatigue is slowly getting worse.  He can do activities of daily living.  He has been using some hide with the chemotherapy.  He also gets intermittent numbness and tingling in the fingers and toes.  More in the feet than the fingers.  Things are not falling out of his hand.  Walking is pretty good.  He is not on a stable.  He has increased arthritic joint pain.    Patient has dysphagia. He does PEG tube feeding.     No headache.  No dizziness. No chest pain.  No shortness of breath.  No abdominal pain.  No nausea or vomiting.  No urinary or bowel complaints. No bleeding.  No recurrent infection.  All other review of systems is negative.     PHYSICAL EXAMINATION:   GENERAL:  Alert and oriented x 3.   VITAL SIGNS:  Reviewed.     Rest of the systems not examined.     LABS: CBC, TSH and CMP were  reviewed.     ASSESSMENT:  1.  A 73-year-old gentleman with recurrent laryngeal squamous cell carcinoma on chemotherapy.  Disease is responding to treatment.  2.  History of pulmonary embolism on Eliquis.  3.  Hypothyroidism on levothyroxine.  4.  Dysphagia s/p PEG.  5.  Pancytopenia.  6.  Grade 1 peripheral neuropathy.     PLAN:  1. Continue chemotherapy.  2. See ERNESTINE with next treatment.  3. See me in 6 weeks.  4. Take gabapentin three times a day.     DISCUSSION:  1.  CT scan was reviewed with the patient.  Explained to him it reveals improvement in the cancer.  He was happy to know that.    2.  Discussed regarding treatment.  He is on carboplatin and Taxol.  Overall he is tolerating it well.  He has grade 1 peripheral neuropathy.  Explained to him that neuropathy will get worse with further treatment.  At this time it is not interfering with any work.  He will let us know if it starts interfering with activities.  Will consider dose reduction of Taxol.    He has been taking gabapentin at night.  It is hard for him to crush the capsule.  Prescription of liquid gabapentin given.  I told the patient to increase it to 3 times a day.  It will help with neuropathy.    3.  Labs were reviewed with him.  He has pancytopenia.  No neutropenia.  Overall labs are good for treatment.    4.  TSH is normal. He will continue on levothyroxine at the current dose.    5.  He will continue on Eliquis.  He is not having any bleeding complication.    6.  He had multiple questions which were all answered.  I reassured the patient that his cancer is responding to treatment.  I will see him in 6 weeks.  In between he will see our ERNESTINE. Advised him to call us with any questions or concerns.     Total visit time of 40 minutes.  Time spent in today's visit, review of chart/investigations today, monitoring for toxicity of high risk drugs and documentation today.      Again, thank you for allowing me to participate in the care of your patient.         Sincerely,        Rosa Bean MD

## 2024-02-28 NOTE — PATIENT INSTRUCTIONS
Continue chemotherapy.  See ERNESTINE with next treatment.  See me in 6 weeks.  Take gabapentin three times a day.

## 2024-02-28 NOTE — PROGRESS NOTES
Nursing Note:  Raj Warren presents today for Port labs.    Patient seen by provider today: Yes: Dr Bean   present during visit today: Not Applicable.    Note: infusion following provider visit.    Intravenous Access:  Implanted Port.    Discharge Plan:   Patient was sent to lobby for next appointment.    Claire Barnes RN

## 2024-02-29 NOTE — RESULT ENCOUNTER NOTE
Dear Mr. Warren,    Hemoglobin A1C is normal.    Please, call me with any questions.    Rosa Bean MD

## 2024-03-03 NOTE — PROGRESS NOTES
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  -COMBINED POSITIVE SCORE (CPS):  70  -TUMOR PROPORTION SCORE (TPS):  60%  -FoundationOne: CDKN2A/B [id number] R80* and p14ARF P94L, INPP4B rearrangement intron 11, TP53 H179L.     1.  Patient was evaluated in 2019 for hoarseness of voice.  He was seen by ENT and found to have right vocal cord mass.    -He had biopsy done on 08/07/2019. Pathology revealed moderately-differentiated invasive squamous cell carcinoma.     2.  The patient was seen in the Emergency Room on 04/12/2021 for shortness of breath and admitted.   -CT neck revealed an enhancing soft tissue mass involving the right larynx measuring 3.9 cm.  -Patient had laryngoscopy with biopsy and tracheostomy done on 04/12/2021.  There was an endolaryngeal mass obscuring the laryngeal landmarks.  Mass appeared to be based on right endolarynx.  Pathology revealed invasive keratinizing moderately-differentiated squamous cell carcinoma.     3.  PET scan on 04/26/2021 revealed hypermetabolic mass involving the supraglottic, glottic and subglottic larynx.  There was a hypermetabolic left level IV lymph node.  There was also hypermetabolic nodule in right middle lobe.     4. CT chest angiogram on 04/27/2021 revealed a large bilateral pulmonary embolism in all the lobes.  There was evidence of right cardiac strain.  -Echocardiogram on 04/27/2021 revealed clot in transit in the right ventricle.  -The patient had emergency pulmonary embolectomy on 04/28/2021.  Pathology revealed organizing clot.  -Patient on Eliquis. Long term anti-coagulation recommended.     5. Total laryngectomy with bilateral neck dissection on 07/08/2021.  Pathology reveals 6 cm squamous cell carcinoma involving the right false and true vocal cords with fixation, the left false and true vocal cords and invades through thyroid cartilage.  There is focal lymphatic invasion.   There is perineural invasion. Benign 75 lymph nodes.  pT4a pN0 disease.     6. Post-op radiation between 08/04/2021 and 09/20/2021. 6600 cGy in 33 fractions.     7. PET/CT on 09/30/2022 revealed hypermetabolic mass originating from the right anterolateral wall of the neopharynx.  -Patient seen by ENT on 10/03/2022 and had FNA of neck mass. Pathology is positive for malignancy. Rare clusters morphologically consistent with squamous cell carcinoma.      8. Pembrolizumab between 10/27/2022 and 11/02/2023. Stopped due to progression.     9. PET scan on 11/20/203 reveals increasing metabolic activity of soft tissue thickening extending from the right neopharynx/superior margin of the laryngectomy site extending inferiorly to the right supraclavicular region.  -PEG placed on 11/16/2023.     10. Carboplatin and taxol started on 12/06/2023.     SUBJECTIVE:    Mr. Warren is a 73-year-old gentleman with recurrent laryngeal squamous cell carcinoma on carboplatin and taxol.  Overall he is tolerating it well.    CT neck on 02/14/2024:  1. Slight decrease in size of locally advanced irregular centrally necrotic mass along the right aspect of the laryngectomy bed since 11/20/2023, suggestive of positive treatment response of recurrent squamous cell carcinoma.  2. Stable tumor encasement and associated mild to moderate narrowing of the right common carotid artery stress.  3. No pathologic lymphadenopathy or evidence of distant metastatic disease in the neck.    CT chest, abdomen and pelvis on 02/14/2024:  1.  No evidence for metastatic disease in the chest, abdomen, or pelvis.  2.  Partial visualization of post laryngectomy changes with soft tissue thickening/mass along the right aspect of the laryngectomy. Please see CT soft tissue neck report.  3.  Cholelithiasis.     He has mild generalized weakness.  Fatigue is slowly getting worse.  He can do activities of daily living.  He has been using some hide with the chemotherapy.  He  also gets intermittent numbness and tingling in the fingers and toes.  More in the feet than the fingers.  Things are not falling out of his hand.  Walking is pretty good.  He is not on a stable.  He has increased arthritic joint pain.    Patient has dysphagia. He does PEG tube feeding.     No headache.  No dizziness. No chest pain.  No shortness of breath.  No abdominal pain.  No nausea or vomiting.  No urinary or bowel complaints. No bleeding.  No recurrent infection.  All other review of systems is negative.     PHYSICAL EXAMINATION:   GENERAL:  Alert and oriented x 3.   VITAL SIGNS:  Reviewed.     Rest of the systems not examined.     LABS: CBC, TSH and CMP were reviewed.     ASSESSMENT:  1.  A 73-year-old gentleman with recurrent laryngeal squamous cell carcinoma on chemotherapy.  Disease is responding to treatment.  2.  History of pulmonary embolism on Eliquis.  3.  Hypothyroidism on levothyroxine.  4.  Dysphagia s/p PEG.  5.  Pancytopenia.  6.  Grade 1 peripheral neuropathy.     PLAN:  1. Continue chemotherapy.  2. See ERNESTINE with next treatment.  3. See me in 6 weeks.  4. Take gabapentin three times a day.     DISCUSSION:  1.  CT scan was reviewed with the patient.  Explained to him it reveals improvement in the cancer.  He was happy to know that.    2.  Discussed regarding treatment.  He is on carboplatin and Taxol.  Overall he is tolerating it well.  He has grade 1 peripheral neuropathy.  Explained to him that neuropathy will get worse with further treatment.  At this time it is not interfering with any work.  He will let us know if it starts interfering with activities.  Will consider dose reduction of Taxol.    He has been taking gabapentin at night.  It is hard for him to crush the capsule.  Prescription of liquid gabapentin given.  I told the patient to increase it to 3 times a day.  It will help with neuropathy.    3.  Labs were reviewed with him.  He has pancytopenia.  No neutropenia.  Overall labs are  good for treatment.    4.  TSH is normal. He will continue on levothyroxine at the current dose.    5.  He will continue on Eliquis.  He is not having any bleeding complication.    6.  He had multiple questions which were all answered.  I reassured the patient that his cancer is responding to treatment.  I will see him in 6 weeks.  In between he will see our ERNESTINE. Advised him to call us with any questions or concerns.     Total visit time of 40 minutes.  Time spent in today's visit, review of chart/investigations today, monitoring for toxicity of high risk drugs and documentation today.

## 2024-03-11 NOTE — PROGRESS NOTES
Dear Dr. Bean:    I had the pleasure of seeing Raj Warren in follow-up today at the [facility].     History of Present Illness:  Raj Warren is 73 year old man with a T4aN0 SCC of the larynx. The patient presented to the ED with a week history of shortness of breath, along with symptoms of sore throat, bilateral ear pain. He had stridor vs wheezing, mild increased work of breathing, and hoarse voice at that time. He was treated with steroids and nebulizers with improvement in his symptoms and was discharged.  He had recurrent symptoms and was seen in the ED again on 4/12/2021. He had a CT scan performed on 4/12/2021 which demonstrated a 3.9 x 1.6 x 3.9 cm mass involving the right true cord, right AE fold, right false cord, posterior hypopharynx, proximal trachea, with involvement of the thyroid cartilage, involvement of the prelaryngeal tissue, no abnormal nodes. Dr Willoughby (local ENT), scope exam was performed, and patient was admitted for a trach and DL/bx. Dr Johnson and Dr Sauceda performed this procedure on 4/13/2021. A tracheal window at 2nd tracheal ring was performed with placement of an 8 cuffed Shiley. Pathology showed moderately differentiated SCC. He was seen by Dr Bean of medical oncology on 4/13/2021. He was followed by local ENT team during his hospitalization. He was recommended for chemoradiation. [place] [facility] [place], and CT simulation was done. He did have a VSS done while inpatient without evidence of aspiration. The patient was referred here for discussion of possible salvage laryngectomy after chemoradiation. He had a PET scan on 4/26/2021 which showed an FDG avid mass in the larynx involving the supraglottis/glottis/subglottis, hypermetabolic left level 4 node, FDG avid nodule in the right lung thought to be infectious/inflammatory. He was then admitted to the hospital locally from 4/27/2021 to 5/8/2021 after  presenting with worsening shortness of breath, found to have bilateral PE with significant clot burden including the right pulmonary artery and the right ventricle. He was taken to the OR for pulmonary embolectomy and RV thrombectomy. He had IVC filter placed 4/30/2021. His course was complicated by hemodynamic instability requiring pressors, poor healing of his sternal incision requiring wound vac placement. He was discharged on eliquis. He was sent to rehab after his admission, discharged 5/15/2021. He saw Dr Bean in follow-up on 5/27/2021 and was recommended for repeat imaging. This showed slight decrease in size of the tumor, no distant disease.     He was seen as a new patient on 6/18/2021. After extensive preoperative counseling especially in light of his recent PE and anticoagulation, the recommendations was for surgery given the cartilaginous erosion seen on imaging. He was taken to the OR on 7/8/2021 for a DL, total laryngectomy, bilateral neck dissections, cricopharyngeal myotomy. His hospital course was uncomplicated, was restarted on his anticoagulation. His final pathology demonstrated a 6 cm SCC with invasion through the thyroid cartilage, involved the cricoid cartilage, involved the right pyriform sinus, extended to the left side of the tracheostomy site, PNI present, focal lymphatic invasion, 0/75 nodes. The main specimen had a positive margin at the pyriform sinus, margins were taken from the main specimen and were negative (named right pharyngeal wall mucosal margin).      He received postoperative radiation under the care of Dr Kennedy, from 8/4/2021 to 9/20/2021 for a total of 66 Gy.  He had his PEG removed by radiation oncology on 10/4/2021. His post treatment PET scan was negative in December 2021. He had his IVC filter removed in February 2022.     He had imaging in June 2022 which showed a NIRADS 2b nodular soft tissue density along the upper esophagus. He was scheduled for a PET scan to  follow-up this area but due to some scheduling issues did not have the scan, got completed on 9/30/2022. This demonstrated a hypermetabolic mass in the right anterolateral wall of the neopharynx measuring 1.8 x 1.7 x 3.7 cm with invasion of the right thyroid lobe, not involving the vessels, no distant disease. He was not interested in surgical resection and elected to proceed with palliative keytruda. He was started on keytruda on 10/27/2022 under the care of Dr Bean.     Interval history:  He comes in today for follow-up. He was last seen in August 2023. He developed progression of his diease on imaging and keytruda was stopped on 11/2/2023. He was seen in the [facility] ER on 11/12/2023 with dysphagia, had a CT scan which showed peristomal recurrence measuring 3.1 x 2.8 x 4.1 cm with mass effect on the esophagus. He was admitted to the hospital and had PEG placement on 11/16/2023. He had PET scan on 11/20/2023 which showed a neopharyngeal recurrence measuring 4.2 x 2.6 x 7.3 cm with encasement of the carotid. He was started on carbo/taxol on 12/6/2023. He last saw oncology in February 2024 with plans to continue chemotherapy.     He says that he is not doing well. He is having a hard time with chemotherapy. He is struggling particularly with the neuropathy in his hands and feet. He says he was given medication for this last week but has not noticed any benefit. He says that in the last 5 days or so he started not being able to swallow his saliva anymore. He says that he has to spit it out, that this was a relatively sudden change. He has had blood tinged secretions that he brings up, no nirali blood. He is wearing a base plate and HME.       MEDICATIONS:     Current Outpatient Medications   Medication Sig Dispense Refill    acetaminophen (TYLENOL) 500 MG tablet 500 mg by Per Feeding Tube route every 6 hours as needed for mild pain      apixaban ANTICOAGULANT (ELIQUIS) 5 MG tablet 5 mg by Per Feeding Tube route 2  times daily      dexAMETHasone (DECADRON) 4 MG tablet 8 mg by Per Feeding Tube route daily Take for 3 days, starting the day after chemo. 2 tabs (8mg). Take with food.      gabapentin (NEURONTIN) 250 MG/5ML solution 5 mLs (250 mg) by Per G Tube route 3 times daily 470 mL 3    levothyroxine (SYNTHROID/LEVOTHROID) 75 MCG tablet 75 mcg by Per Feeding Tube route daily      lidocaine, viscous, (XYLOCAINE) 2 % solution Apply 1 mL topically every 6 hours as needed for other (placement of anny tube) Apply on anny tube when placing 100 mL 3    metoprolol tartrate (LOPRESSOR) 25 MG tablet 25 mg by Per Feeding Tube route 2 times daily      ondansetron (ZOFRAN) 8 MG tablet 8 mg by Per Feeding Tube route every 8 hours as needed for nausea      prochlorperazine (COMPAZINE) 10 MG tablet 10 mg by Per Feeding Tube route every 6 hours as needed for nausea or vomiting         ALLERGIES:    Allergies   Allergen Reactions    Pollen Extract Other (See Comments)     Season allergies       HABITS/SOCIAL HISTORY:    Former smoker 1/2 ppd x 8 yrs, quit in . Sober from alcohol.   Lives alone. His family does not live in the immediate [place] area.   He is a retired .       Social History     Socioeconomic History    Marital status: Single     Spouse name: Not on file    Number of children: Not on file    Years of education: Not on file    Highest education level: Not on file   Occupational History    Not on file   Tobacco Use    Smoking status: Former     Types: Cigarettes     Quit date: 1989     Years since quittin.2    Smokeless tobacco: Never    Tobacco comments:     quit in   had smoked about 1/2 pack a day then cut back   Substance and Sexual Activity    Alcohol use: No     Comment: No alcohol since     Drug use: No    Sexual activity: Yes     Partners: Female   Other Topics Concern    Parent/sibling w/ CABG, MI or angioplasty before 65F 55M? Not Asked   Social History Narrative    Not on file      Social Determinants of Health     Financial Resource Strain: Low Risk  (5/18/2021)    Overall Financial Resource Strain (CARDIA)     Difficulty of Paying Living Expenses: Not hard at all   Food Insecurity: No Food Insecurity (5/18/2021)    Hunger Vital Sign     Worried About Running Out of Food in the Last Year: Never true     Ran Out of Food in the Last Year: Never true   Transportation Needs: No Transportation Needs (5/18/2021)    PRAPARE - Transportation     Lack of Transportation (Medical): No     Lack of Transportation (Non-Medical): No   Physical Activity: Not on file   Stress: Not on file   Social Connections: Not on file   Interpersonal Safety: Not At Risk (8/31/2023)    Humiliation, Afraid, Rape, and Kick questionnaire     Fear of Current or Ex-Partner: No     Emotionally Abused: No     Physically Abused: No     Sexually Abused: No   Housing Stability: Not on file       PAST MEDICAL HISTORY:   Past Medical History:   Diagnosis Date    Allergic state     Anemia     Former smoker     Malignant neoplasm of larynx (H) 04/20/2021    Pulmonary emboli (H) 04/28/2021    Bilateral-s/p embolectomy and IVC filter placement    S/P percutaneous endoscopic gastrostomy (PEG) tube placement (H)     Tracheostomy in place (H)         PAST SURGICAL HISTORY:   Past Surgical History:   Procedure Laterality Date    DISSECTION RADICAL NECK BILATERAL Bilateral 7/8/2021    Procedure: bilateral neck dissections;  Surgeon: Marilou Sethi MD;  Location: [facility] OR    ENDOSCOPIC INSERTION TUBE GASTROSTOMY Left 7/8/2021    Procedure: INSERTION, PEG TUBE with Esophagogastroduodenoscopy;  Surgeon: Kg Montaño MD;  Location: [facility] OR    IR CHEST PORT PLACEMENT > 5 YRS OF AGE  1/2/2024    IR GASTROSTOMY TUBE PERCUTANEOUS PLCMNT  11/16/2023    IR IVC FILTER PLACEMENT  4/30/2021    IR IVC FILTER REMOVAL  2/7/2022    LARYNGECTOMY N/A 7/8/2021    Procedure: LARYNGECTOMY;  Surgeon: Marilou Sethi MD;  Location: [facility] OR    LARYNGOSCOPY  "N/A 4/12/2021    Procedure: LARYNGOSCOPY;  Surgeon: Choco Johnson MD;  Location: [facility] OR    LARYNGOSCOPY N/A 7/8/2021    Procedure: LARYNGOSCOPY;  Surgeon: Marilou Sethi MD;  Location: [facility] OR    REPAIR ANEURYSM ASCENDING AORTA N/A 4/28/2021    Procedure: PULMONARY THROMBECTOMY;  Surgeon: Yumi Singh MD;  Location: [facility] OR    TRACHEOSTOMY  4/12/2021    Procedure: CREATION, TRACHEOSTOMY;  Surgeon: Choco Johnson MD;  Location: [facility] OR       FAMILY HISTORY:    Family History   Problem Relation Age of Onset    Circulatory Mother         blood clots    Alcohol/Drug Father         alcohol drank heavily    Alcohol/Drug Brother         alcohol       REVIEW OF SYSTEMS:  12 point ROS was negative other than the symptoms noted above in the HPI.  Patient Supplied Answers to Review of Systems      5/17/2023    10:51 AM   [facility] ROS   Ears, Nose, Throat Hearing loss    Nasal congestion or drainage   Musculoskeletal Sore or stiff joints    Back pain    Neck pain         PHYSICAL EXAMINATION:   /67   Pulse 96   Ht 1.803 m (5' 11\")   Wt 79.9 kg (176 lb 1.6 oz)   SpO2 100%   BMI 24.56 kg/m    Patient ill appearing  Less interactive than usual  Mouths words and uses electrolarynx  Initially increased work of breathing, improved with removal of HME and placement of anny tube  Secretions along stoma edge nonobstructive  Spits up blood tinged secretions throughout visit  Stoma more narrow than previous  Firm mass of right neck without obvious skin changes      PROCEDURES:   lexible fiberoptic laryngoscopy: Scope exam was indicated due to laryngeal cancer. Verbal consent was obtained. The nasal cavity was prepped with an aerosolized solution of topical anesthetic and vasoconstrictive agent. The scope was passed through the anterior nasal cavity and advanced. Inspection of the nasopharynx revealed no gross abnormality. The base of tongue is normal. The neopharynx has pooled secretions and the scope could not " be passed, mucosa could not be visualized.    Tracheoscopy: The flexible scope was passed through the stoma down to the bronchi. There are no masses or mucosal lesions. There are no secretions. Crusting removed from proximal stoma.         RESULTS REVIEWED:     CT neck report reviewed, PET report reviewed, notes from oncology x 2 reviewed, discharge summary reviewed, TSH reviewed    Care discussed with SLP    CT neck imaging independently reviewed      IMPRESSION AND PLAN:   Raj Warren is a 73 year old man with a T4N0 SCC of the larynx. [facility] He was taken to the OR on 7/8/2021 for a total laryngectomy with bilateral neck dissections. He completed his postoperative radiation on 9/20/2021. He developed a local/regional recurrence in October 2022, receiving palliative keytruda starting 10/27/2022, discontinued 11/2023 due to tumor progression. He was started on carbo/taxol in December 2023. He had PEG placement in November 2023.    He is having a hard time with toleration of his treatment. He is most bothered by the neuropathy. We spent some time discussing today that ultimately he has to decide how bothersome the side effects of treatment are relative to the benefits of tumor response in the context of his own personal goals. We discussed that he has the right at any time to discontinue treatment and can prioritize other parts of his life. We discussed however that if he stops treatment certainly the tumor will progress. I discussed with him that even though the scan in February showed tumor response, I am concerned about his sudden inability to tolerate his saliva and that raises concern for me of tumor progression causing esophageal obstruction. I discussed with him that if he starts having nirali blood in his saliva rather than just pale blood tinged secretions he needs to go to the ER.    I am concerned that his stoma is more narrow, he was having difficulty with  his breathing. His mentation and his breathing did improve after placement of anny tube and removal of some of the crusting. I would like him to wear the anny tube full time. He had some discomfort with placement, provided prescription for lidocaine to apply to the anny tube before placing in stoma. A new Atos prescription was completed.    I sent a message to oncology as I am concerned about his intolerance of his secretions sudden in onset making me concerned about tumor progression. 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    Given his advanced stage cancer, would be beneficial to consider palliative care referral, will be helpful in goals decision making especially as anticipate this will become more of an issue pending his course. He was not ready for this referral to be placed today, broadly discussed during visit.    TSH being checked by oncology, normal 2/2024.     He is having imaging per medical oncology.     Follow-up in 3 months.    Thank you very much for the opportunity to participate in the care of your patient.      Marilou Sethi MD  Otolaryngology- Head & Neck Surgery      This note was dictated with voice recognition software and then edited. Please excuse any unintentional errors.         CC:  Rosa Bean MD  [facility]  [address] [address]      Serg Kennedy MD  Department of Radiation Oncology  [facility]

## 2024-03-13 NOTE — PROGRESS NOTES
SPEECH LANGUAGE PATHOLOGY EVALUATION    Subjective      Presenting condition or subjective complaint: Pt presents today in conjunction with ENT clinic visit per MD request. MD requesting refitting of larytube as patient presents with stomal stenosis.   Date of onset: 07/08/21    Relevant medical history: T4aN0 SCC larynx s/p total laryngectomy with recurrence disease in the peristomal area  Dates & types of surgery: total laryngectomy, bilateral neck dissection 7/8/2021    Prior diagnostic imaging/testing results:  yes  Prior therapy history for the same diagnosis, illness or injury: yes, last SLP visit 2022     Patient goals for therapy: not stated    Pain assessment: Pain present  Patient reports tingling in his feet that is very annoying     Objective       POST-OPERATIVE ASSESSMENT    Primary Communication Method: electrolarynx, mouthing words   Oral Motor Structure Assessment: patient spitting out secretions as he is unable to swallow food/liquid d/t recurrence; otherwise WFL except laryngeal function 2/2 total laryngectomy    Date of Surgery: 7/8/21  Primary Site of Tumor: T4aN0 SCC larynx  Extent of Surgery: total laryngectomy, bilateral neck dissection      LARYNGECTOMY PULMONARY REHAB  Laryngectomy tube brand: Atos   Laryngectomy tube size: 9  Laryngectomy tube length: 36  HME brand/type: GO   [place] with stoma cares: independent    *patient prefers to wear baseplates; would recommend white ring larytube if wanting to wear baseplates     Assessment & Plan   CLINICAL IMPRESSIONS   Medical Diagnosis: Malignant neoplasm of larynx    Treatment Diagnosis: Aphonia   Impression/Assessment: Pt presents today with aphonia in the context of s/p total laryngectomy. 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 Patient fitted with a size 9/36 larytube. He is having trouble tolerating larytube; MD trialed viscous lidocaine on the end of the tube and this significantly improved tolerance. Discussed importance of wearing larytube as  "much as he can, ideally 24/7. Trained pt on option for white ring larytube if he wants to continue wearing baseplates. New Atos prescription faxed over. Patient given written information on new products. Encouraged him to reach out if he has issues obtaining supplies.     PLAN OF CARE  Treatment Interventions: Communication    Prognosis to achieve stated therapy goals is fair   Rehab potential is impacted by: current level of function, pending medical intervention    Long Term Goals:   SLP Goal 1  Goal Identifier: Pulmonary Rehab  Goal Description: 1. Pt will demonstrated understanding and use for all pulmonary rehab options (i.e. baseplates, HMEs, larytube, etc) for maintaining good pulmonary function post laryngectomy 100% of the time independently.  Rationale: To maximize the ability to communicate wants and needs within the home or community  Target Date: 06/10/24  SLP Goal 2  Goal Identifier: EL Placement  Goal Description: 2.Pt will demonstrated adequate cheek and/or neck electrolarynx placement in the \"sweet spot\" independently in 4/5 opportunities  Rationale: To maximize the ability to communicate wants and needs within the home or community  Target Date: 06/10/24  SLP Goal 3  Goal Identifier: EL Intelligibility  Goal Description: 3.  Pt will use electrolarynx in conversation with ~90% or greater intelligibility as judged by a trained listener 80% of the time  Rationale: To maximize the ability to communicate wants and needs within the home or community  Target Date: 06/10/24      Frequency of Treatment: 1x/2-3 months in conjunction with ENT clinic visits  Duration of Treatment: 12 months     Education Assessment:   Learner/Method: Patient;Listening;Demonstration;No Barriers to Learning    Risks and benefits of evaluation/treatment have been explained.   Patient/Family/caregiver agrees with Plan of Care.     Evaluation Time:    Sound production (artic, phonology, apraxia, dysarthria) Minutes (05726): " 25      Signing Clinician: MOHIT Shoemaker    [facility]                                                                                   OUTPATIENT SPEECH LANGUAGE PATHOLOGY      PLAN OF TREATMENT FOR OUTPATIENT REHABILITATION   Patient's Last Name, First Name, Raj Mead    YOB: 1950   Provider's Name   [facility]   Medical Record No.  [id number]     Onset Date: 07/08/21 Start of Care Date: 03/13/24     Medical Diagnosis:  Malignant neoplasm of larynx      SLP Treatment Diagnosis: Aphonia  Plan of Treatment  Frequency/Duration: 1x/2-3 months in conjunction with ENT clinic visits  / 12 months     Certification date from 03/13/24   To 06/10/24          See note for plan of treatment details and functional goals     MOHIT Shoemaker                         I CERTIFY THE NEED FOR THESE SERVICES FURNISHED UNDER        THIS PLAN OF TREATMENT AND WHILE UNDER MY CARE     (Physician attestation of this document indicates review and certification of the therapy plan).              Referring Provider:  Dr. Marilou Sethi    Initial Assessment  See Epic Evaluation- 03/13/24           Opt out

## 2024-03-13 NOTE — LETTER
3/13/2024       RE: Raj Warren  [address]  [facility] [address]     Dear Colleague,    Thank you for referring your patient, Raj Warren, to the [facility] EAR [facility]SE [facility] [place] at [facility]. Please see a copy of my visit note below.    Dear Dr. Bean:    I had the pleasure of seeing Raj Warren in follow-up today at the [facility] Ot[facility].     History of Present Illness:  Raj Warren is 73 year old man with a T4aN0 SCC of the larynx. The patient presented to the ED with a week history of shortness of breath, along with symptoms of sore throat, bilateral ear pain. He had stridor vs wheezing, mild increased work of breathing, and hoarse voice at that time. He was treated with steroids and nebulizers with improvement in his symptoms and was discharged.  He had recurrent symptoms and was seen in the ED again on 4/12/2021. He had a CT scan performed on 4/12/2021 which demonstrated a 3.9 x 1.6 x 3.9 cm mass involving the right true cord, right AE fold, right false cord, posterior hypopharynx, proximal trachea, with involvement of the thyroid cartilage, involvement of the prelaryngeal tissue, no abnormal nodes. Dr Willoughby (local ENT), scope exam was performed, and patient was admitted for a trach and DL/bx. Dr Johnson and Dr Sauceda performed this procedure on 4/13/2021. A tracheal window at 2nd tracheal ring was performed with placement of an 8 cuffed Shiley. Pathology showed moderately differentiated SCC. He was seen by Dr Bean of medical oncology on 4/13/2021. He was followed by local ENT team during his hospitalization. He was recommended for chemoradiation. He was seen by Dr Mclaughlin of [facility], planning 7 weeks treatment, and CT simulation was done. He did have a VSS done while inpatient without evidence of aspiration. The patient was referred here for discussion of possible salvage  laryngectomy after chemoradiation. He had a PET scan on 4/26/2021 which showed an FDG avid mass in the larynx involving the supraglottis/glottis/subglottis, hypermetabolic left level 4 node, FDG avid nodule in the right lung thought to be infectious/inflammatory. He was then admitted to the hospital locally from 4/27/2021 to 5/8/2021 after presenting with worsening shortness of breath, found to have bilateral PE with significant clot burden including the right pulmonary artery and the right ventricle. He was taken to the OR for pulmonary embolectomy and RV thrombectomy. He had IVC filter placed 4/30/2021. His course was complicated by hemodynamic instability requiring pressors, poor healing of his sternal incision requiring wound vac placement. He was discharged on eliquis. He was sent to rehab after his admission, discharged 5/15/2021. He saw Dr Bean in follow-up on 5/27/2021 and was recommended for repeat imaging. This showed slight decrease in size of the tumor, no distant disease.     He was seen as a new patient on 6/18/2021. After extensive preoperative counseling especially in light of his recent PE and anticoagulation, the recommendations was for surgery given the cartilaginous erosion seen on imaging. He was taken to the OR on 7/8/2021 for a DL, total laryngectomy, bilateral neck dissections, cricopharyngeal myotomy. His hospital course was uncomplicated, was restarted on his anticoagulation. His final pathology demonstrated a 6 cm SCC with invasion through the thyroid cartilage, involved the cricoid cartilage, involved the right pyriform sinus, extended to the left side of the tracheostomy site, PNI present, focal lymphatic invasion, 0/75 nodes. The main specimen had a positive margin at the pyriform sinus, margins were taken from the main specimen and were negative (named right pharyngeal wall mucosal margin).      He received postoperative radiation under the care of Dr Kennedy, from 8/4/2021 to 9/20/2021  for a total of 66 Gy.  He had his PEG removed by radiation oncology on 10/4/2021. His post treatment PET scan was negative in December 2021. He had his IVC filter removed in February 2022.     He had imaging in June 2022 which showed a NIRADS 2b nodular soft tissue density along the upper esophagus. He was scheduled for a PET scan to follow-up this area but due to some scheduling issues did not have the scan, got completed on 9/30/2022. This demonstrated a hypermetabolic mass in the right anterolateral wall of the neopharynx measuring 1.8 x 1.7 x 3.7 cm with invasion of the right thyroid lobe, not involving the vessels, no distant disease. He was not interested in surgical resection and elected to proceed with palliative keytruda. He was started on keytruda on 10/27/2022 under the care of Dr Bean.     Interval history:  He comes in today for follow-up. He was last seen in August 2023. He developed progression of his diease on imaging and keytruda was stopped on 11/2/2023. He was seen in the [facility] ER on 11/12/2023 with dysphagia, had a CT scan which showed peristomal recurrence measuring 3.1 x 2.8 x 4.1 cm with mass effect on the esophagus. He was admitted to the hospital and had PEG placement on 11/16/2023. He had PET scan on 11/20/2023 which showed a neopharyngeal recurrence measuring 4.2 x 2.6 x 7.3 cm with encasement of the carotid. He was started on carbo/taxol on 12/6/2023. He last saw oncology in February 2024 with plans to continue chemotherapy.     He says that he is not doing well. He is having a hard time with chemotherapy. He is struggling particularly with the neuropathy in his hands and feet. He says he was given medication for this last week but has not noticed any benefit. He says that in the last 5 days or so he started not being able to swallow his saliva anymore. He says that he has to spit it out, that this was a relatively sudden change. He has had blood tinged secretions that he brings up,  no nirali blood. He is wearing a base plate and HME.       MEDICATIONS:     Current Outpatient Medications   Medication Sig Dispense Refill     acetaminophen (TYLENOL) 500 MG tablet 500 mg by Per Feeding Tube route every 6 hours as needed for mild pain       apixaban ANTICOAGULANT (ELIQUIS) 5 MG tablet 5 mg by Per Feeding Tube route 2 times daily       dexAMETHasone (DECADRON) 4 MG tablet 8 mg by Per Feeding Tube route daily Take for 3 days, starting the day after chemo. 2 tabs (8mg). Take with food.       gabapentin (NEURONTIN) 250 MG/5ML solution 5 mLs (250 mg) by Per G Tube route 3 times daily 470 mL 3     levothyroxine (SYNTHROID/LEVOTHROID) 75 MCG tablet 75 mcg by Per Feeding Tube route daily       lidocaine, viscous, (XYLOCAINE) 2 % solution Apply 1 mL topically every 6 hours as needed for other (placement of anny tube) Apply on anny tube when placing 100 mL 3     metoprolol tartrate (LOPRESSOR) 25 MG tablet 25 mg by Per Feeding Tube route 2 times daily       ondansetron (ZOFRAN) 8 MG tablet 8 mg by Per Feeding Tube route every 8 hours as needed for nausea       prochlorperazine (COMPAZINE) 10 MG tablet 10 mg by Per Feeding Tube route every 6 hours as needed for nausea or vomiting         ALLERGIES:    Allergies   Allergen Reactions     Pollen Extract Other (See Comments)     Season allergies       HABITS/SOCIAL HISTORY:    Former smoker / ppd x 8 yrs, quit in . Sober from alcohol.   Lives alone. His family does not live in the immediate [place] area.   He is a retired .       Social History     Socioeconomic History     Marital status: Single     Spouse name: Not on file     Number of children: Not on file     Years of education: Not on file     Highest education level: Not on file   Occupational History     Not on file   Tobacco Use     Smoking status: Former     Types: Cigarettes     Quit date: 1989     Years since quittin.2     Smokeless tobacco: Never     Tobacco  comments:     quit in 1989  had smoked about 1/2 pack a day then cut back   Substance and Sexual Activity     Alcohol use: No     Comment: No alcohol since 1989     Drug use: No     Sexual activity: Yes     Partners: Female   Other Topics Concern     Parent/sibling w/ CABG, MI or angioplasty before 65F 55M? Not Asked   Social History Narrative     Not on file     Social Determinants of Health     Financial Resource Strain: Low Risk  (5/18/2021)    Overall Financial Resource Strain (CARDIA)      Difficulty of Paying Living Expenses: Not hard at all   Food Insecurity: No Food Insecurity (5/18/2021)    Hunger Vital Sign      Worried About Running Out of Food in the Last Year: Never true      Ran Out of Food in the Last Year: Never true   Transportation Needs: No Transportation Needs (5/18/2021)    PRAPARE - Transportation      Lack of Transportation (Medical): No      Lack of Transportation (Non-Medical): No   Physical Activity: Not on file   Stress: Not on file   Social Connections: Not on file   Interpersonal Safety: Not At Risk (8/31/2023)    Humiliation, Afraid, Rape, and Kick questionnaire      Fear of Current or Ex-Partner: No      Emotionally Abused: No      Physically Abused: No      Sexually Abused: No   Housing Stability: Not on file       PAST MEDICAL HISTORY:   Past Medical History:   Diagnosis Date     Allergic state      Anemia      Former smoker      Malignant neoplasm of larynx (H) 04/20/2021     Pulmonary emboli (H) 04/28/2021    Bilateral-s/p embolectomy and IVC filter placement     S/P percutaneous endoscopic gastrostomy (PEG) tube placement (H)      Tracheostomy in place (H)         PAST SURGICAL HISTORY:   Past Surgical History:   Procedure Laterality Date     DISSECTION RADICAL NECK BILATERAL Bilateral 7/8/2021    Procedure: bilateral neck dissections;  Surgeon: Marilou Sethi MD;  Location: [facility] OR     ENDOSCOPIC INSERTION TUBE GASTROSTOMY Left 7/8/2021    Procedure: INSERTION, PEG TUBE with  "Esophagogastroduodenoscopy;  Surgeon: Kg Montaño MD;  Location: [facility] OR     IR CHEST PORT PLACEMENT > 5 YRS OF AGE  1/2/2024     IR GASTROSTOMY TUBE PERCUTANEOUS PLCMNT  11/16/2023     IR IVC FILTER PLACEMENT  4/30/2021     IR IVC FILTER REMOVAL  2/7/2022     LARYNGECTOMY N/A 7/8/2021    Procedure: LARYNGECTOMY;  Surgeon: Marilou Sethi MD;  Location: [facility] OR     LARYNGOSCOPY N/A 4/12/2021    Procedure: LARYNGOSCOPY;  Surgeon: Choco Johnson MD;  Location: [facility] OR     LARYNGOSCOPY N/A 7/8/2021    Procedure: LARYNGOSCOPY;  Surgeon: Marilou Sethi MD;  Location: [facility] OR     REPAIR ANEURYSM ASCENDING AORTA N/A 4/28/2021    Procedure: PULMONARY THROMBECTOMY;  Surgeon: Yumi Singh MD;  Location: [facility] OR     TRACHEOSTOMY  4/12/2021    Procedure: CREATION, TRACHEOSTOMY;  Surgeon: Choco Johnson MD;  Location: [facility] OR       FAMILY HISTORY:    Family History   Problem Relation Age of Onset     Circulatory Mother         blood clots     Alcohol/Drug Father         alcohol drank heavily     Alcohol/Drug Brother         alcohol       REVIEW OF SYSTEMS:  12 point ROS was negative other than the symptoms noted above in the HPI.  Patient Supplied Answers to Review of Systems      5/17/2023    10:51 AM    ENT ROS   Ears, Nose, Throat Hearing loss    Nasal congestion or drainage   Musculoskeletal Sore or stiff joints    Back pain    Neck pain         PHYSICAL EXAMINATION:   /67   Pulse 96   Ht 1.803 m (5' 11\")   Wt 79.9 kg (176 lb 1.6 oz)   SpO2 100%   BMI 24.56 kg/m    Patient ill appearing  Less interactive than usual  Mouths words and uses electrolarynx  Initially increased work of breathing, improved with removal of HME and placement of anny tube  Secretions along stoma edge nonobstructive  Spits up blood tinged secretions throughout visit  Stoma more narrow than previous  Firm mass of right neck without obvious skin changes      PROCEDURES:   lexible fiberoptic laryngoscopy: Scope exam was " indicated due to laryngeal cancer. Verbal consent was obtained. The nasal cavity was prepped with an aerosolized solution of topical anesthetic and vasoconstrictive agent. The scope was passed through the anterior nasal cavity and advanced. Inspection of the nasopharynx revealed no gross abnormality. The base of tongue is normal. The neopharynx has pooled secretions and the scope could not be passed, mucosa could not be visualized.    Tracheoscopy: The flexible scope was passed through the stoma down to the bronchi. There are no masses or mucosal lesions. There are no secretions. Crusting removed from proximal stoma.         RESULTS REVIEWED:     CT neck report reviewed, PET report reviewed, notes from oncology x 2 reviewed, discharge summary reviewed, TSH reviewed    Care discussed with SLP    CT neck imaging independently reviewed      IMPRESSION AND PLAN:   Raj Warren is a 73 year old man with a T4N0 SCC of the larynx. He [facility]. He was taken to the OR on 7/8/2021 for a total laryngectomy with bilateral neck dissections. He completed his postoperative radiation on 9/20/2021. He developed a local/regional recurrence in October 2022, receiving palliative keytruda starting 10/27/2022, discontinued 11/2023 due to tumor progression. He was started on carbo/taxol in December 2023. He had PEG placement in November 2023.    He is having a hard time with toleration of his treatment. He is most bothered by the neuropathy. We spent some time discussing today that ultimately he has to decide how bothersome the side effects of treatment are relative to the benefits of tumor response in the context of his own personal goals. We discussed that he has the right at any time to discontinue treatment and can prioritize other parts of his life. We discussed however that if he stops treatment certainly the tumor will progress. I discussed with him that even though the scan in February  showed tumor response, I am concerned about his sudden inability to tolerate his saliva and that raises concern for me of tumor progression causing esophageal obstruction. I discussed with him that if he starts having nirali blood in his saliva rather than just pale blood tinged secretions he needs to go to the ER.    I am concerned that his stoma is more narrow, he was having difficulty with his breathing. His mentation and his breathing did improve after placement of anny tube and removal of some of the crusting. I would like him to wear the anny tube full time. He had some discomfort with placement, provided prescription for lidocaine to apply to the anny tube before placing in stoma. A new Atos prescription was completed.    I sent a message to oncology as I am concerned about his intolerance of his secretions sudden in onset making me concerned about tumor progression. 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    Given his advanced stage cancer, would be beneficial to consider palliative care referral, will be helpful in goals decision making especially as anticipate this will become more of an issue pending his course. He was not ready for this referral to be placed today, broadly discussed during visit.    TSH being checked by oncology, normal 2/2024.     He is having imaging per medical oncology.     Follow-up in 3 months.    Thank you very much for the opportunity to participate in the care of your patient.      Marilou Sethi MD  Otolaryngology- Head & Neck Surgery      This note was dictated with voice recognition software and then edited. Please excuse any unintentional errors.         CC:  Rosa Bean MD  [facility]  [address] [facility] [address]  [facility] [address]      Serg Kennedy MD  Department of Radiation Oncology  [facility]  [place]      Again, thank you for allowing me to participate in the care of your patient.      Sincerely,    Marilou Sethi MD

## 2024-03-13 NOTE — PATIENT INSTRUCTIONS
Today we changed the size of your larytube-    Size 9 Length 36 (Sometime this is called 9/36)    I'd recommend trying to wear the larytube as much as possible using the lidocaine gel on the end to make it more comfortable to wear    If you want to get a larytube that fits into a baseplate to where them together, this is called the [place] Larytube

## 2024-03-18 NOTE — TELEPHONE ENCOUNTER
[facility]    Phone Message    May a detailed message be left on voicemail: yes     Reason for Call: Other: Ranjit calling from [facility] - he said they did receive the Office Visit Notes from 3/13 however they do not see any electronic signatures in the document and they need that for insurance, please re-fax this document to , call him with questions, thanks      Action Taken: Other: ENT    Travel Screening: Not Applicable

## 2024-03-20 NOTE — LETTER
"    3/20/2024         RE: Raj Warren  [address]  [facility] [address]        Dear Colleague,    Thank you for referring your patient, Raj Warren, to the [facility]. Please see a copy of my visit note below.    Oncology Rooming Note    March 20, 2024 9:02 AM   Raj Warren is a 73 year old male who presents for:    Chief Complaint   Patient presents with     Oncology Clinic Visit     Initial Vitals: Resp 16   Ht 1.803 m (5' 11\")   Wt 79.8 kg (176 lb)   BMI 24.55 kg/m   Estimated body mass index is 24.55 kg/m  as calculated from the following:    Height as of this encounter: 1.803 m (5' 11\").    Weight as of this encounter: 79.8 kg (176 lb). Body surface area is 2 meters squared.  Moderate Pain (5) Comment: Data Unavailable   No LMP for male patient.  Allergies reviewed: Yes  Medications reviewed: Yes    Medications: Medication refills not needed today.  Pharmacy name entered into fotopedia: [id number] - [place], [place] - [address] AT [facility]    Frailty Screening:   Is the patient here for a new oncology consult visit in cancer care? 2. No        Jennifer Farfan MA              Oncology/Hematology Visit Note  Mar 20, 2024    Reason for Visit: follow up of recurrent laryngeal squamous cell carcinoma  Follows with Dr. Bean  Status post total laryngectomy with bilateral neck dissection in July 2021  Status post radiation from August to September 2021  10/27/2022-started pembrolizumab  11/202/2023-PET scan reveals progression of the disease  11/16/23-PEG tube placed  12/06/2023-started carboplatin/taxol   02/14/2024-CT scan neck reveals improvement in disease  CT chest abdomen pelvis no evidence of metastatic disease    Interval History:  Overall patient reports feeling well he is noticing some more neuropathy in his fingers and toes.  He denies any swelling of the extremity he is able to walk.  He denies fever chills sweats " "cough shortness of breath chest pain nausea vomiting diarrhea abdominal pain bleeding  He denies any neck pain  Reports no issues with PEG tube feeding      Review of Systems:  14 point ROS of systems including Constitutional, Eyes, Respiratory, Cardiovascular, Gastroenterology, Genitourinary, Integumentary, Muscularskeletal, Psychiatric were all negative except for pertinent positives noted in my HPI.    Physical Examination:  General: The patient is a pleasant male in no acute distress.  /69   Pulse 83   Temp 98.4  F (36.9  C)   Resp 16   Ht 1.803 m (5' 11\")   Wt 79.8 kg (176 lb)   SpO2 100%   BMI 24.55 kg/m    HEENT: EOMI, PERRL. Sclerae are anicteric. Oral mucosa is pink and moist with no lesions or thrush.   Lymph: Neck is supple with no lymphadenopathy in the cervical or supraclavicular areas.   Heart: Regular rate and rhythm.   Lungs: Clear to auscultation bilaterally.   GI: Bowel sounds present, soft, nontender with no palpable hepatosplenomegaly or masses.   Extremities: No lower extremity edema noted bilaterally.   Skin: No rashes, petechiae, or bruising noted on exposed skin.    Laboratory Data:  CBC CMP and TSH results reviewed    Assessment and Plan:    recurrent laryngeal squamous cell carcinoma  Follows with Dr. Bean  Previously progressed on pembrolizumab  12/06/2023-started carboplatin/taxol   03/13/2023-saw Dr. Sethi from ENT- inability to tolerate saliva concern for disease progression.  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 Bean  -send referral in for neurosurgery- patient to see    -Due to neuropathy carboplatin reduced to AUC 4 and Taxol by 20%-ok per Dr. Bean  Patient is scheduled to see Dr. Bean on April 9 with next cycle of treatment  -I discussed referral to palliative care with the patient-patient is not ready yet    Peripheral neuropathy  - Per patient " gabapentin is not helping and neuropathy is getting worse  Reviewed with our pharmacy will switch from gabapentin to Lyrica  As above will reduce carboplatin and taxol       Anemia  Overall stable  Continue to monitor    Leukopenia  ANC 1.5   patient is afebrile  Patient meets parameters for treatment today as above chemotherapy doses will be reduced  In the event of fever chills sweats or any signs symptoms of infection patient is advised to go to ER    Thrombocytopenia  Patient denies bleeding  Patient meets parameters for chemo-as above we are reducing chemotherapy doses by 20%  Advised patient denies to bleeding bruising fall or injury to go to ER      Nutrition  Continue with PEG tube feedings  continue follow-up with nutritionist    Pulmonary embolism  Continue with Eliquis indefinitely      Please call our clinic with any changes in health condition or questions      TARUN Sandy CNP  [facility]     Chart documentation with Dragon Voice recognition Software. Although reviewed after completion, some words and grammatical errors may remain.          Again, thank you for allowing me to participate in the care of your patient.        Sincerely,        TARUN Sandy CNP

## 2024-03-20 NOTE — PROGRESS NOTES
"Oncology/Hematology Visit Note  Mar 20, 2024    Reason for Visit: follow up of recurrent laryngeal squamous cell carcinoma  Follows with Dr. Bean  Status post total laryngectomy with bilateral neck dissection in July 2021  Status post radiation from August to September 2021  10/27/2022-started pembrolizumab  11/202/2023-PET scan reveals progression of the disease  11/16/23-PEG tube placed  12/06/2023-started carboplatin/taxol   02/14/2024-CT scan neck reveals improvement in disease  CT chest abdomen pelvis no evidence of metastatic disease    Interval History:  Overall patient reports feeling well he is noticing some more neuropathy in his fingers and toes.  He denies any swelling of the extremity he is able to walk.  He denies fever chills sweats cough shortness of breath chest pain nausea vomiting diarrhea abdominal pain bleeding  He denies any neck pain  Reports no issues with PEG tube feeding      Review of Systems:  14 point ROS of systems including Constitutional, Eyes, Respiratory, Cardiovascular, Gastroenterology, Genitourinary, Integumentary, Muscularskeletal, Psychiatric were all negative except for pertinent positives noted in my HPI.    Physical Examination:  General: The patient is a pleasant male in no acute distress.  /69   Pulse 83   Temp 98.4  F (36.9  C)   Resp 16   Ht 1.803 m (5' 11\")   Wt 79.8 kg (176 lb)   SpO2 100%   BMI 24.55 kg/m    HEENT: EOMI, PERRL. Sclerae are anicteric. Oral mucosa is pink and moist with no lesions or thrush.   Lymph: Neck is supple with no lymphadenopathy in the cervical or supraclavicular areas.   Heart: Regular rate and rhythm.   Lungs: Clear to auscultation bilaterally.   GI: Bowel sounds present, soft, nontender with no palpable hepatosplenomegaly or masses.   Extremities: No lower extremity edema noted bilaterally.   Skin: No rashes, petechiae, or bruising noted on exposed skin.    Laboratory Data:  CBC CMP and TSH results reviewed    Assessment and " Plan:    recurrent laryngeal squamous cell carcinoma  Follows with Dr. Bean  Previously progressed on pembrolizumab  12/06/2023-started carboplatin/taxol   03/13/2023-saw Dr. Sethi from ENT- inability to tolerate saliva concern for disease progression.  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 Bean  -send referral in for neurosurgery- patient to see    -Due to neuropathy carboplatin reduced to AUC 4 and Taxol by 20%-ok per Dr. Bean  Patient is scheduled to see Dr. Bean on April 9 with next cycle of treatment  -I discussed referral to palliative care with the patient-patient is not ready yet    Peripheral neuropathy  - Per patient gabapentin is not helping and neuropathy is getting worse  Reviewed with our pharmacy will switch from gabapentin to Lyrica  As above will reduce carboplatin and taxol       Anemia  Overall stable  Continue to monitor    Leukopenia  ANC 1.5   patient is afebrile  Patient meets parameters for treatment today as above chemotherapy doses will be reduced  In the event of fever chills sweats or any signs symptoms of infection patient is advised to go to ER    Thrombocytopenia  Patient denies bleeding  Patient meets parameters for chemo-as above we are reducing chemotherapy doses by 20%  Advised patient denies to bleeding bruising fall or injury to go to ER      Nutrition  Continue with PEG tube feedings  continue follow-up with nutritionist    Pulmonary embolism  Continue with Eliquis indefinitely      Please call our clinic with any changes in health condition or questions      TARUN [facility] [place]     Chart documentation with Dragon Voice recognition Software. Although reviewed after completion, some words and grammatical errors may remain.

## 2024-03-20 NOTE — PROGRESS NOTES
Nursing Note:  Raj Warren presents today for port labs for tx today.    Patient seen by provider today: Yes: Kyree   present during visit today: Not Applicable.    Note: N/A.    Intravenous Access:  Labs drawn without difficulty.  Implanted Port.    Discharge Plan:   Patient was sent to [facility] for provider appointment.    Felipe Harrison RN

## 2024-03-20 NOTE — PROGRESS NOTES
Infusion Nursing Note:  Raj Warren presents today for taxol, carboplatin.    Patient seen by provider today: Yes: RICARDO Jurado NP   present during visit today: Not Applicable.    Note: pt reports increased neuropathy in extremeites. Chemo dose reduced.      Intravenous Access:  Implanted Port.    Treatment Conditions:  Lab Results   Component Value Date    HGB 8.9 (L) 03/20/2024    WBC 2.6 (L) 03/20/2024    ANEU 2.6 06/23/2021    ANEUTAUTO 1.5 (L) 03/20/2024    PLT 75 (L) 03/20/2024        Lab Results   Component Value Date     03/20/2024    POTASSIUM 3.9 03/20/2024    MAG 2.1 11/18/2023    CR 0.61 (L) 03/20/2024    TELMA 9.1 03/20/2024    BILITOTAL 0.4 03/20/2024    ALBUMIN 4.1 03/20/2024    ALT 12 03/20/2024    AST 16 03/20/2024       Results reviewed, labs MET treatment parameters, ok to proceed with treatment.      Post Infusion Assessment:  Patient tolerated infusion without incident.  Site patent and intact, free from redness, edema or discomfort.  No evidence of extravasations.  Access discontinued per protocol.       Discharge Plan:   Patient and/or family verbalized understanding of discharge instructions and all questions answered.  AVS to patient via [employer].  Patient will return 4/9 for next appointment.   Patient discharged in stable condition accompanied by: self.  Departure Mode: Ambulatory.      Karishma Ervin RN

## 2024-03-20 NOTE — PROGRESS NOTES
"Oncology Rooming Note    March 20, 2024 9:02 AM   Raj Warren is a 73 year old male who presents for:    Chief Complaint   Patient presents with    Oncology Clinic Visit     Initial Vitals: Resp 16   Ht 1.803 m (5' 11\")   Wt 79.8 kg (176 lb)   BMI 24.55 kg/m   Estimated body mass index is 24.55 kg/m  as calculated from the following:    Height as of this encounter: 1.803 m (5' 11\").    Weight as of this encounter: 79.8 kg (176 lb). Body surface area is 2 meters squared.  Moderate Pain (5) Comment: Data Unavailable   No LMP for male patient.  Allergies reviewed: Yes  Medications reviewed: Yes    Medications: Medication refills not needed today.  Pharmacy name entered into [employer]: [facility] #[id number] - [place], [place] - [address] AT [facility]    Frailty Screening:   Is the patient here for a new oncology consult visit in cancer care? 2. No        Jennifer Farfan MA            "

## 2024-04-09 NOTE — PROGRESS NOTES
Infusion Nursing Note:  Raj Warren presents today for C7D1 Carbo/Taxol.    Patient seen by provider today: Yes: Vikas   present during visit today: Not Applicable.    Note: N/A.      Intravenous Access:  Implanted Port.  Accessed in FT.    Treatment Conditions:  Lab Results   Component Value Date    HGB 8.3 (L) 04/09/2024    WBC 2.9 (L) 04/09/2024    ANEU 1.4 (L) 04/09/2024    ANEUTAUTO 1.5 (L) 03/20/2024    PLT 81 (L) 04/09/2024        Lab Results   Component Value Date     04/09/2024    POTASSIUM 3.4 04/09/2024    MAG 2.1 11/18/2023    CR 0.64 (L) 04/09/2024    TELMA 9.3 04/09/2024    BILITOTAL 0.4 04/09/2024    ALBUMIN 3.9 04/09/2024    ALT 10 04/09/2024    AST 13 04/09/2024       Results reviewed, labs did NOT meet treatment parameters: OK for chemo today with ANC 1.4 per Dr. Bean.  Taxol has been dose reduced.      Post Infusion Assessment:  Patient tolerated infusion without incident.  Blood return noted pre and post infusion.  Site patent and intact, free from redness, edema or discomfort.  No evidence of extravasations.  Access discontinued per protocol.       Discharge Plan:   Discharge instructions reviewed with: Patient.  Patient and/or family verbalized understanding of discharge instructions and all questions answered.  AVS to patient via [employer].  Patient will return 4/29/24 for next appointment.   Patient discharged in stable condition accompanied by: self.  Departure Mode: Ambulatory.      Felipe Harrisno RN

## 2024-04-09 NOTE — LETTER
"    4/9/2024         RE: Raj Warren  [address]  [facility] [address]        Dear Colleague,    Thank you for referring your patient, Raj Warren, to the [facility]. Please see a copy of my visit note below.    Oncology Rooming Note    April 9, 2024 9:22 AM   Raj Warren is a 73 year old male who presents for:    Chief Complaint   Patient presents with     Oncology Clinic Visit     Initial Vitals: Resp 16   Ht 1.803 m (5' 11\")   Wt 79.8 kg (176 lb)   BMI 24.55 kg/m   Estimated body mass index is 24.55 kg/m  as calculated from the following:    Height as of this encounter: 1.803 m (5' 11\").    Weight as of this encounter: 79.8 kg (176 lb). Body surface area is 2 meters squared.  No Pain (0) Comment: Data Unavailable   No LMP for male patient.  Allergies reviewed: Yes  Medications reviewed: Yes    Medications: Medication refills not needed today.  Pharmacy name entered into [employer]: [facility] #[id number] - [facility] [address] AT [facility]    Frailty Screening:   Is the patient here for a new oncology consult visit in cancer care? 2. No          Jennifer Farfan MA              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  -COMBINED POSITIVE SCORE (CPS):  70  -TUMOR PROPORTION SCORE (TPS):  60%  -FoundationOne: CDKN2A/B [id number] R80* and p14ARF P94L, INPP4B rearrangement intron 11, TP53 H179L.     1.  Patient was evaluated in 2019 for hoarseness of voice.  He was seen by ENT and found to have right vocal cord mass.    -He had biopsy done on 08/07/2019. Pathology revealed moderately-differentiated invasive squamous cell carcinoma.     2.  The patient was seen in the Emergency Room on 04/12/2021 for shortness of breath and admitted.   -CT neck revealed an enhancing soft tissue mass involving the right larynx measuring 3.9 cm.  -Patient had laryngoscopy " with biopsy and tracheostomy done on 04/12/2021.  There was an endolaryngeal mass obscuring the laryngeal landmarks.  Mass appeared to be based on right endolarynx.  Pathology revealed invasive keratinizing moderately-differentiated squamous cell carcinoma.     3.  PET scan on 04/26/2021 revealed hypermetabolic mass involving the supraglottic, glottic and subglottic larynx.  There was a hypermetabolic left level IV lymph node.  There was also hypermetabolic nodule in right middle lobe.     4. CT chest angiogram on 04/27/2021 revealed a large bilateral pulmonary embolism in all the lobes.  There was evidence of right cardiac strain.  -Echocardiogram on 04/27/2021 revealed clot in transit in the right ventricle.  -The patient had emergency pulmonary embolectomy on 04/28/2021.  Pathology revealed organizing clot.  -Patient on Eliquis. Long term anti-coagulation recommended.     5. Total laryngectomy with bilateral neck dissection on 07/08/2021.  Pathology reveals 6 cm squamous cell carcinoma involving the right false and true vocal cords with fixation, the left false and true vocal cords and invades through thyroid cartilage.  There is focal lymphatic invasion.  There is perineural invasion. Benign 75 lymph nodes.  pT4a pN0 disease.     6. Post-op radiation between 08/04/2021 and 09/20/2021. 6600 cGy in 33 fractions.     7. PET/CT on 09/30/2022 revealed hypermetabolic mass originating from the right anterolateral wall of the neopharynx.  -Patient seen by ENT on 10/03/2022 and had FNA of neck mass. Pathology is positive for malignancy. Rare clusters morphologically consistent with squamous cell carcinoma.      8. Pembrolizumab between 10/27/2022 and 11/02/2023. Stopped due to progression.     9. PET scan on 11/20/203 reveals increasing metabolic activity of soft tissue thickening extending from the right neopharynx/superior margin of the laryngectomy site extending inferiorly to the right supraclavicular region.  -PEG  placed on 11/16/2023.     10. Carboplatin and taxol started on 12/06/2023.     SUBJECTIVE:    Mr. Warren is a 73-year-old gentleman with recurrent laryngeal squamous cell carcinoma on carboplatin and taxol.  CT scan in February revealed improvement in malignancy.      He has few side effects from chemotherapy.  He is more fatigued.  He also has worsening neuropathy.  He has numbness, tingling and pain in the extremities.  They are getting worse.  It interferes with activities.  He has to hold onto things tightly otherwise they may fall out of his hand.  His walking is pretty good.    No headache.  Sometimes gets some dizziness.  No chest pain.  No shortness of breath at rest.  No nausea or vomiting.  He does PEG tube feeding.  No urinary complaints.  Some constipation.  No bleeding.     PHYSICAL EXAMINATION:   GENERAL:  Alert and oriented x 3.   VITAL SIGNS:  Reviewed.     Rest of the systems not examined.     LABS: CBC and CMP were reviewed.     ASSESSMENT:  1.  A 73-year-old gentleman with recurrent laryngeal squamous cell carcinoma on pembrolizumab.  2.  History of pulmonary embolism on Eliquis.  3.  Hypothyroidism on levothyroxine.  4.  Dysphagia s/p PEG.  5.  Pancytopenia.  6.  Grade 2 peripheral neuropathy.  7.  Generalized weakness.  8.  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 Reduce dose of taxol.  See ERNESTINE with next treatment.  See me in 6 weeks.  Take gabapentin three times a day.  Take oxycodone as needed.  Vascular surgery appointment.     DISCUSSION:  1.  I had a long discussion with the patient regarding malignancy.  CT scan done in February was again reviewed.  Explained to him it reveals response to treatment.    He is on carboplatin and Taxol.  He is having side effects including worsening neuropathy and fatigue.  He is also cytopenic.  Discussed regarding dose reduction of Taxol.  He is agreeable for it.  Will reduce the dose of Taxol.    If side effects of  treatment continues, we will do dose reduction of both carboplatin and Taxol next time.    2.  Patient has neuropathy.  He recently tried Lyrica.  It did not help.  He wants to continue on gabapentin.  He will take 300 mg 3 times a day.    3.  Patient gets some pain in the extremities from neuropathy.  Prescription of oxycodone given.  Side effect of constipation and sedation reviewed.    4.  He has carotid artery narrowing due to malignancy.  Will set him up to see vascular surgery.    5.  Labs were reviewed with him.  He is pancytopenic from chemotherapy.  He does not need any growth factor or transfusion.  It will be rechecked with next chemotherapy.    6.  He will continue on levothyroxine for hypothyroidism.    7.  He had few questions which were all answered.  I will see him in 6 weeks.  In between he will see our ERNESTINE.     Total visit time of 40 minutes.  Time spent in today's visit, review of chart/investigations today, monitoring for toxicity of high risk drugs and documentation today.      Again, thank you for allowing me to participate in the care of your patient.        Sincerely,        Rosa Bean MD

## 2024-04-09 NOTE — PROGRESS NOTES
"Oncology Rooming Note    April 9, 2024 9:22 AM   Raj Warren is a 73 year old male who presents for:    Chief Complaint   Patient presents with    Oncology Clinic Visit     Initial Vitals: Resp 16   Ht 1.803 m (5' 11\")   Wt 79.8 kg (176 lb)   BMI 24.55 kg/m   Estimated body mass index is 24.55 kg/m  as calculated from the following:    Height as of this encounter: 1.803 m (5' 11\").    Weight as of this encounter: 79.8 kg (176 lb). Body surface area is 2 meters squared.  No Pain (0) Comment: Data Unavailable   No LMP for male patient.  Allergies reviewed: Yes  Medications reviewed: Yes    Medications: Medication refills not needed today.  Pharmacy name entered into [employer]: [facility] #[id number] - [place], [place] - [address] AT [facility]    Frailty Screening:   Is the patient here for a new oncology consult visit in cancer care? 2. No          Jennifer Farfan MA            "

## 2024-04-09 NOTE — TELEPHONE ENCOUNTER
Referral received via [employer] on 04/09/24.    Pt referred to [facility] by Dr. Bean for right carotid artery narrowing due to laryngeal squamous cell tumor.     CT soft tissue neck w/contrast 2/14/24 in Epic.    Routing to scheduling to coordinate the following:  Bilateral carotid ultrasound  NEW VASCULAR PATIENT consult with Vascular Surgery  Please schedule this at next available    Appt note:    Ref by Dr. Bean for right carotid artery narrowing due to laryngeal squamous cell tumor.  Bilateral carotid ultrasound prior.

## 2024-04-09 NOTE — PROGRESS NOTES
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  -COMBINED POSITIVE SCORE (CPS):  70  -TUMOR PROPORTION SCORE (TPS):  60%  -[employer]One: CDKN2A/B d16THF1c R80* and p14ARF P94L, INPP4B rearrangement intron 11, TP53 H179L.     1.  Patient was evaluated in 2019 for hoarseness of voice.  He was seen by ENT and found to have right vocal cord mass.    -He had biopsy done on 08/07/2019. Pathology revealed moderately-differentiated invasive squamous cell carcinoma.     2.  The patient was seen in the Emergency Room on 04/12/2021 for shortness of breath and admitted.   -CT neck revealed an enhancing soft tissue mass involving the right larynx measuring 3.9 cm.  -Patient had laryngoscopy with biopsy and tracheostomy done on 04/12/2021.  There was an endolaryngeal mass obscuring the laryngeal landmarks.  Mass appeared to be based on right endolarynx.  Pathology revealed invasive keratinizing moderately-differentiated squamous cell carcinoma.     3.  PET scan on 04/26/2021 revealed hypermetabolic mass involving the supraglottic, glottic and subglottic larynx.  There was a hypermetabolic left level IV lymph node.  There was also hypermetabolic nodule in right middle lobe.     4. CT chest angiogram on 04/27/2021 revealed a large bilateral pulmonary embolism in all the lobes.  There was evidence of right cardiac strain.  -Echocardiogram on 04/27/2021 revealed clot in transit in the right ventricle.  -The patient had emergency pulmonary embolectomy on 04/28/2021.  Pathology revealed organizing clot.  -Patient on Eliquis. Long term anti-coagulation recommended.     5. Total laryngectomy with bilateral neck dissection on 07/08/2021.  Pathology reveals 6 cm squamous cell carcinoma involving the right false and true vocal cords with fixation, the left false and true vocal cords and invades through thyroid cartilage.  There is focal lymphatic invasion.   There is perineural invasion. Benign 75 lymph nodes.  pT4a pN0 disease.     6. Post-op radiation between 08/04/2021 and 09/20/2021. 6600 cGy in 33 fractions.     7. PET/CT on 09/30/2022 revealed hypermetabolic mass originating from the right anterolateral wall of the neopharynx.  -Patient seen by ENT on 10/03/2022 and had FNA of neck mass. Pathology is positive for malignancy. Rare clusters morphologically consistent with squamous cell carcinoma.      8. Pembrolizumab between 10/27/2022 and 11/02/2023. Stopped due to progression.     9. PET scan on 11/20/203 reveals increasing metabolic activity of soft tissue thickening extending from the right neopharynx/superior margin of the laryngectomy site extending inferiorly to the right supraclavicular region.  -PEG placed on 11/16/2023.     10. Carboplatin and taxol started on 12/06/2023.     SUBJECTIVE:    Mr. Warren is a 73-year-old gentleman with recurrent laryngeal squamous cell carcinoma on carboplatin and taxol.  CT scan in February revealed improvement in malignancy.      He has few side effects from chemotherapy.  He is more fatigued.  He also has worsening neuropathy.  He has numbness, tingling and pain in the extremities.  They are getting worse.  It interferes with activities.  He has to hold onto things tightly otherwise they may fall out of his hand.  His walking is pretty good.    No headache.  Sometimes gets some dizziness.  No chest pain.  No shortness of breath at rest.  No nausea or vomiting.  He does PEG tube feeding.  No urinary complaints.  Some constipation.  No bleeding.     PHYSICAL EXAMINATION:   GENERAL:  Alert and oriented x 3.   VITAL SIGNS:  Reviewed.     Rest of the systems not examined.     LABS: CBC and CMP were reviewed.     ASSESSMENT:  1.  A 73-year-old gentleman with recurrent laryngeal squamous cell carcinoma on pembrolizumab.  2.  History of pulmonary embolism on Eliquis.  3.  Hypothyroidism on levothyroxine.  4.  Dysphagia s/p  PEG.  5.  Pancytopenia.  6.  Grade 2 peripheral neuropathy.  7.  Generalized weakness.  8.  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 Reduce dose of taxol.  See ERNESTINE with next treatment.  See me in 6 weeks.  Take gabapentin three times a day.  Take oxycodone as needed.  Vascular surgery appointment.     DISCUSSION:  1.  I had a long discussion with the patient regarding malignancy.  CT scan done in February was again reviewed.  Explained to him it reveals response to treatment.    He is on carboplatin and Taxol.  He is having side effects including worsening neuropathy and fatigue.  He is also cytopenic.  Discussed regarding dose reduction of Taxol.  He is agreeable for it.  Will reduce the dose of Taxol.    If side effects of treatment continues, we will do dose reduction of both carboplatin and Taxol next time.    2.  Patient has neuropathy.  He recently tried Lyrica.  It did not help.  He wants to continue on gabapentin.  He will take 300 mg 3 times a day.    3.  Patient gets some pain in the extremities from neuropathy.  Prescription of oxycodone given.  Side effect of constipation and sedation reviewed.    4.  He has carotid artery narrowing due to malignancy.  Will set him up to see vascular surgery.    5.  Labs were reviewed with him.  He is pancytopenic from chemotherapy.  He does not need any growth factor or transfusion.  It will be rechecked with next chemotherapy.    6.  He will continue on levothyroxine for hypothyroidism.    7.  He had few questions which were all answered.  I will see him in 6 weeks.  In between he will see our ERNESTINE.     Total visit time of 40 minutes.  Time spent in today's visit, review of chart/investigations today, monitoring for toxicity of high risk drugs and documentation today.

## 2024-04-09 NOTE — PROGRESS NOTES
Nursing Note:  Raj Warren presents today for Port labs.    Patient seen by provider today: Yes: Dr. Bean   present during visit today: Not Applicable.    Note: N/A.    Intravenous Access:  Implanted Port.    Discharge Plan:   Patient was sent to [facility] for provider appointment.    Bárbara Garcia RN

## 2024-04-11 NOTE — TELEPHONE ENCOUNTER
Pending Prescriptions:                       Disp   Refills    dexAMETHasone (DECADRON) 4 MG tablet      6 tabl*3            Si tablets (8 mg) by Per Feeding Tube route daily           Take for 3 days, starting the day after chemo. 2           tabs (8mg). Take with food.          Last Written Prescription Date:  2023  Last Fill Quantity: 6,   # refills: 5  Last Office Visit: 2024  Future Office visit:  2024 with Kyree Jurado NP   Next 5 appointments (look out 90 days)      2024 11:20 AM  (Arrive by 11:05 AM)  Return Visit with Marilou Sethi MD  [facility] [place] ([facility] ) [address] [place] [id number] [phone]             Routing refill request to provider for review/approval.    Jimena Mc RN on 2024 at 11:19 AM

## 2024-04-17 NOTE — TELEPHONE ENCOUNTER
Left voicemail for patient to schedule appointment(s), stated this our 2nd attempt at scheduling and provided [facility] telephone number for patient to call back to schedule.      Left VM on number listed as Jovani (Son)

## 2024-04-29 PROBLEM — Z95.828 PORT-A-CATH IN PLACE: Status: ACTIVE | Noted: 2024-01-01

## 2024-04-29 NOTE — PROGRESS NOTES
Infusion Nursing Note:  Raj Warren presents today for carbo, taxol-deferred.    Patient seen by provider today: Yes: HERMELINDA Adorno   present during visit today: Not Applicable.    Note: pt did not meet parameters for treatment today due to thrombocytopenia. Deferred till next week.      Intravenous Access:  Implanted Port.    Treatment Conditions:  Lab Results   Component Value Date    HGB 8.6 (L) 04/29/2024    WBC 3.6 (L) 04/29/2024    ANEU 1.4 (L) 04/09/2024    ANEUTAUTO 2.2 04/29/2024    PLT 59 (L) 04/29/2024        Lab Results   Component Value Date     04/29/2024    POTASSIUM 3.4 04/29/2024    MAG 2.1 11/18/2023    CR 0.66 (L) 04/29/2024    TELMA 9.5 04/29/2024    BILITOTAL 0.5 04/29/2024    ALBUMIN 4.3 04/29/2024    ALT 10 04/29/2024    AST 14 04/29/2024       Results reviewed, labs did NOT meet treatment parameters:.      Site patent and intact, free from redness, edema or discomfort.  No evidence of extravasations.  Access discontinued per protocol.       Discharge Plan:   Patient and/or family verbalized understanding of discharge instructions and all questions answered.  AVS to patient via [employer].  Patient will return Tuesday for next appointment.   Patient discharged in stable condition accompanied by: self.  Departure Mode: Ambulatory.      Karishma Ervin RN

## 2024-04-29 NOTE — PROGRESS NOTES
Nursing Note:  Raj Warren presents today for port labs.    Patient seen by provider today: Yes: Kyree   present during visit today: Not Applicable.    Note: N/A.    Intravenous Access:  Labs drawn without difficulty.  Implanted Port.    Discharge Plan:   Patient was sent to [facility] for provider appointment.    Lisa Deleon RN

## 2024-04-29 NOTE — PROGRESS NOTES
"Oncology Rooming Note    April 29, 2024 10:28 AM   Raj Warren is a 73 year old male who presents for:    Chief Complaint   Patient presents with    Oncology Clinic Visit     Initial Vitals: /68   Pulse 88   Temp 99  F (37.2  C) (Oral)   Resp 16   Wt 72.2 kg (159 lb 3.2 oz)   SpO2 100%   BMI 22.21 kg/m   Estimated body mass index is 22.21 kg/m  as calculated from the following:    Height as of 4/9/24: 1.803 m (5' 10.98\").    Weight as of this encounter: 72.2 kg (159 lb 3.2 oz). Body surface area is 1.9 meters squared.  No Pain (0) Comment: Data Unavailable   No LMP for male patient.  Allergies reviewed: Yes  Medications reviewed: Yes    Medications: refills needed  Pharmacy name entered into Promote[employer]: [id number] [place], [place] - [address] AT [facility] & [facility]    Frailty Screening:   Is the patient here for a new oncology consult visit in cancer care? 2. No      Clinical concerns:   NP was notified.      Kirti Jones CMA            "

## 2024-04-29 NOTE — PROGRESS NOTES
Oncology/Hematology Visit Note  Apr 29, 2024    Reason for Visit: follow up of recurrent laryngeal squamous cell carcinoma  Follows with Dr. Bean  Status post total laryngectomy with bilateral neck dissection in July 2021  Status post radiation from August to September 2021  10/27/2022-started pembrolizumab  11/202/2023-PET scan reveals progression of the disease  11/16/23-PEG tube placed  12/06/2023-started carboplatin/taxol   02/14/2024-CT scan neck reveals improvement in disease  CT chest abdomen pelvis no evidence of metastatic disease    Interval History:  Patient denies bleeding bruising reports he gets constipated sometimes he denies abdominal distention denies abdominal pain denies nausea vomiting denies fever chills sweats cough shortness of breath chest pain       Review of Systems:  14 point ROS of systems including Constitutional, Eyes, Respiratory, Cardiovascular, Gastroenterology, Genitourinary, Integumentary, Muscularskeletal, Psychiatric were all negative except for pertinent positives noted in my HPI.    Physical Examination:  General: The patient is a pleasant male in no acute distress.  /68   Pulse 88   Temp 99  F (37.2  C) (Oral)   Resp 16   Wt 72.2 kg (159 lb 3.2 oz)   SpO2 100%   BMI 22.21 kg/m    HEENT: EOMI, PERRL. Sclerae are anicteric. Oral mucosa is pink and moist with no lesions or thrush.   Lymph: Neck is supple with no lymphadenopathy in the cervical or supraclavicular areas.   Heart: Regular rate and rhythm.   Lungs: Clear to auscultation bilaterally.   GI: Bowel sounds present, soft, nontender with no palpable hepatosplenomegaly or masses.   Extremities: No lower extremity edema noted bilaterally.   Skin: No rashes, petechiae, or bruising noted on exposed skin.    Laboratory Data:  CBC CMP and TSH results reviewed    Assessment and Plan:    recurrent laryngeal squamous cell carcinoma  Follows with Dr. Bean  Previously progressed on pembrolizumab  12/06/2023-started  carboplatin/taxol   03/13/2023-saw Dr. Sethi from ENT- inability to tolerate saliva concern for disease progression.  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 Bean  -send referral in for neurosurgery- patient to see    -Due to neuropathy carboplatin reduced to AUC 4 and Taxol by 20%-ok per Dr. Bean  Patient comes today for cycle 8-day 1  Labs reviewed-platelet count is 59  Hold treatment this week and reschedule for next week and follow-up with me  Postpone all future appointments by 1 week.-  I will check with Dr. Bean in further reduction in carboplatin?    Peripheral neuropathy  As above chemo reduced   - Per patient gabapentin is not helping and neuropathy is getting worse  Reviewed with our pharmacy will switch from gabapentin to Lyrica  As above will reduce carboplatin and taxol       Anemia  Overall stable  Continue to monitor    Leukopenia  ANC is normal   patient is afebrile  Patient meets parameters for treatment today as above chemotherapy doses will be reduced  In the event of fever chills sweats or any signs symptoms of infection patient is advised to go to ER    Thrombocytopenia  Patient denies bleeding.  Complications of thrombocytopenia reviewed  As above will hold chemotherapy this week  -as above we are reducing chemotherapy doses by 20%  In the event of bleeding bruising fall or injury to go to ER      Nutrition  Continue with PEG tube feedings  continue follow-up with nutritionist    Pulmonary embolism  Continue with Eliquis indefinitely  Call clinic or go to ER in the event of bleeding bruising for injury    Constipation  Take MiraLAX daily as needed      Please call our clinic with any changes in health condition or questions      TARUN Sandy [facility]     Chart documentation with Dragon Voice recognition Software. Although reviewed after  completion, some words and grammatical errors may remain.

## 2024-04-29 NOTE — LETTER
"    4/29/2024         RE: Raj Warren  [address]  [facility] [place] [facility] [address]        Dear Colleague,    Thank you for referring your patient, Raj Warren, to the [facility]. Please see a copy of my visit note below.    Oncology Rooming Note    April 29, 2024 10:28 AM   Raj Warren is a 73 year old male who presents for:    Chief Complaint   Patient presents with     Oncology Clinic Visit     Initial Vitals: /68   Pulse 88   Temp 99  F (37.2  C) (Oral)   Resp 16   Wt 72.2 kg (159 lb 3.2 oz)   SpO2 100%   BMI 22.21 kg/m   Estimated body mass index is 22.21 kg/m  as calculated from the following:    Height as of 4/9/24: 1.803 m (5' 10.98\").    Weight as of this encounter: 72.2 kg (159 lb 3.2 oz). Body surface area is 1.9 meters squared.  No Pain (0) Comment: Data Unavailable   No LMP for male patient.  Allergies reviewed: Yes  Medications reviewed: Yes    Medications: refills needed  Pharmacy name entered into LiveSafe: [facility] #[id number] - [place], [place] - [address] AT [facility]    Frailty Screening:   Is the patient here for a new oncology consult visit in cancer care? 2. No      Clinical concerns:   NP was notified.      Kirti Jones, [facility]              Oncology/Hematology Visit Note  Apr 29, 2024    Reason for Visit: follow up of recurrent laryngeal squamous cell carcinoma  Follows with Dr. Bean  Status post total laryngectomy with bilateral neck dissection in July 2021  Status post radiation from August to September 2021  10/27/2022-started pembrolizumab  11/202/2023-PET scan reveals progression of the disease  11/16/23-PEG tube placed  12/06/2023-started carboplatin/taxol   02/14/2024-CT scan neck reveals improvement in disease  CT chest abdomen pelvis no evidence of metastatic disease    Interval History:  Patient denies bleeding bruising reports he gets constipated sometimes he denies abdominal distention denies abdominal " pain denies nausea vomiting denies fever chills sweats cough shortness of breath chest pain       Review of Systems:  14 point ROS of systems including Constitutional, Eyes, Respiratory, Cardiovascular, Gastroenterology, Genitourinary, Integumentary, Muscularskeletal, Psychiatric were all negative except for pertinent positives noted in my HPI.    Physical Examination:  General: The patient is a pleasant male in no acute distress.  /68   Pulse 88   Temp 99  F (37.2  C) (Oral)   Resp 16   Wt 72.2 kg (159 lb 3.2 oz)   SpO2 100%   BMI 22.21 kg/m    HEENT: EOMI, PERRL. Sclerae are anicteric. Oral mucosa is pink and moist with no lesions or thrush.   Lymph: Neck is supple with no lymphadenopathy in the cervical or supraclavicular areas.   Heart: Regular rate and rhythm.   Lungs: Clear to auscultation bilaterally.   GI: Bowel sounds present, soft, nontender with no palpable hepatosplenomegaly or masses.   Extremities: No lower extremity edema noted bilaterally.   Skin: No rashes, petechiae, or bruising noted on exposed skin.    Laboratory Data:  CBC CMP and TSH results reviewed    Assessment and Plan:    recurrent laryngeal squamous cell carcinoma  Follows with Dr. Bean  Previously progressed on pembrolizumab  12/06/2023-started carboplatin/taxol   03/13/2023-saw Dr. Sethi from ENT- inability to tolerate saliva concern for disease progression.  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 Bean  -send referral in for neurosurgery- patient to see    -Due to neuropathy carboplatin reduced to AUC 4 and Taxol by 20%-ok per Dr. Bean  Patient comes today for cycle 8-day 1  Labs reviewed-platelet count is 59  Hold treatment this week and reschedule for next week and follow-up with me  Postpone all future appointments by 1 week.-  I will check with Dr. Bean in further reduction in carboplatin?    Peripheral  neuropathy  As above chemo reduced   - Per patient gabapentin is not helping and neuropathy is getting worse  Reviewed with our pharmacy will switch from gabapentin to Lyrica  As above will reduce carboplatin and taxol       Anemia  Overall stable  Continue to monitor    Leukopenia  ANC is normal   patient is afebrile  Patient meets parameters for treatment today as above chemotherapy doses will be reduced  In the event of fever chills sweats or any signs symptoms of infection patient is advised to go to ER    Thrombocytopenia  Patient denies bleeding.  Complications of thrombocytopenia reviewed  As above will hold chemotherapy this week  -as above we are reducing chemotherapy doses by 20%  In the event of bleeding bruising fall or injury to go to ER      Nutrition  Continue with PEG tube feedings  continue follow-up with nutritionist    Pulmonary embolism  Continue with Eliquis indefinitely  Call clinic or go to ER in the event of bleeding bruising for injury    Constipation  Take MiraLAX daily as needed      Please call our clinic with any changes in health condition or questions      TARUN Sandy CNP  [facility]     Chart documentation with Dragon Voice recognition Software. Although reviewed after completion, some words and grammatical errors may remain.          Again, thank you for allowing me to participate in the care of your patient.        Sincerely,        TARUN Sandy CNP

## 2024-05-07 NOTE — LETTER
"    5/7/2024         RE: Raj Warren  [address]  [place] [address]        Dear Colleague,    Thank you for referring your patient, Raj Warren, to the [facility]. Please see a copy of my visit note below.    Oncology Rooming Note    May 7, 2024 9:32 AM   Raj Warren is a 73 year old male who presents for:    Chief Complaint   Patient presents with     Oncology Clinic Visit     Initial Vitals: /77   Pulse 101   Temp 98.1  F (36.7  C) (Oral)   Resp 18   Wt 70.3 kg (155 lb)   SpO2 100%   BMI 21.63 kg/m   Estimated body mass index is 21.63 kg/m  as calculated from the following:    Height as of 4/9/24: 1.803 m (5' 10.98\").    Weight as of this encounter: 70.3 kg (155 lb). Body surface area is 1.88 meters squared.  Extreme Pain (8) Comment: back   No LMP for male patient.  Allergies reviewed: Yes  Medications reviewed: Yes    Medications: Medication refills not needed today.  Pharmacy name entered into [employer]: [facility] #[id number] - [place], [place] - [address] AT [facility]    Frailty Screening:   Is the patient here for a new oncology consult visit in cancer care? 2. No      Shari J. Schoenberger, CMA              Oncology/Hematology Visit Note  May 7, 2024    Reason for Visit: follow up of recurrent laryngeal squamous cell carcinoma  Follows with Dr. Bean  Status post total laryngectomy with bilateral neck dissection in July 2021  Status post radiation from August to September 2021  10/27/2022-started pembrolizumab  11/202/2023-PET scan reveals progression of the disease  11/16/23-PEG tube placed  12/06/2023-started carboplatin/taxol   02/14/2024-CT scan neck reveals improvement in disease  CT chest abdomen pelvis no evidence of metastatic disease    Interval History:  Patient denies bleeding bruising reports he gets constipated sometimes he denies abdominal distention denies abdominal pain denies nausea vomiting denies fever " chills sweats cough shortness of breath chest pain       Review of Systems:  14 point ROS of systems including Constitutional, Eyes, Respiratory, Cardiovascular, Gastroenterology, Genitourinary, Integumentary, Muscularskeletal, Psychiatric were all negative except for pertinent positives noted in my HPI.    Physical Examination:  General: The patient is a pleasant male in no acute distress.  /77   Pulse 101   Temp 98.1  F (36.7  C) (Oral)   Resp 18   Wt 70.3 kg (155 lb)   SpO2 100%   BMI 21.63 kg/m    HEENT: EOMI, PERRL. Sclerae are anicteric. Oral mucosa is pink and moist with no lesions or thrush.   Lymph: Neck is supple with no lymphadenopathy in the cervical or supraclavicular areas.   Heart: Regular rate and rhythm.   Lungs: Clear to auscultation bilaterally.   GI: Bowel sounds present, soft, nontender with no palpable hepatosplenomegaly or masses.   Extremities: No lower extremity edema noted bilaterally.   Skin: No rashes, petechiae, or bruising noted on exposed skin.    Laboratory Data:  CBC CMP and TSH results reviewed    Assessment and Plan:    recurrent laryngeal squamous cell carcinoma  Follows with Dr. Bean  Previously progressed on pembrolizumab  12/06/2023-started carboplatin/taxol   03/13/2023-saw Dr. Sethi from ENT- inability to tolerate saliva concern for disease progression.  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 Bean  -send referral in for neurosurgery- patient to see    -Due to neuropathy carboplatin reduced to AUC 4 and Taxol by 20%-ok per Dr. Bean  Patient comes today for cycle 8-day 1  Last week chemotherapy on hold due to thrombocytopenia  Today's labs revealed again thrombocytopenia  Reviewed the labs with Dr. Bean-  Per Dr. Bean recommendation will hold treatment.  Schedule for CT scan neck chest abdomen pelvis    05/16-CT scan neck chest abdomen  pelvis  05/2285-[id number] with Dr. Bean to review the CT scan  Schedule tentatively for chemo after he sees Dr. Bean on the 22nd      Peripheral neuropathy  As above chemo reduced   - Per patient gabapentin is not helping and neuropathy is getting worse  Reviewed with our pharmacy will switch from gabapentin to Lyrica  Patient continues to have neuropathy  As above we are holding chemotherapy this week      Anemia  Overall stable  Continue to monitor      Leukopenia  ANC is normal   patient is afebrile  Patient meets parameters for treatment today as above chemotherapy doses will be reduced  In the event of fever chills sweats or any signs symptoms of infection patient is advised to go to ER    Thrombocytopenia  Patient denies bleeding.  Complications of thrombocytopenia reviewed  Taxol reduced by 20% carboplatin reduced to AUC 4 with cycle 7  Patient is on Eliquis but she is high risk for bleeding  As above will hold chemotherapy this week  In the event of bleeding bruising fall injury or edema advised patient to go to ER        Nutrition  Continue with PEG tube feedings  continue follow-up with nutritionist    Pulmonary embolism  Continue with Eliquis indefinitely  Call clinic or go to ER in the event of bleeding bruising for injury    Constipation  Take MiraLAX daily as needed      Please call our clinic with any changes in health condition or questions      TARUN Sandy CNP  [facility]     Chart documentation with Dragon Voice recognition Software. Although reviewed after completion, some words and grammatical errors may remain.          Again, thank you for allowing me to participate in the care of your patient.        Sincerely,        TARUN Sandy CNP

## 2024-05-07 NOTE — PROGRESS NOTES
"Oncology Rooming Note    May 7, 2024 9:32 AM   Raj Warren is a 73 year old male who presents for:    Chief Complaint   Patient presents with    Oncology Clinic Visit     Initial Vitals: /77   Pulse 101   Temp 98.1  F (36.7  C) (Oral)   Resp 18   Wt 70.3 kg (155 lb)   SpO2 100%   BMI 21.63 kg/m   Estimated body mass index is 21.63 kg/m  as calculated from the following:    Height as of 4/9/24: 1.803 m (5' 10.98\").    Weight as of this encounter: 70.3 kg (155 lb). Body surface area is 1.88 meters squared.  Extreme Pain (8) Comment: back   No LMP for male patient.  Allergies reviewed: Yes  Medications reviewed: Yes    Medications: Medication refills not needed today.  Pharmacy name entered into [employer]: [id number] #[id number] - [place], [place] - [address] AT [facility]    Frailty Screening:   Is the patient here for a new oncology consult visit in cancer care? 2. No      Shari J. Schoenberger, CMA            "

## 2024-05-07 NOTE — PROGRESS NOTES
Infusion Nursing Note:  Raj Warren presents today for port labs.    Patient seen by provider today: Yes: Kyree Jurado NP   present during visit today: Not Applicable.    Note: N/A.    Intravenous Access:  Labs drawn without difficulty.  Implanted Port.    Treatment Conditions:  Not Applicable. Labs pending at time of discharge.      Post Infusion Assessment:  Site patent and intact, free from redness, edema or discomfort.  No evidence of extravasations.  Port access left in place for infusion today.       Discharge Plan:   Patient discharged in stable condition accompanied by: self.  Departure Mode: Ambulatory.    Idania Noel RN

## 2024-05-07 NOTE — PROGRESS NOTES
Oncology/Hematology Visit Note  May 7, 2024    Reason for Visit: follow up of recurrent laryngeal squamous cell carcinoma  Follows with Dr. Bean  Status post total laryngectomy with bilateral neck dissection in July 2021  Status post radiation from August to September 2021  10/27/2022-started pembrolizumab  11/202/2023-PET scan reveals progression of the disease  11/16/23-PEG tube placed  12/06/2023-started carboplatin/taxol   02/14/2024-CT scan neck reveals improvement in disease  CT chest abdomen pelvis no evidence of metastatic disease    Interval History:  Patient denies bleeding bruising reports he gets constipated sometimes he denies abdominal distention denies abdominal pain denies nausea vomiting denies fever chills sweats cough shortness of breath chest pain       Review of Systems:  14 point ROS of systems including Constitutional, Eyes, Respiratory, Cardiovascular, Gastroenterology, Genitourinary, Integumentary, Muscularskeletal, Psychiatric were all negative except for pertinent positives noted in my HPI.    Physical Examination:  General: The patient is a pleasant male in no acute distress.  /77   Pulse 101   Temp 98.1  F (36.7  C) (Oral)   Resp 18   Wt 70.3 kg (155 lb)   SpO2 100%   BMI 21.63 kg/m    HEENT: EOMI, PERRL. Sclerae are anicteric. Oral mucosa is pink and moist with no lesions or thrush.   Lymph: Neck is supple with no lymphadenopathy in the cervical or supraclavicular areas.   Heart: Regular rate and rhythm.   Lungs: Clear to auscultation bilaterally.   GI: Bowel sounds present, soft, nontender with no palpable hepatosplenomegaly or masses.   Extremities: No lower extremity edema noted bilaterally.   Skin: No rashes, petechiae, or bruising noted on exposed skin.    Laboratory Data:  CBC CMP and TSH results reviewed    Assessment and Plan:    recurrent laryngeal squamous cell carcinoma  Follows with Dr. Bean  Previously progressed on pembrolizumab  12/06/2023-started  carboplatin/taxol   03/13/2023-saw Dr. Sethi from ENT- inability to tolerate saliva concern for disease progression.  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 Bean  -send referral in for neurosurgery- patient to see    -Due to neuropathy carboplatin reduced to AUC 4 and Taxol by 20%-ok per Dr. Bean  Patient comes today for cycle 8-day 1  Last week chemotherapy on hold due to thrombocytopenia  Today's labs revealed again thrombocytopenia  Reviewed the labs with Dr. Bean-  Per Dr. Bean recommendation will hold treatment.  Schedule for CT scan neck chest abdomen pelvis    05/16-CT scan neck chest abdomen pelvis  05/2200-[id number] with Dr. Bean to review the CT scan  Schedule tentatively for chemo after he sees Dr. Bean on the 22nd      Peripheral neuropathy  As above chemo reduced   - Per patient gabapentin is not helping and neuropathy is getting worse  Reviewed with our pharmacy will switch from gabapentin to Lyrica  Patient continues to have neuropathy  As above we are holding chemotherapy this week      Anemia  Overall stable  Continue to monitor      Leukopenia  ANC is normal   patient is afebrile  Patient meets parameters for treatment today as above chemotherapy doses will be reduced  In the event of fever chills sweats or any signs symptoms of infection patient is advised to go to ER    Thrombocytopenia  Patient denies bleeding.  Complications of thrombocytopenia reviewed  Taxol reduced by 20% carboplatin reduced to AUC 4 with cycle 7  Patient is on Eliquis but she is high risk for bleeding  As above will hold chemotherapy this week  In the event of bleeding bruising fall injury or edema advised patient to go to ER        Nutrition  Continue with PEG tube feedings  continue follow-up with nutritionist    Pulmonary embolism  Continue with Eliquis indefinitely  Call clinic or go to ER in the event of  bleeding bruising for injury    Constipation  Take MiraLAX daily as needed      Please call our clinic with any changes in health condition or questions      TARUN Sandy CNP [facility]     Chart documentation with Dragon Voice recognition Software. Although reviewed after completion, some words and grammatical errors may remain.

## 2024-05-21 NOTE — RESULT ENCOUNTER NOTE
Dear  Briana,    CT scan reveals mild worsening of cancer. We will review it during appointment.    Please, call me with any questions.    Rosa Bean MD

## 2024-05-22 NOTE — PATIENT INSTRUCTIONS
-Start cetuximab.  -Continue Eliquis.  -Continue gabapentin.  -Continue levothyroxine  -Nutrition appointment.  -See ERNESTINE in 2-3 weeks.  -See me in 6-8 weeks.

## 2024-05-22 NOTE — PROGRESS NOTES
"Oncology Rooming Note    May 22, 2024 11:32 AM   Raj Warren is a 74 year old male who presents for:    Chief Complaint   Patient presents with    Oncology Clinic Visit     Initial Vitals: Resp 16   Ht 1.778 m (5' 10\")   Wt 67.1 kg (148 lb)   BMI 21.24 kg/m   Estimated body mass index is 21.24 kg/m  as calculated from the following:    Height as of this encounter: 1.778 m (5' 10\").    Weight as of this encounter: 67.1 kg (148 lb). Body surface area is 1.82 meters squared.  Severe Pain (7) Comment: Data Unavailable   No LMP for male patient.  Allergies reviewed: Yes  Medications reviewed: Yes    Medications: Medication refills not needed today.  Pharmacy name entered into [employer] ([employer]): [id number] - [place], [place] - [address] AT [facility] [address]    Frailty Screening:   Is the patient here for a new oncology consult visit in cancer care? 2. No        Jennifer Farfan MA            "

## 2024-05-22 NOTE — LETTER
"    5/22/2024         RE: Raj Warren  [address]  [place] [address]        Dear Colleague,    Thank you for referring your patient, Raj Warren, to the [facility]. Please see a copy of my visit note below.    Oncology Rooming Note    May 22, 2024 11:32 AM   Raj Warren is a 74 year old male who presents for:    Chief Complaint   Patient presents with     Oncology Clinic Visit     Initial Vitals: Resp 16   Ht 1.778 m (5' 10\")   Wt 67.1 kg (148 lb)   BMI 21.24 kg/m   Estimated body mass index is 21.24 kg/m  as calculated from the following:    Height as of this encounter: 1.778 m (5' 10\").    Weight as of this encounter: 67.1 kg (148 lb). Body surface area is 1.82 meters squared.  Severe Pain (7) Comment: Data Unavailable   No LMP for male patient.  Allergies reviewed: Yes  Medications reviewed: Yes    Medications: Medication refills not needed today.  Pharmacy name entered into [employer]: [id number] - [facility] [address] AT [facility] & [facility]    Frailty Screening:   Is the patient here for a new oncology consult visit in cancer care? 2. No        Jennifer Farfan MA              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  -COMBINED POSITIVE SCORE (CPS):  70  -TUMOR PROPORTION SCORE (TPS):  60%  -FoundationOne: CDKN2A/B c24PKS5e R80* and p14ARF P94L, INPP4B rearrangement intron 11, TP53 H179L.     1.  Patient was evaluated in 2019 for hoarseness of voice.  He was seen by ENT and found to have right vocal cord mass.    -He had biopsy done on 08/07/2019. Pathology revealed moderately-differentiated invasive squamous cell carcinoma.     2.  The patient was seen in the Emergency Room on 04/12/2021 for shortness of breath and admitted.   -CT neck revealed an enhancing soft tissue mass involving the right larynx measuring 3.9 cm.  -Patient had " laryngoscopy with biopsy and tracheostomy done on 04/12/2021.  There was an endolaryngeal mass obscuring the laryngeal landmarks.  Mass appeared to be based on right endolarynx.  Pathology revealed invasive keratinizing moderately-differentiated squamous cell carcinoma.     3.  PET scan on 04/26/2021 revealed hypermetabolic mass involving the supraglottic, glottic and subglottic larynx.  There was a hypermetabolic left level IV lymph node.  There was also hypermetabolic nodule in right middle lobe.     4. CT chest angiogram on 04/27/2021 revealed a large bilateral pulmonary embolism in all the lobes.  There was evidence of right cardiac strain.  -Echocardiogram on 04/27/2021 revealed clot in transit in the right ventricle.  -The patient had emergency pulmonary embolectomy on 04/28/2021.  Pathology revealed organizing clot.  -Patient on Eliquis. Long term anti-coagulation recommended.     5. Total laryngectomy with bilateral neck dissection on 07/08/2021.  Pathology reveals 6 cm squamous cell carcinoma involving the right false and true vocal cords with fixation, the left false and true vocal cords and invades through thyroid cartilage.  There is focal lymphatic invasion.  There is perineural invasion. Benign 75 lymph nodes.  pT4a pN0 disease.     6. Post-op radiation between 08/04/2021 and 09/20/2021. 6600 cGy in 33 fractions.     7. PET/CT on 09/30/2022 revealed hypermetabolic mass originating from the right anterolateral wall of the neopharynx.  -Patient seen by ENT on 10/03/2022 and had FNA of neck mass. Pathology is positive for malignancy. Rare clusters morphologically consistent with squamous cell carcinoma.      8. Pembrolizumab between 10/27/2022 and 11/02/2023. Stopped due to progression.     9. PET scan on 11/20/203 reveals increasing metabolic activity of soft tissue thickening extending from the right neopharynx/superior margin of the laryngectomy site extending inferiorly to the right supraclavicular  region.  -PEG placed on 11/16/2023.     10. Carboplatin and taxol x 7 cycles between 12/06/2023 and 04/09/2024.  -CT scan on 05/16/2024 reveals progression in neck. Overall there is a slightly greater prominence to the recurrent disease with greater central necrosis and persistent  irregular peripheral enhancement along the right lateral margin of the laryngectomy bed extending towards the sternoclavicular joint.     SUBJECTIVE:    Mr. 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      CT scan on 05/16/2024:  -Overall there is a slightly greater prominence to the recurrent disease with greater central necrosis and persistent irregular peripheral enhancement along the right lateral margin of the laryngectomy bed extending towards the sternoclavicular joint.  -No evidence of metastatic disease in the chest, abdomen, or pelvis.     He has side effects from chemotherapy.  He is more fatigued. Peripheral neuropathy has gotten worse. He has numbness, tingling and pain in the extremities. It interferes with activities.  He has to hold onto things tightly otherwise they may fall out of his hand.  His walking is pretty good. Not unstable. No falls.    He is losing weight. He does PEG tube feeding.     No headache. Gets intermittent dizziness.  No chest pain.  No shortness of breath at rest. No nausea or vomiting. No urinary complaints.  Some constipation.  No bleeding.     PHYSICAL EXAMINATION:   GENERAL:  Alert and oriented x 3.   VITAL SIGNS:  Reviewed.     Rest of the systems not examined.      ASSESSMENT:  1.  A 74-year-old gentleman with recurrent laryngeal squamous cell carcinoma on carboplatin and taxol. Disease is progressing.  2.  History of pulmonary embolism on Eliquis.  3.  Hypothyroidism on levothyroxine.  4.  Dysphagia s/p PEG.  5.  Pancytopenia.  6.  Grade 2 peripheral neuropathy.  7.  Generalized weakness.  8.  Right carotid narrowing secondary to  malignancy     PLAN:  -Start cetuximab.  -Continue Eliquis.  -Continue gabapentin.  -Continue levothyroxine  -Nutrition appointment.  -See ERNESTINE in 2-3 weeks.  -See me in 6-8 weeks.     DISCUSSION:  1.  CT scan was reviewed with the patient.  Explained to him it reveals progression of disease.  Clinically also he is progressing.  He continues to lose weight.    2.  Discussed regarding further treatment of laryngeal squamous cell carcinoma.  Patient wants to continue taking treatment to fight the cancer and prolong life.    Patient has progressed on pembrolizumab and carboplatin and Taxol.  We discussed regarding different options for his cancer.  After discussion, plan is to start him on single agent cetuximab.  Regimen of cetuximab was reviewed.  Side effects including skin rash, diarrhea and hypomagnesemia were reviewed.  Patient agreeable for cetuximab.  It will be starting next week.    2.  Patient has peripheral neuropathy.  He is on gabapentin.  He will continue on it.    Taxol is being discontinued.  Explained to him that his neuropathy hopefully improves.    3.  Patient will continue on Eliquis for pulmonary embolism.  He is not having any bleeding complications.    4.  He will continue on levothyroxine for hypothyroidism.    5.  Patient has been losing weight.  That is concerning.  Will set him up to see nutritionist.    6.  Labs on 05/07/2024 revealed pancytopenia.  This is secondary to chemotherapy.  Cytopenias should improve as chemotherapy is being discontinued.    7.  Patient had a few questions which were all answered.  I will see him in 6 to 8 weeks time.  In between he will see our ERNESTINE.     Total visit time of 40 minutes.  Time spent in today's visit, review of chart/investigations today, monitoring for toxicity of high risk drug and documentation today.         Again, thank you for allowing me to participate in the care of your patient.        Sincerely,        Rosa Bean MD

## 2024-05-26 NOTE — PROGRESS NOTES
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  -COMBINED POSITIVE SCORE (CPS):  70  -TUMOR PROPORTION SCORE (TPS):  60%  -[employer]One: CDKN2A/B z70PMA5b R80* and p14ARF P94L, INPP4B rearrangement intron 11, TP53 H179L.     1.  Patient was evaluated in 2019 for hoarseness of voice.  He was seen by ENT and found to have right vocal cord mass.    -He had biopsy done on 08/07/2019. Pathology revealed moderately-differentiated invasive squamous cell carcinoma.     2.  The patient was seen in the Emergency Room on 04/12/2021 for shortness of breath and admitted.   -CT neck revealed an enhancing soft tissue mass involving the right larynx measuring 3.9 cm.  -Patient had laryngoscopy with biopsy and tracheostomy done on 04/12/2021.  There was an endolaryngeal mass obscuring the laryngeal landmarks.  Mass appeared to be based on right endolarynx.  Pathology revealed invasive keratinizing moderately-differentiated squamous cell carcinoma.     3.  PET scan on 04/26/2021 revealed hypermetabolic mass involving the supraglottic, glottic and subglottic larynx.  There was a hypermetabolic left level IV lymph node.  There was also hypermetabolic nodule in right middle lobe.     4. CT chest angiogram on 04/27/2021 revealed a large bilateral pulmonary embolism in all the lobes.  There was evidence of right cardiac strain.  -Echocardiogram on 04/27/2021 revealed clot in transit in the right ventricle.  -The patient had emergency pulmonary embolectomy on 04/28/2021.  Pathology revealed organizing clot.  -Patient on Eliquis. Long term anti-coagulation recommended.     5. Total laryngectomy with bilateral neck dissection on 07/08/2021.  Pathology reveals 6 cm squamous cell carcinoma involving the right false and true vocal cords with fixation, the left false and true vocal cords and invades through thyroid cartilage.  There is focal lymphatic invasion.   There is perineural invasion. Benign 75 lymph nodes.  pT4a pN0 disease.     6. Post-op radiation between 08/04/2021 and 09/20/2021. 6600 cGy in 33 fractions.     7. PET/CT on 09/30/2022 revealed hypermetabolic mass originating from the right anterolateral wall of the neopharynx.  -Patient seen by ENT on 10/03/2022 and had FNA of neck mass. Pathology is positive for malignancy. Rare clusters morphologically consistent with squamous cell carcinoma.      8. Pembrolizumab between 10/27/2022 and 11/02/2023. Stopped due to progression.     9. PET scan on 11/20/203 reveals increasing metabolic activity of soft tissue thickening extending from the right neopharynx/superior margin of the laryngectomy site extending inferiorly to the right supraclavicular region.  -PEG placed on 11/16/2023.     10. Carboplatin and taxol x 7 cycles between 12/06/2023 and 04/09/2024.  -CT scan on 05/16/2024 reveals progression in neck. Overall there is a slightly greater prominence to the recurrent disease with greater central necrosis and persistent  irregular peripheral enhancement along the right lateral margin of the laryngectomy bed extending towards the sternoclavicular joint.     SUBJECTIVE:    Mr. 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      CT scan on 05/16/2024:  -Overall there is a slightly greater prominence to the recurrent disease with greater central necrosis and persistent irregular peripheral enhancement along the right lateral margin of the laryngectomy bed extending towards the sternoclavicular joint.  -No evidence of metastatic disease in the chest, abdomen, or pelvis.     He has side effects from chemotherapy.  He is more fatigued. Peripheral neuropathy has gotten worse. He has numbness, tingling and pain in the extremities. It interferes with activities.  He has to hold onto things tightly otherwise they may fall out of his hand.  His walking is pretty  good. Not unstable. No falls.    He is losing weight. He does PEG tube feeding.     No headache. Gets intermittent dizziness.  No chest pain.  No shortness of breath at rest. No nausea or vomiting. No urinary complaints.  Some constipation.  No bleeding.     PHYSICAL EXAMINATION:   GENERAL:  Alert and oriented x 3.   VITAL SIGNS:  Reviewed.     Rest of the systems not examined.      ASSESSMENT:  1.  A 74-year-old gentleman with recurrent laryngeal squamous cell carcinoma on carboplatin and taxol. Disease is progressing.  2.  History of pulmonary embolism on Eliquis.  3.  Hypothyroidism on levothyroxine.  4.  Dysphagia s/p PEG.  5.  Pancytopenia.  6.  Grade 2 peripheral neuropathy.  7.  Generalized weakness.  8.  Right carotid narrowing secondary to malignancy     PLAN:  -Start cetuximab.  -Continue Eliquis.  -Continue gabapentin.  -Continue levothyroxine  -Nutrition appointment.  -See ERNESTINE in 2-3 weeks.  -See me in 6-8 weeks.     DISCUSSION:  1.  CT scan was reviewed with the patient.  Explained to him it reveals progression of disease.  Clinically also he is progressing.  He continues to lose weight.    2.  Discussed regarding further treatment of laryngeal squamous cell carcinoma.  Patient wants to continue taking treatment to fight the cancer and prolong life.    Patient has progressed on pembrolizumab and carboplatin and Taxol.  We discussed regarding different options for his cancer.  After discussion, plan is to start him on single agent cetuximab.  Regimen of cetuximab was reviewed.  Side effects including skin rash, diarrhea and hypomagnesemia were reviewed.  Patient agreeable for cetuximab.  It will be starting next week.    2.  Patient has peripheral neuropathy.  He is on gabapentin.  He will continue on it.    Taxol is being discontinued.  Explained to him that his neuropathy hopefully improves.    3.  Patient will continue on Eliquis for pulmonary embolism.  He is not having any bleeding  complications.    4.  He will continue on levothyroxine for hypothyroidism.    5.  Patient has been losing weight.  That is concerning.  Will set him up to see nutritionist.    6.  Labs on 05/07/2024 revealed pancytopenia.  This is secondary to chemotherapy.  Cytopenias should improve as chemotherapy is being discontinued.    7.  Patient had a few questions which were all answered.  I will see him in 6 to 8 weeks time.  In between he will see our ERNESTINE.     Total visit time of 40 minutes.  Time spent in today's visit, review of chart/investigations today, monitoring for toxicity of high risk drug and documentation today.

## 2024-06-07 NOTE — PROGRESS NOTES
Social Work - Telephone/MyChart message  [facility]  Data:   Patient Name: Raj Warren  Goes By: Raj    /Age: 1950 (74 year old)      Referral Source: Bárbara Garcia RN    Reason for Referral: Missed appointments: 24 & 24. As of time of this clinician's outreach, Raj had not responded to MyChart message from clinic sent 24.     Collaborated with:   - Clinic Nurse Manager  - Nurse Care Coordinator    Intervention:   This clinician attempted to outreach to Raj today, went straight to voicemail which was full, unable to leave voicemail.     SW attempted to reach son Jovani. House phone rang out, no voicemail available, cell phone belonged to different person who had no relation to Jovani.     SW attempted to reach sister (Yeni Jensen [phone]), and left generic voicemail requesting return call.      SW attempted significant other (Alexus [phone]), and left generic voicemail requesting return call.    SW sent Radiushart message to Raj requesting message back before 3:30pm.    Plan:  will await patient's return phone call/message and provide assistance at that time. If no message is received by 3:30pm today, will request Welfare Check.   Kezia Villavicencio, STACEY, LICSW, OSW-C  Clinical - Adult Oncology  Phone: [phone]  She/Her/Hers  [facility]: Marycruz HOFF  8am-4:30pm  [facility]: JENNI Moreau F 8am-4:30pm   Support Groups at [facility]: Social Work Services for Cancer Patients ([email or web address])    Addendum:   3:45pm: Clinic had not received contact from Raj or emergency contacts. This clinician called [employer] and spoke with dispatch, requesting Welfare Check.    4:45pm: This clinician had yet to hear outcome from Welfare Check. This clinician contacted [employer] to ask for outcome of check. [facility]  "Jeremias Lipscomb. This clinician left detailed voicemail asking for return call with outcomes of visit.     4:52pm: Received voicemail from Alexus Dc ([phone]) who reported that she has \"not conversed with Raj in a while\"  "

## 2024-06-10 ENCOUNTER — PATIENT OUTREACH (OUTPATIENT)
Dept: CARE COORDINATION | Facility: CLINIC | Age: 74
End: 2024-06-10
Payer: COMMERCIAL

## 2024-06-10 NOTE — PROGRESS NOTES
Social Work - Follow-Up  [facility]    Data/Intervention:    Patient Name: Raj Warren Goes By: Raj    /Age: 1950 (74 year old)    Reason for Follow-Up:  This clinician followed up with [employer] regarding Welfare Check    Intervention/Education/Resources Provided:  This clinician spoke with Officer Jeremias Lipscomb who reported that he conducted the Welfare Check and that Raj was dead upon their arrival. Date of death unknown.     SW updated care team. All upcoming appointments removed from calendar.     Kezia Villavicencio, STACEY, LICSW, OSW-C  Clinical - Adult Oncology  Phone: [phone]  She/Her/Hers  [facility]: Marycruz HOFF  8am-4:30pm  [facility]: JENNI Moreau F 8am-4:30pm   Support Groups at [facility]: Social Work Services for Cancer Patients ([email or web address])

## 2024-06-19 ENCOUNTER — MEDICAL CORRESPONDENCE (OUTPATIENT)
Dept: HEALTH INFORMATION MANAGEMENT | Facility: CLINIC | Age: 74
End: 2024-06-19
Payer: COMMERCIAL

## 2024-10-26 NOTE — PROGRESS NOTES
Medical Assistant Note:  Raj Warren presents today for lab draw.    Patient seen by provider today: Yes: GK.   present during visit today: Not Applicable.    Concerns: No Concerns.    Procedure:  Lab draw site: Right Hand, Needle type: Butterfly, Gauge: 23.    Post Assessment:  Labs drawn without difficulty: Yes.    Discharge Plan:  Departure Mode: Ambulatory.    Face to Face Time: 4 min.    Shari Schoenberger, CMA  
No

## 2024-12-08 NOTE — PROGRESS NOTES
Received positive distress screen regarding patient's concern for ability to eat.  Called and spoke with patient via phone this morning. Patient denies any nutrition concerns at this time, states his appetite has been good and he is eating well. Actually has been gaining a little bit of weight over the past year. Provided patient with RD contact information if nutrition questions or concerns arise. Patient verbalized understanding.     Verito Capellan RD, LD  Clinical Dietitian  [facility]  [phone]     (1) More than 48 hours/None

## 2024-12-24 NOTE — PLAN OF CARE
Patient ID: Ally Blackwood : 2000 MRN: [id number]    Ally Blackwood is a 24 year old male     -here for chronic medical problems    -Patient  has a past surgical history that includes Circumcision, primary.  -Patient's family history includes Patient is unaware of any medical problems in his father, mother, sister, and sister.  -Patient's  reports that he has never smoked. He has never been exposed to tobacco smoke. He has never used smokeless tobacco. He reports that he does not drink alcohol and does not use drugs.  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 Additional physical exam findings below in addition to any documented above.  Constitutional: no acute distress, sitting comfortably in chair  HEENT: head normocephalic and atraumatic  Cardiovascular: heart regular rate and regular rhythm  Respiratory: lungs clear to auscultation bilaterally, no respiratory distress  Abdomen: soft, nontender  Neurological: awake and alert  Skin: warm, dry    Assessment and Plan  Problem List Items Addressed This Visit    None  Visit Diagnoses       Need for immunization against influenza     Status: POD#2 s/p total laryngectomy, and bilateral neck dissection  Vitals: VSS on RA  Neuros: A&O x4, generalized weakness, forgetful, numbness around incision site and right pinky and ring finger  IV: PIV infusing LR at 100mL/hr  Labs/Electrolytes: WNL  Resp/trach: #9 Larytube in place with HME, lavaged 3x, suctioned once for small amount of blood streaked secretions. Pt oral suctioning independently with red chayo   Diet: NPO, PEG with TF advanced 0700 to 30 ml/hr. Due to advance Q8h until goal of 55mL/hr is attained.  Bowel status:  BS +, Senna given  : Voiding spontaneously in urinal  Skin: Bilateral neck incisions sutured, cleansed with NS and Bacitracin applied, four BRITTNI's to neck with serosanguinous drainage  Pain: c/o Neck and shoulder pain controlled with Oxycodone and IV Dilaudid x1  Activity: Up with A1  Social: Cooperative with cares.  Plan: Margy BEAVERS completed today, TF PLC Monday at 0900, discharge home when medically stable, continue to monitor and follow POC   -  Primary    Relevant Orders    INFLUENZA (FLULAVAL)    Need for vaccination        Relevant Orders    TDAP (BOOSTRIX)    Need for Tdap vaccination        Relevant Orders    TDAP (BOOSTRIX)    Vitamin D deficiency        Relevant Orders    Vitamin D -25 Hydroxy    Other hyperlipidemia        Relevant Orders    Thyroid Stimulating Hormone Reflex    CBC with Automated Differential    Comprehensive Metabolic Panel    Glycohemoglobin    Lipid Panel With Reflex    Chronic right shoulder pain        Relevant Orders    SERVICE TO ORTHOPEDICS    SERVICE TO PHYSICAL THERAPY          #Hyperlipidemia-healthy diet and exercise, 10-year ASCVD risk score is n/a, recheck lipids next visit and consider medication    #Vitamin D deficiency-vitamin D supplementation    #Chronic right shoulder pain-stretches, heat/cold therapy prn, start PT, see ortho if no improvement    #Preventive Health   -Discussed importance of any missing vaccinations.    -Return in about 3 months (around 3/24/2025). or sooner as needed    No LOS data to display  (including reviewing the patient's chart, obtaining history, performing an exam, counseling the patient, coordinating care, and documenting clinical information in the EMR)     We discussed the risks and benefits of any medications started or modified as well as significant and common adverse effects to monitor as appropriate to problems addressed at today's visit.    We discussed healthy lifestyle modifications including diet and regular exercise as pertinent to problems addressed at today's visit. In regards to diet, particularly to decrease salt, fat, and/or carbohydrate intake as appropriate to problems addressed at today's visit.     In addition to the above, as medically indicated and appropriate to problems addressed at today's visit, we discussed at length anticipatory guidance and indications to return to clinic for earlier appointment, go to urgent care, go to the nearest emergency room,  and/or call 911.    Prior to discharge all questions and concerns were addressed.  Patient expressed understanding of the working diagnoses, assessments, plan of care.  Plan was made with respect to patient autonomy using a shared decision making model.  Johnny Garcia DO, MA Bioethics  -----

## (undated) DEVICE — GLOVE PROTEXIS MICRO 6.0  2D73PM60

## (undated) DEVICE — SPONGE PEANUT

## (undated) DEVICE — PACK NEURO MINOR UMMC SNE32MNMU4

## (undated) DEVICE — DRAPE SPLIT EENT 76X124" 3X28" 9447

## (undated) DEVICE — DEVICE ASSEMBLY SUCTION/ANTI COAG BTC93

## (undated) DEVICE — SPONGE RAY-TEC 4X4" 7317

## (undated) DEVICE — SU ETHILON 3-0 PS-1 18" 1663H

## (undated) DEVICE — LINEN TOWEL PACK X30 5481

## (undated) DEVICE — SU ETHIBOND 3-0 BBDA 36" X588H

## (undated) DEVICE — ENDO SYSTEM WATER BOTTLE & TUBING W/CO2 FILTER 00711549

## (undated) DEVICE — LINEN GOWN XLG 5407

## (undated) DEVICE — CANNULA PERFUSION ARTERIAL 20FR 12" 77420

## (undated) DEVICE — SYR 10ML SLIP TIP W/O NDL 303134

## (undated) DEVICE — NDL 19GA 1.5"

## (undated) DEVICE — BONE WAX OSTENE 3.5GM CT410

## (undated) DEVICE — SUCTION CANISTER MEDIVAC LINER 3000ML W/LID 65651-530

## (undated) DEVICE — PACK MINI VAC CUSTOM CARDOPULMONARY BB5Z97R15

## (undated) DEVICE — SYR EAR BULB 3OZ 0035830

## (undated) DEVICE — LINEN TOWEL PACK X6 WHITE 5487

## (undated) DEVICE — GOWN IMPERVIOUS SPECIALTY XLG/XLONG 32474

## (undated) DEVICE — COVER TABLE POLY 65X90" 8186

## (undated) DEVICE — Device

## (undated) DEVICE — SU SILK 1 TIE 10X30" SA87G

## (undated) DEVICE — ESU HARMONIC FOCUS SHEARS CVD 9CM HAR9F

## (undated) DEVICE — KIT WASH CELL SAVING ATL2001

## (undated) DEVICE — LINEN GOWN LG 5406

## (undated) DEVICE — DRSG TELFA 3X8" 1238

## (undated) DEVICE — DRAPE SHEET REV FOLD 3/4 9349

## (undated) DEVICE — SU PDS II 3-0 SH 27" Z316H

## (undated) DEVICE — ESU PENCIL W/HOLSTER E2350H

## (undated) DEVICE — LINEN LEG ROLL 5489

## (undated) DEVICE — CLIP HORIZON SM RED WIDE SLOT 001201

## (undated) DEVICE — SU VICRYL 3-0 FS-1 27" J442H

## (undated) DEVICE — RETR ELASTIC STAYS LONE STAR BLUNT DUAL LEAD 3550-1G

## (undated) DEVICE — SOL ADH LIQUID BENZOIN SWAB 0.6ML C1544

## (undated) DEVICE — SUCTION CATH 18FR ORAL TRI-FLO T62

## (undated) DEVICE — CONNECTOR DRAIN CHEST Y EXTENSION SET 19909

## (undated) DEVICE — SU PROLENE 4-0 RB-1DA 18" 8757H

## (undated) DEVICE — SU ETHIBOND 0 CT-1 CR 8X18" CX21D

## (undated) DEVICE — SU PLEDGET SOFT TFE 3/8"X3/26"X1/16" PCP40

## (undated) DEVICE — SU STEEL 6 CCS 4X18" M654G

## (undated) DEVICE — CANNULA PERFUSION VENOUS 30FR 12-15" SGL STAGE STR 66130

## (undated) DEVICE — SOL WATER IRRIG 1000ML BOTTLE 2F7114

## (undated) DEVICE — ENDO TOOTH GUARD SAC2001

## (undated) DEVICE — SU VICRYL 0 CTX 36" J370H

## (undated) DEVICE — PROTECTOR ARM ONE-STEP TRENDELENBURG 40418

## (undated) DEVICE — CANNULA PERFUSION 24FR SGL STAGE RT ANGLE 69324

## (undated) DEVICE — GLOVE PROTEXIS MICRO 8.5  2D73PM85

## (undated) DEVICE — SU SILK 2-0 TIE 12X30" A305H

## (undated) DEVICE — CABLE MYO/LEAD PACING WHITE DISP 019-530

## (undated) DEVICE — ENDO CAP AND TUBING STERILE FOR ENDOGATOR  100130

## (undated) DEVICE — TUBING SUCTION 12"X1/4" N612

## (undated) DEVICE — SYR 10ML SLIP TIP W/O NDL

## (undated) DEVICE — SURGICEL HEMOSTAT 2X14" 1951

## (undated) DEVICE — LINEN TOWEL PACK X5 5464

## (undated) DEVICE — SOL NACL 0.9% IRRIG 1000ML BOTTLE 2F7124

## (undated) DEVICE — DRAIN JACKSON PRATT RESERVOIR 100ML SU130-1305

## (undated) DEVICE — PACK OPEN HEART PV12OH524

## (undated) DEVICE — DRAPE MAYO STAND 23X54 8337

## (undated) DEVICE — GLOVE BIOGEL NEODERM 7.5 LF 42975

## (undated) DEVICE — SU SILK 2-0 SH CR 5X18" C0125

## (undated) DEVICE — TOURNIQUET VASCULAR

## (undated) DEVICE — ESU PENCIL SMOKE EVAC W/ROCKER SWITCH 0703-047-000

## (undated) DEVICE — VALVE VRV 9156R2

## (undated) DEVICE — PACK SET-UP STD 9102

## (undated) DEVICE — DRAIN CHEST TUBE RIGHT ANGLED 32FR 8132

## (undated) DEVICE — KIT CONNECTOR FOR OLYMPUS ENDOSCOPES DEFENDO 100310

## (undated) DEVICE — TUBING SUCTION 10'X3/16" N510

## (undated) DEVICE — PREP POVIDONE IODINE SOLUTION 10% 4OZ

## (undated) DEVICE — PEN MARKING SKIN W/LABELS 31145884

## (undated) DEVICE — SPONGE LAP 18X18" X8435

## (undated) DEVICE — GLOVE PROTEXIS MICRO 8.0  2D73PM80

## (undated) DEVICE — SU PROLENE 4-0 RB-1DA 36" 8557H

## (undated) DEVICE — CLIP HORIZON MED BLUE 002200

## (undated) DEVICE — GLOVE PROTEXIS W/NEU-THERA 7.5  2D73TE75

## (undated) DEVICE — SU PROLENE 5-0 RB-2DA 30" 8710H

## (undated) DEVICE — ESU GROUND PAD UNIVERSAL W/O CORD

## (undated) DEVICE — KIT ENDO FIRST STEP DISINFECTANT 200ML W/POUCH EP-4

## (undated) DEVICE — DRAIN JACKSON PRATT 10MM FLAT 4/4 PERF SU130-1311

## (undated) DEVICE — SPONGE KITTNER 30-101

## (undated) DEVICE — TUBE GASTROSTOMY PONSKY PULL DELUXE 20FR 000792

## (undated) DEVICE — DRSG WOUND VAC SPONGE MED BLACK M8275052/5

## (undated) DEVICE — DRSG ADAPTIC 3X8" 6113

## (undated) DEVICE — GLOVE PROTEXIS W/NEU-THERA 8.0  2D73TE80

## (undated) DEVICE — SU POLYESTER TAPE 30" 8618-00

## (undated) DEVICE — GLOVE PROTEXIS W/NEU-THERA 6.5  2D73TE65

## (undated) DEVICE — CATH TRAY FOLEY SURESTEP 16FR W/TMP PRB STLK LATEX A319416AM

## (undated) DEVICE — BASIN SET MINOR DISP

## (undated) DEVICE — SU ETHILON 2-0 FS 18" 664G

## (undated) DEVICE — DRAIN SUMP INTRACARDIAC 20FR 12" POOL TIP 12112

## (undated) DEVICE — CANISTER WOUND VAC W/GEL 500ML M8275063/5

## (undated) DEVICE — SU CHROMIC 4-0 RB-1 27" U203H

## (undated) DEVICE — DRAIN CHEST TUBE 32FR STR 8032

## (undated) DEVICE — RESERVOIR CELL SAVING BLOOD COLLECTION EL2120

## (undated) DEVICE — LIGHT HANDLE X1 31140133

## (undated) DEVICE — PACK TUBING MINI VAC CUSTOM 1/2X3/8T BB9J78R4

## (undated) DEVICE — SPONGE COTTONOID 1X1" 80-1403

## (undated) DEVICE — SU PROLENE 3-0 SHDA 36" 8522H

## (undated) DEVICE — SU VICRYL 3-0 SH 8X18" UND J864D

## (undated) DEVICE — SOL NACL 0.9% IRRIG 1000ML BOTTLE 07138-09

## (undated) DEVICE — SU ETHIBOND 2-0 SHDA 30" X563H

## (undated) DEVICE — ESU ELEC BLADE 2.75" COATED/INSULATED E1455

## (undated) DEVICE — SPONGE COTTONOID 1/2X3" 80-1407

## (undated) DEVICE — WIRE GUIDE 0.035"X260CM NAVIPRO ANG 5625 M00556251

## (undated) DEVICE — PACK HEAD NECK SEN15HNFSF

## (undated) DEVICE — BLADE KNIFE SURG 15 371115

## (undated) DEVICE — PREP DURAPREP 06ML APL 8635

## (undated) DEVICE — CANNULA PERFUSION AORTIC ROOT 22FR 20012

## (undated) DEVICE — PREP SKIN SCRUB TRAY 4461A

## (undated) DEVICE — SU PROLENE 6-0 RB-2DA 30" 8711H

## (undated) DEVICE — ESU GROUND PAD ADULT W/CORD E7507

## (undated) DEVICE — JELLY LUBRICATING SURGILUBE 4OZ TUBE

## (undated) DEVICE — DRAPE MINOR PROCEDURE LAP 29496

## (undated) DEVICE — ESU HOLSTER PLASTIC DISP E2400

## (undated) DEVICE — NDL COUNTER 20CT 31142493

## (undated) DEVICE — SU PROLENE 4-0 SHDA 36" 8521H

## (undated) DEVICE — LEAD PACER MYOCARDIAL BIPOLAR TEMPORARY 53CM 6495F

## (undated) DEVICE — SUCTION MANIFOLD NEPTUNE 2 SYS 4 PORT 0702-020-000

## (undated) RX ORDER — EPINEPHRINE 1 MG/ML
INJECTION, SOLUTION INTRAMUSCULAR; SUBCUTANEOUS
Status: DISPENSED
Start: 2021-04-12

## (undated) RX ORDER — GLYCOPYRROLATE 0.2 MG/ML
INJECTION, SOLUTION INTRAMUSCULAR; INTRAVENOUS
Status: DISPENSED
Start: 2021-07-08

## (undated) RX ORDER — HEPARIN SODIUM 1000 [USP'U]/ML
INJECTION, SOLUTION INTRAVENOUS; SUBCUTANEOUS
Status: DISPENSED
Start: 2021-04-28

## (undated) RX ORDER — CEFAZOLIN SODIUM 1 G/3ML
INJECTION, POWDER, FOR SOLUTION INTRAMUSCULAR; INTRAVENOUS
Status: DISPENSED
Start: 2021-04-28

## (undated) RX ORDER — LIDOCAINE HYDROCHLORIDE 20 MG/ML
INJECTION, SOLUTION EPIDURAL; INFILTRATION; INTRACAUDAL; PERINEURAL
Status: DISPENSED
Start: 2021-07-08

## (undated) RX ORDER — HEPARIN SODIUM 200 [USP'U]/100ML
INJECTION, SOLUTION INTRAVENOUS
Status: DISPENSED
Start: 2021-04-30

## (undated) RX ORDER — FENTANYL CITRATE 50 UG/ML
INJECTION, SOLUTION INTRAMUSCULAR; INTRAVENOUS
Status: DISPENSED
Start: 2021-04-12

## (undated) RX ORDER — FENTANYL CITRATE-0.9 % NACL/PF 10 MCG/ML
PLASTIC BAG, INJECTION (ML) INTRAVENOUS
Status: DISPENSED
Start: 2021-07-08

## (undated) RX ORDER — PAPAVERINE HYDROCHLORIDE 30 MG/ML
INJECTION INTRAMUSCULAR; INTRAVENOUS
Status: DISPENSED
Start: 2021-04-28

## (undated) RX ORDER — HEPARIN SODIUM 200 [USP'U]/100ML
INJECTION, SOLUTION INTRAVENOUS
Status: DISPENSED
Start: 2022-02-07

## (undated) RX ORDER — AMPICILLIN AND SULBACTAM 2; 1 G/1; G/1
INJECTION, POWDER, FOR SOLUTION INTRAMUSCULAR; INTRAVENOUS
Status: DISPENSED
Start: 2021-07-08

## (undated) RX ORDER — LIDOCAINE HYDROCHLORIDE 20 MG/ML
SOLUTION OROPHARYNGEAL
Status: DISPENSED
Start: 2024-01-01

## (undated) RX ORDER — DEXAMETHASONE SODIUM PHOSPHATE 4 MG/ML
INJECTION, SOLUTION INTRA-ARTICULAR; INTRALESIONAL; INTRAMUSCULAR; INTRAVENOUS; SOFT TISSUE
Status: DISPENSED
Start: 2021-04-12

## (undated) RX ORDER — FENTANYL CITRATE 0.05 MG/ML
INJECTION, SOLUTION INTRAMUSCULAR; INTRAVENOUS
Status: DISPENSED
Start: 2021-04-28

## (undated) RX ORDER — EPHEDRINE SULFATE 50 MG/ML
INJECTION, SOLUTION INTRAMUSCULAR; INTRAVENOUS; SUBCUTANEOUS
Status: DISPENSED
Start: 2021-07-08

## (undated) RX ORDER — PROPOFOL 10 MG/ML
INJECTION, EMULSION INTRAVENOUS
Status: DISPENSED
Start: 2021-07-08

## (undated) RX ORDER — HEPARIN SODIUM (PORCINE) LOCK FLUSH IV SOLN 100 UNIT/ML 100 UNIT/ML
SOLUTION INTRAVENOUS
Status: DISPENSED
Start: 2024-01-01

## (undated) RX ORDER — HYDROMORPHONE HYDROCHLORIDE 1 MG/ML
INJECTION, SOLUTION INTRAMUSCULAR; INTRAVENOUS; SUBCUTANEOUS
Status: DISPENSED
Start: 2021-04-12

## (undated) RX ORDER — LIDOCAINE/PRILOCAINE 2.5 %-2.5%
CREAM (GRAM) TOPICAL
Status: DISPENSED
Start: 2022-10-03

## (undated) RX ORDER — ONDANSETRON 2 MG/ML
INJECTION INTRAMUSCULAR; INTRAVENOUS
Status: DISPENSED
Start: 2021-07-08

## (undated) RX ORDER — GLYCOPYRROLATE 0.2 MG/ML
INJECTION, SOLUTION INTRAMUSCULAR; INTRAVENOUS
Status: DISPENSED
Start: 2021-04-12

## (undated) RX ORDER — CEFAZOLIN SODIUM 2 G/100ML
INJECTION, SOLUTION INTRAVENOUS
Status: DISPENSED
Start: 2024-01-01

## (undated) RX ORDER — FENTANYL CITRATE 50 UG/ML
INJECTION, SOLUTION INTRAMUSCULAR; INTRAVENOUS
Status: DISPENSED
Start: 2021-07-08

## (undated) RX ORDER — SODIUM CHLORIDE, SODIUM LACTATE, POTASSIUM CHLORIDE, CALCIUM CHLORIDE 600; 310; 30; 20 MG/100ML; MG/100ML; MG/100ML; MG/100ML
INJECTION, SOLUTION INTRAVENOUS
Status: DISPENSED
Start: 2021-07-08

## (undated) RX ORDER — PROPOFOL 10 MG/ML
INJECTION, EMULSION INTRAVENOUS
Status: DISPENSED
Start: 2021-04-12

## (undated) RX ORDER — VECURONIUM BROMIDE 1 MG/ML
INJECTION, POWDER, LYOPHILIZED, FOR SOLUTION INTRAVENOUS
Status: DISPENSED
Start: 2021-04-28

## (undated) RX ORDER — PROPOFOL 10 MG/ML
INJECTION, EMULSION INTRAVENOUS
Status: DISPENSED
Start: 2021-04-28

## (undated) RX ORDER — HYDROMORPHONE HYDROCHLORIDE 1 MG/ML
INJECTION, SOLUTION INTRAMUSCULAR; INTRAVENOUS; SUBCUTANEOUS
Status: DISPENSED
Start: 2021-07-08

## (undated) RX ORDER — CEFAZOLIN SODIUM 2 G/100ML
INJECTION, SOLUTION INTRAVENOUS
Status: DISPENSED
Start: 2021-04-12

## (undated) RX ORDER — DEXAMETHASONE SODIUM PHOSPHATE 4 MG/ML
INJECTION, SOLUTION INTRA-ARTICULAR; INTRALESIONAL; INTRAMUSCULAR; INTRAVENOUS; SOFT TISSUE
Status: DISPENSED
Start: 2021-07-08

## (undated) RX ORDER — ONDANSETRON 2 MG/ML
INJECTION INTRAMUSCULAR; INTRAVENOUS
Status: DISPENSED
Start: 2021-04-12

## (undated) RX ORDER — OXYMETAZOLINE HYDROCHLORIDE 0.05 G/100ML
SPRAY NASAL
Status: DISPENSED
Start: 2021-07-08

## (undated) RX ORDER — FENTANYL CITRATE 0.05 MG/ML
INJECTION, SOLUTION INTRAMUSCULAR; INTRAVENOUS
Status: DISPENSED
Start: 2021-04-12

## (undated) RX ORDER — LIDOCAINE HYDROCHLORIDE AND EPINEPHRINE 10; 10 MG/ML; UG/ML
INJECTION, SOLUTION INFILTRATION; PERINEURAL
Status: DISPENSED
Start: 2021-07-08

## (undated) RX ORDER — LIDOCAINE HYDROCHLORIDE 20 MG/ML
INJECTION, SOLUTION EPIDURAL; INFILTRATION; INTRACAUDAL; PERINEURAL
Status: DISPENSED
Start: 2021-04-12

## (undated) RX ORDER — LIDOCAINE HYDROCHLORIDE 20 MG/ML
INJECTION, SOLUTION EPIDURAL; INFILTRATION; INTRACAUDAL; PERINEURAL
Status: DISPENSED
Start: 2021-04-28

## (undated) RX ORDER — ALBUMIN, HUMAN INJ 5% 5 %
SOLUTION INTRAVENOUS
Status: DISPENSED
Start: 2021-04-28

## (undated) RX ORDER — LIDOCAINE HYDROCHLORIDE 20 MG/ML
JELLY TOPICAL
Status: DISPENSED
Start: 2023-01-01

## (undated) RX ORDER — FENTANYL CITRATE 50 UG/ML
INJECTION, SOLUTION INTRAMUSCULAR; INTRAVENOUS
Status: DISPENSED
Start: 2021-04-30

## (undated) RX ORDER — LIDOCAINE HYDROCHLORIDE 10 MG/ML
INJECTION, SOLUTION INFILTRATION; PERINEURAL
Status: DISPENSED
Start: 2021-04-30

## (undated) RX ORDER — LIDOCAINE HYDROCHLORIDE 10 MG/ML
INJECTION, SOLUTION INFILTRATION; PERINEURAL
Status: DISPENSED
Start: 2022-02-07

## (undated) RX ORDER — PROTAMINE SULFATE 10 MG/ML
INJECTION, SOLUTION INTRAVENOUS
Status: DISPENSED
Start: 2021-04-28

## (undated) RX ORDER — NEOSTIGMINE METHYLSULFATE 1 MG/ML
VIAL (ML) INJECTION
Status: DISPENSED
Start: 2021-04-12

## (undated) RX ORDER — LIDOCAINE HYDROCHLORIDE 10 MG/ML
INJECTION, SOLUTION INFILTRATION; PERINEURAL
Status: DISPENSED
Start: 2024-01-01

## (undated) RX ORDER — FENTANYL CITRATE 50 UG/ML
INJECTION, SOLUTION INTRAMUSCULAR; INTRAVENOUS
Status: DISPENSED
Start: 2023-01-01

## (undated) RX ORDER — CEFAZOLIN SODIUM 2 G/100ML
INJECTION, SOLUTION INTRAVENOUS
Status: DISPENSED
Start: 2023-01-01

## (undated) RX ORDER — FENTANYL CITRATE 50 UG/ML
INJECTION, SOLUTION INTRAMUSCULAR; INTRAVENOUS
Status: DISPENSED
Start: 2024-01-01